# Patient Record
Sex: FEMALE | Race: BLACK OR AFRICAN AMERICAN | NOT HISPANIC OR LATINO | Employment: OTHER | URBAN - METROPOLITAN AREA
[De-identification: names, ages, dates, MRNs, and addresses within clinical notes are randomized per-mention and may not be internally consistent; named-entity substitution may affect disease eponyms.]

---

## 2017-01-08 RX ORDER — LOVASTATIN 20 MG/1
TABLET ORAL
Qty: 180 TABLET | Refills: 0 | Status: SHIPPED | OUTPATIENT
Start: 2017-01-08 | End: 2017-06-08 | Stop reason: SDUPTHER

## 2017-02-20 RX ORDER — GLIPIZIDE 5 MG/1
TABLET ORAL
Qty: 60 TABLET | Refills: 0 | Status: SHIPPED | OUTPATIENT
Start: 2017-02-20 | End: 2017-05-08

## 2017-02-20 RX ORDER — METFORMIN HYDROCHLORIDE 1000 MG/1
TABLET ORAL
Qty: 30 TABLET | Refills: 0 | Status: SHIPPED | OUTPATIENT
Start: 2017-02-20 | End: 2017-06-12 | Stop reason: SDUPTHER

## 2017-02-20 RX ORDER — LOSARTAN POTASSIUM 50 MG/1
TABLET ORAL
Qty: 30 TABLET | Refills: 1 | Status: SHIPPED | OUTPATIENT
Start: 2017-02-20 | End: 2017-05-10 | Stop reason: SDUPTHER

## 2017-02-23 ENCOUNTER — OFFICE VISIT (OUTPATIENT)
Dept: ORTHOPEDICS | Facility: CLINIC | Age: 82
End: 2017-02-23
Payer: MEDICARE

## 2017-02-23 VITALS
WEIGHT: 207.44 LBS | HEART RATE: 79 BPM | SYSTOLIC BLOOD PRESSURE: 131 MMHG | HEIGHT: 66 IN | BODY MASS INDEX: 33.34 KG/M2 | DIASTOLIC BLOOD PRESSURE: 72 MMHG

## 2017-02-23 DIAGNOSIS — M17.12 PRIMARY OSTEOARTHRITIS OF LEFT KNEE: Primary | ICD-10-CM

## 2017-02-23 DIAGNOSIS — S83.92XA SPRAIN OF KNEE/LEG, LEFT, INITIAL ENCOUNTER: ICD-10-CM

## 2017-02-23 PROCEDURE — 99212 OFFICE O/P EST SF 10 MIN: CPT | Mod: PBBFAC | Performed by: ORTHOPAEDIC SURGERY

## 2017-02-23 PROCEDURE — 99999 PR PBB SHADOW E&M-EST. PATIENT-LVL II: CPT | Mod: PBBFAC,,, | Performed by: ORTHOPAEDIC SURGERY

## 2017-02-23 PROCEDURE — 99213 OFFICE O/P EST LOW 20 MIN: CPT | Mod: S$PBB,,, | Performed by: ORTHOPAEDIC SURGERY

## 2017-02-23 NOTE — PROGRESS NOTES
CHIEF COMPLAINT: Left  Knee pain.                                                          HISTORY OF PRESENT ILLNESS:  The patient is a 84 y.o. female  who presents  for evaluation of her left knee pain. She twisted her knee 2 weeks ago with resultant posterior knee pain.  Pain has steadily improved.    Pain Duration: 2 weeks  Pain Quality: sharp  Pain Context:improving  Pain Timing: intermittent  Pain Location:posterior  Pain Severity: mild  Modifying Factors: tylenol helps  Associated Signs and Symptoms:none    She  presents for further treatment recommendations.    She denies radicular pain or low back pain.  She denies distal paresthesias, lower extremity edema, or calf pain concerning for vascular claudication.                                                                                                                 PAST MEDICAL HISTORY:    Past Medical History   Diagnosis Date    AR (allergic rhinitis)     Atherosclerosis of abdominal aorta      noted on CT scan 1/17/2011    Carpal tunnel syndrome of left wrist     Chronic kidney disease, stage 3     Diabetic peripheral neuropathy     Diverticulosis     History of colon polyps     History of diverticulitis of colon 1/2014    Hyperlipemia     Hypertension     Hypothyroidism      followed by endocrinology, Dr. Green    Mild aortic stenosis     OA (osteoarthritis) of knee     Obesity     Type II or unspecified type diabetes mellitus with neurological manifestations, uncontrolled                                                                                                             PAST SURGICAL HISTORY:    Past Surgical History   Procedure Laterality Date    Hysterectomy       partial    Lung biopsy  in her 40's    Cosmetic surgery      Total knee arthroplasty Right 6/13/2014                                                                                                               SOCIAL HISTORY:  Reviewed per EPIC history for  tobacco or alcohol use and she   is an active  84 y.o.  female                                                                             FAMILY MEDICAL HISTORY:  family history includes Arthritis in her brother and sister; Cancer in her mother; Diabetes in her brother and sister; Heart attack in her father; Heart disease in her father; Hypertension in her father and mother; Skin cancer in her brother. There is no history of Melanoma, Eczema, Lupus, Psoriasis, Breast cancer, or Colon cancer.                                                                                                                                                           PHYSICAL EXAMINATION:                                                        GENERAL:  A well-developed, well-nourished 84 y.o. female who is alert and       oriented in no acute distress.      Gait: She  walks with a normal gait.                   EXTREMITIES:  Examination of lower extremities reveals there is no visible mass or deformity.    Left knee:  ROM 5-120    Ligamentously stable to varus/valgus stress without pain.    Negative Sagar's    Anterior and posterior drawers negative.    No pain over pes bursa.    No warmth    No erythema    Effusion No      The skin over both lower extremities is normal and unremarkable.  She has a  painless range of motion of the hips and ankles bilaterally.   She has no calf tenderness to palpation nor edema.    I have reviewed her prior x-rays with her which show moderate DJD.  Do not feel new x-rays needed.                                                                               IMPRESSION: Osteoarthritis left knee with superimposed knee sprain.                             PLAN:  RICE and tylenol

## 2017-02-24 DIAGNOSIS — E11.9 TYPE 2 DIABETES MELLITUS WITHOUT COMPLICATION: ICD-10-CM

## 2017-03-16 DIAGNOSIS — E03.9 ACQUIRED HYPOTHYROIDISM: ICD-10-CM

## 2017-03-16 RX ORDER — LEVOTHYROXINE SODIUM 88 UG/1
88 TABLET ORAL DAILY
Qty: 90 TABLET | Refills: 0 | Status: SHIPPED | OUTPATIENT
Start: 2017-03-16 | End: 2017-06-16 | Stop reason: SDUPTHER

## 2017-03-17 DIAGNOSIS — E11.9 TYPE 2 DIABETES MELLITUS WITHOUT COMPLICATION: ICD-10-CM

## 2017-04-03 ENCOUNTER — TELEPHONE (OUTPATIENT)
Dept: ADMINISTRATIVE | Facility: HOSPITAL | Age: 82
End: 2017-04-03

## 2017-04-03 ENCOUNTER — OFFICE VISIT (OUTPATIENT)
Dept: FAMILY MEDICINE | Facility: CLINIC | Age: 82
End: 2017-04-03
Payer: MEDICARE

## 2017-04-03 ENCOUNTER — LAB VISIT (OUTPATIENT)
Dept: LAB | Facility: HOSPITAL | Age: 82
End: 2017-04-03
Attending: INTERNAL MEDICINE
Payer: MEDICARE

## 2017-04-03 VITALS
BODY MASS INDEX: 33.15 KG/M2 | DIASTOLIC BLOOD PRESSURE: 64 MMHG | HEART RATE: 73 BPM | WEIGHT: 206.25 LBS | HEIGHT: 66 IN | TEMPERATURE: 98 F | RESPIRATION RATE: 18 BRPM | SYSTOLIC BLOOD PRESSURE: 148 MMHG | OXYGEN SATURATION: 98 %

## 2017-04-03 DIAGNOSIS — M19.049 ARTHRITIS OF HAND: ICD-10-CM

## 2017-04-03 DIAGNOSIS — E03.9 HYPOTHYROIDISM (ACQUIRED): ICD-10-CM

## 2017-04-03 DIAGNOSIS — J06.9 UPPER RESPIRATORY TRACT INFECTION, UNSPECIFIED TYPE: Primary | ICD-10-CM

## 2017-04-03 DIAGNOSIS — Z12.11 SCREENING FOR COLON CANCER: ICD-10-CM

## 2017-04-03 DIAGNOSIS — N18.30 BENIGN HYPERTENSION WITH CHRONIC KIDNEY DISEASE, STAGE III: ICD-10-CM

## 2017-04-03 DIAGNOSIS — I12.9 BENIGN HYPERTENSION WITH CHRONIC KIDNEY DISEASE, STAGE III: ICD-10-CM

## 2017-04-03 DIAGNOSIS — E11.40 TYPE 2 DIABETES, CONTROLLED, WITH NEUROPATHY: ICD-10-CM

## 2017-04-03 DIAGNOSIS — E11.9 TYPE 2 DIABETES MELLITUS WITHOUT COMPLICATION: ICD-10-CM

## 2017-04-03 LAB
ALBUMIN SERPL BCP-MCNC: 4 G/DL
ALP SERPL-CCNC: 65 U/L
ALT SERPL W/O P-5'-P-CCNC: 10 U/L
ANION GAP SERPL CALC-SCNC: 7 MMOL/L
AST SERPL-CCNC: 16 U/L
BILIRUB SERPL-MCNC: 0.4 MG/DL
BUN SERPL-MCNC: 18 MG/DL
CALCIUM SERPL-MCNC: 9.8 MG/DL
CHLORIDE SERPL-SCNC: 105 MMOL/L
CHOLEST/HDLC SERPL: 2.9 {RATIO}
CO2 SERPL-SCNC: 31 MMOL/L
CREAT SERPL-MCNC: 0.9 MG/DL
EST. GFR  (AFRICAN AMERICAN): >60 ML/MIN/1.73 M^2
EST. GFR  (NON AFRICAN AMERICAN): 58.9 ML/MIN/1.73 M^2
GLUCOSE SERPL-MCNC: 83 MG/DL
HDL/CHOLESTEROL RATIO: 34.5 %
HDLC SERPL-MCNC: 194 MG/DL
HDLC SERPL-MCNC: 67 MG/DL
LDLC SERPL CALC-MCNC: 110 MG/DL
NONHDLC SERPL-MCNC: 127 MG/DL
POTASSIUM SERPL-SCNC: 4.1 MMOL/L
PROT SERPL-MCNC: 7.2 G/DL
SODIUM SERPL-SCNC: 143 MMOL/L
T3 SERPL-MCNC: 80 NG/DL
T4 FREE SERPL-MCNC: 1.05 NG/DL
TRIGL SERPL-MCNC: 85 MG/DL
TSH SERPL DL<=0.005 MIU/L-ACNC: 0.36 UIU/ML

## 2017-04-03 PROCEDURE — 80053 COMPREHEN METABOLIC PANEL: CPT

## 2017-04-03 PROCEDURE — 84439 ASSAY OF FREE THYROXINE: CPT

## 2017-04-03 PROCEDURE — 84480 ASSAY TRIIODOTHYRONINE (T3): CPT

## 2017-04-03 PROCEDURE — 84443 ASSAY THYROID STIM HORMONE: CPT

## 2017-04-03 PROCEDURE — 83036 HEMOGLOBIN GLYCOSYLATED A1C: CPT

## 2017-04-03 PROCEDURE — 36415 COLL VENOUS BLD VENIPUNCTURE: CPT | Mod: PO

## 2017-04-03 PROCEDURE — 99999 PR PBB SHADOW E&M-EST. PATIENT-LVL IV: CPT | Mod: PBBFAC,,, | Performed by: FAMILY MEDICINE

## 2017-04-03 PROCEDURE — 99214 OFFICE O/P EST MOD 30 MIN: CPT | Mod: S$PBB,,, | Performed by: FAMILY MEDICINE

## 2017-04-03 PROCEDURE — 80061 LIPID PANEL: CPT

## 2017-04-03 RX ORDER — LEVOCETIRIZINE DIHYDROCHLORIDE 5 MG/1
5 TABLET, FILM COATED ORAL NIGHTLY
Qty: 30 TABLET | Refills: 5 | Status: SHIPPED | OUTPATIENT
Start: 2017-04-03 | End: 2018-12-06

## 2017-04-03 RX ORDER — BENZONATATE 200 MG/1
200 CAPSULE ORAL 3 TIMES DAILY PRN
Qty: 45 CAPSULE | Refills: 1 | Status: SHIPPED | OUTPATIENT
Start: 2017-04-03 | End: 2017-10-26 | Stop reason: SDUPTHER

## 2017-04-03 RX ORDER — AZELASTINE 1 MG/ML
1 SPRAY, METERED NASAL 2 TIMES DAILY
Qty: 30 ML | Refills: 3 | Status: SHIPPED | OUTPATIENT
Start: 2017-04-03 | End: 2017-08-28

## 2017-04-03 NOTE — PATIENT INSTRUCTIONS
Please use flonase by these steps:    1. Point your head down to avoid swallowing the medication  2. Point the nozzle upward and slightly away from the center of your nose

## 2017-04-03 NOTE — PROGRESS NOTES
Chief Complaint   Patient presents with    URI     x 1 week    Medication Management       HPI  Tonya Cohn is a 84 y.o. female with multiple medical diagnoses as listed in the medical history and problem list that presents for evaluation for one week of dry cough that is worse at night and a 6 month follow up for hypertension, diabetes and hypothyroid.    She has not had fever or chills, no wheezing but just a persistent cough. She has been taking robitussin over the counter for this with little relief. No sinus pressure or pain.     She is due for lab work and would like to get this done today as she travels far to get here. Her sugars are normally controlled and she does not check them. She has also been having swelling of her right middle finger and some times she has to straighten it with the left hand. She has numbness in her left wrist but sleeps with a splint.    PAST MEDICAL HISTORY:  Past Medical History:   Diagnosis Date    AR (allergic rhinitis)     Atherosclerosis of abdominal aorta     noted on CT scan 1/17/2011    Carpal tunnel syndrome of left wrist     Chronic kidney disease, stage 3     Diabetic peripheral neuropathy     Diverticulosis     History of colon polyps     History of diverticulitis of colon 1/2014    Hyperlipemia     Hypertension     Hypothyroidism     followed by endocrinology, Dr. Green    Mild aortic stenosis     OA (osteoarthritis) of knee     Obesity     Type II or unspecified type diabetes mellitus with neurological manifestations, uncontrolled        PAST SURGICAL HISTORY:  Past Surgical History:   Procedure Laterality Date    COSMETIC SURGERY      HYSTERECTOMY      partial    LUNG BIOPSY  in her 40's    TOTAL KNEE ARTHROPLASTY Right 6/13/2014       SOCIAL HISTORY:  Social History     Social History    Marital status:      Spouse name: N/A    Number of children: 7    Years of education: N/A     Occupational History    retired - house  cleaning      Social History Main Topics    Smoking status: Former Smoker     Packs/day: 0.25     Years: 2.00     Quit date: 7/19/1962    Smokeless tobacco: Former User     Quit date: 7/14/1970    Alcohol use 1.2 oz/week     2 Cans of beer per week      Comment: occasionally    Drug use: No    Sexual activity: No     Other Topics Concern    Are You Pregnant Or Think You May Be? No    Breast-Feeding No     Social History Narrative       FAMILY HISTORY:  Family History   Problem Relation Age of Onset    Cancer Mother      shoulder tumor - cancer    Hypertension Mother     Heart disease Father      valve replacement    Hypertension Father     Heart attack Father     Diabetes Sister     Arthritis Sister      s/p knee surgery    Diabetes Brother     Arthritis Brother      back problems    Skin cancer Brother     Melanoma Neg Hx     Eczema Neg Hx     Lupus Neg Hx     Psoriasis Neg Hx     Breast cancer Neg Hx     Colon cancer Neg Hx        ALLERGIES AND MEDICATIONS: updated and reviewed.  Review of patient's allergies indicates:   Allergen Reactions    Lipitor [atorvastatin]      Current Outpatient Prescriptions   Medication Sig Dispense Refill    clobetasol 0.05% (TEMOVATE) 0.05 % Oint AAA on arms and legs BID x 1-2 wks then prn flares 60 g 2    desonide (DESOWEN) 0.05 % cream Apply topically 2 (two) times daily. 15 g 0    fish oil-fat acid comb.8-hb137 (OMEGA 3-6-9) 1,200 mg Cap       glipiZIDE (GLUCOTROL) 5 MG tablet TAKE 1 TABLET BY MOUTH TWICE DAILY WITH MEALS 60 tablet 0    levothyroxine (SYNTHROID) 88 MCG tablet Take 1 tablet (88 mcg total) by mouth once daily. 90 tablet 0    losartan (COZAAR) 50 MG tablet TAKE 1 TABLET BY MOUTH EVERY DAY 30 tablet 1    metformin (GLUCOPHAGE) 1000 MG tablet TAKE ONE-HALF TABLET BY MOUTH TWICE DAILY WITH MEALS 30 tablet 0    omeprazole (PRILOSEC) 20 MG capsule Take 2 capsules (40 mg total) by mouth once daily. 180 capsule 1    azelastine (ASTELIN) 137  "mcg (0.1 %) nasal spray 1 spray (137 mcg total) by Nasal route 2 (two) times daily. 30 mL 3    benzonatate (TESSALON) 200 MG capsule Take 1 capsule (200 mg total) by mouth 3 (three) times daily as needed for Cough. 45 capsule 1    levocetirizine (XYZAL) 5 MG tablet Take 1 tablet (5 mg total) by mouth every evening. 30 tablet 5    lovastatin (MEVACOR) 20 MG tablet TAKE 2 TABLETS BY MOUTH EVERY NIGHT AT BEDTIME 180 tablet 0     No current facility-administered medications for this visit.        ROS  Review of Systems   Constitutional: Negative for chills, diaphoresis, fatigue, fever and unexpected weight change.   HENT: Positive for congestion. Negative for rhinorrhea, sinus pressure, sore throat and tinnitus.    Eyes: Negative for photophobia and visual disturbance.   Respiratory: Positive for cough. Negative for shortness of breath and wheezing.    Cardiovascular: Negative for chest pain and palpitations.   Gastrointestinal: Negative for abdominal pain, blood in stool, constipation, diarrhea, nausea and vomiting.   Genitourinary: Negative for dysuria, flank pain, frequency and vaginal discharge.   Musculoskeletal: Negative for arthralgias and joint swelling.   Skin: Negative for rash.   Neurological: Negative for speech difficulty, weakness, light-headedness and headaches.   Psychiatric/Behavioral: Negative for behavioral problems and dysphoric mood.       Physical Exam  Vitals:    04/03/17 0913   BP: (!) 148/64   Pulse: 73   Resp: 18   Temp: 97.7 °F (36.5 °C)   TempSrc: Oral   SpO2: 98%   Weight: 93.6 kg (206 lb 3.9 oz)   Height: 5' 6" (1.676 m)    Body mass index is 33.29 kg/(m^2).  Weight: 93.6 kg (206 lb 3.9 oz)   Height: 5' 6" (167.6 cm)     Physical Exam   Constitutional: She is oriented to person, place, and time. She appears well-developed and well-nourished. No distress.   HENT:   Head: Normocephalic and atraumatic.   Nose: Mucosal edema present. Right sinus exhibits no maxillary sinus tenderness and no " frontal sinus tenderness. Left sinus exhibits no maxillary sinus tenderness and no frontal sinus tenderness.   Large amt of nasal turbinate swelling   Eyes: EOM are normal.   Neck: Neck supple.   Cardiovascular: Normal rate and regular rhythm.  Exam reveals no gallop and no friction rub.    No murmur heard.  Pulmonary/Chest: Effort normal and breath sounds normal. No respiratory distress. She has no wheezes. She has no rales.   Musculoskeletal:   Right middle finger with swelling and pain at DIP joint, sensation intact, no redness or warmth   Lymphadenopathy:     She has no cervical adenopathy.   Neurological: She is alert and oriented to person, place, and time.   Skin: Skin is warm and dry. No rash noted.   Psychiatric: She has a normal mood and affect. Her behavior is normal.   Nursing note and vitals reviewed.      Health Maintenance       Date Due Completion Date    Eye Exam 9/9/2016 9/9/2015 (Done)    Override on 9/9/2015: Done (No diabetic retinopathy)    Override on 8/14/2014: Done (no diabetic retinopathy; Dr. Lucero)    Override on 4/21/2014: Done (Pt states her eye exam is scheduled for next month)    Override on 4/1/2013: Done    Foot Exam 2/19/2017 2/19/2016    Override on 1/30/2015: Done (Mr. Davenport)    Hemoglobin A1c 2/19/2017 8/19/2016    Urine Microalbumin 2/19/2017 2/19/2016    Lipid Panel 2/26/2017 2/26/2016    Override on 2/26/2016: Done ([facility] - total 189, TG 62, , HDL 76)    Colonoscopy 3/21/2017 3/21/2012    Mammogram 5/30/2017 5/30/2016    DEXA SCAN 12/24/2017 12/24/2014    TETANUS VACCINE 4/3/2027 4/3/2017 (Declined)    Override on 4/3/2017: Declined            ASSESSMENT     1. Upper respiratory tract infection, unspecified type    2. Screening for colon cancer    3. Type 2 diabetes, controlled, with neuropathy    4. Benign hypertension with chronic kidney disease, stage III    5. Hypothyroidism (acquired)    6. Arthritis of hand        PLAN:     Upper  respiratory tract infection, unspecified type  -discussed how to use nasal spray  -     levocetirizine (XYZAL) 5 MG tablet; Take 1 tablet (5 mg total) by mouth every evening.  Dispense: 30 tablet; Refill: 5  -     azelastine (ASTELIN) 137 mcg (0.1 %) nasal spray; 1 spray (137 mcg total) by Nasal route 2 (two) times daily.  Dispense: 30 mL; Refill: 3  -     benzonatate (TESSALON) 200 MG capsule; Take 1 capsule (200 mg total) by mouth 3 (three) times daily as needed for Cough.  Dispense: 45 capsule; Refill: 1    Screening for colon cancer  -     Case request GI: COLONOSCOPY    Type 2 diabetes, controlled, with neuropathy  -     Ambulatory referral to Podiatry  -     Microalbumin/creatinine urine ratio; Future; Expected date: 4/3/17    Benign hypertension with chronic kidney disease, stage III  -Bp recheck, close follow up if elevated  -     Comprehensive metabolic panel; Future; Expected date: 4/3/17    Hypothyroidism (acquired)  -     TSH; Future; Expected date: 4/3/17  -     T3; Future; Expected date: 4/3/17  -     T4, free; Future; Expected date: 4/3/17    Arthritis of hand  -recommend she continue the topical cream she is using, may take tylenol extra strength    RTC prn, follow up with PCP in 6 mos    Alivia Shah MD  04/03/2017 9:57 AM        Return if symptoms worsen or fail to improve.

## 2017-04-03 NOTE — TELEPHONE ENCOUNTER
The patient's record was received in our office on today. Health Maintenance was updated.          A request was sent on today to Dr. Lucero's office for the patient's most recent diabetic eye exam. I spoke w/ Yenny, she will fax over the eye report on today.

## 2017-04-03 NOTE — MR AVS SNAPSHOT
[facility]  [address]  [place] [address]  Phone: [phone]  Fax: [phone]                  Tonya Cohn   4/3/2017 11:20 AM   Office Visit    Description:  Female : 1932   Provider:  Alivia Shah MD   Department:  [facility]           Reason for Visit     URI     Medication Management           Diagnoses this Visit        Comments    Upper respiratory tract infection, unspecified type    -  Primary     Screening for colon cancer         Type 2 diabetes, controlled, with neuropathy         Benign hypertension with chronic kidney disease, stage III                To Do List           Future Appointments        Provider Department Dept Phone    4/3/2017 11:20 AM Alivia Shah MD [facility] [phone]      Goals (5 Years of Data)              8/19/16    2/26/16    9/29/15    HEMOGLOBIN A1C < 7.0   6.2  6.1  6.0    Related Problems    Type 2 diabetes, controlled, with neuropathy      Follow-Up and Disposition     Return if symptoms worsen or fail to improve.       These Medications        Disp Refills Start End    levocetirizine (XYZAL) 5 MG tablet 30 tablet 5 4/3/2017 4/3/2018    Take 1 tablet (5 mg total) by mouth every evening. - Oral    Pharmacy: [facility] [address] [place] [address] AT [facility] [address] Ph #: [phone]       azelastine (ASTELIN) 137 mcg (0.1 %) nasal spray 30 mL 3 4/3/2017 4/3/2018    1 spray (137 mcg total) by Nasal route 2 (two) times daily. - Nasal    [facility] [address] [place] [address] [facility] [address] Ph #: [phone]       benzonatate (TESSALON) 200 MG capsule 45 capsule 1 4/3/2017 2017    Take 1 capsule (200 mg total) by mouth 3 (three) times daily as needed for Cough. - Oral    [facility] [address] [place], [place] [id number] AT [facility] [address] Ph #: [phone]          [facility]     [facility] -  Assistance  Unless otherwise directed by your provider, please contact [facility], our nurse care line that is available for  assistance.     Registered nurses in the [facility] provide: appointment scheduling, clinical advisement, health education, and other advisory services.  Call: [phone] (toll free)               Medications           Message regarding Medications     Verify the changes and/or additions to your medication regime listed below are the same as discussed with your clinician today.  If any of these changes or additions are incorrect, please notify your healthcare provider.        START taking these NEW medications        Refills    levocetirizine (XYZAL) 5 MG tablet 5    Sig: Take 1 tablet (5 mg total) by mouth every evening.    Class: Normal    Route: Oral    azelastine (ASTELIN) 137 mcg (0.1 %) nasal spray 3    Si spray (137 mcg total) by Nasal route 2 (two) times daily.    Class: Normal    Route: Nasal    benzonatate (TESSALON) 200 MG capsule 1    Sig: Take 1 capsule (200 mg total) by mouth 3 (three) times daily as needed for Cough.    Class: Normal    Route: Oral      STOP taking these medications     meloxicam (MOBIC) 7.5 MG tablet TAKE 1 TABLET BY MOUTH EVERY DAY           Verify that the below list of medications is an accurate representation of the medications you are currently taking.  If none reported, the list may be blank. If incorrect, please contact your healthcare provider. Carry this list with you in case of emergency.           Current Medications     azelastine (ASTELIN) 137 mcg (0.1 %) nasal spray 1 spray (137 mcg total) by Nasal route 2 (two) times daily.    benzonatate (TESSALON) 200 MG capsule Take 1 capsule (200 mg total) by mouth 3 (three) times daily as needed for Cough.    clobetasol 0.05% (TEMOVATE) 0.05 % Oint AAA on arms and legs BID x 1-2 wks then prn flares    desonide  "(DESOWEN) 0.05 % cream Apply topically 2 (two) times daily.    fish oil-fat acid comb.8-hb137 (OMEGA 3-6-9) 1,200 mg Cap     glipiZIDE (GLUCOTROL) 5 MG tablet TAKE 1 TABLET BY MOUTH TWICE DAILY WITH MEALS    levocetirizine (XYZAL) 5 MG tablet Take 1 tablet (5 mg total) by mouth every evening.    levothyroxine (SYNTHROID) 88 MCG tablet Take 1 tablet (88 mcg total) by mouth once daily.    losartan (COZAAR) 50 MG tablet TAKE 1 TABLET BY MOUTH EVERY DAY    lovastatin (MEVACOR) 20 MG tablet TAKE 2 TABLETS BY MOUTH EVERY NIGHT AT BEDTIME    metformin (GLUCOPHAGE) 1000 MG tablet TAKE ONE-HALF TABLET BY MOUTH TWICE DAILY WITH MEALS    omeprazole (PRILOSEC) 20 MG capsule Take 2 capsules (40 mg total) by mouth once daily.           Clinical Reference Information           Your Vitals Were     BP Pulse Temp Resp Height Weight    148/64 73 97.7 °F (36.5 °C) (Oral) 18 5' 6" (1.676 m) 93.6 kg (206 lb 3.9 oz)    SpO2 BMI             98% 33.29 kg/m2         Blood Pressure          Most Recent Value    BP  (!)  148/64      Allergies as of 4/3/2017     Lipitor [Atorvastatin]      Immunizations Administered on Date of Encounter - 4/3/2017     None      Orders Placed During Today's Visit      Normal Orders This Visit    Ambulatory referral to Podiatry     Case request GI: COLONOSCOPY     Future Labs/Procedures Expected by Expires    Microalbumin/creatinine urine ratio  4/3/2017 6/2/2018      [facility] Sign-Up     Activating your [facility] account is as easy as 1-2-3!     1) Visit [email or web address], select Sign Up Now, enter this activation code and your date of birth, then select Next.  Activation code not generated  Current Patient Portal Status: Account disabled      2) Create a username and password to use when you visit [facility] in the future and select a security question in case you lose your password and select Next.    3) Enter your e-mail address and click Sign Up!    Additional Information  If you have questions, please e-mail " [email or web address] or call [phone] to talk to our [facility] staff. Remember, [facility] is NOT to be used for urgent needs. For medical emergencies, dial 911.         Instructions    Please use flonase by these steps:    1. Point your head down to avoid swallowing the medication  2. Point the nozzle upward and slightly away from the center of your nose           Language Assistance Services     ATTENTION: Language assistance services are available, free of charge. Please call [phone].      ATENCIÓN: Si habla español, tiene a schofield disposición servicios gratuitos de asistencia lingüística. Llame al [phone].     CHÚ Ý: N?u b?n nói Ti?ng Vi?t, có các d?ch v? h? tr? ngôn ng? mi?n phí dành cho b?n. G?i s? [phone].         [facility] complies with applicable Federal civil rights laws and does not discriminate on the basis of race, color, national origin, age, disability, or sex.

## 2017-04-03 NOTE — TELEPHONE ENCOUNTER
----- Message from Alivia Shah MD sent at 4/3/2017 10:00 AM CDT -----  Regarding: med release for eye exam  Record request for Dr. Lucero for eye exam for Dr. Hughes patient    Alivia Shah MD

## 2017-04-04 LAB
ESTIMATED AVG GLUCOSE: 131 MG/DL
HBA1C MFR BLD HPLC: 6.2 %

## 2017-04-05 ENCOUNTER — TELEPHONE (OUTPATIENT)
Dept: FAMILY MEDICINE | Facility: CLINIC | Age: 82
End: 2017-04-05

## 2017-04-06 ENCOUNTER — TELEPHONE (OUTPATIENT)
Dept: FAMILY MEDICINE | Facility: CLINIC | Age: 82
End: 2017-04-06

## 2017-04-06 NOTE — TELEPHONE ENCOUNTER
Please let her know that her thyroid function is a little low, if she feels fine we do not need to adjust her medication, but if she is having sweats or palpitations, we can decrease her thyroid medication    Alivia Shah MD

## 2017-04-18 ENCOUNTER — TELEPHONE (OUTPATIENT)
Dept: PODIATRY | Facility: CLINIC | Age: 82
End: 2017-04-18

## 2017-04-18 NOTE — TELEPHONE ENCOUNTER
----- Message from Nicole Reid sent at 4/17/2017  4:39 PM CDT -----  Contact: self  Patient stated received a message to call the office.Patient states received call twice.Patient ask for a call.    Note: called ext [phone] [phone] no answer

## 2017-04-21 ENCOUNTER — OFFICE VISIT (OUTPATIENT)
Dept: FAMILY MEDICINE | Facility: CLINIC | Age: 82
End: 2017-04-21
Payer: MEDICARE

## 2017-04-21 VITALS
DIASTOLIC BLOOD PRESSURE: 74 MMHG | HEIGHT: 66 IN | OXYGEN SATURATION: 98 % | WEIGHT: 204.06 LBS | BODY MASS INDEX: 32.79 KG/M2 | TEMPERATURE: 98 F | SYSTOLIC BLOOD PRESSURE: 134 MMHG | HEART RATE: 82 BPM

## 2017-04-21 DIAGNOSIS — M75.82 ROTATOR CUFF TENDONITIS, LEFT: Primary | ICD-10-CM

## 2017-04-21 PROCEDURE — 99213 OFFICE O/P EST LOW 20 MIN: CPT | Mod: S$PBB,,, | Performed by: INTERNAL MEDICINE

## 2017-04-21 PROCEDURE — 99213 OFFICE O/P EST LOW 20 MIN: CPT | Mod: PBBFAC,PO | Performed by: INTERNAL MEDICINE

## 2017-04-21 PROCEDURE — 99999 PR PBB SHADOW E&M-EST. PATIENT-LVL III: CPT | Mod: PBBFAC,,, | Performed by: INTERNAL MEDICINE

## 2017-04-21 RX ORDER — CYCLOBENZAPRINE HCL 5 MG
5 TABLET ORAL NIGHTLY
Qty: 7 TABLET | Refills: 0 | Status: SHIPPED | OUTPATIENT
Start: 2017-04-21 | End: 2017-04-28

## 2017-04-21 NOTE — PROGRESS NOTES
Assessment & Plan  Rotator cuff tendonitis, left side.  Home PT handout provided.  Cool packs.  She's requesting muscle relaxer.  Hx of flexeril, refilled but at 5 mg, cautioned about sedation and drowsiness with this medication.    Other orders  -     cyclobenzaprine (FLEXERIL) 5 MG tablet; Take 1 tablet (5 mg total) by mouth nightly.  Dispense: 7 tablet; Refill: 0      There are no discontinued medications.    Follow-up: No Follow-up on file.      =================================================================      Chief Complaint   Patient presents with    Shoulder Pain     left       HPI  Tonya is a 84 y.o. female, last appointment with this clinic was 4/3/2017.    No LMP recorded. Patient has had a hysterectomy.    Patient of Dr. Hughes.  Followed for diabetes with neuropathy, chronic kidney disease, hypertension, hyperlipidemia, hypothyroid    For the past week and a half - L shoulder pain.  Some sensation in the neck of pulling - pointing to the trapezius.  Does not do heavy lifting but does stay physically active.  Not a lot of pushing and pulling. Not constant.  No radiation distally.  No issues with shoulder before.  Stomach is fine.  No abd pain, bloating.  Tylenol with some relief.  Notes no chronic neck pain/stiffness.  XR done 2013 with facet hypertrophy.    Patient Care Team:  Tere Hughes MD as PCP - General (Internal Medicine)    Patient Active Problem List    Diagnosis Date Noted    Atherosclerosis of abdominal aorta      noted on CT scan 1/17/2011      Mild aortic stenosis      - followed by cardiology,  Dr. Whitfield      Tachycardia 08/05/2014    Heart murmur 07/02/2014    Diverticulosis     History of colon polyps     AR (allergic rhinitis)     DJD (degenerative joint disease), cervical 01/10/2014    Spondylolisthesis, grade 1 12/04/2013    DDD (degenerative disc disease), lumbar 11/12/2013    GERD (gastroesophageal reflux disease) 09/30/2013    Obesity, Class II, BMI 35-39.9,  with comorbidity     Benign hypertension with chronic kidney disease, stage III 09/16/2012    Hyperlipidemia LDL goal <100 09/16/2012    Type 2 diabetes, controlled, with neuropathy 09/16/2012    Hypothyroidism (acquired) 09/16/2012    Primary osteoarthritis of both knees 07/19/2012       PAST MEDICAL HISTORY:  Past Medical History:   Diagnosis Date    AR (allergic rhinitis)     Atherosclerosis of abdominal aorta     noted on CT scan 1/17/2011    Carpal tunnel syndrome of left wrist     Chronic kidney disease, stage 3     Diabetic peripheral neuropathy     Diverticulosis     History of colon polyps     History of diverticulitis of colon 1/2014    Hyperlipemia     Hypertension     Hypothyroidism     followed by endocrinology, Dr. Green    Mild aortic stenosis     OA (osteoarthritis) of knee     Obesity     Type II or unspecified type diabetes mellitus with neurological manifestations, uncontrolled        PAST SURGICAL HISTORY:  Past Surgical History:   Procedure Laterality Date    COSMETIC SURGERY      HYSTERECTOMY      partial    LUNG BIOPSY  in her 40's    TOTAL KNEE ARTHROPLASTY Right 6/13/2014       SOCIAL HISTORY:  Social History     Social History    Marital status:      Spouse name: N/A    Number of children: 7    Years of education: N/A     Occupational History    retired - house cleaning      Social History Main Topics    Smoking status: Former Smoker     Packs/day: 0.25     Years: 2.00     Quit date: 7/19/1962    Smokeless tobacco: Former User     Quit date: 7/14/1970    Alcohol use 1.2 oz/week     2 Cans of beer per week      Comment: occasionally    Drug use: No    Sexual activity: No     Other Topics Concern    Are You Pregnant Or Think You May Be? No    Breast-Feeding No     Social History Narrative       ALLERGIES AND MEDICATIONS: updated and reviewed.  Review of patient's allergies indicates:   Allergen Reactions    Lipitor [atorvastatin]      Current  "Outpatient Prescriptions   Medication Sig Dispense Refill    azelastine (ASTELIN) 137 mcg (0.1 %) nasal spray 1 spray (137 mcg total) by Nasal route 2 (two) times daily. 30 mL 3    clobetasol 0.05% (TEMOVATE) 0.05 % Oint AAA on arms and legs BID x 1-2 wks then prn flares 60 g 2    fish oil-fat acid comb.8-hb137 (OMEGA 3-6-9) 1,200 mg Cap       glipiZIDE (GLUCOTROL) 5 MG tablet TAKE 1 TABLET BY MOUTH TWICE DAILY WITH MEALS 60 tablet 0    levocetirizine (XYZAL) 5 MG tablet Take 1 tablet (5 mg total) by mouth every evening. 30 tablet 5    levothyroxine (SYNTHROID) 88 MCG tablet Take 1 tablet (88 mcg total) by mouth once daily. 90 tablet 0    losartan (COZAAR) 50 MG tablet TAKE 1 TABLET BY MOUTH EVERY DAY 30 tablet 1    lovastatin (MEVACOR) 20 MG tablet TAKE 2 TABLETS BY MOUTH EVERY NIGHT AT BEDTIME 180 tablet 0    metformin (GLUCOPHAGE) 1000 MG tablet TAKE ONE-HALF TABLET BY MOUTH TWICE DAILY WITH MEALS 30 tablet 0    omeprazole (PRILOSEC) 20 MG capsule Take 2 capsules (40 mg total) by mouth once daily. 180 capsule 1    desonide (DESOWEN) 0.05 % cream Apply topically 2 (two) times daily. 15 g 0     No current facility-administered medications for this visit.        Review of Systems   Constitutional: Negative for chills and fever.   Gastrointestinal: Negative for abdominal pain.   Musculoskeletal: Positive for joint pain and neck pain.       Physical Exam   Vitals:    04/21/17 1041   BP: 134/74   Pulse: 82   Temp: 97.8 °F (36.6 °C)   SpO2: 98%   Weight: 92.5 kg (204 lb 0.6 oz)   Height: 5' 6" (1.676 m)    Body mass index is 32.93 kg/(m^2).  Weight: 92.5 kg (204 lb 0.6 oz)   Height: 5' 6" (167.6 cm)     Physical Exam   Constitutional: She is oriented to person, place, and time. She appears well-developed and well-nourished. No distress.   Eyes: EOM are normal.   Cardiovascular: Normal rate and regular rhythm.    No murmur heard.  Crescendo decrescendo murmur in the left upper sternal border   Abdominal: Soft. " There is no tenderness.   Musculoskeletal: She exhibits no edema.   Left shoulder mild TTP.  Pain with Neer and shelton maneuvers; empty can maneuver with pain; internal rotation with pain.  No deformity no swelling.   Neurological: She is alert and oriented to person, place, and time. Coordination normal.   Skin: Skin is warm and dry.   Psychiatric: She has a normal mood and affect. Her behavior is normal. Thought content normal.

## 2017-04-21 NOTE — PATIENT INSTRUCTIONS

## 2017-04-21 NOTE — MR AVS SNAPSHOT
[facility]  [address]  [place] [address]  Phone: [phone]  Fax: [phone]                  Tonya Cohn   2017 10:40 AM   Office Visit    Description:  Female : 1932   Provider:  Miguel Burks MD   Department:  [facility]           Reason for Visit     Shoulder Pain           Diagnoses this Visit        Comments    Rotator cuff tendonitis, left    -  Primary            To Do List           Future Appointments        Provider Department Dept Phone    2017 9:30 AM Yohana Goldman DPM [facility] Podiatry [phone]    2017 9:45 AM Svetlana Guzman MD [facility] Dermatology [phone]      Goals (5 Years of Data)              4/3/17    8/19/16    2/26/16    HEMOGLOBIN A1C < 7.0   6.2  6.2  6.1    Related Problems    Type 2 diabetes, controlled, with neuropathy       These Medications        Disp Refills Start End    cyclobenzaprine (FLEXERIL) 5 MG tablet 7 tablet 0 2017    Take 1 tablet (5 mg total) by mouth nightly. - Oral    Pharmacy: [facility] [address] [place] [address] AT [facility] [address] & [facility] Ph #: [phone]         [facility] On [facility]     [facility] On [facility] Nurse Care Line - 24/ Assistance  Unless otherwise directed by your provider, please contact [facility], our nurse care line that is available for  assistance.     Registered nurses in the [facility] provide: appointment scheduling, clinical advisement, health education, and other advisory services.  Call: [phone] (toll free)               Medications           Message regarding Medications     Verify the changes and/or additions to your medication regime listed below are the same as discussed with your clinician today.  If any of these changes or additions are incorrect, please notify your healthcare provider.        START taking these NEW medications        Refills    cyclobenzaprine (FLEXERIL) 5 MG  "tablet 0    Sig: Take 1 tablet (5 mg total) by mouth nightly.    Class: Normal    Route: Oral           Verify that the below list of medications is an accurate representation of the medications you are currently taking.  If none reported, the list may be blank. If incorrect, please contact your healthcare provider. Carry this list with you in case of emergency.           Current Medications     azelastine (ASTELIN) 137 mcg (0.1 %) nasal spray 1 spray (137 mcg total) by Nasal route 2 (two) times daily.    clobetasol 0.05% (TEMOVATE) 0.05 % Oint AAA on arms and legs BID x 1-2 wks then prn flares    fish oil-fat acid comb.8-hb137 (OMEGA 3-6-9) 1,200 mg Cap     glipiZIDE (GLUCOTROL) 5 MG tablet TAKE 1 TABLET BY MOUTH TWICE DAILY WITH MEALS    levocetirizine (XYZAL) 5 MG tablet Take 1 tablet (5 mg total) by mouth every evening.    levothyroxine (SYNTHROID) 88 MCG tablet Take 1 tablet (88 mcg total) by mouth once daily.    losartan (COZAAR) 50 MG tablet TAKE 1 TABLET BY MOUTH EVERY DAY    lovastatin (MEVACOR) 20 MG tablet TAKE 2 TABLETS BY MOUTH EVERY NIGHT AT BEDTIME    metformin (GLUCOPHAGE) 1000 MG tablet TAKE ONE-HALF TABLET BY MOUTH TWICE DAILY WITH MEALS    omeprazole (PRILOSEC) 20 MG capsule Take 2 capsules (40 mg total) by mouth once daily.    cyclobenzaprine (FLEXERIL) 5 MG tablet Take 1 tablet (5 mg total) by mouth nightly.    desonide (DESOWEN) 0.05 % cream Apply topically 2 (two) times daily.           Clinical Reference Information           Your Vitals Were     BP Pulse Temp Height Weight SpO2    134/74 82 97.8 °F (36.6 °C) 5' 6" (1.676 m) 92.5 kg (204 lb 0.6 oz) 98%    BMI                32.93 kg/m2          Blood Pressure          Most Recent Value    BP  134/74      Allergies as of 4/21/2017     Lipitor [Atorvastatin]      Immunizations Administered on Date of Encounter - 4/21/2017     None      Instructions      R.I.C.E.    R.I.C.E. stands for Rest, Ice, Compression, and Elevation. Doing these things " helps limit pain and swelling after an injury. R.I.C.E. also helps injuries heal faster. Use R.I.C.E. for sprains, strains, and severe bruises or bumps. Follow the tips on this handout and begin R.I.C.E. as soon as possible after an injury.  ? Rest  Pain is your bodys way of telling you to rest an injured area. Whether you have hurt an elbow, hand, foot, or knee, limiting its use will prevent further injury and help you heal.  ? Ice  Applying ice right after an injury helps prevent swelling and reduce pain. Dont place ice directly on your skin.  · Wrap a cold pack or bag of ice in a thin cloth. Place it over the injured area.  · Ice for 10 minutes every 3 hours. Dont ice for more than 20 minutes at a time.  ? Compression  Putting pressure (compression) on an injury helps prevent swelling and provides support.  · Wrap the injured area firmly with an elastic bandage. If your hand or foot tingles, becomes discolored, or feels cold to the touch, the bandage may be too tight. Rewrap it more loosely.  · If your bandage becomes too loose, rewrap it.  · Do not wear an elastic bandage overnight.  ? Elevation  Keeping an injury elevated helps reduce swelling, pain, and throbbing. Elevation is most effective when the injury is kept elevated higher than the heart.     Call your healthcare provider if you notice any of the following:  · Fingers or toes feel numb, are cold to the touch, or change color  · Skin looks shiny or tight  · Pain, swelling, or bruising worsens and is not improved with elevation   Date Last Reviewed: 9/3/2015  © 6503-6668 [employer]. [address] [place] [address], [place] [address]. All rights reserved. This information is not intended as a substitute for professional medical care. Always follow your healthcare professional's instructions.             Language Assistance Services     ATTENTION: Language assistance services are available, free of charge. Please call [phone].       ATENCIÓN: Si habla español, tiene a schofield disposición servicios gratuitos de asistencia lingüística. Ricky al [phone].     CHÚ Ý: N?u b?n nói Ti?ng Vi?t, có các d?ch v? h? tr? ngôn ng? mi?n phí dành cho b?n. G?i s? [phone].         [facility] complies with applicable Federal civil rights laws and does not discriminate on the basis of race, color, national origin, age, disability, or sex.

## 2017-05-01 ENCOUNTER — OFFICE VISIT (OUTPATIENT)
Dept: PODIATRY | Facility: CLINIC | Age: 82
End: 2017-05-01
Payer: MEDICARE

## 2017-05-01 VITALS
BODY MASS INDEX: 33.21 KG/M2 | SYSTOLIC BLOOD PRESSURE: 122 MMHG | DIASTOLIC BLOOD PRESSURE: 70 MMHG | HEART RATE: 74 BPM | HEIGHT: 66 IN | WEIGHT: 206.63 LBS

## 2017-05-01 DIAGNOSIS — B35.1 DERMATOPHYTOSIS OF NAIL: ICD-10-CM

## 2017-05-01 DIAGNOSIS — L84 PRE-ULCERATIVE CORN OR CALLOUS: ICD-10-CM

## 2017-05-01 DIAGNOSIS — E11.40 TYPE 2 DIABETES, CONTROLLED, WITH NEUROPATHY: Primary | ICD-10-CM

## 2017-05-01 PROCEDURE — 11055 PARING/CUTG B9 HYPRKER LES 1: CPT | Mod: Q9,59,PBBFAC | Performed by: PODIATRIST

## 2017-05-01 PROCEDURE — 99213 OFFICE O/P EST LOW 20 MIN: CPT | Mod: PBBFAC | Performed by: PODIATRIST

## 2017-05-01 PROCEDURE — 99999 PR PBB SHADOW E&M-EST. PATIENT-LVL III: CPT | Mod: PBBFAC,,, | Performed by: PODIATRIST

## 2017-05-01 PROCEDURE — 99499 UNLISTED E&M SERVICE: CPT | Mod: S$PBB,,, | Performed by: PODIATRIST

## 2017-05-01 PROCEDURE — 11721 DEBRIDE NAIL 6 OR MORE: CPT | Mod: 59,Q9,S$PBB, | Performed by: PODIATRIST

## 2017-05-01 NOTE — PROGRESS NOTES
CC:     Foot exam       HPI:   The patient is a 84 y.o.  female  who presents for a diabetic foot exam.     Patient reports no presence of abnormal sensation to the feet .    History of diabetic foot ulcers: none   History of foot surgery: none.     Shoes worn today:  Slip on casual lace up shoes.  She states they're comfortable on her left 5th toe, painful when in tight shoes though.   She is requesting toenail debridement today.       Primary care doctor is: Tere Hughes MD  Patient last saw primary care doctor on:   8/19/16  HEMOGLOBIN A1C   Date Value Ref Range Status   08/19/2016 6.2 4.5 - 6.2 % Final   02/26/2016 6.1 (H) <5.7 Final   09/29/2015 6.0 4.5 - 6.2 % Final           Past Medical History:   Diagnosis Date    AR (allergic rhinitis)     Atherosclerosis of abdominal aorta     noted on CT scan 1/17/2011    Carpal tunnel syndrome of left wrist     Chronic kidney disease, stage 3     Diabetic peripheral neuropathy     Diverticulosis     History of colon polyps     History of diverticulitis of colon 1/2014    Hyperlipemia     Hypertension     Hypothyroidism     followed by endocrinology, Dr. Green    Mild aortic stenosis     OA (osteoarthritis) of knee     Obesity     Type II or unspecified type diabetes mellitus with neurological manifestations, uncontrolled          Current Outpatient Prescriptions on File Prior to Visit   Medication Sig Dispense Refill    azelastine (ASTELIN) 137 mcg (0.1 %) nasal spray 1 spray (137 mcg total) by Nasal route 2 (two) times daily. 30 mL 3    clobetasol 0.05% (TEMOVATE) 0.05 % Oint AAA on arms and legs BID x 1-2 wks then prn flares 60 g 2    fish oil-fat acid comb.8-hb137 (OMEGA 3-6-9) 1,200 mg Cap       glipiZIDE (GLUCOTROL) 5 MG tablet TAKE 1 TABLET BY MOUTH TWICE DAILY WITH MEALS 60 tablet 0    levocetirizine (XYZAL) 5 MG tablet Take 1 tablet (5 mg total) by mouth every evening. 30 tablet 5    levothyroxine (SYNTHROID) 88 MCG tablet Take 1  "tablet (88 mcg total) by mouth once daily. 90 tablet 0    losartan (COZAAR) 50 MG tablet TAKE 1 TABLET BY MOUTH EVERY DAY 30 tablet 1    lovastatin (MEVACOR) 20 MG tablet TAKE 2 TABLETS BY MOUTH EVERY NIGHT AT BEDTIME 180 tablet 0    metformin (GLUCOPHAGE) 1000 MG tablet TAKE ONE-HALF TABLET BY MOUTH TWICE DAILY WITH MEALS 30 tablet 0    omeprazole (PRILOSEC) 20 MG capsule Take 2 capsules (40 mg total) by mouth once daily. 180 capsule 1    desonide (DESOWEN) 0.05 % cream Apply topically 2 (two) times daily. 15 g 0     No current facility-administered medications on file prior to visit.          Review of patient's allergies indicates:   Allergen Reactions    Lipitor [atorvastatin]              ROS:  General ROS: negative  Respiratory ROS: no cough, shortness of breath, or wheezing  Cardiovascular ROS: no chest pain or dyspnea on exertion  Musculoskeletal ROS: negative  Neurological ROS:   negative for - numbness/tingling  Dermatological ROS: negative          EXAM:   Vitals:    05/01/17 0930   BP: 122/70   Pulse: 74   Weight: 93.7 kg (206 lb 9.6 oz)   Height: 5' 6" (1.676 m)       General: alert, no distress, cooperative    Vascular:   Dorsalis pedis:   1+ bilateral.   Posterior Tibial:   1+ bilateral.   3 seconds capillary refill time   Temperature of toes are cool to touch.   normal hair growth on the feet.    Edema on feet:   Trace and non-pitting   Varicosities:  none    Dermatological:    Skin: thin and atrophic,  dry  Nails: toenails 1-5 L and 1-5 R  are onychomycosis of the toenails, onycholysis (right hallux) and dystrophic nails, elongated and thick by 1-2mm with subungual debris.    Callus:  None  Open Wounds: None  Ecchymoses is not observed.      Erythema:  none .     Interdigital spaces: clean, dry and without evidence of break in skin integrity      Neurological:    Sharp dull - B/L normal and light touch - B/L normal  Vibratory - normal  [place] diminished      Musculoskeletal:     Muscle " strength: 5/5, adequate ROM, adequate strength     Right foot:  no gross deformity   Left foot: right 5th hammertoe, rigid contracture at the distal interphalangeal joint and proximal interphalangeal joint              ASSESSMENT/PLAN     I counseled the patient on her conditions, their implications and medical management.     There are no diagnoses linked to this encounter.     See Foot Care report under the Procedures tab.     No Follow-up on file.

## 2017-05-01 NOTE — PROGRESS NOTES
CC:     Foot exam       HPI:   The patient is a 84 y.o.  female  who presents for a diabetic foot exam.     Patient reports no presence of abnormal sensation to the feet .    History of diabetic foot ulcers: none   History of foot surgery: none.     Shoes worn today:  Dress flats.   She does have diabetes shoes at home that she wears for walking long distance.    She complains of pain on the left 5th toe.  Has history of pain in this area.  She is also requesting toenail debridement today.         Primary care doctor is: Tere Hughes MD  Patient last saw primary care doctor on:   4/3/17  Internal medicine  HEMOGLOBIN A1C   Date Value Ref Range Status   08/19/2016 6.2 4.5 - 6.2 % Final   02/26/2016 6.1 (H) <5.7 Final   09/29/2015 6.0 4.5 - 6.2 % Final           Past Medical History:   Diagnosis Date    AR (allergic rhinitis)     Atherosclerosis of abdominal aorta     noted on CT scan 1/17/2011    Carpal tunnel syndrome of left wrist     Chronic kidney disease, stage 3     Diabetic peripheral neuropathy     Diverticulosis     History of colon polyps     History of diverticulitis of colon 1/2014    Hyperlipemia     Hypertension     Hypothyroidism     followed by endocrinology, Dr. Green    Mild aortic stenosis     OA (osteoarthritis) of knee     Obesity     Type II or unspecified type diabetes mellitus with neurological manifestations, uncontrolled          Current Outpatient Prescriptions on File Prior to Visit   Medication Sig Dispense Refill    azelastine (ASTELIN) 137 mcg (0.1 %) nasal spray 1 spray (137 mcg total) by Nasal route 2 (two) times daily. 30 mL 3    clobetasol 0.05% (TEMOVATE) 0.05 % Oint AAA on arms and legs BID x 1-2 wks then prn flares 60 g 2    fish oil-fat acid comb.8-hb137 (OMEGA 3-6-9) 1,200 mg Cap       glipiZIDE (GLUCOTROL) 5 MG tablet TAKE 1 TABLET BY MOUTH TWICE DAILY WITH MEALS 60 tablet 0    levocetirizine (XYZAL) 5 MG tablet Take 1 tablet (5 mg total) by mouth  "every evening. 30 tablet 5    levothyroxine (SYNTHROID) 88 MCG tablet Take 1 tablet (88 mcg total) by mouth once daily. 90 tablet 0    losartan (COZAAR) 50 MG tablet TAKE 1 TABLET BY MOUTH EVERY DAY 30 tablet 1    lovastatin (MEVACOR) 20 MG tablet TAKE 2 TABLETS BY MOUTH EVERY NIGHT AT BEDTIME 180 tablet 0    metformin (GLUCOPHAGE) 1000 MG tablet TAKE ONE-HALF TABLET BY MOUTH TWICE DAILY WITH MEALS 30 tablet 0    omeprazole (PRILOSEC) 20 MG capsule Take 2 capsules (40 mg total) by mouth once daily. 180 capsule 1    desonide (DESOWEN) 0.05 % cream Apply topically 2 (two) times daily. 15 g 0     No current facility-administered medications on file prior to visit.          Review of patient's allergies indicates:   Allergen Reactions    Lipitor [atorvastatin]              ROS:  General ROS: negative  Respiratory ROS: no cough, shortness of breath, or wheezing  Cardiovascular ROS: no chest pain or dyspnea on exertion  Musculoskeletal ROS: negative  Neurological ROS:   negative for - numbness/tingling  Dermatological ROS: negative          EXAM:   Vitals:    05/01/17 0930   BP: 122/70   Pulse: 74   Weight: 93.7 kg (206 lb 9.6 oz)   Height: 5' 6" (1.676 m)       General: alert, no distress, cooperative    Vascular:   Dorsalis pedis:   1+ bilateral.   Posterior Tibial:   1+ bilateral.   3 seconds capillary refill time   Temperature of toes are cool to touch.   normal hair growth on the feet.    Edema on feet:   Trace and non-pitting   Varicosities:  none    Dermatological:    Skin: thin and atrophic,  dry  Nails: toenails 1-5 L and 1-5 R  are onychomycosis of the toenails, onycholysis (right hallux) and dystrophic nails, elongated and thick by 1-2mm with subungual debris.    Callus:  left 5th toe at the proximal interphalangeal joint   Open Wounds: None  Ecchymoses is not observed.      Erythema:  none .     Interdigital spaces: clean, dry and without evidence of break in skin integrity      Neurological:    Sharp " dull - B/L normal and light touch - B/L normal  Vibratory - normal  [place] diminished      Musculoskeletal:     Muscle strength: 5/5, adequate ROM, adequate strength     Right foot:  no gross deformity   Left foot: left 5th hammertoe, rigid contracture at the distal interphalangeal joint and proximal interphalangeal joint              ASSESSMENT/PLAN     I counseled the patient on her conditions, their implications and medical management.     Type 2 diabetes, controlled, with neuropathy    Dermatophytosis of nail    Pre-ulcerative corn or callous      Routine Foot Care  Date/Time: 5/1/2017 10:12 AM  Performed by: DAISY ASHLEY  Authorized by: DAISY ASHLEY     Consent Done?:  Yes (Verbal)  Hyperkeratotic Skin Lesions?: Yes    Number of trimmed lesions:  1  Location(s):  Left 5th Toe    Nail Care Type:  Debride  Location(s): All  (Left 1st Toe, Left 3rd Toe, Left 2nd Toe, Left 4th Toe, Left 5th Toe, Right 1st Toe, Right 2nd Toe, Right 3rd Toe, Right 4th Toe and Right 5th Toe)  Patient tolerance:  Patient tolerated the procedure well with no immediate complications      Wider shoes  Moisturize feet daily  Patient defers surgical intervention for correction of left 5th toe.   Return in about 6 months (around 11/1/2017) for diabetic foot exam, or sooner if concerned.

## 2017-05-01 NOTE — PATIENT INSTRUCTIONS
Your a1c    Hemoglobin A1C   Date Value Ref Range Status   04/03/2017 6.2 4.5 - 6.2 % Final     Comment:     According to ADA guidelines, hemoglobin A1C <7.0% represents  optimal control in non-pregnant diabetic patients.  Different  metrics may apply to specific populations.   Standards of Medical Care in Diabetes - 2016.  For the purpose of screening for the presence of diabetes:  <5.7%     Consistent with the absence of diabetes  5.7-6.4%  Consistent with increasing risk for diabetes   (prediabetes)  >or=6.5%  Consistent with diabetes  Currently no consensus exists for use of hemoglobin A1C  for diagnosis of diabetes for children.     08/19/2016 6.2 4.5 - 6.2 % Final     Comment:     According to ADA guidelines, hemoglobin A1C <7.0% represents  optimal control in non-pregnant diabetic patients.  Different  metrics may apply to specific populations.   Standards of Medical Care in Diabetes - 2016.  For the purpose of screening for the presence of diabetes:  <5.7%     Consistent with the absence of diabetes  5.7-6.4%  Consistent with increasing risk for diabetes   (prediabetes)  >or=6.5%  Consistent with diabetes  Currently no consensus exists for use of hemoglobin A1C  for diagnosis of diabetes for children.     02/26/2016 6.1 (H) <5.7 Final     Comment:     Units:  % of total Hgb  According to ADA guidelines, hemoglobin A1c <7.0%  represents optimal control in non-pregnant diabetic  patients. Different metrics may apply to specific  patient populations. Standards of Medical Care in  Diabetes-2013. Diabetes Care. 2013;36:s11-s66  For the purpose of screening for the presence of  diabetes  <5.7%       Consistent with the absence of diabetes  5.7-6.4%    Consistent with increased risk for diabetes  (prediabetes)  >or=6.5%    Consistent with diabetes  This assay result is consistent with a higher risk  of diabetes.  Currently, no consensus exists for use of hemoglobin  A1c for diagnosis of diabetes for children.  Test  Performed at:  [facility]  [address] [place]  [address]     RUKHSANA LI MD         How to Check Your Feet    Below are tips to help you look for foot problems. Try to check your feet at the same time each day, such as when you get out of bed in the morning.    · Check the top of each foot. The tops of toes, back of the heel, and outer edge of the foot can get a lot of rubbing from poor-fitting shoes.    · Check the bottom of each foot. Daily wear and tear often leads to problems at pressure spots.    · Check the toes and nails. Fungal infections often occur between toes. Toenail problems can also be a sign of fungal infections or lead to breaks in the skin.    · Check your shoes, too. Loose objects inside a shoe can injure the foot. Use your hand to feel inside your shoes for things like reji, loose stitching, or rough areas that could irritate your skin.        Diabetic Foot Care    Diabetes can lead to a number of different foot complications. Fortunately, most of these complications can be prevented with a little extra foot care. If diabetes is not well controlled, the high blood sugar can cause damage to blood vessels and result in poor circulation to the foot. When the skin does not get enough blood flow, it becomes prone to pressure sores and ulcers, which heal slowly.  High blood sugar can also damage nerves, interfering with the ability to feel pain and pressure. When you cant feel your foot normally, it is easy to injure your skin, bones and joints without knowing it. For these reasons diabetes increases the risk of fungal infections, bunions and ulcers. Deep ulcers can lead to bone infection. Gangrene is the most serious foot complication of diabetes. It usually occurs on the tips of the toes as blacked areas of skin. The black area is dead tissue. In severe cases, gangrene spreads to involve the entire toe, other toes and the entire foot. Foot or toe amputation may be  required. Good foot care and blood sugar control can prevent this.    Home Care  1. Wear comfortable, proper fitting shoes.  2. Wash your feet daily with warm water and mild soap.  3. After drying, apply a moisturizing cream or lotion.  4. Check your feet daily for skin breaks, blisters, swelling, or redness. Look between your toes also.  5. Wear cotton socks and change them every day.  6. Trim toe nails carefully and do not cut your cuticles.  7. Strive to keep your blood sugar under control with a combination of medicines, diet and activity.  8. If you smoke and have diabetes, it is very important that you stop. Smoking reduces blood flow to your foot.  9. Avoid activities that increase your risk of foot injury:  · Do not walk barefoot.  · Do not use heating pads or hot water bottles on your feet.  · Do not put your foot in a hot tub without first checking the temperature with your hand.  10) Schedule yearly foot exams.    Follow Up  with your doctor or as advised by our staff. Report any cut, puncture, scrape, other injury, blister, ingrown toenail or ulcer on your foot.    Get Prompt Medical Attention  if any of the following occur:  -- Open ulcer with pus draining from the wound  -- Increasing foot or leg pain  -- New areas of redness or swelling or tender areas of the foot    © 4670-1483 [employer]. [address] [place] [address]. All rights reserved. This information is not intended as a substitute for professional medical care. Always follow your healthcare professional's instructions.

## 2017-05-02 ENCOUNTER — OFFICE VISIT (OUTPATIENT)
Dept: DERMATOLOGY | Facility: CLINIC | Age: 82
End: 2017-05-02
Payer: MEDICARE

## 2017-05-02 DIAGNOSIS — L30.9 ECZEMA, UNSPECIFIED TYPE: ICD-10-CM

## 2017-05-02 DIAGNOSIS — L30.9 ECZEMA OF BOTH HANDS: Primary | ICD-10-CM

## 2017-05-02 PROCEDURE — 99214 OFFICE O/P EST MOD 30 MIN: CPT | Mod: S$PBB,,, | Performed by: DERMATOLOGY

## 2017-05-02 PROCEDURE — 99999 PR PBB SHADOW E&M-EST. PATIENT-LVL II: CPT | Mod: PBBFAC,,, | Performed by: DERMATOLOGY

## 2017-05-02 PROCEDURE — 99212 OFFICE O/P EST SF 10 MIN: CPT | Mod: PBBFAC | Performed by: DERMATOLOGY

## 2017-05-02 RX ORDER — CLOBETASOL PROPIONATE 0.5 MG/G
OINTMENT TOPICAL 2 TIMES DAILY
Qty: 60 G | Refills: 1 | Status: SHIPPED | OUTPATIENT
Start: 2017-05-02 | End: 2018-03-21

## 2017-05-02 NOTE — PROGRESS NOTES
Subjective:       Patient ID:  Tonya Cohn is a 84 y.o. female who presents for   Chief Complaint   Patient presents with    Rash     R arm & L lower leg, f/u comes & goes, itchy, tx clobetasol oint     Rash  - Follow-up  Symptom course: unchanged  Currently using: clobetasol.  Affected locations: left knee, left lower leg, right wrist and right arm  Signs / symptoms: inflamed    Does not wear gloves to wash dishes  Dove soap  Detergents for sensitive skin  CeraVe moisturizer daily (at least)  Past Medical History:   Diagnosis Date    AR (allergic rhinitis)     Atherosclerosis of abdominal aorta     noted on CT scan 1/17/2011    Carpal tunnel syndrome of left wrist     Chronic kidney disease, stage 3     Diabetic peripheral neuropathy     Diverticulosis     History of colon polyps     History of diverticulitis of colon 1/2014    Hyperlipemia     Hypertension     Hypothyroidism     followed by endocrinology, Dr. Green    Mild aortic stenosis     OA (osteoarthritis) of knee     Obesity     Type II or unspecified type diabetes mellitus with neurological manifestations, uncontrolled      Review of Systems   Constitutional: Negative for fever and chills.   Skin: Positive for itching, rash, dry skin and activity-related sunscreen use. Negative for daily sunscreen use.   Hematologic/Lymphatic: Negative for adenopathy. Bruises/bleeds easily.        Objective:    Physical Exam   Constitutional: She appears well-developed and well-nourished. No distress.   Neurological: She is alert and oriented to person, place, and time. She is not disoriented.   Psychiatric: She has a normal mood and affect.   Skin:   Areas Examined (abnormalities noted in diagram):   Head / Face Inspection Performed  Neck Inspection Performed  Chest / Axilla Inspection Performed  RUE Inspected  LUE Inspection Performed  RLE Inspected  LLE Inspection Performed  Nails and Digits Inspection Performed              Diagram Legend      Erythematous scaling macule/papule c/w actinic keratosis       Vascular papule c/w angioma      Pigmented verrucoid papule/plaque c/w seborrheic keratosis      Yellow umbilicated papule c/w sebaceous hyperplasia      Irregularly shaped tan macule c/w lentigo     1-2 mm smooth white papules consistent with Milia      Movable subcutaneous cyst with punctum c/w epidermal inclusion cyst      Subcutaneous movable cyst c/w pilar cyst      Firm pink to brown papule c/w dermatofibroma      Pedunculated fleshy papule(s) c/w skin tag(s)      Evenly pigmented macule c/w junctional nevus     Mildly variegated pigmented, slightly irregular-bordered macule c/w mildly atypical nevus      Flesh colored to evenly pigmented papule c/w intradermal nevus       Pink pearly papule/plaque c/w basal cell carcinoma      Erythematous hyperkeratotic cursted plaque c/w SCC      Surgical scar with no sign of skin cancer recurrence      Open and closed comedones      Inflammatory papules and pustules      Verrucoid papule consistent consistent with wart     Erythematous eczematous patches and plaques     Dystrophic onycholytic nail with subungual debris c/w onychomycosis     Umbilicated papule    Erythematous-base heme-crusted tan verrucoid plaque consistent with inflamed seborrheic keratosis     Erythematous Silvery Scaling Plaque c/w Psoriasis     See annotation      Assessment / Plan:        Eczema-both hands, shins  -     clobetasol 0.05% (TEMOVATE) 0.05 % Oint; Apply topically 2 (two) times daily.  Dispense: 60 g; Refill: 1  Encouraged liberal use of moisturizer as this can contribute to pruritis  I discussed the side effects of topical steroids including atrophy, telangiectasias, striae.  Avoid use on the face and contact with the eyes  Continue Dove, CeraVe moisturizer, fragrance free detergent         Return in about 6 months (around 11/2/2017).

## 2017-05-08 RX ORDER — GLIPIZIDE 5 MG/1
TABLET ORAL
Qty: 60 TABLET | Refills: 0 | Status: SHIPPED | OUTPATIENT
Start: 2017-05-08 | End: 2017-07-16 | Stop reason: SDUPTHER

## 2017-05-10 RX ORDER — LOSARTAN POTASSIUM 50 MG/1
50 TABLET ORAL DAILY
Qty: 30 TABLET | Refills: 2 | Status: SHIPPED | OUTPATIENT
Start: 2017-05-10 | End: 2017-08-23 | Stop reason: SDUPTHER

## 2017-05-23 ENCOUNTER — OFFICE VISIT (OUTPATIENT)
Dept: CARDIOLOGY | Facility: CLINIC | Age: 82
End: 2017-05-23
Payer: MEDICARE

## 2017-05-23 VITALS
BODY MASS INDEX: 34.01 KG/M2 | HEART RATE: 84 BPM | HEIGHT: 65 IN | DIASTOLIC BLOOD PRESSURE: 79 MMHG | SYSTOLIC BLOOD PRESSURE: 131 MMHG | OXYGEN SATURATION: 96 % | WEIGHT: 204.13 LBS

## 2017-05-23 DIAGNOSIS — N18.30 BENIGN HYPERTENSION WITH CHRONIC KIDNEY DISEASE, STAGE III: ICD-10-CM

## 2017-05-23 DIAGNOSIS — E78.5 HYPERLIPIDEMIA LDL GOAL <100: ICD-10-CM

## 2017-05-23 DIAGNOSIS — E11.40 TYPE 2 DIABETES, CONTROLLED, WITH NEUROPATHY: Primary | ICD-10-CM

## 2017-05-23 DIAGNOSIS — I10 HYPERTENSION: ICD-10-CM

## 2017-05-23 DIAGNOSIS — I12.9 BENIGN HYPERTENSION WITH CHRONIC KIDNEY DISEASE, STAGE III: ICD-10-CM

## 2017-05-23 DIAGNOSIS — I35.0 MILD AORTIC STENOSIS: ICD-10-CM

## 2017-05-23 PROCEDURE — 93005 ELECTROCARDIOGRAM TRACING: CPT | Mod: ,,, | Performed by: INTERNAL MEDICINE

## 2017-05-23 PROCEDURE — 99213 OFFICE O/P EST LOW 20 MIN: CPT | Mod: S$PBB,,, | Performed by: INTERNAL MEDICINE

## 2017-05-23 PROCEDURE — 99999 PR PBB SHADOW E&M-EST. PATIENT-LVL III: CPT | Mod: PBBFAC,,, | Performed by: INTERNAL MEDICINE

## 2017-05-23 PROCEDURE — 93010 ELECTROCARDIOGRAM REPORT: CPT | Mod: ,,, | Performed by: INTERNAL MEDICINE

## 2017-05-23 PROCEDURE — 99213 OFFICE O/P EST LOW 20 MIN: CPT | Mod: PBBFAC,PO | Performed by: INTERNAL MEDICINE

## 2017-05-23 NOTE — PROGRESS NOTES
Subjective:    Patient ID:  Tonya Cohn is a 85 y.o. female who presents for follow-up of Valvular Heart Disease      HPI     Has been followed for HTN, AS-mild,  and tachycardia     Stress test 10/17/16  LVEF: >= 70 %  Impression: NORMAL MYOCARDIAL PERFUSION  1. The perfusion scan is free of evidence for myocardial ischemia or injury.   2. Resting wall motion is physiologic.   3. Resting LV function is normal.     Echo 10/17/16    1 - Normal left ventricular systolic function (EF 55-60%).     2 - Left ventricular diastolic dysfunction.     3 - Mild mitral regurgitation.     4 - Mild tricuspid regurgitation.     5 - Trivial pulmonic regurgitation.     Denies recent CP or SOB  EKG NSR - ok    Review of Systems   Constitution: Negative for decreased appetite.   HENT: Negative for ear discharge.    Eyes: Negative for blurred vision.   Respiratory: Negative for hemoptysis.    Endocrine: Negative for polyphagia.   Hematologic/Lymphatic: Negative for adenopathy.   Skin: Negative for color change.   Musculoskeletal: Negative for joint swelling.   Neurological: Negative for brief paralysis.   Psychiatric/Behavioral: Negative for hallucinations.        Objective:    Physical Exam   Constitutional: She is oriented to person, place, and time. She appears well-developed and well-nourished.   HENT:   Head: Normocephalic and atraumatic.   Eyes: Conjunctivae are normal. Pupils are equal, round, and reactive to light.   Neck: Normal range of motion. Neck supple.   Cardiovascular: Normal rate and intact distal pulses.    Murmur heard.   Early systolic murmur is present with a grade of 2/6   Pulmonary/Chest: Effort normal and breath sounds normal.   Abdominal: Soft. Bowel sounds are normal.   Musculoskeletal: Normal range of motion.   Neurological: She is alert and oriented to person, place, and time.   Skin: Skin is warm and dry.         Assessment:       1. Type 2 diabetes, controlled, with neuropathy    2. Benign hypertension  with chronic kidney disease, stage III    3. Mild aortic stenosis    4. Hyperlipidemia LDL goal <100         Plan:       Cardiac stable  OV 6 months with echo to look at AS

## 2017-05-29 ENCOUNTER — HOSPITAL ENCOUNTER (OUTPATIENT)
Dept: RADIOLOGY | Facility: HOSPITAL | Age: 82
Discharge: HOME | End: 2017-05-29
Attending: ORTHOPAEDIC SURGERY
Payer: MEDICARE

## 2017-05-29 ENCOUNTER — OFFICE VISIT (OUTPATIENT)
Dept: ORTHOPEDICS | Facility: CLINIC | Age: 82
End: 2017-05-29
Payer: MEDICARE

## 2017-05-29 VITALS — WEIGHT: 204.13 LBS | HEIGHT: 65 IN | BODY MASS INDEX: 34.01 KG/M2

## 2017-05-29 DIAGNOSIS — Z98.890 POST-OPERATIVE STATE: Primary | ICD-10-CM

## 2017-05-29 DIAGNOSIS — Z98.890 POST-OPERATIVE STATE: ICD-10-CM

## 2017-05-29 DIAGNOSIS — Z96.651 STATUS POST TOTAL RIGHT KNEE REPLACEMENT: ICD-10-CM

## 2017-05-29 PROCEDURE — 99213 OFFICE O/P EST LOW 20 MIN: CPT | Mod: S$PBB,,, | Performed by: ORTHOPAEDIC SURGERY

## 2017-05-29 PROCEDURE — 73562 X-RAY EXAM OF KNEE 3: CPT | Mod: 26,50,, | Performed by: RADIOLOGY

## 2017-05-29 PROCEDURE — 99999 PR PBB SHADOW E&M-EST. PATIENT-LVL II: CPT | Mod: PBBFAC,,, | Performed by: ORTHOPAEDIC SURGERY

## 2017-05-29 PROCEDURE — 73562 X-RAY EXAM OF KNEE 3: CPT | Mod: TC,50

## 2017-05-29 NOTE — PROGRESS NOTES
CC:  Right knee replacement    Hx:  Tonya Cohn presents for routine follow up of right knee replacement.  Her procedure was performed 3 years ago.  She has done well postoperatively and she denies effusions, warmth, or fever.  No complaints of pain.  No complaints of swelling.  Her ADL's are not limited in any way.    ROS:  Denies fevers/chills.  Denies distal edema or distal paresthesias.  Denies warmth or erythema in the knee.      PE:  Skin is unremarkable over both knees other than well healed anterior incision.  No warmth, erythema, or effusion bilaterally.  Stable to varus/valgus stress bilaterally.  No distal edema bilaterally.  No pain ROM either hip.  ROM 0-120    AP standing knees, Merchant's and lateral right knee radiographs were ordered and reviewed with the patient and show no evidence of loosening or wear.    IMP: S/P Knee replacement    Plan: Dental prophylaxis was discussed.    She may follow up prn.

## 2017-06-08 DIAGNOSIS — E11.40 TYPE 2 DIABETES, CONTROLLED, WITH NEUROPATHY: Primary | ICD-10-CM

## 2017-06-08 DIAGNOSIS — I12.9 BENIGN HYPERTENSION WITH CHRONIC KIDNEY DISEASE, STAGE III: ICD-10-CM

## 2017-06-08 DIAGNOSIS — N18.30 BENIGN HYPERTENSION WITH CHRONIC KIDNEY DISEASE, STAGE III: ICD-10-CM

## 2017-06-08 DIAGNOSIS — E78.5 HYPERLIPIDEMIA LDL GOAL <100: ICD-10-CM

## 2017-06-08 DIAGNOSIS — Z12.31 ENCOUNTER FOR SCREENING MAMMOGRAM FOR MALIGNANT NEOPLASM OF BREAST: ICD-10-CM

## 2017-06-08 DIAGNOSIS — E03.9 HYPOTHYROIDISM (ACQUIRED): ICD-10-CM

## 2017-06-08 RX ORDER — LOVASTATIN 20 MG/1
40 TABLET ORAL NIGHTLY
Qty: 180 TABLET | Refills: 0 | Status: SHIPPED | OUTPATIENT
Start: 2017-06-08 | End: 2017-08-28 | Stop reason: SDUPTHER

## 2017-06-12 RX ORDER — METFORMIN HYDROCHLORIDE 1000 MG/1
TABLET ORAL
Qty: 30 TABLET | Refills: 2 | Status: SHIPPED | OUTPATIENT
Start: 2017-06-12 | End: 2017-09-19 | Stop reason: SDUPTHER

## 2017-06-16 DIAGNOSIS — E03.9 ACQUIRED HYPOTHYROIDISM: ICD-10-CM

## 2017-06-16 RX ORDER — LEVOTHYROXINE SODIUM 88 UG/1
TABLET ORAL
Qty: 90 TABLET | Refills: 0 | Status: SHIPPED | OUTPATIENT
Start: 2017-06-16 | End: 2018-04-09 | Stop reason: SDUPTHER

## 2017-07-16 RX ORDER — GLIPIZIDE 5 MG/1
TABLET ORAL
Qty: 60 TABLET | Refills: 0 | Status: SHIPPED | OUTPATIENT
Start: 2017-07-16 | End: 2017-08-16 | Stop reason: SDUPTHER

## 2017-08-16 RX ORDER — GLIPIZIDE 5 MG/1
TABLET ORAL
Qty: 60 TABLET | Refills: 0 | Status: SHIPPED | OUTPATIENT
Start: 2017-08-16 | End: 2017-09-18 | Stop reason: SDUPTHER

## 2017-08-22 ENCOUNTER — HOSPITAL ENCOUNTER (OUTPATIENT)
Dept: RADIOLOGY | Facility: HOSPITAL | Age: 82
Discharge: HOME | End: 2017-08-22
Payer: MEDICARE

## 2017-08-22 DIAGNOSIS — Z12.31 ENCOUNTER FOR SCREENING MAMMOGRAM FOR MALIGNANT NEOPLASM OF BREAST: ICD-10-CM

## 2017-08-22 PROCEDURE — 77067 SCR MAMMO BI INCL CAD: CPT | Mod: 26,,, | Performed by: RADIOLOGY

## 2017-08-22 PROCEDURE — 77067 SCR MAMMO BI INCL CAD: CPT | Mod: TC

## 2017-08-22 PROCEDURE — 77063 BREAST TOMOSYNTHESIS BI: CPT | Mod: 26,,, | Performed by: RADIOLOGY

## 2017-08-23 DIAGNOSIS — N18.30 BENIGN HYPERTENSION WITH CHRONIC KIDNEY DISEASE, STAGE III: Primary | ICD-10-CM

## 2017-08-23 DIAGNOSIS — I12.9 BENIGN HYPERTENSION WITH CHRONIC KIDNEY DISEASE, STAGE III: Primary | ICD-10-CM

## 2017-08-23 RX ORDER — LOSARTAN POTASSIUM 50 MG/1
50 TABLET ORAL DAILY
Qty: 30 TABLET | Refills: 0 | Status: SHIPPED | OUTPATIENT
Start: 2017-08-23 | End: 2017-09-20 | Stop reason: SDUPTHER

## 2017-08-23 NOTE — TELEPHONE ENCOUNTER
----- Message from Michelle Hayward sent at 8/23/2017  3:12 PM CDT -----  Contact: Self   Patient need refill. Please call patient at [phone]    losartan (COZAAR) 50 MG tablet    [facility] in [place]

## 2017-08-28 ENCOUNTER — OFFICE VISIT (OUTPATIENT)
Dept: FAMILY MEDICINE | Facility: CLINIC | Age: 82
End: 2017-08-28
Payer: MEDICARE

## 2017-08-28 VITALS
TEMPERATURE: 99 F | DIASTOLIC BLOOD PRESSURE: 68 MMHG | OXYGEN SATURATION: 96 % | BODY MASS INDEX: 33.83 KG/M2 | SYSTOLIC BLOOD PRESSURE: 122 MMHG | HEART RATE: 74 BPM | WEIGHT: 203.06 LBS | HEIGHT: 65 IN

## 2017-08-28 DIAGNOSIS — I12.9 BENIGN HYPERTENSION WITH CHRONIC KIDNEY DISEASE, STAGE III: ICD-10-CM

## 2017-08-28 DIAGNOSIS — E78.5 HYPERLIPIDEMIA LDL GOAL <100: ICD-10-CM

## 2017-08-28 DIAGNOSIS — E11.40 TYPE 2 DIABETES, CONTROLLED, WITH NEUROPATHY: ICD-10-CM

## 2017-08-28 DIAGNOSIS — Z23 FLU VACCINE NEED: ICD-10-CM

## 2017-08-28 DIAGNOSIS — N18.30 BENIGN HYPERTENSION WITH CHRONIC KIDNEY DISEASE, STAGE III: ICD-10-CM

## 2017-08-28 DIAGNOSIS — M43.6 STIFFNESS OF NECK: ICD-10-CM

## 2017-08-28 DIAGNOSIS — E03.9 HYPOTHYROIDISM (ACQUIRED): ICD-10-CM

## 2017-08-28 DIAGNOSIS — Z00.00 ROUTINE MEDICAL EXAM: Primary | ICD-10-CM

## 2017-08-28 DIAGNOSIS — I35.0 MILD AORTIC STENOSIS: ICD-10-CM

## 2017-08-28 DIAGNOSIS — I70.0 ATHEROSCLEROSIS OF ABDOMINAL AORTA: ICD-10-CM

## 2017-08-28 DIAGNOSIS — E66.9 OBESITY (BMI 30-39.9): ICD-10-CM

## 2017-08-28 DIAGNOSIS — Z12.10 ENCOUNTER FOR SCREENING FOR MALIGNANT NEOPLASM OF INTESTINAL TRACT: ICD-10-CM

## 2017-08-28 DIAGNOSIS — K21.9 GASTROESOPHAGEAL REFLUX DISEASE WITHOUT ESOPHAGITIS: ICD-10-CM

## 2017-08-28 PROCEDURE — 99397 PER PM REEVAL EST PAT 65+ YR: CPT | Mod: S$PBB,,, | Performed by: INTERNAL MEDICINE

## 2017-08-28 PROCEDURE — 99999 PR PBB SHADOW E&M-EST. PATIENT-LVL V: CPT | Mod: PBBFAC,,, | Performed by: INTERNAL MEDICINE

## 2017-08-28 PROCEDURE — 99215 OFFICE O/P EST HI 40 MIN: CPT | Mod: PBBFAC,PO | Performed by: INTERNAL MEDICINE

## 2017-08-28 RX ORDER — FLUOCINONIDE 1 MG/G
CREAM TOPICAL
COMMUNITY
Start: 2017-08-04

## 2017-08-28 RX ORDER — LOVASTATIN 20 MG/1
60 TABLET ORAL NIGHTLY
Qty: 270 TABLET | Refills: 1 | Status: SHIPPED | OUTPATIENT
Start: 2017-08-28 | End: 2018-03-09 | Stop reason: SDUPTHER

## 2017-08-28 RX ORDER — LIDOCAINE 50 MG/G
OINTMENT TOPICAL
COMMUNITY
Start: 2017-08-23 | End: 2019-05-02

## 2017-08-28 NOTE — PROGRESS NOTES
HISTORY OF PRESENT ILLNESS:  Tonya Cohn is a 85 y.o. female who presents to the clinic today for a routine medical physical exam. Her last physical exam was approximately 1 years(s) ago.        PAST MEDICAL HISTORY:  Past Medical History:   Diagnosis Date    AR (allergic rhinitis)     Atherosclerosis of abdominal aorta     noted on CT scan 1/17/2011    Carpal tunnel syndrome of left wrist     Chronic kidney disease, stage 3     Diabetic peripheral neuropathy     Diverticulosis     History of colon polyps     History of diverticulitis of colon 1/2014    Hyperlipemia     Hypertension     Hypothyroidism     followed by endocrinology, Dr. Green    Mild aortic stenosis     OA (osteoarthritis) of knee     Obesity     Type II or unspecified type diabetes mellitus with neurological manifestations, uncontrolled        PAST SURGICAL HISTORY:  Past Surgical History:   Procedure Laterality Date    COSMETIC SURGERY      HYSTERECTOMY      partial    LUNG BIOPSY  in her 40's    TOTAL KNEE ARTHROPLASTY Right 6/13/2014    TKR       SOCIAL HISTORY:  Social History     Social History    Marital status:      Spouse name: N/A    Number of children: 7    Years of education: N/A     Occupational History    retired - house cleaning      Social History Main Topics    Smoking status: Former Smoker     Packs/day: 0.25     Years: 2.00     Quit date: 7/19/1962    Smokeless tobacco: Former User     Quit date: 7/14/1970    Alcohol use 1.2 oz/week     2 Cans of beer per week      Comment: occasionally    Drug use: No    Sexual activity: No     Other Topics Concern    Are You Pregnant Or Think You May Be? No    Breast-Feeding No     Social History Narrative    No narrative on file       FAMILY HISTORY:  Family History   Problem Relation Age of Onset    Cancer Mother      shoulder tumor - cancer    Hypertension Mother     Heart disease Father      valve replacement    Hypertension Father     Heart  attack Father     Diabetes Sister     Arthritis Sister      s/p knee surgery    Diabetes Brother     Arthritis Brother      back problems    Skin cancer Brother     Cancer Son      jaw    Melanoma Neg Hx     Eczema Neg Hx     Lupus Neg Hx     Psoriasis Neg Hx     Breast cancer Neg Hx     Colon cancer Neg Hx        ALLERGIES AND MEDICATIONS: updated and reviewed.  Review of patient's allergies indicates:   Allergen Reactions    Lipitor [atorvastatin]      Medication List with Changes/Refills   Current Medications    CLOBETASOL 0.05% (TEMOVATE) 0.05 % OINT    Apply topically 2 (two) times daily.    DESONIDE (DESOWEN) 0.05 % CREAM    Apply topically 2 (two) times daily.    FISH OIL-FAT ACID COMB.8- (OMEGA 3-6-9) 1,200 MG CAP        FLUOCINONIDE 0.1 % CREA        GLIPIZIDE (GLUCOTROL) 5 MG TABLET    TAKE 1 TABLET BY MOUTH TWICE DAILY WITH MEALS    LEVOCETIRIZINE (XYZAL) 5 MG TABLET    Take 1 tablet (5 mg total) by mouth every evening.    LEVOTHYROXINE (SYNTHROID) 88 MCG TABLET    TAKE 1 TABLET(88 MCG) BY MOUTH EVERY DAY    LIDOCAINE (XYLOCAINE) 5 % OINT OINTMENT        LOSARTAN (COZAAR) 50 MG TABLET    Take 1 tablet (50 mg total) by mouth once daily.    METFORMIN (GLUCOPHAGE) 1000 MG TABLET    TAKE ONE-HALF TABLET BY MOUTH TWICE DAILY WITH MEALS    OMEPRAZOLE (PRILOSEC) 20 MG CAPSULE    Take 2 capsules (40 mg total) by mouth once daily.   Changed and/or Refilled Medications    Modified Medication Previous Medication    LOVASTATIN (MEVACOR) 20 MG TABLET lovastatin (MEVACOR) 20 MG tablet       Take 3 tablets (60 mg total) by mouth nightly.    Take 2 tablets (40 mg total) by mouth nightly.   Discontinued Medications    AZELASTINE (ASTELIN) 137 MCG (0.1 %) NASAL SPRAY    1 spray (137 mcg total) by Nasal route 2 (two) times daily.             SCREENING HISTORY:  Health Maintenance       Date Due Completion Date    Colonoscopy 03/21/2017 3/21/2012    Influenza Vaccine 08/01/2017 12/2/2016    Eye Exam  09/20/2017 9/20/2016 (Done)    Override on 9/20/2016: Done (Dr. Kole Lucero- negative for diabetic retinopathy bilaterally)    Override on 9/9/2015: Done (No diabetic retinopathy)    Override on 8/14/2014: Done (no diabetic retinopathy; Dr. Lucero)    Override on 4/21/2014: Done (Pt states her eye exam is scheduled for next month)    Override on 4/1/2013: Done    DEXA SCAN 12/24/2017 12/24/2014    Hemoglobin A1c 02/22/2018 8/22/2017    Urine Microalbumin 04/03/2018 4/3/2017    Foot Exam 05/01/2018 5/1/2017 (Done)    Override on 5/1/2017: Done (- mild neuropathy)    Override on 10/28/2016: Done    Override on 1/30/2015: Done (Mr. Davenport)    Mammogram 08/22/2018 8/22/2017    Lipid Panel 08/22/2018 8/22/2017    Override on 2/26/2016: Done ([facility] - total 189, TG 62, , HDL 76)    TETANUS VACCINE 04/03/2027 4/3/2017 (Declined)    Override on 4/3/2017: Declined            REVIEW OF SYSTEMS:   The patient reports good dietary habits.  The patient does not exercise regularly.  General ROS: negative for - chills, fever or malaise  Psychological ROS: negative for - anxiety, depression or suicidal ideation  Ophthalmic ROS: negative for - blurry vision or eye pain  ENT ROS: negative for - epistaxis, headaches, nasal congestion, oral lesions or sore throat  Allergy and Immunology ROS: negative for - hives  Hematological and Lymphatic ROS: negative for - swollen lymph nodes  Endocrine ROS: negative for - polydipsia/polyuria or temperature intolerance  Respiratory ROS: positive for - cough x about 1 month - mostly from PND  negative for - hemoptysis, sputum changes or wheezing  Cardiovascular ROS: no chest pain or dyspnea on exertion  Gastrointestinal ROS: no abdominal pain, change in bowel habits, or black or bloody stools  Genito-Urinary ROS: no dysuria, trouble voiding, or hematuria  Breast ROS: negative for breast lumps  Musculoskeletal ROS: negative for - joint swelling; she reports some stiffness  "in her neck that started about 2 days ago.  She thinks she may have slept wrong.  It is improving.  She has been taking Tylenol as needed for pain.  Neurological ROS: no TIA or stroke symptoms  Dermatological ROS: negative for mole changes and rash    Physical Examination:   Vitals:    08/28/17 0925   BP: 122/68   Pulse: 74   Temp: 98.6 °F (37 °C)     Weight: 92.1 kg (203 lb 0.7 oz)   Height: 5' 5" (165.1 cm)   Body mass index is 33.79 kg/m².    General appearance - alert, well appearing, and in no distress and obese  Mental status - alert, oriented to person, place, and time, normal mood, behavior, speech, dress, motor activity, and thought processes  Eyes - pupils equal and reactive, extraocular eye movements intact, sclera anicteric  Ears - bilateral TM's and external ear canals normal  Nose - normal nontender sinuses, mucosal congestion and mucosal pallor  Mouth - mucous membranes moist, pharynx normal without lesions  Neck - supple, no significant adenopathy, carotids upstroke normal bilaterally, no bruits, thyroid exam: thyroid is normal in size without nodules or tenderness  Lymphatics - no palpable lymphadenopathy  Chest - clear to auscultation, no wheezes, rales or rhonchi, symmetric air entry  Heart - normal rate and regular rhythm, no gallops noted, systolic murmur 2/6   Abdomen - soft, nontender, nondistended, no masses or organomegaly  Breasts - not examined  Back exam - mild limited range of motion  Neurological - alert, oriented, normal speech, no focal findings or movement disorder noted, cranial nerves II through XII intact  Musculoskeletal - no muscular tenderness noted, Mild-Moderate osteoarthritic changes noted to both knee joints. No joint effusions noted.  Mild stiffness noted in the neck region with slight discomfort to palpation of the left upper trapezius muscle.  Extremities - no pedal edema noted  Skin - normal coloration and turgor, no rashes, no suspicious skin lesions noted      Results " for orders placed or performed in visit on 08/22/17   CBC auto differential   Result Value Ref Range    WBC 5.35 3.90 - 12.70 K/uL    RBC 4.35 4.00 - 5.40 M/uL    Hemoglobin 12.7 12.0 - 16.0 g/dL    Hematocrit 35.8 (L) 37.0 - 48.5 %    MCV 82 82 - 98 fL    MCH 29.2 27.0 - 31.0 pg    MCHC 35.5 32.0 - 36.0 g/dL    RDW 13.7 11.5 - 14.5 %    Platelets 205 150 - 350 K/uL    MPV 11.2 9.2 - 12.9 fL    Gran # 2.4 1.8 - 7.7 K/uL    Lymph # 2.2 1.0 - 4.8 K/uL    Mono # 0.5 0.3 - 1.0 K/uL    Eos # 0.2 0.0 - 0.5 K/uL    Baso # 0.02 0.00 - 0.20 K/uL    Gran% 45.5 38.0 - 73.0 %    Lymph% 40.6 18.0 - 48.0 %    Mono% 10.1 4.0 - 15.0 %    Eosinophil% 3.2 0.0 - 8.0 %    Basophil% 0.4 0.0 - 1.9 %    Differential Method Automated    Lipid panel   Result Value Ref Range    Cholesterol 201 (H) 120 - 199 mg/dL    Triglycerides 97 30 - 150 mg/dL    HDL 66 40 - 75 mg/dL    LDL Cholesterol 115.6 63.0 - 159.0 mg/dL    HDL/Chol Ratio 32.8 20.0 - 50.0 %    Total Cholesterol/HDL Ratio 3.0 2.0 - 5.0    Non-HDL Cholesterol 135 mg/dL   Hemoglobin A1c   Result Value Ref Range    Hemoglobin A1C 6.1 (H) 4.0 - 5.6 %    Estimated Avg Glucose 128 68 - 131 mg/dL   TSH   Result Value Ref Range    TSH 0.379 (L) 0.400 - 4.000 uIU/mL   T4, free   Result Value Ref Range    Free T4 1.09 0.71 - 1.51 ng/dL          ASSESSMENT AND PLAN:  1. Routine medical exam  Counseled on age appropriate medical preventative services including age appropriate cancer screenings, age appropriate eye and dental exams, over all nutritional health, need for a consistent exercise regimen, and an over all push towards maintaining a vigorous and active lifestyle.  Counseled on age appropriate vaccines and discussed upcoming health care needs based on age/gender. Discussed good sleep hygiene and stress management.     2. Type 2 diabetes, controlled, with neuropathy  Diabetes currently is controlled. We discussed diabetic diet and regular exercise. We discussed home blood sugar  monitoring, if appropriate. The current medical regimen is effective;  continue present plan and medications.      3. Benign hypertension with chronic kidney disease, stage III  Discussed sodium restriction, maintaining ideal body weight and regular exercise program as physiologic means to achieve blood pressure control. The patient will strive towards this. The current medical regimen is effective;  continue present plan and medications. Recommended patient to check home readings to monitor and see me for followup as scheduled or sooner as needed. Patient was educated that both decongestant and anti-inflammatory medication may raise blood pressure. Stable kidney function. Observe. Patient counseled to avoid/minimize the use of anti-inflammatory  medication.     4. Hyperlipidemia LDL goal <100  LDL not quite at goal.  We discussed low fat diet and regular exercise.  I will increase her lovastatin to 60 mg daily.  Recheck in 6 months.  - lovastatin (MEVACOR) 20 MG tablet; Take 3 tablets (60 mg total) by mouth nightly.  Dispense: 270 tablet; Refill: 1    5. Hypothyroidism (acquired)  Patient is clinically euthyroid. Continue current regimen.     6. Gastroesophageal reflux disease without esophagitis  Symptoms controlled. Reflux precautions discussed (eliminate tobacco if a smoker; minimize caffeine, chocolate and red/white peppermint intake; avoid heavy and spicy meals; don't lay down within 2-3 hours after eating). Medication as needed. Patient asked to take medication breaks, if possible - discussed chronic use can limit calcium absorption, increases the risk for intestinal infections, and can cause kidney damage.      7. Mild aortic stenosis  Stable. Asymptomatic. Observe. Followed by cardiology.    8. Atherosclerosis of abdominal aorta  Patient with Atherosclerosis of the Aorta.  Stable/asymptomatic. Currently stable on lipid lowering medication and b/p monitoring.     9. Obesity (BMI 30-39.9)  The patient is  asked to make an attempt to improve diet and exercise patterns to aid in medical management of this problem.     10. Encounter for screening for malignant neoplasm of intestinal tract    - Case request GI: COLONOSCOPY    11. Flu vaccine need  Patient was advised to come back in about a month or go to the pharmacy since we do not have this available in the clinic today.     12. Stiffness of neck  Improving.  Continue Tylenol as needed for pain.  Add heat.  She will let me know symptoms worsen or persist.          Return in about 6 months (around 2/28/2018), or if symptoms worsen or fail to improve, for follow up chronic medical conditions.. or sooner as needed.

## 2017-09-14 ENCOUNTER — TELEPHONE (OUTPATIENT)
Dept: ENDOSCOPY | Facility: HOSPITAL | Age: 82
End: 2017-09-14

## 2017-09-14 DIAGNOSIS — Z12.11 SPECIAL SCREENING FOR MALIGNANT NEOPLASMS, COLON: Primary | ICD-10-CM

## 2017-09-14 RX ORDER — SODIUM, POTASSIUM,MAG SULFATES 17.5-3.13G
1 SOLUTION, RECONSTITUTED, ORAL ORAL ONCE
Qty: 1 BOTTLE | Refills: 0 | Status: SHIPPED | OUTPATIENT
Start: 2017-09-14 | End: 2017-09-14

## 2017-09-18 RX ORDER — GLIPIZIDE 5 MG/1
TABLET ORAL
Qty: 60 TABLET | Refills: 5 | Status: SHIPPED | OUTPATIENT
Start: 2017-09-18 | End: 2018-03-21 | Stop reason: DRUGHIGH

## 2017-09-19 RX ORDER — METFORMIN HYDROCHLORIDE 1000 MG/1
TABLET ORAL
Qty: 30 TABLET | Refills: 2 | Status: SHIPPED | OUTPATIENT
Start: 2017-09-19 | End: 2018-01-01 | Stop reason: SDUPTHER

## 2017-09-20 DIAGNOSIS — I12.9 BENIGN HYPERTENSION WITH CHRONIC KIDNEY DISEASE, STAGE III: ICD-10-CM

## 2017-09-20 DIAGNOSIS — N18.30 BENIGN HYPERTENSION WITH CHRONIC KIDNEY DISEASE, STAGE III: ICD-10-CM

## 2017-09-20 RX ORDER — LOSARTAN POTASSIUM 50 MG/1
TABLET ORAL
Qty: 30 TABLET | Refills: 5 | Status: SHIPPED | OUTPATIENT
Start: 2017-09-20 | End: 2018-03-19 | Stop reason: SDUPTHER

## 2017-10-03 ENCOUNTER — HOSPITAL ENCOUNTER (OUTPATIENT)
Facility: HOSPITAL | Age: 82
Discharge: HOME | End: 2017-10-03
Attending: COLON & RECTAL SURGERY | Admitting: COLON & RECTAL SURGERY
Payer: MEDICARE

## 2017-10-03 ENCOUNTER — ANESTHESIA EVENT (OUTPATIENT)
Dept: ENDOSCOPY | Facility: HOSPITAL | Age: 82
End: 2017-10-03
Payer: MEDICARE

## 2017-10-03 ENCOUNTER — ANESTHESIA (OUTPATIENT)
Dept: ENDOSCOPY | Facility: HOSPITAL | Age: 82
End: 2017-10-03
Payer: MEDICARE

## 2017-10-03 ENCOUNTER — SURGERY (OUTPATIENT)
Age: 82
End: 2017-10-03

## 2017-10-03 VITALS
HEIGHT: 66 IN | BODY MASS INDEX: 30.53 KG/M2 | OXYGEN SATURATION: 100 % | TEMPERATURE: 98 F | RESPIRATION RATE: 18 BRPM | DIASTOLIC BLOOD PRESSURE: 67 MMHG | HEART RATE: 68 BPM | WEIGHT: 190 LBS | SYSTOLIC BLOOD PRESSURE: 131 MMHG

## 2017-10-03 DIAGNOSIS — Z12.11 SCREENING FOR COLON CANCER: ICD-10-CM

## 2017-10-03 DIAGNOSIS — Z86.010 HISTORY OF COLON POLYPS: Primary | ICD-10-CM

## 2017-10-03 LAB
GLUCOSE SERPL-MCNC: 103 MG/DL (ref 70–110)
POCT GLUCOSE: 103 MG/DL (ref 70–110)

## 2017-10-03 PROCEDURE — 37000009 HC ANESTHESIA EA ADD 15 MINS: Performed by: COLON & RECTAL SURGERY

## 2017-10-03 PROCEDURE — 37000008 HC ANESTHESIA 1ST 15 MINUTES: Performed by: COLON & RECTAL SURGERY

## 2017-10-03 PROCEDURE — G0105 COLORECTAL SCRN; HI RISK IND: HCPCS | Performed by: COLON & RECTAL SURGERY

## 2017-10-03 PROCEDURE — 82962 GLUCOSE BLOOD TEST: CPT | Performed by: COLON & RECTAL SURGERY

## 2017-10-03 PROCEDURE — 63600175 PHARM REV CODE 636 W HCPCS: Performed by: NURSE ANESTHETIST, CERTIFIED REGISTERED

## 2017-10-03 PROCEDURE — G0105 COLORECTAL SCRN; HI RISK IND: HCPCS | Mod: GC,,, | Performed by: COLON & RECTAL SURGERY

## 2017-10-03 PROCEDURE — 25000003 PHARM REV CODE 250: Performed by: NURSE PRACTITIONER

## 2017-10-03 PROCEDURE — D9220A PRA ANESTHESIA: Mod: 33,ANES,, | Performed by: ANESTHESIOLOGY

## 2017-10-03 PROCEDURE — D9220A PRA ANESTHESIA: Mod: 33,CRNA,, | Performed by: NURSE ANESTHETIST, CERTIFIED REGISTERED

## 2017-10-03 RX ORDER — PROPOFOL 10 MG/ML
VIAL (ML) INTRAVENOUS
Status: DISCONTINUED | OUTPATIENT
Start: 2017-10-03 | End: 2017-10-03

## 2017-10-03 RX ORDER — SODIUM CHLORIDE 9 MG/ML
INJECTION, SOLUTION INTRAVENOUS CONTINUOUS
Status: DISCONTINUED | OUTPATIENT
Start: 2017-10-03 | End: 2017-10-03 | Stop reason: HOSPADM

## 2017-10-03 RX ORDER — PROPOFOL 10 MG/ML
VIAL (ML) INTRAVENOUS CONTINUOUS PRN
Status: DISCONTINUED | OUTPATIENT
Start: 2017-10-03 | End: 2017-10-03

## 2017-10-03 RX ADMIN — SODIUM CHLORIDE: 0.9 INJECTION, SOLUTION INTRAVENOUS at 09:10

## 2017-10-03 RX ADMIN — PROPOFOL 50 MG: 10 INJECTION, EMULSION INTRAVENOUS at 09:10

## 2017-10-03 RX ADMIN — PROPOFOL 100 MCG/KG/MIN: 10 INJECTION, EMULSION INTRAVENOUS at 09:10

## 2017-10-03 NOTE — ANESTHESIA POSTPROCEDURE EVALUATION
"Anesthesia Post Evaluation    Patient: Tonya Cohn    Procedure(s) Performed: Procedure(s) (LRB):  COLONOSCOPY (N/A)    Final Anesthesia Type: general  Patient location during evaluation: GI PACU  Patient participation: Yes- Able to Participate  Level of consciousness: awake and alert  Post-procedure vital signs: reviewed and stable  Pain management: adequate  Airway patency: patent  PONV status at discharge: No PONV  Anesthetic complications: no      Cardiovascular status: blood pressure returned to baseline  Respiratory status: unassisted  Hydration status: euvolemic  Follow-up not needed.        Visit Vitals  /67 (BP Location: Left arm, Patient Position: Lying)   Pulse 68   Temp 36.6 °C (97.8 °F) (Temporal)   Resp 18   Ht 5' 6" (1.676 m)   Wt 86.2 kg (190 lb)   SpO2 100%   Breastfeeding? No   BMI 30.67 kg/m²       Pain/Ophelia Score: Pain Assessment Performed: Yes (10/3/2017 10:22 AM)  Presence of Pain: denies (10/3/2017 10:22 AM)  Ophelia Score: 10 (10/3/2017 10:03 AM)      "

## 2017-10-03 NOTE — PATIENT INSTRUCTIONS
Discharge Summary/Instructions after an Endoscopic Procedure  Patient Name: Tonya Cohn  Patient MRN: [id number]  Patient YOB: 1932 Tuesday, October 03, 2017  Alin Gonzalez MD  RESTRICTIONS:  During your procedure today, you received medications for sedation.  These   medications may affect your judgment, balance and coordination.  Therefore,   for 24 hours, you have the following restrictions:   - DO NOT drive a car, operate machinery, make legal/financial decisions,   sign important papers or drink alcohol.    ACTIVITY:  The following day: return to full activity including work, except no heavy   lifting, straining or running for 3 days if polyps were removed.  DIET:  Eat and drink normally unless instructed otherwise.  TREATMENT FOR COMMON SIDE EFFECTS:  - Mild abdominal pain, belching, bloating or excessive gas: rest, eat   lightly and use a heating pad.  - Sore Throat: treat with throat lozenges and/or gargle with warm salt   water.  SYMPTOMS TO WATCH FOR AND REPORT TO YOUR PHYSICIAN:  1. Abdominal pain or bloating, other than gas cramps.  2. Chest pain.  3. Back pain.  4. Chills or fever occurring within 24 hours after the procedure.  5. Rectal bleeding, which would show as bright red, maroon, or black stools.   (A tablespoon of blood from the rectum is not serious, especially if   hemorrhoids are present.)  6. Vomiting.  7. Weakness or dizziness.  8. Because air was used during the procedure, expelling large amounts of air   from your rectum or belching is normal.  9. If a bowel prep was taken, you may not have a bowel movement for 1-3   days.  This is normal.  GO DIRECTLY TO THE EMERGENCY ROOM IF YOU HAVE ANY OF THE FOLLOWING:   Difficulty breathing   Chills and/or fever over 101 F   Persistent vomiting and/or vomiting blood   Severe abdominal pain   Severe chest pain   Black, tarry stools   Bleeding- more than one tablespoon  Your doctor recommends these additional instructions:  If any biopsies  were taken, your doctors clinic will call you in 1 to 2   weeks with any results.  You are being discharged to home.   Your physician has recommended a repeat colonoscopy in five years for   surveillance.  For questions, problems or results please call your physician - Alin Gonzalez MD at Work:  [phone].  [facility], EMERGENCY ROOM PHONE NUMBER: [phone]  IF A COMPLICATION OR EMERGENCY SITUATION ARISES AND YOU ARE UNABLE TO REACH   YOUR PHYSICIAN - GO DIRECTLY TO THE EMERGENCY ROOM.  Alin Gonzalez MD  10/3/2017 9:56:45 AM  This report has been verified and signed electronically.

## 2017-10-03 NOTE — ANESTHESIA RELEASE NOTE
Anesthesia Release from PACU note    Patient: Tonya Cohn    Procedure(s) Performed: Procedure(s) (LRB):  COLONOSCOPY (N/A)    Anesthesia type: general    Post pain: Adequate analgesia    Post assessment: no apparent anesthetic complications, tolerated procedure well and no evidence of recall    Last Vitals:   Vitals:    10/03/17 1021   BP: 131/67   Pulse: 68   Resp: 18   Temp:    SpO2: 100%       Post vital signs: stable    Level of consciousness: awake, alert  and oriented    Nausea/Vomiting: no nausea/no vomiting    Complications: none    Airway Patency: patent    Respiratory: unassisted    Cardiovascular: stable and blood pressure at baseline    Hydration: euvolemic

## 2017-10-03 NOTE — ANESTHESIA PREPROCEDURE EVALUATION
10/03/2017  Tonya Cohn is a 85 y.o., female.    Pre-operative evaluation for Procedure(s) (LRB):  COLONOSCOPY (N/A)    Tonya Cohn is a 85 y.o. female     Patient Active Problem List   Diagnosis    Primary osteoarthritis of both knees    Benign hypertension with chronic kidney disease, stage III    Hyperlipidemia LDL goal <100    Type 2 diabetes, controlled, with neuropathy    Hypothyroidism (acquired)    Obesity (BMI 30-39.9)    GERD (gastroesophageal reflux disease)    DDD (degenerative disc disease), lumbar    Spondylolisthesis, grade 1    DJD (degenerative joint disease), cervical    Diverticulosis    History of colon polyps    AR (allergic rhinitis)    Heart murmur    Tachycardia    Mild aortic stenosis    Atherosclerosis of abdominal aorta    Screening for colon cancer       Review of patient's allergies indicates:   Allergen Reactions    Lipitor [atorvastatin]        No current facility-administered medications on file prior to encounter.      Current Outpatient Prescriptions on File Prior to Encounter   Medication Sig Dispense Refill    fish oil-fat acid comb.8-hb137 (OMEGA 3-6-9) 1,200 mg Cap       levocetirizine (XYZAL) 5 MG tablet Take 1 tablet (5 mg total) by mouth every evening. 30 tablet 5    levothyroxine (SYNTHROID) 88 MCG tablet TAKE 1 TABLET(88 MCG) BY MOUTH EVERY DAY 90 tablet 0    lovastatin (MEVACOR) 20 MG tablet Take 3 tablets (60 mg total) by mouth nightly. 270 tablet 1    omeprazole (PRILOSEC) 20 MG capsule Take 2 capsules (40 mg total) by mouth once daily. 180 capsule 1    clobetasol 0.05% (TEMOVATE) 0.05 % Oint Apply topically 2 (two) times daily. 60 g 1    desonide (DESOWEN) 0.05 % cream Apply topically 2 (two) times daily. 15 g 0    fluocinonide 0.1 % Crea       lidocaine (XYLOCAINE) 5 % Oint ointment          Past Surgical History:    Procedure Laterality Date    COSMETIC SURGERY      HYSTERECTOMY      partial    LUNG BIOPSY  in her 40's    TOTAL KNEE ARTHROPLASTY Right 2014    TKR       Social History     Social History    Marital status:      Spouse name: N/A    Number of children: 7    Years of education: N/A     Occupational History    retired - house cleaning      Social History Main Topics    Smoking status: Former Smoker     Packs/day: 0.25     Years: 2.00     Quit date: 1962    Smokeless tobacco: Former User     Quit date: 1970    Alcohol use 1.2 oz/week     2 Cans of beer per week      Comment: occasionally    Drug use: No    Sexual activity: No     Other Topics Concern    Are You Pregnant Or Think You May Be? No    Breast-Feeding No     Social History Narrative    No narrative on file         Vital Signs Range (Last 24H):  Temp:  [36.6 °C (97.9 °F)]   Pulse:  [92]   Resp:  [16]   BP: (130)/(73)   SpO2:  [94 %]       CBC: No results for input(s): WBC, RBC, HGB, HCT, PLT, MCV, MCH, MCHC in the last 72 hours.    CMP: No results for input(s): NA, K, CL, CO2, BUN, CREATININE, GLU, MG, PHOS, CALCIUM, ALBUMIN, PROT, ALKPHOS, ALT, AST, BILITOT in the last 72 hours.    INR  No results for input(s): INR, PROTIME, APTT in the last 72 hours.    Invalid input(s): PT        Diagnostic Studies:      EKD Echo:      Anesthesia Evaluation    I have reviewed the Patient Summary Reports.    I have reviewed the Nursing Notes.   I have reviewed the Medications.     Review of Systems  Anesthesia Hx:  No problems with previous Anesthesia    Social:  Former Smoker, Social Alcohol Use    Cardiovascular:   Exercise tolerance: poor Hypertension ECG has been reviewed.    Pulmonary:   Denies Shortness of breath.    Renal/:   Chronic Renal Disease, CRI    Hepatic/GI:   GERD, well controlled    Musculoskeletal:   Arthritis     Neurological:   Peripheral Neuropathy    Endocrine:   Diabetes Hypothyroidism         Physical Exam  General:  Well nourished    Airway/Jaw/Neck:  Airway Findings: Mouth Opening: Normal Tongue: Normal  General Airway Assessment: Adult  Mallampati: II  TM Distance: Normal, at least 6 cm  Jaw/Neck Findings:  Neck ROM: Extension Decreased, Mild      Dental:  Dental Findings: Periodontal disease, Severe, Upper Dentures   Chest/Lungs:  Chest/Lungs Findings: Clear to auscultation, Normal Respiratory Rate     Heart/Vascular:  Heart Findings: Rate: Normal  Rhythm: Regular Rhythm        Mental Status:  Mental Status Findings:  Cooperative, Alert and Oriented         Anesthesia Plan  Type of Anesthesia, risks & benefits discussed:  Anesthesia Type:  general  Patient's Preference:   Intra-op Monitoring Plan: standard ASA monitors  Intra-op Monitoring Plan Comments:   Post Op Pain Control Plan: IV/PO Opioids PRN  Post Op Pain Control Plan Comments:   Induction:   IV  Beta Blocker:  Patient is not currently on a Beta-Blocker (No further documentation required).       Informed Consent: Patient understands risks and agrees with Anesthesia plan.  Questions answered. Anesthesia consent signed with patient.  ASA Score: 3     Day of Surgery Review of History & Physical:    H&P update referred to the provider.         Ready For Surgery From Anesthesia Perspective.

## 2017-10-03 NOTE — H&P
Endoscopy H&P    Procedure : Colonoscopy      personal history of colon polyps and most recent endoscopic exam 5 years ago      Past Medical History:   Diagnosis Date    AR (allergic rhinitis)     Atherosclerosis of abdominal aorta     noted on CT scan 1/17/2011    Carpal tunnel syndrome of left wrist     Chronic kidney disease, stage 3     Diabetic peripheral neuropathy     Diverticulosis     History of colon polyps     History of diverticulitis of colon 1/2014    Hyperlipemia     Hypertension     Hypothyroidism     followed by endocrinology, Dr. Green    Mild aortic stenosis     OA (osteoarthritis) of knee     Obesity     Type II or unspecified type diabetes mellitus with neurological manifestations, uncontrolled(250.62)              Review of Systems -ROS:  GENERAL: No fever, chills, fatigability or weight loss.  CHEST: Denies BLACK, cyanosis, wheezing, cough and sputum production.  CARDIOVASCULAR: Denies chest pain, PND, orthopnea or reduced exercise tolerance.   Musculoskeletal ROS: negative for - gait disturbance or joint pain  Neurological ROS: negative for - confusion or memory loss        Physical Exam:  General: well developed, well nourished, no distress  Head: normocephalic  Neck: supple, symmetrical, trachea midline  Lungs:  normal respiratory effort  Heart: regular rate and rhythm  Abdomen: soft, non-tender non-distented; bowel sounds normal; no masses,  no organomegaly  Extremities: no cyanosis or edema, or clubbing       Moderate Sedation (choice): Mallampati Score 2    ASA : III    IMP: personal history of colon polyps and most recent endoscopic exam 5 years ago    Plan: Colonoscopy with Moderate sedation.  I have explained the procedure including indications, alternatives, expected outcomes and potential complications. The patient appears to understand and gives informed consent. The patient is medically ready for surgery.

## 2017-10-03 NOTE — TRANSFER OF CARE
"Anesthesia Transfer of Care Note    Patient: Tonya Cohn    Procedure(s) Performed: Procedure(s) (LRB):  COLONOSCOPY (N/A)    Patient location: PACU    Anesthesia Type: general    Transport from OR: Transported from OR on room air with adequate spontaneous ventilation    Post pain: adequate analgesia    Post assessment: no apparent anesthetic complications    Post vital signs: stable    Level of consciousness: sedated    Nausea/Vomiting: no nausea/vomiting    Complications: none    Transfer of care protocol was followed      Last vitals:   Visit Vitals  /73 (Patient Position: Lying)   Pulse 92   Temp 36.6 °C (97.9 °F) (Temporal)   Resp 16   Ht 5' 6" (1.676 m)   Wt 86.2 kg (190 lb)   SpO2 (!) 94%   Breastfeeding? No   BMI 30.67 kg/m²     "

## 2017-10-04 ENCOUNTER — TELEPHONE (OUTPATIENT)
Dept: FAMILY MEDICINE | Facility: CLINIC | Age: 82
End: 2017-10-04

## 2017-10-04 DIAGNOSIS — E11.40 TYPE 2 DIABETES, CONTROLLED, WITH NEUROPATHY: Primary | ICD-10-CM

## 2017-10-04 NOTE — TELEPHONE ENCOUNTER
----- Message from Tere Hughes MD sent at 10/3/2017 10:49 AM CDT -----  Please call patient: did she do her eye exam yet this year?

## 2017-10-04 NOTE — TELEPHONE ENCOUNTER
Spoke w/patient, states she has not had eye exam this year.  Requesting referral to see someone with [facility]. Please advise.

## 2017-10-05 NOTE — TELEPHONE ENCOUNTER
Spoke w/patient, informed of referral and she will be contacted to schedule. Verbalized understanding.

## 2017-10-10 ENCOUNTER — TELEPHONE (OUTPATIENT)
Dept: ENDOSCOPY | Facility: HOSPITAL | Age: 82
End: 2017-10-10

## 2017-10-13 ENCOUNTER — OFFICE VISIT (OUTPATIENT)
Dept: OPHTHALMOLOGY | Facility: CLINIC | Age: 82
End: 2017-10-13
Payer: MEDICARE

## 2017-10-13 DIAGNOSIS — E11.9 DM TYPE 2 WITHOUT RETINOPATHY: Primary | ICD-10-CM

## 2017-10-13 DIAGNOSIS — Z96.1 PSEUDOPHAKIA: ICD-10-CM

## 2017-10-13 DIAGNOSIS — I10 ESSENTIAL HYPERTENSION: ICD-10-CM

## 2017-10-13 DIAGNOSIS — H52.7 REFRACTIVE ERROR: ICD-10-CM

## 2017-10-13 PROCEDURE — 99212 OFFICE O/P EST SF 10 MIN: CPT | Mod: PBBFAC,PO | Performed by: OPHTHALMOLOGY

## 2017-10-13 PROCEDURE — 99999 PR PBB SHADOW E&M-EST. PATIENT-LVL II: CPT | Mod: PBBFAC,,, | Performed by: OPHTHALMOLOGY

## 2017-10-13 PROCEDURE — 92004 COMPRE OPH EXAM NEW PT 1/>: CPT | Mod: S$PBB,,, | Performed by: OPHTHALMOLOGY

## 2017-10-13 NOTE — LETTER
October 13, 2017      Tere Hughes MD  [address]  [place] [address]           [facility]  [address] [place] [address]  Phone: [phone]  Fax: [phone]          Patient: Tonya Cohn   MR Number: [id number]   YOB: 1932   Date of Visit: 10/13/2017       Dear Dr. Tere Hughes:    Thank you for referring Tonya Cohn to me for evaluation. Attached you will find relevant portions of my assessment and plan of care.    If you have questions, please do not hesitate to call me. I look forward to following Tonya Cohn along with you.    Sincerely,    Branden Alba MD    Enclosure  CC:  No Recipients    If you would like to receive this communication electronically, please contact [email or web address] or [phone] to request more information on [facility] access.    For providers and/or their staff who would like to refer a patient to [facility], please contact us through our one-stop-shop provider referral line, [facility], at [phone].    If you feel you have received this communication in error or would no longer like to receive these types of communications, please e-mail [email or web address]

## 2017-10-13 NOTE — PROGRESS NOTES
Subjective:       Patient ID: Tonya Cohn is a 85 y.o. female.    Chief Complaint: Diabetic Eye Exam (ref. by pcp Dr Tere Hughes)    HPI  Review of Systems    Objective:      Physical Exam    Assessment:       1. 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 Essential hypertension    3. Refractive error    4. Pseudophakia        Plan:       DM-No NPDR OU.  HTN-No retinopathy OU.  RE-No need to change Rx.        Control DM & HTN.  RTC 1 yr.

## 2017-10-15 DIAGNOSIS — E03.9 ACQUIRED HYPOTHYROIDISM: ICD-10-CM

## 2017-10-15 RX ORDER — LEVOTHYROXINE SODIUM 88 UG/1
TABLET ORAL
Qty: 90 TABLET | Refills: 1 | Status: SHIPPED | OUTPATIENT
Start: 2017-10-15 | End: 2017-11-01 | Stop reason: SDUPTHER

## 2017-10-16 ENCOUNTER — TELEPHONE (OUTPATIENT)
Dept: FAMILY MEDICINE | Facility: CLINIC | Age: 82
End: 2017-10-16

## 2017-10-16 NOTE — TELEPHONE ENCOUNTER
----- Message from Alanna Stephens sent at 10/16/2017 11:11 AM CDT -----  Contact: self  Refill : levothyroxine (SYNTHROID) 88 MCG tablet    Pharmacy     [facility] [place] [id number] AT [facility] [address]     #90 DAY SUPPLY#

## 2017-10-16 NOTE — TELEPHONE ENCOUNTER
No answer.called to advise patient medication was approved yesterday and sent to her pharmacy.(Levothyroxine)

## 2017-10-25 ENCOUNTER — HOSPITAL ENCOUNTER (OUTPATIENT)
Dept: CARDIOLOGY | Facility: HOSPITAL | Age: 82
Discharge: HOME | End: 2017-10-25
Attending: INTERNAL MEDICINE
Payer: MEDICARE

## 2017-10-25 DIAGNOSIS — I35.0 MILD AORTIC STENOSIS: ICD-10-CM

## 2017-10-25 LAB
AORTIC VALVE STENOSIS: ABNORMAL
DIASTOLIC DYSFUNCTION: YES
ESTIMATED PA SYSTOLIC PRESSURE: 22.89
MITRAL VALVE REGURGITATION: ABNORMAL
RETIRED EF AND QEF - SEE NOTES: 55 (ref 55–65)
TRICUSPID VALVE REGURGITATION: ABNORMAL

## 2017-10-25 PROCEDURE — 93306 TTE W/DOPPLER COMPLETE: CPT

## 2017-10-25 PROCEDURE — 93306 TTE W/DOPPLER COMPLETE: CPT | Mod: 26,,, | Performed by: INTERNAL MEDICINE

## 2017-10-26 DIAGNOSIS — J06.9 UPPER RESPIRATORY TRACT INFECTION, UNSPECIFIED TYPE: ICD-10-CM

## 2017-10-26 RX ORDER — BENZONATATE 200 MG/1
CAPSULE ORAL
Qty: 45 CAPSULE | Refills: 0 | Status: SHIPPED | OUTPATIENT
Start: 2017-10-26 | End: 2018-02-20 | Stop reason: ALTCHOICE

## 2017-10-30 ENCOUNTER — OFFICE VISIT (OUTPATIENT)
Dept: PODIATRY | Facility: CLINIC | Age: 82
End: 2017-10-30
Payer: MEDICARE

## 2017-10-30 VITALS
HEIGHT: 66 IN | DIASTOLIC BLOOD PRESSURE: 69 MMHG | SYSTOLIC BLOOD PRESSURE: 113 MMHG | HEART RATE: 73 BPM | BODY MASS INDEX: 30.53 KG/M2 | WEIGHT: 190 LBS

## 2017-10-30 DIAGNOSIS — M20.42 HAMMER TOE OF LEFT FOOT: ICD-10-CM

## 2017-10-30 DIAGNOSIS — E11.40 TYPE 2 DIABETES, CONTROLLED, WITH NEUROPATHY: Primary | ICD-10-CM

## 2017-10-30 PROCEDURE — 99999 PR PBB SHADOW E&M-EST. PATIENT-LVL III: CPT | Mod: PBBFAC,,, | Performed by: PODIATRIST

## 2017-10-30 PROCEDURE — 99213 OFFICE O/P EST LOW 20 MIN: CPT | Mod: PBBFAC | Performed by: PODIATRIST

## 2017-10-30 PROCEDURE — 99214 OFFICE O/P EST MOD 30 MIN: CPT | Mod: S$PBB,,, | Performed by: PODIATRIST

## 2017-10-30 NOTE — PROGRESS NOTES
CC:     Foot exam       HPI:   The patient is a 85 y.o.  female  who presents for a diabetic foot exam.     Patient reports no presence of abnormal sensation to the feet .    History of diabetic foot ulcers: none   History of foot surgery: none.     Shoes worn today:  Athletic mesh type shoes.    She complains of pain on the left 5th toe.  Has history of pain in this area.    Her grandson trimmed her nails recently.         Primary care doctor is: Tere Hughes MD  Patient last saw primary care doctor on:   Chief Complaint   Patient presents with    PCP     Ellen 08/28/2017    Diabetic Foot Exam    Nail Care           Past Medical History:   Diagnosis Date    AR (allergic rhinitis)     Atherosclerosis of abdominal aorta     noted on CT scan 1/17/2011    Carpal tunnel syndrome of left wrist     Chronic kidney disease, stage 3     Diabetic peripheral neuropathy     Diverticulosis     History of colon polyps     History of diverticulitis of colon 1/2014    Hyperlipemia     Hypertension     Hypothyroidism     followed by endocrinology, Dr. Green    Mild aortic stenosis     OA (osteoarthritis) of knee     Obesity     Type II or unspecified type diabetes mellitus with neurological manifestations, uncontrolled(250.62)          Current Outpatient Prescriptions on File Prior to Visit   Medication Sig Dispense Refill    benzonatate (TESSALON) 200 MG capsule TAKE 1 CAPSULE(200 MG) BY MOUTH THREE TIMES DAILY AS NEEDED FOR COUGH 45 capsule 0    clobetasol 0.05% (TEMOVATE) 0.05 % Oint Apply topically 2 (two) times daily. 60 g 1    fish oil-fat acid comb.[id number] (OMEGA 3-6-9) 1,200 mg Cap       fluocinonide 0.1 % Crea       glipiZIDE (GLUCOTROL) 5 MG tablet TAKE 1 TABLET BY MOUTH TWICE DAILY WITH MEALS 60 tablet 5    levocetirizine (XYZAL) 5 MG tablet Take 1 tablet (5 mg total) by mouth every evening. 30 tablet 5    levothyroxine (SYNTHROID) 88 MCG tablet TAKE 1 TABLET(88 MCG) BY MOUTH EVERY DAY 90  "tablet 0    levothyroxine (SYNTHROID) 88 MCG tablet TAKE 1 TABLET(88 MCG) BY MOUTH EVERY DAY 90 tablet 1    lidocaine (XYLOCAINE) 5 % Oint ointment       losartan (COZAAR) 50 MG tablet TAKE 1 TABLET(50 MG) BY MOUTH EVERY DAY 30 tablet 5    lovastatin (MEVACOR) 20 MG tablet Take 3 tablets (60 mg total) by mouth nightly. 270 tablet 1    metformin (GLUCOPHAGE) 1000 MG tablet TAKE ONE-HALF TABLET BY MOUTH TWICE DAILY WITH MEALS 30 tablet 2    omeprazole (PRILOSEC) 20 MG capsule Take 2 capsules (40 mg total) by mouth once daily. 180 capsule 1    desonide (DESOWEN) 0.05 % cream Apply topically 2 (two) times daily. 15 g 0     No current facility-administered medications on file prior to visit.          Review of patient's allergies indicates:   Allergen Reactions    Lipitor [atorvastatin]              ROS:  General ROS: negative  Respiratory ROS: no cough, shortness of breath, or wheezing  Cardiovascular ROS: no chest pain or dyspnea on exertion  Musculoskeletal ROS: negative  Neurological ROS:   negative for - numbness/tingling  Dermatological ROS: negative        Social History     Social History    Marital status:      Spouse name: N/A    Number of children: 7    Years of education: N/A     Occupational History    retired - house cleaning      Social History Main Topics    Smoking status: Former Smoker     Packs/day: 0.25     Years: 2.00     Quit date: 7/19/1962    Smokeless tobacco: Former User     Quit date: 7/14/1970    Alcohol use 1.2 oz/week     2 Cans of beer per week      Comment: occasionally    Drug use: No    Sexual activity: No     Other Topics Concern    Are You Pregnant Or Think You May Be? No    Breast-Feeding No     Social History Narrative    No narrative on file           EXAM:   Vitals:    10/30/17 1047   BP: 113/69   Pulse: 73   Weight: 86.2 kg (190 lb)   Height: 5' 6" (1.676 m)       General: alert, no distress, cooperative    Vascular:   Dorsalis pedis:   1+ bilateral. "   Posterior Tibial:   1+ bilateral.   3 seconds capillary refill time   Temperature of toes are cool to touch.   normal hair growth on the feet.    Edema on feet:   Trace and non-pitting   Varicosities:  none      Dermatological:    Skin: thin and atrophic,  dry  Nails: toenails 1-5 L and 1-5 R  are onychomycosis of the toenails, short.  No paronychia  Callus:  left 5th toe at the proximal interphalangeal joint   Open Wounds: None  Ecchymoses is not observed.      Erythema:  none .     Interdigital spaces: clean, dry and without evidence of break in skin integrity      Neurological:    Sharp dull - B/L normal and light touch - B/L normal  Vibratory - normal  Oakley diminished      Musculoskeletal:     Muscle strength: 5/5, adequate ROM, adequate strength     Right foot:  no gross deformity   Left foot: left 5th hammertoe, rigid contracture at the distal interphalangeal joint and proximal interphalangeal joint, with thin hyperkeratosis             ASSESSMENT/PLAN     I counseled the patient on her conditions, their implications and medical management.     Type 2 diabetes, controlled, with neuropathy  -     DIABETIC SHOES FOR HOME USE    Hammer toe of left foot  -     DIABETIC SHOES FOR HOME USE      Procedures:  None today.   Wider shoes.  Toe sleeve provided  Moisturize feet daily  Patient defers surgical intervention for correction of left 5th toe.   But will discuss if interested.     Return in about 6 months (around 4/30/2018) for foot exam, or sooner if concerned.

## 2017-10-30 NOTE — PATIENT INSTRUCTIONS
How to Check Your Feet    Below are tips to help you look for foot problems. Try to check your feet at the same time each day, such as when you get out of bed in the morning.    · Check the top of each foot. The tops of toes, back of the heel, and outer edge of the foot can get a lot of rubbing from poor-fitting shoes.    · Check the bottom of each foot. Daily wear and tear often leads to problems at pressure spots.    · Check the toes and nails. Fungal infections often occur between toes. Toenail problems can also be a sign of fungal infections or lead to breaks in the skin.    · Check your shoes, too. Loose objects inside a shoe can injure the foot. Use your hand to feel inside your shoes for things like reji, loose stitching, or rough areas that could irritate your skin.        Diabetic Foot Care    Diabetes can lead to a number of different foot complications. Fortunately, most of these complications can be prevented with a little extra foot care. If diabetes is not well controlled, the high blood sugar can cause damage to blood vessels and result in poor circulation to the foot. When the skin does not get enough blood flow, it becomes prone to pressure sores and ulcers, which heal slowly.  High blood sugar can also damage nerves, interfering with the ability to feel pain and pressure. When you cant feel your foot normally, it is easy to injure your skin, bones and joints without knowing it. For these reasons diabetes increases the risk of fungal infections, bunions and ulcers. Deep ulcers can lead to bone infection. Gangrene is the most serious foot complication of diabetes. It usually occurs on the tips of the toes as blacked areas of skin. The black area is dead tissue. In severe cases, gangrene spreads to involve the entire toe, other toes and the entire foot. Foot or toe amputation may be required. Good foot care and blood sugar control can prevent this.    Home Care  1. Wear comfortable, proper fitting  shoes.  2. Wash your feet daily with warm water and mild soap.  3. After drying, apply a moisturizing cream or lotion.  4. Check your feet daily for skin breaks, blisters, swelling, or redness. Look between your toes also.  5. Wear cotton socks and change them every day.  6. Trim toe nails carefully and do not cut your cuticles.  7. Strive to keep your blood sugar under control with a combination of medicines, diet and activity.  8. If you smoke and have diabetes, it is very important that you stop. Smoking reduces blood flow to your foot.  9. Avoid activities that increase your risk of foot injury:  · Do not walk barefoot.  · Do not use heating pads or hot water bottles on your feet.  · Do not put your foot in a hot tub without first checking the temperature with your hand.  10) Schedule yearly foot exams.    Follow Up  with your doctor or as advised by our staff. Report any cut, puncture, scrape, other injury, blister, ingrown toenail or ulcer on your foot.    Get Prompt Medical Attention  if any of the following occur:  -- Open ulcer with pus draining from the wound  -- Increasing foot or leg pain  -- New areas of redness or swelling or tender areas of the foot    © 1663-4528 [employer]. [address] [place] [address], [place] [address]. All rights reserved. This information is not intended as a substitute for professional medical care. Always follow your healthcare professional's instructions.      Foot Surgery: Curled Fifth Toe  Hammertoes can make walking painful. With hammertoes, one or more toes curl or bend abnormally. This can be caused by an inherited muscle problem, an abnormal bone length, or poor foot mechanics. The affected joints can rub inside shoes, causing corns (buildups of dead skin).    Curled fifth toe  A curled fifth toe is most often inherited. When the fifth toe curls inward, it moves under the next toe. Then the nail of the curled toe starts to face outward. As a result, you may  bear weight on the side of your toe instead of the bottom. This can cause corns and painful nails.  There are many nonsurgical treatments available. But if these are not effective, surgery is a choice.    Derotation arthroplasty  A wedge of skin and a section of bone are removed to help straighten (derotate) the toe. You can bear weight on your foot right after surgery. In some cases, you may need to wear a bandage, splint, and surgical shoe for a few weeks. When healed, the bones become connected with scar tissue.  Date Last Reviewed: 10/15/2015  © 1706-1108 [employer]. [address] [place] [address], [place] [id number]. All rights reserved. This information is not intended as a substitute for professional medical care. Always follow your healthcare professional's instructions.        What Are Mallet, Hammer, and Claw Toes?  Mallet, hammer, and claw toes are most often caused by wearing shoes that are too short or heels that are too high. This jams the toes against the front of the shoe and causes one or more joints to bend. Rarely, disease can cause the joints in the toes to bend. Mallet, hammer, and claw toes are among the most common toe problems. They occur most often in the longest of the four smaller toes.    Inside your toes  There are 3 bones in each of your 4 smaller toes. Where 2 bones connect is called a joint. Normally the toes lie flat. But pressure on the toes or the front of the foot can cause one or more joints to bend. This curls the toe. Toes that stay curled are called mallet toes, hammer toes, or claw toes, depending on which joints are bent.    Symptoms  You may feel pain in the toe or in the ball of your foot. A corn (a hard growth of skin on the top of the toe) may form where the toe rubs against the top of the shoe. Or a callus (a hard growth of skin on the bottom of the foot) may form under the tip of the toe or on the ball of the foot. Corns and calluses can also be painful.   Date  Last Reviewed: 10/18/2015  © 0335-0673 [employer]. [address] [place] [address], [place] [address]. All rights reserved. This information is not intended as a substitute for professional medical care. Always follow your healthcare professional's instructions.

## 2017-10-31 PROBLEM — Z12.11 SCREENING FOR COLON CANCER: Status: RESOLVED | Noted: 2017-10-03 | Resolved: 2017-10-31

## 2017-10-31 PROBLEM — H52.7 REFRACTIVE ERROR: Status: RESOLVED | Noted: 2017-10-13 | Resolved: 2017-10-31

## 2017-10-31 PROBLEM — I10 ESSENTIAL HYPERTENSION: Status: RESOLVED | Noted: 2017-10-13 | Resolved: 2017-10-31

## 2017-10-31 PROBLEM — E11.9 DM TYPE 2 WITHOUT RETINOPATHY: Status: RESOLVED | Noted: 2017-10-13 | Resolved: 2017-10-31

## 2017-11-01 ENCOUNTER — OFFICE VISIT (OUTPATIENT)
Dept: FAMILY MEDICINE | Facility: CLINIC | Age: 82
End: 2017-11-01
Payer: MEDICARE

## 2017-11-01 VITALS
TEMPERATURE: 99 F | BODY MASS INDEX: 33.48 KG/M2 | HEART RATE: 95 BPM | HEIGHT: 65 IN | DIASTOLIC BLOOD PRESSURE: 80 MMHG | SYSTOLIC BLOOD PRESSURE: 102 MMHG | WEIGHT: 200.94 LBS | OXYGEN SATURATION: 96 %

## 2017-11-01 DIAGNOSIS — N18.30 BENIGN HYPERTENSION WITH CHRONIC KIDNEY DISEASE, STAGE III: ICD-10-CM

## 2017-11-01 DIAGNOSIS — E11.40 TYPE 2 DIABETES, CONTROLLED, WITH NEUROPATHY: ICD-10-CM

## 2017-11-01 DIAGNOSIS — R05.9 COUGH: ICD-10-CM

## 2017-11-01 DIAGNOSIS — K21.9 GASTROESOPHAGEAL REFLUX DISEASE WITHOUT ESOPHAGITIS: Primary | ICD-10-CM

## 2017-11-01 DIAGNOSIS — J30.9 ALLERGIC RHINITIS, UNSPECIFIED CHRONICITY, UNSPECIFIED SEASONALITY, UNSPECIFIED TRIGGER: ICD-10-CM

## 2017-11-01 DIAGNOSIS — I12.9 BENIGN HYPERTENSION WITH CHRONIC KIDNEY DISEASE, STAGE III: ICD-10-CM

## 2017-11-01 DIAGNOSIS — E66.9 OBESITY (BMI 30-39.9): ICD-10-CM

## 2017-11-01 PROCEDURE — 99999 PR PBB SHADOW E&M-EST. PATIENT-LVL III: CPT | Mod: PBBFAC,,, | Performed by: INTERNAL MEDICINE

## 2017-11-01 PROCEDURE — 99213 OFFICE O/P EST LOW 20 MIN: CPT | Mod: PBBFAC,PO | Performed by: INTERNAL MEDICINE

## 2017-11-01 PROCEDURE — 99214 OFFICE O/P EST MOD 30 MIN: CPT | Mod: S$PBB,,, | Performed by: INTERNAL MEDICINE

## 2017-11-01 RX ORDER — MOMETASONE FUROATE 50 UG/1
2 SPRAY, METERED NASAL DAILY
Qty: 17 G | Refills: 3 | Status: SHIPPED | OUTPATIENT
Start: 2017-11-01 | End: 2018-12-06

## 2017-11-01 NOTE — PROGRESS NOTES
"HISTORY OF PRESENT ILLNESS:  Tonya Cohn is a 85 y.o. female who presents to the clinic today for Sore Throat and URI  .  The patient presents to clinic today with complaint of sore throat and postnasal drip.  She has known ALLERGIES.  She is currently not doing much at home for her ALLERGIES.  She has no ALLERGY nose spray.  She is not doing any saline rinse.  She is not taking any antihistamines at bedtime.  She had a colonoscopy done recently and apparently she had some cough then with clear mucous.  She has been prescribed some Tessalon Perles which she feels helped a little bit.  The gastroenterologist who did a colonoscopy thought perhaps she was suffering from heartburn and reflux.  She does take omeprazole on a regular basis.  She drinks about 2 cups of coffee in the morning.  The rest of the day she drinks water and tea.  She does like to eat on bread and white peppermints.  She denies any dysphagia.  He is 6 sometimes things get "stuck in the top part of her throat".  She has diabetes with neuropathy which is well controlled.  She also has hypertension complicated by chronic kidney disease stage III.  She does not check her blood pressures at home. The patient denies any problems with cardiac chest pain, dizziness, palpitations, orthopnea, or lower extremity edema.  The patient reports compliance with current medication- patient denies missing any  medication doses.      PAST MEDICAL HISTORY:  Past Medical History:   Diagnosis Date    AR (allergic rhinitis)     Atherosclerosis of abdominal aorta     noted on CT scan 1/17/2011    Carpal tunnel syndrome of left wrist     Chronic kidney disease, stage 3     Diabetic peripheral neuropathy     Diverticulosis     History of colon polyps     History of diverticulitis of colon 1/2014    Hyperlipemia     Hypertension     Hypothyroidism     followed by endocrinology, Dr. Green    Mild aortic stenosis     OA (osteoarthritis) of knee     Obesity  "    Type II or unspecified type diabetes mellitus with neurological manifestations, uncontrolled(250.62)        PAST SURGICAL HISTORY:  Past Surgical History:   Procedure Laterality Date    CATARACT EXTRACTION      COLONOSCOPY N/A 10/3/2017    Procedure: COLONOSCOPY;  Surgeon: Alin Gonzalez MD;  Location: [facility] ([address] [place] [address]);  Service: Endoscopy;  Laterality: N/A;    COSMETIC SURGERY      HYSTERECTOMY      partial    LUNG BIOPSY  in her 40's    TOTAL KNEE ARTHROPLASTY Right 6/13/2014    TKR       SOCIAL HISTORY:  Social History     Social History    Marital status:      Spouse name: N/A    Number of children: 7    Years of education: N/A     Occupational History    retired - house cleaning      Social History Main Topics    Smoking status: Former Smoker     Packs/day: 0.25     Years: 2.00     Quit date: 7/19/1962    Smokeless tobacco: Former User     Quit date: 7/14/1970    Alcohol use 1.2 oz/week     2 Cans of beer per week      Comment: occasionally    Drug use: No    Sexual activity: No     Other Topics Concern    Are You Pregnant Or Think You May Be? No    Breast-Feeding No     Social History Narrative    No narrative on file       FAMILY HISTORY:  Family History   Problem Relation Age of Onset    Cancer Mother      shoulder tumor - cancer    Hypertension Mother     Heart disease Father      valve replacement    Hypertension Father     Heart attack Father     Diabetes Sister     Arthritis Sister      s/p knee surgery    Diabetes Brother     Arthritis Brother      back problems    Skin cancer Brother     Cancer Son      jaw    Melanoma Neg Hx     Eczema Neg Hx     Lupus Neg Hx     Psoriasis Neg Hx     Breast cancer Neg Hx     Colon cancer Neg Hx        ALLERGIES AND MEDICATIONS: updated and reviewed.  Review of patient's allergies indicates:   Allergen Reactions    Lipitor [atorvastatin]      Medication List with Changes/Refills   New Medications    MOMETASONE (NASONEX)  50 MCG/ACTUATION NASAL SPRAY    2 sprays by Nasal route once daily.   Current Medications    BENZONATATE (TESSALON) 200 MG CAPSULE    TAKE 1 CAPSULE(200 MG) BY MOUTH THREE TIMES DAILY AS NEEDED FOR COUGH    CLOBETASOL 0.05% (TEMOVATE) 0.05 % OINT    Apply topically 2 (two) times daily.    DESONIDE (DESOWEN) 0.05 % CREAM    Apply topically 2 (two) times daily.    FISH OIL-FAT ACID COMB.8- (OMEGA 3-6-9) 1,200 MG CAP        FLUOCINONIDE 0.1 % CREA        GLIPIZIDE (GLUCOTROL) 5 MG TABLET    TAKE 1 TABLET BY MOUTH TWICE DAILY WITH MEALS    LEVOCETIRIZINE (XYZAL) 5 MG TABLET    Take 1 tablet (5 mg total) by mouth every evening.    LEVOTHYROXINE (SYNTHROID) 88 MCG TABLET    TAKE 1 TABLET(88 MCG) BY MOUTH EVERY DAY    LIDOCAINE (XYLOCAINE) 5 % OINT OINTMENT        LOSARTAN (COZAAR) 50 MG TABLET    TAKE 1 TABLET(50 MG) BY MOUTH EVERY DAY    LOVASTATIN (MEVACOR) 20 MG TABLET    Take 3 tablets (60 mg total) by mouth nightly.    METFORMIN (GLUCOPHAGE) 1000 MG TABLET    TAKE ONE-HALF TABLET BY MOUTH TWICE DAILY WITH MEALS    OMEPRAZOLE (PRILOSEC) 20 MG CAPSULE    Take 2 capsules (40 mg total) by mouth once daily.   Discontinued Medications    LEVOTHYROXINE (SYNTHROID) 88 MCG TABLET    TAKE 1 TABLET(88 MCG) BY MOUTH EVERY DAY        PATIENT CARE TEAM:  Data Unavailable      CARE TEAM:  Patient Care Team:  Tere Hughes MD as PCP - General (Internal Medicine)         REVIEW OF SYSTEMS:  General ROS: negative for - chills, fever or malaise  Psychological ROS: negative for - memory difficulties, obsessive thoughts or suicidal ideation  Ophthalmic ROS: negative for - blurry vision or eye pain  ENT ROS: negative for - epistaxis, oral lesions or sore throat  Allergy and Immunology ROS: positive for - seasonal allergies  negative for - hives  Hematological and Lymphatic ROS: negative for - swollen lymph nodes  Endocrine ROS: negative for - polydipsia/polyuria or temperature intolerance  Respiratory ROS: negative for -  "hemoptysis, sputum changes or wheezing  Cardiovascular ROS: no chest pain or dyspnea on exertion  Gastrointestinal ROS: no abdominal pain, change in bowel habits, or black or bloody stools  Dermatological ROS: negative for mole changes and rash  Musculoskeletal ROS: negative for - gait disturbance, joint swelling, muscle pain or muscular weakness  Neurological ROS: no TIA or stroke symptoms  Genito-Urinary ROS: no dysuria, trouble voiding, or hematuria      PHYSICAL EXAM:   Vitals:    11/01/17 1520   BP: 102/80   Pulse: 95   Temp: 98.7 °F (37.1 °C)     Weight: 91.2 kg (200 lb 15.2 oz)   Height: 1' 8.11" (51.1 cm)   Body mass index is 349.28 kg/m².     General appearance - alert, well appearing, and in no distress  Mental status - alert, oriented to person, place, and time, normal mood, behavior, speech, dress, motor activity, and thought processes  Eyes - pupils equal and reactive, extraocular eye movements intact, sclera anicteric  Ears - bilateral TM's and external ear canals normal  Nose - normal nontender sinuses, mucosal congestion and mucosal pallor  Mouth - mucous membranes moist, pharynx normal without lesions  Neck - supple, no significant adenopathy, carotids upstroke normal bilaterally, no bruits, thyroid exam: thyroid is normal in size without nodules or tenderness  Lymphatics - no palpable lymphadenopathy  Chest - clear to auscultation, no wheezes, rales or rhonchi, symmetric air entry  Heart - normal rate and regular rhythm, no gallops noted  Neurological - alert, oriented, normal speech, no focal findings or movement disorder noted, cranial nerves II through XII intact  Musculoskeletal - no muscular tenderness noted, Mild-Moderate osteoarthritic changes noted to both knee joints. No joint effusions noted.   Extremities - no pedal edema noted  Skin - normal coloration and turgor, no rashes, no suspicious skin lesions noted      ASSESSMENT AND PLAN:  1. Gastroesophageal reflux disease without " esophagitis  Symptoms not controlled. Reflux precautions discussed (eliminate tobacco if a smoker; minimize caffeine, chocolate and red/white peppermint intake; avoid heavy and spicy meals; don't lay down within 2-3 hours after eating). Medication as needed. Patient asked to take medication breaks, if possible - discussed chronic use can limit calcium absorption, increases the risk for intestinal infections, and can cause kidney damage.  We will check EGD to rule out any stenosis.  - Case request GI: ESOPHAGOGASTRODUODENOSCOPY (EGD)    2. Cough/3. Allergic rhinitis, unspecified chronicity, unspecified seasonality, unspecified trigger  We discussed several treatment strategies: antihistamine at bedtime, flonase in the morning. We also discussed saline nasal rinse in the evening as needed. I recommended allergy covers for pillow and mattress. Patient will let me know if symptoms worsen or persist.   - mometasone (NASONEX) 50 mcg/actuation nasal spray; 2 sprays by Nasal route once daily.  Dispense: 17 g; Refill: 3    4. Type 2 diabetes, controlled, with neuropathy  Diabetes currently is controlled. We discussed diabetic diet and regular exercise. We discussed home blood sugar monitoring, if appropriate. The current medical regimen is effective;  continue present plan and medications.      5. Benign hypertension with chronic kidney disease, stage III  Discussed sodium restriction, maintaining ideal body weight and regular exercise program as physiologic means to achieve blood pressure control. The patient will strive towards this. The current medical regimen is effective;  continue present plan and medications. Recommended patient to check home readings to monitor and see me for followup as scheduled or sooner as needed. Patient was educated that both decongestant and anti-inflammatory medication may raise blood pressure. Stable kidney function. Observe. Patient counseled to avoid/minimize the use of anti-inflammatory   Medication. Discussed to stay well hydrated. Also discussed with patient that good control of blood pressure or diabetes, if present, will help to prevent progression.     6. Obesity (BMI 30-39.9)  The patient is asked to make an attempt to improve diet and exercise patterns to aid in medical management of this problem.           Return in about 3 months (around 2/1/2018), or if symptoms worsen or fail to improve, for follow up chronic medical conditions.. or sooner as needed.

## 2017-11-01 NOTE — PATIENT INSTRUCTIONS
Recommend: antihistamine at bedtime (allegra, claritin or zyrtec), saline nasal rinse twice a day (hold head forward and spray towards the ear). Nasonex in the evening after a saline rinse.  I recommended allergy covers for pillow and mattress.

## 2017-11-19 RX ORDER — LOVASTATIN 20 MG/1
TABLET ORAL
Qty: 180 TABLET | Refills: 0 | Status: SHIPPED | OUTPATIENT
Start: 2017-11-19 | End: 2018-03-09 | Stop reason: SDUPTHER

## 2017-11-20 ENCOUNTER — OFFICE VISIT (OUTPATIENT)
Dept: CARDIOLOGY | Facility: CLINIC | Age: 82
End: 2017-11-20
Payer: MEDICARE

## 2017-11-20 ENCOUNTER — TELEPHONE (OUTPATIENT)
Dept: FAMILY MEDICINE | Facility: CLINIC | Age: 82
End: 2017-11-20

## 2017-11-20 VITALS
BODY MASS INDEX: 33.46 KG/M2 | OXYGEN SATURATION: 97 % | HEIGHT: 65 IN | DIASTOLIC BLOOD PRESSURE: 64 MMHG | WEIGHT: 200.81 LBS | HEART RATE: 72 BPM | RESPIRATION RATE: 15 BRPM | SYSTOLIC BLOOD PRESSURE: 144 MMHG

## 2017-11-20 DIAGNOSIS — I10 HTN (HYPERTENSION): ICD-10-CM

## 2017-11-20 DIAGNOSIS — E78.5 HYPERLIPIDEMIA LDL GOAL <100: Primary | ICD-10-CM

## 2017-11-20 DIAGNOSIS — I35.0 MILD AORTIC STENOSIS: ICD-10-CM

## 2017-11-20 DIAGNOSIS — I70.0 ATHEROSCLEROSIS OF ABDOMINAL AORTA: ICD-10-CM

## 2017-11-20 DIAGNOSIS — I12.9 BENIGN HYPERTENSION WITH CHRONIC KIDNEY DISEASE, STAGE III: ICD-10-CM

## 2017-11-20 DIAGNOSIS — E11.40 TYPE 2 DIABETES, CONTROLLED, WITH NEUROPATHY: ICD-10-CM

## 2017-11-20 DIAGNOSIS — N18.30 BENIGN HYPERTENSION WITH CHRONIC KIDNEY DISEASE, STAGE III: ICD-10-CM

## 2017-11-20 DIAGNOSIS — R00.0 TACHYCARDIA: ICD-10-CM

## 2017-11-20 PROCEDURE — 99213 OFFICE O/P EST LOW 20 MIN: CPT | Mod: S$PBB,,, | Performed by: INTERNAL MEDICINE

## 2017-11-20 PROCEDURE — 93010 ELECTROCARDIOGRAM REPORT: CPT | Mod: ,,, | Performed by: INTERNAL MEDICINE

## 2017-11-20 PROCEDURE — 99213 OFFICE O/P EST LOW 20 MIN: CPT | Mod: PBBFAC | Performed by: INTERNAL MEDICINE

## 2017-11-20 PROCEDURE — 99999 PR PBB SHADOW E&M-EST. PATIENT-LVL III: CPT | Mod: PBBFAC,,, | Performed by: INTERNAL MEDICINE

## 2017-11-20 NOTE — TELEPHONE ENCOUNTER
----- Message from Willow Chavez sent at 11/20/2017 12:29 PM CST -----  Contact: Self  Pt called to f/u on being scheduled for appointment to have throat looked at with scope. Pt can be reached @ [phone].

## 2017-11-20 NOTE — PROGRESS NOTES
Subjective:    Patient ID:  Tonya Cohn is a 85 y.o. female who presents for follow-up of Hypertension      HPI     HTN, AS-mild,  and tachycardia     Stress test 10/17/16  LVEF: >= 70 %  Impression: NORMAL MYOCARDIAL PERFUSION  1. The perfusion scan is free of evidence for myocardial ischemia or injury.   2. Resting wall motion is physiologic.   3. Resting LV function is normal.     Echo 10/25/17    1 - Normal left ventricular systolic function (EF 55-60%).     2 - Concentric remodeling.     3 - Impaired LV relaxation, elevated LAP (grade 2 diastolic dysfunction).     4 - Trivial aortic stenosis, AMAIRANI = 1.54 cm2, peak velocity = 2.02 m/s, mean gradient = 9 mmHg.      Denies recent CP or SOB  EKG NSR - ok    Review of Systems   Constitution: Negative for decreased appetite.   HENT: Negative for ear discharge.    Eyes: Negative for blurred vision.   Respiratory: Negative for hemoptysis.    Endocrine: Negative for polyphagia.   Hematologic/Lymphatic: Negative for adenopathy.   Skin: Negative for color change.   Musculoskeletal: Negative for joint swelling.   Neurological: Negative for brief paralysis.   Psychiatric/Behavioral: Negative for hallucinations.        Objective:    Physical Exam   Constitutional: She is oriented to person, place, and time. She appears well-developed and well-nourished.   HENT:   Head: Normocephalic and atraumatic.   Eyes: Conjunctivae are normal. Pupils are equal, round, and reactive to light.   Neck: Normal range of motion. Neck supple.   Cardiovascular: Normal rate and intact distal pulses.    Murmur heard.   Early systolic murmur is present with a grade of 2/6   Pulmonary/Chest: Effort normal and breath sounds normal.   Abdominal: Soft. Bowel sounds are normal.   Musculoskeletal: Normal range of motion.   Neurological: She is alert and oriented to person, place, and time.   Skin: Skin is warm and dry.         Assessment:       1. Hyperlipidemia LDL goal <100    2. Tachycardia    3.  Mild aortic stenosis    4. Atherosclerosis of abdominal aorta    5. Benign hypertension with chronic kidney disease, stage III    6. Type 2 diabetes, controlled, with neuropathy         Plan:       Cardiac stable  OV 6 months

## 2017-11-28 ENCOUNTER — TELEPHONE (OUTPATIENT)
Dept: FAMILY MEDICINE | Facility: CLINIC | Age: 82
End: 2017-11-28

## 2017-11-28 NOTE — TELEPHONE ENCOUNTER
----- Message from Sylvia Freitas sent at 11/28/2017  2:04 PM CST -----  Contact: Pt can be reached [phone] / [phone]  Pt is calling to speak with the nurse appt about procedure appt.      Thank you!

## 2017-12-12 ENCOUNTER — HOSPITAL ENCOUNTER (OUTPATIENT)
Facility: HOSPITAL | Age: 82
Discharge: HOME | End: 2017-12-12
Attending: INTERNAL MEDICINE | Admitting: INTERNAL MEDICINE
Payer: MEDICARE

## 2017-12-12 ENCOUNTER — SURGERY (OUTPATIENT)
Age: 82
End: 2017-12-12

## 2017-12-12 ENCOUNTER — ANESTHESIA (OUTPATIENT)
Dept: ENDOSCOPY | Facility: HOSPITAL | Age: 82
End: 2017-12-12
Payer: MEDICARE

## 2017-12-12 ENCOUNTER — ANESTHESIA EVENT (OUTPATIENT)
Dept: ENDOSCOPY | Facility: HOSPITAL | Age: 82
End: 2017-12-12
Payer: MEDICARE

## 2017-12-12 VITALS
WEIGHT: 206.13 LBS | TEMPERATURE: 98 F | DIASTOLIC BLOOD PRESSURE: 75 MMHG | HEART RATE: 87 BPM | OXYGEN SATURATION: 98 % | HEIGHT: 66 IN | RESPIRATION RATE: 16 BRPM | BODY MASS INDEX: 33.13 KG/M2 | SYSTOLIC BLOOD PRESSURE: 138 MMHG

## 2017-12-12 DIAGNOSIS — K21.9 GERD (GASTROESOPHAGEAL REFLUX DISEASE): ICD-10-CM

## 2017-12-12 DIAGNOSIS — K21.9 GASTROESOPHAGEAL REFLUX DISEASE, ESOPHAGITIS PRESENCE NOT SPECIFIED: Primary | ICD-10-CM

## 2017-12-12 LAB — POCT GLUCOSE: 96 MG/DL (ref 70–110)

## 2017-12-12 PROCEDURE — 37000008 HC ANESTHESIA 1ST 15 MINUTES: Performed by: INTERNAL MEDICINE

## 2017-12-12 PROCEDURE — 88305 TISSUE EXAM BY PATHOLOGIST: CPT | Mod: 26,,, | Performed by: PATHOLOGY

## 2017-12-12 PROCEDURE — 43239 EGD BIOPSY SINGLE/MULTIPLE: CPT | Performed by: INTERNAL MEDICINE

## 2017-12-12 PROCEDURE — D9220A PRA ANESTHESIA: Mod: ANES,,, | Performed by: ANESTHESIOLOGY

## 2017-12-12 PROCEDURE — 82962 GLUCOSE BLOOD TEST: CPT | Performed by: INTERNAL MEDICINE

## 2017-12-12 PROCEDURE — D9220A PRA ANESTHESIA: Mod: CRNA,,, | Performed by: NURSE ANESTHETIST, CERTIFIED REGISTERED

## 2017-12-12 PROCEDURE — 25000003 PHARM REV CODE 250: Performed by: INTERNAL MEDICINE

## 2017-12-12 PROCEDURE — 63600175 PHARM REV CODE 636 W HCPCS: Performed by: NURSE ANESTHETIST, CERTIFIED REGISTERED

## 2017-12-12 PROCEDURE — 27201012 HC FORCEPS, HOT/COLD, DISP: Performed by: INTERNAL MEDICINE

## 2017-12-12 PROCEDURE — 88305 TISSUE EXAM BY PATHOLOGIST: CPT | Performed by: PATHOLOGY

## 2017-12-12 PROCEDURE — 43239 EGD BIOPSY SINGLE/MULTIPLE: CPT | Mod: ,,, | Performed by: INTERNAL MEDICINE

## 2017-12-12 PROCEDURE — 37000009 HC ANESTHESIA EA ADD 15 MINS: Performed by: INTERNAL MEDICINE

## 2017-12-12 RX ORDER — PROPOFOL 10 MG/ML
VIAL (ML) INTRAVENOUS CONTINUOUS PRN
Status: DISCONTINUED | OUTPATIENT
Start: 2017-12-12 | End: 2017-12-12

## 2017-12-12 RX ORDER — PROPOFOL 10 MG/ML
VIAL (ML) INTRAVENOUS
Status: DISCONTINUED | OUTPATIENT
Start: 2017-12-12 | End: 2017-12-12

## 2017-12-12 RX ORDER — SODIUM CHLORIDE 9 MG/ML
INJECTION, SOLUTION INTRAVENOUS CONTINUOUS
Status: DISCONTINUED | OUTPATIENT
Start: 2017-12-12 | End: 2017-12-12 | Stop reason: HOSPADM

## 2017-12-12 RX ORDER — LIDOCAINE HCL/PF 100 MG/5ML
SYRINGE (ML) INTRAVENOUS
Status: DISCONTINUED | OUTPATIENT
Start: 2017-12-12 | End: 2017-12-12

## 2017-12-12 RX ADMIN — PROPOFOL 200 MCG/KG/MIN: 10 INJECTION, EMULSION INTRAVENOUS at 09:12

## 2017-12-12 RX ADMIN — LIDOCAINE HYDROCHLORIDE 100 MG: 20 INJECTION, SOLUTION INTRAVENOUS at 09:12

## 2017-12-12 RX ADMIN — PROPOFOL 80 MG: 10 INJECTION, EMULSION INTRAVENOUS at 09:12

## 2017-12-12 RX ADMIN — SODIUM CHLORIDE: 0.9 INJECTION, SOLUTION INTRAVENOUS at 09:12

## 2017-12-12 NOTE — H&P
Short Stay Endoscopy History and Physical    PCP - Tere Hughes MD  Referring Physician - Tere Hughes MD  [address] [facility]  [place] [id number]    Procedure - EGD  ASA - per anesthesia  Mallampati - per anesthesia  History of Anesthesia problems - no  Family history Anesthesia problems -  no   Plan of anesthesia - General    HPI  85 y.o. female  Reason for procedure: GERD with regurgitation      ROS:  Constitutional: No fevers, chills, No weight loss  CV: No chest pain  Pulm: No cough, No shortness of breath  GI: see HPI    Medical History:  has a past medical history of AR (allergic rhinitis); Atherosclerosis of abdominal aorta; Carpal tunnel syndrome of left wrist; Chronic kidney disease, stage 3; Diabetic peripheral neuropathy; Diverticulosis; History of colon polyps; History of diverticulitis of colon (1/2014); Hyperlipemia; Hypertension; Hypothyroidism; Mild aortic stenosis; OA (osteoarthritis) of knee; Obesity; and Type II or unspecified type diabetes mellitus with neurological manifestations, uncontrolled(250.62).    Surgical History:  has a past surgical history that includes Hysterectomy; Lung biopsy (in her 40's); Cosmetic surgery; Total knee arthroplasty (Right, 6/13/2014); Colonoscopy (N/A, 10/3/2017); and Cataract extraction.    Family History: family history includes Arthritis in her brother and sister; Cancer in her mother and son; Diabetes in her brother and sister; Heart attack in her father; Heart disease in her father; Hypertension in her father and mother; Skin cancer in her brother.. Otherwise no colon cancer, inflammatory bowel disease, or GI malignancies.    Social History:  reports that she quit smoking about 55 years ago. She has a 0.50 pack-year smoking history. She quit smokeless tobacco use about 47 years ago. She reports that she drinks about 1.2 oz of alcohol per week . She reports that she does not use drugs.    Review of patient's allergies indicates:   Allergen Reactions     Lipitor [atorvastatin]        Medications:   Prescriptions Prior to Admission   Medication Sig Dispense Refill Last Dose    benzonatate (TESSALON) 200 MG capsule TAKE 1 CAPSULE(200 MG) BY MOUTH THREE TIMES DAILY AS NEEDED FOR COUGH 45 capsule 0 Taking    clobetasol 0.05% (TEMOVATE) 0.05 % Oint Apply topically 2 (two) times daily. 60 g 1 Taking    desonide (DESOWEN) 0.05 % cream Apply topically 2 (two) times daily. 15 g 0 Taking    fish oil-fat acid comb.8-hb137 (OMEGA 3-6-9) 1,200 mg Cap    Taking    fluocinonide 0.1 % Crea    Taking    glipiZIDE (GLUCOTROL) 5 MG tablet TAKE 1 TABLET BY MOUTH TWICE DAILY WITH MEALS 60 tablet 5 Taking    levocetirizine (XYZAL) 5 MG tablet Take 1 tablet (5 mg total) by mouth every evening. 30 tablet 5 Taking    levothyroxine (SYNTHROID) 88 MCG tablet TAKE 1 TABLET(88 MCG) BY MOUTH EVERY DAY 90 tablet 0 Taking    lidocaine (XYLOCAINE) 5 % Oint ointment    Taking    losartan (COZAAR) 50 MG tablet TAKE 1 TABLET(50 MG) BY MOUTH EVERY DAY 30 tablet 5 Taking    lovastatin (MEVACOR) 20 MG tablet Take 3 tablets (60 mg total) by mouth nightly. 270 tablet 1 Taking    lovastatin (MEVACOR) 20 MG tablet TAKE 2 TABLETS(40 MG) BY MOUTH EVERY NIGHT 180 tablet 0     metformin (GLUCOPHAGE) 1000 MG tablet TAKE ONE-HALF TABLET BY MOUTH TWICE DAILY WITH MEALS 30 tablet 2 Taking    mometasone (NASONEX) 50 mcg/actuation nasal spray 2 sprays by Nasal route once daily. 17 g 3     omeprazole (PRILOSEC) 20 MG capsule Take 2 capsules (40 mg total) by mouth once daily. 180 capsule 1 Taking       Physical Exam:    Vital Signs: There were no vitals filed for this visit.    General Appearance: Well appearing in no acute distress  Lungs: CTA anteriorly  Heart:  Regular rate, S1, S2 normal, no murmurs heard.  Abdomen: Soft, non tender, non distended with normal bowel sounds, no masses  Extremities: No edema    Labs:  Lab Results   Component Value Date    WBC 5.35 08/22/2017    HGB 12.7 08/22/2017    HCT  35.8 (L) 08/22/2017     08/22/2017    CHOL 201 (H) 08/22/2017    TRIG 97 08/22/2017    HDL 66 08/22/2017    ALT 10 04/03/2017    AST 16 04/03/2017     04/03/2017    K 4.1 04/03/2017     04/03/2017    CREATININE 0.9 04/03/2017    BUN 18 04/03/2017    CO2 31 (H) 04/03/2017    TSH 0.379 (L) 08/22/2017    INR 1.3 07/10/2014    HGBA1C 6.1 (H) 08/22/2017       I have explained the risks and benefits of this endoscopic procedure to the patient including but not limited to bleeding, inflammation, infection, perforation, and death.      Katrin De La Torre MD

## 2017-12-12 NOTE — PLAN OF CARE
Pt verbalized an understanding of discharge instructions including diet, s/s to notify md, and follow up.  Pt refused wheel chair and will ambulate off unit with sis in law.

## 2017-12-12 NOTE — DISCHARGE INSTRUCTIONS

## 2017-12-12 NOTE — TRANSFER OF CARE
"Anesthesia Transfer of Care Note    Patient: Tonya Cohn    Procedure(s) Performed: Procedure(s) (LRB):  ESOPHAGOGASTRODUODENOSCOPY (EGD) (N/A)    Patient location: PACU    Anesthesia Type: general    Transport from OR: Transported from OR on room air with adequate spontaneous ventilation    Post pain: adequate analgesia    Post assessment: no apparent anesthetic complications    Post vital signs: stable    Level of consciousness: sedated    Nausea/Vomiting: no nausea/vomiting    Complications: none    Transfer of care protocol was followed      Last vitals:   Visit Vitals  BP (!) 141/67 (BP Location: Left arm, Patient Position: Lying)   Pulse 93   Temp 36.6 °C (97.9 °F) (Temporal)   Resp 18   Ht 5' 6" (1.676 m)   Wt 93.5 kg (206 lb 2 oz)   SpO2 97%   Breastfeeding? No   BMI 33.27 kg/m²     "

## 2017-12-12 NOTE — PATIENT INSTRUCTIONS
Discharge Summary/Instructions after an Endoscopic Procedure  Patient Name: Tonya Cohn  Patient MRN: [id number]  Patient YOB: 1932 Tuesday, December 12, 2017  Katrin De La Torre MD  RESTRICTIONS:  During your procedure today, you received medications for sedation.  These   medications may affect your judgment, balance and coordination.  Therefore,   for 24 hours, you have the following restrictions:   - DO NOT drive a car, operate machinery, make legal/financial decisions,   sign important papers or drink alcohol.    ACTIVITY:  The following day: return to full activity including work, except no heavy   lifting, straining or running for 3 days if polyps were removed.  DIET:  Eat and drink normally unless instructed otherwise.     TREATMENT FOR COMMON SIDE EFFECTS:  - Mild abdominal pain, belching, bloating or excessive gas: rest, eat   lightly and use a heating pad.  - Sore Throat: treat with throat lozenges and/or gargle with warm salt   water.  SYMPTOMS TO WATCH FOR AND REPORT TO YOUR PHYSICIAN:  1. Abdominal pain or bloating, other than gas cramps.  2. Chest pain.  3. Back pain.  4. Chills or fever occurring within 24 hours after the procedure.  5. Rectal bleeding, which would show as bright red, maroon, or black stools.   (A tablespoon of blood from the rectum is not serious, especially if   hemorrhoids are present.)  6. Vomiting.  7. Weakness or dizziness.  8. Because air was used during the procedure, expelling large amounts of air   from your rectum or belching is normal.  9. If a bowel prep was taken, you may not have a bowel movement for 1-3   days.  This is normal.  GO DIRECTLY TO THE NEAREST EMERGENCY ROOM IF YOU HAVE ANY OF THE FOLLOWING:      Difficulty breathing  Chills and/or fever over 101 F   Persistent vomiting and/or vomiting blood   Severe abdominal pain   Severe chest pain   Black, tarry stools   Bleeding- more than one tablespoon   Any other symptom or condition that you may feel  needs urgent attention  Your doctor recommends these additional instructions:  If any biopsies were taken, your doctor s clinic will contact you in 1 to 2   weeks with any results.  You are being discharged to home.   You have a contact number available for emergencies.  The signs and symptoms   of potential delayed complications were discussed with you.  You may return   to normal activities tomorrow.  Written discharge instructions were   provided to you.   Resume your previous diet.   Continue your present medications.   We are waiting for your pathology results.   Return to your referring physician as previously scheduled.  For questions, problems or results please call your physician - Katrin De La Torre MD at Work:  [phone].  [facility], EMERGENCY ROOM PHONE NUMBER: [phone]  IF A COMPLICATION OR EMERGENCY SITUATION ARISES AND YOU ARE UNABLE TO REACH   YOUR PHYSICIAN - GO DIRECTLY TO THE EMERGENCY ROOM.  Katrin De La Torre MD  12/12/2017 9:46:18 AM  This report has been verified and signed electronically.

## 2017-12-12 NOTE — ANESTHESIA PREPROCEDURE EVALUATION
12/12/2017  Tonya Cohn is a 85 y.o., female.  Patient Active Problem List   Diagnosis    Primary osteoarthritis of both knees    Benign hypertension with chronic kidney disease, stage III    Hyperlipidemia LDL goal <100    Type 2 diabetes, controlled, with neuropathy    Hypothyroidism (acquired)    Obesity (BMI 30-39.9)    GERD (gastroesophageal reflux disease)    DDD (degenerative disc disease), lumbar    Spondylolisthesis, grade 1    DJD (degenerative joint disease), cervical    Diverticulosis    History of colon polyps    AR (allergic rhinitis)    Heart murmur    Tachycardia    Mild aortic stenosis    Atherosclerosis of abdominal aorta    Pseudophakia         Anesthesia Evaluation         Review of Systems      Physical Exam  General:  Well nourished    Airway/Jaw/Neck:  Airway Findings: Mouth Opening: Normal Tongue: Normal  General Airway Assessment: Adult  Mallampati: II  Improves to II with phonation.  TM Distance: Normal, at least 6 cm      Dental:  Dental Findings: Edentulous   Chest/Lungs:  Chest/Lungs Findings: Clear to auscultation     Heart/Vascular:  Heart Findings: Rate: Normal  Rhythm: Regular Rhythm  Sounds: Normal        Mental Status:  Mental Status Findings:  Cooperative, Alert and Oriented         Anesthesia Plan  Type of Anesthesia, risks & benefits discussed:  Anesthesia Type:  general  Patient's Preference: General  Intra-op Monitoring Plan: standard ASA monitors  Intra-op Monitoring Plan Comments: Standard ASA monitors.   Post Op Pain Control Plan: per primary service following discharge from PACU  Post Op Pain Control Plan Comments: Per primary service.     Induction:   IV  Beta Blocker:  Patient is not currently on a Beta-Blocker (No further documentation required).       Informed Consent: Patient understands risks and agrees with Anesthesia plan.  Questions  answered. Anesthesia consent signed with patient.  ASA Score: 2     Day of Surgery Review of History & Physical:    H&P update referred to the surgeon.     Anesthesia Plan Notes: Chart reviewed, patient interviewed and examined.  The plan for general anesthesia was explained.  Questions were answered and the consent was signed.  Betina MANCERA         Ready For Surgery From Anesthesia Perspective.

## 2017-12-15 ENCOUNTER — TELEPHONE (OUTPATIENT)
Dept: FAMILY MEDICINE | Facility: CLINIC | Age: 82
End: 2017-12-15

## 2017-12-15 DIAGNOSIS — R09.89 CHOKING EPISODE OCCURRING AT NIGHT: Primary | ICD-10-CM

## 2017-12-15 NOTE — TELEPHONE ENCOUNTER
Spoke w/patient, informed of result. Patient states she is feeling better, but still has some choking at night.

## 2017-12-15 NOTE — TELEPHONE ENCOUNTER
Please call patient: her EGD showed mild inflammation but no stenosis or other abnormalities. How is she feeling?

## 2017-12-19 ENCOUNTER — TELEPHONE (OUTPATIENT)
Dept: ENDOSCOPY | Facility: HOSPITAL | Age: 82
End: 2017-12-19

## 2018-01-01 RX ORDER — METFORMIN HYDROCHLORIDE 1000 MG/1
TABLET ORAL
Qty: 30 TABLET | Refills: 2 | Status: SHIPPED | OUTPATIENT
Start: 2018-01-01 | End: 2018-04-02 | Stop reason: SDUPTHER

## 2018-01-25 ENCOUNTER — OFFICE VISIT (OUTPATIENT)
Dept: PODIATRY | Facility: CLINIC | Age: 83
End: 2018-01-25
Payer: MEDICARE

## 2018-01-25 VITALS
DIASTOLIC BLOOD PRESSURE: 79 MMHG | BODY MASS INDEX: 33.11 KG/M2 | HEART RATE: 81 BPM | HEIGHT: 66 IN | SYSTOLIC BLOOD PRESSURE: 141 MMHG | WEIGHT: 206 LBS

## 2018-01-25 DIAGNOSIS — E11.40 TYPE 2 DIABETES, CONTROLLED, WITH NEUROPATHY: Primary | ICD-10-CM

## 2018-01-25 DIAGNOSIS — B35.1 DERMATOPHYTOSIS OF NAIL: ICD-10-CM

## 2018-01-25 PROCEDURE — 11721 DEBRIDE NAIL 6 OR MORE: CPT | Mod: Q9,PBBFAC,PO | Performed by: PODIATRIST

## 2018-01-25 PROCEDURE — 99213 OFFICE O/P EST LOW 20 MIN: CPT | Mod: PBBFAC,PO | Performed by: PODIATRIST

## 2018-01-25 PROCEDURE — 99499 UNLISTED E&M SERVICE: CPT | Mod: S$PBB,,, | Performed by: PODIATRIST

## 2018-01-25 PROCEDURE — 99999 PR PBB SHADOW E&M-EST. PATIENT-LVL III: CPT | Mod: PBBFAC,,, | Performed by: PODIATRIST

## 2018-01-25 NOTE — PROGRESS NOTES
Chief Complaint   Patient presents with    Diabetes Mellitus     dr hardy  11/17/17    Toe Pain     left great toe            HPI:   The patient is a 85 y.o.  female  who presents for a diabetic foot exam.     Patient reports no presence of abnormal sensation to the feet .    History of diabetic foot ulcers: none   History of foot surgery: none.     Shoes worn today:  Athletic mesh type shoes.    She complains of pain on the left great pain.  Concerned about infection.  She has been applying neosporin to the area.           Primary care doctor is: Tere Hardy MD  Patient last saw primary care doctor on:   Chief Complaint   Patient presents with    Diabetes Mellitus     dr hardy  11/17/17    Toe Pain     left great toe           Past Medical History:   Diagnosis Date    AR (allergic rhinitis)     Atherosclerosis of abdominal aorta     noted on CT scan 1/17/2011    Carpal tunnel syndrome of left wrist     Chronic kidney disease, stage 3     Diabetic peripheral neuropathy     Diverticulosis     History of colon polyps     History of diverticulitis of colon 1/2014    Hyperlipemia     Hypertension     Hypothyroidism     followed by endocrinology, Dr. Green    Mild aortic stenosis     OA (osteoarthritis) of knee     Obesity     Type II or unspecified type diabetes mellitus with neurological manifestations, uncontrolled(250.62)          Current Outpatient Prescriptions on File Prior to Visit   Medication Sig Dispense Refill    benzonatate (TESSALON) 200 MG capsule TAKE 1 CAPSULE(200 MG) BY MOUTH THREE TIMES DAILY AS NEEDED FOR COUGH 45 capsule 0    clobetasol 0.05% (TEMOVATE) 0.05 % Oint Apply topically 2 (two) times daily. 60 g 1    fish oil-fat acid [id number] (OMEGA 3-6-9) 1,200 mg Cap       fluocinonide 0.1 % Crea       glipiZIDE (GLUCOTROL) 5 MG tablet TAKE 1 TABLET BY MOUTH TWICE DAILY WITH MEALS 60 tablet 5    levocetirizine (XYZAL) 5 MG tablet Take 1 tablet (5 mg total) by  mouth every evening. 30 tablet 5    levothyroxine (SYNTHROID) 88 MCG tablet TAKE 1 TABLET(88 MCG) BY MOUTH EVERY DAY 90 tablet 0    lidocaine (XYLOCAINE) 5 % Oint ointment       losartan (COZAAR) 50 MG tablet TAKE 1 TABLET(50 MG) BY MOUTH EVERY DAY 30 tablet 5    lovastatin (MEVACOR) 20 MG tablet Take 3 tablets (60 mg total) by mouth nightly. 270 tablet 1    lovastatin (MEVACOR) 20 MG tablet TAKE 2 TABLETS(40 MG) BY MOUTH EVERY NIGHT 180 tablet 0    metFORMIN (GLUCOPHAGE) 1000 MG tablet TAKE 1/2 TABLET BY MOUTH TWICE DAILY WITH MEALS 30 tablet 2    mometasone (NASONEX) 50 mcg/actuation nasal spray 2 sprays by Nasal route once daily. 17 g 3    omeprazole (PRILOSEC) 20 MG capsule Take 2 capsules (40 mg total) by mouth once daily. 180 capsule 1    desonide (DESOWEN) 0.05 % cream Apply topically 2 (two) times daily. 15 g 0     No current facility-administered medications on file prior to visit.          Review of patient's allergies indicates:   Allergen Reactions    Lipitor [atorvastatin]              ROS:  General ROS: negative  Respiratory ROS: no cough, shortness of breath, or wheezing  Cardiovascular ROS: no chest pain or dyspnea on exertion  Musculoskeletal ROS: negative  Neurological ROS:   negative for - numbness/tingling  Dermatological ROS: negative        Social History     Social History    Marital status:      Spouse name: N/A    Number of children: 7    Years of education: N/A     Occupational History    retired - house cleaning      Social History Main Topics    Smoking status: Former Smoker     Packs/day: 0.25     Years: 2.00     Quit date: 7/19/1962    Smokeless tobacco: Former User     Quit date: 7/14/1970    Alcohol use 1.2 oz/week     2 Cans of beer per week      Comment: occasionally    Drug use: No    Sexual activity: No     Other Topics Concern    Are You Pregnant Or Think You May Be? No    Breast-Feeding No     Social History Narrative    No narrative on file  "          EXAM:   Vitals:    01/25/18 0911   BP: (!) 141/79   Pulse: 81   Weight: 93.4 kg (206 lb)   Height: 5' 6" (1.676 m)       General: alert, no distress, cooperative    Vascular:   Dorsalis pedis:   1+ bilateral.   Posterior Tibial:   1+ bilateral.   3 seconds capillary refill time   Temperature of toes are cool to touch.   normal hair growth on the feet.    Edema on feet:   Trace and non-pitting   Varicosities:  none      Dermatological:    Skin: thin and atrophic,  dry  Nails: toenails 1-5 L and 1-5 R  are onychomycosis of the toenails, thickened with subungual debris   No paronychia.  No drainage noted to the left hallux.    Callus:  none  Open Wounds: None  Ecchymoses is not observed.      Erythema:  none .     Interdigital spaces: clean, dry and without evidence of break in skin integrity      Neurological:    Sharp dull - B/L normal and light touch - B/L normal  Vibratory - normal  Somers diminished      Musculoskeletal:     Muscle strength: 5/5, adequate ROM, adequate strength     Right foot:  no gross deformity   Left foot: left 5th hammertoe, rigid contracture at the distal interphalangeal joint and proximal interphalangeal joint,             ASSESSMENT/PLAN     I counseled the patient on her conditions, their implications and medical management.     Type 2 diabetes, controlled, with neuropathy    Dermatophytosis of nail      Routine Foot Care  Date/Time: 1/25/2018 10:00 AM  Performed by: DAISY ASHLEY  Authorized by: DAISY ASHLEY     Consent Done?:  Yes (Verbal)    Nail Care Type:  Debride  Location(s): All  (Left 1st Toe, Left 3rd Toe, Left 2nd Toe, Left 4th Toe, Left 5th Toe, Right 1st Toe, Right 2nd Toe, Right 3rd Toe, Right 4th Toe and Right 5th Toe)  Patient tolerance:  Patient tolerated the procedure well with no immediate complications     With patient's permission, the toenails mentioned above were aggressively reduced and debrided using a nail nipper, removing all offending nail and debris. " The patient will continue to monitor the areas daily, inspect the feet, wear protective shoe gear when ambulatory, and moisturizer to maintain skin integrity.       Reassurance.  left hallux nail pain is from thickness of the toenail.  No acute infection.   I filed down the toenail and patient reported some relief.

## 2018-02-15 ENCOUNTER — TELEPHONE (OUTPATIENT)
Dept: FAMILY MEDICINE | Facility: CLINIC | Age: 83
End: 2018-02-15

## 2018-02-15 NOTE — TELEPHONE ENCOUNTER
----- Message from Angela Gracía sent at 2/15/2018 12:26 PM CST -----  Contact: Self   Patient would like to be seen, she is having thumb pain. However, there is not any appointments available. Please call at [phone]. Patient wanted to be seen by you only.

## 2018-02-16 ENCOUNTER — TELEPHONE (OUTPATIENT)
Dept: FAMILY MEDICINE | Facility: CLINIC | Age: 83
End: 2018-02-16

## 2018-02-16 NOTE — TELEPHONE ENCOUNTER
----- Message from Eli Block sent at 2/16/2018  9:28 AM CST -----  Brett from [employer] requesting to get the latest clinical notes. She can be reached at [phone]. Fax: [phone]. Thank you!

## 2018-02-20 ENCOUNTER — OFFICE VISIT (OUTPATIENT)
Dept: FAMILY MEDICINE | Facility: CLINIC | Age: 83
End: 2018-02-20
Payer: MEDICARE

## 2018-02-20 ENCOUNTER — LAB VISIT (OUTPATIENT)
Dept: LAB | Facility: HOSPITAL | Age: 83
End: 2018-02-20
Attending: FAMILY MEDICINE
Payer: MEDICARE

## 2018-02-20 VITALS
OXYGEN SATURATION: 97 % | TEMPERATURE: 98 F | WEIGHT: 207.44 LBS | SYSTOLIC BLOOD PRESSURE: 136 MMHG | HEART RATE: 77 BPM | BODY MASS INDEX: 33.34 KG/M2 | HEIGHT: 66 IN | DIASTOLIC BLOOD PRESSURE: 70 MMHG

## 2018-02-20 DIAGNOSIS — M79.641 RIGHT HAND PAIN: ICD-10-CM

## 2018-02-20 DIAGNOSIS — I70.0 ATHEROSCLEROSIS OF ABDOMINAL AORTA: ICD-10-CM

## 2018-02-20 DIAGNOSIS — M79.641 RIGHT HAND PAIN: Primary | ICD-10-CM

## 2018-02-20 LAB
CRP SERPL-MCNC: 1.1 MG/L
ERYTHROCYTE [SEDIMENTATION RATE] IN BLOOD BY WESTERGREN METHOD: 20 MM/HR
URATE SERPL-MCNC: 5.2 MG/DL

## 2018-02-20 PROCEDURE — 1125F AMNT PAIN NOTED PAIN PRSNT: CPT | Mod: ,,, | Performed by: FAMILY MEDICINE

## 2018-02-20 PROCEDURE — 99214 OFFICE O/P EST MOD 30 MIN: CPT | Mod: S$PBB,,, | Performed by: FAMILY MEDICINE

## 2018-02-20 PROCEDURE — 85651 RBC SED RATE NONAUTOMATED: CPT

## 2018-02-20 PROCEDURE — 86038 ANTINUCLEAR ANTIBODIES: CPT

## 2018-02-20 PROCEDURE — 99999 PR PBB SHADOW E&M-EST. PATIENT-LVL III: CPT | Mod: PBBFAC,,, | Performed by: FAMILY MEDICINE

## 2018-02-20 PROCEDURE — 36415 COLL VENOUS BLD VENIPUNCTURE: CPT | Mod: PO

## 2018-02-20 PROCEDURE — 99213 OFFICE O/P EST LOW 20 MIN: CPT | Mod: PBBFAC,PO | Performed by: FAMILY MEDICINE

## 2018-02-20 PROCEDURE — 84550 ASSAY OF BLOOD/URIC ACID: CPT

## 2018-02-20 PROCEDURE — 86140 C-REACTIVE PROTEIN: CPT

## 2018-02-20 PROCEDURE — 1159F MED LIST DOCD IN RCRD: CPT | Mod: ,,, | Performed by: FAMILY MEDICINE

## 2018-02-20 RX ORDER — DICLOFENAC SODIUM 10 MG/G
2 GEL TOPICAL 3 TIMES DAILY
Qty: 100 G | Refills: 0 | Status: SHIPPED | OUTPATIENT
Start: 2018-02-20 | End: 2018-08-11 | Stop reason: ALTCHOICE

## 2018-02-20 NOTE — PROGRESS NOTES
[facility]  Progress Note    SUBJECTIVE:     Chief Complaint   Patient presents with    Joint Swelling     hands    Hand Pain       HPI   Tonya Cohn  is a 85 y.o. female here for c/o hand pain for the past couple weeks. Onset was gradual and was worst. Rates pain as mild-moderate. There is associated swelling. otc meds not working well. It only started getting better over the past few days. No falls/trauma.     Review of patient's allergies indicates:   Allergen Reactions    Lipitor [atorvastatin]        Past Medical History:   Diagnosis Date    AR (allergic rhinitis)     Atherosclerosis of abdominal aorta     noted on CT scan 1/17/2011    Carpal tunnel syndrome of left wrist     Chronic kidney disease, stage 3     Diabetic peripheral neuropathy     Diverticulosis     History of colon polyps     History of diverticulitis of colon 1/2014    Hyperlipemia     Hypertension     Hypothyroidism     followed by endocrinology, Dr. Green    Mild aortic stenosis     OA (osteoarthritis) of knee     Obesity     Type II or unspecified type diabetes mellitus with neurological manifestations, uncontrolled(250.62)      Past Surgical History:   Procedure Laterality Date    CATARACT EXTRACTION      COLONOSCOPY N/A 10/3/2017    Procedure: COLONOSCOPY;  Surgeon: Alin Gonzalez MD;  Location: [facility] ([address] [place] [address]);  Service: Endoscopy;  Laterality: N/A;    COSMETIC SURGERY      HYSTERECTOMY      partial    LUNG BIOPSY  in her 40's    TOTAL KNEE ARTHROPLASTY Right 6/13/2014    TKR     Family History   Problem Relation Age of Onset    Cancer Mother      shoulder tumor - cancer    Hypertension Mother     Heart disease Father      valve replacement    Hypertension Father     Heart attack Father     Diabetes Sister     Arthritis Sister      s/p knee surgery    Diabetes Brother     Arthritis Brother      back problems    Skin cancer Brother     Cancer Son      jaw    Melanoma Neg Hx      Eczema Neg Hx     Lupus Neg Hx     Psoriasis Neg Hx     Breast cancer Neg Hx     Colon cancer Neg Hx      Social History   Substance Use Topics    Smoking status: Former Smoker     Packs/day: 0.25     Years: 2.00     Quit date: 7/19/1962    Smokeless tobacco: Former User     Quit date: 7/14/1970    Alcohol use 1.2 oz/week     2 Cans of beer per week      Comment: occasionally        Review of Systems   Constitutional: Negative for chills and fever.   Musculoskeletal: Positive for joint pain (right hand (thumb)).   Skin: Negative for itching and rash.     OBJECTIVE:     Vitals:    02/20/18 1033   BP: 136/70   Pulse: 77   Temp: 98.2 °F (36.8 °C)     Body mass index is 33.48 kg/m².    Physical Exam   Constitutional: She is oriented to person, place, and time. She appears distressed (mild).   HENT:   Head: Normocephalic and atraumatic.   Eyes: Conjunctivae and EOM are normal.   Pulmonary/Chest: Effort normal.   Musculoskeletal: She exhibits tenderness (right base of thumb, with some swelling. no fractures/dislocations. also some tenderness to right 3rd digit at PIP. again mild, swelling but no erythema.).   Neurological: She is alert and oriented to person, place, and time.   Skin: She is not diaphoretic.       Old records were reviewed. Labs and/or images were independently reviewed.    ASSESSMENT     1. Right hand pain    2. Atherosclerosis of abdominal aorta        PLAN:     Right hand pain  -     Uric acid; Future; Expected date: 02/20/2018  -     JES; Future; Expected date: 02/20/2018  -     Sedimentation rate, manual; Future; Expected date: 02/20/2018  -     C-reactive protein; Future; Expected date: 02/20/2018  -     diclofenac sodium (VOLTAREN) 1 % Gel; Apply 2 g topically 3 (three) times daily.  Dispense: 100 g; Refill: 0  -     Will run tests to r/o gout. Recommend ice, tylenol, and gel.     Atherosclerosis of abdominal aorta   -     Stable. Continue current regimen.      RTC PRN    Sukumar Goldman  MD  02/20/2018 10:55 AM

## 2018-02-21 LAB — ANA SER QL IF: NORMAL

## 2018-03-08 ENCOUNTER — TELEPHONE (OUTPATIENT)
Dept: FAMILY MEDICINE | Facility: CLINIC | Age: 83
End: 2018-03-08

## 2018-03-08 NOTE — TELEPHONE ENCOUNTER
----- Message from Eli Block sent at 3/8/2018  9:15 AM CST -----  Contact: [employer]  Ivanna from [employer] calling to get approval for test strips, lancets, and alcohol pads. She can be reached at [phone] ext [phone]. Thank you!

## 2018-03-09 DIAGNOSIS — I12.9 BENIGN HYPERTENSION WITH CHRONIC KIDNEY DISEASE, STAGE III: ICD-10-CM

## 2018-03-09 DIAGNOSIS — M94.9 DISORDER OF BONE AND CARTILAGE: ICD-10-CM

## 2018-03-09 DIAGNOSIS — M51.36 DDD (DEGENERATIVE DISC DISEASE), LUMBAR: ICD-10-CM

## 2018-03-09 DIAGNOSIS — M89.9 DISORDER OF BONE AND CARTILAGE: ICD-10-CM

## 2018-03-09 DIAGNOSIS — N18.30 BENIGN HYPERTENSION WITH CHRONIC KIDNEY DISEASE, STAGE III: ICD-10-CM

## 2018-03-09 DIAGNOSIS — E11.40 TYPE 2 DIABETES, CONTROLLED, WITH NEUROPATHY: Primary | ICD-10-CM

## 2018-03-09 DIAGNOSIS — E78.5 HYPERLIPIDEMIA LDL GOAL <100: ICD-10-CM

## 2018-03-09 DIAGNOSIS — E03.9 HYPOTHYROIDISM (ACQUIRED): ICD-10-CM

## 2018-03-09 RX ORDER — LOVASTATIN 20 MG/1
TABLET ORAL
Qty: 180 TABLET | Refills: 0 | Status: SHIPPED | OUTPATIENT
Start: 2018-03-09 | End: 2018-06-09 | Stop reason: SDUPTHER

## 2018-03-19 ENCOUNTER — HOSPITAL ENCOUNTER (OUTPATIENT)
Dept: RADIOLOGY | Facility: CLINIC | Age: 83
Discharge: HOME | End: 2018-03-19
Payer: MEDICARE

## 2018-03-19 DIAGNOSIS — N18.30 BENIGN HYPERTENSION WITH CHRONIC KIDNEY DISEASE, STAGE III: ICD-10-CM

## 2018-03-19 DIAGNOSIS — I12.9 BENIGN HYPERTENSION WITH CHRONIC KIDNEY DISEASE, STAGE III: ICD-10-CM

## 2018-03-19 DIAGNOSIS — M94.9 DISORDER OF BONE AND CARTILAGE: ICD-10-CM

## 2018-03-19 DIAGNOSIS — M89.9 DISORDER OF BONE AND CARTILAGE: ICD-10-CM

## 2018-03-19 PROCEDURE — 77080 DXA BONE DENSITY AXIAL: CPT | Mod: TC,PO

## 2018-03-19 PROCEDURE — 77080 DXA BONE DENSITY AXIAL: CPT | Mod: 26,,, | Performed by: INTERNAL MEDICINE

## 2018-03-19 RX ORDER — LOSARTAN POTASSIUM 50 MG/1
TABLET ORAL
Qty: 30 TABLET | Refills: 0 | Status: SHIPPED | OUTPATIENT
Start: 2018-03-19 | End: 2018-04-06 | Stop reason: SDUPTHER

## 2018-03-20 PROBLEM — M85.89 OSTEOPENIA OF MULTIPLE SITES: Status: ACTIVE | Noted: 2018-03-20

## 2018-03-21 ENCOUNTER — LAB VISIT (OUTPATIENT)
Dept: LAB | Facility: HOSPITAL | Age: 83
End: 2018-03-21
Attending: NURSE PRACTITIONER
Payer: MEDICARE

## 2018-03-21 ENCOUNTER — OFFICE VISIT (OUTPATIENT)
Dept: FAMILY MEDICINE | Facility: CLINIC | Age: 83
End: 2018-03-21
Payer: MEDICARE

## 2018-03-21 VITALS
WEIGHT: 196.13 LBS | OXYGEN SATURATION: 93 % | DIASTOLIC BLOOD PRESSURE: 66 MMHG | TEMPERATURE: 98 F | HEIGHT: 66 IN | BODY MASS INDEX: 31.52 KG/M2 | SYSTOLIC BLOOD PRESSURE: 134 MMHG | HEART RATE: 72 BPM

## 2018-03-21 DIAGNOSIS — E66.9 OBESITY (BMI 30-39.9): ICD-10-CM

## 2018-03-21 DIAGNOSIS — M85.89 OSTEOPENIA OF MULTIPLE SITES: ICD-10-CM

## 2018-03-21 DIAGNOSIS — I70.0 ATHEROSCLEROSIS OF ABDOMINAL AORTA: ICD-10-CM

## 2018-03-21 DIAGNOSIS — J30.9 ALLERGIC RHINITIS, UNSPECIFIED CHRONICITY, UNSPECIFIED SEASONALITY, UNSPECIFIED TRIGGER: Primary | ICD-10-CM

## 2018-03-21 DIAGNOSIS — I10 ESSENTIAL HYPERTENSION: ICD-10-CM

## 2018-03-21 DIAGNOSIS — E11.40 TYPE 2 DIABETES, CONTROLLED, WITH NEUROPATHY: ICD-10-CM

## 2018-03-21 DIAGNOSIS — K21.9 GASTROESOPHAGEAL REFLUX DISEASE WITHOUT ESOPHAGITIS: ICD-10-CM

## 2018-03-21 DIAGNOSIS — E78.5 HYPERLIPIDEMIA LDL GOAL <100: ICD-10-CM

## 2018-03-21 DIAGNOSIS — I35.0 MILD AORTIC STENOSIS: ICD-10-CM

## 2018-03-21 DIAGNOSIS — Z86.010 HISTORY OF COLON POLYPS: ICD-10-CM

## 2018-03-21 DIAGNOSIS — E03.9 HYPOTHYROIDISM (ACQUIRED): ICD-10-CM

## 2018-03-21 LAB
CREAT UR-MCNC: 84 MG/DL
MICROALBUMIN UR DL<=1MG/L-MCNC: 10 UG/ML
MICROALBUMIN/CREATININE RATIO: 11.9 UG/MG

## 2018-03-21 PROCEDURE — 99999 PR PBB SHADOW E&M-EST. PATIENT-LVL IV: CPT | Mod: PBBFAC,,, | Performed by: NURSE PRACTITIONER

## 2018-03-21 PROCEDURE — 82043 UR ALBUMIN QUANTITATIVE: CPT

## 2018-03-21 PROCEDURE — 99214 OFFICE O/P EST MOD 30 MIN: CPT | Mod: S$PBB,,, | Performed by: NURSE PRACTITIONER

## 2018-03-21 PROCEDURE — 99214 OFFICE O/P EST MOD 30 MIN: CPT | Mod: PBBFAC,PO | Performed by: NURSE PRACTITIONER

## 2018-03-21 RX ORDER — GLIPIZIDE 5 MG/1
5 TABLET, FILM COATED, EXTENDED RELEASE ORAL
COMMUNITY
End: 2018-04-02 | Stop reason: SDUPTHER

## 2018-03-21 NOTE — PATIENT INSTRUCTIONS
Decrease Glipizide 5 mg once a day with breakfast only  Continue Metformin 1000 mg 1/2 tablet twice a day  Continue watching diet closely  Try to begin a exercise program  Check microalbuminuria today

## 2018-03-21 NOTE — PROGRESS NOTES
Subjective:       Patient ID: Tonya Cohn is a 85 y.o. female.    Chief Complaint: Hypertension (F/U) and Diabetes (F/U)    85-year-old female presents to the clinic today for follow-up on hypertension and diabetes.  She is also here to discuss lab results.  She has good dietary habits.  She does not exercise.  However, she is very active.  She is compliant with all of her medications.  She takes Prilosec only as needed like when she eats certain fatty foods.  She denies any headaches, dizziness, or blurred vision.  She denies any cardiac chest pain, heart palpitations, shortness breath, or swelling to lower extremities.  She states the lidocaine gel is helping with the pain in her right thumb and right middle finger.  She does not check her blood pressure blood sugars.  I did discuss with her that her hemoglobin A1c was 5.8 her blood sugar was 83.  I recommended that she decrease her glipizide from 5 mg twice a day to 5 mg with breakfast only.  I also told to continue her metformin thousand milligrams one half tablet twice a day with meals.      Past Medical History:   Diagnosis Date    AR (allergic rhinitis)     Atherosclerosis of abdominal aorta     noted on CT scan 1/17/2011    Carpal tunnel syndrome of left wrist     Chronic kidney disease, stage 3     Diabetic peripheral neuropathy     Diverticulosis     History of colon polyps     History of diverticulitis of colon 1/2014    Hyperlipemia     Hypertension     Hypothyroidism     followed by endocrinology, Dr. Green    Mild aortic stenosis     OA (osteoarthritis) of knee     Obesity     Type II or unspecified type diabetes mellitus with neurological manifestations, uncontrolled(250.62)      Past Surgical History:   Procedure Laterality Date    CATARACT EXTRACTION      COLONOSCOPY N/A 10/3/2017    Procedure: COLONOSCOPY;  Surgeon: Alin Gonzalez MD;  Location: [facility] ([address] [place] [address]);  Service: Endoscopy;  Laterality: N/A;    COSMETIC  SURGERY      HYSTERECTOMY      partial    LUNG BIOPSY  in her 40's    TOTAL KNEE ARTHROPLASTY Right 6/13/2014    TKR      reports that she quit smoking about 55 years ago. She has a 0.50 pack-year smoking history. She quit smokeless tobacco use about 47 years ago. She reports that she drinks about 1.2 oz of alcohol per week . She reports that she does not use drugs.  Review of Systems   Respiratory: Negative for cough, shortness of breath and wheezing.    Cardiovascular: Negative for chest pain, palpitations and leg swelling.   Gastrointestinal: Negative for abdominal pain, blood in stool, constipation, diarrhea, nausea and vomiting.   Musculoskeletal: Negative for gait problem.   Skin: Negative for rash.   Neurological: Negative for dizziness, light-headedness and headaches.       Objective:      Physical Exam   Constitutional: She is oriented to person, place, and time. She appears well-developed and well-nourished. No distress.   Eyes: Conjunctivae and EOM are normal. Pupils are equal, round, and reactive to light. Right eye exhibits no discharge. Left eye exhibits no discharge. No scleral icterus.   Neck: Normal range of motion. Neck supple. No JVD present.   Cardiovascular: Normal rate, regular rhythm and normal heart sounds.    No murmur heard.  Pulmonary/Chest: Effort normal and breath sounds normal. No respiratory distress. She has no wheezes. She has no rales.   Abdominal: Soft. Bowel sounds are normal. There is no tenderness.   Musculoskeletal: Normal range of motion. She exhibits no edema.   Protective Sensation (w/ 10 gram monofilament):  Right: Intact  Left: Intact    Visual Inspection:  Onychomycosis -  Bilateral    Pedal Pulses:   Right: Present  Left: Present    Posterior tibialis:   Right:Present  Left: Present     Neurological: She is alert and oriented to person, place, and time.   Skin: Skin is warm and dry. She is not diaphoretic.   Psychiatric: She has a normal mood and affect.        Assessment:       1. Allergic rhinitis, unspecified chronicity, unspecified seasonality, unspecified trigger    2. Atherosclerosis of abdominal aorta    3. Benign hypertension with chronic kidney disease, stage III    4. Gastroesophageal reflux disease without esophagitis    5. History of colon polyps    6. Hyperlipidemia LDL goal <100    7. Hypothyroidism (acquired)    8. Mild aortic stenosis    9. Obesity (BMI 30-39.9)    10. Osteopenia of multiple sites    11. Type 2 diabetes, controlled, with neuropathy    12. Essential hypertension        Plan:         Allergic rhinitis, unspecified chronicity, unspecified seasonality, unspecified trigger  - The current medical regimen is effective;  continue present plan and medications.    Atherosclerosis of abdominal aorta  -Stable / Asymptomatic is on blood pressure and cholesterol lowering medications    Gastroesophageal reflux disease without esophagitis  - tales Prilosec as needed only     History of colon polyps  - UTD on colonoscopy 10/3/2017    Hyperlipidemia LDL goal <100  - The current medical regimen is effective;  continue present plan and medications.    Hypothyroidism (acquired)  - The current medical regimen is effective;  continue present plan and medications.    Mild aortic stenosis  - followed by Dr. Whitfield    Obesity (BMI 30-39.9)  - The patient is asked to make an attempt to improve diet and exercise patterns to aid in medical management of this problem.    Osteopenia of multiple sites  - The current medical regimen is effective;  continue present plan and medications.    Type 2 diabetes, controlled, with neuropathy  -     Microalbumin/creatinine urine ratio; Future; Expected date: 03/21/2018  - Decrease Glipizide 5 mg once with breakfast only  - Continue Metformin 1000 mg 1/2 tablet bid with meals     Essential hypertension  - The current medical regimen is effective;  continue present plan and medications.

## 2018-03-22 ENCOUNTER — TELEPHONE (OUTPATIENT)
Dept: FAMILY MEDICINE | Facility: CLINIC | Age: 83
End: 2018-03-22

## 2018-03-22 NOTE — TELEPHONE ENCOUNTER
I spoke with the patient and explained that her urine microalbuminuria was negative. Patient verbalized understanding of above. Patient verbalized understanding of above.

## 2018-04-02 ENCOUNTER — TELEPHONE (OUTPATIENT)
Dept: FAMILY MEDICINE | Facility: CLINIC | Age: 83
End: 2018-04-02

## 2018-04-02 RX ORDER — GLIPIZIDE 5 MG/1
TABLET ORAL
Qty: 60 TABLET | Refills: 0 | Status: SHIPPED | OUTPATIENT
Start: 2018-04-02 | End: 2018-05-01 | Stop reason: SDUPTHER

## 2018-04-02 RX ORDER — METFORMIN HYDROCHLORIDE 1000 MG/1
TABLET ORAL
Qty: 30 TABLET | Refills: 0 | Status: SHIPPED | OUTPATIENT
Start: 2018-04-02 | End: 2018-05-01 | Stop reason: SDUPTHER

## 2018-04-02 NOTE — TELEPHONE ENCOUNTER
----- Message from Angela García sent at 4/2/2018  2:18 PM CDT -----  Contact: Self   Patient is requestign an appt in May Please call at [phone] [facility] [phone]

## 2018-04-02 NOTE — TELEPHONE ENCOUNTER
----- Message from Angela García sent at 4/2/2018  2:18 PM CDT -----  Contact: Self   Patient is requestign an appt in May Please call at [phone] [facility] [phone]

## 2018-04-06 ENCOUNTER — OFFICE VISIT (OUTPATIENT)
Dept: FAMILY MEDICINE | Facility: CLINIC | Age: 83
End: 2018-04-06
Payer: MEDICARE

## 2018-04-06 VITALS
SYSTOLIC BLOOD PRESSURE: 144 MMHG | OXYGEN SATURATION: 95 % | TEMPERATURE: 98 F | WEIGHT: 195 LBS | DIASTOLIC BLOOD PRESSURE: 76 MMHG | HEIGHT: 66 IN | HEART RATE: 90 BPM | BODY MASS INDEX: 31.34 KG/M2

## 2018-04-06 DIAGNOSIS — Z23 NEED FOR TDAP VACCINATION: ICD-10-CM

## 2018-04-06 DIAGNOSIS — Z71.89 ADVANCED CARE PLANNING/COUNSELING DISCUSSION: ICD-10-CM

## 2018-04-06 DIAGNOSIS — K21.9 GASTROESOPHAGEAL REFLUX DISEASE WITHOUT ESOPHAGITIS: ICD-10-CM

## 2018-04-06 DIAGNOSIS — I70.0 ATHEROSCLEROSIS OF ABDOMINAL AORTA: ICD-10-CM

## 2018-04-06 DIAGNOSIS — E11.40 TYPE 2 DIABETES, CONTROLLED, WITH NEUROPATHY: Primary | ICD-10-CM

## 2018-04-06 DIAGNOSIS — M85.89 OSTEOPENIA OF MULTIPLE SITES: ICD-10-CM

## 2018-04-06 DIAGNOSIS — N18.30 BENIGN HYPERTENSION WITH CHRONIC KIDNEY DISEASE, STAGE III: ICD-10-CM

## 2018-04-06 DIAGNOSIS — H92.02 LEFT EAR PAIN: ICD-10-CM

## 2018-04-06 DIAGNOSIS — E78.5 HYPERLIPIDEMIA LDL GOAL <100: ICD-10-CM

## 2018-04-06 DIAGNOSIS — E03.9 HYPOTHYROIDISM (ACQUIRED): ICD-10-CM

## 2018-04-06 DIAGNOSIS — I12.9 BENIGN HYPERTENSION WITH CHRONIC KIDNEY DISEASE, STAGE III: ICD-10-CM

## 2018-04-06 DIAGNOSIS — E66.9 OBESITY (BMI 30-39.9): ICD-10-CM

## 2018-04-06 PROBLEM — I10 ESSENTIAL HYPERTENSION: Status: RESOLVED | Noted: 2018-03-21 | Resolved: 2018-04-06

## 2018-04-06 PROCEDURE — 99999 PR PBB SHADOW E&M-EST. PATIENT-LVL III: CPT | Mod: PBBFAC,,, | Performed by: INTERNAL MEDICINE

## 2018-04-06 PROCEDURE — 99497 ADVNCD CARE PLAN 30 MIN: CPT | Mod: S$PBB,,, | Performed by: INTERNAL MEDICINE

## 2018-04-06 PROCEDURE — 99215 OFFICE O/P EST HI 40 MIN: CPT | Mod: S$PBB,25,, | Performed by: INTERNAL MEDICINE

## 2018-04-06 PROCEDURE — 99213 OFFICE O/P EST LOW 20 MIN: CPT | Mod: PBBFAC,PO,25 | Performed by: INTERNAL MEDICINE

## 2018-04-06 PROCEDURE — 99497 ADVNCD CARE PLAN 30 MIN: CPT | Mod: PBBFAC,PO | Performed by: INTERNAL MEDICINE

## 2018-04-06 RX ORDER — LOSARTAN POTASSIUM 50 MG/1
TABLET ORAL
Qty: 30 TABLET | Refills: 5 | Status: SHIPPED | OUTPATIENT
Start: 2018-04-06 | End: 2018-08-23 | Stop reason: SDUPTHER

## 2018-04-06 NOTE — PROGRESS NOTES
HISTORY OF PRESENT ILLNESS:  Tonya Cohn is a 85 y.o. female who presents to the clinic today for Annual Exam; Diabetes; Hyperlipidemia; Hypertension; Thyroid Problem; and Chronic Kidney Disease  .  The patient presents to clinic today for follow-up of her type 2 diabetes mellitus, located by neuropathy, hypertension, located by chronic kidney disease stage III, and hyperlipidemia.  The patient states she has been out of her blood pressure medication for the last 3 days.  She does not check her blood pressure or blood sugars at home. The patient denies any problems with cardiac chest pain, dizziness, palpitations, orthopnea, or lower extremity edema.  She denies temperature intolerance or unexplained changes in her weight.  No significant positives with heartburn or reflux.  Her only complaint today is some pain around her left ear.  She denies any changes in hearing.  No drainage.  She denies using Q-tips.  She does chew gum on a regular basis.      PAST MEDICAL HISTORY:  Past Medical History:   Diagnosis Date    AR (allergic rhinitis)     Atherosclerosis of abdominal aorta     noted on CT scan 1/17/2011    Carpal tunnel syndrome of left wrist     Chronic kidney disease, stage 3     Diabetic peripheral neuropathy     Diverticulosis     History of colon polyps     History of diverticulitis of colon 1/2014    Hyperlipemia     Hypertension     Hypothyroidism     followed by endocrinology, Dr. Green    Mild aortic stenosis     OA (osteoarthritis) of knee     Obesity     Type II or unspecified type diabetes mellitus with neurological manifestations, uncontrolled(250.62)        PAST SURGICAL HISTORY:  Past Surgical History:   Procedure Laterality Date    CATARACT EXTRACTION      COLONOSCOPY N/A 10/3/2017    Procedure: COLONOSCOPY;  Surgeon: Alin Gonzalez MD;  Location: [address]);  Service: Endoscopy;  Laterality: N/A;    COSMETIC SURGERY      HYSTERECTOMY      partial    LUNG BIOPSY   in her 40's    TOTAL KNEE ARTHROPLASTY Right 6/13/2014    TKR       SOCIAL HISTORY:  Social History     Social History    Marital status:      Spouse name: N/A    Number of children: 7    Years of education: N/A     Occupational History    retired - house cleaning      Social History Main Topics    Smoking status: Former Smoker     Packs/day: 0.25     Years: 2.00     Quit date: 7/19/1962    Smokeless tobacco: Former User     Quit date: 7/14/1970    Alcohol use 1.2 oz/week     2 Cans of beer per week      Comment: occasionally    Drug use: No    Sexual activity: No     Other Topics Concern    Are You Pregnant Or Think You May Be? No    Breast-Feeding No     Social History Narrative    No narrative on file       FAMILY HISTORY:  Family History   Problem Relation Age of Onset    Cancer Mother      shoulder tumor - cancer    Hypertension Mother     Heart disease Father      valve replacement    Hypertension Father     Heart attack Father     Diabetes Sister     Arthritis Sister      s/p knee surgery    Diabetes Brother     Arthritis Brother      back problems    Skin cancer Brother     Cancer Son      jaw    Melanoma Neg Hx     Eczema Neg Hx     Lupus Neg Hx     Psoriasis Neg Hx     Breast cancer Neg Hx     Colon cancer Neg Hx        ALLERGIES AND MEDICATIONS: updated and reviewed.  Review of patient's allergies indicates:   Allergen Reactions    Lipitor [atorvastatin]      Medication List with Changes/Refills   Current Medications    DICLOFENAC SODIUM (VOLTAREN) 1 % GEL    Apply 2 g topically 3 (three) times daily.    FISH OIL-FAT ACID COMB.8- (OMEGA 3-6-9) 1,200 MG CAP        FLUOCINONIDE 0.1 % CREA    as needed.     GLIPIZIDE (GLUCOTROL) 5 MG TABLET    TAKE 1 TABLET BY MOUTH TWICE DAILY WITH MEALS    LEVOCETIRIZINE (XYZAL) 5 MG TABLET    Take 1 tablet (5 mg total) by mouth every evening.    LEVOTHYROXINE (SYNTHROID) 88 MCG TABLET    TAKE 1 TABLET(88 MCG) BY MOUTH EVERY DAY  "   LIDOCAINE (XYLOCAINE) 5 % OINT OINTMENT        LOVASTATIN (MEVACOR) 20 MG TABLET    TAKE 2 TABLETS(40 MG) BY MOUTH EVERY NIGHT    METFORMIN (GLUCOPHAGE) 1000 MG TABLET    TAKE 1/2 TABLET BY MOUTH TWICE DAILY WITH MEALS    MOMETASONE (NASONEX) 50 MCG/ACTUATION NASAL SPRAY    2 sprays by Nasal route once daily.    OMEPRAZOLE (PRILOSEC) 20 MG CAPSULE    Take 2 capsules (40 mg total) by mouth once daily.   Changed and/or Refilled Medications    Modified Medication Previous Medication    LOSARTAN (COZAAR) 50 MG TABLET losartan (COZAAR) 50 MG tablet       TAKE 1 TABLET(50 MG) BY MOUTH EVERY DAY    TAKE 1 TABLET(50 MG) BY MOUTH EVERY DAY          CARE TEAM:  Patient Care Team:  Tere Hughes MD as PCP - General (Internal Medicine)  Alivia Shah MD as PCP - [facility] Attributed         REVIEW OF SYSTEMS:  Review of Systems   Constitutional: Negative for chills, fatigue, fever and unexpected weight change.   HENT: Positive for ear pain. Negative for congestion, ear discharge and postnasal drip.    Eyes: Negative for photophobia, pain and visual disturbance.   Respiratory: Negative for cough, shortness of breath and wheezing.    Cardiovascular: Negative for chest pain, palpitations and leg swelling.   Gastrointestinal: Negative for abdominal pain, constipation, diarrhea, nausea and vomiting.   Genitourinary: Negative for dysuria, frequency, urgency and vaginal discharge.   Musculoskeletal: Negative for back pain, joint swelling and neck stiffness.   Skin: Negative for rash.   Neurological: Negative for weakness and headaches.   Psychiatric/Behavioral: Negative for dysphoric mood and sleep disturbance. The patient is not nervous/anxious.          PHYSICAL EXAM:   Vitals:    04/06/18 1500   BP: (!) 144/76   Pulse: 90   Temp: 98 °F (36.7 °C)     Weight: 88.5 kg (195 lb)   Height: 5' 6" (167.6 cm)   Body mass index is 31.47 kg/m².     General appearance - alert, well appearing, and in no distress and obese  Mental status - " alert, oriented to person, place, and time, normal mood, behavior, speech, dress, motor activity, and thought processes  Eyes - pupils equal and reactive, extraocular eye movements intact, sclera anicteric  Ears - normal TMs and ear canals bilaterally; no significant pain to palpation of her TMJ joints  Mouth - mucous membranes moist, pharynx normal without lesions  Neck - supple, no significant adenopathy, carotids upstroke normal bilaterally, no bruits, thyroid exam: thyroid is normal in size without nodules or tenderness  Lymphatics - no palpable lymphadenopathy  Chest - clear to auscultation, no wheezes, rales or rhonchi, symmetric air entry  Heart - normal rate and regular rhythm, no gallops noted  Neurological - alert, oriented, normal speech, no focal findings or movement disorder noted, cranial nerves II through XII intact  Musculoskeletal - no muscular tenderness noted, Mild-Moderate osteoarthritic changes noted to both knee joints. No joint effusions noted.   Extremities - no pedal edema noted  Skin - normal coloration and turgor, no rashes, no suspicious skin lesions noted    Results for orders placed or performed in visit on 03/21/18   Microalbumin/creatinine urine ratio   Result Value Ref Range    Microalbum.,U,Random 10.0 ug/mL    Creatinine, Random Ur 84.0 15.0 - 325.0 mg/dL    Microalb Creat Ratio 11.9 0.0 - 30.0 ug/mg        Hemoglobin A1C   Date Value Ref Range Status   03/19/2018 5.8 (H) 4.0 - 5.6 % Final     Comment:     According to ADA guidelines, hemoglobin A1c <7.0% represents  optimal control in non-pregnant diabetic patients. Different  metrics may apply to specific patient populations.   Standards of Medical Care in Diabetes-2016.  For the purpose of screening for the presence of diabetes:  <5.7%     Consistent with the absence of diabetes  5.7-6.4%  Consistent with increasing risk for diabetes   (prediabetes)  >or=6.5%  Consistent with diabetes  Currently, no consensus exists for use of  hemoglobin A1c  for diagnosis of diabetes for children.  This Hemoglobin A1c assay has significant interference with fetal   hemoglobin   (HbF). The results are invalid for patients with abnormal amounts of   HbF,   including those with known Hereditary Persistence   of Fetal Hemoglobin. Heterozygous hemoglobin variants (HbAS, HbAC,   HbAD, HbAE, HbA2) do not significantly interfere with this assay;   however, presence of multiple variants in a sample may impact the %   interference.     08/22/2017 6.1 (H) 4.0 - 5.6 % Final     Comment:     According to ADA guidelines, hemoglobin A1c <7.0% represents  optimal control in non-pregnant diabetic patients. Different  metrics may apply to specific patient populations.   Standards of Medical Care in Diabetes-2016.  For the purpose of screening for the presence of diabetes:  <5.7%     Consistent with the absence of diabetes  5.7-6.4%  Consistent with increasing risk for diabetes   (prediabetes)  >or=6.5%  Consistent with diabetes  Currently, no consensus exists for use of hemoglobin A1c  for diagnosis of diabetes for children.  This Hemoglobin A1c assay has significant interference with fetal   hemoglobin   (HbF). The results are invalid for patients with abnormal amounts of   HbF,   including those with known Hereditary Persistence   of Fetal Hemoglobin. Heterozygous hemoglobin variants (HbAS, HbAC,   HbAD, HbAE, HbA2) do not significantly interfere with this assay;   however, presence of multiple variants in a sample may impact the %   interference.     04/03/2017 6.2 4.5 - 6.2 % Final     Comment:     According to ADA guidelines, hemoglobin A1C <7.0% represents  optimal control in non-pregnant diabetic patients.  Different  metrics may apply to specific populations.   Standards of Medical Care in Diabetes - 2016.  For the purpose of screening for the presence of diabetes:  <5.7%     Consistent with the absence of diabetes  5.7-6.4%  Consistent with increasing risk for  diabetes   (prediabetes)  >or=6.5%  Consistent with diabetes  Currently no consensus exists for use of hemoglobin A1C  for diagnosis of diabetes for children.             ASSESSMENT AND PLAN:  1. Type 2 diabetes, controlled, with neuropathy  Diabetes currently is controlled for age and comorbid conditions. We discussed diabetic diet and regular exercise. We discussed home blood sugar monitoring, if appropriate. The current medical regimen is effective;  continue present plan and medications. Neuropathy pain controlled.     2. Benign hypertension with chronic kidney disease, stage III  Blood pressure is not controlled today. We discussed low salt diet and regular exercise. Patient reports that they have not taken any decongestant or anti-inflammatory medication recently (patient educated that these medications can increase blood pressure).  Medication changes were not made today - BP likely up as she has been out of her medication for the past 3 days. Patient will come back in 2-4 weeks for recheck of blood pressure by nursing staff. Patient also asked to check blood pressure at home and bring recordings to next office visit. Patient did not want to enroll in the  digital hypertension program.    - losartan (COZAAR) 50 MG tablet; TAKE 1 TABLET(50 MG) BY MOUTH EVERY DAY  Dispense: 30 tablet; Refill: 5    3. Hyperlipidemia LDL goal <100  LDL not quite at goal, but will continue current dose for now. We discussed low fat diet and regular physical activity.    4. Hypothyroidism (acquired)  Patient is clinically euthyroid. Continue current regimen.     5. Gastroesophageal reflux disease without esophagitis  Symptoms controlled. Reflux precautions discussed (eliminate tobacco if a smoker; minimize caffeine, chocolate and red/white peppermint intake; avoid heavy and spicy meals; don't lay down within 2-3 hours after eating). Medication as needed. Patient asked to take medication breaks, if possible - discussed chronic use  can limit calcium absorption, increases the risk for intestinal infections, and can cause kidney damage. There are also some newer studies that show possible increased risk of mortality.     6. Atherosclerosis of abdominal aorta  Patient with Atherosclerosis of the Aorta.  Stable/asymptomatic. Currently stable on lipid lowering medication and b/p monitoring.     7. Osteopenia of multiple sites  We discussed adequate calcium and vitamin D supplementation. We discussed fall precautions. She is up to date on her BMD. No need for Rx tx at this time.      8. Obesity (BMI 30-39.9)  The patient is asked to make an attempt to improve diet and exercise patterns to aid in medical management of this problem.     9. Left ear pain  I'm unsure of the etiology of her ear pain.  Exam normal.  I don't see any signs of TMJ.  I encouraged her to perhaps apply some warmth and to not chew gum for a few days.  She will let me know symptoms worsen or persist.    10. Advanced care planning/counseling discussion  I had a discussion today with the patient, family, and/or surrogate regarding advanced directives. Paperwork was given to complete living will and medical POA. [place] was discussed and paperwork was given to complete at the next office visit. Approximately 10 minute(s) spent on counseling/discussion regarding end of life decision making.     11. Need for Tdap vaccination  Patient was advised to get immunization at the pharmacy.            Follow-up in about 6 months (around 10/6/2018), or if symptoms worsen or fail to improve, for annual exam. or sooner as needed.

## 2018-04-08 DIAGNOSIS — E03.9 ACQUIRED HYPOTHYROIDISM: ICD-10-CM

## 2018-04-09 RX ORDER — LEVOTHYROXINE SODIUM 88 UG/1
TABLET ORAL
Qty: 90 TABLET | Refills: 1 | Status: SHIPPED | OUTPATIENT
Start: 2018-04-09 | End: 2018-10-01 | Stop reason: SDUPTHER

## 2018-04-20 ENCOUNTER — CLINICAL SUPPORT (OUTPATIENT)
Dept: FAMILY MEDICINE | Facility: CLINIC | Age: 83
End: 2018-04-20
Payer: MEDICARE

## 2018-04-20 VITALS — SYSTOLIC BLOOD PRESSURE: 130 MMHG | DIASTOLIC BLOOD PRESSURE: 70 MMHG | HEART RATE: 71 BPM

## 2018-04-20 DIAGNOSIS — I12.9 BENIGN HYPERTENSION WITH CHRONIC KIDNEY DISEASE, STAGE III: Primary | ICD-10-CM

## 2018-04-20 DIAGNOSIS — N18.30 BENIGN HYPERTENSION WITH CHRONIC KIDNEY DISEASE, STAGE III: Primary | ICD-10-CM

## 2018-04-20 PROCEDURE — 99999 PR PBB SHADOW E&M-EST. PATIENT-LVL III: CPT | Mod: PBBFAC,,,

## 2018-04-20 PROCEDURE — 99499 UNLISTED E&M SERVICE: CPT | Mod: S$PBB,,, | Performed by: INTERNAL MEDICINE

## 2018-04-20 PROCEDURE — 99213 OFFICE O/P EST LOW 20 MIN: CPT | Mod: PBBFAC,PO

## 2018-04-20 NOTE — PROGRESS NOTES
Tonya Cohn 85 y.o. female is here today for Blood Pressure check.   History of HTN yes.    Review of patient's allergies indicates:   Allergen Reactions    Lipitor [atorvastatin]      Creatinine   Date Value Ref Range Status   03/19/2018 0.8 0.5 - 1.4 mg/dL Final     Sodium   Date Value Ref Range Status   03/19/2018 143 136 - 145 mmol/L Final     Potassium   Date Value Ref Range Status   03/19/2018 3.9 3.5 - 5.1 mmol/L Final   ]  Patient verifies taking blood pressure medications on a regular basis at the same time of the day.     Current Outpatient Prescriptions:     fish oil-fat acid comb.8-hb137 (OMEGA 3-6-9) 1,200 mg Cap, , Disp: , Rfl:     fluocinonide 0.1 % Crea, as needed. , Disp: , Rfl:     glipiZIDE (GLUCOTROL) 5 MG tablet, TAKE 1 TABLET BY MOUTH TWICE DAILY WITH MEALS, Disp: 60 tablet, Rfl: 0    levothyroxine (SYNTHROID) 88 MCG tablet, TAKE 1 TABLET(88 MCG) BY MOUTH EVERY DAY, Disp: 90 tablet, Rfl: 1    losartan (COZAAR) 50 MG tablet, TAKE 1 TABLET(50 MG) BY MOUTH EVERY DAY, Disp: 30 tablet, Rfl: 5    lovastatin (MEVACOR) 20 MG tablet, TAKE 2 TABLETS(40 MG) BY MOUTH EVERY NIGHT, Disp: 180 tablet, Rfl: 0    metFORMIN (GLUCOPHAGE) 1000 MG tablet, TAKE 1/2 TABLET BY MOUTH TWICE DAILY WITH MEALS, Disp: 30 tablet, Rfl: 0    omeprazole (PRILOSEC) 20 MG capsule, Take 2 capsules (40 mg total) by mouth once daily., Disp: 180 capsule, Rfl: 1    diclofenac sodium (VOLTAREN) 1 % Gel, Apply 2 g topically 3 (three) times daily., Disp: 100 g, Rfl: 0    levocetirizine (XYZAL) 5 MG tablet, Take 1 tablet (5 mg total) by mouth every evening., Disp: 30 tablet, Rfl: 5    lidocaine (XYLOCAINE) 5 % Oint ointment, , Disp: , Rfl:     mometasone (NASONEX) 50 mcg/actuation nasal spray, 2 sprays by Nasal route once daily., Disp: 17 g, Rfl: 3  Does patient have record of home blood pressure readings no.   Last dose of blood pressure medication was taken at approx 8:30 am on Thursday.  Patient is asymptomatic.    Complains of none.    Vitals:    04/20/18 1005   BP: 130/70   BP Location: Right arm   Patient Position: Sitting   BP Method: Medium (Manual)   Pulse: 71         Dr. Hughes notified.

## 2018-05-01 RX ORDER — METFORMIN HYDROCHLORIDE 1000 MG/1
TABLET ORAL
Qty: 90 TABLET | Refills: 1 | Status: SHIPPED | OUTPATIENT
Start: 2018-05-01 | End: 2019-01-04 | Stop reason: SDUPTHER

## 2018-05-01 RX ORDER — GLIPIZIDE 5 MG/1
TABLET ORAL
Qty: 180 TABLET | Refills: 1 | Status: SHIPPED | OUTPATIENT
Start: 2018-05-01 | End: 2019-02-27 | Stop reason: SDUPTHER

## 2018-05-22 ENCOUNTER — OFFICE VISIT (OUTPATIENT)
Dept: CARDIOLOGY | Facility: CLINIC | Age: 83
End: 2018-05-22
Payer: MEDICARE

## 2018-05-22 VITALS
HEART RATE: 89 BPM | RESPIRATION RATE: 15 BRPM | BODY MASS INDEX: 31.36 KG/M2 | WEIGHT: 195.13 LBS | SYSTOLIC BLOOD PRESSURE: 125 MMHG | OXYGEN SATURATION: 98 % | DIASTOLIC BLOOD PRESSURE: 69 MMHG | HEIGHT: 66 IN

## 2018-05-22 DIAGNOSIS — E11.40 TYPE 2 DIABETES, CONTROLLED, WITH NEUROPATHY: ICD-10-CM

## 2018-05-22 DIAGNOSIS — I35.0 MILD AORTIC STENOSIS: ICD-10-CM

## 2018-05-22 DIAGNOSIS — E78.5 HYPERLIPIDEMIA LDL GOAL <100: Primary | ICD-10-CM

## 2018-05-22 DIAGNOSIS — I10 HTN (HYPERTENSION): ICD-10-CM

## 2018-05-22 PROCEDURE — 99214 OFFICE O/P EST MOD 30 MIN: CPT | Mod: PBBFAC,25 | Performed by: INTERNAL MEDICINE

## 2018-05-22 PROCEDURE — 93005 ELECTROCARDIOGRAM TRACING: CPT | Mod: PBBFAC | Performed by: INTERNAL MEDICINE

## 2018-05-22 PROCEDURE — 93010 ELECTROCARDIOGRAM REPORT: CPT | Mod: S$PBB,,, | Performed by: INTERNAL MEDICINE

## 2018-05-22 PROCEDURE — 99213 OFFICE O/P EST LOW 20 MIN: CPT | Mod: S$PBB,,, | Performed by: INTERNAL MEDICINE

## 2018-05-22 PROCEDURE — 99999 PR PBB SHADOW E&M-EST. PATIENT-LVL IV: CPT | Mod: PBBFAC,,, | Performed by: INTERNAL MEDICINE

## 2018-05-22 NOTE — PROGRESS NOTES
Subjective:    Patient ID:  Tonya Cohn is a 86 y.o. female who presents for follow-up of Hypertension      HPI     HTN, AS-mild, HLD, DM     Stress test 10/17/16  LVEF: >= 70 %  Impression: NORMAL MYOCARDIAL PERFUSION  1. The perfusion scan is free of evidence for myocardial ischemia or injury.   2. Resting wall motion is physiologic.   3. Resting LV function is normal.     Echo 10/25/17    1 - Normal left ventricular systolic function (EF 55-60%).     2 - Concentric remodeling.     3 - Impaired LV relaxation, elevated LAP (grade 2 diastolic dysfunction).     4 - Trivial aortic stenosis, AMAIRANI = 1.54 cm2, peak velocity = 2.02 m/s, mean gradient = 9 mmHg.     Stays active for 86, denies CP or SOB  EKG NSR - ok    Review of Systems   Constitution: Negative for decreased appetite.   HENT: Negative for ear discharge.    Eyes: Negative for blurred vision.   Respiratory: Negative for hemoptysis.    Endocrine: Negative for polyphagia.   Hematologic/Lymphatic: Negative for adenopathy.   Skin: Negative for color change.   Musculoskeletal: Negative for joint swelling.   Neurological: Negative for brief paralysis.   Psychiatric/Behavioral: Negative for hallucinations.        Objective:    Physical Exam   Constitutional: She is oriented to person, place, and time. She appears well-developed and well-nourished.   HENT:   Head: Normocephalic and atraumatic.   Eyes: Conjunctivae are normal. Pupils are equal, round, and reactive to light.   Neck: Normal range of motion. Neck supple.   Cardiovascular: Normal rate and intact distal pulses.    Murmur heard.   Early systolic murmur is present with a grade of 2/6   Pulmonary/Chest: Effort normal and breath sounds normal.   Abdominal: Soft. Bowel sounds are normal.   Musculoskeletal: Normal range of motion.   Neurological: She is alert and oriented to person, place, and time.   Skin: Skin is warm and dry.         Assessment:       1. Hyperlipidemia LDL goal <100    2. Mild aortic  stenosis    3. Type 2 diabetes, controlled, with neuropathy         Plan:       Cardiac stable  OV 6 months with echo

## 2018-05-30 ENCOUNTER — TELEPHONE (OUTPATIENT)
Dept: ORTHOPEDICS | Facility: CLINIC | Age: 83
End: 2018-05-30

## 2018-05-30 NOTE — TELEPHONE ENCOUNTER
----- Message from Megan Shelley sent at 5/30/2018  9:30 AM CDT -----  Contact: Pt  Pt is requesting a callback from Joanne regarding an appt says her legs are hurting and she could barely walk    Pt can be reached at [phone]    Thanks

## 2018-05-31 ENCOUNTER — HOSPITAL ENCOUNTER (OUTPATIENT)
Dept: RADIOLOGY | Facility: HOSPITAL | Age: 83
Discharge: HOME | End: 2018-05-31
Attending: NURSE PRACTITIONER
Payer: MEDICARE

## 2018-05-31 ENCOUNTER — OFFICE VISIT (OUTPATIENT)
Dept: ORTHOPEDICS | Facility: CLINIC | Age: 83
End: 2018-05-31
Payer: MEDICARE

## 2018-05-31 VITALS — BODY MASS INDEX: 31.29 KG/M2 | WEIGHT: 194.69 LBS | HEIGHT: 66 IN

## 2018-05-31 DIAGNOSIS — M25.561 RIGHT KNEE PAIN, UNSPECIFIED CHRONICITY: ICD-10-CM

## 2018-05-31 DIAGNOSIS — M54.31 SCIATICA OF RIGHT SIDE: ICD-10-CM

## 2018-05-31 DIAGNOSIS — M25.561 RIGHT KNEE PAIN, UNSPECIFIED CHRONICITY: Primary | ICD-10-CM

## 2018-05-31 PROCEDURE — 99213 OFFICE O/P EST LOW 20 MIN: CPT | Mod: PBBFAC,25 | Performed by: NURSE PRACTITIONER

## 2018-05-31 PROCEDURE — 99213 OFFICE O/P EST LOW 20 MIN: CPT | Mod: S$PBB,,, | Performed by: NURSE PRACTITIONER

## 2018-05-31 PROCEDURE — 73564 X-RAY EXAM KNEE 4 OR MORE: CPT | Mod: 26,RT,, | Performed by: RADIOLOGY

## 2018-05-31 PROCEDURE — 99999 PR PBB SHADOW E&M-EST. PATIENT-LVL III: CPT | Mod: PBBFAC,,, | Performed by: NURSE PRACTITIONER

## 2018-05-31 PROCEDURE — 73562 X-RAY EXAM OF KNEE 3: CPT | Mod: 26,XS,LT, | Performed by: RADIOLOGY

## 2018-05-31 PROCEDURE — 73562 X-RAY EXAM OF KNEE 3: CPT | Mod: TC,LT

## 2018-05-31 RX ORDER — METHYLPREDNISOLONE 4 MG/1
TABLET ORAL
Qty: 1 PACKAGE | Refills: 0 | Status: SHIPPED | OUTPATIENT
Start: 2018-05-31 | End: 2018-06-06 | Stop reason: SDUPTHER

## 2018-06-06 DIAGNOSIS — M54.31 SCIATICA OF RIGHT SIDE: ICD-10-CM

## 2018-06-11 RX ORDER — LOVASTATIN 20 MG/1
TABLET ORAL
Qty: 180 TABLET | Refills: 0 | Status: SHIPPED | OUTPATIENT
Start: 2018-06-11 | End: 2018-06-15 | Stop reason: SDUPTHER

## 2018-06-11 RX ORDER — METHYLPREDNISOLONE 4 MG/1
TABLET ORAL
Qty: 21 TABLET | Refills: 0 | Status: SHIPPED | OUTPATIENT
Start: 2018-06-11 | End: 2018-06-21

## 2018-06-15 RX ORDER — LOVASTATIN 20 MG/1
TABLET ORAL
Qty: 180 TABLET | Refills: 0 | Status: SHIPPED | OUTPATIENT
Start: 2018-06-15 | End: 2018-09-16 | Stop reason: SDUPTHER

## 2018-06-21 ENCOUNTER — OFFICE VISIT (OUTPATIENT)
Dept: FAMILY MEDICINE | Facility: CLINIC | Age: 83
End: 2018-06-21
Payer: MEDICARE

## 2018-06-21 VITALS
OXYGEN SATURATION: 97 % | HEART RATE: 96 BPM | DIASTOLIC BLOOD PRESSURE: 56 MMHG | SYSTOLIC BLOOD PRESSURE: 136 MMHG | HEIGHT: 66 IN | BODY MASS INDEX: 31.25 KG/M2 | WEIGHT: 194.44 LBS | TEMPERATURE: 99 F

## 2018-06-21 DIAGNOSIS — R06.2 WHEEZING: ICD-10-CM

## 2018-06-21 DIAGNOSIS — I70.0 ATHEROSCLEROSIS OF ABDOMINAL AORTA: ICD-10-CM

## 2018-06-21 DIAGNOSIS — E11.40 TYPE 2 DIABETES, CONTROLLED, WITH NEUROPATHY: ICD-10-CM

## 2018-06-21 DIAGNOSIS — J06.9 VIRAL URI WITH COUGH: Primary | ICD-10-CM

## 2018-06-21 PROCEDURE — 99214 OFFICE O/P EST MOD 30 MIN: CPT | Mod: S$PBB,,, | Performed by: NURSE PRACTITIONER

## 2018-06-21 PROCEDURE — 99999 PR PBB SHADOW E&M-EST. PATIENT-LVL IV: CPT | Mod: PBBFAC,,, | Performed by: NURSE PRACTITIONER

## 2018-06-21 PROCEDURE — 99214 OFFICE O/P EST MOD 30 MIN: CPT | Mod: PBBFAC,PO | Performed by: NURSE PRACTITIONER

## 2018-06-21 RX ORDER — BENZONATATE 200 MG/1
200 CAPSULE ORAL 3 TIMES DAILY PRN
Qty: 30 CAPSULE | Refills: 0 | Status: SHIPPED | OUTPATIENT
Start: 2018-06-21 | End: 2018-07-01

## 2018-06-21 RX ORDER — ALBUTEROL SULFATE 90 UG/1
2 AEROSOL, METERED RESPIRATORY (INHALATION) EVERY 6 HOURS PRN
Qty: 1 INHALER | Refills: 0 | Status: SHIPPED | OUTPATIENT
Start: 2018-06-21 | End: 2018-08-11

## 2018-06-21 NOTE — PROGRESS NOTES
The the Subjective:       Patient ID: Tonya Cohn is a 86 y.o. female.    Chief Complaint: Cough (5 days) and Chest Congestion (5 days)    86-year-old female presents to the clinic today chief complaint of sneezing, postnasal drip, right rib cage pain from coughing, coughing, and wheezing at night for the past 5 days.  She has been taking Robitussin and aspirin without much relief.  She denies any fever, chills, sore throat, sinus congestion, shortness of breath, abdominal pain, nausea, vomiting, or diarrhea.  She denies any cardiac chest pain, heart palpitations, shortness of breath, or swelling to lower extremities. She denies any headaches, dizziness, or blurred vision.        Past Medical History:   Diagnosis Date    AR (allergic rhinitis)     Atherosclerosis of abdominal aorta     noted on CT scan 1/17/2011    Carpal tunnel syndrome of left wrist     Chronic kidney disease, stage 3     Diabetic peripheral neuropathy     Diverticulosis     History of colon polyps     History of diverticulitis of colon 1/2014    Hyperlipemia     Hypertension     Hypothyroidism     followed by endocrinology, Dr. Green    Mild aortic stenosis     OA (osteoarthritis) of knee     Obesity     Type II or unspecified type diabetes mellitus with neurological manifestations, uncontrolled(250.62)      Past Surgical History:   Procedure Laterality Date    CATARACT EXTRACTION      COLONOSCOPY N/A 10/3/2017    Procedure: COLONOSCOPY;  Surgeon: Alin Gonzalez MD;  Location: [address]);  Service: Endoscopy;  Laterality: N/A;    COSMETIC SURGERY      HYSTERECTOMY      partial    JOINT REPLACEMENT      LUNG BIOPSY  in her 40's    TOTAL KNEE ARTHROPLASTY Right 6/13/2014    TKR      reports that she quit smoking about 55 years ago. She has a 0.50 pack-year smoking history. She quit smokeless tobacco use about 47 years ago. She reports that she drinks about 1.2 oz of alcohol per week . She reports that she does  not use drugs.  Review of Systems   Constitutional: Negative for chills and fever.   HENT: Positive for postnasal drip and sneezing. Negative for congestion, ear discharge, ear pain, rhinorrhea, sinus pressure and sore throat.    Eyes: Negative for pain, discharge and itching.   Respiratory: Positive for cough and wheezing. Negative for shortness of breath.    Cardiovascular: Negative for chest pain, palpitations and leg swelling.   Gastrointestinal: Negative for abdominal pain, diarrhea, nausea and vomiting.   Musculoskeletal: Negative for back pain, neck pain and neck stiffness.   Skin: Negative for rash.   Neurological: Negative for dizziness, light-headedness and headaches.   Hematological: Negative for adenopathy.       Objective:      Physical Exam   Constitutional: She is oriented to person, place, and time. She appears well-developed and well-nourished. No distress.   HENT:   Head: Normocephalic and atraumatic.   Right Ear: External ear normal.   Left Ear: External ear normal.   Nose: Nose normal.   Mouth/Throat: No oropharyngeal exudate.   Clear post nasal drip noted    Eyes: Conjunctivae and EOM are normal. Pupils are equal, round, and reactive to light. Right eye exhibits no discharge. Left eye exhibits no discharge. No scleral icterus.   Neck: Normal range of motion. Neck supple.   Cardiovascular: Normal rate and regular rhythm.  Exam reveals no gallop and no friction rub.    No murmur heard.  Pulmonary/Chest: Effort normal and breath sounds normal. No respiratory distress. She has no wheezes. She has no rales.   A lot of coughing but no wheezing    Abdominal: Soft. Bowel sounds are normal. There is no tenderness.   Musculoskeletal: Normal range of motion. She exhibits no edema.   Lymphadenopathy:     She has no cervical adenopathy.   Neurological: She is alert and oriented to person, place, and time.   Skin: Skin is warm and dry. No rash noted. She is not diaphoretic.   Psychiatric: She has a normal  mood and affect.       Assessment:       1. Viral URI with cough    2. Wheezing    3. Type 2 diabetes, controlled, with neuropathy    4. Atherosclerosis of abdominal aorta        Plan:         Viral URI with cough  -     benzonatate (TESSALON) 200 MG capsule; Take 1 capsule (200 mg total) by mouth 3 (three) times daily as needed.  Dispense: 30 capsule; Refill: 0    Wheezing  -     albuterol 90 mcg/actuation inhaler; Inhale 2 puffs into the lungs every 6 (six) hours as needed.  Dispense: 1 Inhaler; Refill: 0    Type 2 diabetes, controlled, with neuropathy  - The current medical regimen is effective;  continue present plan and medications.    Atherosclerosis of abdominal aorta  - Stable / Asymptomatic is on blood pressure and cholesterol lowering medications        Follow-up if symptoms worsen or fail to improve.

## 2018-06-21 NOTE — PATIENT INSTRUCTIONS
Albuterol inhaler for wheezing and excessive coughing   Tessalon as needed for coughing   Tylenol as needed for rib or chest pain   Drink lots of water   Left a message to get some generic Claritin

## 2018-06-26 ENCOUNTER — TELEPHONE (OUTPATIENT)
Dept: FAMILY MEDICINE | Facility: CLINIC | Age: 83
End: 2018-06-26

## 2018-06-26 DIAGNOSIS — R06.2 WHEEZING: Primary | ICD-10-CM

## 2018-06-26 RX ORDER — ALBUTEROL SULFATE 90 UG/1
2 AEROSOL, METERED RESPIRATORY (INHALATION) EVERY 6 HOURS PRN
Qty: 1 INHALER | Refills: 0 | Status: SHIPPED | OUTPATIENT
Start: 2018-06-26 | End: 2018-11-09 | Stop reason: ALTCHOICE

## 2018-06-26 RX ORDER — ALBUTEROL SULFATE 90 UG/1
2 AEROSOL, METERED RESPIRATORY (INHALATION) EVERY 6 HOURS PRN
Qty: 1 INHALER | Refills: 0 | Status: SHIPPED | OUTPATIENT
Start: 2018-06-26 | End: 2018-07-25 | Stop reason: SDUPTHER

## 2018-06-26 NOTE — TELEPHONE ENCOUNTER
Patient insurance doesn't cover Proventil the alternative is Pro Air Inhaler or Ventolin inhaler please advise.

## 2018-06-28 ENCOUNTER — OFFICE VISIT (OUTPATIENT)
Dept: FAMILY MEDICINE | Facility: CLINIC | Age: 83
End: 2018-06-28
Payer: MEDICARE

## 2018-06-28 VITALS
WEIGHT: 190.94 LBS | TEMPERATURE: 99 F | OXYGEN SATURATION: 96 % | HEIGHT: 66 IN | SYSTOLIC BLOOD PRESSURE: 124 MMHG | HEART RATE: 95 BPM | BODY MASS INDEX: 30.69 KG/M2 | DIASTOLIC BLOOD PRESSURE: 70 MMHG

## 2018-06-28 DIAGNOSIS — J32.0 MAXILLARY SINUSITIS, UNSPECIFIED CHRONICITY: Primary | ICD-10-CM

## 2018-06-28 DIAGNOSIS — R09.81 NASAL CONGESTION: ICD-10-CM

## 2018-06-28 DIAGNOSIS — R05.9 COUGH: ICD-10-CM

## 2018-06-28 DIAGNOSIS — R09.82 PND (POST-NASAL DRIP): ICD-10-CM

## 2018-06-28 DIAGNOSIS — R51.9 SINUS HEADACHE: ICD-10-CM

## 2018-06-28 PROCEDURE — 99214 OFFICE O/P EST MOD 30 MIN: CPT | Mod: S$PBB,,, | Performed by: FAMILY MEDICINE

## 2018-06-28 PROCEDURE — 99999 PR PBB SHADOW E&M-EST. PATIENT-LVL III: CPT | Mod: PBBFAC,,, | Performed by: FAMILY MEDICINE

## 2018-06-28 PROCEDURE — 99213 OFFICE O/P EST LOW 20 MIN: CPT | Mod: PBBFAC,PO | Performed by: FAMILY MEDICINE

## 2018-06-28 RX ORDER — AMOXICILLIN AND CLAVULANATE POTASSIUM 875; 125 MG/1; MG/1
1 TABLET, FILM COATED ORAL EVERY 12 HOURS
Qty: 20 TABLET | Refills: 0 | Status: SHIPPED | OUTPATIENT
Start: 2018-06-28 | End: 2018-08-11

## 2018-06-28 RX ORDER — GUAIFENESIN 1200 MG/1
1 TABLET, EXTENDED RELEASE ORAL EVERY 12 HOURS PRN
Qty: 20 TABLET | Refills: 0 | Status: SHIPPED | OUTPATIENT
Start: 2018-06-28 | End: 2018-07-08

## 2018-06-28 RX ORDER — PROMETHAZINE HYDROCHLORIDE AND DEXTROMETHORPHAN HYDROBROMIDE 6.25; 15 MG/5ML; MG/5ML
5 SYRUP ORAL 3 TIMES DAILY PRN
Qty: 118 ML | Refills: 1 | Status: SHIPPED | OUTPATIENT
Start: 2018-06-28 | End: 2018-08-11 | Stop reason: ALTCHOICE

## 2018-06-28 NOTE — PROGRESS NOTES
Office Visit    Patient Name: Tonya Cohn    : 1932  MRN: [id number]      Assessment/Plan:  Tonya Cohn is a 86 y.o. female who presents today for :    Maxillary sinusitis, unspecified chronicity  -     amoxicillin-clavulanate 875-125mg (AUGMENTIN) 875-125 mg per tablet; Take 1 tablet by mouth every 12 (twelve) hours.  Dispense: 20 tablet; Refill: 0  -     guaiFENesin (MUCINEX) 1,200 mg Ta12; Take 1 tablet by mouth every 12 (twelve) hours as needed.  Dispense: 20 tablet; Refill: 0    Nasal congestion    PND (post-nasal drip)    Cough  -     promethazine-dextromethorphan (PROMETHAZINE-DM) 6.25-15 mg/5 mL Syrp; Take 5 mLs by mouth 3 (three) times daily as needed (cough).  Dispense: 118 mL; Refill: 1    Sinus headache    -start Abx. Counseled pt that cough may persist for up to 2-3 weeks  -advised cont supportive therapy and symptomatic management. May try Nasal Saline Rinses and continue with Astelin. Cepacol prn for sore throat  -advised to use air humidifier/filter at home, changing AC filters regularly, avoid second-hand smoking  -stressed hydration (water) and rest, as well as frequent hand washing and covering coughs to prevent spread of respiratory illnesses  -     Tylenol every 4-6 hours as needed for fever, headaches, sore throat, ear pain, bodyaches, and/or nasal/sinus inflammation  -RTC if Sx worsens or call clinic back if pt has any concerns.      Follow-up for worsening Sx. Urgent care/ED precautions provided.     This note was created by combination of typed  and Dragon dictation.  Transcription errors may be present.  If there are any questions, please contact me.      ----------------------------------------------------------------------------------------------------------------------      HPI:  Tonya is a 86 y.o. female who presents today for:    Sinus Problem and Cough      This patient has multiple medical diagnoses as noted below.    This patient is new to me     Patient is  here today for sinus pressure and forehead for the past week. She was seen 1 week ago with cough and was diagnosed with URI and was sent home on Tessalon Perles, for which she states cough slightly improved, but then she developed sinus pressure and frontal headache, with nasal congestion. She has had a normal appetite. No sick contact with similar Sx. Also uses Astelin with some relief of nasal congestion.  No sore throat.  No body aches  No ear pain. No F/C          Additional ROS    No dysphagia  No CP/SOB/palpitations/swelling  No nausea/vomiting/abd pain/no diarrhea  No rashes    Patient Active Problem List   Diagnosis    Primary osteoarthritis of both knees    Hyperlipidemia LDL goal <100    Type 2 diabetes, controlled, with neuropathy    Hypothyroidism (acquired)    Obesity (BMI 30-39.9)    GERD (gastroesophageal reflux disease)    DDD (degenerative disc disease), lumbar    Spondylolisthesis, grade 1    DJD (degenerative joint disease), cervical    Diverticulosis    History of colon polyps    AR (allergic rhinitis)    Heart murmur    Tachycardia    Mild aortic stenosis    Atherosclerosis of abdominal aorta    Pseudophakia    Osteopenia of multiple sites       Current Medications  Medications reviewed and updated.       Current Outpatient Prescriptions:     albuterol (VENTOLIN HFA) 90 mcg/actuation inhaler, Inhale 2 puffs into the lungs every 6 (six) hours as needed for Wheezing. Rescue, Disp: 1 Inhaler, Rfl: 0    albuterol 90 mcg/actuation inhaler, Inhale 2 puffs into the lungs every 6 (six) hours as needed., Disp: 1 Inhaler, Rfl: 0    albuterol 90 mcg/actuation inhaler, Inhale 2 puffs into the lungs every 6 (six) hours as needed., Disp: 1 Inhaler, Rfl: 0    benzonatate (TESSALON) 200 MG capsule, Take 1 capsule (200 mg total) by mouth 3 (three) times daily as needed., Disp: 30 capsule, Rfl: 0    fish oil-fat acid comb.[id number] (OMEGA 3-6-9) 1,200 mg Cap, , Disp: , Rfl:     fluocinonide  0.1 % Crea, as needed. , Disp: , Rfl:     glipiZIDE (GLUCOTROL) 5 MG tablet, TAKE 1 TABLET BY MOUTH TWICE DAILY WITH MEALS, Disp: 180 tablet, Rfl: 1    levothyroxine (SYNTHROID) 88 MCG tablet, TAKE 1 TABLET(88 MCG) BY MOUTH EVERY DAY, Disp: 90 tablet, Rfl: 1    lidocaine (XYLOCAINE) 5 % Oint ointment, , Disp: , Rfl:     losartan (COZAAR) 50 MG tablet, TAKE 1 TABLET(50 MG) BY MOUTH EVERY DAY, Disp: 30 tablet, Rfl: 5    lovastatin (MEVACOR) 20 MG tablet, TAKE 2 TABLETS(40 MG) BY MOUTH EVERY NIGHT, Disp: 180 tablet, Rfl: 0    metFORMIN (GLUCOPHAGE) 1000 MG tablet, TAKE 1/2 TABLET BY MOUTH TWICE DAILY WITH MEALS, Disp: 90 tablet, Rfl: 1    mometasone (NASONEX) 50 mcg/actuation nasal spray, 2 sprays by Nasal route once daily., Disp: 17 g, Rfl: 3    omeprazole (PRILOSEC) 20 MG capsule, Take 2 capsules (40 mg total) by mouth once daily., Disp: 180 capsule, Rfl: 1    amoxicillin-clavulanate 875-125mg (AUGMENTIN) 875-125 mg per tablet, Take 1 tablet by mouth every 12 (twelve) hours., Disp: 20 tablet, Rfl: 0    diclofenac sodium (VOLTAREN) 1 % Gel, Apply 2 g topically 3 (three) times daily., Disp: 100 g, Rfl: 0    guaiFENesin (MUCINEX) 1,200 mg Ta12, Take 1 tablet by mouth every 12 (twelve) hours as needed., Disp: 20 tablet, Rfl: 0    levocetirizine (XYZAL) 5 MG tablet, Take 1 tablet (5 mg total) by mouth every evening., Disp: 30 tablet, Rfl: 5    promethazine-dextromethorphan (PROMETHAZINE-DM) 6.25-15 mg/5 mL Syrp, Take 5 mLs by mouth 3 (three) times daily as needed (cough)., Disp: 118 mL, Rfl: 1    Past Surgical History:   Procedure Laterality Date    CATARACT EXTRACTION      COLONOSCOPY N/A 10/3/2017    Procedure: COLONOSCOPY;  Surgeon: Alin Gonzalez MD;  Location: [facility] (4TH FLR);  Service: Endoscopy;  Laterality: N/A;    COSMETIC SURGERY      HYSTERECTOMY      partial    JOINT REPLACEMENT      LUNG BIOPSY  in her 40's    TOTAL KNEE ARTHROPLASTY Right 6/13/2014    TKR       Family History  "  Problem Relation Age of Onset    Cancer Mother         shoulder tumor - cancer    Hypertension Mother     Heart disease Father         valve replacement    Hypertension Father     Heart attack Father     Diabetes Sister     Arthritis Sister         s/p knee surgery    Diabetes Brother     Arthritis Brother         back problems    Skin cancer Brother     Cancer Son         jaw    Melanoma Neg Hx     Eczema Neg Hx     Lupus Neg Hx     Psoriasis Neg Hx     Breast cancer Neg Hx     Colon cancer Neg Hx        Social History     Social History    Marital status:      Spouse name: N/A    Number of children: 7    Years of education: N/A     Occupational History    retired - house cleaning      Social History Main Topics    Smoking status: Former Smoker     Packs/day: 0.25     Years: 2.00     Quit date: 7/19/1962    Smokeless tobacco: Former User     Quit date: 7/14/1970    Alcohol use 1.2 oz/week     2 Cans of beer per week      Comment: occasionally    Drug use: No    Sexual activity: No     Other Topics Concern    Are You Pregnant Or Think You May Be? No    Breast-Feeding No     Social History Narrative    No narrative on file             Allergies   Review of patient's allergies indicates:   Allergen Reactions    Lipitor [atorvastatin]              Review of Systems  See HPI      Physical Exam  /70   Pulse 95   Temp 98.9 °F (37.2 °C) (Oral)   Ht 5' 6" (1.676 m)   Wt 86.6 kg (190 lb 14.7 oz)   SpO2 96%   BMI 30.81 kg/m²     GEN: NAD, well developed, appears tired but nontoxic  HEENT: NCAT, PERRLA, EOMI, sclera clear, anicteric, TM clear bilaterally with normal light reflex, mild nasal turbinate swelling, MMM with no lesions, O/P clear - no tonsillar swelling/discharge, +mild drainage, +minimal clear nasal discharge,+moderate frontal/maxillary TTP  NECK: normal, supple with midline trachea, no LAD, no thyromegaly  LUNGS: CTAB, no w/r/r, no increased work of " breathing  HEART: RRR, normal S1 and S2, no m/r/g  ABD: s/nt/nd, NABS  SKIN: normal turgor, no rashes, no other lesions.   PSYCH: AOx3, appropriate mood and affect.  NEURO: normal without focal findings, CN II-XII are intact.  Sensation/strength grossly normal, gait and station normal. Normal hearing test. No light sensitivity.

## 2018-06-29 ENCOUNTER — TELEPHONE (OUTPATIENT)
Dept: ADMINISTRATIVE | Facility: HOSPITAL | Age: 83
End: 2018-06-29

## 2018-07-25 DIAGNOSIS — R06.2 WHEEZING: ICD-10-CM

## 2018-07-25 RX ORDER — ALBUTEROL SULFATE 90 UG/1
2 AEROSOL, METERED RESPIRATORY (INHALATION) EVERY 6 HOURS PRN
Qty: 1 INHALER | Refills: 1 | Status: SHIPPED | OUTPATIENT
Start: 2018-07-25 | End: 2018-08-11

## 2018-08-11 ENCOUNTER — OFFICE VISIT (OUTPATIENT)
Dept: URGENT CARE | Facility: CLINIC | Age: 83
End: 2018-08-11
Payer: MEDICARE

## 2018-08-11 VITALS
RESPIRATION RATE: 18 BRPM | TEMPERATURE: 97 F | OXYGEN SATURATION: 98 % | HEIGHT: 66 IN | BODY MASS INDEX: 30.53 KG/M2 | WEIGHT: 190 LBS | SYSTOLIC BLOOD PRESSURE: 153 MMHG | DIASTOLIC BLOOD PRESSURE: 81 MMHG | HEART RATE: 85 BPM

## 2018-08-11 DIAGNOSIS — R03.0 ELEVATED BLOOD PRESSURE READING: ICD-10-CM

## 2018-08-11 DIAGNOSIS — G89.29 CHRONIC RIGHT-SIDED LOW BACK PAIN WITH RIGHT-SIDED SCIATICA: Primary | ICD-10-CM

## 2018-08-11 DIAGNOSIS — M54.41 CHRONIC RIGHT-SIDED LOW BACK PAIN WITH RIGHT-SIDED SCIATICA: Primary | ICD-10-CM

## 2018-08-11 PROCEDURE — 99214 OFFICE O/P EST MOD 30 MIN: CPT | Mod: S$GLB,,, | Performed by: NURSE PRACTITIONER

## 2018-08-11 RX ORDER — METHYLPREDNISOLONE 4 MG/1
4 TABLET ORAL
Qty: 1 PACKAGE | Refills: 0 | Status: SHIPPED | OUTPATIENT
Start: 2018-08-11 | End: 2018-08-17

## 2018-08-11 NOTE — PATIENT INSTRUCTIONS
Please follow up with your Primary care provider within 2-5 days if your signs and symptoms have not resolved or worsen.     If your condition worsens or fails to improve we recommend that you receive another evaluation at the emergency room immediately or contact your primary medical clinic to discuss your concerns.    You must understand that you have received an Urgent Care treatment only and that you may be released before all of your medical problems are known or treated.   You, the patient, will arrange for follow up care as instructed.     ORTHO    Ice 20 minutes on and 20 minutes off PRN     Please drink plenty of fluids.    Please get plenty of rest.    Please return here or go to the Emergency Department for any concerns or worsening of condition.    If you were prescribed a narcotic medication, do not drive or operate heavy equipment or machinery while taking these medications.    If you were not prescribed an anti-inflammatory medication, and if you do not have any history of stomach/intestinal ulcers, or kidney disease, or are not taking a blood thinner such as Coumadin, Plavix, Pradaxa, Eloquis, or Xaralta for example, it is OK to take over the counter Ibuprofen or Advil or Motrin or Aleve as directed.  Do not take these medications on an empty stomach.    If you were given a splint wear it at all times unless otherwise instructed.     If you were given crutches use them as we instructed. Do not rest your armpits on the foam pad or you risk compressing the nerves and the vessels there.      Rest, ice, compression and elevation to the affected joint or limb as needed.    Please follow up with your primary care doctor or specialist as needed.    If you lose control of your bowel and/or bladder, please go to the nearest Emergency Department immediately.    If you lose sensation in between your legs by your genitalia and/or rectum, please go to the nearest Emergency Department immediately.    If you lose  control or sensation of any extremity, please go to the nearest Emergency Department immediately.    Elevated Blood Pressure    Your blood pressure was elevated during your visit to the urgent care.     It was not so high that immediate care was needed but it is recommended that you monitor your blood pressure over the next week or two to make sure that it is not staying elevated.      Please have your blood pressure taken 2-3 times daily at different times of the day.  Write all of those blood pressures down and record the time that they were taken.     Keep all that information and take it with you to see your Primary Care Physician.     If you are taking antihypertensives, please continue your medication regularly as prescribed.      If your blood pressure is consistently above 140/90 you will need to follow up with your PCP more quickly.     - According to ACEP guidelines, workup and treatment of hypertension in asymptomatic patient is not warranted in the ED:    (1) Initiating treatment for asymptomatic hypertension in the ED is not necessary when patients have follow-up.    (2) Rapidly lowering blood pressure in asymptomatic patients in the ED is unnecessary and may be harmful in some patients.    (3) When ED treatment for asymptomatic hypertension is initiated, blood pressure management should attempt to gradually lower blood pressure and should not be expected to be normalized during the initial ED visit.        Call your doctor immediately or seek emergency care if you have any of the following:  · Chest pain or shortness of breath (call 911)  · Moderate to severe headache  · Weakness in the muscles of your face, arms, or legs  · Trouble speaking  · Extreme drowsiness  · Confusion  · Fainting or dizziness  · Pulsating or rushing sound in your ears  · Unexplained nosebleed  · Weakness, tingling, or numbness of your face, arms, or legs  · Change in vision  · Blood pressure measured at home that is greater  than 180/110     You were also provided a DASH diet plan flyer, published by the [employer].   It is the only recommended diet used for lowering your blood pressure.  Please read through and try to f      Back Sprain or Strain    Injury to the muscles (strain) or ligaments (sprain) around the spine can be troubling. Injury may occur after a sudden forceful twisting or bending force such as in a car accident, after a simple awkward movement, or after lifting something heavy with poor body positioning. In any case, muscle spasm is often present and adds to the pain.  Thankfully, most people feel better in 1 to 2 weeks, and most of the rest in 1 to 2 months. Most people can remain active. Unless you had a forceful or traumatic physical injury such as a car accident or fall, X-rays may not be ordered for the first evaluation of a back sprain or strain. If pain continues and does not respond to medical treatment, your healthcare provider may then order X-rays and other tests.  Home care  The following guidelines will help you care for your injury at home:  · When in bed, try to find a comfortable position. A firm mattress is best. Try lying flat on your back with pillows under your knees. You can also try lying on your side with your knees bent up toward your chest and a pillow between your knees.  · Don't sit for long periods. Try not to take long car rides or take other trips that have you sitting for a long time. This puts more stress on the lower back than standing or walking.  · During the first 24 to 72 hours after an injury or flare-up, apply an ice pack to the painful area for 20 minutes. Then remove it for 20 minutes. Do this for 60 to 90 minutes, or several times a day. This will reduce swelling and pain. Be sure to wrap the ice pack in a thin towel or plastic to protect your skin.  · You can start with ice, then switch to heat. Heat from a hot shower, hot bath, or heating pad reduces pain and  works well for muscle spasms. Put heat on the painful area for 20 minutes, then remove for 20 minutes. Do this for 60 to 90 minutes, or several times a day. Do not use a heating pad while sleeping. It can burn the skin.  · You can alternate the ice and heat. Talk with your healthcare provider to find out the best treatment or therapy for your back pain.  · Therapeutic massage will help relax the back muscles without stretching them.  · Be aware of safe lifting methods. Do not lift anything over 15 pounds until all of the pain is gone.  Medicines  Talk to your healthcare provider before using medicines, especially if you have other health problems or are taking other medicines.  · You may use acetaminophen or ibuprofen to control pain, unless another pain medicine was prescribed. If you have chronic conditions like diabetes, liver or kidney disease, stomach ulcers, or gastrointestinal bleeding, or are taking blood-thinner medicines, talk with your doctor before taking any medicines.  · Be careful if you are given prescription medicines, narcotics, or medicine for muscle spasm. They can cause drowsiness, and affect your coordination, reflexes, and judgment. Do not drive or operate heavy machinery when taking these types of medicines. Only take pain medicine as prescribed by your healthcare provider.  Follow-up care  Follow up with your healthcare provider, or as advised. You may need physical therapy or more tests if your symptoms get worse.  If you had X-rays your healthcare provider may be checking for any broken bones, breaks, or fractures. Bruises and sprains can sometimes hurt as much as a fracture. These injuries can take time to heal completely. If your symptoms dont improve or they get worse, talk with your healthcare provider. You may need a repeat X-ray or other tests.  Call 911  Call for emergency care if any of the following occur:  · Trouble breathing  · Confused  · Very drowsy or trouble  awakening  · Fainting or loss of consciousness  · Rapid or very slow heart rate  · Loss of bowel or bladder control  When to seek medical advice  Call your healthcare provider right away if any of the following occur:  · Pain gets worse or spreads to your arms or legs  · Weakness or numbness in one or both arms or legs  · Numbness in the groin or genital area  Date Last Reviewed: 6/1/2016  © 3190-6327 [employer]. [address] [place] [address], [place] [address]. All rights reserved. This information is not intended as a substitute for professional medical care. Always follow your healthcare professional's instructions.

## 2018-08-11 NOTE — PROGRESS NOTES
"Subjective:       Patient ID: Tonya Cohn is a 86 y.o. female.    Vitals:  height is 5' 6" (1.676 m) and weight is 86.2 kg (190 lb). Her temperature is 96.8 °F (36 °C). Her blood pressure is 153/81 (abnormal) and her pulse is 85. Her respiration is 18 and oxygen saturation is 98%.     Chief Complaint: Hip Pain    This is a 86 y.o. female who presents today with a chief complaint of right side hip pain down her leg. Able to urinate and have bowel movements.         Pain   This is a recurrent problem. The current episode started in the past 7 days. The problem occurs constantly. The problem has been unchanged. Pertinent negatives include no abdominal pain, change in bowel habit, numbness, rash or urinary symptoms. Associated symptoms comments: Leg Pain . Nothing aggravates the symptoms.     Review of Systems   Constitution: Negative for malaise/fatigue.   Skin: Negative for rash.   Musculoskeletal: Positive for joint pain. Negative for back pain, muscle cramps, muscle weakness and stiffness.   Gastrointestinal: Negative for abdominal pain, change in bowel habit and bowel incontinence.   Genitourinary: Negative for bladder incontinence, dysuria, hematuria and urgency.   Neurological: Negative for disturbances in coordination and numbness.       Objective:      Physical Exam   Constitutional: She is oriented to person, place, and time. Vital signs are normal. She appears well-developed and well-nourished. She is active and cooperative. No distress.   HENT:   Head: Normocephalic and atraumatic.   Nose: Nose normal.   Mouth/Throat: Oropharynx is clear and moist and mucous membranes are normal.   Eyes: Conjunctivae and lids are normal.   Neck: Trachea normal, normal range of motion, full passive range of motion without pain and phonation normal. Neck supple.   Cardiovascular: Normal rate, regular rhythm, normal heart sounds, intact distal pulses and normal pulses.    Pulmonary/Chest: Effort normal and breath sounds " normal.   Abdominal: Soft. Normal appearance and bowel sounds are normal. She exhibits no abdominal bruit, no pulsatile midline mass and no mass.   Musculoskeletal: She exhibits no edema or deformity.        Thoracic back: Normal.        Lumbar back: She exhibits decreased range of motion, tenderness, pain and spasm. She exhibits no bony tenderness, no swelling, no edema, no deformity, no laceration and normal pulse.   Neurological: She is alert and oriented to person, place, and time. She has normal strength and normal reflexes. No sensory deficit.   Skin: Skin is warm, dry and intact. She is not diaphoretic.   Psychiatric: She has a normal mood and affect. Her speech is normal and behavior is normal. Judgment and thought content normal. Cognition and memory are normal.   Nursing note and vitals reviewed.      Assessment:       1. Chronic right-sided low back pain with right-sided sciatica    2. Elevated blood pressure reading        Plan:       Patient's PCP gave her medrol dose pack for her Advised to monitor blood pressure and blood sugar.  If elevated, she may half the dose of the steroid pack and call her PCP.  Verbalized understanding.     Chronic right-sided low back pain with right-sided sciatica  -     methylPREDNISolone (MEDROL DOSEPACK) 4 mg tablet; Take 1 tablet (4 mg total) by mouth as needed (Take as directed). Take as directed  Dispense: 1 Package; Refill: 0    Elevated blood pressure reading      Patient Instructions     Please follow up with your Primary care provider within 2-5 days if your signs and symptoms have not resolved or worsen.     If your condition worsens or fails to improve we recommend that you receive another evaluation at the emergency room immediately or contact your primary medical clinic to discuss your concerns.    You must understand that you have received an Urgent Care treatment only and that you may be released before all of your medical problems are known or treated.    You, the patient, will arrange for follow up care as instructed.     ORTHO    Ice 20 minutes on and 20 minutes off PRN     Please drink plenty of fluids.    Please get plenty of rest.    Please return here or go to the Emergency Department for any concerns or worsening of condition.    If you were prescribed a narcotic medication, do not drive or operate heavy equipment or machinery while taking these medications.    If you were not prescribed an anti-inflammatory medication, and if you do not have any history of stomach/intestinal ulcers, or kidney disease, or are not taking a blood thinner such as Coumadin, Plavix, Pradaxa, Eloquis, or Xaralta for example, it is OK to take over the counter Ibuprofen or Advil or Motrin or Aleve as directed.  Do not take these medications on an empty stomach.    If you were given a splint wear it at all times unless otherwise instructed.     If you were given crutches use them as we instructed. Do not rest your armpits on the foam pad or you risk compressing the nerves and the vessels there.      Rest, ice, compression and elevation to the affected joint or limb as needed.    Please follow up with your primary care doctor or specialist as needed.    If you lose control of your bowel and/or bladder, please go to the nearest Emergency Department immediately.    If you lose sensation in between your legs by your genitalia and/or rectum, please go to the nearest Emergency Department immediately.    If you lose control or sensation of any extremity, please go to the nearest Emergency Department immediately.    Elevated Blood Pressure    Your blood pressure was elevated during your visit to the urgent care.     It was not so high that immediate care was needed but it is recommended that you monitor your blood pressure over the next week or two to make sure that it is not staying elevated.      Please have your blood pressure taken 2-3 times daily at different times of the day.  Write  all of those blood pressures down and record the time that they were taken.     Keep all that information and take it with you to see your Primary Care Physician.     If you are taking antihypertensives, please continue your medication regularly as prescribed.      If your blood pressure is consistently above 140/90 you will need to follow up with your PCP more quickly.     - According to ACEP guidelines, workup and treatment of hypertension in asymptomatic patient is not warranted in the ED:    (1) Initiating treatment for asymptomatic hypertension in the ED is not necessary when patients have follow-up.    (2) Rapidly lowering blood pressure in asymptomatic patients in the ED is unnecessary and may be harmful in some patients.    (3) When ED treatment for asymptomatic hypertension is initiated, blood pressure management should attempt to gradually lower blood pressure and should not be expected to be normalized during the initial ED visit.        Call your doctor immediately or seek emergency care if you have any of the following:  · Chest pain or shortness of breath (call 911)  · Moderate to severe headache  · Weakness in the muscles of your face, arms, or legs  · Trouble speaking  · Extreme drowsiness  · Confusion  · Fainting or dizziness  · Pulsating or rushing sound in your ears  · Unexplained nosebleed  · Weakness, tingling, or numbness of your face, arms, or legs  · Change in vision  · Blood pressure measured at home that is greater than 180/110     You were also provided a DASH diet plan flyer, published by the [employer].   It is the only recommended diet used for lowering your blood pressure.  Please read through and try to f      Back Sprain or Strain    Injury to the muscles (strain) or ligaments (sprain) around the spine can be troubling. Injury may occur after a sudden forceful twisting or bending force such as in a car accident, after a simple awkward movement, or after lifting  something heavy with poor body positioning. In any case, muscle spasm is often present and adds to the pain.  Thankfully, most people feel better in 1 to 2 weeks, and most of the rest in 1 to 2 months. Most people can remain active. Unless you had a forceful or traumatic physical injury such as a car accident or fall, X-rays may not be ordered for the first evaluation of a back sprain or strain. If pain continues and does not respond to medical treatment, your healthcare provider may then order X-rays and other tests.  Home care  The following guidelines will help you care for your injury at home:  · When in bed, try to find a comfortable position. A firm mattress is best. Try lying flat on your back with pillows under your knees. You can also try lying on your side with your knees bent up toward your chest and a pillow between your knees.  · Don't sit for long periods. Try not to take long car rides or take other trips that have you sitting for a long time. This puts more stress on the lower back than standing or walking.  · During the first 24 to 72 hours after an injury or flare-up, apply an ice pack to the painful area for 20 minutes. Then remove it for 20 minutes. Do this for 60 to 90 minutes, or several times a day. This will reduce swelling and pain. Be sure to wrap the ice pack in a thin towel or plastic to protect your skin.  · You can start with ice, then switch to heat. Heat from a hot shower, hot bath, or heating pad reduces pain and works well for muscle spasms. Put heat on the painful area for 20 minutes, then remove for 20 minutes. Do this for 60 to 90 minutes, or several times a day. Do not use a heating pad while sleeping. It can burn the skin.  · You can alternate the ice and heat. Talk with your healthcare provider to find out the best treatment or therapy for your back pain.  · Therapeutic massage will help relax the back muscles without stretching them.  · Be aware of safe lifting methods. Do  not lift anything over 15 pounds until all of the pain is gone.  Medicines  Talk to your healthcare provider before using medicines, especially if you have other health problems or are taking other medicines.  · You may use acetaminophen or ibuprofen to control pain, unless another pain medicine was prescribed. If you have chronic conditions like diabetes, liver or kidney disease, stomach ulcers, or gastrointestinal bleeding, or are taking blood-thinner medicines, talk with your doctor before taking any medicines.  · Be careful if you are given prescription medicines, narcotics, or medicine for muscle spasm. They can cause drowsiness, and affect your coordination, reflexes, and judgment. Do not drive or operate heavy machinery when taking these types of medicines. Only take pain medicine as prescribed by your healthcare provider.  Follow-up care  Follow up with your healthcare provider, or as advised. You may need physical therapy or more tests if your symptoms get worse.  If you had X-rays your healthcare provider may be checking for any broken bones, breaks, or fractures. Bruises and sprains can sometimes hurt as much as a fracture. These injuries can take time to heal completely. If your symptoms dont improve or they get worse, talk with your healthcare provider. You may need a repeat X-ray or other tests.  Call 911  Call for emergency care if any of the following occur:  · Trouble breathing  · Confused  · Very drowsy or trouble awakening  · Fainting or loss of consciousness  · Rapid or very slow heart rate  · Loss of bowel or bladder control  When to seek medical advice  Call your healthcare provider right away if any of the following occur:  · Pain gets worse or spreads to your arms or legs  · Weakness or numbness in one or both arms or legs  · Numbness in the groin or genital area  Date Last Reviewed: 6/1/2016  © 7509-2708 [employer]. [address] [place] [address], [place] [address]. All rights  reserved. This information is not intended as a substitute for professional medical care. Always follow your healthcare professional's instructions.

## 2018-08-23 DIAGNOSIS — N18.30 BENIGN HYPERTENSION WITH CHRONIC KIDNEY DISEASE, STAGE III: ICD-10-CM

## 2018-08-23 DIAGNOSIS — I12.9 BENIGN HYPERTENSION WITH CHRONIC KIDNEY DISEASE, STAGE III: ICD-10-CM

## 2018-08-23 RX ORDER — LOSARTAN POTASSIUM 50 MG/1
TABLET ORAL
Qty: 30 TABLET | Refills: 2 | Status: SHIPPED | OUTPATIENT
Start: 2018-08-23 | End: 2018-11-26 | Stop reason: SDUPTHER

## 2018-09-04 ENCOUNTER — HOSPITAL ENCOUNTER (OUTPATIENT)
Dept: RADIOLOGY | Facility: HOSPITAL | Age: 83
Discharge: HOME | End: 2018-09-04
Attending: INTERNAL MEDICINE
Payer: MEDICARE

## 2018-09-04 ENCOUNTER — INITIAL CONSULT (OUTPATIENT)
Dept: RHEUMATOLOGY | Facility: CLINIC | Age: 83
End: 2018-09-04
Payer: MEDICARE

## 2018-09-04 VITALS
HEART RATE: 70 BPM | BODY MASS INDEX: 31.34 KG/M2 | WEIGHT: 195 LBS | HEIGHT: 66 IN | DIASTOLIC BLOOD PRESSURE: 64 MMHG | SYSTOLIC BLOOD PRESSURE: 132 MMHG

## 2018-09-04 DIAGNOSIS — M54.31 RIGHT SIDED SCIATICA: Primary | ICD-10-CM

## 2018-09-04 DIAGNOSIS — M54.31 RIGHT SIDED SCIATICA: ICD-10-CM

## 2018-09-04 PROCEDURE — 72100 X-RAY EXAM L-S SPINE 2/3 VWS: CPT | Mod: TC

## 2018-09-04 PROCEDURE — 99213 OFFICE O/P EST LOW 20 MIN: CPT | Mod: PBBFAC,25 | Performed by: INTERNAL MEDICINE

## 2018-09-04 PROCEDURE — 72100 X-RAY EXAM L-S SPINE 2/3 VWS: CPT | Mod: 26,,, | Performed by: RADIOLOGY

## 2018-09-04 PROCEDURE — 99204 OFFICE O/P NEW MOD 45 MIN: CPT | Mod: S$PBB,,, | Performed by: INTERNAL MEDICINE

## 2018-09-04 PROCEDURE — 99999 PR PBB SHADOW E&M-EST. PATIENT-LVL III: CPT | Mod: PBBFAC,,, | Performed by: INTERNAL MEDICINE

## 2018-09-04 RX ORDER — PROMETHAZINE HYDROCHLORIDE AND DEXTROMETHORPHAN HYDROBROMIDE 6.25; 15 MG/5ML; MG/5ML
SYRUP ORAL
Refills: 1 | COMMUNITY
Start: 2018-08-27 | End: 2018-12-06

## 2018-09-04 NOTE — PROGRESS NOTES
Chief Complaint   Patient presents with    Disease Management     RIGHT SIDED SCIATICA       Patient was referred by : SELF     History of presenting illness    86 year old black female comes with low back pain    She has :     Low back pain   Shoots to the right leg  She got medrol dose pack  She felt better  She takes tylenol    This is a new problem  Started jan 2018  No recent falls    Comes and goes    Day time : its ok  Nights : cant sleep  Has to get up and sit down    Walking and bending is ok  Lying down is the worse    No paresthesias  No muscle weakness     Its not the pain in the back that bothers her but the shooting pain down the right leg that bothers her    Right knee replaced 2014  Left knee ok  Hips ok  Ankles and feet ok  Hands,wrists,elbows,shoulders,neck,mid back is ok    In the past MRI LS spine    Lumbar spondylosis, most prominent in the facet joints at L4-5, contributing to moderate central canal and mild bilateral neural foraminal stenosis.  There is mild/grade 1 L4 over L5 anterolisthesis.  This was not treated or followed     Past history : HLD,DM,hypothyroidism,GERD,osteopenia     Family history : heart disease dad,cancer in the lymph node    Social history : former smoker quit 1970      Review of Systems   Constitutional: Negative for activity change, appetite change, chills, diaphoresis, fatigue, fever and unexpected weight change.   HENT: Negative for congestion, dental problem, drooling, ear discharge, ear pain, facial swelling, hearing loss, mouth sores, nosebleeds, postnasal drip, rhinorrhea, sinus pressure, sinus pain, sneezing, sore throat, tinnitus, trouble swallowing and voice change.    Eyes: Negative for photophobia, pain, discharge, redness, itching and visual disturbance.   Respiratory: Negative for apnea, cough, choking, chest tightness, shortness of breath, wheezing and stridor.    Cardiovascular: Negative for chest pain, palpitations and leg swelling.    Gastrointestinal: Negative for abdominal distention, abdominal pain, anal bleeding, blood in stool, constipation, diarrhea, nausea, rectal pain and vomiting.   Endocrine: Negative for cold intolerance, heat intolerance, polydipsia, polyphagia and polyuria.   Genitourinary: Negative for decreased urine volume, difficulty urinating, dysuria, enuresis, flank pain, frequency, genital sores, hematuria and urgency.   Musculoskeletal: Positive for arthralgias. Negative for back pain, gait problem, joint swelling, myalgias, neck pain and neck stiffness.   Skin: Negative for color change, pallor, rash and wound.   Allergic/Immunologic: Negative for environmental allergies, food allergies and immunocompromised state.   Neurological: Negative for dizziness, tremors, seizures, syncope, facial asymmetry, speech difficulty, weakness, light-headedness, numbness and headaches.   Hematological: Negative for adenopathy. Does not bruise/bleed easily.   Psychiatric/Behavioral: Negative for agitation, behavioral problems, confusion, decreased concentration, dysphoric mood, hallucinations, self-injury, sleep disturbance and suicidal ideas. The patient is not nervous/anxious and is not hyperactive.      Physical Exam     PARRY-28 tender joint count: 0  PARRY-28 swollen joint count: 0    Physical Exam   Constitutional: She is oriented to person, place, and time and well-developed, well-nourished, and in no distress. No distress.   HENT:   Head: Normocephalic.   Mouth/Throat: Oropharynx is clear and moist.   Eyes: Conjunctivae are normal. Pupils are equal, round, and reactive to light. Right eye exhibits no discharge. Left eye exhibits no discharge. No scleral icterus.   Neck: Normal range of motion. No thyromegaly present.   Cardiovascular: Normal rate, regular rhythm, normal heart sounds and intact distal pulses.    Pulmonary/Chest: Effort normal and breath sounds normal. No stridor.   Abdominal: Soft. Bowel sounds are normal.    Lymphadenopathy:     She has no cervical adenopathy.   Neurological: She is alert and oriented to person, place, and time.   Skin: Skin is warm. No rash noted. She is not diaphoretic.     Psychiatric: Affect and judgment normal.   Musculoskeletal: Normal range of motion.         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 Right sided sciatica        Reviewed imaging    New problem     Plan    Stat LS spine xrays    PT LS spine    Prn tylenol and NSAIDs    If symptoms persist,will MRI    Tonya was seen today for disease management.    Diagnoses and all orders for this visit:    Right sided sciatica  -     X-Ray Lumbar Spine AP And Lateral; Future  -     Ambulatory Referral to Physical/Occupational Therapy        RTC IN 3 MONTHS

## 2018-09-11 PROBLEM — Z74.09 IMPAIRED FUNCTIONAL MOBILITY AND ACTIVITY TOLERANCE: Status: ACTIVE | Noted: 2018-09-11

## 2018-09-16 RX ORDER — LOVASTATIN 20 MG/1
TABLET ORAL
Qty: 180 TABLET | Refills: 0 | Status: SHIPPED | OUTPATIENT
Start: 2018-09-16 | End: 2018-10-03

## 2018-10-01 DIAGNOSIS — E03.9 ACQUIRED HYPOTHYROIDISM: ICD-10-CM

## 2018-10-01 RX ORDER — LEVOTHYROXINE SODIUM 88 UG/1
TABLET ORAL
Qty: 90 TABLET | Refills: 0 | Status: SHIPPED | OUTPATIENT
Start: 2018-10-01 | End: 2019-01-07 | Stop reason: SDUPTHER

## 2018-10-03 ENCOUNTER — OFFICE VISIT (OUTPATIENT)
Dept: FAMILY MEDICINE | Facility: CLINIC | Age: 83
End: 2018-10-03
Payer: MEDICARE

## 2018-10-03 ENCOUNTER — LAB VISIT (OUTPATIENT)
Dept: LAB | Facility: HOSPITAL | Age: 83
End: 2018-10-03
Payer: MEDICARE

## 2018-10-03 VITALS
TEMPERATURE: 98 F | SYSTOLIC BLOOD PRESSURE: 134 MMHG | BODY MASS INDEX: 30.9 KG/M2 | WEIGHT: 192.25 LBS | OXYGEN SATURATION: 97 % | DIASTOLIC BLOOD PRESSURE: 78 MMHG | HEIGHT: 66 IN | HEART RATE: 67 BPM

## 2018-10-03 DIAGNOSIS — E78.5 HYPERLIPIDEMIA LDL GOAL <100: ICD-10-CM

## 2018-10-03 DIAGNOSIS — E03.9 HYPOTHYROIDISM (ACQUIRED): ICD-10-CM

## 2018-10-03 DIAGNOSIS — I70.0 ATHEROSCLEROSIS OF ABDOMINAL AORTA: ICD-10-CM

## 2018-10-03 DIAGNOSIS — E11.40 TYPE 2 DIABETES, CONTROLLED, WITH NEUROPATHY: ICD-10-CM

## 2018-10-03 DIAGNOSIS — J30.9 ALLERGIC RHINITIS, UNSPECIFIED SEASONALITY, UNSPECIFIED TRIGGER: ICD-10-CM

## 2018-10-03 DIAGNOSIS — Z00.00 ROUTINE MEDICAL EXAM: Primary | ICD-10-CM

## 2018-10-03 DIAGNOSIS — E66.9 OBESITY (BMI 30-39.9): ICD-10-CM

## 2018-10-03 DIAGNOSIS — I35.0 MILD AORTIC STENOSIS: ICD-10-CM

## 2018-10-03 DIAGNOSIS — K21.9 GASTROESOPHAGEAL REFLUX DISEASE WITHOUT ESOPHAGITIS: ICD-10-CM

## 2018-10-03 DIAGNOSIS — Z23 FLU VACCINE NEED: ICD-10-CM

## 2018-10-03 PROCEDURE — 36415 COLL VENOUS BLD VENIPUNCTURE: CPT | Mod: PO

## 2018-10-03 PROCEDURE — 99999 PR PBB SHADOW E&M-EST. PATIENT-LVL III: CPT | Mod: PBBFAC,,, | Performed by: INTERNAL MEDICINE

## 2018-10-03 PROCEDURE — 90662 IIV NO PRSV INCREASED AG IM: CPT | Mod: PBBFAC,PO

## 2018-10-03 PROCEDURE — 83036 HEMOGLOBIN GLYCOSYLATED A1C: CPT

## 2018-10-03 PROCEDURE — 99397 PER PM REEVAL EST PAT 65+ YR: CPT | Mod: S$PBB,,, | Performed by: INTERNAL MEDICINE

## 2018-10-03 PROCEDURE — 99213 OFFICE O/P EST LOW 20 MIN: CPT | Mod: PBBFAC,PO,25 | Performed by: INTERNAL MEDICINE

## 2018-10-03 RX ORDER — NAPROXEN SODIUM 220 MG/1
81 TABLET, FILM COATED ORAL DAILY
Refills: 0 | COMMUNITY
Start: 2018-10-03 | End: 2022-09-14

## 2018-10-03 RX ORDER — LOVASTATIN 20 MG/1
60 TABLET ORAL NIGHTLY
Qty: 270 TABLET | Refills: 1 | Status: SHIPPED | OUTPATIENT
Start: 2018-10-03 | End: 2018-12-20 | Stop reason: ALTCHOICE

## 2018-10-03 NOTE — PROGRESS NOTES
Influenza vaccination administered. Tolerated well, instructed to wait 15 min for observation. No reaction noted at discharge.

## 2018-10-03 NOTE — PROGRESS NOTES
HISTORY OF PRESENT ILLNESS:  Tonya Cohn is a 86 y.o. female who presents to the clinic today for a routine medical physical exam. Her last physical exam was approximately 1 years(s) ago.        PAST MEDICAL HISTORY:  Past Medical History:   Diagnosis Date    AR (allergic rhinitis)     Atherosclerosis of abdominal aorta     noted on CT scan 1/17/2011    Carpal tunnel syndrome of left wrist     Chronic kidney disease, stage 3     Diabetic peripheral neuropathy     Diverticulosis     History of colon polyps     History of diverticulitis of colon 1/2014    Hyperlipemia     Hypertension     Hypothyroidism     followed by endocrinology, Dr. Green    Mild aortic stenosis     OA (osteoarthritis) of knee     Obesity     Sciatica     Type II or unspecified type diabetes mellitus with neurological manifestations, uncontrolled(250.62)        PAST SURGICAL HISTORY:  Past Surgical History:   Procedure Laterality Date    CATARACT EXTRACTION      COLONOSCOPY N/A 10/3/2017    Procedure: COLONOSCOPY;  Surgeon: Alin Gonzalez MD;  Location: [facility] ([address] [place] [address]);  Service: Endoscopy;  Laterality: N/A;    COLONOSCOPY N/A 10/3/2017    Performed by Alin Gonzalez MD at [facility] (4TH FLR)    COSMETIC SURGERY      ESOPHAGOGASTRODUODENOSCOPY (EGD) N/A 12/12/2017    Performed by Katrin De La Torre MD at [facility] (4TH FLR)    HYSTERECTOMY      partial    JOINT REPLACEMENT      LUNG BIOPSY  in her 40's    REPLACEMENT-KNEE-TOTAL Right 6/13/2014    Performed by Rojas Kaur [facility] at [facility] OR 2ND FLR    TOTAL KNEE ARTHROPLASTY Right 6/13/2014    TKR       SOCIAL HISTORY:  Social History     Socioeconomic History    Marital status:      Spouse name: Not on file    Number of children: 7    Years of education: Not on file    Highest education level: Not on file   Social Needs    Financial resource strain: Not on file    Food insecurity - worry: Not on file    Food insecurity - inability: Not on file     Transportation needs - medical: Not on file    Transportation needs - non-medical: Not on file   Occupational History    Occupation: retired - house cleaning   Tobacco Use    Smoking status: Former Smoker     Packs/day: 0.25     Years: 2.00     Pack years: 0.50     Last attempt to quit: 1962     Years since quittin.2    Smokeless tobacco: Former User     Quit date: 1970   Substance and Sexual Activity    Alcohol use: Yes     Alcohol/week: 1.2 oz     Types: 2 Cans of beer per week     Comment: occasionally    Drug use: No    Sexual activity: No   Other Topics Concern    Are you pregnant or think you may be? No    Breast-feeding No   Social History Narrative    Not on file       FAMILY HISTORY:  Family History   Problem Relation Age of Onset    Cancer Mother         shoulder tumor - cancer    Hypertension Mother     Heart disease Father         valve replacement    Hypertension Father     Heart attack Father     Diabetes Sister     Arthritis Sister         s/p knee surgery    Diabetes Brother     Arthritis Brother         back problems    Skin cancer Brother     Cancer Son         jaw    Melanoma Neg Hx     Eczema Neg Hx     Lupus Neg Hx     Psoriasis Neg Hx     Breast cancer Neg Hx     Colon cancer Neg Hx        ALLERGIES AND MEDICATIONS: updated and reviewed.  Review of patient's allergies indicates:   Allergen Reactions    Lipitor [atorvastatin]         Medication List           Accurate as of 10/3/18 11:39 AM. If you have any questions, ask your nurse or doctor.               START taking these medications    aspirin 81 MG Chew  Take 1 tablet (81 mg total) by mouth once daily.  Started by:  Tere Hughes MD        CHANGE how you take these medications    lovastatin 20 MG tablet  Commonly known as:  MEVACOR  Take 3 tablets (60 mg total) by mouth every evening.  What changed:  See the new instructions.  Changed by:  Tere Hughes MD        CONTINUE taking these  medications    albuterol 90 mcg/actuation inhaler  Commonly known as:  PROVENTIL/VENTOLIN HFA  Inhale 2 puffs into the lungs every 6 (six) hours as needed.     fluocinonide 0.1 % Crea     glipiZIDE 5 MG tablet  Commonly known as:  GLUCOTROL  TAKE 1 TABLET BY MOUTH TWICE DAILY WITH MEALS     levocetirizine 5 MG tablet  Commonly known as:  XYZAL  Take 1 tablet (5 mg total) by mouth every evening.     levothyroxine 88 MCG tablet  Commonly known as:  SYNTHROID  TAKE 1 TABLET(88 MCG) BY MOUTH EVERY DAY     lidocaine 5 % Oint ointment  Commonly known as:  XYLOCAINE     losartan 50 MG tablet  Commonly known as:  COZAAR  TAKE 1 TABLET(50 MG) BY MOUTH EVERY DAY     metFORMIN 1000 MG tablet  Commonly known as:  GLUCOPHAGE  TAKE 1/2 TABLET BY MOUTH TWICE DAILY WITH MEALS     mometasone 50 mcg/actuation nasal spray  Commonly known as:  NASONEX  2 sprays by Nasal route once daily.     OMEGA 3-6-9 1,200 mg (400 [id number]) Cap  Generic drug:  fish oil-fat acid [id number]     omeprazole 20 MG capsule  Commonly known as:  PRILOSEC  Take 2 capsules (40 mg total) by mouth once daily.     promethazine-dextromethorphan 6.25-15 mg/5 mL Syrp  Commonly known as:  PROMETHAZINE-DM           Where to Get Your Medications      [facility] [address] [place] [address] AT [facility] [address] [address]    Phone:  [phone]   · lovastatin 20 MG tablet     You can get these medications from any pharmacy    You don't need a prescription for these medications  · aspirin 81 MG Chew           CARE TEAM:  Patient Care Team:  Tere Hughes MD as PCP - General (Internal Medicine)  Alivia Shah MD as PCP - MSSP Attributed  Preston Whitfield MD as Consulting Physician (Cardiology)           SCREENING HISTORY:  Health Maintenance       Date Due Completion Date    TETANUS VACCINE 05/16/1950 ---    Influenza Vaccine 08/01/2018 10/3/2017    Hemoglobin A1c  09/19/2018 3/19/2018    Lipid Panel 03/19/2019 3/19/2018    Override on 2/26/2016: Done ([place] - total 189, TG 62, , HDL 76)    Foot Exam 03/21/2019 3/21/2018    Override on 3/21/2018: Done    Override on 5/1/2017: Done (- mild neuropathy)    Override on 10/28/2016: Done    Override on 1/30/2015: Done (Mr. Davenport)    Urine Microalbumin 03/21/2019 3/21/2018    Eye Exam 06/25/2019 6/25/2018    Override on 9/20/2016: Done (Dr. Kole Lucero- negative for diabetic retinopathy bilaterally)    Override on 9/9/2015: Done (No diabetic retinopathy)    Override on 8/14/2014: Done (no diabetic retinopathy; Dr. Lucero)    Override on 4/21/2014: Done (Pt states her eye exam is scheduled for next month)    Override on 4/1/2013: Done    Colonoscopy 10/03/2022 10/3/2017            REVIEW OF SYSTEMS:   The patient reports good dietary habits.  The patient does not exercise regularly, but tries to stay active.  Review of Systems   Constitutional: Negative for chills, fatigue, fever and unexpected weight change.   HENT: Negative for congestion, ear discharge, ear pain and postnasal drip.    Eyes: Negative for photophobia, pain and visual disturbance.   Respiratory: Negative for cough, shortness of breath and wheezing.    Cardiovascular: Negative for chest pain, palpitations and leg swelling.   Gastrointestinal: Negative for abdominal pain, constipation, diarrhea, nausea and vomiting.   Genitourinary: Negative for dysuria, frequency, urgency and vaginal discharge.   Musculoskeletal: Positive for arthralgias (- mild; chronic). Negative for back pain, joint swelling and neck stiffness.   Skin: Negative for rash.   Neurological: Negative for weakness and headaches.   Psychiatric/Behavioral: Negative for dysphoric mood and sleep disturbance. The patient is not nervous/anxious.      Breast ROS: negative for breast lumps             Physical Examination:   Vitals:    10/03/18 1033   BP: 134/78   Pulse: 67   Temp:  "97.7 °F (36.5 °C)     Weight: 87.2 kg (192 lb 3.9 oz)   Height: 5' 6" (167.6 cm)   Body mass index is 31.03 kg/m².      Patient did not require to have a chaperone present during the exam today.  General appearance - alert, well appearing, and in no distress and obese  Mental status - alert, oriented to person, place, and time, normal mood, behavior, speech, dress, motor activity, and thought processes  Eyes - pupils equal and reactive, extraocular eye movements intact, sclera anicteric  Mouth - mucous membranes moist, pharynx normal without lesions  Neck - supple, no significant adenopathy, carotids upstroke normal bilaterally, no bruits, thyroid exam: thyroid is normal in size without nodules or tenderness  Lymphatics - no palpable lymphadenopathy  Chest - clear to auscultation, no wheezes, rales or rhonchi, symmetric air entry  Heart - normal rate and regular rhythm, no gallops noted; soft 2/6 systolic murmur that radiated to the carotid arteries  Abdomen - soft, nontender, nondistended, no masses or organomegaly  Breasts - not examined  Back exam - mild limited range of motion, no pain with motion noted during exam  Neurological - alert, oriented, normal speech, no focal findings or movement disorder noted, cranial nerves II through XII intact  Musculoskeletal - no muscular tenderness noted, Mild-Moderate osteoarthritic changes noted to both knee joints. No joint effusions noted.   Extremities - no pedal edema noted  Skin - normal coloration and turgor, no rashes, no suspicious skin lesions noted      ASSESSMENT AND PLAN:  1. Routine medical exam  Counseled on age appropriate medical preventative services including age appropriate cancer screenings, age appropriate eye and dental exams, over all nutritional health, need for a consistent exercise regimen, and an over all push towards maintaining a vigorous and active lifestyle.  Counseled on age appropriate vaccines and discussed upcoming health care needs based " on age/gender. Discussed good sleep hygiene and stress management.     2. Type 2 diabetes, controlled, with neuropathy  Diabetes currently is controlled for age and comorbid conditions. We discussed diabetic diet and regular exercise. We discussed home blood sugar monitoring, if appropriate. Continue current medication regimen.  Diabetic complications addressed: Neuropathy pain controlled.  Patient was counseled on the need for yearly diabetic retinopathy exam and yearly diabetic foot exam.   - Hemoglobin A1c; Future    3. Mild aortic stenosis  Stable. Asymptomatic. Observe. Due for follow up with cardiology this month.  - Ambulatory referral to Cardiology    4. Hyperlipidemia LDL goal <100  LDL not quite at goal.  She is supposed to be on 60 mg of lovastatin daily.  I will increase her dosage.  Recheck blood work in 6 months.  - lovastatin (MEVACOR) 20 MG tablet; Take 3 tablets (60 mg total) by mouth every evening.  Dispense: 270 tablet; Refill: 1    5. Hypothyroidism (acquired)  Patient is clinically euthyroid. Continue current regimen.     6. Gastroesophageal reflux disease without esophagitis  Symptoms controlled: yes. Reflux precautions discussed (eliminate tobacco if a smoker; minimize caffeine, chocolate and red/white peppermint intake; avoid heavy and spicy meals; don't lay down within 2-3 hours after eating; minimize the intake of NSAIDs). Medication as needed. Patient asked to take medication breaks, if possible - discussed chronic use can limit calcium absorption (which can lead to osteopenia/osteoporosis), increases the risk for intestinal infections, and can cause kidney damage. There are also some newer studies that show possible increased risk of mortality.     7. Allergic rhinitis, unspecified seasonality, unspecified trigger  We discussed several treatment strategies: antihistamine at bedtime, flonase in the morning. We also discussed saline nasal rinse in the evening as needed. I recommended  allergy covers for pillow and mattress. Patient will let me know if symptoms worsen or persist.     8. Atherosclerosis of abdominal aorta  Patient with Atherosclerosis of the Aorta.  Stable/asymptomatic. Currently stable on lipid lowering medication and b/p monitoring.   - aspirin 81 MG Chew; Take 1 tablet (81 mg total) by mouth once daily.; Refill: 0    9. Obesity (BMI 30-39.9)  The patient is asked to make an attempt to improve diet and exercise patterns to aid in medical management of this problem.     10. Flu vaccine need    - Influenza - High Dose (65+) (PF) (IM)    I encouraged her to go to the pharmacy to get the Tdap and new shingles vaccine.      Follow-up in about 6 months (around 4/3/2019), or if symptoms worsen or fail to improve, for follow up chronic medical conditions.. or sooner as needed.

## 2018-10-04 LAB
ESTIMATED AVG GLUCOSE: 120 MG/DL
HBA1C MFR BLD HPLC: 5.8 %

## 2018-10-12 ENCOUNTER — TELEPHONE (OUTPATIENT)
Dept: PODIATRY | Facility: CLINIC | Age: 83
End: 2018-10-12

## 2018-10-23 ENCOUNTER — TELEPHONE (OUTPATIENT)
Dept: CARDIOLOGY | Facility: CLINIC | Age: 83
End: 2018-10-23

## 2018-10-23 DIAGNOSIS — E78.5 HYPERLIPIDEMIA LDL GOAL <100: ICD-10-CM

## 2018-10-23 DIAGNOSIS — R01.1 HEART MURMUR: Primary | ICD-10-CM

## 2018-10-23 DIAGNOSIS — I35.0 MILD AORTIC STENOSIS: ICD-10-CM

## 2018-10-30 ENCOUNTER — HOSPITAL ENCOUNTER (OUTPATIENT)
Dept: CARDIOLOGY | Facility: HOSPITAL | Age: 83
Discharge: HOME | End: 2018-10-30
Attending: INTERNAL MEDICINE
Payer: MEDICARE

## 2018-10-30 VITALS — BODY MASS INDEX: 30.86 KG/M2 | HEIGHT: 66 IN | WEIGHT: 192 LBS

## 2018-10-30 DIAGNOSIS — E78.5 HYPERLIPIDEMIA LDL GOAL <100: ICD-10-CM

## 2018-10-30 DIAGNOSIS — R01.1 HEART MURMUR: ICD-10-CM

## 2018-10-30 DIAGNOSIS — I35.0 MILD AORTIC STENOSIS: ICD-10-CM

## 2018-10-30 LAB
AORTIC ROOT ANNULUS: 3.13 CM
AORTIC VALVE CUSP SEPERATION: 1.58 CM
ASCENDING AORTA: 2.87 CM
AV MEAN GRADIENT: 10.77 MMHG
AV PEAK GRADIENT: 19.09 MMHG
AV VALVE AREA: 1.49 CM2
BSA FOR ECHO PROCEDURE: 2.01 M2
CV ECHO LV RWT: 0.55 CM
DOP CALC AO PEAK VEL: 2.18 M/S
DOP CALC AO VTI: 52.17 CM
DOP CALC LVOT AREA: 2.63 CM2
DOP CALC LVOT DIAMETER: 1.83 CM
DOP CALC LVOT STROKE VOLUME: 77.66 CM3
DOP CALCLVOT PEAK VEL VTI: 29.54 CM
E WAVE DECELERATION TIME: 304.66 MSEC
E/A RATIO: 0.92
E/E' RATIO: 18.46
ECHO LV POSTERIOR WALL: 1.12 CM (ref 0.6–1.1)
FRACTIONAL SHORTENING: 43 % (ref 28–44)
INTERVENTRICULAR SEPTUM: 1.12 CM (ref 0.6–1.1)
IVRT: 0.16 MSEC
LA MAJOR: 4.73 CM
LA MINOR: 4.97 CM
LA WIDTH: 4.13 CM
LEFT ATRIUM SIZE: 3.52 CM
LEFT ATRIUM VOLUME INDEX: 29.8 ML/M2
LEFT ATRIUM VOLUME: 59.89 CM3
LEFT INTERNAL DIMENSION IN SYSTOLE: 2.33 CM (ref 2.1–4)
LEFT VENTRICLE DIASTOLIC VOLUME INDEX: 36.65 ML/M2
LEFT VENTRICLE DIASTOLIC VOLUME: 73.67 ML
LEFT VENTRICLE MASS INDEX: 77 G/M2
LEFT VENTRICLE SYSTOLIC VOLUME INDEX: 9.3 ML/M2
LEFT VENTRICLE SYSTOLIC VOLUME: 18.71 ML
LEFT VENTRICULAR INTERNAL DIMENSION IN DIASTOLE: 4.09 CM (ref 3.5–6)
LEFT VENTRICULAR MASS: 154.7 G
LV LATERAL E/E' RATIO: 17.14
LV SEPTAL E/E' RATIO: 20
MV PEAK A VEL: 1.31 M/S
MV PEAK E VEL: 1.2 M/S
MV STENOSIS PRESSURE HALF TIME: 88.35 MS
MV VALVE AREA P 1/2 METHOD: 2.49 CM2
PISA TR MAX VEL: 2.55 M/S
PULM VEIN S/D RATIO: 1.25
PV PEAK D VEL: 0.44 M/S
PV PEAK GRADIENT: 5.65 MMHG
PV PEAK S VEL: 0.55 M/S
PV PEAK VELOCITY: 1.19 CM/S
RA MAJOR: 3.9 CM
RA PRESSURE: 8 MMHG
RA WIDTH: 3.76 CM
RETIRED EF AND QEF - SEE NOTES: 74.6 %
RIGHT VENTRICULAR END-DIASTOLIC DIMENSION: 3.9 CM
SINUS: 3.18 CM
STJ: 2.25 CM
TDI LATERAL: 0.07
TDI SEPTAL: 0.06
TDI: 0.07
TR MAX PG: 26.01 MMHG
TRICUSPID ANNULAR PLANE SYSTOLIC EXCURSION: 2.62 CM
TV REST PULMONARY ARTERY PRESSURE: 34.01 MMHG

## 2018-10-30 PROCEDURE — 93306 TTE W/DOPPLER COMPLETE: CPT

## 2018-10-30 PROCEDURE — 93306 TTE W/DOPPLER COMPLETE: CPT | Mod: 26,,, | Performed by: INTERNAL MEDICINE

## 2018-11-09 ENCOUNTER — OFFICE VISIT (OUTPATIENT)
Dept: CARDIOLOGY | Facility: CLINIC | Age: 83
End: 2018-11-09
Payer: MEDICARE

## 2018-11-09 VITALS
SYSTOLIC BLOOD PRESSURE: 136 MMHG | BODY MASS INDEX: 30.7 KG/M2 | OXYGEN SATURATION: 96 % | HEART RATE: 75 BPM | HEIGHT: 66 IN | DIASTOLIC BLOOD PRESSURE: 64 MMHG | WEIGHT: 191 LBS

## 2018-11-09 DIAGNOSIS — I35.0 MILD AORTIC STENOSIS: ICD-10-CM

## 2018-11-09 DIAGNOSIS — R06.02 SOB (SHORTNESS OF BREATH): ICD-10-CM

## 2018-11-09 DIAGNOSIS — E78.5 HYPERLIPIDEMIA LDL GOAL <100: Primary | ICD-10-CM

## 2018-11-09 PROCEDURE — 99213 OFFICE O/P EST LOW 20 MIN: CPT | Mod: PBBFAC,PO,25 | Performed by: INTERNAL MEDICINE

## 2018-11-09 PROCEDURE — 99213 OFFICE O/P EST LOW 20 MIN: CPT | Mod: S$PBB,,, | Performed by: INTERNAL MEDICINE

## 2018-11-09 PROCEDURE — 99999 PR PBB SHADOW E&M-EST. PATIENT-LVL III: CPT | Mod: PBBFAC,,, | Performed by: INTERNAL MEDICINE

## 2018-11-09 PROCEDURE — 93010 ELECTROCARDIOGRAM REPORT: CPT | Mod: S$PBB,,, | Performed by: INTERNAL MEDICINE

## 2018-11-09 PROCEDURE — 93005 ELECTROCARDIOGRAM TRACING: CPT | Mod: PBBFAC,PO | Performed by: INTERNAL MEDICINE

## 2018-11-09 NOTE — PROGRESS NOTES
Subjective:    Patient ID:  Tonya Cohn is a 86 y.o. female who presents for follow-up of Follow-up      HPI        HTN, AS-mild, HLD, DM     Stress test 10/17/16  LVEF: >= 70 %  Impression: NORMAL MYOCARDIAL PERFUSION  1. The perfusion scan is free of evidence for myocardial ischemia or injury.   2. Resting wall motion is physiologic.   3. Resting LV function is normal.     Echo 10/30/18  · Left ventricle ejection fraction is normal at 65%  · No wall motion abnormalities.  · Left ventricle shows concentric remodeling.  · Mild-to-moderate aortic valve stenosis.  · AMAIRANI is 1.49 cm2; peak velocity is 2.18 m/s; mean gradient is 10.77 mmHg.  · Trace mitral regurgitation.  · Mild tricuspid regurgitation.  · The estimated PA systolic pressure is 34.01 mm Hg          5/22/18 Stays active for 86, denies CP or SOB  EKG NSR - ok    Denies CP or SOB  Stays active - [place] the [place]  EKG NSR - ok        Review of Systems   Constitution: Negative for decreased appetite.   HENT: Negative for ear discharge.    Eyes: Negative for blurred vision.   Respiratory: Negative for hemoptysis.    Endocrine: Negative for polyphagia.   Hematologic/Lymphatic: Negative for adenopathy.   Skin: Negative for color change.   Musculoskeletal: Negative for joint swelling.   Neurological: Negative for brief paralysis.   Psychiatric/Behavioral: Negative for hallucinations.        Objective:    Physical Exam   Constitutional: She is oriented to person, place, and time. She appears well-developed and well-nourished.   HENT:   Head: Normocephalic and atraumatic.   Eyes: Conjunctivae are normal. Pupils are equal, round, and reactive to light.   Neck: Normal range of motion. Neck supple.   Cardiovascular: Normal rate and intact distal pulses.   Murmur heard.   Early systolic murmur is present with a grade of 2/6.  Pulmonary/Chest: Effort normal and breath sounds normal.   Abdominal: Soft. Bowel sounds are normal.   Musculoskeletal: Normal range  of motion.   Neurological: She is alert and oriented to person, place, and time.   Skin: Skin is warm and dry.         Assessment:       1. Hyperlipidemia LDL goal <100    2. Mild aortic stenosis         Plan:       Cardiac stable  OV 6 months

## 2018-11-15 DIAGNOSIS — R05.9 COUGH: ICD-10-CM

## 2018-11-15 RX ORDER — PROMETHAZINE HYDROCHLORIDE AND DEXTROMETHORPHAN HYDROBROMIDE 6.25; 15 MG/5ML; MG/5ML
SYRUP ORAL
Qty: 118 ML | Refills: 0 | OUTPATIENT
Start: 2018-11-15

## 2018-11-26 DIAGNOSIS — I12.9 BENIGN HYPERTENSION WITH CHRONIC KIDNEY DISEASE, STAGE III: ICD-10-CM

## 2018-11-26 DIAGNOSIS — N18.30 BENIGN HYPERTENSION WITH CHRONIC KIDNEY DISEASE, STAGE III: ICD-10-CM

## 2018-11-26 RX ORDER — LOSARTAN POTASSIUM 50 MG/1
TABLET ORAL
Qty: 30 TABLET | Refills: 0 | Status: SHIPPED | OUTPATIENT
Start: 2018-11-26 | End: 2018-12-06 | Stop reason: SDUPTHER

## 2018-12-06 ENCOUNTER — OFFICE VISIT (OUTPATIENT)
Dept: FAMILY MEDICINE | Facility: CLINIC | Age: 83
End: 2018-12-06
Payer: MEDICARE

## 2018-12-06 VITALS
TEMPERATURE: 98 F | SYSTOLIC BLOOD PRESSURE: 126 MMHG | OXYGEN SATURATION: 99 % | HEIGHT: 66 IN | HEART RATE: 96 BPM | DIASTOLIC BLOOD PRESSURE: 60 MMHG | WEIGHT: 194.75 LBS | BODY MASS INDEX: 31.3 KG/M2

## 2018-12-06 DIAGNOSIS — N18.30 BENIGN HYPERTENSION WITH CHRONIC KIDNEY DISEASE, STAGE III: ICD-10-CM

## 2018-12-06 DIAGNOSIS — I12.9 BENIGN HYPERTENSION WITH CHRONIC KIDNEY DISEASE, STAGE III: ICD-10-CM

## 2018-12-06 DIAGNOSIS — H66.002 ACUTE SUPPURATIVE OTITIS MEDIA OF LEFT EAR WITHOUT SPONTANEOUS RUPTURE OF TYMPANIC MEMBRANE, RECURRENCE NOT SPECIFIED: Primary | ICD-10-CM

## 2018-12-06 DIAGNOSIS — Z23 NEED FOR IMMUNIZATION AGAINST TETANUS ALONE: ICD-10-CM

## 2018-12-06 PROCEDURE — 99214 OFFICE O/P EST MOD 30 MIN: CPT | Mod: PBBFAC,PO,25 | Performed by: NURSE PRACTITIONER

## 2018-12-06 PROCEDURE — 99999 PR PBB SHADOW E&M-EST. PATIENT-LVL IV: CPT | Mod: PBBFAC,,, | Performed by: NURSE PRACTITIONER

## 2018-12-06 PROCEDURE — 90714 TD VACC NO PRESV 7 YRS+ IM: CPT | Mod: PBBFAC,PO

## 2018-12-06 PROCEDURE — 99214 OFFICE O/P EST MOD 30 MIN: CPT | Mod: S$PBB,,, | Performed by: NURSE PRACTITIONER

## 2018-12-06 RX ORDER — AMOXICILLIN 500 MG/1
500 TABLET, FILM COATED ORAL EVERY 12 HOURS
Qty: 20 TABLET | Refills: 0 | Status: SHIPPED | OUTPATIENT
Start: 2018-12-06 | End: 2018-12-16

## 2018-12-06 RX ORDER — LOSARTAN POTASSIUM 50 MG/1
TABLET ORAL
Qty: 90 TABLET | Refills: 3 | Status: SHIPPED | OUTPATIENT
Start: 2018-12-06 | End: 2019-11-05 | Stop reason: SDUPTHER

## 2018-12-06 NOTE — PROGRESS NOTES
History of Present Illness   Tonya Cohn is a 86 y.o. woman with medical history as listed below who presents today for evaluation of left ear pain x4 days. Pain is constant with intermittent worsening. The pain radiates down jaw line. The pain is associated with right sided sore throat. She denies drainage from the ear or external ear pain. The pain is not associated with nasal congestion, post nasal drip, sinus pressure, right ear symptoms, cough, or fever. She has tried a sinus pill, Tylenol, and old abx/steroid ear drops with no relief. She has no additional complaints and is otherwise healthy on today's visit.      Past Medical History:   Diagnosis Date    AR (allergic rhinitis)     Atherosclerosis of abdominal aorta     noted on CT scan 1/17/2011    Carpal tunnel syndrome of left wrist     Chronic kidney disease, stage 3     Diabetic peripheral neuropathy     Diverticulosis     History of colon polyps     History of diverticulitis of colon 1/2014    Hyperlipemia     Hypertension     Hypothyroidism     followed by endocrinology, Dr. Green    Mild aortic stenosis     OA (osteoarthritis) of knee     Obesity     Sciatica     Type II or unspecified type diabetes mellitus with neurological manifestations, uncontrolled(250.62)        Past Surgical History:   Procedure Laterality Date    CATARACT EXTRACTION      COLONOSCOPY N/A 10/3/2017    Procedure: COLONOSCOPY;  Surgeon: Alin Gonzalez MD;  Location: [facility] ([address] [place] [address]);  Service: Endoscopy;  Laterality: N/A;    COLONOSCOPY N/A 10/3/2017    [facility] at [facility] (4TH FLR)    COSMETIC SURGERY      ESOPHAGOGASTRODUODENOSCOPY (EGD) N/A 12/12/2017    Performed by Katrin [facility] La [facility]re [facility] at [facility] (4TH FLR)    HYSTERECTOMY      partial    JOINT REPLACEMENT      LUNG BIOPSY  in her 40's    REPLACEMENT-KNEE-TOTAL Right 6/13/2014    Per[facility] at [facility] 2ND FLR    TOTAL KNEE ARTHROPLASTY Right  2014    TKR       Social History     Socioeconomic History    Marital status:      Spouse name: None    Number of children: 7    Years of education: None    Highest education level: None   Social Needs    Financial resource strain: None    Food insecurity - worry: None    Food insecurity - inability: None    Transportation needs - medical: None    Transportation needs - non-medical: None   Occupational History    Occupation: retired - house cleaning   Tobacco Use    Smoking status: Former Smoker     Packs/day: 0.25     Years: 2.00     Pack years: 0.50     Last attempt to quit: 1962     Years since quittin.4    Smokeless tobacco: Former User     Quit date: 1970   Substance and Sexual Activity    Alcohol use: Yes     Alcohol/week: 1.2 oz     Types: 2 Cans of beer per week     Comment: occasionally    Drug use: No    Sexual activity: No   Other Topics Concern    Are you pregnant or think you may be? No    Breast-feeding No   Social History Narrative    None       Family History   Problem Relation Age of Onset    Cancer Mother         shoulder tumor - cancer    Hypertension Mother     Heart disease Father         valve replacement    Hypertension Father     Heart attack Father     Diabetes Sister     Arthritis Sister         s/p knee surgery    Diabetes Brother     Arthritis Brother         back problems    Skin cancer Brother     Cancer Son         jaw    Melanoma Neg Hx     Eczema Neg Hx     Lupus Neg Hx     Psoriasis Neg Hx     Breast cancer Neg Hx     Colon cancer Neg Hx        Review of Systems  Review of Systems   Constitutional: Negative for chills and fever.   HENT: Positive for ear pain (right) and sore throat. Negative for congestion and ear discharge.    Eyes: Negative for discharge and redness.   Respiratory: Negative for cough, sputum production, shortness of breath and wheezing.    Cardiovascular: Negative for chest pain.   Gastrointestinal:  "Negative for nausea and vomiting.     A complete review of systems was otherwise negative.    Physical Exam  /60 (BP Location: Right arm, Patient Position: Sitting, BP Method: Medium (Manual))   Pulse 96   Temp 98.1 °F (36.7 °C) (Oral)   Ht 5' 6" (1.676 m)   Wt 88.3 kg (194 lb 12.4 oz)   SpO2 99%   BMI 31.44 kg/m²   General appearance: alert, appears stated age, cooperative and no distress  Eyes: negative findings: lids and lashes normal and conjunctivae and sclerae normal  Ears: normal TM and external ear canal right ear, abnormal external canal left ear - erythematous and abnormal TM left ear - dull, bulging and purulent middle ear fluid  Nose: clear discharge, mild congestion, turbinates pale, no sinus tenderness  Throat: lips, mucosa, and tongue normal; teeth and gums normal  Lungs: clear to auscultation bilaterally  Heart: regular rate and rhythm, S1, S2 normal, no murmur, click, rub or gallop  Lymph nodes: Cervical, supraclavicular, and axillary nodes normal.  Neurologic: Grossly normal    Assessment/Plan  Acute suppurative otitis media of left ear without spontaneous rupture of tympanic membrane, recurrence not specified  Treatment as listed below.  Okay to continue OTC allergy pill and Tylenol.  Discussed warm compresses for pain control.  RTC PRN.  -     amoxicillin (AMOXIL) 500 MG Tab; Take 1 tablet (500 mg total) by mouth every 12 (twelve) hours. for 10 days  Dispense: 20 tablet; Refill: 0    Benign hypertension with chronic kidney disease, stage III  The current medical regimen is effective;  continue present plan and medications.  -     losartan (COZAAR) 50 MG tablet; By mouth once daily  Dispense: 90 tablet; Refill: 3    Need for immunization against tetanus alone  Administered today.  -     (In Office Administered) Td Vaccine - Preservative Free    She has verbalized understanding and is in agreement with plan of care.    Follow-up if symptoms worsen or fail to improve.  "

## 2018-12-06 NOTE — PATIENT INSTRUCTIONS
Common Middle Ear Problems    Your middle ear may have been injured or infected recently. Over time, certain growths or bone disease can also harm the middle ear. Left untreated, middle ear problems often lead to lifelong hearing loss. There are two types of hearing loss: conductive and sensorineural. One or both kinds can occur. Injury, infection, certain growths, or bone disease can cause your symptoms. A ruptured eardrum or a long-lasting (chronic) ear infection may be painful and decrease hearing.  Symptoms  · Hearing loss in one or both ears  · Fluid, often smelly, draining from the ear  · Pain, pressure, or discomfort in the ear  · Ringing in the ear  Conductive and sensorineural hearing loss  Sound waves may be disrupted before they reach the inner ear. If this happens, conductive hearing loss may occur. The ear canal can be blocked by wax, infection, a tumor, or a foreign object. The eardrum can be injured or infected. Abnormal bone growth, infection, or tumors in the middle ear can block sound waves.  Sound waves may not be processed correctly in the inner ear. If this happens, sensorineural hearing loss may occur. Permanent hearing loss is most commonly associated with sensorineural problems.  The tests and evaluations used to diagnose what type of hearing problem you have will depend on your symptoms.   Date Last Reviewed: 10/1/2016  © 9237-3375 [employer]. [address] [place] [address], [place] [address]. All rights reserved. This information is not intended as a substitute for professional medical care. Always follow your healthcare professional's instructions.

## 2018-12-18 ENCOUNTER — TELEPHONE (OUTPATIENT)
Dept: FAMILY MEDICINE | Facility: CLINIC | Age: 83
End: 2018-12-18

## 2018-12-18 DIAGNOSIS — E78.5 HYPERLIPIDEMIA LDL GOAL <100: ICD-10-CM

## 2018-12-18 RX ORDER — LOVASTATIN 20 MG/1
60 TABLET ORAL NIGHTLY
Qty: 270 TABLET | Refills: 0 | Status: SHIPPED | OUTPATIENT
Start: 2018-12-18 | End: 2018-12-20 | Stop reason: ALTCHOICE

## 2018-12-20 RX ORDER — LOVASTATIN 40 MG/1
80 TABLET ORAL NIGHTLY
Qty: 180 TABLET | Refills: 1 | Status: SHIPPED | OUTPATIENT
Start: 2018-12-20 | End: 2019-09-20

## 2018-12-20 NOTE — TELEPHONE ENCOUNTER
rec'd note from pharmacy - plan does not cover medication. Please call plan at [phone] to see what is covered.  Is it # of pills? She takes 3 a day.   Hx of lipitor with SE.  Consider crestor if lovastatin not covered.

## 2018-12-24 NOTE — TELEPHONE ENCOUNTER
Spoke with patient with the new medication changes for cholesterol medication as charted. Patient to  new script today and verbalize instruction.

## 2019-01-04 RX ORDER — METFORMIN HYDROCHLORIDE 1000 MG/1
TABLET ORAL
Qty: 90 TABLET | Refills: 0 | Status: SHIPPED | OUTPATIENT
Start: 2019-01-04 | End: 2019-04-05 | Stop reason: SDUPTHER

## 2019-01-07 DIAGNOSIS — E03.9 ACQUIRED HYPOTHYROIDISM: ICD-10-CM

## 2019-01-07 RX ORDER — LEVOTHYROXINE SODIUM 88 UG/1
TABLET ORAL
Qty: 90 TABLET | Refills: 0 | Status: SHIPPED | OUTPATIENT
Start: 2019-01-07 | End: 2019-04-10 | Stop reason: SDUPTHER

## 2019-02-15 ENCOUNTER — PES CALL (OUTPATIENT)
Dept: ADMINISTRATIVE | Facility: CLINIC | Age: 84
End: 2019-02-15

## 2019-02-22 ENCOUNTER — PES CALL (OUTPATIENT)
Dept: ADMINISTRATIVE | Facility: CLINIC | Age: 84
End: 2019-02-22

## 2019-02-27 RX ORDER — GLIPIZIDE 5 MG/1
TABLET ORAL
Qty: 180 TABLET | Refills: 0 | Status: SHIPPED | OUTPATIENT
Start: 2019-02-27 | End: 2019-12-27

## 2019-03-14 ENCOUNTER — OFFICE VISIT (OUTPATIENT)
Dept: FAMILY MEDICINE | Facility: CLINIC | Age: 84
End: 2019-03-14
Payer: MEDICARE

## 2019-03-14 VITALS
RESPIRATION RATE: 16 BRPM | WEIGHT: 196.56 LBS | SYSTOLIC BLOOD PRESSURE: 138 MMHG | HEART RATE: 68 BPM | TEMPERATURE: 98 F | OXYGEN SATURATION: 96 % | HEIGHT: 66 IN | BODY MASS INDEX: 31.59 KG/M2 | DIASTOLIC BLOOD PRESSURE: 70 MMHG

## 2019-03-14 DIAGNOSIS — E11.40 TYPE 2 DIABETES, CONTROLLED, WITH NEUROPATHY: ICD-10-CM

## 2019-03-14 DIAGNOSIS — L25.9 CONTACT DERMATITIS, UNSPECIFIED CONTACT DERMATITIS TYPE, UNSPECIFIED TRIGGER: Primary | ICD-10-CM

## 2019-03-14 LAB — GLUCOSE SERPL-MCNC: 41 MG/DL (ref 70–110)

## 2019-03-14 PROCEDURE — 99214 PR OFFICE/OUTPT VISIT, EST, LEVL IV, 30-39 MIN: ICD-10-PCS | Mod: S$PBB,,, | Performed by: NURSE PRACTITIONER

## 2019-03-14 PROCEDURE — 99215 OFFICE O/P EST HI 40 MIN: CPT | Mod: PBBFAC,PO | Performed by: NURSE PRACTITIONER

## 2019-03-14 PROCEDURE — 96372 THER/PROPH/DIAG INJ SC/IM: CPT | Mod: PBBFAC,PO

## 2019-03-14 PROCEDURE — 99999 PR PBB SHADOW E&M-EST. PATIENT-LVL V: CPT | Mod: PBBFAC,,, | Performed by: NURSE PRACTITIONER

## 2019-03-14 PROCEDURE — 99214 OFFICE O/P EST MOD 30 MIN: CPT | Mod: S$PBB,,, | Performed by: NURSE PRACTITIONER

## 2019-03-14 PROCEDURE — 99999 PR PBB SHADOW E&M-EST. PATIENT-LVL V: ICD-10-PCS | Mod: PBBFAC,,, | Performed by: NURSE PRACTITIONER

## 2019-03-14 PROCEDURE — 82962 GLUCOSE BLOOD TEST: CPT | Mod: PBBFAC,PO | Performed by: NURSE PRACTITIONER

## 2019-03-14 RX ORDER — HYDROXYZINE HYDROCHLORIDE 25 MG/1
25 TABLET, FILM COATED ORAL 3 TIMES DAILY PRN
Qty: 30 TABLET | Refills: 0 | Status: SHIPPED | OUTPATIENT
Start: 2019-03-14 | End: 2019-06-24

## 2019-03-14 RX ORDER — TRIAMCINOLONE ACETONIDE 40 MG/ML
40 INJECTION, SUSPENSION INTRA-ARTICULAR; INTRAMUSCULAR
Status: COMPLETED | OUTPATIENT
Start: 2019-03-14 | End: 2019-03-14

## 2019-03-14 RX ORDER — PREDNISONE 20 MG/1
TABLET ORAL
Qty: 18 TABLET | Refills: 0 | Status: SHIPPED | OUTPATIENT
Start: 2019-03-14 | End: 2019-04-03 | Stop reason: ALTCHOICE

## 2019-03-14 RX ADMIN — TRIAMCINOLONE ACETONIDE 40 MG: 40 INJECTION, SUSPENSION INTRA-ARTICULAR; INTRAMUSCULAR at 11:03

## 2019-03-14 NOTE — PATIENT INSTRUCTIONS
"  Contact Dermatitis  Contact dermatitis is a skin rash caused by something that touches the skin and makes it irritated and inflamed. Your skin may be red, swollen, dry, and may be cracked. Blisters may form and ooze. The rash will itch.  Contact dermatitis can form on the face and neck, backs of hands, forearms, genitals, and lower legs.  People can get contact dermatitis from lots of sources. These include:  · Plants such as poison ivy, oak, or sumac  · Chemicals in hair dyes and rinses, soaps, solvents, waxes, fingernail polish, and deodorants   · Jewelry or watchbands made of nickel  Contact dermatitis is not passed from person to person.  Talk with your healthcare provider about what may have caused the rash. A type of allergy testing called "patch testing" may be used to discover what you are allergic to. You will need to avoid the source of your rash in the future to prevent it from coming back.  Treatment is done to relieve itching and prevent the rash from coming back. The rash should go away in a few days to a few weeks.  Home care  Your healthcare provider may prescribe medicine to relieve swelling and itching. Follow all instructions when using these medicines.  General care:  · Avoid anything that heats up your skin, such as hot showers or baths, or direct sunlight. This can make itching worse.  · Apply cold compresses to soothe your sores to help relieve your symptoms. Do this for 30 minutes 3 to 4 times a day. You can make a cold compress by soaking a cloth in cold water. Squeeze out excess water. You can add colloidal oatmeal to the water to help reduce itching. For severe itching in a small area, apply an ice pack wrapped in a thin towel. Do this for 20 minutes 3 to 4 times a day.  · You can also try wet dressings. One way to do this is to wear a wet piece of clothing under a dry one. Wear a damp shirt under a dry shirt if your upper body is affected. This can relieve itching and prevent you from " scratching the affected area.  · You can also help relieve large areas of itching by taking a lukewarm bath with colloidal oatmeal added to the water.  · Use hydrocortisone cream for redness and irritation, unless another medicine was prescribed. You can also use benzocaine anesthetic cream or spray. Calamine lotion can also relieve mild symptoms.  · Use oral diphenhydramine to help reduce itching. You can buy this antihistamine at drug and grocery stores. It can make you sleepy, so use lower doses during the daytime. Or you can use loratadine. This is an antihistamine that will not make you sleepy. Do not use diphenhydramine if you have glaucoma or have trouble urinating due to an enlarged prostate.  · If a plant causes your rash, make sure to wash your skin and the clothes you were wearing when you came into contact with the plant. This is to wash away the plant oils that gave you the rash and prevent more or worse symptoms.  · Stay away from the substance or object that causes your symptoms. If you cant avoid it, wear gloves or some other type of protection.  Follow-up care  Follow up with your healthcare provider, or as advised.  When to seek medical advice  Call your healthcare provider right away if any of these occur:  · Spreading of the rash to other parts of your body  · Severe swelling of your face, eyelids, mouth, throat or tongue  · Trouble urinating due to swelling in the genital area  · Fever of 100.4°F (38°C) or higher  · Redness or swelling that gets worse  · Pain that gets worse  · Foul-smelling fluid leaking from the skin  · Yellow-brown crusts on the open blisters  Date Last Reviewed: 9/1/2016  © 8914-0428 [employer]. [address] [place] [address], [place] [address]. All rights reserved. This information is not intended as a substitute for professional medical care. Always follow your healthcare professional's instructions.

## 2019-03-14 NOTE — PROGRESS NOTES
Subjective:       Patient ID: Tonya Cohn is a 86 y.o. female.    Chief Complaint: Rash (back, chest, and arms)    Rash   This is a new problem. The current episode started in the past 7 days. The problem has been gradually worsening since onset. The affected locations include the back, neck and chest. The rash is characterized by itchiness and redness. It is unknown if there was an exposure to a precipitant. Pertinent negatives include no anorexia, congestion, cough, diarrhea, eye pain, facial edema, fatigue, fever, joint pain, nail changes, rhinorrhea, shortness of breath, sore throat or vomiting. Past treatments include nothing.     Review of Systems   Constitutional: Negative for fatigue and fever.   HENT: Negative for congestion, rhinorrhea and sore throat.    Eyes: Negative for pain.   Respiratory: Negative for cough, chest tightness and shortness of breath.    Gastrointestinal: Negative for anorexia, diarrhea and vomiting.   Musculoskeletal: Negative for arthralgias, back pain, joint pain, joint swelling, myalgias and neck pain.   Skin: Positive for rash. Negative for color change and nail changes.   Neurological: Negative for dizziness and weakness.   Hematological: Negative for adenopathy. Does not bruise/bleed easily.       Objective:      Physical Exam   Constitutional: She is oriented to person, place, and time. Vital signs are normal. She appears well-developed and well-nourished.   HENT:   Head: Normocephalic and atraumatic.   Right Ear: External ear normal.   Left Ear: External ear normal.   Nose: Nose normal.   Mouth/Throat: Oropharynx is clear and moist. No oropharyngeal exudate.   Cardiovascular: Normal rate, regular rhythm and normal heart sounds.   Pulmonary/Chest: Effort normal and breath sounds normal.   Neurological: She is alert and oriented to person, place, and time.   Skin: Skin is warm, dry and intact. Capillary refill takes less than 2 seconds. Rash noted. Rash is maculopapular and  urticarial.   Psychiatric: She has a normal mood and affect.       Assessment:       1. Contact dermatitis, unspecified contact dermatitis type, unspecified trigger    2. Type 2 diabetes, controlled, with neuropathy        Plan:       Tonay was seen today for rash.    Diagnoses and all orders for this visit:    Contact dermatitis, unspecified contact dermatitis type, unspecified trigger  -     predniSONE (DELTASONE) 20 MG tablet; 60 mg x 3 days then 40mg x 3 days the 20 mg until complete  -     hydrOXYzine HCl (ATARAX) 25 MG tablet; Take 1 tablet (25 mg total) by mouth 3 (three) times daily as needed for Itching.  -     triamcinolone acetonide injection 40 mg  Home care  Your healthcare provider may prescribe medicine to relieve swelling and itching. Follow all instructions when using these medicines.  General care:  · Avoid anything that heats up your skin, such as hot showers or baths, or direct sunlight. This can make itching worse.  · Apply cold compresses to soothe your sores to help relieve your symptoms. Do this for 30 minutes 3 to 4 times a day. You can make a cold compress by soaking a cloth in cold water. Squeeze out excess water. You can add colloidal oatmeal to the water to help reduce itching. For severe itching in a small area, apply an ice pack wrapped in a thin towel. Do this for 20 minutes 3 to 4 times a day.  · You can also try wet dressings. One way to do this is to wear a wet piece of clothing under a dry one. Wear a damp shirt under a dry shirt if your upper body is affected. This can relieve itching and prevent you from scratching the affected area.  · You can also help relieve large areas of itching by taking a lukewarm bath with colloidal oatmeal added to the water.  · Use hydrocortisone cream for redness and irritation, unless another medicine was prescribed. You can also use benzocaine anesthetic cream or spray. Calamine lotion can also relieve mild symptoms.  · Use oral diphenhydramine to  help reduce itching. You can buy this antihistamine at drug and grocery stores. It can make you sleepy, so use lower doses during the daytime. Or you can use loratadine. This is an antihistamine that will not make you sleepy. Do not use diphenhydramine if you have glaucoma or have trouble urinating due to an enlarged prostate.  · If a plant causes your rash, make sure to wash your skin and the clothes you were wearing when you came into contact with the plant. This is to wash away the plant oils that gave you the rash and prevent more or worse symptoms.  · Stay away from the substance or object that causes your symptoms. If you cant avoid it, wear gloves or some other type of protection.  Follow-up care  Follow up with your healthcare provider, or as advised.  When to seek medical advice  Call your healthcare provider right away if any of these occur:  · Spreading of the rash to other parts of your body  · Severe swelling of your face, eyelids, mouth, throat or tongue  · Trouble urinating due to swelling in the genital area  · Fever of 100.4°F (38°C) or higher  · Redness or swelling that gets worse  · Pain that gets worse  · Foul-smelling fluid leaking from the skin  · Yellow-brown crusts on the open blisters  Date Last Reviewed: 9/1/2016  © 9829-6370 [employer]. [address] [place] [address], [place] [address]. All rights reserved. This information is not intended as a substitute for professional medical care. Always follow your healthcare professional's instructions.        Type 2 diabetes, controlled, with neuropathy  -     POCT Glucose, Hand-Held Device

## 2019-03-29 ENCOUNTER — INITIAL CONSULT (OUTPATIENT)
Dept: OPTOMETRY | Facility: CLINIC | Age: 84
End: 2019-03-29
Payer: MEDICARE

## 2019-03-29 DIAGNOSIS — Z83.511 FAMILY HISTORY OF GLAUCOMA: ICD-10-CM

## 2019-03-29 DIAGNOSIS — Z79.84 LONG TERM CURRENT USE OF ORAL HYPOGLYCEMIC DRUG: ICD-10-CM

## 2019-03-29 DIAGNOSIS — H04.123 BILATERAL DRY EYES: ICD-10-CM

## 2019-03-29 DIAGNOSIS — E11.9 TYPE 2 DIABETES MELLITUS WITHOUT RETINOPATHY: Primary | ICD-10-CM

## 2019-03-29 DIAGNOSIS — Z96.1 PSEUDOPHAKIA OF BOTH EYES: ICD-10-CM

## 2019-03-29 DIAGNOSIS — H52.223 REGULAR ASTIGMATISM WITH PRESBYOPIA, BILATERAL: ICD-10-CM

## 2019-03-29 DIAGNOSIS — H52.4 REGULAR ASTIGMATISM WITH PRESBYOPIA, BILATERAL: ICD-10-CM

## 2019-03-29 LAB
LEFT EYE DM RETINOPATHY: NEGATIVE
RIGHT EYE DM RETINOPATHY: NEGATIVE

## 2019-03-29 PROCEDURE — 92015 DETERMINE REFRACTIVE STATE: CPT | Mod: ,,, | Performed by: OPTOMETRIST

## 2019-03-29 PROCEDURE — 92015 PR REFRACTION: ICD-10-PCS | Mod: ,,, | Performed by: OPTOMETRIST

## 2019-03-29 PROCEDURE — 99213 OFFICE O/P EST LOW 20 MIN: CPT | Mod: PBBFAC | Performed by: OPTOMETRIST

## 2019-03-29 PROCEDURE — 92014 PR EYE EXAM, EST PATIENT,COMPREHESV: ICD-10-PCS | Mod: S$PBB,,, | Performed by: OPTOMETRIST

## 2019-03-29 PROCEDURE — 99999 PR PBB SHADOW E&M-EST. PATIENT-LVL III: CPT | Mod: PBBFAC,,, | Performed by: OPTOMETRIST

## 2019-03-29 PROCEDURE — 99999 PR PBB SHADOW E&M-EST. PATIENT-LVL III: ICD-10-PCS | Mod: PBBFAC,,, | Performed by: OPTOMETRIST

## 2019-03-29 PROCEDURE — 92014 COMPRE OPH EXAM EST PT 1/>: CPT | Mod: S$PBB,,, | Performed by: OPTOMETRIST

## 2019-03-29 NOTE — PROGRESS NOTES
HPI     Ms. Tonya Cohn is here for her annual diabetic eye exam.    Blurred vision all ranges with glasses (Rx about 3 years old). She thinks   she sees better without glasses. She requests refraction today.     When she is reading she gets blurry vision and eyes burn sometimes. She   uses Visine whenever this happens, with relief.     (+)drops: Visine prn (used rarely)  (-)flashes  (-)floaters  (-)diplopia    (+)Diabetes  Hemoglobin A1C       Date                     Value               Ref Range             Status           10/03/2018               5.8 (H)             4.0 - 5.6 %         Final  03/19/2018               5.8 (H)             4.0 - 5.6 %         Final  08/22/2017               6.1 (H)             4.0 - 5.6 %         Final      OCULAR HISTORY  Last Eye Exam: 10/13/17 with Dr. Alba  (+)eye surgery: Cataract extraction w/IOL OU  (-)diagnosed or treated for any eye conditions or diseases      FAMILY HISTORY  (+)Glaucoma: Mother        Last edited by Criss Biard, OD on 3/29/2019  9:45 AM. (History)            Assessment /Plan     For exam results, see Encounter Report.    Type 2 diabetes mellitus without retinopathy  Long term current use of oral hypoglycemic drug   No retinopathy noted OU. Monitor with yearly DFE.     Bilateral dry eyes   Recommended artificial tears BID-TID.    Pseudophakia of both eyes   Doing well OU. Monitor.     Family history of glaucoma   No signs of glaucoma today OU. Monitor yearly.     Regular astigmatism with presbyopia, bilateral   Increase OD, relatively stable OS. New glasses prescription released, adaptation expected.  New glasses optional.   Eyeglass Final Rx     Eyeglass Final Rx       Sphere Cylinder Axis Add    Right -1.50 +1.75 015 +2.50    Left -1.25 +1.25 005 +2.50    Expiration Date:  3/29/2020                 RTC 1 year

## 2019-04-03 ENCOUNTER — OFFICE VISIT (OUTPATIENT)
Dept: FAMILY MEDICINE | Facility: CLINIC | Age: 84
End: 2019-04-03
Payer: MEDICARE

## 2019-04-03 VITALS
BODY MASS INDEX: 31.59 KG/M2 | WEIGHT: 196.56 LBS | OXYGEN SATURATION: 97 % | DIASTOLIC BLOOD PRESSURE: 54 MMHG | HEIGHT: 66 IN | SYSTOLIC BLOOD PRESSURE: 106 MMHG | HEART RATE: 74 BPM | TEMPERATURE: 98 F

## 2019-04-03 DIAGNOSIS — E11.40 TYPE 2 DIABETES, CONTROLLED, WITH NEUROPATHY: Primary | ICD-10-CM

## 2019-04-03 DIAGNOSIS — M85.89 OSTEOPENIA OF MULTIPLE SITES: ICD-10-CM

## 2019-04-03 DIAGNOSIS — E03.9 HYPOTHYROIDISM (ACQUIRED): ICD-10-CM

## 2019-04-03 DIAGNOSIS — K21.9 GASTROESOPHAGEAL REFLUX DISEASE WITHOUT ESOPHAGITIS: ICD-10-CM

## 2019-04-03 DIAGNOSIS — I70.0 ATHEROSCLEROSIS OF ABDOMINAL AORTA: ICD-10-CM

## 2019-04-03 DIAGNOSIS — E66.9 OBESITY (BMI 30-39.9): ICD-10-CM

## 2019-04-03 DIAGNOSIS — Z86.010 HISTORY OF COLON POLYPS: ICD-10-CM

## 2019-04-03 DIAGNOSIS — J30.9 ALLERGIC RHINITIS, UNSPECIFIED SEASONALITY, UNSPECIFIED TRIGGER: ICD-10-CM

## 2019-04-03 DIAGNOSIS — E78.5 HYPERLIPIDEMIA LDL GOAL <100: ICD-10-CM

## 2019-04-03 DIAGNOSIS — I35.0 MILD AORTIC STENOSIS: ICD-10-CM

## 2019-04-03 PROCEDURE — 99214 OFFICE O/P EST MOD 30 MIN: CPT | Mod: PBBFAC,PO | Performed by: NURSE PRACTITIONER

## 2019-04-03 PROCEDURE — 99214 OFFICE O/P EST MOD 30 MIN: CPT | Mod: S$PBB,,, | Performed by: NURSE PRACTITIONER

## 2019-04-03 PROCEDURE — 99999 PR PBB SHADOW E&M-EST. PATIENT-LVL IV: ICD-10-PCS | Mod: PBBFAC,,, | Performed by: NURSE PRACTITIONER

## 2019-04-03 PROCEDURE — 99999 PR PBB SHADOW E&M-EST. PATIENT-LVL IV: CPT | Mod: PBBFAC,,, | Performed by: NURSE PRACTITIONER

## 2019-04-03 PROCEDURE — 99214 PR OFFICE/OUTPT VISIT, EST, LEVL IV, 30-39 MIN: ICD-10-PCS | Mod: S$PBB,,, | Performed by: NURSE PRACTITIONER

## 2019-04-03 NOTE — PROGRESS NOTES
Subjective:       Patient ID: Tonya Cohn is a 86 y.o. female.    Chief Complaint: Diabetes (F/U); Hyperlipidemia (F/U); and Hypothyroidism (F/U)    86-year-old female presents to the clinic today for follow-up on diabetes, hyperlipidemia, and hypothyroidism.  She has good dietary habits.  She does not exercise but she is very active.  She is compliant with all of her medications.  She denies any cardiac chest pain, heart palpitations, shortness of breath, or swelling to lower extremities.  She denies any headaches, dizziness, or blurred vision.  She is due to follow up with Dr. Whitfield in May of 2019.    Past Medical History:   Diagnosis Date    AR (allergic rhinitis)     Atherosclerosis of abdominal aorta     noted on CT scan 1/17/2011    Carpal tunnel syndrome of left wrist     Chronic kidney disease, stage 3     Diabetic peripheral neuropathy     Diverticulosis     History of colon polyps     History of diverticulitis of colon 1/2014    Hyperlipemia     Hypertension     Hypothyroidism     followed by endocrinology, Dr. Green    Mild aortic stenosis     OA (osteoarthritis) of knee     Obesity     Sciatica     Type II or unspecified type diabetes mellitus with neurological manifestations, uncontrolled(250.62)      Past Surgical History:   Procedure Laterality Date    CATARACT EXTRACTION      COLONOSCOPY N/A 10/3/2017    Performed by [facility] at [facility] ENDO (4TH FLR)    COSMETIC SURGERY      ESOPHAGOGASTRODUODENOSCOPY (EGD) N/A 12/12/2017    Performed by Katrin De La Torre MD at [facility] ENDO (4TH FLR)    HYSTERECTOMY      partial    JOINT REPLACEMENT      LUNG BIOPSY  in her 40's    REPLACEMENT-KNEE-TOTAL Right 6/13/2014    Performed by Rojas Kaur MD at [facility] OR 2ND FLR    TOTAL KNEE ARTHROPLASTY Right 6/13/2014    TKR      reports that she quit smoking about 56 years ago. She has a 0.50 pack-year smoking history. She quit smokeless tobacco use about 48 years ago. She reports  that she drinks about 1.2 oz of alcohol per week. She reports that she does not use drugs.  Review of Systems   Constitutional: Negative for activity change.   Respiratory: Negative for cough, chest tightness, shortness of breath and wheezing.    Cardiovascular: Negative for chest pain, palpitations and leg swelling.   Gastrointestinal: Negative for abdominal pain, constipation, diarrhea, nausea and vomiting.   Musculoskeletal: Negative for gait problem.   Skin: Negative for color change.   Neurological: Negative for dizziness, syncope and light-headedness.       Objective:      Physical Exam   Constitutional: She is oriented to person, place, and time. She appears well-developed and well-nourished. No distress.   Eyes: Pupils are equal, round, and reactive to light. Conjunctivae and EOM are normal. Right eye exhibits no discharge. Left eye exhibits no discharge. No scleral icterus.   Neck: Normal range of motion. Neck supple. No JVD present.   Cardiovascular: Normal rate and regular rhythm.   Murmur heard.  Pulmonary/Chest: Effort normal and breath sounds normal. No respiratory distress. She has no wheezes. She has no rales.   Abdominal: Soft. Bowel sounds are normal. There is no tenderness.   Musculoskeletal: Normal range of motion. She exhibits no edema.   Protective Sensation (w/ 10 gram monofilament):  Right: Intact  Left: Intact    Visual Inspection:  Onychomycosis -  Bilateral with black colored nails noted     Pedal Pulses:   Right: Present  Left: Present    Posterior tibialis:   Right:Present  Left: Present     Neurological: She is alert and oriented to person, place, and time.   Skin: Skin is warm and dry. She is not diaphoretic.   Psychiatric: She has a normal mood and affect.       Assessment:       1. Type 2 diabetes, controlled, with neuropathy    2. Mild aortic stenosis    3. Hyperlipidemia LDL goal <100    4. Gastroesophageal reflux disease without esophagitis    5. Allergic rhinitis, unspecified  seasonality, unspecified trigger    6. Atherosclerosis of abdominal aorta    7. Osteopenia of multiple sites    8. Obesity (BMI 30-39.9)    9. History of colon polyps    10. Hypothyroidism (acquired)        Plan:         Type 2 diabetes, controlled, with neuropathy  -     Hemoglobin A1c; Future; Expected date: 04/03/2019  -     Microalbumin/creatinine urine ratio; Future; Expected date: 04/03/2019  - continue current medication will adjust accordingly    Mild aortic stenosis  - followed by Dr. Whitfield  - due to see in 5/2019    Hyperlipidemia LDL goal <100  -     Comprehensive metabolic panel; Future; Expected date: 04/03/2019  -     Lipid panel; Future; Expected date: 04/03/2019  - The current medical regimen is effective;  continue present plan and medications.    Gastroesophageal reflux disease without esophagitis  - Takes Prilosec as needed only    Allergic rhinitis, unspecified seasonality, unspecified trigger  - takes OTC medications as needed only    Atherosclerosis of abdominal aorta  -     CBC auto differential; Future; Expected date: 04/03/2019  - Stable / Asymptomatic is on blood pressure and cholesterol lowering medications    Osteopenia of multiple sites  - 3/2018 no treatment needed at this time    Obesity (BMI 30-39.9)  - The patient is asked to make an attempt to improve diet and exercise patterns to aid in medical management of this problem.    History of colon polyps  - up to date on colonoscopy    Hypothyroidism (acquired)  -     TSH; Future; Expected date: 04/03/2019  -     T4, free; Future; Expected date: 04/03/2019  - continue current medication will adjust accordingly

## 2019-04-03 NOTE — PATIENT INSTRUCTIONS
Check fasting labs  Continue all current medications   Watch diet closely and stay active  Follow up with Dr. Whitfield about 5/19/2019 call and schedule appointment   Follow up with Dr. Hughes as scheduled

## 2019-04-05 RX ORDER — METFORMIN HYDROCHLORIDE 1000 MG/1
TABLET ORAL
Qty: 90 TABLET | Refills: 1 | Status: SHIPPED | OUTPATIENT
Start: 2019-04-05 | End: 2019-10-22 | Stop reason: SDUPTHER

## 2019-04-10 ENCOUNTER — TELEPHONE (OUTPATIENT)
Dept: FAMILY MEDICINE | Facility: CLINIC | Age: 84
End: 2019-04-10

## 2019-04-10 DIAGNOSIS — E03.9 HYPOTHYROIDISM (ACQUIRED): Primary | ICD-10-CM

## 2019-04-10 DIAGNOSIS — E03.9 ACQUIRED HYPOTHYROIDISM: ICD-10-CM

## 2019-04-10 RX ORDER — LEVOTHYROXINE SODIUM 88 UG/1
TABLET ORAL
Qty: 90 TABLET | Refills: 1 | Status: SHIPPED | OUTPATIENT
Start: 2019-04-10 | End: 2019-10-14 | Stop reason: SDUPTHER

## 2019-04-10 NOTE — TELEPHONE ENCOUNTER
I spoke with the patient and explained that all of her labs were normal and her diabetes was well controlled. She needed to continue all of her current medications. She verbalized understanding of above. She needs to follow up with Dr. Hughes as scheduled. She needs a refill on her thyroid medication.

## 2019-05-02 ENCOUNTER — OFFICE VISIT (OUTPATIENT)
Dept: FAMILY MEDICINE | Facility: CLINIC | Age: 84
End: 2019-05-02
Payer: MEDICARE

## 2019-05-02 VITALS
TEMPERATURE: 98 F | WEIGHT: 199.06 LBS | HEART RATE: 84 BPM | DIASTOLIC BLOOD PRESSURE: 64 MMHG | HEIGHT: 66 IN | OXYGEN SATURATION: 96 % | BODY MASS INDEX: 31.99 KG/M2 | SYSTOLIC BLOOD PRESSURE: 120 MMHG

## 2019-05-02 DIAGNOSIS — K21.9 GASTROESOPHAGEAL REFLUX DISEASE WITHOUT ESOPHAGITIS: ICD-10-CM

## 2019-05-02 DIAGNOSIS — L25.9 CONTACT DERMATITIS, UNSPECIFIED CONTACT DERMATITIS TYPE, UNSPECIFIED TRIGGER: Primary | ICD-10-CM

## 2019-05-02 DIAGNOSIS — E78.5 HYPERLIPIDEMIA LDL GOAL <100: ICD-10-CM

## 2019-05-02 DIAGNOSIS — E66.9 OBESITY (BMI 30-39.9): ICD-10-CM

## 2019-05-02 DIAGNOSIS — I70.0 ATHEROSCLEROSIS OF ABDOMINAL AORTA: ICD-10-CM

## 2019-05-02 DIAGNOSIS — Z86.010 HISTORY OF COLON POLYPS: ICD-10-CM

## 2019-05-02 DIAGNOSIS — E11.40 TYPE 2 DIABETES, CONTROLLED, WITH NEUROPATHY: ICD-10-CM

## 2019-05-02 DIAGNOSIS — I35.0 MILD AORTIC STENOSIS: ICD-10-CM

## 2019-05-02 PROCEDURE — 99214 PR OFFICE/OUTPT VISIT, EST, LEVL IV, 30-39 MIN: ICD-10-PCS | Mod: S$PBB,,, | Performed by: NURSE PRACTITIONER

## 2019-05-02 PROCEDURE — 99999 PR PBB SHADOW E&M-EST. PATIENT-LVL V: ICD-10-PCS | Mod: PBBFAC,,, | Performed by: NURSE PRACTITIONER

## 2019-05-02 PROCEDURE — 99214 OFFICE O/P EST MOD 30 MIN: CPT | Mod: S$PBB,,, | Performed by: NURSE PRACTITIONER

## 2019-05-02 PROCEDURE — 99215 OFFICE O/P EST HI 40 MIN: CPT | Mod: PBBFAC,PO | Performed by: NURSE PRACTITIONER

## 2019-05-02 PROCEDURE — 99999 PR PBB SHADOW E&M-EST. PATIENT-LVL V: CPT | Mod: PBBFAC,,, | Performed by: NURSE PRACTITIONER

## 2019-05-02 RX ORDER — TRIAMCINOLONE ACETONIDE 5 MG/G
CREAM TOPICAL 2 TIMES DAILY
Qty: 30 G | Refills: 1 | Status: SHIPPED | OUTPATIENT
Start: 2019-05-02 | End: 2019-06-24

## 2019-05-02 NOTE — PROGRESS NOTES
Subjective:       Patient ID: Tonya Cohn is a 86 y.o. female.    Chief Complaint: Rash (Neck and Arms)    86-year-old female presents to the clinic today with complaint of a rash to left AC area of left arm and around neck the in upper chest area times the last couple of days.  She had a similar rash around 03/14/2019 was treated with prednisone and Atarax which she stated resolved within a came back a couple of days ago.  She denies any exposure to new soaps, detergents, hair products, medications, or lotions.  She denies any swelling to her lips, tongue, uvula, shortness of breath, or wheezing.  She has diabetes but does not remember what her most recent blood sugars were.  She denies any cardiac chest pain, heart palpitations, shortness breath, or swelling to lower extremities.  She denies any headaches, dizziness, or blurred vision.  She is scheduled to see  on May 20 a  on July 17th.    Past Medical History:   Diagnosis Date    AR (allergic rhinitis)     Atherosclerosis of abdominal aorta     noted on CT scan 1/17/2011    Carpal tunnel syndrome of left wrist     Chronic kidney disease, stage 3     Diabetic peripheral neuropathy     Diverticulosis     History of colon polyps     History of diverticulitis of colon 1/2014    Hyperlipemia     Hypertension     Hypothyroidism     followed by endocrinology, Dr. Green    Mild aortic stenosis     OA (osteoarthritis) of knee     Obesity     Sciatica     Type II or unspecified type diabetes mellitus with neurological manifestations, uncontrolled(250.62)      Past Surgical History:   Procedure Laterality Date    CATARACT EXTRACTION      COLONOSCOPY N/A 10/3/2017    [facility]formed [facility]O (4TH FLR)    COSMETIC SURGERY      ESOPHAGOGASTRODUODENOSCOPY (EGD) N/A 12/12/2017    Performed by Katrin [facility]re [facility] at [facility] ENDO (4TH FLR)    HYSTERECTOMY      partial    JOINT REPLACEMENT      LUNG BIOPSY  in her  40's    REPLACEMENT-KNEE-TOTAL Right 6/13/2014    Performed by [facility] at [facility] OR 2ND FLR    TOTAL KNEE ARTHROPLASTY Right 6/13/2014    TKR      reports that she quit smoking about 56 years ago. She has a 0.50 pack-year smoking history. She quit smokeless tobacco use about 48 years ago. She reports that she drinks about 1.2 oz of alcohol per week. She reports that she does not use drugs.  Review of Systems   Constitutional: Negative for activity change.   HENT:        No swelling to lips, tongue or uvula    Respiratory: Negative for cough, chest tightness, shortness of breath and wheezing.         Denies sob or wheezing    Cardiovascular: Negative for chest pain, palpitations and leg swelling.   Gastrointestinal: Negative for abdominal pain, constipation, diarrhea, nausea and vomiting.   Musculoskeletal: Negative for gait problem.   Skin: Positive for rash. Negative for color change.   Neurological: Negative for dizziness, syncope and light-headedness.       Objective:      Physical Exam   Constitutional: She is oriented to person, place, and time. She appears well-developed and well-nourished. No distress.   Eyes: Pupils are equal, round, and reactive to light. Conjunctivae and EOM are normal. Right eye exhibits no discharge. Left eye exhibits no discharge. No scleral icterus.   Neck: Normal range of motion. Neck supple. No JVD present.   Cardiovascular: Normal rate, regular rhythm and normal heart sounds.   No murmur heard.  Pulmonary/Chest: Effort normal and breath sounds normal. No respiratory distress. She has no wheezes. She has no rales.   Abdominal: Soft. Bowel sounds are normal. There is no tenderness.   Musculoskeletal: Normal range of motion. She exhibits no edema.   Neurological: She is alert and oriented to person, place, and time.   Skin: Skin is warm and dry. Rash noted. She is not diaphoretic.   See photos    Psychiatric: She has a normal mood and affect.           Assessment:       1.  Contact dermatitis, unspecified contact dermatitis type, unspecified trigger    2. Type 2 diabetes, controlled, with neuropathy    3. Obesity (BMI 30-39.9)    4. Mild aortic stenosis    5. Hyperlipidemia LDL goal <100    6. History of colon polyps    7. Gastroesophageal reflux disease without esophagitis    8. Atherosclerosis of abdominal aorta        Plan:         Contact dermatitis, unspecified contact dermatitis type, unspecified trigger  -     Ambulatory consult to Dermatology  - Continue Hydroxyzine 25 mg every hs for itching   - Triamcinolone cream twice a day to rash     Type 2 diabetes, controlled, with neuropathy  - The current medical regimen is effective;  continue present plan and medications.    Obesity (BMI 30-39.9)  The patient is asked to make an attempt to improve diet and exercise patterns to aid in medical management of this problem.    Mild aortic stenosis  - followed by Dr. Whitfield    Hyperlipidemia LDL goal <100  - The current medical regimen is effective;  continue present plan and medications.    History of colon polyps  - UTD on colonoscopy    Gastroesophageal reflux disease without esophagitis  - The current medical regimen is effective;  continue present plan and medications.    Atherosclerosis of abdominal aorta  - Stable / Asymptomatic is on blood pressure and cholesterol lowering medications    Other orders  -     triamcinolone acetonide 0.5% (KENALOG) 0.5 % Cr ea; Apply topically 2 (two) times daily. for 10 days  Dispense: 30 G; Refill: 1

## 2019-05-20 ENCOUNTER — OFFICE VISIT (OUTPATIENT)
Dept: CARDIOLOGY | Facility: CLINIC | Age: 84
End: 2019-05-20
Payer: MEDICARE

## 2019-05-20 VITALS
HEIGHT: 66 IN | HEART RATE: 81 BPM | DIASTOLIC BLOOD PRESSURE: 67 MMHG | BODY MASS INDEX: 31.53 KG/M2 | SYSTOLIC BLOOD PRESSURE: 128 MMHG | OXYGEN SATURATION: 98 % | WEIGHT: 196.19 LBS

## 2019-05-20 DIAGNOSIS — I10 HYPERTENSION: ICD-10-CM

## 2019-05-20 DIAGNOSIS — E78.5 HYPERLIPIDEMIA LDL GOAL <100: Primary | ICD-10-CM

## 2019-05-20 DIAGNOSIS — I35.0 MILD AORTIC STENOSIS: ICD-10-CM

## 2019-05-20 PROCEDURE — 93010 ELECTROCARDIOGRAM REPORT: CPT | Mod: S$PBB,,, | Performed by: INTERNAL MEDICINE

## 2019-05-20 PROCEDURE — 93010 EKG 12-LEAD: ICD-10-PCS | Mod: S$PBB,,, | Performed by: INTERNAL MEDICINE

## 2019-05-20 PROCEDURE — 99999 PR PBB SHADOW E&M-EST. PATIENT-LVL IV: CPT | Mod: PBBFAC,,, | Performed by: INTERNAL MEDICINE

## 2019-05-20 PROCEDURE — 99213 PR OFFICE/OUTPT VISIT, EST, LEVL III, 20-29 MIN: ICD-10-PCS | Mod: S$PBB,,, | Performed by: INTERNAL MEDICINE

## 2019-05-20 PROCEDURE — 99999 PR PBB SHADOW E&M-EST. PATIENT-LVL IV: ICD-10-PCS | Mod: PBBFAC,,, | Performed by: INTERNAL MEDICINE

## 2019-05-20 PROCEDURE — 99213 OFFICE O/P EST LOW 20 MIN: CPT | Mod: S$PBB,,, | Performed by: INTERNAL MEDICINE

## 2019-05-20 PROCEDURE — 93005 ELECTROCARDIOGRAM TRACING: CPT | Mod: PBBFAC | Performed by: INTERNAL MEDICINE

## 2019-05-20 PROCEDURE — 99214 OFFICE O/P EST MOD 30 MIN: CPT | Mod: PBBFAC | Performed by: INTERNAL MEDICINE

## 2019-05-20 NOTE — PROGRESS NOTES
Subjective:    Patient ID:  Tonya Cohn is a 87 y.o. female who presents for follow-up of Valvular Heart Disease      HPI        HTN, AS-mild, HLD, DM     Stress test 10/17/16  LVEF: >= 70 %  Impression: NORMAL MYOCARDIAL PERFUSION  1. The perfusion scan is free of evidence for myocardial ischemia or injury.   2. Resting wall motion is physiologic.   3. Resting LV function is normal.     Echo 10/30/18  · Left ventricle ejection fraction is normal at 65%  · No wall motion abnormalities.  · Left ventricle shows concentric remodeling.  · Mild-to-moderate aortic valve stenosis.  · AMAIRANI is 1.49 cm2; peak velocity is 2.18 m/s; mean gradient is 10.77 mmHg.  · Trace mitral regurgitation.  · Mild tricuspid regurgitation.  · The estimated PA systolic pressure is 34.01 mm Hg           5/22/18 Stays active for 86, denies CP or SOB  EKG NSR - ok     11/9/18 Denies CP or SOB  Stays active - [place]  EKG NSR - ok    Stays active  Gets some indigestion type pain after spicy foods  Denies SOB  EKG NSR - ok       Review of Systems   Constitution: Negative for decreased appetite.   HENT: Negative for ear discharge.    Eyes: Negative for blurred vision.   Respiratory: Negative for hemoptysis.    Endocrine: Negative for polyphagia.   Hematologic/Lymphatic: Negative for adenopathy.   Skin: Negative for color change.   Musculoskeletal: Negative for joint swelling.   Genitourinary: Negative for bladder incontinence.   Neurological: Negative for brief paralysis.   Psychiatric/Behavioral: Negative for hallucinations.   Allergic/Immunologic: Negative for hives.        Objective:    Physical Exam   Constitutional: She is oriented to person, place, and time. She appears well-developed and well-nourished.   HENT:   Head: Normocephalic and atraumatic.   Eyes: Pupils are equal, round, and reactive to light. Conjunctivae are normal.   Neck: Normal range of motion. Neck supple.   Cardiovascular: Normal rate and intact distal  pulses.   Murmur heard.   Early systolic murmur is present with a grade of 2/6.  Pulmonary/Chest: Effort normal and breath sounds normal.   Abdominal: Soft. Bowel sounds are normal.   Musculoskeletal: Normal range of motion.   Neurological: She is alert and oriented to person, place, and time.   Skin: Skin is warm and dry.         Assessment:       1. Hyperlipidemia LDL goal <100    2. Mild aortic stenosis         Plan:       Cardiac stable  OV 6 months with repeat echo

## 2019-06-24 ENCOUNTER — OFFICE VISIT (OUTPATIENT)
Dept: FAMILY MEDICINE | Facility: CLINIC | Age: 84
End: 2019-06-24
Payer: MEDICARE

## 2019-06-24 VITALS
HEART RATE: 71 BPM | DIASTOLIC BLOOD PRESSURE: 62 MMHG | HEIGHT: 66 IN | BODY MASS INDEX: 32.06 KG/M2 | SYSTOLIC BLOOD PRESSURE: 130 MMHG | WEIGHT: 199.5 LBS | OXYGEN SATURATION: 97 % | TEMPERATURE: 98 F | RESPIRATION RATE: 16 BRPM

## 2019-06-24 DIAGNOSIS — M47.892 OTHER OSTEOARTHRITIS OF SPINE, CERVICAL REGION: ICD-10-CM

## 2019-06-24 DIAGNOSIS — E78.5 HYPERLIPIDEMIA LDL GOAL <100: ICD-10-CM

## 2019-06-24 DIAGNOSIS — R01.1 HEART MURMUR: ICD-10-CM

## 2019-06-24 DIAGNOSIS — I70.0 ATHEROSCLEROSIS OF ABDOMINAL AORTA: ICD-10-CM

## 2019-06-24 DIAGNOSIS — K21.9 GASTROESOPHAGEAL REFLUX DISEASE WITHOUT ESOPHAGITIS: ICD-10-CM

## 2019-06-24 DIAGNOSIS — M43.10 SPONDYLOLISTHESIS, GRADE 1: ICD-10-CM

## 2019-06-24 DIAGNOSIS — I35.0 MILD AORTIC STENOSIS: ICD-10-CM

## 2019-06-24 DIAGNOSIS — M85.89 OSTEOPENIA OF MULTIPLE SITES: ICD-10-CM

## 2019-06-24 DIAGNOSIS — H04.123 BILATERAL DRY EYES: ICD-10-CM

## 2019-06-24 DIAGNOSIS — Z00.00 ENCOUNTER FOR PREVENTIVE HEALTH EXAMINATION: Primary | ICD-10-CM

## 2019-06-24 DIAGNOSIS — J30.9 ALLERGIC RHINITIS, UNSPECIFIED SEASONALITY, UNSPECIFIED TRIGGER: ICD-10-CM

## 2019-06-24 DIAGNOSIS — Z23 NEED FOR SHINGLES VACCINE: ICD-10-CM

## 2019-06-24 DIAGNOSIS — Z86.010 HISTORY OF COLON POLYPS: ICD-10-CM

## 2019-06-24 DIAGNOSIS — Z74.09 IMPAIRED FUNCTIONAL MOBILITY AND ACTIVITY TOLERANCE: ICD-10-CM

## 2019-06-24 DIAGNOSIS — I10 ESSENTIAL HYPERTENSION: ICD-10-CM

## 2019-06-24 DIAGNOSIS — M51.36 DDD (DEGENERATIVE DISC DISEASE), LUMBAR: ICD-10-CM

## 2019-06-24 DIAGNOSIS — M17.0 PRIMARY OSTEOARTHRITIS OF BOTH KNEES: ICD-10-CM

## 2019-06-24 DIAGNOSIS — E03.9 HYPOTHYROIDISM (ACQUIRED): ICD-10-CM

## 2019-06-24 DIAGNOSIS — Z96.1 PSEUDOPHAKIA OF BOTH EYES: ICD-10-CM

## 2019-06-24 DIAGNOSIS — E11.40 TYPE 2 DIABETES, CONTROLLED, WITH NEUROPATHY: ICD-10-CM

## 2019-06-24 DIAGNOSIS — E66.09 CLASS 1 OBESITY DUE TO EXCESS CALORIES WITH SERIOUS COMORBIDITY AND BODY MASS INDEX (BMI) OF 32.0 TO 32.9 IN ADULT: ICD-10-CM

## 2019-06-24 PROCEDURE — G0439 PR MEDICARE ANNUAL WELLNESS SUBSEQUENT VISIT: ICD-10-PCS | Mod: S$GLB,,, | Performed by: NURSE PRACTITIONER

## 2019-06-24 PROCEDURE — 99999 PR PBB SHADOW E&M-EST. PATIENT-LVL IV: CPT | Mod: PBBFAC,,, | Performed by: NURSE PRACTITIONER

## 2019-06-24 PROCEDURE — 99214 OFFICE O/P EST MOD 30 MIN: CPT | Mod: PBBFAC,PO | Performed by: NURSE PRACTITIONER

## 2019-06-24 PROCEDURE — G0439 PPPS, SUBSEQ VISIT: HCPCS | Mod: S$GLB,,, | Performed by: NURSE PRACTITIONER

## 2019-06-24 PROCEDURE — 99999 PR PBB SHADOW E&M-EST. PATIENT-LVL IV: ICD-10-PCS | Mod: PBBFAC,,, | Performed by: NURSE PRACTITIONER

## 2019-06-24 NOTE — PATIENT INSTRUCTIONS
Counseling and Referral of Other Preventative  (Italic type indicates deductible and co-insurance are waived)    Patient Name: Tonya Cohn  Today's Date: 6/24/2019    Health Maintenance       Date Due Completion Date    Shingles Vaccine (2 of 3) 11/12/2012 9/17/2012    Influenza Vaccine 08/01/2019 10/3/2018    Hemoglobin A1c 10/09/2019 4/9/2019    Eye Exam 03/29/2020 3/29/2019    Override on 9/20/2016: Done (Dr. Kole Lucero- negative for diabetic retinopathy bilaterally)    Override on 9/9/2015: Done (No diabetic retinopathy)    Override on 8/14/2014: Done (no diabetic retinopathy; Dr. Lucero)    Override on 4/21/2014: Done (Pt states her eye exam is scheduled for next month)    Override on 4/1/2013: Done    Foot Exam 04/03/2020 4/3/2019    Override on 4/3/2019: Done    Override on 3/21/2018: Done    Override on 5/1/2017: Done (- mild neuropathy)    Override on 10/28/2016: Done    Override on 1/30/2015: Done (Mr. Davenport)    Lipid Panel 04/09/2020 4/9/2019    Override on 2/26/2016: Done ([facility] - total 189, TG 62, , HDL 76)    Urine Microalbumin 04/09/2020 4/9/2019    Colonoscopy 10/03/2022 10/3/2017    TETANUS VACCINE 12/06/2028 12/6/2018    Override on 12/6/2018: Done        No orders of the defined types were placed in this encounter.    The following information is provided to all patients.  This information is to help you find resources for any of the problems found today that may be affecting your health:                Living healthy guide: www.[facility].[place].gov      Understanding Diabetes: www.diabetes.org      Eating healthy: www.cdc.gov/healthyweight      CDC home safety checklist: www.cdc.gov/steadi/patient.html      Agency on Aging: www.goea.[place].[facility]      Alcoholics anonymous (AA): www.aa.org      Physical Activity: www.david.nih.gov/wg1otii      Tobacco use: www.quitwithusla.org

## 2019-07-03 ENCOUNTER — PATIENT OUTREACH (OUTPATIENT)
Dept: ADMINISTRATIVE | Facility: HOSPITAL | Age: 84
End: 2019-07-03

## 2019-07-17 ENCOUNTER — OFFICE VISIT (OUTPATIENT)
Dept: FAMILY MEDICINE | Facility: CLINIC | Age: 84
End: 2019-07-17
Payer: MEDICARE

## 2019-07-17 VITALS
OXYGEN SATURATION: 98 % | TEMPERATURE: 98 F | BODY MASS INDEX: 32.01 KG/M2 | HEART RATE: 65 BPM | WEIGHT: 199.19 LBS | HEIGHT: 66 IN | SYSTOLIC BLOOD PRESSURE: 138 MMHG | DIASTOLIC BLOOD PRESSURE: 60 MMHG

## 2019-07-17 DIAGNOSIS — I10 ESSENTIAL HYPERTENSION: ICD-10-CM

## 2019-07-17 DIAGNOSIS — I70.0 ATHEROSCLEROSIS OF ABDOMINAL AORTA: ICD-10-CM

## 2019-07-17 DIAGNOSIS — I35.0 MILD AORTIC STENOSIS: ICD-10-CM

## 2019-07-17 DIAGNOSIS — E78.5 HYPERLIPIDEMIA LDL GOAL <100: ICD-10-CM

## 2019-07-17 DIAGNOSIS — K21.9 GASTROESOPHAGEAL REFLUX DISEASE WITHOUT ESOPHAGITIS: ICD-10-CM

## 2019-07-17 DIAGNOSIS — E11.40 TYPE 2 DIABETES, CONTROLLED, WITH NEUROPATHY: Primary | ICD-10-CM

## 2019-07-17 DIAGNOSIS — M85.89 OSTEOPENIA OF MULTIPLE SITES: ICD-10-CM

## 2019-07-17 DIAGNOSIS — K13.0 LIP LESION: ICD-10-CM

## 2019-07-17 DIAGNOSIS — N60.82 SEBACEOUS CYST OF BREAST, LEFT: ICD-10-CM

## 2019-07-17 PROCEDURE — 99397 PER PM REEVAL EST PAT 65+ YR: CPT | Mod: S$PBB,,, | Performed by: INTERNAL MEDICINE

## 2019-07-17 PROCEDURE — 99999 PR PBB SHADOW E&M-EST. PATIENT-LVL IV: CPT | Mod: PBBFAC,,, | Performed by: INTERNAL MEDICINE

## 2019-07-17 PROCEDURE — 99214 OFFICE O/P EST MOD 30 MIN: CPT | Mod: PBBFAC,PO | Performed by: INTERNAL MEDICINE

## 2019-07-17 PROCEDURE — 99999 PR PBB SHADOW E&M-EST. PATIENT-LVL IV: ICD-10-PCS | Mod: PBBFAC,,, | Performed by: INTERNAL MEDICINE

## 2019-07-17 PROCEDURE — 99397 PR PREVENTIVE VISIT,EST,65 & OVER: ICD-10-PCS | Mod: S$PBB,,, | Performed by: INTERNAL MEDICINE

## 2019-07-17 NOTE — PROGRESS NOTES
HISTORY OF PRESENT ILLNESS:  Tonya Cohn is a 87 y.o. female who presents to the clinic today for Diabetes (f/u)  .   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  She does not check her blood pressures or blood sugars at home.  She reports compliance with current medication.  I did decrease the glyburide to once daily.  She denies any problems with low blood sugars.  She denies cardiac chest pain or shortness of breath.  She stays active and go shopping frequently.  She is going on a trip out of the country in August with family.  She denies any significant problems with heartburn or reflux.  Her primary concerns today are a lesion on her left breast that itches.  There is a blackhead there.  She thinks she may have had a drain once in the past.  She is also concerned about a right upper lip lesion.  She states that in the morning the lesion tends to be larger than later on in the day.  It otherwise does not bother her.      PAST MEDICAL HISTORY:  Past Medical History:   Diagnosis Date    AR (allergic rhinitis)     Atherosclerosis of abdominal aorta     noted on CT scan 1/17/2011    Carpal tunnel syndrome of left wrist     Chronic kidney disease, stage 3     Diabetic peripheral neuropathy     Diverticulosis     History of colon polyps     History of diverticulitis of colon 1/2014    Hyperlipemia     Hypertension     Hypothyroidism     followed by endocrinology, Dr. Green    Mild aortic stenosis     OA (osteoarthritis) of knee     Obesity     Sciatica     Type II or unspecified type diabetes mellitus with neurological manifestations, uncontrolled(250.62)        PAST SURGICAL HISTORY:  Past Surgical History:   Procedure Laterality Date    CATARACT EXTRACTION      COLONOSCOPY N/A 10/3/2017    [facility]    COSMETIC SURGERY       ESOPHAGOGASTRODUODENOSCOPY (EGD) N/A 2017    Performed by Katrin [facility] Torre [facility] at [facility] (4TH [facility])    HYSTERECTOMY      partial    JOINT REPLACEMENT Right     LUNG BIOPSY  in her 40's    REPLACEMENT-KNEE-TOTAL Right 2014    Performed by [facility]aur [facility] at [facility] OR 2[facility] FLR    TOTAL KNEE ARTHROPLASTY Right 2014    TKR       SOCIAL HISTORY:  Social History     Socioeconomic History    Marital status:      Spouse name: Not on file    Number of children: 7    Years of education: Not on file    Highest education level: Not on file   Occupational History    Occupation: retired - house cleaning   Social Needs    Financial resource strain: Not on file    Food insecurity:     Worry: Not on file     Inability: Not on file    Transportation needs:     Medical: Not on file     Non-medical: Not on file   Tobacco Use    Smoking status: Former Smoker     Packs/day: 0.25     Years: 2.00     Pack years: 0.50     Start date: 1955     Last attempt to quit: 1962     Years since quittin.0    Smokeless tobacco: Former User     Quit date: 1970   Substance and Sexual Activity    Alcohol use: Yes     Alcohol/week: 0.6 oz     Types: 1 Cans of beer per week     Comment: occasionally    Drug use: No    Sexual activity: Not Currently     Partners: Male   Lifestyle    Physical activity:     Days per week: Not on file     Minutes per session: Not on file    Stress: Not on file   Relationships    Social connections:     Talks on phone: Not on file     Gets together: Not on file     Attends Druze service: Not on file     Active member of club or organization: Not on file     Attends meetings of clubs or organizations: Not on file     Relationship status: Not on file   Other Topics Concern    Are you pregnant or think you may be? No    Breast-feeding No   Social History Narrative    Not on file       FAMILY HISTORY:  Family History   Problem Relation Age of Onset    Cancer  Mother         shoulder tumor - cancer    Hypertension Mother     Heart disease Father         valve replacement    Hypertension Father     Heart attack Father     Diabetes Sister     Arthritis Sister         s/p knee surgery    Diabetes Brother     Heart disease Brother     Heart failure Brother     Stroke Brother     Arthritis Brother         back problems    Skin cancer Brother     Cancer Brother     Throat cancer Brother     Cancer Son         jaw    Melanoma Neg Hx     Eczema Neg Hx     Lupus Neg Hx     Psoriasis Neg Hx     Breast cancer Neg Hx     Colon cancer Neg Hx        ALLERGIES AND MEDICATIONS: updated and reviewed.  Review of patient's allergies indicates:   Allergen Reactions    Lipitor [atorvastatin]      Medication List with Changes/Refills   Current Medications    ASPIRIN 81 MG CHEW    Take 1 tablet (81 mg total) by mouth once daily.    FISH OIL-FAT ACID COMB.8- (OMEGA 3-6-9) 1,200 MG CAP        FLUOCINONIDE 0.1 % CREA    as needed.     GLIPIZIDE (GLUCOTROL) 5 MG TABLET    TAKE 1 TABLET BY MOUTH TWICE DAILY WITH MEALS    LEVOTHYROXINE (SYNTHROID) 88 MCG TABLET    TAKE 1 TABLET(88 MCG) BY MOUTH EVERY DAY    LOSARTAN (COZAAR) 50 MG TABLET    By mouth once daily    LOVASTATIN (MEVACOR) 40 MG TABLET    Take 2 tablets (80 mg total) by mouth every evening.    METFORMIN (GLUCOPHAGE) 1000 MG TABLET    TAKE 1/2 TABLET BY MOUTH TWICE DAILY WITH MEALS    MULTIVIT,CALC,MINS/IRON/FOLIC (ONE-A-DAY WOMENS FORMULA ORAL)    Take 1 tablet by mouth once daily.    OMEPRAZOLE (PRILOSEC) 20 MG CAPSULE    Take 2 capsules (40 mg total) by mouth once daily.          CARE TEAM:  Patient Care Team:  Tere Hughes MD as PCP - General (Internal Medicine)  Tere Hughes MD as PCP - MSSP Attributed  Preston Whitfield MD as Consulting Physician (Cardiology)  Criss Baird OD as Consulting Physician (Optometry)  Marvin Roach MD as Consulting Physician (Rheumatology)  Wendy TOLEDO  "[place], LPN as Licensed Practical Nurse         REVIEW OF SYSTEMS:  Review of Systems   Constitutional: Negative for chills, fatigue, fever and unexpected weight change.   HENT: Negative for congestion and postnasal drip.    Eyes: Negative for pain and visual disturbance.   Respiratory: Negative for cough, shortness of breath and wheezing.    Cardiovascular: Negative for chest pain, palpitations and leg swelling.   Gastrointestinal: Negative for abdominal pain, constipation, diarrhea, nausea and vomiting.   Genitourinary: Negative for dysuria.   Musculoskeletal: Negative for arthralgias and back pain.   Skin: Positive for rash (- see HPI).   Neurological: Negative for weakness and headaches.   Psychiatric/Behavioral: Negative for dysphoric mood and sleep disturbance. The patient is not nervous/anxious.          PHYSICAL EXAM:   Vitals:    07/17/19 1141   BP: 138/60   Pulse:    Temp:      Weight: 90.4 kg (199 lb 3 oz)   Height: 5' 6" (167.6 cm)   Body mass index is 32.15 kg/m².     General appearance - alert, well appearing, and in no distress and obese  Mental status - alert, oriented to person, place, and time, normal mood, behavior, speech, dress, motor activity, and thought processes  Eyes - pupils equal and reactive, extraocular eye movements intact, sclera anicteric  Mouth - mucous membranes moist, pharynx normal without lesions and Round violaceous nodule noted on right upper lip without erythema/warmth/pain to palpation  Neck - supple, no significant adenopathy, carotids upstroke normal bilaterally, no bruits  Lymphatics - no palpable lymphadenopathy  Chest - clear to auscultation, no wheezes, rales or rhonchi, symmetric air entry  Heart - normal rate and regular rhythm, systolic murmur 3/6 radiates to carotids  Back exam - Mild limited range of motion, no pain with motion noted during exam; in depth exam deferred   Neurological - alert, oriented, normal speech, no focal findings or movement disorder " noted, cranial nerves II through XII intact  Musculoskeletal - no muscular tenderness noted, Mild-Moderate osteoarthritic changes noted to both knee joints. No joint effusions noted.   Extremities - peripheral pulses normal, no pedal edema, no clubbing or cyanosis  Skin - normal coloration and turgor, no rashes, no suspicious skin lesions noted; she had what appears to be something like a blood blister on her right upper lip.  She also had what seemed like a sebaceous cyst on her left breast without any signs of infection noted      Lab Results   Component Value Date    HGBA1C 6.3 (H) 04/09/2019    HGBA1C 5.8 (H) 10/03/2018    HGBA1C 5.8 (H) 03/19/2018      Lab Results   Component Value Date    CHOL 179 04/09/2019    CHOL 193 03/19/2018    CHOL 201 (H) 08/22/2017     Lab Results   Component Value Date    LDLCALC 90.8 04/09/2019    LDLCALC 110.0 03/19/2018    LDLCALC 115.6 08/22/2017          ASSESSMENT AND PLAN:  1. Type 2 diabetes, controlled, with neuropathy  Diabetes currently is controlled for age and comorbid conditions. We discussed diabetic diet and regular exercise. We discussed home blood sugar monitoring, if appropriate - the patient does not need to test daily but can test only as needed. Continue current medication regimen.  Diabetic complications addressed: Neuropathy pain controlled.  Patient was counseled on the need for yearly eye exam to screen for/monitor diabetic retinopathy and yearly diabetic foot exam.     2. Essential hypertension/3. Mild aortic stenosis  Discussed sodium restriction, maintaining ideal body weight and regular exercise program as physiologic means to achieve blood pressure control. The patient will strive towards this. The current medical regimen is effective;  continue present plan and medications. Recommended patient to check home readings to monitor and see me for followup as scheduled or sooner as needed. Patient was educated that both decongestant and anti-inflammatory  medication may raise blood pressure.     4. Hyperlipidemia LDL goal <100  We discussed low fat diet and regular exercise.The current medical regimen is effective;  continue present plan and medications.      5. Gastroesophageal reflux disease without esophagitis  Symptoms controlled: yes. Reflux precautions discussed (eliminate tobacco if a smoker; minimize caffeine, chocolate and red/white peppermint intake; avoid heavy and spicy meals; don't lay down within 2-3 hours after eating; minimize the intake of NSAIDs). Medication as needed. Patient asked to take medication breaks, if possible - discussed chronic use can limit calcium absorption (which can lead to osteopenia/osteoporosis), increases the risk for intestinal infections, and can cause kidney damage. There are also some newer studies that show possible increased risk of mortality.     6. Atherosclerosis of abdominal aorta  Patient with Atherosclerosis of the Aorta.  Stable/asymptomatic. Currently stable on lipid lowering medication and b/p monitoring.     7. Osteopenia of multiple sites  We discussed adequate calcium and vitamin D supplementation. We discussed fall precautions. She is up to date on her BMD. No need for prescription medication at this time     8. Sebaceous cyst of breast, left  I will refer her to breast surgery for further evaluation and treatment.  - Ambulatory Referral to Breast Surgery    9. Lip lesion  I will refer her to Dermatology for further evaluation and treatment.  - Ambulatory referral to Dermatology           Follow up in about 6 months (around 1/17/2020), or if symptoms worsen or fail to improve, for follow up chronic medical conditions.. or sooner as needed.

## 2019-08-13 RX ORDER — GLIPIZIDE 5 MG/1
TABLET ORAL
Qty: 180 TABLET | Refills: 1 | Status: SHIPPED | OUTPATIENT
Start: 2019-08-13 | End: 2019-08-20 | Stop reason: SDUPTHER

## 2019-08-20 ENCOUNTER — OFFICE VISIT (OUTPATIENT)
Dept: SURGERY | Facility: CLINIC | Age: 84
End: 2019-08-20
Payer: MEDICARE

## 2019-08-20 VITALS
DIASTOLIC BLOOD PRESSURE: 68 MMHG | HEART RATE: 85 BPM | TEMPERATURE: 98 F | HEIGHT: 66 IN | BODY MASS INDEX: 32.29 KG/M2 | SYSTOLIC BLOOD PRESSURE: 153 MMHG | WEIGHT: 200.94 LBS

## 2019-08-20 DIAGNOSIS — L72.3 SEBACEOUS CYST: Primary | ICD-10-CM

## 2019-08-20 PROCEDURE — 99999 PR PBB SHADOW E&M-EST. PATIENT-LVL III: ICD-10-PCS | Mod: PBBFAC,,, | Performed by: SURGERY

## 2019-08-20 PROCEDURE — 99213 OFFICE O/P EST LOW 20 MIN: CPT | Mod: PBBFAC | Performed by: SURGERY

## 2019-08-20 PROCEDURE — 99999 PR PBB SHADOW E&M-EST. PATIENT-LVL III: CPT | Mod: PBBFAC,,, | Performed by: SURGERY

## 2019-08-20 PROCEDURE — 99203 PR OFFICE/OUTPT VISIT, NEW, LEVL III, 30-44 MIN: ICD-10-PCS | Mod: S$PBB,,, | Performed by: SURGERY

## 2019-08-20 PROCEDURE — 99203 OFFICE O/P NEW LOW 30 MIN: CPT | Mod: S$PBB,,, | Performed by: SURGERY

## 2019-08-20 NOTE — PROGRESS NOTES
History and Physical  [facility]  Department of Surgery    REFERRING PROVIDER: Tere Hughes Md  [address]  [place] [address]    CHIEF COMPLAINT: Breast cyst of the left breast    Subjective:      Tonya Cohn is a 87 y.o. postmenopausal female referred for evaluation of a breast cyst of the left breast. Patient reports cyst was diagnosed at least 2 years ago by mammogram. Patient notes it is intermittently itchy. She denies nipple discharge, pain, or erythema. Patient denies a personal history of breast cancer.    GYN History:  Age of menarche was 17yo. Age of menopause was 45.Patient admits to hormonal therapy, unsure of duration. Patient is . Age of first live birth was 21yo.      Past Medical History:   Diagnosis Date    AR (allergic rhinitis)     Atherosclerosis of abdominal aorta     noted on CT scan 2011    Carpal tunnel syndrome of left wrist     Chronic kidney disease, stage 3     Diabetic peripheral neuropathy     Diverticulosis     History of colon polyps     History of diverticulitis of colon 2014    Hyperlipemia     Hypertension     Hypothyroidism     followed by endocrinology, Dr. Green    Mild aortic stenosis     OA (osteoarthritis) of knee     Obesity     Sciatica     Type II or unspecified type diabetes mellitus with neurological manifestations, uncontrolled(250.62)      Past Surgical History:   Procedure Laterality Date    CATARACT EXTRACTION      COLONOSCOPY N/A 10/3/2017    Performed by [facility] at [facility] (4TH [facility]LR)    COSMETIC SURGERY      ESOPHAGOGASTRODUODENOSCOPY (EGD) N/A 2017    Performed by Katrin De La Torre MD at [facility] END[facility] (4TH FLR)    HYSTERECTOMY      partial    JOINT REPLACEMENT Right     LUNG BIOPSY  in her 40's    REPLACEMENT-KNEE-TOTAL Right 2014    Performed by [facility] at [facility] OR 2ND FLR    TOTAL KNEE ARTHROPLASTY Right 2014    TKR     Current Outpatient Medications on File Prior to  Visit   Medication Sig Dispense Refill    aspirin 81 MG Chew Take 1 tablet (81 mg total) by mouth once daily.  0    fish oil-fat acid comb.8-hb137 (OMEGA 3-6-9) 1,200 mg Cap       fluocinonide 0.1 % Crea as needed.       glipiZIDE (GLUCOTROL) 5 MG tablet TAKE 1 TABLET BY MOUTH TWICE DAILY WITH MEALS 180 tablet 0    glipiZIDE (GLUCOTROL) 5 MG tablet TAKE 1 TABLET BY MOUTH TWICE DAILY WITH MEALS 180 tablet 1    levothyroxine (SYNTHROID) 88 MCG tablet TAKE 1 TABLET(88 MCG) BY MOUTH EVERY DAY 90 tablet 1    losartan (COZAAR) 50 MG tablet By mouth once daily 90 tablet 3    lovastatin (MEVACOR) 40 MG tablet Take 2 tablets (80 mg total) by mouth every evening. 180 tablet 1    metFORMIN (GLUCOPHAGE) 1000 MG tablet TAKE 1/2 TABLET BY MOUTH TWICE DAILY WITH MEALS 90 tablet 1    multivit,calc,mins/iron/folic (ONE-A-DAY WOMENS FORMULA ORAL) Take 1 tablet by mouth once daily.      omeprazole (PRILOSEC) 20 MG capsule Take 2 capsules (40 mg total) by mouth once daily. 180 capsule 1     No current facility-administered medications on file prior to visit.      Social History     Socioeconomic History    Marital status:      Spouse name: Not on file    Number of children: 7    Years of education: Not on file    Highest education level: Not on file   Occupational History    Occupation: retired - house cleaning   Social Needs    Financial resource strain: Not on file    Food insecurity:     Worry: Not on file     Inability: Not on file    Transportation needs:     Medical: Not on file     Non-medical: Not on file   Tobacco Use    Smoking status: Former Smoker     Packs/day: 0.25     Years: 2.00     Pack years: 0.50     Start date: 1955     Last attempt to quit: 1962     Years since quittin.1    Smokeless tobacco: Former User     Quit date: 1970   Substance and Sexual Activity    Alcohol use: Yes     Alcohol/week: 0.6 oz     Types: 1 Cans of beer per week     Comment: occasionally    Drug  use: No    Sexual activity: Not Currently     Partners: Male   Lifestyle    Physical activity:     Days per week: Not on file     Minutes per session: Not on file    Stress: Not on file   Relationships    Social connections:     Talks on phone: Not on file     Gets together: Not on file     Attends Anabaptist service: Not on file     Active member of club or organization: Not on file     Attends meetings of clubs or organizations: Not on file     Relationship status: Not on file   Other Topics Concern    Are you pregnant or think you may be? No    Breast-feeding No   Social History Narrative    Not on file     Family History   Problem Relation Age of Onset    Cancer Mother         shoulder tumor - cancer    Hypertension Mother     Heart disease Father         valve replacement    Hypertension Father     Heart attack Father     Diabetes Sister     Arthritis Sister         s/p knee surgery    Diabetes Brother     Heart disease Brother     Heart failure Brother     Stroke Brother     Arthritis Brother         back problems    Skin cancer Brother     Cancer Brother     Throat cancer Brother     Cancer Son         jaw    Melanoma Neg Hx     Eczema Neg Hx     Lupus Neg Hx     Psoriasis Neg Hx     Breast cancer Neg Hx     Colon cancer Neg Hx        Review of Systems  Review of Systems   Constitutional: Negative for chills and fever.   Respiratory: Negative for cough and shortness of breath.    Cardiovascular: Negative for chest pain.   Gastrointestinal: Negative for nausea and vomiting.   Skin: Positive for itching. Negative for rash.       Objective:     Vitals:    08/20/19 1120   BP: (!) 153/68   Pulse: 85   Temp: 97.6 °F (36.4 °C)       General Appearance:    Alert, cooperative, no distress, appears stated age   Head:    Normocephalic, without obvious abnormality, atraumatic   Eyes:    PERRL, lids normal   Neck:   Supple, symmetrical, no adenopathy   Lungs:     respirations unlabored; no  obvious deformity   Chest Wall:    No tenderness or deformity   Heart::   Regular rate and rhythm   Abdomen:     Soft, non-tender, nondistended   Extremities:   Extremities normal, atraumatic   Skin:   Skin color, texture, turgor normal, no rashes or lesions   Lymph nodes:   No Cervical or supraclavicular adenopathy   Neuro/Psych:   Alert and oriented, good judgement   BREAST exam:  Left: no masses, skin changes. No nipple discharge or inverted nipple.  No axillary LAD. 1cm cyst with blackhead at 7 o'clock at Left breast. No erythema.   Right: no masses, skin changes. No nipple discharge or inverted nipple.  No axillary LAD      Assessment:      Tonya Cohn is a 87 y.o. postmenopausal female referred for evaluation of an breast cyst of the left breast.     Plan:   1. Cyst removal with local anesthetic on 9/20/2019.   I have personally taken the history and examined this patient and agree with the resident's note as stated above.  The patient has a 2 year history of a 1 cm size sebaceous cyst/epidermal inclusion cyst of the left breast skin in the inferior 7:00 o'clock region.    There are no signs of infection.  It has slightly grown in the interval over the past 2 years and she desires elective local excision.    She has been consented and scheduled for the minor procedure room on September 20, 2019 at 8:00 a.m. for local excision of left inferior breast cyst.

## 2019-08-20 NOTE — LETTER
August 22, 2019      Tere Hughes MD  [address]  [place] [address]           [facility]  [address]  [facility] [address]  Phone: [phone]  Fax: [phone]          Patient: Tonya Cohn   MR Number: [id number]   YOB: 1932   Date of Visit: 8/20/2019       Dear Dr. Tere Hughes:    Thank you for referring Tonya Cohn to me for evaluation. Attached you will find relevant portions of my assessment and plan of care.    If you have questions, please do not hesitate to call me. I look forward to following Tonya Cohn along with you.    Sincerely,    Landon Gardner MD    Enclosure  CC:  No Recipients    If you would like to receive this communication electronically, please contact [email or web address] or [phone] to request more information on [employer] Link access.    For providers and/or their staff who would like to refer a patient to [facility], please contact us through our one-stop-shop provider referral line, [facility], at [phone].    If you feel you have received this communication in error or would no longer like to receive these types of communications, please e-mail [email or web address]

## 2019-08-22 PROBLEM — L72.3 SEBACEOUS CYST: Status: ACTIVE | Noted: 2019-08-22

## 2019-08-29 ENCOUNTER — INITIAL CONSULT (OUTPATIENT)
Dept: DERMATOLOGY | Facility: CLINIC | Age: 84
End: 2019-08-29
Payer: MEDICARE

## 2019-08-29 VITALS — BODY MASS INDEX: 32.28 KG/M2 | WEIGHT: 200 LBS

## 2019-08-29 DIAGNOSIS — Z87.2 HISTORY OF DERMATITIS: ICD-10-CM

## 2019-08-29 DIAGNOSIS — R23.8 VENOUS LAKE OF LIP: Primary | ICD-10-CM

## 2019-08-29 PROCEDURE — 99999 PR PBB SHADOW E&M-EST. PATIENT-LVL II: CPT | Mod: PBBFAC,,, | Performed by: DERMATOLOGY

## 2019-08-29 PROCEDURE — 99212 OFFICE O/P EST SF 10 MIN: CPT | Mod: S$PBB,,, | Performed by: DERMATOLOGY

## 2019-08-29 PROCEDURE — 99212 OFFICE O/P EST SF 10 MIN: CPT | Mod: PBBFAC,PO | Performed by: DERMATOLOGY

## 2019-08-29 PROCEDURE — 99212 PR OFFICE/OUTPT VISIT, EST, LEVL II, 10-19 MIN: ICD-10-PCS | Mod: S$PBB,,, | Performed by: DERMATOLOGY

## 2019-08-29 PROCEDURE — 99999 PR PBB SHADOW E&M-EST. PATIENT-LVL II: ICD-10-PCS | Mod: PBBFAC,,, | Performed by: DERMATOLOGY

## 2019-08-29 NOTE — LETTER
August 29, 2019      Tere Hughes MD  [address] [facility]  [place] [address]           [place] [facility]  [address] [facility].  [place] [address]  Phone: [phone]  Fax: [phone]          Patient: Tonya Cohn   MR Number: [id number]   YOB: 1932   Date of Visit: 8/29/2019       Dear Dr. Tere Hughes:    Thank you for referring Tonya Cohn to me for evaluation. Attached you will find relevant portions of my assessment and plan of care.    If you have questions, please do not hesitate to call me. I look forward to following Tonya Cohn along with you.    Sincerely,    Emma Aldridge MD    Enclosure  CC:  No Recipients    If you would like to receive this communication electronically, please contact [email or web address] or [phone] to request more information on [employer] Link access.    For providers and/or their staff who would like to refer a patient to [facility], please contact us through our one-stop-shop provider referral line, [facility], at [phone].    If you feel you have received this communication in error or would no longer like to receive these types of communications, please e-mail [email or web address]

## 2019-08-29 NOTE — PROGRESS NOTES
Subjective:       Patient ID:  Tonya Cohn is a 87 y.o. female who presents for   Chief Complaint   Patient presents with    Lesion     lip     Lesion on lip for over 5 years not painful no tx no changes in lesion.    Lesion  - Initial  Affected locations: face  Signs / symptoms: asymptomatic  Aggravated by: nothing  Relieving factors/Treatments tried: nothing  Improvement on treatment: no relief        Review of Systems   Constitutional: Negative for fever, chills, weight loss, weight gain, fatigue, night sweats and malaise.   Skin: Positive for wears hat. Negative for daily sunscreen use and activity-related sunscreen use.   Hematologic/Lymphatic: Bruises/bleeds easily.        Objective:    Physical Exam   Constitutional: She appears well-developed and well-nourished. No distress.   Neurological: She is alert and oriented to person, place, and time. She is not disoriented.   Psychiatric: She has a normal mood and affect.   Skin:   Areas Examined (abnormalities noted in diagram):   Head / Face Inspection Performed  Neck Inspection Performed  RUE Inspected  LUE Inspection Performed              Diagram Legend     Erythematous scaling macule/papule c/w actinic keratosis       Vascular papule c/w angioma      Pigmented verrucoid papule/plaque c/w seborrheic keratosis      Yellow umbilicated papule c/w sebaceous hyperplasia      Irregularly shaped tan macule c/w lentigo     1-2 mm smooth white papules consistent with Milia      Movable subcutaneous cyst with punctum c/w epidermal inclusion cyst      Subcutaneous movable cyst c/w pilar cyst      Firm pink to brown papule c/w dermatofibroma      Pedunculated fleshy papule(s) c/w skin tag(s)      Evenly pigmented macule c/w junctional nevus     Mildly variegated pigmented, slightly irregular-bordered macule c/w mildly atypical nevus      Flesh colored to evenly pigmented papule c/w intradermal nevus       Pink pearly papule/plaque c/w basal cell carcinoma       Erythematous hyperkeratotic cursted plaque c/w SCC      Surgical scar with no sign of skin cancer recurrence      Open and closed comedones      Inflammatory papules and pustules      Verrucoid papule consistent consistent with wart     Erythematous eczematous patches and plaques     Dystrophic onycholytic nail with subungual debris c/w onychomycosis     Umbilicated papule    Erythematous-base heme-crusted tan verrucoid plaque consistent with inflamed seborrheic keratosis     Erythematous Silvery Scaling Plaque c/w Psoriasis     See annotation      Assessment / Plan:        Venous lake of lip  (See note 2014)  Call if grows, bleeds    History of dermatitis  See previous notes, no recurrences of hand, neck or leg dermatitis             Follow up if symptoms worsen or fail to improve.

## 2019-09-19 ENCOUNTER — TELEPHONE (OUTPATIENT)
Dept: SURGERY | Facility: CLINIC | Age: 84
End: 2019-09-19

## 2019-09-19 NOTE — TELEPHONE ENCOUNTER
Spoke to Patient's Son-in-Law to verify her 0800 appointment with Dr. Gardner on 9-20-19.  Directions given to the General Surgery Department.  Verbalized understanding of directions.

## 2019-09-20 ENCOUNTER — PROCEDURE VISIT (OUTPATIENT)
Dept: SURGERY | Facility: CLINIC | Age: 84
End: 2019-09-20
Payer: MEDICARE

## 2019-09-20 VITALS
DIASTOLIC BLOOD PRESSURE: 61 MMHG | BODY MASS INDEX: 32.13 KG/M2 | SYSTOLIC BLOOD PRESSURE: 133 MMHG | HEIGHT: 66 IN | HEART RATE: 94 BPM | WEIGHT: 199.94 LBS | TEMPERATURE: 98 F

## 2019-09-20 DIAGNOSIS — N60.89 SEBACEOUS CYST OF SKIN OF BREAST, UNSPECIFIED LATERALITY: ICD-10-CM

## 2019-09-20 DIAGNOSIS — N60.02 BREAST CYST, LEFT: Primary | ICD-10-CM

## 2019-09-20 PROCEDURE — 11402 EXC TR-EXT B9+MARG 1.1-2 CM: CPT | Mod: S$PBB,,, | Performed by: SURGERY

## 2019-09-20 PROCEDURE — 88305 TISSUE SPECIMEN TO PATHOLOGY, SURGERY: ICD-10-PCS | Mod: 26,,, | Performed by: DERMATOLOGY

## 2019-09-20 PROCEDURE — 12042 INTMD RPR N-HF/GENIT2.6-7.5: CPT | Mod: PBBFAC | Performed by: SURGERY

## 2019-09-20 PROCEDURE — 88305 TISSUE EXAM BY PATHOLOGIST: CPT | Mod: 26,,, | Performed by: DERMATOLOGY

## 2019-09-20 PROCEDURE — 11402 PR EXC SKIN BENIG 1.1-2 CM TRUNK,ARM,LEG: ICD-10-PCS | Mod: S$PBB,,, | Performed by: SURGERY

## 2019-09-20 PROCEDURE — 88305 TISSUE EXAM BY PATHOLOGIST: CPT | Performed by: DERMATOLOGY

## 2019-09-20 PROCEDURE — 11402 EXC TR-EXT B9+MARG 1.1-2 CM: CPT | Mod: PBBFAC | Performed by: SURGERY

## 2019-09-20 PROCEDURE — 12042 INTMD RPR N-HF/GENIT2.6-7.5: CPT | Mod: 51,S$PBB,, | Performed by: SURGERY

## 2019-09-20 PROCEDURE — 12042 PR LAYR CLOS WND REST BODY 2.6-7.5 CM: ICD-10-PCS | Mod: 51,S$PBB,, | Performed by: SURGERY

## 2019-09-20 RX ORDER — LOVASTATIN 20 MG/1
TABLET ORAL
Refills: 1 | COMMUNITY
Start: 2019-09-03 | End: 2019-10-14 | Stop reason: SDUPTHER

## 2019-09-20 NOTE — PROCEDURES
DATE OF PROCEDURE:  09/20/2019    PRIMARY SURGEON:  Landon Gardner MD    ASSISTANT:  Tere Casillas MD, surgery resident.     PREOPERATIVE DIAGNOSIS:  A 1 cm benign epidermal inclusion cyst/sebaceous cyst   of the left inferior breast.    POSTOPERATIVE DIAGNOSIS:  A 1 cm benign epidermal inclusion cyst/sebaceous cyst   of the left inferior breast.    PROCEDURE:  Wide local excision of approximately 1 cm epidermal inclusion cyst   with a transverse skin ellipse to achieve local excision without cyst rupture   measuring 1.5 x 3 cm with primary closure of wound in multiple layers of   absorbable suture, incision length 3 cm.    PROCEDURE IN DETAIL:  The patient underwent informed consent.  The note was   reviewed and updated.  She was brought to the minor procedure room.  She was   placed in a supine position.  The left breast was prepped and draped in a   sterile fashion.  Approximately 1 cm marble sized epidermal inclusion cyst was   marked with a couple of millimeters of grossly clear margins around it, so that   we would not rupture it during its dissection and local excision.  The   transverse ellipse measured 1.5 cm from superior to inferior and 3 cm wide.    Local anesthesia was infiltrated.  After sterile prep and drape, the skin was   incised sharply with full thickness skin incised sharply.  The subcutaneous   tissue was dissected sharply around the cyst, so as to not rupture the cyst.    The cyst was removed intact without rupture and sent to Pathology for permanent   sectioning.  The 1.5 x 3 cm defect was then made hemostatic within the   subcutaneous tissue with cautery.  The deep dermal layer was reapproximated with   interrupted 3-0 Vicryl suture with intermittent deep three-point fixation down   into the deeper subcutaneous tissue to obliterate any potential dead space with   the deep dermal and subcutaneous layers reapproximated to close off the dead   space with interrupted Vicryl sutures.  The  skin was then closed with a running   4-0 Monocryl subcuticular skin closure.  Incision length was 3 cm.  Dermabond   was placed.  Postop wound care and analgesic instructions were provided.  The   patient will follow up p.r.n. and we will phone review the pathology results,   for which we expect a benign epidermal inclusion cyst/sebaceous cyst of the   skin.  She tolerated the procedure well without complications.      KATE/IN  dd: 09/20/2019 09:26:03 (CDT)  td: 09/21/2019 02:05:08 (CDT)  Doc ID   #[id number]  Job ID #[id number]    CC:     Job # [id number]

## 2019-10-02 ENCOUNTER — TELEPHONE (OUTPATIENT)
Dept: SURGERY | Facility: CLINIC | Age: 84
End: 2019-10-02

## 2019-10-02 NOTE — TELEPHONE ENCOUNTER
I tried calling this patient to discuss her benign pathology results but she didn't answer.  I will try to reach her again tomorrow morning.    ~Kelsie Mdconald PA-C

## 2019-10-03 ENCOUNTER — OFFICE VISIT (OUTPATIENT)
Dept: SURGERY | Facility: CLINIC | Age: 84
End: 2019-10-03
Payer: MEDICARE

## 2019-10-03 VITALS
BODY MASS INDEX: 32.08 KG/M2 | WEIGHT: 199.63 LBS | DIASTOLIC BLOOD PRESSURE: 68 MMHG | TEMPERATURE: 98 F | HEIGHT: 66 IN | HEART RATE: 88 BPM | SYSTOLIC BLOOD PRESSURE: 136 MMHG

## 2019-10-03 DIAGNOSIS — N60.82 SEBACEOUS CYST OF BREAST, LEFT: Primary | ICD-10-CM

## 2019-10-03 PROCEDURE — 99999 PR PBB SHADOW E&M-EST. PATIENT-LVL III: ICD-10-PCS | Mod: PBBFAC,,, | Performed by: SURGERY

## 2019-10-03 PROCEDURE — 99213 OFFICE O/P EST LOW 20 MIN: CPT | Mod: PBBFAC | Performed by: SURGERY

## 2019-10-03 PROCEDURE — 99024 PR POST-OP FOLLOW-UP VISIT: ICD-10-PCS | Mod: POP,,, | Performed by: SURGERY

## 2019-10-03 PROCEDURE — 99999 PR PBB SHADOW E&M-EST. PATIENT-LVL III: CPT | Mod: PBBFAC,,, | Performed by: SURGERY

## 2019-10-03 PROCEDURE — 99024 POSTOP FOLLOW-UP VISIT: CPT | Mod: POP,,, | Performed by: SURGERY

## 2019-10-03 NOTE — PROGRESS NOTES
The patient is an 87-year-old female status post local excision of a benign follicular cyst of the infundibular type from the left inferior 6:00 a.m. region of the breast in the minor procedure room on 09/20/2019.  She offers no subjective complaints in the incision site is well healed.  It is clean dry and intact with no signs of infection.  A copy the pathology result was provided to the patient and reviewed closure. She will follow up with me on a p.r.n. basis.  She will continue with annual screening bilateral mammography as previously outlined.

## 2019-10-14 DIAGNOSIS — E03.9 ACQUIRED HYPOTHYROIDISM: ICD-10-CM

## 2019-10-14 RX ORDER — LOVASTATIN 20 MG/1
10 TABLET ORAL DAILY
Qty: 45 TABLET | Refills: 0 | Status: SHIPPED | OUTPATIENT
Start: 2019-10-14 | End: 2019-12-11 | Stop reason: SDUPTHER

## 2019-10-14 RX ORDER — LEVOTHYROXINE SODIUM 88 UG/1
TABLET ORAL
Qty: 90 TABLET | Refills: 0 | Status: SHIPPED | OUTPATIENT
Start: 2019-10-14 | End: 2020-01-10

## 2019-10-14 NOTE — TELEPHONE ENCOUNTER
----- Message from Randi Sapp sent at 10/14/2019 11:44 AM CDT -----  Contact: pt  Type: RX Refill Request    Who Called: pt    Have you contacted your pharmacy:    Refill or New Rx:lovastatin (MEVACOR) 20 MG tablet -90 day    levothyroxine (SYNTHROID) 88 MCG tablet - 90 day    RX Name and Strength:    How is the patient currently taking it? (ex. 1XDay):    Is this a 30 day or 90 day RX:    Preferred Pharmacy with phone number:  [id number] - [facility] [address] [facility] [address] [facility] [address]  [address]  [facility] [address]  Phone: [phone] Fax: [phone]        Local or Mail Order:    Ordering Provider:    Would the patient rather a call back or a response via [facility]? Call back    Best Call Back Number:[phone]  Additional Information:

## 2019-10-22 RX ORDER — METFORMIN HYDROCHLORIDE 1000 MG/1
TABLET ORAL
Qty: 90 TABLET | Refills: 1 | Status: SHIPPED | OUTPATIENT
Start: 2019-10-22 | End: 2020-06-06

## 2019-10-30 ENCOUNTER — HOSPITAL ENCOUNTER (OUTPATIENT)
Dept: CARDIOLOGY | Facility: HOSPITAL | Age: 84
Discharge: HOME | End: 2019-10-30
Attending: INTERNAL MEDICINE
Payer: MEDICARE

## 2019-10-30 ENCOUNTER — OFFICE VISIT (OUTPATIENT)
Dept: FAMILY MEDICINE | Facility: CLINIC | Age: 84
End: 2019-10-30
Payer: MEDICARE

## 2019-10-30 VITALS
SYSTOLIC BLOOD PRESSURE: 140 MMHG | DIASTOLIC BLOOD PRESSURE: 70 MMHG | WEIGHT: 200.94 LBS | HEART RATE: 88 BPM | BODY MASS INDEX: 32.29 KG/M2 | OXYGEN SATURATION: 96 % | HEIGHT: 66 IN | TEMPERATURE: 98 F

## 2019-10-30 DIAGNOSIS — M62.838 NECK MUSCLE SPASM: Primary | ICD-10-CM

## 2019-10-30 DIAGNOSIS — J34.89 RHINORRHEA: ICD-10-CM

## 2019-10-30 DIAGNOSIS — Z12.31 VISIT FOR SCREENING MAMMOGRAM: ICD-10-CM

## 2019-10-30 DIAGNOSIS — I35.0 MILD AORTIC STENOSIS: ICD-10-CM

## 2019-10-30 DIAGNOSIS — E78.5 HYPERLIPIDEMIA LDL GOAL <100: ICD-10-CM

## 2019-10-30 DIAGNOSIS — I70.0 ATHEROSCLEROSIS OF ABDOMINAL AORTA: ICD-10-CM

## 2019-10-30 DIAGNOSIS — Z23 NEED FOR INFLUENZA VACCINATION: ICD-10-CM

## 2019-10-30 DIAGNOSIS — R05.9 COUGH: ICD-10-CM

## 2019-10-30 DIAGNOSIS — E11.40 TYPE 2 DIABETES, CONTROLLED, WITH NEUROPATHY: ICD-10-CM

## 2019-10-30 LAB
AORTIC ROOT ANNULUS: 2.58 CM
AORTIC VALVE CUSP SEPERATION: 1.66 CM
ASCENDING AORTA: 2.92 CM
AV INDEX (PROSTH): 0.61
AV MEAN GRADIENT: 8 MMHG
AV PEAK GRADIENT: 15 MMHG
AV VALVE AREA: 1.63 CM2
AV VELOCITY RATIO: 0.53
CV ECHO LV RWT: 0.68 CM
DOP CALC AO PEAK VEL: 1.94 M/S
DOP CALC AO VTI: 43.75 CM
DOP CALC LVOT AREA: 2.7 CM2
DOP CALC LVOT DIAMETER: 1.85 CM
DOP CALC LVOT PEAK VEL: 1.03 M/S
DOP CALC LVOT STROKE VOLUME: 71.33 CM3
DOP CALCLVOT PEAK VEL VTI: 26.55 CM
E WAVE DECELERATION TIME: 293.17 MSEC
E/A RATIO: 1.07
ECHO LV POSTERIOR WALL: 1.28 CM (ref 0.6–1.1)
FRACTIONAL SHORTENING: 34 % (ref 28–44)
INTERVENTRICULAR SEPTUM: 1.26 CM (ref 0.6–1.1)
IVRT: 0.05 MSEC
LA MAJOR: 5.79 CM
LA MINOR: 5.56 CM
LA WIDTH: 3.47 CM
LEFT ATRIUM SIZE: 4.83 CM
LEFT ATRIUM VOLUME: 80.81 CM3
LEFT INTERNAL DIMENSION IN SYSTOLE: 2.52 CM (ref 2.1–4)
LEFT VENTRICLE DIASTOLIC VOLUME: 61.69 ML
LEFT VENTRICLE SYSTOLIC VOLUME: 22.87 ML
LEFT VENTRICULAR INTERNAL DIMENSION IN DIASTOLE: 3.79 CM (ref 3.5–6)
LEFT VENTRICULAR MASS: 166.33 G
MV MEAN GRADIENT: 3 MMHG
MV PEAK A VEL: 1.54 M/S
MV PEAK E VEL: 1.65 M/S
MV STENOSIS PRESSURE HALF TIME: 82 MS
MV VALVE AREA P 1/2 METHOD: 2.68 CM2
PISA TR MAX VEL: 2.6 M/S
PULM VEIN S/D RATIO: 1.18
PV PEAK D VEL: 0.49 M/S
PV PEAK S VEL: 0.58 M/S
PV PEAK VELOCITY: 0.98 CM/S
RA MAJOR: 5.3 CM
RA PRESSURE: 8 MMHG
RA WIDTH: 2.9 CM
RIGHT VENTRICULAR END-DIASTOLIC DIMENSION: 2.78 CM
RV TISSUE DOPPLER FREE WALL SYSTOLIC VELOCITY 1 (APICAL 4 CHAMBER VIEW): 11.11 CM/S
SINUS: 2.99 CM
STJ: 2.33 CM
TR MAX PG: 27 MMHG
TRICUSPID ANNULAR PLANE SYSTOLIC EXCURSION: 1.99 CM
TV REST PULMONARY ARTERY PRESSURE: 35 MMHG

## 2019-10-30 PROCEDURE — 90662 IIV NO PRSV INCREASED AG IM: CPT | Mod: PBBFAC,PO

## 2019-10-30 PROCEDURE — 99214 OFFICE O/P EST MOD 30 MIN: CPT | Mod: S$PBB,,, | Performed by: NURSE PRACTITIONER

## 2019-10-30 PROCEDURE — 99999 PR PBB SHADOW E&M-EST. PATIENT-LVL IV: ICD-10-PCS | Mod: PBBFAC,,, | Performed by: NURSE PRACTITIONER

## 2019-10-30 PROCEDURE — 99999 PR PBB SHADOW E&M-EST. PATIENT-LVL IV: CPT | Mod: PBBFAC,,, | Performed by: NURSE PRACTITIONER

## 2019-10-30 PROCEDURE — 93306 TTE W/DOPPLER COMPLETE: CPT | Mod: 26,,, | Performed by: INTERNAL MEDICINE

## 2019-10-30 PROCEDURE — 93306 TRANSTHORACIC ECHO (TTE) COMPLETE (CUPID ONLY): ICD-10-PCS | Mod: 26,,, | Performed by: INTERNAL MEDICINE

## 2019-10-30 PROCEDURE — 93306 TTE W/DOPPLER COMPLETE: CPT

## 2019-10-30 PROCEDURE — 99214 PR OFFICE/OUTPT VISIT, EST, LEVL IV, 30-39 MIN: ICD-10-PCS | Mod: S$PBB,,, | Performed by: NURSE PRACTITIONER

## 2019-10-30 PROCEDURE — 99214 OFFICE O/P EST MOD 30 MIN: CPT | Mod: PBBFAC,25,PO | Performed by: NURSE PRACTITIONER

## 2019-10-30 RX ORDER — BENZONATATE 200 MG/1
200 CAPSULE ORAL 3 TIMES DAILY PRN
Qty: 30 CAPSULE | Refills: 0 | Status: SHIPPED | OUTPATIENT
Start: 2019-10-30 | End: 2019-11-09

## 2019-10-30 RX ORDER — TIZANIDINE 4 MG/1
4 TABLET ORAL 2 TIMES DAILY PRN
Qty: 20 TABLET | Refills: 0 | Status: SHIPPED | OUTPATIENT
Start: 2019-10-30 | End: 2019-11-09

## 2019-10-30 NOTE — PATIENT INSTRUCTIONS
Neck Spasm     A spasm of the neck muscles can happen after a sudden awkward neck movement. Sleeping with your neck in a crooked position can also cause spasm. Some people respond to emotional stress by tensing the muscles of their neck, shoulders, and upper back. If neck spasm lasts long enough, it can cause headache.  The treatment described below will usually help the pain to go away in 5 to 7 days. Pain that continues may need further evaluation or other types of treatment such as physical therapy.  Home care  · Rest and relax the muscles. Use a comfortable pillow that supports the head and keeps the spine in a neutral position. The position of the head should not be tilted forward or backward. A rolled up towel may help for a custom fit.  · Some people find relief with heat. Heat can be applied with either a warm shower or bath or a moist towel heated in the microwave and massage. Others prefer cold packs. You can make an ice pack by filling a plastic bag that seals at the top with ice cubes or crushed ice and then wrapping it with a thin towel. Try both and use the method that feels best for 15 to 20 minutes, several times a day.  · Whether using ice or heat, be careful that you do not injure your skin. Never put ice directly on the skin. Always wrap the ice in a towel or other type of cloth. This is very important, especially in people with poor skin sensations.  · Try to reduce your stress level. Emotional stress can lead to neck muscle tension and get in the way of or delay the healing process.  · You may use over-the-counter pain medicine to control pain, unless another medicine was prescribed.If you have chronic liver or kidney disease or ever had a stomach ulcer or GI bleeding, talk with your healthcare provider before using these medicines.  Follow-up care  Follow up with your healthcare provider if your symptoms do not show signs of improvement after one week. Physical therapy or further tests may be  needed.  If X-rays, CT scans, or MRI scans were taken, you will be told of any new findings that may affect your care.  Call 911  Call 911 if you have:  · Sudden weakness or numbness in one or both arms  · Neck swelling, difficulty or painful swallowing  · Difficulty breathing  · Chest pain  When to seek medical advice  Call your healthcare provider right away if any of these occur:  · Pain becomes worse or spreads into one or both arms  · Increasing headache with nausea or vomiting  · Fever of 100.4°F (38°C) or above lasting for 24 to 48 hours  Date Last Reviewed: 11/21/2015  © 1942-0429 [employer]. [address] [place] [address], [place] [address]. All rights reserved. This information is not intended as a substitute for professional medical care. Always follow your healthcare professional's instructions.        Muscle Spasm  A muscle spasm is a sudden tightening of the muscle you cant control. This may be caused by strain, overworking the muscle, or injury. It can also be caused by dehydration, electrolyte imbalance, diabetes, alcohol use, and certain medicines. If it goes on long enough the muscle spasm causes pain. Common areas for muscle spasm are the legs, neck, and back.  Home care  · Heat, massage, and stretching will help relax muscle spasm.  · When the spasm is in your arm or leg, stretch the muscle passively. To do this, have someone bend or straighten the joint above or below the muscle until you feel the stretch on the sore muscle. You can stretch the muscle actively by moving the affected body part. This will stretch the muscle that is in spasm. For example, if the spasm is in your calf, bend the ankle so your toes point upward toward your knee. This will stretch your calf muscle.  · You may use over-the-counter pain medicine to control pain, unless another medicine was prescribed. If you have chronic liver or kidney disease or ever had a stomach ulcer or GI bleeding, talk with your healthcare  provider before using these medicines.  Follow-up care  Follow up with your healthcare provider, or as advised.    When to seek medical advice  Call your healthcare provider right away if any of the following occur:  · Fingers or toes become swollen, cold, blue, numb, or tingly  · You develop weakness in the affected arm or leg  · Pain increases and is not controlled by the above measures  Date Last Reviewed: 11/21/2015  © 3321-3330 [employer]. [address] [place] [address], [place] [address]. All rights reserved. This information is not intended as a substitute for professional medical care. Always follow your healthcare professional's instructions.

## 2019-10-31 ENCOUNTER — PATIENT OUTREACH (OUTPATIENT)
Dept: ADMINISTRATIVE | Facility: OTHER | Age: 84
End: 2019-10-31

## 2019-11-01 ENCOUNTER — HOSPITAL ENCOUNTER (EMERGENCY)
Facility: HOSPITAL | Age: 84
Discharge: HOME | End: 2019-11-01
Attending: EMERGENCY MEDICINE
Payer: MEDICARE

## 2019-11-01 VITALS
BODY MASS INDEX: 32.14 KG/M2 | WEIGHT: 200 LBS | SYSTOLIC BLOOD PRESSURE: 177 MMHG | DIASTOLIC BLOOD PRESSURE: 79 MMHG | HEIGHT: 66 IN | TEMPERATURE: 98 F | HEART RATE: 71 BPM | RESPIRATION RATE: 18 BRPM | OXYGEN SATURATION: 100 %

## 2019-11-01 DIAGNOSIS — M62.838 NECK MUSCLE SPASM: Primary | ICD-10-CM

## 2019-11-01 PROCEDURE — 99284 EMERGENCY DEPT VISIT MOD MDM: CPT | Mod: 25

## 2019-11-01 PROCEDURE — 63600175 PHARM REV CODE 636 W HCPCS: Performed by: PHYSICIAN ASSISTANT

## 2019-11-01 PROCEDURE — 96372 THER/PROPH/DIAG INJ SC/IM: CPT

## 2019-11-01 RX ORDER — LIDOCAINE 50 MG/G
1 PATCH TOPICAL DAILY
Qty: 5 PATCH | Refills: 0 | Status: SHIPPED | OUTPATIENT
Start: 2019-11-01 | End: 2019-11-06

## 2019-11-01 RX ORDER — LIDOCAINE 50 MG/G
1 PATCH TOPICAL DAILY
Qty: 5 PATCH | Refills: 0 | Status: SHIPPED | OUTPATIENT
Start: 2019-11-01 | End: 2019-11-01 | Stop reason: SDUPTHER

## 2019-11-01 RX ORDER — NAPROXEN 500 MG/1
500 TABLET ORAL 2 TIMES DAILY WITH MEALS
Qty: 14 TABLET | Refills: 0 | Status: SHIPPED | OUTPATIENT
Start: 2019-11-01 | End: 2021-04-30

## 2019-11-01 RX ORDER — MORPHINE SULFATE 2 MG/ML
2 INJECTION, SOLUTION INTRAMUSCULAR; INTRAVENOUS
Status: DISCONTINUED | OUTPATIENT
Start: 2019-11-01 | End: 2019-11-01

## 2019-11-01 RX ORDER — KETOROLAC TROMETHAMINE 30 MG/ML
15 INJECTION, SOLUTION INTRAMUSCULAR; INTRAVENOUS
Status: DISCONTINUED | OUTPATIENT
Start: 2019-11-01 | End: 2019-11-01

## 2019-11-01 RX ORDER — KETOROLAC TROMETHAMINE 30 MG/ML
15 INJECTION, SOLUTION INTRAMUSCULAR; INTRAVENOUS
Status: COMPLETED | OUTPATIENT
Start: 2019-11-01 | End: 2019-11-01

## 2019-11-01 RX ADMIN — KETOROLAC TROMETHAMINE 15 MG: 30 INJECTION, SOLUTION INTRAMUSCULAR at 02:11

## 2019-11-01 NOTE — ED TRIAGE NOTES
Pt presents to ED with C/O 0/10 bilat neck pain and stiffness with onset Monday and has progressively gotten worse. Pt states she did see the NP on wed and was proscribed muscle relaaxers with no relief.

## 2019-11-01 NOTE — ED PROVIDER NOTES
Encounter Date: 11/1/2019       History     Chief Complaint   Patient presents with    Neck Pain     pt presents to ED today c/o pain to neck onset Monday pt reports seeing a NP and given rx for muscle relaxers with no relief      Tonya Cohn, a 87 y.o. female  has a past medical history of AR (allergic rhinitis), Atherosclerosis of abdominal aorta, Carpal tunnel syndrome of left wrist, Chronic kidney disease, stage 3, Diabetic peripheral neuropathy, Diverticulosis, History of colon polyps, History of diverticulitis of colon (1/2014), Hyperlipemia, Hypertension, Hypothyroidism, Mild aortic stenosis, OA (osteoarthritis) of knee, Obesity, Sciatica, and Type II or unspecified type diabetes mellitus with neurological manifestations, uncontrolled(250.62).     She presents to the ED evaluation of left sided neck pain that started on Wednesday.  Patient states that she was seen by her NP and given an RX of a muscle relaxer of an unknown name.  States she has been taking this medication twice a day but has not gotten any improvement, feels that the pain is getting worse.  Denies any trauma or injury to site.  Pain is worse when movement of her head and better when she is still.  No previous surgery to site.  Patient is hypertensive in the ED and has taken her medications.  Denies any CP, SOB, HA.        The history is provided by the patient.     Review of patient's allergies indicates:   Allergen Reactions    Lipitor [atorvastatin]      Past Medical History:   Diagnosis Date    AR (allergic rhinitis)     Atherosclerosis of abdominal aorta     noted on CT scan 1/17/2011    Carpal tunnel syndrome of left wrist     Chronic kidney disease, stage 3     Diabetic peripheral neuropathy     Diverticulosis     History of colon polyps     History of diverticulitis of colon 1/2014    Hyperlipemia     Hypertension     Hypothyroidism     followed by endocrinology, Dr. Green    Mild aortic stenosis     OA  (osteoarthritis) of knee     Obesity     Sciatica     Type II or unspecified type diabetes mellitus with neurological manifestations, uncontrolled(250.62)      Past Surgical History:   Procedure Laterality Date    CATARACT EXTRACTION      COLONOSCOPY N/A 10/3/2017    Procedure: COLONOSCOPY;  Surgeon: Alin Gonzalez MD;  Location: [facility] ([address] [place] [address]);  Service: Endoscopy;  Laterality: N/A;    COSMETIC SURGERY      HYSTERECTOMY      partial    JOINT REPLACEMENT Right     LUNG BIOPSY  in her 40's    TOTAL KNEE ARTHROPLASTY Right 2014    TKR     Family History   Problem Relation Age of Onset    Cancer Mother         shoulder tumor - cancer    Hypertension Mother     Heart disease Father         valve replacement    Hypertension Father     Heart attack Father     Diabetes Sister     Arthritis Sister         s/p knee surgery    Diabetes Brother     Heart disease Brother     Heart failure Brother     Stroke Brother     Arthritis Brother         back problems    Skin cancer Brother     Cancer Brother     Throat cancer Brother     Cancer Son         jaw    Melanoma Neg Hx     Eczema Neg Hx     Lupus Neg Hx     Psoriasis Neg Hx     Breast cancer Neg Hx     Colon cancer Neg Hx      Social History     Tobacco Use    Smoking status: Former Smoker     Packs/day: 0.25     Years: 2.00     Pack years: 0.50     Start date: 1955     Last attempt to quit: 1962     Years since quittin.3    Smokeless tobacco: Former User     Quit date: 1970   Substance Use Topics    Alcohol use: Yes     Alcohol/week: 1.0 standard drinks     Types: 1 Cans of beer per week     Comment: occasionally    Drug use: No     Review of Systems   Constitutional: Negative for fever.   Respiratory: Negative for shortness of breath.    Cardiovascular: Negative for chest pain.   Gastrointestinal: Negative for nausea and vomiting.   Musculoskeletal: Positive for neck pain and neck stiffness.   Neurological:  Negative for dizziness, weakness and numbness.   All other systems reviewed and are negative.      Physical Exam     Initial Vitals [11/01/19 1341]   BP Pulse Resp Temp SpO2   (!) 192/84 89 16 98.1 °F (36.7 °C) 98 %      MAP       --         Physical Exam    Nursing note and vitals reviewed.  Constitutional: She appears well-developed and well-nourished.   HENT:   Head: Normocephalic and atraumatic.   Right Ear: External ear normal.   Left Ear: External ear normal.   Nose: Nose normal.   Eyes: Conjunctivae and EOM are normal.   Neck: Muscular tenderness present. No spinous process tenderness present. Decreased range of motion present. No neck rigidity.       Cardiovascular: Normal rate and regular rhythm.   Pulmonary/Chest: Breath sounds normal. No respiratory distress.   Neurological: She is alert and oriented to person, place, and time.   Skin: Skin is warm and dry. Capillary refill takes less than 2 seconds. No rash noted. No erythema.   Psychiatric: She has a normal mood and affect. Thought content normal.         ED Course   Procedures  Labs Reviewed - No data to display       Imaging Results          X-Ray Cervical Spine AP And Lateral (Final result)  Result time 11/01/19 14:30:21    Final result by Errol Armenta MD (11/01/19 14:30:21)                 Impression:      No acute fractures, findings as noted.      Electronically signed by: Errol Armenta  Date:    11/01/2019  Time:    14:30             Narrative:    EXAMINATION:  XR CERVICAL SPINE AP LATERAL    CLINICAL HISTORY:  cervical strain;    TECHNIQUE:  AP, lateral, swimmer's lateral, and open mouth views of the cervical spine were performed.    COMPARISON:  August 7, 2013    FINDINGS:  Osteopenia.  Reversal of normal cervical lordosis.  Lower aspect of C7 vertebral body not well seen on lateral or swimmer's lateral images, appears unremarkable on frontal radiograph.  No acute fractures.  Unremarkable pre dental space and paraspinal soft tissues.  Disc  narrowing, endplate osteophytes, and uncovertebral spurring seen at the C4-C5, C5-C6, and left-sided C3-C4 and C6-C7 levels.                                 Medical Decision Making:   Initial Assessment:   Right neck pain   Differential Diagnosis:   OA, herniated disk, muscular strain   Clinical Tests:   Radiological Study: Ordered and Reviewed  ED Management:  Patient presents to ED for evaluation of neck pain that improved with muscle relaxers or Lidoderm patches.  TTP over right trapezius muscle.  X-ray today shows no acute abnormality.  She will be discharged home with additional Lidoderm patches and anti-inflammatories will be added on as well. She was encouraged to follow up with her primary care provider if no relief with this intervention.  She verbalized understanding and agreement with plan.                      Clinical Impression:       ICD-10-CM ICD-9-CM   1. Neck muscle spasm M62.838 728.85                                Anne Goetz PA-C  11/01/19 2213

## 2019-11-04 ENCOUNTER — OFFICE VISIT (OUTPATIENT)
Dept: FAMILY MEDICINE | Facility: CLINIC | Age: 84
End: 2019-11-04
Payer: MEDICARE

## 2019-11-04 VITALS
SYSTOLIC BLOOD PRESSURE: 130 MMHG | WEIGHT: 202.06 LBS | HEIGHT: 66 IN | OXYGEN SATURATION: 99 % | BODY MASS INDEX: 32.47 KG/M2 | DIASTOLIC BLOOD PRESSURE: 70 MMHG | HEART RATE: 73 BPM | TEMPERATURE: 98 F

## 2019-11-04 DIAGNOSIS — M62.838 NECK MUSCLE SPASM: ICD-10-CM

## 2019-11-04 DIAGNOSIS — Z09 FOLLOW UP: Primary | ICD-10-CM

## 2019-11-04 PROCEDURE — 99213 PR OFFICE/OUTPT VISIT, EST, LEVL III, 20-29 MIN: ICD-10-PCS | Mod: S$PBB,,, | Performed by: FAMILY MEDICINE

## 2019-11-04 PROCEDURE — 99999 PR PBB SHADOW E&M-EST. PATIENT-LVL III: CPT | Mod: PBBFAC,,, | Performed by: FAMILY MEDICINE

## 2019-11-04 PROCEDURE — 99213 OFFICE O/P EST LOW 20 MIN: CPT | Mod: PBBFAC,PO | Performed by: FAMILY MEDICINE

## 2019-11-04 PROCEDURE — 99999 PR PBB SHADOW E&M-EST. PATIENT-LVL III: ICD-10-PCS | Mod: PBBFAC,,, | Performed by: FAMILY MEDICINE

## 2019-11-04 PROCEDURE — 99213 OFFICE O/P EST LOW 20 MIN: CPT | Mod: S$PBB,,, | Performed by: FAMILY MEDICINE

## 2019-11-04 NOTE — PROGRESS NOTES
Office Visit    Patient Name: Tonya Cohn    : 1932  MRN: [id number]      Assessment/Plan:  Tonya Cohn is a 87 y.o. female who presents today for :    Follow up  Neck muscle spasm  -resolved s/p Naproxen/Zanflex, if Sx return, advised to add heat/massage and avoid provocative movements that could worsen pain. Also counseled patienton stretching/strengthening exercises, maintaining good posture as demonstrated in clinic (handout also given) to help with decreasing risk for future injury.          Follow up for any evaluation as needed.     This note was created by combination of typed  and Dragon dictation.  Transcription errors may be present.  If there are any questions, please contact me.        ----------------------------------------------------------------------------------------------------------------------      HPI:  Patient Care Team:  Tere Hughes MD as PCP - General (Internal Medicine)  Tere Hughes MD as PCP - MSSP Attributed  Preston Whitfield MD as Consulting Physician (Cardiology)  Criss Baird OD as Consulting Physician (Optometry)  Marvin Roach MD as Consulting Physician (Rheumatology)  Wendy Garcia LPN as Licensed Practical Nurse    Tonya is a 87 y.o. female with      Patient Active Problem List   Diagnosis    Primary osteoarthritis of both knees    Hyperlipidemia LDL goal <100    Type 2 diabetes, controlled, with neuropathy    Hypothyroidism (acquired)    Class 1 obesity due to excess calories with serious comorbidity and body mass index (BMI) of 32.0 to 32.9 in adult    GERD (gastroesophageal reflux disease)    DDD (degenerative disc disease), lumbar    Spondylolisthesis, grade 1    DJD (degenerative joint disease), cervical    History of colon polyps    AR (allergic rhinitis)    Mild aortic stenosis    Atherosclerosis of abdominal aorta    Osteopenia of multiple sites    Essential hypertension    Impaired functional  mobility and activity tolerance    Bilateral dry eyes    Pseudophakia of both eyes    Sebaceous cyst    Breast cyst, left         Patient presents today for :  Neck Pain and Follow-up  that started 5 days ago on the right side of the neck, she was seen by one of our providers and given Zanaflex, which she states didn't help, so 3 days ago she went to the ED because the pain remained at 10/10 - her nexk XR didn't show any acute abnormality, she was given Naproxen and lidocaine patch, which had resolved her Sx. She has been pain free since yesterday - today's pain level is 0/10. She had good mobility of her neck. Denies recent injury/trauma to area. No slips/falls. She is able to go back to her normal daily routine. No tingling/numbness/weakness.  Denies pain radiation        Additional ROS    No CP/SOB/palpitations/swelling  No cough/wheezing/SOB  No nausea/vomiting/abd pain  No rash, no history of allergies to any specific substances              Patient Active Problem List   Diagnosis    Primary osteoarthritis of both knees    Hyperlipidemia LDL goal <100    Type 2 diabetes, controlled, with neuropathy    Hypothyroidism (acquired)    Class 1 obesity due to excess calories with serious comorbidity and body mass index (BMI) of 32.0 to 32.9 in adult    GERD (gastroesophageal reflux disease)    DDD (degenerative disc disease), lumbar    Spondylolisthesis, grade 1    DJD (degenerative joint disease), cervical    History of colon polyps    AR (allergic rhinitis)    Mild aortic stenosis    Atherosclerosis of abdominal aorta    Osteopenia of multiple sites    Essential hypertension    Impaired functional mobility and activity tolerance    Bilateral dry eyes    Pseudophakia of both eyes    Sebaceous cyst    Breast cyst, left       Current Medications  Medications reviewed/updated.     Current Outpatient Medications on File Prior to Visit   Medication Sig Dispense Refill    benzonatate (TESSALON) 200  MG capsule Take 1 capsule (200 mg total) by mouth 3 (three) times daily as needed. 30 capsule 0    fish oil-fat acid [id number] (OMEGA 3-6-9) 1,200 mg Cap       fluocinonide 0.1 % Crea as needed.       glipiZIDE (GLUCOTROL) 5 MG tablet TAKE 1 TABLET BY MOUTH TWICE DAILY WITH MEALS 180 tablet 0    levothyroxine (SYNTHROID) 88 MCG tablet TAKE 1 TABLET(88 MCG) BY MOUTH EVERY DAY 90 tablet 0    lidocaine (LIDODERM) 5 % Place 1 patch onto the skin once daily. Remove & Discard patch within 12 hours or as directed by MD for 5 days 5 patch 0    losartan (COZAAR) 50 MG tablet By mouth once daily 90 tablet 3    lovastatin (MEVACOR) 20 MG tablet Take 0.5 tablets (10 mg total) by mouth Daily. 45 tablet 0    metFORMIN (GLUCOPHAGE) 1000 MG tablet TAKE 1/2 TABLET BY MOUTH TWICE DAILY WITH MEALS 90 tablet 1    multivit,calc,mins/iron/folic (ONE-A-DAY WOMENS FORMULA ORAL) Take 1 tablet by mouth once daily.      naproxen (NAPROSYN) 500 MG tablet Take 1 tablet (500 mg total) by mouth 2 (two) times daily with meals. 14 tablet 0    omeprazole (PRILOSEC) 20 MG capsule Take 2 capsules (40 mg total) by mouth once daily. 180 capsule 1    tiZANidine (ZANAFLEX) 4 MG tablet Take 1 tablet (4 mg total) by mouth 2 (two) times daily as needed. 20 tablet 0    aspirin 81 MG Chew Take 1 tablet (81 mg total) by mouth once daily.  0     No current facility-administered medications on file prior to visit.        Past Surgical History:   Procedure Laterality Date    CATARACT EXTRACTION      COLONOSCOPY N/A 10/3/2017    Procedure: COLONOSCOPY;  Surgeon: Alin Gonzalez MD;  Location: [address]);  Service: Endoscopy;  Laterality: N/A;    COSMETIC SURGERY      HYSTERECTOMY      partial    JOINT REPLACEMENT Right     LUNG BIOPSY  in her 40's    TOTAL KNEE ARTHROPLASTY Right 6/13/2014    TKR       Family History   Problem Relation Age of Onset    Cancer Mother         shoulder tumor - cancer    Hypertension Mother     Heart  disease Father         valve replacement    Hypertension Father     Heart attack Father     Diabetes Sister     Arthritis Sister         s/p knee surgery    Diabetes Brother     Heart disease Brother     Heart failure Brother     Stroke Brother     Arthritis Brother         back problems    Skin cancer Brother     Cancer Brother     Throat cancer Brother     Cancer Son         jaw    Melanoma Neg Hx     Eczema Neg Hx     Lupus Neg Hx     Psoriasis Neg Hx     Breast cancer Neg Hx     Colon cancer Neg Hx        Social History     Socioeconomic History    Marital status:      Spouse name: Not on file    Number of children: 7    Years of education: Not on file    Highest education level: Not on file   Occupational History    Occupation: retired - house cleaning   Social Needs    Financial resource strain: Not on file    Food insecurity:     Worry: Not on file     Inability: Not on file    Transportation needs:     Medical: Not on file     Non-medical: Not on file   Tobacco Use    Smoking status: Former Smoker     Packs/day: 0.25     Years: 2.00     Pack years: 0.50     Start date: 1955     Last attempt to quit: 1962     Years since quittin.3    Smokeless tobacco: Former User     Quit date: 1970   Substance and Sexual Activity    Alcohol use: Yes     Alcohol/week: 1.0 standard drinks     Types: 1 Cans of beer per week     Comment: occasionally    Drug use: No    Sexual activity: Not Currently     Partners: Male   Lifestyle    Physical activity:     Days per week: Not on file     Minutes per session: Not on file    Stress: Not on file   Relationships    Social connections:     Talks on phone: Not on file     Gets together: Not on file     Attends Islam service: Not on file     Active member of club or organization: Not on file     Attends meetings of clubs or organizations: Not on file     Relationship status: Not on file   Other Topics Concern    Are you  "pregnant or think you may be? No    Breast-feeding No   Social History Narrative    Not on file           Allergies   Review of patient's allergies indicates:   Allergen Reactions    Lipitor [atorvastatin]              Review of Systems  See HPI      Physical Exam  /70 (BP Location: Right arm, Patient Position: Sitting, BP Method: Large (Manual))   Pulse 73   Temp 97.9 °F (36.6 °C) (Oral)   Ht 5' 6" (1.676 m)   Wt 91.7 kg (202 lb 0.8 oz)   SpO2 99%   BMI 32.61 kg/m²     GEN: NAD, well developed, pleasant, well nourished  HEENT: NCAT, PERRLA, EOMI, sclera clear, anicteric  NECK: normal, supple with midline trachea, no LAD, no thyromegaly  LUNGS: CTAB, no w/r/r, no increased work of breathing   HEART: RRR, normal S1 and S2, no m/r/g, no edema  SKIN: normal turgor, no rashes  PSYCH: AOx3, appropriate mood and affect  MSK: warm/well perfused, normal ROM in all extremities, no c/c/e.  Neck: has FROM. No TTP nor mm spasm noted on exam. Neg Spurling's.  No swelling/redness.                "

## 2019-11-05 ENCOUNTER — HOSPITAL ENCOUNTER (OUTPATIENT)
Dept: RADIOLOGY | Facility: HOSPITAL | Age: 84
Discharge: HOME | End: 2019-11-05
Attending: NURSE PRACTITIONER
Payer: MEDICARE

## 2019-11-05 ENCOUNTER — OFFICE VISIT (OUTPATIENT)
Dept: CARDIOLOGY | Facility: CLINIC | Age: 84
End: 2019-11-05
Payer: MEDICARE

## 2019-11-05 VITALS
HEIGHT: 66 IN | HEART RATE: 64 BPM | WEIGHT: 202.81 LBS | SYSTOLIC BLOOD PRESSURE: 151 MMHG | DIASTOLIC BLOOD PRESSURE: 68 MMHG | OXYGEN SATURATION: 98 % | BODY MASS INDEX: 32.59 KG/M2

## 2019-11-05 DIAGNOSIS — N18.30 BENIGN HYPERTENSION WITH CHRONIC KIDNEY DISEASE, STAGE III: ICD-10-CM

## 2019-11-05 DIAGNOSIS — E11.40 TYPE 2 DIABETES, CONTROLLED, WITH NEUROPATHY: ICD-10-CM

## 2019-11-05 DIAGNOSIS — I12.9 BENIGN HYPERTENSION WITH CHRONIC KIDNEY DISEASE, STAGE III: ICD-10-CM

## 2019-11-05 DIAGNOSIS — E78.5 HYPERLIPIDEMIA LDL GOAL <100: Primary | ICD-10-CM

## 2019-11-05 DIAGNOSIS — Z12.31 VISIT FOR SCREENING MAMMOGRAM: ICD-10-CM

## 2019-11-05 DIAGNOSIS — I10 ESSENTIAL HYPERTENSION: ICD-10-CM

## 2019-11-05 DIAGNOSIS — I35.0 MILD AORTIC STENOSIS: ICD-10-CM

## 2019-11-05 PROCEDURE — 99214 PR OFFICE/OUTPT VISIT, EST, LEVL IV, 30-39 MIN: ICD-10-PCS | Mod: S$PBB,,, | Performed by: INTERNAL MEDICINE

## 2019-11-05 PROCEDURE — 77067 SCR MAMMO BI INCL CAD: CPT | Mod: TC,PO

## 2019-11-05 PROCEDURE — 77067 SCR MAMMO BI INCL CAD: CPT | Mod: 26,,, | Performed by: RADIOLOGY

## 2019-11-05 PROCEDURE — 99214 OFFICE O/P EST MOD 30 MIN: CPT | Mod: S$PBB,,, | Performed by: INTERNAL MEDICINE

## 2019-11-05 PROCEDURE — 77063 BREAST TOMOSYNTHESIS BI: CPT | Mod: 26,,, | Performed by: RADIOLOGY

## 2019-11-05 PROCEDURE — 99999 PR PBB SHADOW E&M-EST. PATIENT-LVL III: CPT | Mod: PBBFAC,,, | Performed by: INTERNAL MEDICINE

## 2019-11-05 PROCEDURE — 99213 OFFICE O/P EST LOW 20 MIN: CPT | Mod: PBBFAC | Performed by: INTERNAL MEDICINE

## 2019-11-05 PROCEDURE — 77067 MAMMO DIGITAL SCREENING BILAT WITH TOMOSYNTHESIS_CAD: ICD-10-PCS | Mod: 26,,, | Performed by: RADIOLOGY

## 2019-11-05 PROCEDURE — 99999 PR PBB SHADOW E&M-EST. PATIENT-LVL III: ICD-10-PCS | Mod: PBBFAC,,, | Performed by: INTERNAL MEDICINE

## 2019-11-05 PROCEDURE — 77063 MAMMO DIGITAL SCREENING BILAT WITH TOMOSYNTHESIS_CAD: ICD-10-PCS | Mod: 26,,, | Performed by: RADIOLOGY

## 2019-11-05 RX ORDER — LOSARTAN POTASSIUM 100 MG/1
TABLET ORAL
Qty: 90 TABLET | Refills: 3 | Status: SHIPPED | OUTPATIENT
Start: 2019-11-05 | End: 2020-07-15 | Stop reason: ALTCHOICE

## 2019-11-05 NOTE — PROGRESS NOTES
Subjective:    Patient ID:  Tonya Cohn is a 87 y.o. female who presents for follow-up of Hospital Follow Up      HPI     HTN, AS-mild, HLD, DM     Stress test 10/17/16  LVEF: >= 70 %  Impression: NORMAL MYOCARDIAL PERFUSION  1. The perfusion scan is free of evidence for myocardial ischemia or injury.   2. Resting wall motion is physiologic.   3. Resting LV function is normal.     Echo 10/30/19  · Normal left ventricular systolic function. The estimated ejection fraction is 60%  · Mild concentric left ventricular hypertrophy.  · No wall motion abnormalities.  · Normal right ventricular systolic function.  · Mild aortic valve stenosis.  · Aortic valve area is 1.63 cm2; peak velocity is 1.94 m/s; mean gradient is 8 mmHg.  · Cannot exclude mild mitral stenosis. The mean diastolic gradient across the mitral valve is 3 mmHg at a heart rate of 70 bpm.  · Mild tricuspid regurgitation.  · The estimated PA systolic pressure is 35 mm Hg           5/22/18 Stays active for 86, denies CP or SOB  EKG NSR - ok     11/9/18 Denies CP or SOB  Stays active - likes to go [place] the casino  EKG NSR - ok     5/20/19 Stays active  Gets some indigestion type pain after spicy foods  Denies SOB  EKG NSR - ok    Went to the ER 11/1/19  Tonya Cohn, a 87 y.o. female  has a past medical history of AR (allergic rhinitis), Atherosclerosis of abdominal aorta, Carpal tunnel syndrome of left wrist, Chronic kidney disease, stage 3, Diabetic peripheral neuropathy, Diverticulosis, History of colon polyps, History of diverticulitis of colon (1/2014), Hyperlipemia, Hypertension, Hypothyroidism, Mild aortic stenosis, OA (osteoarthritis) of knee, Obesity, Sciatica, and Type II or unspecified type diabetes mellitus with neurological manifestations, uncontrolled(250.62).      She presents to the ED evaluation of left sided neck pain that started on Wednesday.  Patient states that she was seen by her NP and given an RX of a muscle relaxer of an unknown  name.  States she has been taking this medication twice a day but has not gotten any improvement, feels that the pain is getting worse.  Denies any trauma or injury to site.  Pain is worse when movement of her head and better when she is still.  No previous surgery to site.  Patient is hypertensive in the ED and has taken her medications.  Denies any CP, SOB, HA.       Denies CP or SOB  BP elevated    Review of Systems   Constitution: Negative for decreased appetite.   HENT: Negative for ear discharge.    Eyes: Negative for blurred vision.   Respiratory: Negative for hemoptysis.    Endocrine: Negative for polyphagia.   Hematologic/Lymphatic: Negative for adenopathy.   Skin: Negative for color change.   Musculoskeletal: Negative for joint swelling.   Genitourinary: Negative for bladder incontinence.   Neurological: Negative for brief paralysis.   Psychiatric/Behavioral: Negative for hallucinations.   Allergic/Immunologic: Negative for hives.        Objective:    Physical Exam   Constitutional: She is oriented to person, place, and time. She appears well-developed and well-nourished.   HENT:   Head: Normocephalic and atraumatic.   Eyes: Pupils are equal, round, and reactive to light. Conjunctivae are normal.   Neck: Normal range of motion. Neck supple.   Cardiovascular: Normal rate and intact distal pulses.   Murmur heard.   Early systolic murmur is present with a grade of 2/6.  Pulmonary/Chest: Effort normal and breath sounds normal.   Abdominal: Soft. Bowel sounds are normal.   Musculoskeletal: Normal range of motion.   Neurological: She is alert and oriented to person, place, and time.   Skin: Skin is warm and dry.         Assessment:       1. Hyperlipidemia LDL goal <100    2. Mild aortic stenosis    3. Essential hypertension    4. Type 2 diabetes, controlled, with neuropathy         Plan:       Increase losartan 100 qd  Otherwise cardiac stable  AS mild

## 2019-11-06 RX ORDER — TIZANIDINE 4 MG/1
TABLET ORAL
Qty: 20 TABLET | Refills: 0 | OUTPATIENT
Start: 2019-11-06

## 2019-12-11 RX ORDER — LOVASTATIN 20 MG/1
10 TABLET ORAL DAILY
Qty: 45 TABLET | Refills: 0 | Status: SHIPPED | OUTPATIENT
Start: 2019-12-11 | End: 2020-01-17

## 2019-12-11 NOTE — TELEPHONE ENCOUNTER
----- Message from Angela García sent at 12/11/2019  9:54 AM CST -----  Contact: Self   Type: RX Refill Request    Who Called:  Self   Have you contacted your pharmacy: yes   Refill or New Rx: Refill     RX Name and Strength: lovastatin (MEVACOR) 20 MG tablet  How is the patient currently taking it? (ex. 1XDay):    Is this a 30 day or 90 day RX: 90    Preferred Pharmacy with phone number:   [id number] - [place] [address] AT [facility] [address]   [address] [place] [address]  Phone: [phone] Fax: [phone]      Local or Mail Order: local   Ordering Provider: antony   Would the patient rather a call back or a response via [facility]?  Call   Best Call Back Number: [phone]    Additional Information:

## 2019-12-12 ENCOUNTER — TELEPHONE (OUTPATIENT)
Dept: FAMILY MEDICINE | Facility: CLINIC | Age: 84
End: 2019-12-12

## 2019-12-12 NOTE — TELEPHONE ENCOUNTER
----- Message from Amaliataylor Rose sent at 12/12/2019  8:58 AM CST -----  Contact: Self [phone]  Type: Patient Call Back    Who called:Self    What is the request in detail: pt is calling in regards to lovastatin (MEVACOR) 20 MG tablet. The patient is stating that the dosage is incorrect    Can the clinic reply by [facility]? Call back    Would the patient rather a call back or a response via [facility]? Call back    Best call back number:[phone]

## 2019-12-12 NOTE — TELEPHONE ENCOUNTER
Spoke with patient and she informed me that the pharmacy said that her insurance will not fill Mevacor 20 mg for 90 days. Spoke with pharmacist at [facility] and they informed me that medication is to soon to refill. Patient can't get refill until 12/20/19. Patient informed of this. Patient said that she was taking a whole tablet. Was not aware she was suppose to be taking a 1/2 of tablet.

## 2019-12-20 DIAGNOSIS — I12.9 BENIGN HYPERTENSION WITH CHRONIC KIDNEY DISEASE, STAGE III: ICD-10-CM

## 2019-12-20 DIAGNOSIS — N18.30 BENIGN HYPERTENSION WITH CHRONIC KIDNEY DISEASE, STAGE III: ICD-10-CM

## 2019-12-20 RX ORDER — LOSARTAN POTASSIUM 50 MG/1
TABLET ORAL
Qty: 90 TABLET | Refills: 0 | Status: SHIPPED | OUTPATIENT
Start: 2019-12-20 | End: 2020-01-17 | Stop reason: ALTCHOICE

## 2019-12-26 ENCOUNTER — OFFICE VISIT (OUTPATIENT)
Dept: INTERNAL MEDICINE | Facility: CLINIC | Age: 84
End: 2019-12-26
Payer: MEDICARE

## 2019-12-26 DIAGNOSIS — J06.9 UPPER RESPIRATORY TRACT INFECTION, UNSPECIFIED TYPE: Primary | ICD-10-CM

## 2019-12-26 PROCEDURE — 99213 PR OFFICE/OUTPT VISIT, EST, LEVL III, 20-29 MIN: ICD-10-PCS | Mod: S$PBB,,, | Performed by: INTERNAL MEDICINE

## 2019-12-26 PROCEDURE — 99214 OFFICE O/P EST MOD 30 MIN: CPT | Mod: PBBFAC | Performed by: INTERNAL MEDICINE

## 2019-12-26 PROCEDURE — 99213 OFFICE O/P EST LOW 20 MIN: CPT | Mod: S$PBB,,, | Performed by: INTERNAL MEDICINE

## 2019-12-26 PROCEDURE — 1126F PR PAIN SEVERITY QUANTIFIED, NO PAIN PRESENT: ICD-10-PCS | Mod: ,,, | Performed by: INTERNAL MEDICINE

## 2019-12-26 PROCEDURE — 1159F PR MEDICATION LIST DOCUMENTED IN MEDICAL RECORD: ICD-10-PCS | Mod: ,,, | Performed by: INTERNAL MEDICINE

## 2019-12-26 PROCEDURE — 99999 PR PBB SHADOW E&M-EST. PATIENT-LVL IV: ICD-10-PCS | Mod: PBBFAC,,, | Performed by: INTERNAL MEDICINE

## 2019-12-26 PROCEDURE — 99999 PR PBB SHADOW E&M-EST. PATIENT-LVL IV: CPT | Mod: PBBFAC,,, | Performed by: INTERNAL MEDICINE

## 2019-12-26 PROCEDURE — 1126F AMNT PAIN NOTED NONE PRSNT: CPT | Mod: ,,, | Performed by: INTERNAL MEDICINE

## 2019-12-26 PROCEDURE — 1159F MED LIST DOCD IN RCRD: CPT | Mod: ,,, | Performed by: INTERNAL MEDICINE

## 2019-12-26 RX ORDER — AMOXICILLIN 875 MG/1
875 TABLET, FILM COATED ORAL 2 TIMES DAILY
Qty: 14 TABLET | Refills: 0 | Status: SHIPPED | OUTPATIENT
Start: 2019-12-26 | End: 2020-01-02

## 2019-12-27 RX ORDER — GLIPIZIDE 5 MG/1
TABLET ORAL
Qty: 180 TABLET | Refills: 0 | Status: SHIPPED | OUTPATIENT
Start: 2019-12-27 | End: 2020-10-01

## 2019-12-29 VITALS
DIASTOLIC BLOOD PRESSURE: 82 MMHG | HEART RATE: 96 BPM | OXYGEN SATURATION: 98 % | SYSTOLIC BLOOD PRESSURE: 136 MMHG | HEIGHT: 66 IN | BODY MASS INDEX: 30.69 KG/M2 | WEIGHT: 190.94 LBS | TEMPERATURE: 98 F

## 2019-12-29 NOTE — PROGRESS NOTES
Subjective:       Patient ID: Tonya Cohn is a 87 y.o. female.    Chief Complaint: Nasal Congestion    HPI  She complains of sinus congestion and green nasal discharge. No fever, chills, night sweats  Review of Systems   Constitutional: Negative for chills, fatigue, fever and unexpected weight change.   Respiratory: Negative for chest tightness and shortness of breath.    Cardiovascular: Negative for chest pain and palpitations.   Gastrointestinal: Negative for abdominal pain and blood in stool.   Neurological: Negative for dizziness, syncope, numbness and headaches.       Objective:      Physical Exam   HENT:   Right Ear: External ear normal.   Left Ear: External ear normal.   Nose: Nose normal.   Mouth/Throat: Oropharynx is clear and moist.   Eyes: Pupils are equal, round, and reactive to light.   Neck: Normal range of motion.   Cardiovascular: Normal rate and regular rhythm.   No murmur heard.  Pulmonary/Chest: Breath sounds normal.   Abdominal: She exhibits no distension. There is no hepatosplenomegaly. There is no tenderness.   Lymphadenopathy:     She has no cervical adenopathy.     She has no axillary adenopathy.   Neurological: She has normal strength and normal reflexes. No cranial nerve deficit or sensory deficit.       Assessment/Plan           assessment and plan:  Upper respiratory infection:  Amoxil 875 mg p.o. b.i.d. x7 days.  Call if no relief.  She was here for urgent care only appointment.  She will follow up with her primary care physician for a physical

## 2019-12-30 ENCOUNTER — TELEPHONE (OUTPATIENT)
Dept: FAMILY MEDICINE | Facility: CLINIC | Age: 84
End: 2019-12-30

## 2019-12-30 NOTE — TELEPHONE ENCOUNTER
----- Message from Amalia Rose sent at 12/30/2019 12:23 PM CST -----  Contact: Self [phone] or [phone]  Type: Patient Call Back    Who called:Self    What is the request in detail: pt is calling in regards to finding out if the doctor can call in a RX for cough medication because the patient has a really bad cough that bothers her a lot at night. Pt pharmacy is   [id number] [place] [address] AT [facility] [address] [address]  Phone: [phone] Fax: [phone]    Can the clinic reply by [facility]? Call back    Would the patient rather a call back or a response via [facility]? Call back    Best call back number:[phone] or [phone]

## 2019-12-30 NOTE — TELEPHONE ENCOUNTER
Recommend otc delsym, warm liquids, cough drops and cool mist humidifier. recommend office visit is needs Rx,

## 2019-12-31 NOTE — TELEPHONE ENCOUNTER
Recommend otc delsym, warm liquids, cough drops and cool mist humidifier. recommend office visit is needs Rx,    Spoke with patient and informed of recommendations and patient verbalized understandings.

## 2020-01-03 ENCOUNTER — PATIENT OUTREACH (OUTPATIENT)
Dept: ADMINISTRATIVE | Facility: HOSPITAL | Age: 85
End: 2020-01-03

## 2020-01-10 DIAGNOSIS — E03.9 ACQUIRED HYPOTHYROIDISM: ICD-10-CM

## 2020-01-10 RX ORDER — LEVOTHYROXINE SODIUM 88 UG/1
TABLET ORAL
Qty: 90 TABLET | Refills: 1 | Status: SHIPPED | OUTPATIENT
Start: 2020-01-10 | End: 2020-07-11

## 2020-01-17 ENCOUNTER — OFFICE VISIT (OUTPATIENT)
Dept: FAMILY MEDICINE | Facility: CLINIC | Age: 85
End: 2020-01-17
Payer: MEDICARE

## 2020-01-17 VITALS
WEIGHT: 200.19 LBS | HEIGHT: 66 IN | HEART RATE: 88 BPM | SYSTOLIC BLOOD PRESSURE: 118 MMHG | OXYGEN SATURATION: 99 % | TEMPERATURE: 97 F | BODY MASS INDEX: 32.17 KG/M2 | DIASTOLIC BLOOD PRESSURE: 56 MMHG

## 2020-01-17 DIAGNOSIS — K21.9 GASTROESOPHAGEAL REFLUX DISEASE WITHOUT ESOPHAGITIS: ICD-10-CM

## 2020-01-17 DIAGNOSIS — E11.40 TYPE 2 DIABETES, CONTROLLED, WITH NEUROPATHY: ICD-10-CM

## 2020-01-17 DIAGNOSIS — I70.0 ATHEROSCLEROSIS OF ABDOMINAL AORTA: ICD-10-CM

## 2020-01-17 DIAGNOSIS — L29.9 ITCHING: ICD-10-CM

## 2020-01-17 DIAGNOSIS — I35.0 MILD AORTIC STENOSIS: ICD-10-CM

## 2020-01-17 DIAGNOSIS — E78.5 HYPERLIPIDEMIA LDL GOAL <100: ICD-10-CM

## 2020-01-17 DIAGNOSIS — R05.8 POST-VIRAL COUGH SYNDROME: ICD-10-CM

## 2020-01-17 DIAGNOSIS — M85.89 OSTEOPENIA OF MULTIPLE SITES: ICD-10-CM

## 2020-01-17 DIAGNOSIS — R20.0 LEFT ARM NUMBNESS: ICD-10-CM

## 2020-01-17 DIAGNOSIS — E03.9 HYPOTHYROIDISM (ACQUIRED): ICD-10-CM

## 2020-01-17 DIAGNOSIS — Z00.00 ROUTINE MEDICAL EXAM: Primary | ICD-10-CM

## 2020-01-17 DIAGNOSIS — I10 ESSENTIAL HYPERTENSION: ICD-10-CM

## 2020-01-17 DIAGNOSIS — E66.09 CLASS 1 OBESITY DUE TO EXCESS CALORIES WITH SERIOUS COMORBIDITY AND BODY MASS INDEX (BMI) OF 32.0 TO 32.9 IN ADULT: ICD-10-CM

## 2020-01-17 PROCEDURE — 99214 OFFICE O/P EST MOD 30 MIN: CPT | Mod: PBBFAC,PO | Performed by: INTERNAL MEDICINE

## 2020-01-17 PROCEDURE — 99999 PR PBB SHADOW E&M-EST. PATIENT-LVL IV: CPT | Mod: PBBFAC,,, | Performed by: INTERNAL MEDICINE

## 2020-01-17 PROCEDURE — 99999 PR PBB SHADOW E&M-EST. PATIENT-LVL IV: ICD-10-PCS | Mod: PBBFAC,,, | Performed by: INTERNAL MEDICINE

## 2020-01-17 PROCEDURE — 99397 PR PREVENTIVE VISIT,EST,65 & OVER: ICD-10-PCS | Mod: S$PBB,,, | Performed by: INTERNAL MEDICINE

## 2020-01-17 PROCEDURE — 99397 PER PM REEVAL EST PAT 65+ YR: CPT | Mod: S$PBB,,, | Performed by: INTERNAL MEDICINE

## 2020-01-17 RX ORDER — LOVASTATIN 10 MG/1
10 TABLET ORAL DAILY
Qty: 90 TABLET | Refills: 1 | Status: SHIPPED | OUTPATIENT
Start: 2020-01-17 | End: 2020-08-09

## 2020-01-17 NOTE — PROGRESS NOTES
HISTORY OF PRESENT ILLNESS:  Tonya Cohn is a 87 y.o. female who presents to the clinic today for a routine physical exam. Her last physical exam was approximately 1 years(s) ago.        PAST MEDICAL HISTORY:  Past Medical History:   Diagnosis Date    AR (allergic rhinitis)     Atherosclerosis of abdominal aorta     noted on CT scan 1/17/2011    Carpal tunnel syndrome of left wrist     Chronic kidney disease, stage 3     Diabetic peripheral neuropathy     Diverticulosis     History of colon polyps     History of diverticulitis of colon 1/2014    Hyperlipemia     Hypertension     Hypothyroidism     followed by endocrinology, Dr. Green    Mild aortic stenosis     OA (osteoarthritis) of knee     Obesity     Sciatica     Type II or unspecified type diabetes mellitus with neurological manifestations, uncontrolled(250.62)        PAST SURGICAL HISTORY:  Past Surgical History:   Procedure Laterality Date    CATARACT EXTRACTION      COLONOSCOPY N/A 10/3/2017    Procedure: COLONOSCOPY;  Surgeon: Alin Gonzalez MD;  Location: [address]);  Service: Endoscopy;  Laterality: N/A;    COSMETIC SURGERY      HYSTERECTOMY      partial    JOINT REPLACEMENT Right     LUNG BIOPSY  in her 40's    TOTAL KNEE ARTHROPLASTY Right 6/13/2014    TKR       SOCIAL HISTORY:  Social History     Socioeconomic History    Marital status:      Spouse name: Not on file    Number of children: 7    Years of education: Not on file    Highest education level: Not on file   Occupational History    Occupation: retired - house cleaning   Social Needs    Financial resource strain: Not on file    Food insecurity:     Worry: Not on file     Inability: Not on file    Transportation needs:     Medical: Not on file     Non-medical: Not on file   Tobacco Use    Smoking status: Former Smoker     Packs/day: 0.25     Years: 2.00     Pack years: 0.50     Start date: 1/1/1955     Last attempt to quit: 7/19/1962      Years since quittin.5    Smokeless tobacco: Former User     Quit date: 1970   Substance and Sexual Activity    Alcohol use: Yes     Alcohol/week: 1.0 standard drinks     Types: 1 Cans of beer per week     Comment: occasionally    Drug use: No    Sexual activity: Not Currently     Partners: Male   Lifestyle    Physical activity:     Days per week: Not on file     Minutes per session: Not on file    Stress: Not on file   Relationships    Social connections:     Talks on phone: Not on file     Gets together: Not on file     Attends Orthodox service: Not on file     Active member of club or organization: Not on file     Attends meetings of clubs or organizations: Not on file     Relationship status: Not on file   Other Topics Concern    Are you pregnant or think you may be? No    Breast-feeding No   Social History Narrative    Not on file       FAMILY HISTORY:  Family History   Problem Relation Age of Onset    Cancer Mother         shoulder tumor - cancer    Hypertension Mother     Heart disease Father         valve replacement    Hypertension Father     Heart attack Father     Diabetes Sister     Arthritis Sister         s/p knee surgery    Diabetes Brother     Heart disease Brother     Heart failure Brother     Stroke Brother     Arthritis Brother         back problems    Skin cancer Brother     Cancer Brother     Throat cancer Brother     Cancer Son         jaw    Melanoma Neg Hx     Eczema Neg Hx     Lupus Neg Hx     Psoriasis Neg Hx     Breast cancer Neg Hx     Colon cancer Neg Hx        ALLERGIES AND MEDICATIONS: updated and reviewed.  Review of patient's allergies indicates:   Allergen Reactions    Lipitor [atorvastatin]      Medication List with Changes/Refills   Current Medications    ASPIRIN 81 MG CHEW    Take 1 tablet (81 mg total) by mouth once daily.    FISH OIL-FAT ACID COMB.8- (OMEGA 3-6-9) 1,200 MG CAP        FLUOCINONIDE 0.1 % CREA    as needed.      GLIPIZIDE (GLUCOTROL) 5 MG TABLET    TAKE 1 TABLET BY MOUTH TWICE DAILY WITH MEALS    LEVOTHYROXINE (SYNTHROID) 88 MCG TABLET    TAKE 1 TABLET(88 MCG) BY MOUTH EVERY DAY    LOSARTAN (COZAAR) 100 MG TABLET    By mouth once daily    METFORMIN (GLUCOPHAGE) 1000 MG TABLET    TAKE 1/2 TABLET BY MOUTH TWICE DAILY WITH MEALS    MULTIVIT,CALC,MINS/IRON/FOLIC (ONE-A-DAY WOMENS FORMULA ORAL)    Take 1 tablet by mouth once daily.    NAPROXEN (NAPROSYN) 500 MG TABLET    Take 1 tablet (500 mg total) by mouth 2 (two) times daily with meals.    OMEPRAZOLE (PRILOSEC) 20 MG CAPSULE    Take 2 capsules (40 mg total) by mouth once daily.   Changed and/or Refilled Medications    Modified Medication Previous Medication    LOVASTATIN (MEVACOR) 10 MG TABLET lovastatin (MEVACOR) 20 MG tablet       Take 1 tablet (10 mg total) by mouth Daily.    Take 0.5 tablets (10 mg total) by mouth Daily.   Discontinued Medications    LOSARTAN (COZAAR) 50 MG TABLET    TAKE 1 TABLET BY MOUTH EVERY DAY         CARE TEAM:  Patient Care Team:  Tere Hugehs MD as PCP - General (Internal Medicine)  Preston Whitfield MD as Consulting Physician (Cardiology)  Criss Baird OD as Consulting Physician (Optometry)  Marvin Roach MD as Consulting Physician (Rheumatology)  Wendy Garcia LPN as Licensed Practical Nurse           SCREENING HISTORY:  Health Maintenance       Date Due Completion Date    Eye Exam 03/29/2020 3/29/2019    Override on 9/20/2016: Done (Dr. Kole Lucero- negative for diabetic retinopathy bilaterally)    Override on 9/9/2015: Done (No diabetic retinopathy)    Override on 8/14/2014: Done (no diabetic retinopathy; Dr. Lucero)    Override on 4/21/2014: Done (Pt states her eye exam is scheduled for next month)    Override on 4/1/2013: Done    Foot Exam 04/03/2020 4/3/2019    Override on 5/1/2017: Done (- mild neuropathy)    Override on 10/28/2016: Done    Override on 1/30/2015: Done (Mr. Davenport)    Lipid Panel 04/09/2020  "4/9/2019    Override on 2/26/2016: Done ([facility] - total 189, TG 62, , HDL 76)    Urine Microalbumin 04/09/2020 4/9/2019    Hemoglobin A1c 04/30/2020 10/30/2019    Mammogram 11/05/2020 11/5/2019    Colonoscopy 10/03/2022 10/3/2017    TETANUS VACCINE 12/06/2028 12/6/2018            REVIEW OF SYSTEMS:   The patient reports: good dietary habits.  The patient reports : that they do not exercise, but stay active.  Review of Systems   Constitutional: Negative for chills, fatigue, fever and unexpected weight change.   HENT: Negative for congestion and postnasal drip.    Eyes: Negative for pain and visual disturbance.   Respiratory: Negative for cough, shortness of breath and wheezing.    Cardiovascular: Negative for chest pain, palpitations and leg swelling.   Gastrointestinal: Negative for abdominal pain, constipation, diarrhea, nausea and vomiting.   Genitourinary: Negative for dysuria.   Musculoskeletal: Positive for arthralgias. Negative for back pain.   Skin: Negative for rash.        She reports intermittent itch on her lower back.   Neurological: Positive for numbness (- left hand - mostly at night; wears wrist brace occasionally). Negative for weakness and headaches.   Psychiatric/Behavioral: Negative for dysphoric mood and sleep disturbance. The patient is not nervous/anxious.       ROS (Optional)-: no pelvic pain  Breast ROS (Optional)-: negative for breast lumps/discharge            Physical Examination:   Vitals:    01/17/20 0926   BP: (!) 118/56   Pulse: 88   Temp: 97.4 °F (36.3 °C)     Weight: 90.8 kg (200 lb 2.8 oz)   Height: 5' 6" (167.6 cm)   Body mass index is 32.31 kg/m².      Patient did not require to have a chaperone present during the exam today.    General appearance - alert, well appearing, and in no distress, obese  Psychiatric - alert, oriented to person, place, and time, normal mood, behavior, speech, dress, motor activity, and thought processes  Eyes - pupils equal " and reactive, extraocular eye movements intact, sclera anicteric  Ears - bilateral TM's and external ear canals normal, scant cerumen noted - bilateral  Mouth - mucous membranes moist, pharynx normal without lesions  Neck - supple, no significant adenopathy, carotids upstroke normal bilaterally, no bruits, thyroid exam: thyroid is normal in size without nodules or tenderness  Lymphatics - no palpable cervical lymphadenopathy  Chest - clear to auscultation, no wheezes, rales or rhonchi, symmetric air entry  Heart - normal rate and regular rhythm, no gallops noted, systolic murmur 2/6   Abdomen - soft, nontender, nondistended, no masses or organomegaly  Back exam - mild limit in range of motion noted on exam due to age, no significant pain with motion noted during exam, in depth exam deferred  Neurological - alert, normal speech, no focal findings or movement disorder noted, cranial nerves II through XII intact; she had a negative Tinel sign on the left.  She had a mild positive Phalen sign on the left.  Musculoskeletal - patient noted to have Moderate osteoarthritic changes to both knee joints. No joint effusions noted., no muscular tenderness noted  Extremities - peripheral pulses normal, no pedal edema, no clubbing or cyanosis  Skin - normal coloration, no suspicious skin lesions, she had a couple of small patches of dry hyperkeratotic skin noted on mid lower back      Labs:  Lab Results   Component Value Date    HGBA1C 6.1 (H) 10/30/2019    HGBA1C 6.3 (H) 04/09/2019    HGBA1C 5.8 (H) 10/03/2018      Lab Results   Component Value Date    CHOL 179 04/09/2019    CHOL 193 03/19/2018    CHOL 201 (H) 08/22/2017     Lab Results   Component Value Date    LDLCALC 90.8 04/09/2019    LDLCALC 110.0 03/19/2018    LDLCALC 115.6 08/22/2017         ASSESSMENT AND PLAN:  1. Routine medical exam  Counseled on age appropriate medical preventative services including age appropriate cancer screenings, age appropriate eye and dental  exams, over all nutritional health, need for a consistent exercise regimen, and an over all push towards maintaining a vigorous and active lifestyle.  Counseled on age appropriate vaccines and discussed upcoming health care needs based on age/gender. Discussed good sleep hygiene and stress management.    2. Type 2 diabetes, controlled, with neuropathy  Diabetes is under good control control at this time for age and comorbid conditions. We discussed diabetic diet and regular exercise. We discussed home blood sugar monitoring, if appropriate - the patient should test once daily and as needed. Continue current medication regimen.  Diabetic complications addressed: Neuropathy pain controlled.  Patient was counseled on the need for yearly eye exam to screen for/monitor diabetic retinopathy and yearly diabetic foot exam.  - Ambulatory Referral to Outpatient Case Management    3. Essential hypertension  Discussed sodium restriction, maintaining ideal body weight and regular exercise program as physiologic means to achieve blood pressure control. The patient will strive towards this. The current medical regimen is effective;  continue present plan and medications. Recommended patient to check home readings to monitor and see me for followup as scheduled or sooner as needed. Patient was educated that both decongestant and anti-inflammatory medication may raise blood pressure.    4. Mild aortic stenosis  Stable. Asymptomatic. Observe.  - Ambulatory Referral to Outpatient Case Management    5. Hyperlipidemia LDL goal <100  We discussed low fat diet and regular exercise.The current medical regimen is effective;  continue present plan and medications.   - lovastatin (MEVACOR) 10 MG tablet; Take 1 tablet (10 mg total) by mouth Daily.  Dispense: 90 tablet; Refill: 1    6. Atherosclerosis of abdominal aorta  Patient with Atherosclerosis of the Aorta.  Stable/asymptomatic. Currently stable on lipid lowering medication and b/p  monitoring.    7. Hypothyroidism (acquired)  Patient is clinically euthyroid. Continue current regimen.    8. Gastroesophageal reflux disease without esophagitis  Symptoms controlled: yes - takes medication occasionally as needed.   Reflux precautions discussed (eliminate tobacco if a smoker; minimize caffeine, chocolate and red/white peppermint intake; avoid heavy and spicy meals; don't lay down within 2-3 hours after eating; minimize the intake of NSAIDs). Medication as needed. Patient asked to take medication breaks, if possible - discussed chronic use can limit calcium absorption (which can lead to osteopenia/osteoporosis), increases the risk for intestinal infections, and can cause kidney damage. There are also some newer studies that show possible increased risk of mortality.    9. Osteopenia of multiple sites  We discussed adequate calcium and vitamin D supplementation. We discussed fall precautions. She is up to date on her BMD. No need for prescription medication at this time    10. Class 1 obesity due to excess calories with serious comorbidity and body mass index (BMI) of 32.0 to 32.9 in adult  The patient is asked to make an attempt to improve diet and exercise patterns to aid in medical management of this problem.    11. Left arm numbness  I suspect carpal tunnel syndrome.  We discussed wearing a wrist brace every night.  She is not interested in further evaluation or treatment at this time.    12. Itching  I suspect this is due to dry skin.  We discussed minimizing exposure to water/sitting in the bathtub, hot water, and soapy water.  Discussed moisturization.  Consider Dermatology consultation if symptoms worsen or persist.    13. Post-viral cough syndrome  The patient reports her symptoms overall are improving.  Continue over-the-counter cough syrup as needed.  No need for antibiotics.          Follow up in about 6 months (around 7/17/2020), or if symptoms worsen or fail to improve, for follow up  chronic medical conditions.. or sooner as needed.

## 2020-01-20 ENCOUNTER — OUTPATIENT CASE MANAGEMENT (OUTPATIENT)
Dept: ADMINISTRATIVE | Facility: OTHER | Age: 85
End: 2020-01-20

## 2020-01-20 NOTE — PATIENT INSTRUCTIONS
Eating Out When You Have Diabetes  Eating right is an important part of keeping your blood sugar in your target range. You just need to make healthy choices.    Tips for restaurant meals  When you eat away from home try these tips:  · Try to schedule your dining-out meal at your normal meal time. Make a reservation if possible, so you don't have to wait to eat. If you can't make a reservation, try to arrive at the restaurant at a less-busy time to cut down your wait time. Eat a small fruit or starch snack at your regular mealtime if your restaurant meal is going to be later than usual.   · Call ahead to see if the restaurant can meet your dietary needs if you've never been there before. Or you can go online to see the menu ahead of time.  · Carry some crackers with you in case the restaurant needs you to wait until you can be served.  · Ask how foods are prepared before you order.  · Instead of fried, sautéed, or breaded foods, choose ones that are broiled, steamed, grilled, or baked.  · Ask for sauces, gravies, and dressings on the side.  · Only eat an amount that fits your meal plan. Remember: You can take home the leftovers.  · Save dessert for special occasions. Then choose a small dessert or share one with a friend or family member.  Make healthy choices  Fast food  · Garden salad with light dressing on the side  · Baked potato with vegetables or herbs  · Broiled, roasted, or grilled chicken sandwich  · Sliced turkey or lean roast beef sandwich  Mexican  · Chicken enchilada, without cheese or sour cream   · Small burrito with whole beans and chicken  · Whole beans (not refried) and rice  · Chicken or fish fajitas  Steakhouse  · Grilled or broiled lean cuts of beef  · Baked potato with vegetables or herbs  · Broiled or baked chicken. Dont eat the skin.  · Steamed vegetables  Asian  · Steamed dumplings or potstickers  · Broiled, boiled, or steamed meats or fish  · Sushi or sashimi  · Steamed rice or boiled  noodles. One serving is equal to 1/3 cup.  Date Last Reviewed: 6/1/2016  © 2612-4826 [employer]. [address] [place] [address], [place] [address]. All rights reserved. This information is not intended as a substitute for professional medical care. Always follow your healthcare professional's instructions.        Diabetes: Inspecting Your Feet    Diabetes increases your chances of developing foot problems. So inspect your feet every day. This helps you find small skin irritations before they become serious ulcers or infections. If you have trouble seeing the bottoms of your feet, use a mirror or ask a family member or friend to help.  How to check your feet  Below are tips to help you look for foot problems. Try to check your feet at the same time each day, such as when you get out of bed in the morning:  · Check the top of each foot. The tops of toes, back of the heel, and outer edge of the foot can get a lot of rubbing from poor-fitting shoes.  · Check the bottom of each foot. Daily wear and tear often leads to problems at pressure spots.  · Check the toes and nails. Fungal infections often occur between toes. Toenail problems can also be a sign of fungal infections or lead to breaks in the skin.  · Check your shoes, too. Loose objects inside a shoe can injure the foot. Use your hand to feel inside your shoes for things like reji, loose stitching, or rough areas that could irritate your skin.  Warning signs  Look for any color changes in the foot. Redness with streaks can signal a severe infection, which needs immediate medical attention. Tell your healthcare provider right away if you have any of these problems:  · Swelling, sometimes with color changes, may be a sign of poor blood flow or infection. Symptoms include tenderness and an increase in the size of your foot.  · Warm or hot areas on your feet may be signs of infection. A foot that is cold may not be getting enough blood.  · Sensations such as  burning, tingling, or pins and needles can be signs of a problem. Also check for areas that may be numb.  · Hot spots are caused by friction or pressure. Look for hot spots in areas that get a lot of rubbing. Hot spots can turn into blisters, calluses, or sores.  · Cracks and sores are caused by dry or irritated skin. They are a sign that the skin is breaking down, which can lead to infection.  · Toenail problems to watch for include nails growing into the skin (ingrown toenail) and causing redness or pain. Thick, yellow, or discolored nails can signal a fungal infection.  · Drainage and odor can develop from untreated sores and ulcers. Call your healthcare provider right away if you notice white or yellow drainage, bleeding, or unpleasant odor.   Date Last Reviewed: 6/1/2016  © 4778-2418 [employer]. [address] [place] [address], [place] [address]. All rights reserved. This information is not intended as a substitute for professional medical care. Always follow your healthcare professional's instructions.

## 2020-01-20 NOTE — LETTER
January 20, 2020    Tonya Cohn  [address] [place] [address]  [place] [address]             [facility] [address] [place] [address] Dear Ms. Tonya Cohn,    Welcome to [facility]s Complex Care Management Program.  It was a pleasure talking with you today.  My name is Kelsie Dye, RN and I look forward to being your Care Manager.  My goal is to help you function at the healthiest and highest level possible.  You can contact me directly at [phone].    As an [facility] patient, some of the services we may be able to provide include:      Development of an individualized care plan with a Registered Nurse    Connection with a    Connection with available resources and services     Coordinate communication among your care team members    Provide coaching and education    Help you understand your doctors treatment plan   Help you obtain information about your insurance coverage.     All services provided by [facility]s Complex Care Managers and other care team members are coordinated with and communicated to your primary care team.    As part of your enrollment, you will be receiving education materials and more information about these services in your [facility] account, by phone or through the mail.  If you do not wish to participate or receive information, please contact our office at [phone].      Sincerely,    Kelsie Corrigan, RN  [facility]   Out-patient RN Complex Care Manager

## 2020-01-22 ENCOUNTER — OUTPATIENT CASE MANAGEMENT (OUTPATIENT)
Dept: ADMINISTRATIVE | Facility: OTHER | Age: 85
End: 2020-01-22

## 2020-01-22 NOTE — PROGRESS NOTES
LMSW first follow up attempt. Left VM with contact information requesting return call. Will attempt to contact patient again.

## 2020-01-22 NOTE — LETTER
January 23, 2020    Tonya Cohn  [address]  [place] [address]             [facility] [address] [place] [address] Dear. Ms. Cohn:    I am writing from the Outpatient Complex Care Management Department at [facility].  I received a referral from NAYELI Iglesias to contact you regarding any needs you may have.  I have been unable to reach you by phone. Please contact me if you would like to discuss any needs you may have.    I can be reached at [phone] Monday thru Friday from 8:00am to 4:30pm.  [facility] also has a program with a nurse available 24/7 to answer questions or provide medical advice. [facility] can be reached at [phone].    Sincerely,        Cynthia Pérez LMSW     [facility]

## 2020-01-23 NOTE — PROGRESS NOTES
Second attempt to complete SW assessment for [facility]; left message requesting return call. Letter sent with LMSW contact information

## 2020-01-30 NOTE — ACP (ADVANCE CARE PLANNING)
SW spoke with patient about advanced care planning. Pt has POA and Living Will on [place] and both documents are not legible.     Pt not interested in completing new documents. Reports speaking with her daughter about this already.     [facility] RN mailed a copy of advanced directives to pt home.       Cynthia Pérez [facility]  Outpatient Case Management  [phone]

## 2020-01-30 NOTE — PROGRESS NOTES
LMSW spoke with pt regarding advanced directives referral from [facility] RN Kelsie. She stated that she spoke with her daughter about this. That her daughter received the paperwork mailed by [facility] RN but isn't worried about that. She stated she doesn't need any additional services, and hung up the phone. No other concerns were voiced.LMSW closing case. RN notified.

## 2020-02-04 ENCOUNTER — OUTPATIENT CASE MANAGEMENT (OUTPATIENT)
Dept: ADMINISTRATIVE | Facility: OTHER | Age: 85
End: 2020-02-04

## 2020-02-06 ENCOUNTER — OFFICE VISIT (OUTPATIENT)
Dept: CARDIOLOGY | Facility: CLINIC | Age: 85
End: 2020-02-06
Payer: MEDICARE

## 2020-02-06 VITALS
HEART RATE: 94 BPM | SYSTOLIC BLOOD PRESSURE: 139 MMHG | WEIGHT: 200.63 LBS | HEIGHT: 66 IN | OXYGEN SATURATION: 99 % | DIASTOLIC BLOOD PRESSURE: 78 MMHG | BODY MASS INDEX: 32.24 KG/M2

## 2020-02-06 DIAGNOSIS — E11.40 TYPE 2 DIABETES, CONTROLLED, WITH NEUROPATHY: ICD-10-CM

## 2020-02-06 DIAGNOSIS — I10 ESSENTIAL HYPERTENSION: ICD-10-CM

## 2020-02-06 DIAGNOSIS — I10 HYPERTENSION: ICD-10-CM

## 2020-02-06 DIAGNOSIS — I35.0 MILD AORTIC STENOSIS: Primary | ICD-10-CM

## 2020-02-06 DIAGNOSIS — E78.5 HYPERLIPIDEMIA LDL GOAL <100: ICD-10-CM

## 2020-02-06 PROCEDURE — 99999 PR PBB SHADOW E&M-EST. PATIENT-LVL IV: ICD-10-PCS | Mod: PBBFAC,,, | Performed by: INTERNAL MEDICINE

## 2020-02-06 PROCEDURE — 99214 PR OFFICE/OUTPT VISIT, EST, LEVL IV, 30-39 MIN: ICD-10-PCS | Mod: S$PBB,,, | Performed by: INTERNAL MEDICINE

## 2020-02-06 PROCEDURE — 99214 OFFICE O/P EST MOD 30 MIN: CPT | Mod: S$PBB,,, | Performed by: INTERNAL MEDICINE

## 2020-02-06 PROCEDURE — 93010 EKG 12-LEAD: ICD-10-PCS | Mod: S$PBB,,, | Performed by: INTERNAL MEDICINE

## 2020-02-06 PROCEDURE — 93005 ELECTROCARDIOGRAM TRACING: CPT | Mod: PBBFAC,PO | Performed by: INTERNAL MEDICINE

## 2020-02-06 PROCEDURE — 93010 ELECTROCARDIOGRAM REPORT: CPT | Mod: S$PBB,,, | Performed by: INTERNAL MEDICINE

## 2020-02-06 PROCEDURE — 99999 PR PBB SHADOW E&M-EST. PATIENT-LVL IV: CPT | Mod: PBBFAC,,, | Performed by: INTERNAL MEDICINE

## 2020-02-06 PROCEDURE — 99214 OFFICE O/P EST MOD 30 MIN: CPT | Mod: PBBFAC,PO | Performed by: INTERNAL MEDICINE

## 2020-02-06 NOTE — PROGRESS NOTES
Subjective:    Patient ID:  Tonya Cohn is a 87 y.o. female who presents for follow-up of Hypertension      HPI     HTN, AS-mild, HLD, DM     Stress test 10/17/16  LVEF: >= 70 %  Impression: NORMAL MYOCARDIAL PERFUSION  1. The perfusion scan is free of evidence for myocardial ischemia or injury.   2. Resting wall motion is physiologic.   3. Resting LV function is normal.     Echo 10/30/19  · Normal left ventricular systolic function. The estimated ejection fraction is 60%  · Mild concentric left ventricular hypertrophy.  · No wall motion abnormalities.  · Normal right ventricular systolic function.  · Mild aortic valve stenosis.  · Aortic valve area is 1.63 cm2; peak velocity is 1.94 m/s; mean gradient is 8 mmHg.  · Cannot exclude mild mitral stenosis. The mean diastolic gradient across the mitral valve is 3 mmHg at a heart rate of 70 bpm.  · Mild tricuspid regurgitation.  · The estimated PA systolic pressure is 35 mm Hg      5/22/18 Stays active for 86, denies CP or SOB  EKG NSR - ok     11/9/18 Denies CP or SOB  Stays active - likes to go to the [place]  EKG NSR - ok     5/20/19 Stays active  Gets some indigestion type pain after spicy foods  Denies SOB  EKG NSR - ok     11/5/19 Denies CP or SOB  BP elevated  Increase losartan 100 qd  Otherwise cardiac stable  AS mild    2/6/20 Denies CP or SOB  EKG NSR - ok  BP controlled    Review of Systems   Constitution: Negative for decreased appetite.   HENT: Negative for ear discharge.    Eyes: Negative for blurred vision.   Respiratory: Negative for hemoptysis.    Endocrine: Negative for polyphagia.   Hematologic/Lymphatic: Negative for adenopathy.   Skin: Negative for color change.   Musculoskeletal: Negative for joint swelling.   Genitourinary: Negative for bladder incontinence.   Neurological: Negative for brief paralysis.   Psychiatric/Behavioral: Negative for hallucinations.   Allergic/Immunologic: Negative for hives.        Objective:    Physical Exam    Constitutional: She is oriented to person, place, and time. She appears well-developed and well-nourished.   HENT:   Head: Normocephalic and atraumatic.   Eyes: Pupils are equal, round, and reactive to light. Conjunctivae are normal.   Neck: Normal range of motion. Neck supple.   Cardiovascular: Normal rate and intact distal pulses.   Murmur heard.   Early systolic murmur is present with a grade of 2/6.  Pulmonary/Chest: Effort normal and breath sounds normal.   Abdominal: Soft. Bowel sounds are normal.   Musculoskeletal: Normal range of motion.   Neurological: She is alert and oriented to person, place, and time.   Skin: Skin is warm and dry.         Assessment:       1. Mild aortic stenosis    2. Hyperlipidemia LDL goal <100    3. Essential hypertension    4. Type 2 diabetes, controlled, with neuropathy         Plan:       Cardiac stable  OV 1 year with repeat echo

## 2020-02-13 ENCOUNTER — OUTPATIENT CASE MANAGEMENT (OUTPATIENT)
Dept: ADMINISTRATIVE | Facility: OTHER | Age: 85
End: 2020-02-13

## 2020-02-28 ENCOUNTER — OUTPATIENT CASE MANAGEMENT (OUTPATIENT)
Dept: ADMINISTRATIVE | Facility: OTHER | Age: 85
End: 2020-02-28

## 2020-02-28 NOTE — PROGRESS NOTES
Outpatient Care Management  Plan of Care Follow Up Visit    Patient: Tonya Cohn  MRN: [id number]  Date of Service: 02/28/2020  Completed by: Kelsie Dye RN  Referral Date: 01/17/2020  Program: Case Management (High Risk)    Reason for Visit   Patient presents with    OPCM RN Third Follow-Up Attempt       Brief Summary: Case closure due to unable to reach.    Patient Summary     Involvement of Care:  Do I have permission to speak with other family members about your care?       Patient Reported Labs & Vitals:  1.  Any Patient Reported Labs & Vitals?     2.  Patient Reported Blood Pressure:     3.  Patient Reported Pulse:     4.  Patient Reported Weight (Kg):     5.  Patient Reported Blood Glucose (mg/dl):       Medical History:  Reviewed medical history with patient and/or caregiver    Social History:  Reviewed social history with patient and/or caregiver    Clinical Assessment     Reviewed and provided basic information on available community resources for mental health, transportation, wellness resources, and palliative care programs with patient and/or caregiver.    Complex Care Plan     Care plan was discussed and completed today with input from patient and/or caregiver.    Goals      HEMOGLOBIN A1C < 7.0      Patient/caregiver will have an action plan in place to manage and prevent complications of diabetes prior to discharge from OPCM. - Priority: High      Overall Time to Completion  2 months from 01/20/2020    OPCM Identified Patient Needs:  Advanced Care Planning:  [place] [facility] [place]       OPCM Identified Disease Education Needs:  Diabetes:  Care plan created      Short Term Goals  Patient/caregiver will measure and record the capillary blood glucose 1 time per day for 2 weeks.    Interventions   · Assess for availability of working glucometer in home setting.  · Collaborate with Physician as appropriate to meet patient needs.  · Recognize and provide educational material  (SILVANOMES).  Patient/Caregiver agrees to check bs once a day by  2 weeks.  · Patient/Caregiver agrees to OPCM follow up within 1 week to assess progress to goal.   ·   ·      Status  · Partially met    Patient/caregiver will verbalize 2 diabetic food items within 2 weeks.  Interventions   · Assess for retention of the signs and symptoms of disease specific exacerbation.  · Collaborate with Physician as appropriate to meet patient needs.  · Empower patient/caregiver to discuss treatment plan with Physician/care team.  · Encourage compliance with Physician follow-ups.  · Encourage Dietary Compliance.  · Recognize and provide educational material (SILVANOMES).  · Review eating/nutrition habits.     Status  · Partially met        Patient/caregiver will verbalize 2 risk factors of Diabetes, Hyperglycemia, and Hypoglycemia within 2 weeks.  Interventions   · Collaborate with Physician as appropriate to meet patient needs.  · Empower patient/caregiver to discuss treatment plan with Physician/care team.  · Encourage Dietary Compliance.  · Encourage Exercise.  · Encourage Medication Compliance.  · Mail Blood glucose logs for home use.  · Recognize and provide educational material (SILVANOMES).  ·      Status  · Partially met      Patient/caregiver will verbalize 2 signs and symptoms of Diabetes, Hyperglycemia, and Hypoglycemia within 3 weeks.   Interventions   · Assess for availability of working glucometer in home setting.  · Assess for retention of the signs and symptoms of disease specific exacerbation.  · Collaborate with Physician as appropriate to meet patient needs.  · Recognize and provide educational material (KRAMES).     Status  · Partially met      Patient/caregiver will verbalize current HbA1c value and HbA1c goal within 3 weeks.  Interventions   · Collaborate with Physician as appropriate to meet patient needs.  · Recognize and provide educational material (SILVANOMES).     Status  · Partially met      Patient/caregiver will  verbalize food required to create a well-balanced food plate within 3 weeks.  Interventions   · Collaborate with Physician as appropriate to meet patient needs.  · Encourage Dietary Compliance   · Recognize and provide educational material (TOREY).  · Review eating/nutrition habits.     Status  · Partially met           Clinical Reference Documents Added to Patient Instructions       Document    DIABETES, EATING OUT WHEN YOU HAVE (ENGLISH)    DIABETES: INSPECTING YOUR FEET (ENGLISH)                  Patient Instructions     Instructions were provided via the [facility] [employer] patient resources and are available for the patient to view on the patient portal.    Next Steps:N/A  No follow-ups on file.      Todays OPCM Self-Management Care Plan was developed with the patients/caregivers input and was based on identified barriers from todays assessment.  Goals were written today with the patient/caregiver and the patient has agreed to work towards these goals to improve his/her overall well-being. Patient verbalized understanding of the care plan, goals, and all of today's instructions. Encouraged patient/caregiver to communicate with his/her physician and health care team about health conditions and the treatment plan.  Provided my contact information today and encouraged patient/caregiver to call me with any questions as needed.

## 2020-03-20 DIAGNOSIS — N18.30 BENIGN HYPERTENSION WITH CHRONIC KIDNEY DISEASE, STAGE III: ICD-10-CM

## 2020-03-20 DIAGNOSIS — I12.9 BENIGN HYPERTENSION WITH CHRONIC KIDNEY DISEASE, STAGE III: ICD-10-CM

## 2020-03-20 RX ORDER — LOSARTAN POTASSIUM 50 MG/1
TABLET ORAL
Qty: 90 TABLET | Refills: 0 | Status: SHIPPED | OUTPATIENT
Start: 2020-03-20 | End: 2020-12-11 | Stop reason: SDUPTHER

## 2020-04-08 ENCOUNTER — TELEPHONE (OUTPATIENT)
Dept: FAMILY MEDICINE | Facility: CLINIC | Age: 85
End: 2020-04-08

## 2020-04-08 NOTE — TELEPHONE ENCOUNTER
What is she taking for congestion?  Is she doing any type of antihistamine?  Is she using any saline nasal rinse or nose sprays?

## 2020-04-08 NOTE — TELEPHONE ENCOUNTER
This is likely related to allergies as there is a lot of pollen in the air right now.  She can try some over-the-counter Zyrtec at bedtime along with the regimen she is already doing.  Also she can try a cool mist remit a fire at night.  She should call as in 1-2 weeks if symptoms do not improve or if they worsen.

## 2020-04-08 NOTE — TELEPHONE ENCOUNTER
Spoke with patient she states she has a tickle in her throat and dry cough mostly at night x 1 week when she lye down. No fever, she has been taking OTC medications her daughter has been going to get her such as Robitussin w/honey but she states it is not working. Please advise

## 2020-04-08 NOTE — TELEPHONE ENCOUNTER
Patient has been taking Robitussin and Delsym x 1 week along with cough drops. No nasal rinse or sprays, she states she does not have a problem with her nose.

## 2020-04-08 NOTE — TELEPHONE ENCOUNTER
----- Message from Hilary Steele sent at 4/8/2020  1:16 PM CDT -----  Contact: Self/  [phone]  Type: Patient Call Back    Who called:  Patient    What is the request in detail:  Patient has a dry cough at night, she's tried medication OTC but nothing is working.  Thank you    Would the patient rather a call back or a response via [facility]?  Call back    Best call back number: [phone]

## 2020-04-09 ENCOUNTER — TELEPHONE (OUTPATIENT)
Dept: FAMILY MEDICINE | Facility: CLINIC | Age: 85
End: 2020-04-09

## 2020-04-09 NOTE — TELEPHONE ENCOUNTER
This is likely related to allergies as there is a lot of pollen in the air right now.  She can try some over-the-counter Zyrtec at bedtime along with the regimen she is already doing.  Also she can try a cool mist remit a fire at night.  She should call as in 1-2 weeks if symptoms do not improve or if they worsen.        Spoke with patient and informed of recommendations and patient verbalized understandings.

## 2020-04-09 NOTE — TELEPHONE ENCOUNTER
----- Message from Felipa Trimble sent at 4/8/2020  4:45 PM CDT -----  Contact: Self  Type: Patient Call Back    Who called:Tonya    What is the request in detail:Patient called to request Rx for cough syrup. Please call to advise    Can the clinic reply by [facility]?    Would the patient rather a call back or a response via [facility]?Call    Best call back number:[phone]

## 2020-05-07 DIAGNOSIS — E11.40 TYPE 2 DIABETES, CONTROLLED, WITH NEUROPATHY: ICD-10-CM

## 2020-06-16 ENCOUNTER — OFFICE VISIT (OUTPATIENT)
Dept: PODIATRY | Facility: CLINIC | Age: 85
End: 2020-06-16
Payer: MEDICARE

## 2020-06-16 VITALS — WEIGHT: 200 LBS | HEIGHT: 66 IN | BODY MASS INDEX: 32.14 KG/M2

## 2020-06-16 DIAGNOSIS — B35.3 TINEA PEDIS, UNSPECIFIED LATERALITY: ICD-10-CM

## 2020-06-16 DIAGNOSIS — M21.619 BUNION: ICD-10-CM

## 2020-06-16 DIAGNOSIS — M20.40 HAMMER TOE, UNSPECIFIED LATERALITY: ICD-10-CM

## 2020-06-16 DIAGNOSIS — E11.40 TYPE 2 DIABETES, CONTROLLED, WITH NEUROPATHY: Primary | ICD-10-CM

## 2020-06-16 PROCEDURE — 99213 OFFICE O/P EST LOW 20 MIN: CPT | Mod: S$PBB,,, | Performed by: PODIATRIST

## 2020-06-16 PROCEDURE — 99999 PR PBB SHADOW E&M-EST. PATIENT-LVL III: ICD-10-PCS | Mod: PBBFAC,,, | Performed by: PODIATRIST

## 2020-06-16 PROCEDURE — 99999 PR PBB SHADOW E&M-EST. PATIENT-LVL III: CPT | Mod: PBBFAC,,, | Performed by: PODIATRIST

## 2020-06-16 PROCEDURE — 99213 PR OFFICE/OUTPT VISIT, EST, LEVL III, 20-29 MIN: ICD-10-PCS | Mod: S$PBB,,, | Performed by: PODIATRIST

## 2020-06-16 PROCEDURE — 99213 OFFICE O/P EST LOW 20 MIN: CPT | Mod: PBBFAC,PO | Performed by: PODIATRIST

## 2020-06-16 RX ORDER — KETOCONAZOLE 20 MG/G
CREAM TOPICAL DAILY
Qty: 1 TUBE | Refills: 3 | Status: SHIPPED | OUTPATIENT
Start: 2020-06-16 | End: 2021-04-30

## 2020-06-17 NOTE — PROGRESS NOTES
Subjective:      Patient ID: Tonya Cohn is a 88 y.o. female.    Chief Complaint: No chief complaint on file.    Tonya is a 88 y.o. female who presents to the clinic upon referral from Dr. Heather werner. provider found  for evaluation and treatment of diabetic feet. Tonya has a past medical history of AR (allergic rhinitis), Atherosclerosis of abdominal aorta, Carpal tunnel syndrome of left wrist, Chronic kidney disease, stage 3, Diabetic peripheral neuropathy, Diverticulosis, History of colon polyps, History of diverticulitis of colon (1/2014), Hyperlipemia, Hypertension, Hypothyroidism, Mild aortic stenosis, OA (osteoarthritis) of knee, Obesity, Sciatica, and Type II or unspecified type diabetes mellitus with neurological manifestations, uncontrolled(250.62). Presents for diabetic foot risk assessment. Reports elongated nails that are difficult to trim.       PCP: Tere Hughes MD    Date Last Seen by PCP: 1/17/20    Current shoe gear: Tennis shoes    Hemoglobin A1C   Date Value Ref Range Status   10/30/2019 6.1 (H) 4.0 - 5.6 % Final     Comment:     ADA Screening Guidelines:  5.7-6.4%  Consistent with prediabetes  >or=6.5%  Consistent with diabetes  High levels of fetal hemoglobin interfere with the HbA1C  assay. Heterozygous hemoglobin variants (HbS, HgC, etc)do  not significantly interfere with this assay.   However, presence of multiple variants may affect accuracy.     04/09/2019 6.3 (H) 4.0 - 5.6 % Final     Comment:     ADA Screening Guidelines:  5.7-6.4%  Consistent with prediabetes  >or=6.5%  Consistent with diabetes  High levels of fetal hemoglobin interfere with the HbA1C  assay. Heterozygous hemoglobin variants (HbS, HgC, etc)do  not significantly interfere with this assay.   However, presence of multiple variants may affect accuracy.     10/03/2018 5.8 (H) 4.0 - 5.6 % Final     Comment:     ADA Screening Guidelines:  5.7-6.4%  Consistent with prediabetes  >or=6.5%  Consistent with diabetes  High  levels of fetal hemoglobin interfere with the HbA1C  assay. Heterozygous hemoglobin variants (HbS, HgC, etc)do  not significantly interfere with this assay.   However, presence of multiple variants may affect accuracy.             Review of Systems   Constitution: Negative for chills.   Cardiovascular: Negative for chest pain and claudication.   Respiratory: Negative for cough.    Skin: Positive for color change, dry skin and nail changes.   Musculoskeletal: Positive for joint pain.   Gastrointestinal: Negative for nausea.   Neurological: Positive for paresthesias. Negative for numbness.           Objective:      Physical Exam  Constitutional:       Appearance: She is well-developed.      Comments: Oriented to time, place, and person.   Cardiovascular:      Comments: DP and PT pulses are palpable bilaterally. 3 sec capillary refill time and toes and feet are warm to touch proximally .  There is  hair growth on the feet and toes b/l. There is no edema b/l. No spider veins or varicosities present b/l.     Musculoskeletal:      Comments: Equinus noted b/l ankles with < 10 deg DF noted. MMT 5/5 in DF/PF/Inv/Ev resistance with no reproduction of pain in any direction. Passive range of motion of ankle and pedal joints is painless b/l.     Feet:      Right foot:      Skin integrity: No callus or dry skin.      Left foot:      Skin integrity: No callus or dry skin.   Lymphadenopathy:      Comments: Negative lymphadenopathy bilateral popliteal fossa and tarsal tunnel.   Skin:     Comments: No open lesions, lacerations or wounds noted.Interdigital spaces clean, dry and intact b/l. No erythema noted to b/l foot.    Toenails 1-5 bilaterally are elongated by 2-3 mm, thickened by 2-3 mm, discolored/yellowed, dystrophic, brittle with subungual debris.    Scaling dryness in a moccasin distribution is noted to the bilateral lower extremities .         Neurological:      Mental Status: She is alert.      Comments: Light touch,  proprioception, and sharp/dull sensation are all intact bilaterally. Protective threshold with the [place]-Wienstein monofilament is intact bilaterally.    Psychiatric:         Behavior: Behavior is cooperative.               Assessment:       Encounter Diagnoses   Name Primary?    Type 2 diabetes, controlled, with neuropathy Yes    Bunion     Hammer toe, unspecified laterality     Tinea pedis, unspecified laterality          Plan:       Diagnoses and all orders for this visit:    Type 2 diabetes, controlled, with neuropathy  -     DIABETIC SHOES FOR HOME USE    Bunion  -     DIABETIC SHOES FOR HOME USE    Hammer toe, unspecified laterality  -     DIABETIC SHOES FOR HOME USE    Tinea pedis, unspecified laterality    Other orders  -     ketoconazole (NIZORAL) 2 % cream; Apply topically once daily.      I counseled the patient on her conditions, their implications and medical management.      - Shoe inspection. Diabetic Foot Education. Patient reminded of the importance of good nutrition and blood sugar control to help prevent podiatric complications of diabetes. Patient instructed on proper foot hygeine. We discussed wearing proper shoe gear, daily foot inspections, never walking without protective shoe gear, caution putting sharp instruments to feet     - Discussed DM foot care:  Wear comfortable, proper fitting shoes. Wash feet daily. Dry well. After drying, apply moisturizer to feet (no lotion to webspaces). Inspect feet daily for skin breaks, blisters, swelling, or redness. Wear cotton socks (preferably white)  Change socks every day. Do NOT walk barefoot. Do NOT use heating pads or warm/hot water soaks     Ketoconazole 2% topical cream prescribed for treatment of aforementioned tinea pedis. Patient will use this medication as directed in addition to thourougly drying between toes daily, and applying powder as needed    Rx diabetic shoes with custom molded inserts to be worn at all times while ambulating.  Prescription provided with list of local retailers.     F/u one year DM foot exams sooner PRN.

## 2020-07-15 ENCOUNTER — OFFICE VISIT (OUTPATIENT)
Dept: FAMILY MEDICINE | Facility: CLINIC | Age: 85
End: 2020-07-15
Payer: MEDICARE

## 2020-07-15 VITALS
TEMPERATURE: 97 F | HEIGHT: 66 IN | OXYGEN SATURATION: 96 % | SYSTOLIC BLOOD PRESSURE: 116 MMHG | HEART RATE: 80 BPM | WEIGHT: 198.31 LBS | BODY MASS INDEX: 31.87 KG/M2 | DIASTOLIC BLOOD PRESSURE: 56 MMHG

## 2020-07-15 VITALS
SYSTOLIC BLOOD PRESSURE: 118 MMHG | OXYGEN SATURATION: 99 % | HEIGHT: 66 IN | TEMPERATURE: 97 F | DIASTOLIC BLOOD PRESSURE: 70 MMHG | HEART RATE: 87 BPM | WEIGHT: 198.31 LBS | BODY MASS INDEX: 31.87 KG/M2

## 2020-07-15 DIAGNOSIS — I10 ESSENTIAL HYPERTENSION: ICD-10-CM

## 2020-07-15 DIAGNOSIS — M43.10 SPONDYLOLISTHESIS, GRADE 1: ICD-10-CM

## 2020-07-15 DIAGNOSIS — E11.40 TYPE 2 DIABETES, CONTROLLED, WITH NEUROPATHY: Primary | ICD-10-CM

## 2020-07-15 DIAGNOSIS — Z00.00 ENCOUNTER FOR PREVENTIVE HEALTH EXAMINATION: Primary | ICD-10-CM

## 2020-07-15 DIAGNOSIS — E78.5 HYPERLIPIDEMIA LDL GOAL <100: ICD-10-CM

## 2020-07-15 DIAGNOSIS — E66.09 CLASS 1 OBESITY DUE TO EXCESS CALORIES WITH SERIOUS COMORBIDITY AND BODY MASS INDEX (BMI) OF 32.0 TO 32.9 IN ADULT: ICD-10-CM

## 2020-07-15 DIAGNOSIS — Z86.010 HISTORY OF COLON POLYPS: ICD-10-CM

## 2020-07-15 DIAGNOSIS — M85.89 OSTEOPENIA OF MULTIPLE SITES: ICD-10-CM

## 2020-07-15 DIAGNOSIS — J30.9 ALLERGIC RHINITIS, UNSPECIFIED SEASONALITY, UNSPECIFIED TRIGGER: ICD-10-CM

## 2020-07-15 DIAGNOSIS — M17.0 PRIMARY OSTEOARTHRITIS OF BOTH KNEES: ICD-10-CM

## 2020-07-15 DIAGNOSIS — E03.9 HYPOTHYROIDISM (ACQUIRED): ICD-10-CM

## 2020-07-15 DIAGNOSIS — Z74.09 IMPAIRED FUNCTIONAL MOBILITY AND ACTIVITY TOLERANCE: ICD-10-CM

## 2020-07-15 DIAGNOSIS — M47.892 OTHER OSTEOARTHRITIS OF SPINE, CERVICAL REGION: ICD-10-CM

## 2020-07-15 DIAGNOSIS — K21.9 GASTROESOPHAGEAL REFLUX DISEASE WITHOUT ESOPHAGITIS: ICD-10-CM

## 2020-07-15 DIAGNOSIS — N60.02 BREAST CYST, LEFT: ICD-10-CM

## 2020-07-15 DIAGNOSIS — I35.0 MILD AORTIC STENOSIS: ICD-10-CM

## 2020-07-15 DIAGNOSIS — I70.0 ATHEROSCLEROSIS OF ABDOMINAL AORTA: ICD-10-CM

## 2020-07-15 DIAGNOSIS — M51.36 DDD (DEGENERATIVE DISC DISEASE), LUMBAR: ICD-10-CM

## 2020-07-15 DIAGNOSIS — E11.40 TYPE 2 DIABETES, CONTROLLED, WITH NEUROPATHY: ICD-10-CM

## 2020-07-15 PROCEDURE — 99999 PR PBB SHADOW E&M-EST. PATIENT-LVL IV: CPT | Mod: PBBFAC,,, | Performed by: INTERNAL MEDICINE

## 2020-07-15 PROCEDURE — 99214 OFFICE O/P EST MOD 30 MIN: CPT | Mod: S$PBB,,, | Performed by: INTERNAL MEDICINE

## 2020-07-15 PROCEDURE — 99999 PR PBB SHADOW E&M-EST. PATIENT-LVL V: ICD-10-PCS | Mod: PBBFAC,,, | Performed by: NURSE PRACTITIONER

## 2020-07-15 PROCEDURE — 99214 PR OFFICE/OUTPT VISIT, EST, LEVL IV, 30-39 MIN: ICD-10-PCS | Mod: S$PBB,,, | Performed by: INTERNAL MEDICINE

## 2020-07-15 PROCEDURE — G0439 PPPS, SUBSEQ VISIT: HCPCS | Mod: S$GLB,,, | Performed by: NURSE PRACTITIONER

## 2020-07-15 PROCEDURE — 99999 PR PBB SHADOW E&M-EST. PATIENT-LVL V: CPT | Mod: PBBFAC,,, | Performed by: NURSE PRACTITIONER

## 2020-07-15 PROCEDURE — 99215 OFFICE O/P EST HI 40 MIN: CPT | Mod: PBBFAC,27,PO | Performed by: NURSE PRACTITIONER

## 2020-07-15 PROCEDURE — 99214 OFFICE O/P EST MOD 30 MIN: CPT | Mod: PBBFAC,PO | Performed by: INTERNAL MEDICINE

## 2020-07-15 PROCEDURE — 99999 PR PBB SHADOW E&M-EST. PATIENT-LVL IV: ICD-10-PCS | Mod: PBBFAC,,, | Performed by: INTERNAL MEDICINE

## 2020-07-15 PROCEDURE — G0439 PR MEDICARE ANNUAL WELLNESS SUBSEQUENT VISIT: ICD-10-PCS | Mod: S$GLB,,, | Performed by: NURSE PRACTITIONER

## 2020-07-15 RX ORDER — CALCIUM CARBONATE 500(1250)
1 TABLET ORAL EVERY OTHER DAY
COMMUNITY
Start: 2020-07-15

## 2020-07-15 NOTE — PATIENT INSTRUCTIONS
Counseling and Referral of Other Preventative  (Italic type indicates deductible and co-insurance are waived)    Patient Name: Tonya Cohn  Today's Date: 7/15/2020    Health Maintenance       Date Due Completion Date    Eye Exam 03/29/2020 3/29/2019    Override on 9/20/2016: Done (Dr. Kole Lucero- negative for diabetic retinopathy bilaterally)    Override on 9/9/2015: Done (No diabetic retinopathy)    Override on 8/14/2014: Done (no diabetic retinopathy; Dr. Lucero)    Override on 4/21/2014: Done (Pt states her eye exam is scheduled for next month)    Override on 4/1/2013: Done    Lipid Panel 04/09/2020 4/9/2019    Override on 2/26/2016: Done ([place] - total 189, TG 62, , HDL 76)    Urine Microalbumin 04/09/2020 4/9/2019    Hemoglobin A1c 04/30/2020 10/30/2019    Influenza Vaccine (1) 09/01/2020 10/30/2019    Mammogram 11/05/2020 11/5/2019    Foot Exam 06/16/2021 6/16/2020 (Done)    Override on 6/16/2020: Done    Override on 5/1/2017: Done (- mild neuropathy)    Override on 10/28/2016: Done    Override on 1/30/2015: Done (Mr. Davenport)    Colonoscopy 10/03/2022 10/3/2017    TETANUS VACCINE 12/06/2028 12/6/2018        No orders of the defined types were placed in this encounter.    The following information is provided to all patients.  This information is to help you find resources for any of the problems found today that may be affecting your health:                Living healthy guide: www.[facility].[place].gov      Understanding Diabetes: www.diabetes.org      Eating healthy: www.cdc.gov/healthyweight      CDC home safety checklist: www.cdc.gov/steadi/patient.html      Agency on Aging: www.goea.[place].gov      Alcoholics anonymous (AA): www.aa.org      Physical Activity: www.david.nih.gov/jf5qfgj      Tobacco use: www.quitwithusla.org       Go see eye doctor

## 2020-07-15 NOTE — PROGRESS NOTES
HISTORY OF PRESENT ILLNESS:  Tonya Cohn is a 88 y.o. female who presents to the clinic today for Hypertension and Diabetes  .   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  She denies cardiac chest pain or shortness of breath.  She is trying to stay isolated during this COVID-19 pandemic.  She states she has been doing a lot of cooking for her family members. The patient reports compliance with current medication- patient denies missing any  medication doses.  She denies any significant aches or pains.      PAST MEDICAL HISTORY:  Past Medical History:   Diagnosis Date    AR (allergic rhinitis)     Atherosclerosis of abdominal aorta     noted on CT scan 1/17/2011    Carpal tunnel syndrome of left wrist     Chronic kidney disease, stage 3     Diabetic peripheral neuropathy     Diverticulosis     History of colon polyps     History of diverticulitis of colon 1/2014    Hyperlipemia     Hypertension     Hypothyroidism     followed by endocrinology, Dr. Geren    Mild aortic stenosis     OA (osteoarthritis) of knee     Obesity     Sciatica     Type II or unspecified type diabetes mellitus with neurological manifestations, uncontrolled(250.62)        PAST SURGICAL HISTORY:  Past Surgical History:   Procedure Laterality Date    CATARACT EXTRACTION      COLONOSCOPY N/A 10/3/2017    Procedure: COLONOSCOPY;  Surgeon: Alin Gonzalez MD;  Location: [address]);  Service: Endoscopy;  Laterality: N/A;    COSMETIC SURGERY      HYSTERECTOMY      partial    JOINT REPLACEMENT Right     LUNG BIOPSY  in her 40's    TOTAL KNEE ARTHROPLASTY Right 6/13/2014    TKR       SOCIAL HISTORY:  Social History     Socioeconomic History    Marital status:      Spouse name: Not on file    Number of children: 7    Years of education: Not on file    Highest education level: Not on file    Occupational History    Occupation: retired - house cleaning   Social Needs    Financial resource strain: Not on file    Food insecurity     Worry: Not on file     Inability: Not on file    Transportation needs     Medical: Not on file     Non-medical: Not on file   Tobacco Use    Smoking status: Former Smoker     Packs/day: 0.25     Years: 2.00     Pack years: 0.50     Start date: 1955     Quit date: 1962     Years since quittin.0    Smokeless tobacco: Former User     Quit date: 1970   Substance and Sexual Activity    Alcohol use: Yes     Alcohol/week: 1.0 standard drinks     Types: 1 Cans of beer per week     Comment: occasionally    Drug use: No    Sexual activity: Not Currently     Partners: Male   Lifestyle    Physical activity     Days per week: Not on file     Minutes per session: Not on file    Stress: Not on file   Relationships    Social connections     Talks on phone: Not on file     Gets together: Not on file     Attends Mandaeism service: Not on file     Active member of club or organization: Not on file     Attends meetings of clubs or organizations: Not on file     Relationship status: Not on file   Other Topics Concern    Are you pregnant or think you may be? No    Breast-feeding No   Social History Narrative    Not on file       FAMILY HISTORY:  Family History   Problem Relation Age of Onset    Cancer Mother         shoulder tumor - cancer    Hypertension Mother     Heart disease Father         valve replacement    Hypertension Father     Heart attack Father     Diabetes Sister     Arthritis Sister         s/p knee surgery    Diabetes Brother     Heart disease Brother     Heart failure Brother     Stroke Brother     Arthritis Brother         back problems    Skin cancer Brother     Cancer Brother     Throat cancer Brother     Cancer Son         jaw    Melanoma Neg Hx     Eczema Neg Hx     Lupus Neg Hx     Psoriasis Neg Hx     Breast cancer Neg  Hx     Colon cancer Neg Hx        ALLERGIES AND MEDICATIONS: updated and reviewed.  Review of patient's allergies indicates:   Allergen Reactions    Lipitor [atorvastatin]      Medication List with Changes/Refills   Current Medications    ASPIRIN 81 MG CHEW    Take 1 tablet (81 mg total) by mouth once daily.    FISH OIL-FAT ACID COMB.8- (OMEGA 3-6-9) 1,200 MG CAP        FLUOCINONIDE 0.1 % CREA    as needed.     GLIPIZIDE (GLUCOTROL) 5 MG TABLET    TAKE 1 TABLET BY MOUTH TWICE DAILY WITH MEALS    KETOCONAZOLE (NIZORAL) 2 % CREAM    Apply topically once daily.    LEVOTHYROXINE (SYNTHROID) 88 MCG TABLET    TAKE 1 TABLET(88 MCG) BY MOUTH EVERY DAY    LOSARTAN (COZAAR) 50 MG TABLET    TAKE 1 TABLET BY MOUTH EVERY DAY    LOVASTATIN (MEVACOR) 10 MG TABLET    Take 1 tablet (10 mg total) by mouth Daily.    METFORMIN (GLUCOPHAGE) 1000 MG TABLET    TAKE 1/2 TABLET BY MOUTH TWICE DAILY WITH MEALS    MULTIVIT,CALC,MINS/IRON/FOLIC (ONE-A-DAY WOMENS FORMULA ORAL)    Take 1 tablet by mouth once daily.    NAPROXEN (NAPROSYN) 500 MG TABLET    Take 1 tablet (500 mg total) by mouth 2 (two) times daily with meals.    OMEPRAZOLE (PRILOSEC) 20 MG CAPSULE    Take 2 capsules (40 mg total) by mouth once daily.    TRIAMCINOLONE ACETONIDE 0.5% (KENALOG) 0.5 % CREA    Apply twice a day to affected areas   Discontinued Medications    LOSARTAN (COZAAR) 100 MG TABLET    By mouth once daily         CARE TEAM:  Patient Care Team:  Tere Hughes MD as PCP - General (Internal Medicine)  LUIS ANGEL Rowan as PCP - [facility] Att[facility]  Preston Whitfield MD as Consulting Physician (Cardiology)  Criss Baird OD (Inactive) as Consulting Physician (Optometry)  Marvin Roach MD as Consulting Physician (Rheumatology)  Wendy Garcia LPN as Licensed Practical Nurse         REVIEW OF SYSTEMS:  Review of Systems   Constitutional: Negative for chills, fatigue, fever and unexpected weight change.   HENT: Negative for congestion  "and postnasal drip.    Eyes: Negative for pain and visual disturbance.   Respiratory: Negative for cough, shortness of breath and wheezing.    Cardiovascular: Negative for chest pain, palpitations and leg swelling.   Gastrointestinal: Negative for abdominal pain, constipation, diarrhea, nausea and vomiting.   Genitourinary: Negative for dysuria.        She reports getting up 2-3 times every night to urinate   Musculoskeletal: Negative for arthralgias and back pain.   Skin: Negative for rash.        She reports occasional itching on her lower extremities.   Neurological: Negative for weakness and headaches.   Psychiatric/Behavioral: Negative for dysphoric mood and sleep disturbance. The patient is not nervous/anxious.          PHYSICAL EXAM:   Vitals:    07/15/20 0930   BP: 118/70   Pulse: 87   Temp: 97.3 °F (36.3 °C)     Weight: 89.9 kg (198 lb 4.9 oz)   Height: 5' 6" (167.6 cm)   Body mass index is 32.01 kg/m².      General appearance - alert, well appearing, and in no distress, obese  Psychiatric - alert, oriented to person, place, and time, normal mood, behavior, speech, dress, motor activity, and thought processes  Eyes - pupils equal and reactive, extraocular eye movements intact, sclera anicteric  Mouth - not examined; patient wearing mask due to Covid 19 pandemic  Neck - supple, no significant adenopathy, carotids upstroke normal bilaterally, no bruits, thyroid exam: thyroid is normal in size without nodules or tenderness  Lymphatics - no palpable cervical lymphadenopathy  Chest - clear to auscultation, no wheezes, rales or rhonchi, symmetric air entry  Heart - normal rate and regular rhythm, no gallops noted  Neurological - alert, normal speech, no focal findings or movement disorder noted, cranial nerves II through XII intact  Musculoskeletal - patient noted to have Mild-Moderate osteoarthritic changes to both knee joints. No joint effusions noted., no muscular tenderness noted  Extremities - peripheral " pulses normal, no pedal edema, no clubbing or cyanosis  Skin - normal coloration and turgor, no rashes, no suspicious skin lesions noted      Labs:  Ordered/Scheduled      ASSESSMENT AND PLAN:  1. Type 2 diabetes, controlled, with neuropathy  Diabetes is under good control control at this time for age and comorbid conditions. We discussed diabetic diet and regular exercise. We discussed home blood sugar monitoring, if appropriate - the patient does not need to test daily but can test only as needed. Continue current medication regimen.  Diabetic complications addressed: Neuropathy pain controlled.  Patient was counseled on the need for yearly eye exam to screen for/monitor diabetic retinopathy and yearly diabetic foot exam.  - Hemoglobin A1C; Future  - Microalbumin/creatinine urine ratio; Future    2. Essential hypertension  Discussed sodium restriction, maintaining ideal body weight and regular exercise program as physiologic means to achieve blood pressure control. The patient will strive towards this.   The current medical regimen is effective;  continue present plan and medications. Recommended patient to check home readings to monitor and see me for followup as scheduled or sooner as needed.   Patient was educated that both decongestant and anti-inflammatory medication may raise blood pressure.  The patient is not interested in the digital hypertension program.  - CBC auto differential; Future    3. Hyperlipidemia LDL goal <100  We discussed low fat diet and regular exercise.The current medical regimen is effective;  continue present plan and medications.   - Comprehensive metabolic panel; Future  - Lipid Panel; Future    4. Hypothyroidism (acquired)  Patient is clinically euthyroid. Continue current regimen.    5. Class 1 obesity due to excess calories with serious comorbidity and body mass index (BMI) of 32.0 to 32.9 in adult  The patient is asked to make an attempt to improve diet and exercise patterns to  aid in medical management of this problem.         Orders Placed This Encounter   Procedures    CBC auto differential    Comprehensive metabolic panel    Lipid Panel    Hemoglobin A1C    Microalbumin/creatinine urine ratio      Follow up in about 6 months (around 1/15/2021), or if symptoms worsen or fail to improve, for annual exam. or sooner as needed.

## 2020-07-17 DIAGNOSIS — Z71.89 COMPLEX CARE COORDINATION: ICD-10-CM

## 2020-09-28 ENCOUNTER — PATIENT OUTREACH (OUTPATIENT)
Dept: ADMINISTRATIVE | Facility: OTHER | Age: 85
End: 2020-09-28

## 2020-09-29 NOTE — PROGRESS NOTES
Chart reviewed.   Immunizations: updated  Orders placed: n/a  Upcoming appts to satisfy JACQUELYN topics: Optometry 9/30

## 2020-10-01 RX ORDER — GLIPIZIDE 5 MG/1
TABLET ORAL
Qty: 180 TABLET | Refills: 1 | Status: SHIPPED | OUTPATIENT
Start: 2020-10-01 | End: 2021-03-30

## 2020-10-17 NOTE — PROGRESS NOTES
Subjective:       Patient ID: Tonya Cohn is a 87 y.o. female.    Chief Complaint: Neck Pain (patient states right side neck pain.)    Neck Pain    This is a new problem. The problem occurs constantly. The problem has been unchanged. The pain is associated with nothing. The pain is present in the right side. The pain is at a severity of 10/10. The pain is moderate. The symptoms are aggravated by twisting. Pertinent negatives include no fever or visual change. She has tried acetaminophen for the symptoms. The treatment provided mild relief.       Past Medical History:   Diagnosis Date    AR (allergic rhinitis)     Atherosclerosis of abdominal aorta     noted on CT scan 2011    Carpal tunnel syndrome of left wrist     Chronic kidney disease, stage 3     Diabetic peripheral neuropathy     Diverticulosis     History of colon polyps     History of diverticulitis of colon 2014    Hyperlipemia     Hypertension     Hypothyroidism     followed by endocrinology, Dr. Green    Mild aortic stenosis     OA (osteoarthritis) of knee     Obesity     Sciatica     Type II or unspecified type diabetes mellitus with neurological manifestations, uncontrolled(250.62)        Social History     Socioeconomic History    Marital status:      Spouse name: Not on file    Number of children: 7    Years of education: Not on file    Highest education level: Not on file   Occupational History    Occupation: retired - house cleaning   Social Needs    Financial resource strain: Not on file    Food insecurity:     Worry: Not on file     Inability: Not on file    Transportation needs:     Medical: Not on file     Non-medical: Not on file   Tobacco Use    Smoking status: Former Smoker     Packs/day: 0.25     Years: 2.00     Pack years: 0.50     Start date: 1955     Last attempt to quit: 1962     Years since quittin.3    Smokeless tobacco: Former User     Quit date: 1970   Substance and  "Sexual Activity    Alcohol use: Yes     Alcohol/week: 1.0 standard drinks     Types: 1 Cans of beer per week     Comment: occasionally    Drug use: No    Sexual activity: Not Currently     Partners: Male   Lifestyle    Physical activity:     Days per week: Not on file     Minutes per session: Not on file    Stress: Not on file   Relationships    Social connections:     Talks on phone: Not on file     Gets together: Not on file     Attends Yazidi service: Not on file     Active member of club or organization: Not on file     Attends meetings of clubs or organizations: Not on file     Relationship status: Not on file   Other Topics Concern    Are you pregnant or think you may be? No    Breast-feeding No   Social History Narrative    Not on file       Past Surgical History:   Procedure Laterality Date    CATARACT EXTRACTION      COLONOSCOPY N/A 10/3/2017    Procedure: COLONOSCOPY;  Surgeon: Alin Gonzalez MD;  [facility] [place] [address]);  Service: Endoscopy;  Laterality: N/A;    COSMETIC SURGERY      HYSTERECTOMY      partial    JOINT REPLACEMENT Right     LUNG BIOPSY  in her 40's    TOTAL KNEE ARTHROPLASTY Right 6/13/2014    TKR       Review of Systems   Constitutional: Negative for chills and fever.   HENT: Positive for ear pain, rhinorrhea and sneezing. Negative for congestion, ear discharge, postnasal drip, sinus pressure, sinus pain and sore throat.    Respiratory: Positive for cough.    Musculoskeletal: Positive for neck pain.   All other systems reviewed and are negative.      Objective:   BP (!) 150/72 (BP Location: Left arm, Patient Position: Sitting, BP Method: Large (Manual))   Pulse 88   Temp 98 °F (36.7 °C) (Oral)   Ht 5' 6" (1.676 m)   Wt 91.2 kg (200 lb 15.2 oz)   SpO2 96%   BMI 32.43 kg/m²      Physical Exam   Constitutional: She is oriented to person, place, and time. She appears well-developed and well-nourished.   HENT:   Head: Normocephalic and atraumatic.   Right Ear: " Hearing, tympanic membrane, external ear and ear canal normal.   Left Ear: Hearing, tympanic membrane, external ear and ear canal normal.   Nose: Rhinorrhea present.   Neck: Normal carotid pulses and no JVD present. Muscular tenderness present. No spinous process tenderness present. Carotid bruit is not present. Decreased range of motion present.   Cardiovascular: Normal rate, regular rhythm, normal heart sounds and normal pulses.   Neurological: She is alert and oriented to person, place, and time.   Skin: She is not diaphoretic. No pallor.   Psychiatric: She has a normal mood and affect. Her speech is normal and behavior is normal.       Assessment:       1. Neck muscle spasm    2. Rhinorrhea    3. Cough    4. Type 2 diabetes, controlled, with neuropathy    5. Need for influenza vaccination    6. Visit for screening mammogram    7. Atherosclerosis of abdominal aorta        Plan:       Tonya was seen today for neck pain.    Diagnoses and all orders for this visit:    Neck muscle spasm  -     tiZANidine (ZANAFLEX) 4 MG tablet; Take 1 tablet (4 mg total) by mouth 2 (two) times daily as needed.  -     Apply heat and ice.  -     Home care instructions given to patient on muscle spasms. She verbalized understanding    Rhinorrhea  -     Improving    Cough  -     benzonatate (TESSALON) 200 MG capsule; Take 1 capsule (200 mg total) by mouth 3 (three) times daily as needed.    Type 2 diabetes, controlled, with neuropathy  -     Hemoglobin A1c; Future  -     Ordered and scheduled    Need for influenza vaccination  -     Influenza - High Dose (65+) (PF) (IM)    Visit for screening mammogram  -     Mammo Digital Screening Bilat w/ Ketan; Future  -     Scheduled    Problem List Items Addressed This Visit     Type 2 diabetes, controlled, with neuropathy    Current Assessment & Plan     Stable and controlled. Continue current treatment plan as previously prescribed with your PCP.   The patient is asked to make an attempt to  improve diet and exercise patterns to aid in medical management of this problem.  Followed by PCP           Relevant Orders    Hemoglobin A1c    Atherosclerosis of abdominal aorta    Overview     noted on CT scan 1/17/2011         Current Assessment & Plan     Stable. Continue to monitor             Other Visit Diagnoses     Neck muscle spasm    -  Primary    Rhinorrhea        Cough        Need for influenza vaccination        Relevant Orders    Influenza - High Dose (65+) (PF) (IM) (Completed)    Visit for screening mammogram        Relevant Orders    Mammo Digital Screening Bilat w/ Ketan          Follow up if symptoms worsen or fail to improve.       denies rehab admission denies detox rehab admission, however, notes, past outpatient treatment at "[address] [place] [address]" (address for [facility])

## 2020-10-26 ENCOUNTER — CLINICAL SUPPORT (OUTPATIENT)
Dept: FAMILY MEDICINE | Facility: CLINIC | Age: 85
End: 2020-10-26
Payer: MEDICARE

## 2020-10-26 DIAGNOSIS — Z23 NEED FOR INFLUENZA VACCINATION: Primary | ICD-10-CM

## 2020-10-26 PROCEDURE — 90694 VACC AIIV4 NO PRSRV 0.5ML IM: CPT | Mod: PBBFAC,PO | Performed by: INTERNAL MEDICINE

## 2020-10-26 PROCEDURE — G0008 ADMIN INFLUENZA VIRUS VAC: HCPCS | Mod: PBBFAC,PO | Performed by: INTERNAL MEDICINE

## 2020-11-09 ENCOUNTER — PATIENT OUTREACH (OUTPATIENT)
Dept: ADMINISTRATIVE | Facility: OTHER | Age: 85
End: 2020-11-09

## 2020-11-09 ENCOUNTER — OFFICE VISIT (OUTPATIENT)
Dept: OPTOMETRY | Facility: CLINIC | Age: 85
End: 2020-11-09
Payer: MEDICARE

## 2020-11-09 DIAGNOSIS — H52.7 REFRACTIVE ERROR: ICD-10-CM

## 2020-11-09 DIAGNOSIS — Z83.511 FAMILY HISTORY OF GLAUCOMA: ICD-10-CM

## 2020-11-09 DIAGNOSIS — E11.9 TYPE 2 DIABETES MELLITUS WITHOUT RETINOPATHY: Primary | ICD-10-CM

## 2020-11-09 DIAGNOSIS — Z96.1 PSEUDOPHAKIA OF BOTH EYES: ICD-10-CM

## 2020-11-09 PROCEDURE — 92015 DETERMINE REFRACTIVE STATE: CPT | Mod: ,,, | Performed by: OPTOMETRIST

## 2020-11-09 PROCEDURE — 99999 PR PBB SHADOW E&M-EST. PATIENT-LVL III: ICD-10-PCS | Mod: PBBFAC,,, | Performed by: OPTOMETRIST

## 2020-11-09 PROCEDURE — 92015 PR REFRACTION: ICD-10-PCS | Mod: ,,, | Performed by: OPTOMETRIST

## 2020-11-09 PROCEDURE — 92014 COMPRE OPH EXAM EST PT 1/>: CPT | Mod: S$PBB,,, | Performed by: OPTOMETRIST

## 2020-11-09 PROCEDURE — 99213 OFFICE O/P EST LOW 20 MIN: CPT | Mod: PBBFAC,PO | Performed by: OPTOMETRIST

## 2020-11-09 PROCEDURE — 92014 PR EYE EXAM, EST PATIENT,COMPREHESV: ICD-10-PCS | Mod: S$PBB,,, | Performed by: OPTOMETRIST

## 2020-11-09 PROCEDURE — 99999 PR PBB SHADOW E&M-EST. PATIENT-LVL III: CPT | Mod: PBBFAC,,, | Performed by: OPTOMETRIST

## 2020-11-09 NOTE — PROGRESS NOTES
Subjective:       Patient ID: Tonya Cohn is a 88 y.o. female      Chief Complaint   Patient presents with    Concerns About Ocular Health    Diabetic Eye Exam     Dls: 3/29/19 Dr. Baird    87 y/o female presents today for diabetic eye exam.  Pt states no changes in vision. Pt wears bifocal glasses.   Pt wants a new rx for glasses.     LBS ?     No tearing  No itching  + burning  No pain  No ha's  No floaters  No flashes    Eye meds  Tears ou  Prn     Hemoglobin A1C       Date                     Value               Ref Range           Status                07/16/2020               6.0 (H)             4.0 - 5.6 %         Final                  10/30/2019               6.1 (H)             4.0 - 5.6 %         Final                 04/09/2019               6.3 (H)             4.0 - 5.6 %         Final                    Assessment/Plan:     1. Type 2 diabetes mellitus without retinopathy  No diabetic retinopathy. Discussed with pt the effects of diabetes on vision, importance of good blood sugar control, compliance with meds, and follow up care with PCP. Return in 1 year for dilated eye exam, sooner PRN.    2. Pseudophakia of both eyes  Well-centered. Clear.     3. Family history of glaucoma  No changes related to glaucoma. Monitor yearly.    4. Refractive error  Educated patient on refractive error and discussed lens options. Dispensed updated spectacle Rx. Educated about adaptation period to new specs.    Eyeglass Final Rx     Eyeglass Final Rx       Sphere Cylinder Axis Add    Right -1.25 +1.75 015 +2.50    Left -1.00 +1.25 005 +2.50    Expiration Date: 11/10/2021                  Follow up in about 1 year (around 11/9/2021) for Diabetic Eye Exam.

## 2020-12-09 DIAGNOSIS — E78.5 HYPERLIPIDEMIA LDL GOAL <100: ICD-10-CM

## 2020-12-09 RX ORDER — LOVASTATIN 10 MG/1
10 TABLET ORAL DAILY
Qty: 90 TABLET | Refills: 0 | Status: SHIPPED | OUTPATIENT
Start: 2020-12-09 | End: 2021-03-12 | Stop reason: SDUPTHER

## 2020-12-11 ENCOUNTER — TELEPHONE (OUTPATIENT)
Dept: FAMILY MEDICINE | Facility: CLINIC | Age: 85
End: 2020-12-11

## 2020-12-11 DIAGNOSIS — I12.9 BENIGN HYPERTENSION WITH CHRONIC KIDNEY DISEASE, STAGE III: ICD-10-CM

## 2020-12-11 DIAGNOSIS — N18.30 BENIGN HYPERTENSION WITH CHRONIC KIDNEY DISEASE, STAGE III: ICD-10-CM

## 2020-12-11 NOTE — TELEPHONE ENCOUNTER
----- Message from Felipa Rachel sent at 12/11/2020 10:05 AM CST -----  Regarding: self [phone]  .Type: Patient Call Back    Who called: self    What is the request in detail: Requesting to have another prescription for diabetic shoes to be put in and fax over to Dr. Nicole Hughes office, pt stated the last prescription was from June and requesting a new one     Can the clinic reply by [facility]? Call back     Would the patient rather a call back or a response via [facility]? Call back     Best call back number: [phone]      Additional Information: FAX: [phone]

## 2020-12-14 ENCOUNTER — TELEPHONE (OUTPATIENT)
Dept: PODIATRY | Facility: CLINIC | Age: 85
End: 2020-12-14

## 2020-12-14 RX ORDER — LOSARTAN POTASSIUM 50 MG/1
50 TABLET ORAL DAILY
Qty: 90 TABLET | Refills: 3 | Status: SHIPPED | OUTPATIENT
Start: 2020-12-14 | End: 2021-03-12 | Stop reason: SDUPTHER

## 2020-12-14 NOTE — TELEPHONE ENCOUNTER
----- Message from Beverley Estrada DPM sent at 12/14/2020  2:38 PM CST -----  Contact: Self [phone]    ----- Message -----  From: Natalie Pinto  Sent: 12/14/2020   2:32 PM CST  To: Sean Lowery Staff    Type: Patient Call Back    Who called:Self     What is the request in detail:Pt is calling in regards to place an order for diabetic shoes     Can the clinic reply by [facility]? Call back    Would the patient rather a call back or a response via [facility]? Call back    Best call back number: [phone]            Thank You

## 2020-12-15 ENCOUNTER — TELEPHONE (OUTPATIENT)
Dept: FAMILY MEDICINE | Facility: CLINIC | Age: 85
End: 2020-12-15

## 2020-12-15 ENCOUNTER — TELEPHONE (OUTPATIENT)
Dept: PODIATRY | Facility: CLINIC | Age: 85
End: 2020-12-15

## 2020-12-15 DIAGNOSIS — M20.40 HAMMER TOE, UNSPECIFIED LATERALITY: ICD-10-CM

## 2020-12-15 DIAGNOSIS — M21.619 BUNION: ICD-10-CM

## 2020-12-15 DIAGNOSIS — E11.40 TYPE 2 DIABETES, CONTROLLED, WITH NEUROPATHY: Primary | ICD-10-CM

## 2020-12-15 DIAGNOSIS — Z12.31 VISIT FOR SCREENING MAMMOGRAM: Primary | ICD-10-CM

## 2020-12-15 NOTE — TELEPHONE ENCOUNTER
----- Message from Mj Hayward sent at 12/15/2020  9:56 AM CST -----  Name of Who is Calling : ANTHONY LOWERY [[id number]]    What is the request in detail :     Patient is wanting orders for a MMG so she can schedule her appointment with annual exam    .....Please contact to further discuss and advise.    Can the clinic reply by [facility] : no     What Number to Call Back  :  [phone]

## 2020-12-28 DIAGNOSIS — E03.9 ACQUIRED HYPOTHYROIDISM: ICD-10-CM

## 2020-12-28 RX ORDER — LEVOTHYROXINE SODIUM 88 UG/1
TABLET ORAL
Qty: 90 TABLET | Refills: 0 | Status: SHIPPED | OUTPATIENT
Start: 2020-12-28 | End: 2021-03-30

## 2021-01-09 ENCOUNTER — IMMUNIZATION (OUTPATIENT)
Dept: OBSTETRICS AND GYNECOLOGY | Facility: CLINIC | Age: 86
End: 2021-01-09
Payer: MEDICARE

## 2021-01-09 DIAGNOSIS — Z23 NEED FOR VACCINATION: ICD-10-CM

## 2021-01-09 PROCEDURE — 91300 COVID-19, MRNA, LNP-S, PF, 30 MCG/0.3 ML DOSE VACCINE: CPT | Mod: PBBFAC

## 2021-01-15 ENCOUNTER — OFFICE VISIT (OUTPATIENT)
Dept: FAMILY MEDICINE | Facility: CLINIC | Age: 86
End: 2021-01-15
Payer: MEDICARE

## 2021-01-15 ENCOUNTER — HOSPITAL ENCOUNTER (OUTPATIENT)
Dept: RADIOLOGY | Facility: HOSPITAL | Age: 86
Discharge: HOME | End: 2021-01-15
Attending: INTERNAL MEDICINE
Payer: MEDICARE

## 2021-01-15 VITALS
BODY MASS INDEX: 31.67 KG/M2 | SYSTOLIC BLOOD PRESSURE: 138 MMHG | TEMPERATURE: 98 F | WEIGHT: 197.06 LBS | OXYGEN SATURATION: 99 % | HEART RATE: 68 BPM | DIASTOLIC BLOOD PRESSURE: 70 MMHG | HEIGHT: 66 IN

## 2021-01-15 DIAGNOSIS — M51.36 DDD (DEGENERATIVE DISC DISEASE), LUMBAR: ICD-10-CM

## 2021-01-15 DIAGNOSIS — E11.40 TYPE 2 DIABETES, CONTROLLED, WITH NEUROPATHY: Primary | ICD-10-CM

## 2021-01-15 DIAGNOSIS — M47.892 OTHER OSTEOARTHRITIS OF SPINE, CERVICAL REGION: ICD-10-CM

## 2021-01-15 DIAGNOSIS — I35.0 MILD AORTIC STENOSIS: ICD-10-CM

## 2021-01-15 DIAGNOSIS — I10 ESSENTIAL HYPERTENSION: ICD-10-CM

## 2021-01-15 DIAGNOSIS — E03.9 HYPOTHYROIDISM (ACQUIRED): ICD-10-CM

## 2021-01-15 DIAGNOSIS — I70.0 ATHEROSCLEROSIS OF ABDOMINAL AORTA: ICD-10-CM

## 2021-01-15 DIAGNOSIS — M20.40 HAMMER TOE, UNSPECIFIED LATERALITY: ICD-10-CM

## 2021-01-15 DIAGNOSIS — Z12.31 VISIT FOR SCREENING MAMMOGRAM: ICD-10-CM

## 2021-01-15 DIAGNOSIS — J30.9 ALLERGIC RHINITIS, UNSPECIFIED SEASONALITY, UNSPECIFIED TRIGGER: ICD-10-CM

## 2021-01-15 DIAGNOSIS — Z00.00 ROUTINE MEDICAL EXAM: Primary | ICD-10-CM

## 2021-01-15 DIAGNOSIS — M21.619 BUNION: ICD-10-CM

## 2021-01-15 DIAGNOSIS — M85.89 OSTEOPENIA OF MULTIPLE SITES: ICD-10-CM

## 2021-01-15 DIAGNOSIS — E78.5 HYPERLIPIDEMIA LDL GOAL <100: ICD-10-CM

## 2021-01-15 DIAGNOSIS — K21.9 GASTROESOPHAGEAL REFLUX DISEASE WITHOUT ESOPHAGITIS: ICD-10-CM

## 2021-01-15 DIAGNOSIS — E66.09 CLASS 1 OBESITY DUE TO EXCESS CALORIES WITH SERIOUS COMORBIDITY AND BODY MASS INDEX (BMI) OF 32.0 TO 32.9 IN ADULT: ICD-10-CM

## 2021-01-15 DIAGNOSIS — E11.40 TYPE 2 DIABETES, CONTROLLED, WITH NEUROPATHY: ICD-10-CM

## 2021-01-15 DIAGNOSIS — R05.9 COUGH: ICD-10-CM

## 2021-01-15 PROCEDURE — 99214 OFFICE O/P EST MOD 30 MIN: CPT | Mod: PBBFAC,PO | Performed by: INTERNAL MEDICINE

## 2021-01-15 PROCEDURE — 77067 MAMMO DIGITAL SCREENING BILAT WITH TOMO: ICD-10-PCS | Mod: 26,,, | Performed by: RADIOLOGY

## 2021-01-15 PROCEDURE — 99397 PR PREVENTIVE VISIT,EST,65 & OVER: ICD-10-PCS | Mod: S$PBB,,, | Performed by: INTERNAL MEDICINE

## 2021-01-15 PROCEDURE — 99999 PR PBB SHADOW E&M-EST. PATIENT-LVL IV: ICD-10-PCS | Mod: PBBFAC,,, | Performed by: INTERNAL MEDICINE

## 2021-01-15 PROCEDURE — 99999 PR PBB SHADOW E&M-EST. PATIENT-LVL IV: CPT | Mod: PBBFAC,,, | Performed by: INTERNAL MEDICINE

## 2021-01-15 PROCEDURE — 77063 MAMMO DIGITAL SCREENING BILAT WITH TOMO: ICD-10-PCS | Mod: 26,,, | Performed by: RADIOLOGY

## 2021-01-15 PROCEDURE — 77067 SCR MAMMO BI INCL CAD: CPT | Mod: 26,,, | Performed by: RADIOLOGY

## 2021-01-15 PROCEDURE — 99397 PER PM REEVAL EST PAT 65+ YR: CPT | Mod: S$PBB,,, | Performed by: INTERNAL MEDICINE

## 2021-01-15 PROCEDURE — 77067 SCR MAMMO BI INCL CAD: CPT | Mod: TC,PO

## 2021-01-15 PROCEDURE — 77063 BREAST TOMOSYNTHESIS BI: CPT | Mod: 26,,, | Performed by: RADIOLOGY

## 2021-01-30 ENCOUNTER — IMMUNIZATION (OUTPATIENT)
Dept: OBSTETRICS AND GYNECOLOGY | Facility: CLINIC | Age: 86
End: 2021-01-30
Payer: MEDICARE

## 2021-01-30 DIAGNOSIS — Z23 NEED FOR VACCINATION: Primary | ICD-10-CM

## 2021-01-30 PROCEDURE — 0002A COVID-19, MRNA, LNP-S, PF, 30 MCG/0.3 ML DOSE VACCINE: CPT | Mod: PBBFAC | Performed by: FAMILY MEDICINE

## 2021-01-30 PROCEDURE — 91300 COVID-19, MRNA, LNP-S, PF, 30 MCG/0.3 ML DOSE VACCINE: CPT | Mod: PBBFAC | Performed by: FAMILY MEDICINE

## 2021-02-04 ENCOUNTER — OFFICE VISIT (OUTPATIENT)
Dept: CARDIOLOGY | Facility: CLINIC | Age: 86
End: 2021-02-04
Payer: MEDICARE

## 2021-02-04 VITALS
OXYGEN SATURATION: 98 % | HEIGHT: 66 IN | SYSTOLIC BLOOD PRESSURE: 139 MMHG | BODY MASS INDEX: 31.76 KG/M2 | DIASTOLIC BLOOD PRESSURE: 80 MMHG | WEIGHT: 197.63 LBS | HEART RATE: 82 BPM

## 2021-02-04 DIAGNOSIS — I70.0 ATHEROSCLEROSIS OF ABDOMINAL AORTA: ICD-10-CM

## 2021-02-04 DIAGNOSIS — I35.0 MILD AORTIC STENOSIS: ICD-10-CM

## 2021-02-04 DIAGNOSIS — E11.40 TYPE 2 DIABETES, CONTROLLED, WITH NEUROPATHY: ICD-10-CM

## 2021-02-04 DIAGNOSIS — E78.5 HYPERLIPIDEMIA LDL GOAL <100: ICD-10-CM

## 2021-02-04 DIAGNOSIS — I10 ESSENTIAL HYPERTENSION: Primary | ICD-10-CM

## 2021-02-04 PROCEDURE — 99214 OFFICE O/P EST MOD 30 MIN: CPT | Mod: PBBFAC,PO,25 | Performed by: INTERNAL MEDICINE

## 2021-02-04 PROCEDURE — 93010 EKG 12-LEAD: ICD-10-PCS | Mod: S$PBB,,, | Performed by: INTERNAL MEDICINE

## 2021-02-04 PROCEDURE — 99214 OFFICE O/P EST MOD 30 MIN: CPT | Mod: S$PBB,,, | Performed by: INTERNAL MEDICINE

## 2021-02-04 PROCEDURE — 99214 PR OFFICE/OUTPT VISIT, EST, LEVL IV, 30-39 MIN: ICD-10-PCS | Mod: S$PBB,,, | Performed by: INTERNAL MEDICINE

## 2021-02-04 PROCEDURE — 93010 ELECTROCARDIOGRAM REPORT: CPT | Mod: S$PBB,,, | Performed by: INTERNAL MEDICINE

## 2021-02-04 PROCEDURE — 93005 ELECTROCARDIOGRAM TRACING: CPT | Mod: PBBFAC,PO | Performed by: INTERNAL MEDICINE

## 2021-02-04 PROCEDURE — 99999 PR PBB SHADOW E&M-EST. PATIENT-LVL IV: ICD-10-PCS | Mod: PBBFAC,,, | Performed by: INTERNAL MEDICINE

## 2021-02-04 PROCEDURE — 99999 PR PBB SHADOW E&M-EST. PATIENT-LVL IV: CPT | Mod: PBBFAC,,, | Performed by: INTERNAL MEDICINE

## 2021-02-24 ENCOUNTER — OFFICE VISIT (OUTPATIENT)
Dept: FAMILY MEDICINE | Facility: CLINIC | Age: 86
End: 2021-02-24
Payer: MEDICARE

## 2021-02-24 VITALS
WEIGHT: 196.56 LBS | SYSTOLIC BLOOD PRESSURE: 110 MMHG | TEMPERATURE: 98 F | OXYGEN SATURATION: 98 % | HEART RATE: 97 BPM | HEIGHT: 66 IN | BODY MASS INDEX: 31.59 KG/M2 | DIASTOLIC BLOOD PRESSURE: 62 MMHG

## 2021-02-24 DIAGNOSIS — L25.9 CONTACT DERMATITIS, UNSPECIFIED CONTACT DERMATITIS TYPE, UNSPECIFIED TRIGGER: Primary | ICD-10-CM

## 2021-02-24 PROCEDURE — 99213 OFFICE O/P EST LOW 20 MIN: CPT | Mod: S$PBB,,, | Performed by: FAMILY MEDICINE

## 2021-02-24 PROCEDURE — 99213 OFFICE O/P EST LOW 20 MIN: CPT | Mod: PBBFAC,PO | Performed by: FAMILY MEDICINE

## 2021-02-24 PROCEDURE — 99999 PR PBB SHADOW E&M-EST. PATIENT-LVL III: CPT | Mod: PBBFAC,,, | Performed by: FAMILY MEDICINE

## 2021-02-24 PROCEDURE — 99999 PR PBB SHADOW E&M-EST. PATIENT-LVL III: ICD-10-PCS | Mod: PBBFAC,,, | Performed by: FAMILY MEDICINE

## 2021-02-24 PROCEDURE — 99213 PR OFFICE/OUTPT VISIT, EST, LEVL III, 20-29 MIN: ICD-10-PCS | Mod: S$PBB,,, | Performed by: FAMILY MEDICINE

## 2021-02-24 RX ORDER — BETAMETHASONE DIPROPIONATE 0.5 MG/G
OINTMENT TOPICAL 2 TIMES DAILY
Qty: 45 G | Refills: 0 | Status: SHIPPED | OUTPATIENT
Start: 2021-02-24 | End: 2021-03-16 | Stop reason: SDUPTHER

## 2021-02-28 RX ORDER — CLOBETASOL PROPIONATE 0.5 MG/G
CREAM TOPICAL 2 TIMES DAILY
Qty: 30 G | Refills: 0 | Status: SHIPPED | OUTPATIENT
Start: 2021-02-28 | End: 2022-08-08

## 2021-03-12 DIAGNOSIS — I12.9 BENIGN HYPERTENSION WITH CHRONIC KIDNEY DISEASE, STAGE III: ICD-10-CM

## 2021-03-12 DIAGNOSIS — N18.30 BENIGN HYPERTENSION WITH CHRONIC KIDNEY DISEASE, STAGE III: ICD-10-CM

## 2021-03-12 DIAGNOSIS — E78.5 HYPERLIPIDEMIA LDL GOAL <100: ICD-10-CM

## 2021-03-12 RX ORDER — LOVASTATIN 10 MG/1
10 TABLET ORAL DAILY
Qty: 90 TABLET | Refills: 1 | Status: SHIPPED | OUTPATIENT
Start: 2021-03-12 | End: 2021-07-15 | Stop reason: SDUPTHER

## 2021-03-12 RX ORDER — LOSARTAN POTASSIUM 50 MG/1
50 TABLET ORAL DAILY
Qty: 90 TABLET | Refills: 1 | Status: SHIPPED | OUTPATIENT
Start: 2021-03-12 | End: 2021-12-14 | Stop reason: SDUPTHER

## 2021-03-16 ENCOUNTER — OFFICE VISIT (OUTPATIENT)
Dept: PODIATRY | Facility: CLINIC | Age: 86
End: 2021-03-16
Payer: MEDICARE

## 2021-03-16 VITALS
WEIGHT: 196.44 LBS | HEIGHT: 66 IN | SYSTOLIC BLOOD PRESSURE: 116 MMHG | BODY MASS INDEX: 31.57 KG/M2 | DIASTOLIC BLOOD PRESSURE: 78 MMHG

## 2021-03-16 DIAGNOSIS — L25.9 CONTACT DERMATITIS, UNSPECIFIED CONTACT DERMATITIS TYPE, UNSPECIFIED TRIGGER: ICD-10-CM

## 2021-03-16 DIAGNOSIS — E11.40 TYPE 2 DIABETES, CONTROLLED, WITH NEUROPATHY: Primary | ICD-10-CM

## 2021-03-16 PROCEDURE — 99999 PR PBB SHADOW E&M-EST. PATIENT-LVL III: ICD-10-PCS | Mod: PBBFAC,,, | Performed by: PODIATRIST

## 2021-03-16 PROCEDURE — 99213 OFFICE O/P EST LOW 20 MIN: CPT | Mod: PBBFAC,PO | Performed by: PODIATRIST

## 2021-03-16 PROCEDURE — 99214 PR OFFICE/OUTPT VISIT, EST, LEVL IV, 30-39 MIN: ICD-10-PCS | Mod: S$PBB,,, | Performed by: PODIATRIST

## 2021-03-16 PROCEDURE — 99214 OFFICE O/P EST MOD 30 MIN: CPT | Mod: S$PBB,,, | Performed by: PODIATRIST

## 2021-03-16 PROCEDURE — 99999 PR PBB SHADOW E&M-EST. PATIENT-LVL III: CPT | Mod: PBBFAC,,, | Performed by: PODIATRIST

## 2021-03-16 RX ORDER — BETAMETHASONE DIPROPIONATE 0.5 MG/G
OINTMENT TOPICAL 2 TIMES DAILY
Qty: 45 G | Refills: 0 | Status: SHIPPED | OUTPATIENT
Start: 2021-03-16 | End: 2021-03-18 | Stop reason: SDUPTHER

## 2021-03-18 ENCOUNTER — TELEPHONE (OUTPATIENT)
Dept: PODIATRY | Facility: CLINIC | Age: 86
End: 2021-03-18

## 2021-03-18 DIAGNOSIS — L25.9 CONTACT DERMATITIS, UNSPECIFIED CONTACT DERMATITIS TYPE, UNSPECIFIED TRIGGER: ICD-10-CM

## 2021-03-18 RX ORDER — BETAMETHASONE DIPROPIONATE 0.5 MG/G
OINTMENT TOPICAL 2 TIMES DAILY
Qty: 45 G | Refills: 0 | Status: SHIPPED | OUTPATIENT
Start: 2021-03-18

## 2021-03-31 ENCOUNTER — EXTERNAL CHRONIC CARE MANAGEMENT (OUTPATIENT)
Dept: PRIMARY CARE CLINIC | Facility: CLINIC | Age: 86
End: 2021-03-31
Payer: MEDICARE

## 2021-03-31 PROCEDURE — 99490 CHRNC CARE MGMT STAFF 1ST 20: CPT | Mod: S$PBB,,, | Performed by: INTERNAL MEDICINE

## 2021-03-31 PROCEDURE — 99490 CHRNC CARE MGMT STAFF 1ST 20: CPT | Mod: PBBFAC,PO | Performed by: INTERNAL MEDICINE

## 2021-03-31 PROCEDURE — 99490 PR CHRONIC CARE MGMT, 1ST 20 MIN: ICD-10-PCS | Mod: S$PBB,,, | Performed by: INTERNAL MEDICINE

## 2021-04-19 RX ORDER — METFORMIN HYDROCHLORIDE 1000 MG/1
TABLET ORAL
Qty: 90 TABLET | Refills: 0 | Status: SHIPPED | OUTPATIENT
Start: 2021-04-19 | End: 2021-06-28

## 2021-04-26 ENCOUNTER — TELEPHONE (OUTPATIENT)
Dept: FAMILY MEDICINE | Facility: CLINIC | Age: 86
End: 2021-04-26

## 2021-04-30 ENCOUNTER — OFFICE VISIT (OUTPATIENT)
Dept: FAMILY MEDICINE | Facility: CLINIC | Age: 86
End: 2021-04-30
Payer: MEDICARE

## 2021-04-30 ENCOUNTER — EXTERNAL CHRONIC CARE MANAGEMENT (OUTPATIENT)
Dept: PRIMARY CARE CLINIC | Facility: CLINIC | Age: 86
End: 2021-04-30
Payer: MEDICARE

## 2021-04-30 VITALS
WEIGHT: 194.69 LBS | TEMPERATURE: 98 F | HEART RATE: 93 BPM | OXYGEN SATURATION: 97 % | SYSTOLIC BLOOD PRESSURE: 138 MMHG | DIASTOLIC BLOOD PRESSURE: 72 MMHG | HEIGHT: 66 IN | BODY MASS INDEX: 31.29 KG/M2

## 2021-04-30 DIAGNOSIS — R25.2 LEG CRAMPING: Primary | ICD-10-CM

## 2021-04-30 DIAGNOSIS — E11.40 TYPE 2 DIABETES, CONTROLLED, WITH NEUROPATHY: ICD-10-CM

## 2021-04-30 DIAGNOSIS — I10 ESSENTIAL HYPERTENSION: ICD-10-CM

## 2021-04-30 DIAGNOSIS — M79.605 LEFT LEG PAIN: ICD-10-CM

## 2021-04-30 DIAGNOSIS — E78.5 HYPERLIPIDEMIA LDL GOAL <100: ICD-10-CM

## 2021-04-30 PROCEDURE — 99490 PR CHRONIC CARE MGMT, 1ST 20 MIN: ICD-10-PCS | Mod: S$PBB,,, | Performed by: INTERNAL MEDICINE

## 2021-04-30 PROCEDURE — 99999 PR PBB SHADOW E&M-EST. PATIENT-LVL IV: CPT | Mod: PBBFAC,,, | Performed by: FAMILY MEDICINE

## 2021-04-30 PROCEDURE — 99214 OFFICE O/P EST MOD 30 MIN: CPT | Mod: PBBFAC,PO,25 | Performed by: FAMILY MEDICINE

## 2021-04-30 PROCEDURE — 99999 PR PBB SHADOW E&M-EST. PATIENT-LVL IV: ICD-10-PCS | Mod: PBBFAC,,, | Performed by: FAMILY MEDICINE

## 2021-04-30 PROCEDURE — 99490 CHRNC CARE MGMT STAFF 1ST 20: CPT | Mod: S$PBB,,, | Performed by: INTERNAL MEDICINE

## 2021-04-30 PROCEDURE — 99214 OFFICE O/P EST MOD 30 MIN: CPT | Mod: S$PBB,,, | Performed by: FAMILY MEDICINE

## 2021-04-30 PROCEDURE — 99214 PR OFFICE/OUTPT VISIT, EST, LEVL IV, 30-39 MIN: ICD-10-PCS | Mod: S$PBB,,, | Performed by: FAMILY MEDICINE

## 2021-04-30 PROCEDURE — 99490 CHRNC CARE MGMT STAFF 1ST 20: CPT | Mod: PBBFAC,PO | Performed by: INTERNAL MEDICINE

## 2021-05-31 ENCOUNTER — EXTERNAL CHRONIC CARE MANAGEMENT (OUTPATIENT)
Dept: PRIMARY CARE CLINIC | Facility: CLINIC | Age: 86
End: 2021-05-31
Payer: MEDICARE

## 2021-05-31 PROCEDURE — 99490 PR CHRONIC CARE MGMT, 1ST 20 MIN: ICD-10-PCS | Mod: S$PBB,,, | Performed by: INTERNAL MEDICINE

## 2021-05-31 PROCEDURE — 99490 CHRNC CARE MGMT STAFF 1ST 20: CPT | Mod: PBBFAC,PO | Performed by: INTERNAL MEDICINE

## 2021-05-31 PROCEDURE — 99490 CHRNC CARE MGMT STAFF 1ST 20: CPT | Mod: S$PBB,,, | Performed by: INTERNAL MEDICINE

## 2021-06-07 ENCOUNTER — PES CALL (OUTPATIENT)
Dept: ADMINISTRATIVE | Facility: CLINIC | Age: 86
End: 2021-06-07

## 2021-06-09 ENCOUNTER — OFFICE VISIT (OUTPATIENT)
Dept: ORTHOPEDICS | Facility: CLINIC | Age: 86
End: 2021-06-09
Payer: MEDICARE

## 2021-06-09 ENCOUNTER — HOSPITAL ENCOUNTER (OUTPATIENT)
Dept: RADIOLOGY | Facility: HOSPITAL | Age: 86
Discharge: HOME | End: 2021-06-09
Attending: PHYSICIAN ASSISTANT
Payer: MEDICARE

## 2021-06-09 VITALS
DIASTOLIC BLOOD PRESSURE: 67 MMHG | HEART RATE: 105 BPM | RESPIRATION RATE: 18 BRPM | WEIGHT: 194.69 LBS | SYSTOLIC BLOOD PRESSURE: 139 MMHG | HEIGHT: 66 IN | BODY MASS INDEX: 31.29 KG/M2

## 2021-06-09 DIAGNOSIS — M17.12 OSTEOARTHRITIS OF LEFT KNEE, UNSPECIFIED OSTEOARTHRITIS TYPE: Primary | ICD-10-CM

## 2021-06-09 DIAGNOSIS — M25.562 ACUTE PAIN OF LEFT KNEE: ICD-10-CM

## 2021-06-09 PROCEDURE — 73562 X-RAY EXAM OF KNEE 3: CPT | Mod: 26,RT,, | Performed by: RADIOLOGY

## 2021-06-09 PROCEDURE — 99203 PR OFFICE/OUTPT VISIT, NEW, LEVL III, 30-44 MIN: ICD-10-PCS | Mod: S$PBB,,, | Performed by: PHYSICIAN ASSISTANT

## 2021-06-09 PROCEDURE — 99214 OFFICE O/P EST MOD 30 MIN: CPT | Mod: PBBFAC,25 | Performed by: PHYSICIAN ASSISTANT

## 2021-06-09 PROCEDURE — 73564 X-RAY EXAM KNEE 4 OR MORE: CPT | Mod: 26,LT,, | Performed by: RADIOLOGY

## 2021-06-09 PROCEDURE — 73564 XR KNEE ORTHO LEFT WITH FLEXION: ICD-10-PCS | Mod: 26,LT,, | Performed by: RADIOLOGY

## 2021-06-09 PROCEDURE — 99999 PR PBB SHADOW E&M-EST. PATIENT-LVL IV: CPT | Mod: PBBFAC,,, | Performed by: PHYSICIAN ASSISTANT

## 2021-06-09 PROCEDURE — 73562 XR KNEE ORTHO LEFT WITH FLEXION: ICD-10-PCS | Mod: 26,RT,, | Performed by: RADIOLOGY

## 2021-06-09 PROCEDURE — 99203 OFFICE O/P NEW LOW 30 MIN: CPT | Mod: S$PBB,,, | Performed by: PHYSICIAN ASSISTANT

## 2021-06-09 PROCEDURE — 73564 X-RAY EXAM KNEE 4 OR MORE: CPT | Mod: TC,LT

## 2021-06-09 PROCEDURE — 99999 PR PBB SHADOW E&M-EST. PATIENT-LVL IV: ICD-10-PCS | Mod: PBBFAC,,, | Performed by: PHYSICIAN ASSISTANT

## 2021-06-09 RX ORDER — DICLOFENAC SODIUM 10 MG/G
GEL TOPICAL
Qty: 1 TUBE | Refills: 3 | Status: SHIPPED | OUTPATIENT
Start: 2021-06-09

## 2021-06-28 RX ORDER — METFORMIN HYDROCHLORIDE 1000 MG/1
TABLET ORAL
Qty: 90 TABLET | Refills: 0 | Status: SHIPPED | OUTPATIENT
Start: 2021-06-28 | End: 2021-11-15

## 2021-06-29 DIAGNOSIS — I10 ESSENTIAL HYPERTENSION: ICD-10-CM

## 2021-06-29 DIAGNOSIS — E11.40 TYPE 2 DIABETES, CONTROLLED, WITH NEUROPATHY: ICD-10-CM

## 2021-06-29 DIAGNOSIS — E78.5 HYPERLIPIDEMIA LDL GOAL <100: Primary | ICD-10-CM

## 2021-06-29 DIAGNOSIS — E03.9 HYPOTHYROIDISM (ACQUIRED): ICD-10-CM

## 2021-06-30 ENCOUNTER — EXTERNAL CHRONIC CARE MANAGEMENT (OUTPATIENT)
Dept: PRIMARY CARE CLINIC | Facility: CLINIC | Age: 86
End: 2021-06-30
Payer: MEDICARE

## 2021-06-30 PROCEDURE — 99490 PR CHRONIC CARE MGMT, 1ST 20 MIN: ICD-10-PCS | Mod: S$PBB,,, | Performed by: INTERNAL MEDICINE

## 2021-06-30 PROCEDURE — 99490 CHRNC CARE MGMT STAFF 1ST 20: CPT | Mod: PBBFAC,PO | Performed by: INTERNAL MEDICINE

## 2021-06-30 PROCEDURE — 99490 CHRNC CARE MGMT STAFF 1ST 20: CPT | Mod: S$PBB,,, | Performed by: INTERNAL MEDICINE

## 2021-07-07 ENCOUNTER — OFFICE VISIT (OUTPATIENT)
Dept: FAMILY MEDICINE | Facility: CLINIC | Age: 86
End: 2021-07-07
Payer: MEDICARE

## 2021-07-07 VITALS
HEART RATE: 83 BPM | DIASTOLIC BLOOD PRESSURE: 68 MMHG | TEMPERATURE: 98 F | WEIGHT: 194.44 LBS | OXYGEN SATURATION: 97 % | HEIGHT: 66 IN | SYSTOLIC BLOOD PRESSURE: 124 MMHG | BODY MASS INDEX: 31.25 KG/M2

## 2021-07-07 DIAGNOSIS — E11.40 TYPE 2 DIABETES, CONTROLLED, WITH NEUROPATHY: Primary | ICD-10-CM

## 2021-07-07 DIAGNOSIS — E03.9 HYPOTHYROIDISM (ACQUIRED): ICD-10-CM

## 2021-07-07 DIAGNOSIS — E66.09 CLASS 1 OBESITY DUE TO EXCESS CALORIES WITH SERIOUS COMORBIDITY AND BODY MASS INDEX (BMI) OF 32.0 TO 32.9 IN ADULT: ICD-10-CM

## 2021-07-07 DIAGNOSIS — K21.9 GASTROESOPHAGEAL REFLUX DISEASE WITHOUT ESOPHAGITIS: ICD-10-CM

## 2021-07-07 DIAGNOSIS — R05.9 COUGH: ICD-10-CM

## 2021-07-07 DIAGNOSIS — G89.29 CHRONIC PAIN OF LEFT KNEE: ICD-10-CM

## 2021-07-07 DIAGNOSIS — E78.5 HYPERLIPIDEMIA LDL GOAL <100: ICD-10-CM

## 2021-07-07 DIAGNOSIS — I70.0 ATHEROSCLEROSIS OF ABDOMINAL AORTA: ICD-10-CM

## 2021-07-07 DIAGNOSIS — I35.0 MILD AORTIC STENOSIS: ICD-10-CM

## 2021-07-07 DIAGNOSIS — M25.562 CHRONIC PAIN OF LEFT KNEE: ICD-10-CM

## 2021-07-07 DIAGNOSIS — M85.89 OSTEOPENIA OF MULTIPLE SITES: ICD-10-CM

## 2021-07-07 DIAGNOSIS — I10 ESSENTIAL HYPERTENSION: ICD-10-CM

## 2021-07-07 DIAGNOSIS — R21 RASH: ICD-10-CM

## 2021-07-07 DIAGNOSIS — Z71.89 ADVANCED DIRECTIVES, COUNSELING/DISCUSSION: ICD-10-CM

## 2021-07-07 DIAGNOSIS — J30.9 ALLERGIC RHINITIS, UNSPECIFIED SEASONALITY, UNSPECIFIED TRIGGER: ICD-10-CM

## 2021-07-07 PROCEDURE — 99999 PR PBB SHADOW E&M-EST. PATIENT-LVL IV: ICD-10-PCS | Mod: PBBFAC,,, | Performed by: INTERNAL MEDICINE

## 2021-07-07 PROCEDURE — 99214 PR OFFICE/OUTPT VISIT, EST, LEVL IV, 30-39 MIN: ICD-10-PCS | Mod: S$PBB,,, | Performed by: INTERNAL MEDICINE

## 2021-07-07 PROCEDURE — 99214 OFFICE O/P EST MOD 30 MIN: CPT | Mod: PBBFAC,PO | Performed by: INTERNAL MEDICINE

## 2021-07-07 PROCEDURE — 99214 OFFICE O/P EST MOD 30 MIN: CPT | Mod: S$PBB,,, | Performed by: INTERNAL MEDICINE

## 2021-07-07 PROCEDURE — 99999 PR PBB SHADOW E&M-EST. PATIENT-LVL IV: CPT | Mod: PBBFAC,,, | Performed by: INTERNAL MEDICINE

## 2021-07-08 ENCOUNTER — PES CALL (OUTPATIENT)
Dept: ADMINISTRATIVE | Facility: CLINIC | Age: 86
End: 2021-07-08

## 2021-07-15 DIAGNOSIS — E78.5 HYPERLIPIDEMIA LDL GOAL <100: ICD-10-CM

## 2021-07-15 DIAGNOSIS — E03.9 ACQUIRED HYPOTHYROIDISM: ICD-10-CM

## 2021-07-15 RX ORDER — LEVOTHYROXINE SODIUM 88 UG/1
88 TABLET ORAL
Qty: 90 TABLET | Refills: 1 | Status: SHIPPED | OUTPATIENT
Start: 2021-07-15 | End: 2021-12-29

## 2021-07-15 RX ORDER — LOVASTATIN 10 MG/1
10 TABLET ORAL DAILY
Qty: 90 TABLET | Refills: 1 | Status: SHIPPED | OUTPATIENT
Start: 2021-07-15 | End: 2022-02-10

## 2021-07-31 ENCOUNTER — EXTERNAL CHRONIC CARE MANAGEMENT (OUTPATIENT)
Dept: PRIMARY CARE CLINIC | Facility: CLINIC | Age: 86
End: 2021-07-31
Payer: MEDICARE

## 2021-07-31 PROCEDURE — 99439 CHRNC CARE MGMT STAF EA ADDL: CPT | Mod: PBBFAC,PO,27 | Performed by: INTERNAL MEDICINE

## 2021-07-31 PROCEDURE — 99439 CHRNC CARE MGMT STAF EA ADDL: CPT | Mod: S$PBB,,, | Performed by: INTERNAL MEDICINE

## 2021-07-31 PROCEDURE — 99439 PR CHRONIC CARE MGMT, EA ADDTL 20 MIN: ICD-10-PCS | Mod: S$PBB,,, | Performed by: INTERNAL MEDICINE

## 2021-07-31 PROCEDURE — 99490 CHRNC CARE MGMT STAFF 1ST 20: CPT | Mod: S$PBB,,, | Performed by: INTERNAL MEDICINE

## 2021-07-31 PROCEDURE — 99490 CHRNC CARE MGMT STAFF 1ST 20: CPT | Mod: PBBFAC,PO | Performed by: INTERNAL MEDICINE

## 2021-07-31 PROCEDURE — 99490 PR CHRONIC CARE MGMT, 1ST 20 MIN: ICD-10-PCS | Mod: S$PBB,,, | Performed by: INTERNAL MEDICINE

## 2021-08-13 ENCOUNTER — NURSE TRIAGE (OUTPATIENT)
Dept: ADMINISTRATIVE | Facility: CLINIC | Age: 86
End: 2021-08-13

## 2021-08-31 ENCOUNTER — EXTERNAL CHRONIC CARE MANAGEMENT (OUTPATIENT)
Dept: PRIMARY CARE CLINIC | Facility: CLINIC | Age: 86
End: 2021-08-31
Payer: MEDICARE

## 2021-08-31 PROCEDURE — 99490 CHRNC CARE MGMT STAFF 1ST 20: CPT | Mod: PBBFAC,PO | Performed by: INTERNAL MEDICINE

## 2021-08-31 PROCEDURE — 99490 PR CHRONIC CARE MGMT, 1ST 20 MIN: ICD-10-PCS | Mod: S$PBB,,, | Performed by: INTERNAL MEDICINE

## 2021-08-31 PROCEDURE — 99490 CHRNC CARE MGMT STAFF 1ST 20: CPT | Mod: S$PBB,,, | Performed by: INTERNAL MEDICINE

## 2021-09-13 ENCOUNTER — OFFICE VISIT (OUTPATIENT)
Dept: FAMILY MEDICINE | Facility: CLINIC | Age: 86
End: 2021-09-13
Payer: MEDICARE

## 2021-09-13 VITALS
TEMPERATURE: 98 F | HEIGHT: 66 IN | DIASTOLIC BLOOD PRESSURE: 70 MMHG | HEART RATE: 73 BPM | SYSTOLIC BLOOD PRESSURE: 130 MMHG | BODY MASS INDEX: 31 KG/M2 | OXYGEN SATURATION: 97 % | WEIGHT: 192.88 LBS

## 2021-09-13 DIAGNOSIS — M25.562 CHRONIC PAIN OF LEFT KNEE: Primary | ICD-10-CM

## 2021-09-13 DIAGNOSIS — G89.29 CHRONIC PAIN OF LEFT KNEE: Primary | ICD-10-CM

## 2021-09-13 DIAGNOSIS — M17.0 PRIMARY OSTEOARTHRITIS OF BOTH KNEES: ICD-10-CM

## 2021-09-13 PROCEDURE — 99214 OFFICE O/P EST MOD 30 MIN: CPT | Mod: PBBFAC,PO | Performed by: FAMILY MEDICINE

## 2021-09-13 PROCEDURE — 99999 PR PBB SHADOW E&M-EST. PATIENT-LVL IV: CPT | Mod: PBBFAC,,, | Performed by: FAMILY MEDICINE

## 2021-09-13 PROCEDURE — 99214 OFFICE O/P EST MOD 30 MIN: CPT | Mod: S$PBB,,, | Performed by: FAMILY MEDICINE

## 2021-09-13 PROCEDURE — 99214 PR OFFICE/OUTPT VISIT, EST, LEVL IV, 30-39 MIN: ICD-10-PCS | Mod: S$PBB,,, | Performed by: FAMILY MEDICINE

## 2021-09-13 PROCEDURE — 99999 PR PBB SHADOW E&M-EST. PATIENT-LVL IV: ICD-10-PCS | Mod: PBBFAC,,, | Performed by: FAMILY MEDICINE

## 2021-09-13 RX ORDER — ACETAMINOPHEN 500 MG
500 TABLET ORAL EVERY 4 HOURS PRN
Qty: 60 TABLET | Refills: 2
Start: 2021-09-13

## 2021-09-15 ENCOUNTER — PES CALL (OUTPATIENT)
Dept: ADMINISTRATIVE | Facility: CLINIC | Age: 86
End: 2021-09-15

## 2021-09-27 RX ORDER — GLIPIZIDE 5 MG/1
TABLET ORAL
Qty: 180 TABLET | Refills: 0 | Status: SHIPPED | OUTPATIENT
Start: 2021-09-27 | End: 2022-03-01

## 2021-09-30 ENCOUNTER — PES CALL (OUTPATIENT)
Dept: ADMINISTRATIVE | Facility: CLINIC | Age: 86
End: 2021-09-30

## 2021-09-30 ENCOUNTER — IMMUNIZATION (OUTPATIENT)
Dept: INTERNAL MEDICINE | Facility: CLINIC | Age: 86
End: 2021-09-30
Payer: MEDICARE

## 2021-09-30 ENCOUNTER — EXTERNAL CHRONIC CARE MANAGEMENT (OUTPATIENT)
Dept: PRIMARY CARE CLINIC | Facility: CLINIC | Age: 86
End: 2021-09-30
Payer: MEDICARE

## 2021-09-30 DIAGNOSIS — Z23 NEED FOR VACCINATION: Primary | ICD-10-CM

## 2021-09-30 PROCEDURE — 99439 PR CHRONIC CARE MGMT, EA ADDTL 20 MIN: ICD-10-PCS | Mod: S$PBB,,, | Performed by: INTERNAL MEDICINE

## 2021-09-30 PROCEDURE — 99439 CHRNC CARE MGMT STAF EA ADDL: CPT | Mod: S$PBB,,, | Performed by: INTERNAL MEDICINE

## 2021-09-30 PROCEDURE — 0003A COVID-19, MRNA, LNP-S, PF, 30 MCG/0.3 ML DOSE VACCINE: CPT | Mod: PBBFAC | Performed by: FAMILY MEDICINE

## 2021-09-30 PROCEDURE — 99490 PR CHRONIC CARE MGMT, 1ST 20 MIN: ICD-10-PCS | Mod: S$PBB,,, | Performed by: INTERNAL MEDICINE

## 2021-09-30 PROCEDURE — 99490 CHRNC CARE MGMT STAFF 1ST 20: CPT | Mod: PBBFAC,PO,27 | Performed by: INTERNAL MEDICINE

## 2021-09-30 PROCEDURE — 99439 CHRNC CARE MGMT STAF EA ADDL: CPT | Mod: PBBFAC,PO | Performed by: INTERNAL MEDICINE

## 2021-09-30 PROCEDURE — 91300 COVID-19, MRNA, LNP-S, PF, 30 MCG/0.3 ML DOSE VACCINE: CPT | Mod: PBBFAC | Performed by: FAMILY MEDICINE

## 2021-09-30 PROCEDURE — 99490 CHRNC CARE MGMT STAFF 1ST 20: CPT | Mod: S$PBB,,, | Performed by: INTERNAL MEDICINE

## 2021-10-08 ENCOUNTER — TELEPHONE (OUTPATIENT)
Dept: FAMILY MEDICINE | Facility: CLINIC | Age: 86
End: 2021-10-08

## 2021-10-11 ENCOUNTER — HOSPITAL ENCOUNTER (OUTPATIENT)
Dept: CARDIOLOGY | Facility: HOSPITAL | Age: 86
Discharge: HOME | End: 2021-10-11
Attending: INTERNAL MEDICINE
Payer: MEDICARE

## 2021-10-11 VITALS — HEIGHT: 66 IN | BODY MASS INDEX: 30.86 KG/M2 | WEIGHT: 192 LBS

## 2021-10-11 DIAGNOSIS — E11.40 TYPE 2 DIABETES, CONTROLLED, WITH NEUROPATHY: ICD-10-CM

## 2021-10-11 DIAGNOSIS — I10 ESSENTIAL HYPERTENSION: ICD-10-CM

## 2021-10-11 DIAGNOSIS — I70.0 ATHEROSCLEROSIS OF ABDOMINAL AORTA: ICD-10-CM

## 2021-10-11 DIAGNOSIS — E78.5 HYPERLIPIDEMIA LDL GOAL <100: ICD-10-CM

## 2021-10-11 DIAGNOSIS — I35.0 MILD AORTIC STENOSIS: ICD-10-CM

## 2021-10-11 LAB
AV INDEX (PROSTH): 0.42
AV MEAN GRADIENT: 10 MMHG
AV PEAK GRADIENT: 18 MMHG
AV VALVE AREA: 0.74 CM2
AV VELOCITY RATIO: 0.42
BSA FOR ECHO PROCEDURE: 2.01 M2
CV ECHO LV RWT: 0.47 CM
DOP CALC AO PEAK VEL: 2.13 M/S
DOP CALC AO VTI: 45.02 CM
DOP CALC LVOT AREA: 1.8 CM2
DOP CALC LVOT DIAMETER: 1.5 CM
DOP CALC LVOT PEAK VEL: 0.89 M/S
DOP CALC LVOT STROKE VOLUME: 33.4 CM3
DOP CALC MV VTI: 49.13 CM
DOP CALCLVOT PEAK VEL VTI: 18.91 CM
E WAVE DECELERATION TIME: 358.69 MSEC
E/A RATIO: 0.84
E/E' RATIO: 22.4 M/S
ECHO LV POSTERIOR WALL: 0.97 CM (ref 0.6–1.1)
EJECTION FRACTION: 60 %
FRACTIONAL SHORTENING: 23 % (ref 28–44)
HR MV ECHO: 64 BPM
INTERVENTRICULAR SEPTUM: 1.15 CM (ref 0.6–1.1)
IVRT: 166.09 MSEC
LA MAJOR: 6.35 CM
LA MINOR: 6.07 CM
LA WIDTH: 4.94 CM
LEFT ATRIUM SIZE: 5.49 CM
LEFT ATRIUM VOLUME INDEX: 72.6 ML/M2
LEFT ATRIUM VOLUME: 143.08 CM3
LEFT INTERNAL DIMENSION IN SYSTOLE: 3.19 CM (ref 2.1–4)
LEFT VENTRICLE DIASTOLIC VOLUME INDEX: 38.02 ML/M2
LEFT VENTRICLE DIASTOLIC VOLUME: 74.9 ML
LEFT VENTRICLE MASS INDEX: 73 G/M2
LEFT VENTRICLE SYSTOLIC VOLUME INDEX: 20.6 ML/M2
LEFT VENTRICLE SYSTOLIC VOLUME: 40.52 ML
LEFT VENTRICULAR INTERNAL DIMENSION IN DIASTOLE: 4.12 CM (ref 3.5–6)
LEFT VENTRICULAR MASS: 144.57 G
LV LATERAL E/E' RATIO: 18.67 M/S
LV SEPTAL E/E' RATIO: 28 M/S
MV MEAN GRADIENT: 4 MMHG
MV PEAK A VEL: 1.34 M/S
MV PEAK E VEL: 1.12 M/S
MV PEAK GRADIENT: 10 MMHG
MV STENOSIS PRESSURE HALF TIME: 115 MS
MV VALVE AREA BY CONTINUITY EQUATION: 0.68 CM2
MV VALVE AREA P 1/2 METHOD: 1.91 CM2
PISA TR MAX VEL: 2.28 M/S
PV PEAK VELOCITY: 0.97 CM/S
RA MAJOR: 5.55 CM
RA PRESSURE: 8 MMHG
RA WIDTH: 4.33 CM
RIGHT VENTRICULAR END-DIASTOLIC DIMENSION: 3.26 CM
RV TISSUE DOPPLER FREE WALL SYSTOLIC VELOCITY 1 (APICAL 4 CHAMBER VIEW): 13.72 CM/S
SINUS: 2.84 CM
STJ: 2.04 CM
TDI LATERAL: 0.06 M/S
TDI SEPTAL: 0.04 M/S
TDI: 0.05 M/S
TR MAX PG: 21 MMHG
TRICUSPID ANNULAR PLANE SYSTOLIC EXCURSION: 2.29 CM
TV REST PULMONARY ARTERY PRESSURE: 29 MMHG

## 2021-10-11 PROCEDURE — 93306 TTE W/DOPPLER COMPLETE: CPT

## 2021-10-11 PROCEDURE — 93306 ECHO (CUPID ONLY): ICD-10-PCS | Mod: 26,,, | Performed by: INTERNAL MEDICINE

## 2021-10-11 PROCEDURE — 93306 TTE W/DOPPLER COMPLETE: CPT | Mod: 26,,, | Performed by: INTERNAL MEDICINE

## 2021-10-20 ENCOUNTER — OFFICE VISIT (OUTPATIENT)
Dept: CARDIOLOGY | Facility: CLINIC | Age: 86
End: 2021-10-20
Payer: MEDICARE

## 2021-10-20 VITALS
HEART RATE: 71 BPM | DIASTOLIC BLOOD PRESSURE: 84 MMHG | HEIGHT: 66 IN | OXYGEN SATURATION: 97 % | SYSTOLIC BLOOD PRESSURE: 122 MMHG | WEIGHT: 192 LBS | BODY MASS INDEX: 30.86 KG/M2

## 2021-10-20 DIAGNOSIS — I70.0 ATHEROSCLEROSIS OF ABDOMINAL AORTA: ICD-10-CM

## 2021-10-20 DIAGNOSIS — E78.5 HYPERLIPIDEMIA LDL GOAL <100: ICD-10-CM

## 2021-10-20 DIAGNOSIS — I10 ESSENTIAL HYPERTENSION: ICD-10-CM

## 2021-10-20 DIAGNOSIS — E11.40 TYPE 2 DIABETES, CONTROLLED, WITH NEUROPATHY: ICD-10-CM

## 2021-10-20 DIAGNOSIS — I35.0 MILD AORTIC STENOSIS: Primary | ICD-10-CM

## 2021-10-20 PROCEDURE — 99214 OFFICE O/P EST MOD 30 MIN: CPT | Mod: S$PBB,,, | Performed by: INTERNAL MEDICINE

## 2021-10-20 PROCEDURE — 99999 PR PBB SHADOW E&M-EST. PATIENT-LVL III: ICD-10-PCS | Mod: PBBFAC,,, | Performed by: INTERNAL MEDICINE

## 2021-10-20 PROCEDURE — 99213 OFFICE O/P EST LOW 20 MIN: CPT | Mod: PBBFAC | Performed by: INTERNAL MEDICINE

## 2021-10-20 PROCEDURE — 99214 PR OFFICE/OUTPT VISIT, EST, LEVL IV, 30-39 MIN: ICD-10-PCS | Mod: S$PBB,,, | Performed by: INTERNAL MEDICINE

## 2021-10-20 PROCEDURE — 99999 PR PBB SHADOW E&M-EST. PATIENT-LVL III: CPT | Mod: PBBFAC,,, | Performed by: INTERNAL MEDICINE

## 2021-10-26 ENCOUNTER — OFFICE VISIT (OUTPATIENT)
Dept: FAMILY MEDICINE | Facility: CLINIC | Age: 86
End: 2021-10-26
Payer: MEDICARE

## 2021-10-26 VITALS
BODY MASS INDEX: 31.03 KG/M2 | WEIGHT: 192.25 LBS | HEART RATE: 86 BPM | SYSTOLIC BLOOD PRESSURE: 130 MMHG | TEMPERATURE: 98 F | DIASTOLIC BLOOD PRESSURE: 50 MMHG | OXYGEN SATURATION: 99 %

## 2021-10-26 DIAGNOSIS — E11.40 TYPE 2 DIABETES, CONTROLLED, WITH NEUROPATHY: Primary | ICD-10-CM

## 2021-10-26 DIAGNOSIS — L20.9 ATOPIC DERMATITIS, UNSPECIFIED TYPE: Primary | ICD-10-CM

## 2021-10-26 DIAGNOSIS — Z74.09 IMPAIRED FUNCTIONAL MOBILITY AND ACTIVITY TOLERANCE: ICD-10-CM

## 2021-10-26 DIAGNOSIS — E66.09 CLASS 1 OBESITY DUE TO EXCESS CALORIES WITH SERIOUS COMORBIDITY AND BODY MASS INDEX (BMI) OF 32.0 TO 32.9 IN ADULT: ICD-10-CM

## 2021-10-26 DIAGNOSIS — I10 ESSENTIAL HYPERTENSION: ICD-10-CM

## 2021-10-26 DIAGNOSIS — K13.0 LESION OF LIP: ICD-10-CM

## 2021-10-26 PROCEDURE — 99999 PR PBB SHADOW E&M-EST. PATIENT-LVL IV: CPT | Mod: PBBFAC,,, | Performed by: NURSE PRACTITIONER

## 2021-10-26 PROCEDURE — 99999 PR PBB SHADOW E&M-EST. PATIENT-LVL IV: ICD-10-PCS | Mod: PBBFAC,,, | Performed by: NURSE PRACTITIONER

## 2021-10-26 PROCEDURE — 99214 OFFICE O/P EST MOD 30 MIN: CPT | Mod: S$PBB,,, | Performed by: NURSE PRACTITIONER

## 2021-10-26 PROCEDURE — 99214 PR OFFICE/OUTPT VISIT, EST, LEVL IV, 30-39 MIN: ICD-10-PCS | Mod: S$PBB,,, | Performed by: NURSE PRACTITIONER

## 2021-10-26 PROCEDURE — 99214 OFFICE O/P EST MOD 30 MIN: CPT | Mod: PBBFAC,PO | Performed by: NURSE PRACTITIONER

## 2021-10-26 RX ORDER — HYDROXYZINE HYDROCHLORIDE 10 MG/1
10 TABLET, FILM COATED ORAL 3 TIMES DAILY PRN
Qty: 30 TABLET | Refills: 0 | Status: SHIPPED | OUTPATIENT
Start: 2021-10-26 | End: 2023-02-13

## 2021-10-26 RX ORDER — HYDROCORTISONE 25 MG/G
CREAM TOPICAL 2 TIMES DAILY
Qty: 20 G | Refills: 1 | Status: SHIPPED | OUTPATIENT
Start: 2021-10-26

## 2021-10-31 ENCOUNTER — EXTERNAL CHRONIC CARE MANAGEMENT (OUTPATIENT)
Dept: PRIMARY CARE CLINIC | Facility: CLINIC | Age: 86
End: 2021-10-31
Payer: MEDICARE

## 2021-10-31 PROCEDURE — 99490 CHRNC CARE MGMT STAFF 1ST 20: CPT | Mod: PBBFAC,PO | Performed by: INTERNAL MEDICINE

## 2021-10-31 PROCEDURE — 99490 PR CHRONIC CARE MGMT, 1ST 20 MIN: ICD-10-PCS | Mod: S$PBB,,, | Performed by: INTERNAL MEDICINE

## 2021-10-31 PROCEDURE — 99490 CHRNC CARE MGMT STAFF 1ST 20: CPT | Mod: S$PBB,,, | Performed by: INTERNAL MEDICINE

## 2021-11-03 ENCOUNTER — HOSPITAL ENCOUNTER (OUTPATIENT)
Dept: RADIOLOGY | Facility: HOSPITAL | Age: 86
Discharge: HOME | End: 2021-11-03
Attending: PHYSICIAN ASSISTANT
Payer: MEDICARE

## 2021-11-03 ENCOUNTER — OFFICE VISIT (OUTPATIENT)
Dept: ORTHOPEDICS | Facility: CLINIC | Age: 86
End: 2021-11-03
Payer: MEDICARE

## 2021-11-03 DIAGNOSIS — M71.22 BAKER'S CYST OF KNEE, LEFT: ICD-10-CM

## 2021-11-03 DIAGNOSIS — M17.12 OSTEOARTHRITIS OF LEFT KNEE, UNSPECIFIED OSTEOARTHRITIS TYPE: ICD-10-CM

## 2021-11-03 DIAGNOSIS — M17.12 OSTEOARTHRITIS OF LEFT KNEE, UNSPECIFIED OSTEOARTHRITIS TYPE: Primary | ICD-10-CM

## 2021-11-03 PROCEDURE — 99999 PR PBB SHADOW E&M-EST. PATIENT-LVL III: CPT | Mod: PBBFAC,,, | Performed by: PHYSICIAN ASSISTANT

## 2021-11-03 PROCEDURE — 73562 XR KNEE ORTHO LEFT WITH FLEXION: ICD-10-PCS | Mod: 26,RT,, | Performed by: RADIOLOGY

## 2021-11-03 PROCEDURE — 20610 DRAIN/INJ JOINT/BURSA W/O US: CPT | Mod: PBBFAC | Performed by: PHYSICIAN ASSISTANT

## 2021-11-03 PROCEDURE — 73564 XR KNEE ORTHO LEFT WITH FLEXION: ICD-10-PCS | Mod: 26,LT,, | Performed by: RADIOLOGY

## 2021-11-03 PROCEDURE — 99499 UNLISTED E&M SERVICE: CPT | Mod: S$PBB,,, | Performed by: PHYSICIAN ASSISTANT

## 2021-11-03 PROCEDURE — 99213 OFFICE O/P EST LOW 20 MIN: CPT | Mod: PBBFAC | Performed by: PHYSICIAN ASSISTANT

## 2021-11-03 PROCEDURE — 20610 PR DRAIN/INJECT LARGE JOINT/BURSA: ICD-10-PCS | Mod: S$PBB,LT,, | Performed by: PHYSICIAN ASSISTANT

## 2021-11-03 PROCEDURE — 73562 X-RAY EXAM OF KNEE 3: CPT | Mod: 26,RT,, | Performed by: RADIOLOGY

## 2021-11-03 PROCEDURE — 20610 DRAIN/INJ JOINT/BURSA W/O US: CPT | Mod: S$PBB,LT,, | Performed by: PHYSICIAN ASSISTANT

## 2021-11-03 PROCEDURE — 99999 PR PBB SHADOW E&M-EST. PATIENT-LVL III: ICD-10-PCS | Mod: PBBFAC,,, | Performed by: PHYSICIAN ASSISTANT

## 2021-11-03 PROCEDURE — 73564 X-RAY EXAM KNEE 4 OR MORE: CPT | Mod: 26,LT,, | Performed by: RADIOLOGY

## 2021-11-03 PROCEDURE — 99499 NO LOS: ICD-10-PCS | Mod: S$PBB,,, | Performed by: PHYSICIAN ASSISTANT

## 2021-11-03 PROCEDURE — 73564 X-RAY EXAM KNEE 4 OR MORE: CPT | Mod: TC,LT

## 2021-11-03 RX ADMIN — TRIAMCINOLONE ACETONIDE 40 MG: 40 INJECTION, SUSPENSION INTRA-ARTICULAR; INTRAMUSCULAR at 06:11

## 2021-11-05 RX ORDER — TRIAMCINOLONE ACETONIDE 40 MG/ML
40 INJECTION, SUSPENSION INTRA-ARTICULAR; INTRAMUSCULAR
Status: COMPLETED | OUTPATIENT
Start: 2021-11-05 | End: 2021-11-03

## 2021-11-15 RX ORDER — METFORMIN HYDROCHLORIDE 1000 MG/1
TABLET ORAL
Qty: 90 TABLET | Refills: 0 | Status: SHIPPED | OUTPATIENT
Start: 2021-11-15 | End: 2022-02-09

## 2021-11-30 ENCOUNTER — EXTERNAL CHRONIC CARE MANAGEMENT (OUTPATIENT)
Dept: PRIMARY CARE CLINIC | Facility: CLINIC | Age: 86
End: 2021-11-30
Payer: MEDICARE

## 2021-11-30 PROCEDURE — 99490 CHRNC CARE MGMT STAFF 1ST 20: CPT | Mod: PBBFAC,PO | Performed by: INTERNAL MEDICINE

## 2021-11-30 PROCEDURE — 99490 PR CHRONIC CARE MGMT, 1ST 20 MIN: ICD-10-PCS | Mod: S$PBB,,, | Performed by: INTERNAL MEDICINE

## 2021-11-30 PROCEDURE — 99490 CHRNC CARE MGMT STAFF 1ST 20: CPT | Mod: S$PBB,,, | Performed by: INTERNAL MEDICINE

## 2021-12-03 ENCOUNTER — OFFICE VISIT (OUTPATIENT)
Dept: ORTHOPEDICS | Facility: CLINIC | Age: 86
End: 2021-12-03
Payer: MEDICARE

## 2021-12-03 VITALS — HEIGHT: 66 IN | WEIGHT: 191.38 LBS | BODY MASS INDEX: 30.76 KG/M2

## 2021-12-03 DIAGNOSIS — M17.0 PRIMARY OSTEOARTHRITIS OF BOTH KNEES: ICD-10-CM

## 2021-12-03 DIAGNOSIS — M17.12 PRIMARY OSTEOARTHRITIS OF LEFT KNEE: Primary | ICD-10-CM

## 2021-12-03 PROCEDURE — 99213 OFFICE O/P EST LOW 20 MIN: CPT | Mod: S$PBB,,, | Performed by: ORTHOPAEDIC SURGERY

## 2021-12-03 PROCEDURE — 99999 PR PBB SHADOW E&M-EST. PATIENT-LVL III: CPT | Mod: PBBFAC,,, | Performed by: ORTHOPAEDIC SURGERY

## 2021-12-03 PROCEDURE — 99213 OFFICE O/P EST LOW 20 MIN: CPT | Mod: PBBFAC | Performed by: ORTHOPAEDIC SURGERY

## 2021-12-03 PROCEDURE — 99213 PR OFFICE/OUTPT VISIT, EST, LEVL III, 20-29 MIN: ICD-10-PCS | Mod: S$PBB,,, | Performed by: ORTHOPAEDIC SURGERY

## 2021-12-03 PROCEDURE — 99999 PR PBB SHADOW E&M-EST. PATIENT-LVL III: ICD-10-PCS | Mod: PBBFAC,,, | Performed by: ORTHOPAEDIC SURGERY

## 2021-12-14 DIAGNOSIS — N18.30 BENIGN HYPERTENSION WITH CHRONIC KIDNEY DISEASE, STAGE III: ICD-10-CM

## 2021-12-14 DIAGNOSIS — I12.9 BENIGN HYPERTENSION WITH CHRONIC KIDNEY DISEASE, STAGE III: ICD-10-CM

## 2021-12-14 RX ORDER — LOSARTAN POTASSIUM 50 MG/1
50 TABLET ORAL DAILY
Qty: 90 TABLET | Refills: 0 | Status: SHIPPED | OUTPATIENT
Start: 2021-12-14 | End: 2022-02-09

## 2021-12-15 ENCOUNTER — PATIENT OUTREACH (OUTPATIENT)
Dept: ADMINISTRATIVE | Facility: OTHER | Age: 86
End: 2021-12-15
Payer: MEDICARE

## 2021-12-27 NOTE — TELEPHONE ENCOUNTER
----- Message from Giaubree DuranJessica sent at 12/27/2021 12:23 PM CST -----  Who Called: ANTHONY LOWERY    Refill or New Rx: Refill    RX Name and Strength: triamcinolone acetonide 0.5% (KENALOG) 0.5 % Crea    How is the patient currently taking it? (ex. 1XDay): As needed    Is this a 30 day or 90 day RX: 90 day    Preferred Pharmacy with phone number: [id number] - [facility] [address] [address] [facility] [address]    Local or Mail Order: Local    Ordering Provider: LUIS ANGEL Akers    Best Call Back Number: [phone]    Additional Information:

## 2021-12-27 NOTE — TELEPHONE ENCOUNTER
No new care gaps identified.  Powered by [employer]. Reference number: [id number].   12/27/2021 2:39:44 PM CST

## 2021-12-28 ENCOUNTER — TELEPHONE (OUTPATIENT)
Dept: PAIN MEDICINE | Facility: CLINIC | Age: 86
End: 2021-12-28

## 2021-12-28 ENCOUNTER — OFFICE VISIT (OUTPATIENT)
Dept: PAIN MEDICINE | Facility: CLINIC | Age: 86
End: 2021-12-28
Payer: MEDICARE

## 2021-12-28 DIAGNOSIS — G89.29 CHRONIC PAIN OF LEFT KNEE: Primary | ICD-10-CM

## 2021-12-28 DIAGNOSIS — M76.899 HAMSTRING TENDINITIS: ICD-10-CM

## 2021-12-28 DIAGNOSIS — M17.12 PRIMARY OSTEOARTHRITIS OF LEFT KNEE: ICD-10-CM

## 2021-12-28 DIAGNOSIS — M25.562 CHRONIC PAIN OF LEFT KNEE: Primary | ICD-10-CM

## 2021-12-28 PROCEDURE — 99999 PR PBB SHADOW E&M-EST. PATIENT-LVL IV: ICD-10-PCS | Mod: PBBFAC,,, | Performed by: PHYSICAL MEDICINE & REHABILITATION

## 2021-12-28 PROCEDURE — 99204 OFFICE O/P NEW MOD 45 MIN: CPT | Mod: S$PBB,,, | Performed by: PHYSICAL MEDICINE & REHABILITATION

## 2021-12-28 PROCEDURE — 99999 PR PBB SHADOW E&M-EST. PATIENT-LVL IV: CPT | Mod: PBBFAC,,, | Performed by: PHYSICAL MEDICINE & REHABILITATION

## 2021-12-28 PROCEDURE — 99204 PR OFFICE/OUTPT VISIT, NEW, LEVL IV, 45-59 MIN: ICD-10-PCS | Mod: S$PBB,,, | Performed by: PHYSICAL MEDICINE & REHABILITATION

## 2021-12-28 PROCEDURE — 99214 OFFICE O/P EST MOD 30 MIN: CPT | Mod: PBBFAC,PO | Performed by: PHYSICAL MEDICINE & REHABILITATION

## 2021-12-29 DIAGNOSIS — E03.9 ACQUIRED HYPOTHYROIDISM: ICD-10-CM

## 2021-12-29 RX ORDER — LEVOTHYROXINE SODIUM 88 UG/1
TABLET ORAL
Qty: 90 TABLET | Refills: 0 | Status: SHIPPED | OUTPATIENT
Start: 2021-12-29 | End: 2022-03-28

## 2021-12-31 ENCOUNTER — EXTERNAL CHRONIC CARE MANAGEMENT (OUTPATIENT)
Dept: PRIMARY CARE CLINIC | Facility: CLINIC | Age: 86
End: 2021-12-31
Payer: MEDICARE

## 2021-12-31 PROCEDURE — 99490 CHRNC CARE MGMT STAFF 1ST 20: CPT | Mod: PBBFAC,PO | Performed by: INTERNAL MEDICINE

## 2021-12-31 PROCEDURE — 99490 PR CHRONIC CARE MGMT, 1ST 20 MIN: ICD-10-PCS | Mod: S$PBB,,, | Performed by: INTERNAL MEDICINE

## 2021-12-31 PROCEDURE — 99490 CHRNC CARE MGMT STAFF 1ST 20: CPT | Mod: S$PBB,,, | Performed by: INTERNAL MEDICINE

## 2022-01-01 RX ORDER — TRIAMCINOLONE ACETONIDE 5 MG/G
CREAM TOPICAL
Qty: 30 G | Refills: 1 | Status: SHIPPED | OUTPATIENT
Start: 2022-01-01 | End: 2022-01-03 | Stop reason: SDUPTHER

## 2022-01-01 NOTE — TELEPHONE ENCOUNTER
Refill Authorization Note   Tonya Cohn  is requesting a refill authorization.  Brief Assessment and Rationale for Refill:  Approve     Medication Therapy Plan:       Medication Reconciliation Completed: No   Comments:   --->Care Gap information included below if applicable.   Orders Placed This Encounter    triamcinolone acetonide 0.5% (KENALOG) 0.5 % Crea      Requested Prescriptions   Signed Prescriptions Disp Refills    triamcinolone acetonide 0.5% (KENALOG) 0.5 % Crea 30 g 1     Sig: APPLY EXTERNALLY TO THE AFFECTED AREA TWICE DAILY       Dermatology: Corticosteroids - desonide, flurandrenolide, and triamcinolone Passed - 12/27/2021  2:39 PM        Passed - Patient is at least 18 years old        Passed - Office visit in past 12 months.     Recent Visits  Date Type Provider Dept   07/07/21 Office Visit Tere Hughes MD [facility] Family Med/ Internal Med/ Peds   01/15/21 Office Visit Tere Hughes MD [facility] Family Med/ Internal Med/ Peds   07/15/20 Office Visit Tere Hughes MD [facility] Family Med/ Internal Med/ Peds   01/17/20 Office Visit Tere Hughes MD [facility] Family Med/ Internal Med/ Peds   Showing recent visits within past 720 days and meeting all other requirements  Future Appointments  No visits were found meeting these conditions.  Showing future appointments within next 150 days and meeting all other requirements      Future Appointments              In 2 days [facility] Dermatology [address], [facility]    In 3 days Jayda Alfred, PT [facility] - Rehab, [facility]    In 3 weeks Tere Hughes MD [facility] - Family Medicine, [place]    In 2 months Lina L. Blanca, MD [place] - Pain Management, [facility]                Passed - Manual Review: Max refill duration of 6 months.            Appointments  past 12m or future 3m with PCP    Date Provider   Last Visit   7/7/2021 Tere Hughes MD   Next Visit    Tere Hughes MD   ED visits in past 90 days: 0      Note composed:5:33 PM 01/01/2022

## 2022-01-03 ENCOUNTER — OFFICE VISIT (OUTPATIENT)
Dept: DERMATOLOGY | Facility: CLINIC | Age: 87
End: 2022-01-03
Payer: MEDICARE

## 2022-01-03 DIAGNOSIS — R23.8 VENOUS LAKE OF LIP: Primary | ICD-10-CM

## 2022-01-03 DIAGNOSIS — L30.0 NUMMULAR ECZEMA: ICD-10-CM

## 2022-01-03 PROCEDURE — 99214 OFFICE O/P EST MOD 30 MIN: CPT | Mod: S$GLB,,, | Performed by: DERMATOLOGY

## 2022-01-03 PROCEDURE — 99214 PR OFFICE/OUTPT VISIT, EST, LEVL IV, 30-39 MIN: ICD-10-PCS | Mod: S$GLB,,, | Performed by: DERMATOLOGY

## 2022-01-03 PROCEDURE — 99999 PR PBB SHADOW E&M-EST. PATIENT-LVL III: ICD-10-PCS | Mod: PBBFAC,,, | Performed by: DERMATOLOGY

## 2022-01-03 PROCEDURE — 99213 OFFICE O/P EST LOW 20 MIN: CPT | Mod: PBBFAC | Performed by: DERMATOLOGY

## 2022-01-03 PROCEDURE — 99999 PR PBB SHADOW E&M-EST. PATIENT-LVL III: CPT | Mod: PBBFAC,,, | Performed by: DERMATOLOGY

## 2022-01-03 RX ORDER — TRIAMCINOLONE ACETONIDE 5 MG/G
CREAM TOPICAL
Qty: 454 G | Refills: 2 | Status: SHIPPED | OUTPATIENT
Start: 2022-01-03 | End: 2023-08-25 | Stop reason: SDUPTHER

## 2022-01-03 NOTE — PROGRESS NOTES
.  Patient Information  Name: Tonya Cohn  : 1932  MRN: [id number]     Referring Physician:  Dr. Hayward   Primary Care Physician:  Dr. Tere Hughes MD   Date of Visit: 2022      Subjective:       Tonya Cohn is a 89 y.o. female with hx of hand dermatitis, nummular eczema who presents for evaluation of lip lesion and rash.    HPI  Per pt and daughter, she noticed flaring of rash that started on her bilateral forearms and legs after receiving COVID vaccination.  Following flare, pt saw PCP who initially gave pt HC 2.5% cream which helped, but did not drastically improve rash.  She was then given TAC 0.5% cream with noted drastic improvement.  Pt and daughter states that she only uses cream sparingly during flares.  Endorses recurrence of flare around when she got the COVID booster shot.   She denies any new soaps or detergents.  Uses dove sensitive skin, but does not use sensitive laundry detergent.  Denies any recent flares to hand eczema.    Regarding, pt's lip lesion, pt states that lesion has been present for years.  Denies any major growth, pain, or bleeding from lesion.  She states that lesion is uncomfortable when eating and putting on lipstick.    Patient was last seen: 2 years ago at which time she was evaluated for hand dermatitis and nummular eczema.    Prior notes by myself reviewed.   Clinical documentation obtained by nursing staff reviewed.    Review of Systems     Objective:    Physical Exam   Constitutional: She appears well-developed and well-nourished. No distress.   Neurological: She is alert and oriented to person, place, and time. She is not disoriented.   Psychiatric: She has a normal mood and affect.   Skin:   Areas Examined (abnormalities noted in diagram):   Head / Face Inspection Performed  RLE Inspected  LLE Inspection Performed                                       Assessment / Plan:        Nummular eczema  - known hx of nummular eczema  and hand dermatitis  possibly exacerbated by COVID vaccination  -pt wishes to continue with current TAC 0.5% cream PRN for flares (refills placed); counseled extensively on using sparingly and side effects of use  -dry skin precautions; continue use of emollients and Dove sensitive skin soap      Venous lake of lip - advise removal due to size and irritation  -pt reassured as to likely benign nature of lesion  -referral placed to ENT (Dr. Eller) for removal; pt given number to make appointment       Follow up if symptoms worsen or fail to improve.    ARABELLA Nolan MD

## 2022-01-04 PROBLEM — G89.29 CHRONIC PAIN OF LEFT KNEE: Status: ACTIVE | Noted: 2022-01-04

## 2022-01-04 PROBLEM — M25.562 CHRONIC PAIN OF LEFT KNEE: Status: ACTIVE | Noted: 2022-01-04

## 2022-01-04 PROBLEM — R53.1 WEAKNESS: Status: ACTIVE | Noted: 2022-01-04

## 2022-01-24 DIAGNOSIS — E11.9 DIABETES MELLITUS WITH HEMOGLOBIN A1C GOAL OF 7.0%-8.0%: Primary | ICD-10-CM

## 2022-01-24 NOTE — TELEPHONE ENCOUNTER
----- Message from Tere Hughes MD sent at 1/20/2022  1:52 PM CST -----  Please call patient to schedule labs (A1c only) and address health maintenance prior to next office visit.

## 2022-01-26 ENCOUNTER — TELEPHONE (OUTPATIENT)
Dept: FAMILY MEDICINE | Facility: CLINIC | Age: 87
End: 2022-01-26

## 2022-01-26 ENCOUNTER — OFFICE VISIT (OUTPATIENT)
Dept: FAMILY MEDICINE | Facility: CLINIC | Age: 87
End: 2022-01-26
Payer: MEDICARE

## 2022-01-26 ENCOUNTER — PES CALL (OUTPATIENT)
Dept: ADMINISTRATIVE | Facility: CLINIC | Age: 87
End: 2022-01-26
Payer: MEDICARE

## 2022-01-26 VITALS
OXYGEN SATURATION: 97 % | HEIGHT: 66 IN | WEIGHT: 192.38 LBS | HEART RATE: 79 BPM | BODY MASS INDEX: 30.92 KG/M2 | TEMPERATURE: 98 F | SYSTOLIC BLOOD PRESSURE: 126 MMHG | DIASTOLIC BLOOD PRESSURE: 60 MMHG

## 2022-01-26 DIAGNOSIS — M85.89 OSTEOPENIA OF MULTIPLE SITES: ICD-10-CM

## 2022-01-26 DIAGNOSIS — J06.9 VIRAL URI WITH COUGH: ICD-10-CM

## 2022-01-26 DIAGNOSIS — E66.09 CLASS 1 OBESITY DUE TO EXCESS CALORIES WITH SERIOUS COMORBIDITY AND BODY MASS INDEX (BMI) OF 32.0 TO 32.9 IN ADULT: ICD-10-CM

## 2022-01-26 DIAGNOSIS — E78.5 HYPERLIPIDEMIA LDL GOAL <100: ICD-10-CM

## 2022-01-26 DIAGNOSIS — I12.9 BENIGN HYPERTENSION WITH CHRONIC KIDNEY DISEASE, STAGE III: ICD-10-CM

## 2022-01-26 DIAGNOSIS — I35.0 MILD AORTIC STENOSIS: ICD-10-CM

## 2022-01-26 DIAGNOSIS — R23.8 VENOUS LAKE OF LIP: ICD-10-CM

## 2022-01-26 DIAGNOSIS — N18.30 BENIGN HYPERTENSION WITH CHRONIC KIDNEY DISEASE, STAGE III: ICD-10-CM

## 2022-01-26 DIAGNOSIS — K21.9 GASTROESOPHAGEAL REFLUX DISEASE WITHOUT ESOPHAGITIS: ICD-10-CM

## 2022-01-26 DIAGNOSIS — E11.40 TYPE 2 DIABETES, CONTROLLED, WITH NEUROPATHY: ICD-10-CM

## 2022-01-26 DIAGNOSIS — I10 ESSENTIAL HYPERTENSION: ICD-10-CM

## 2022-01-26 DIAGNOSIS — I70.0 ATHEROSCLEROSIS OF ABDOMINAL AORTA: ICD-10-CM

## 2022-01-26 DIAGNOSIS — E03.9 HYPOTHYROIDISM (ACQUIRED): ICD-10-CM

## 2022-01-26 DIAGNOSIS — Z00.00 ROUTINE MEDICAL EXAM: Primary | ICD-10-CM

## 2022-01-26 PROCEDURE — 1126F PR PAIN SEVERITY QUANTIFIED, NO PAIN PRESENT: ICD-10-PCS | Mod: CPTII,S$GLB,, | Performed by: INTERNAL MEDICINE

## 2022-01-26 PROCEDURE — 99397 PR PREVENTIVE VISIT,EST,65 & OVER: ICD-10-PCS | Mod: S$GLB,,, | Performed by: INTERNAL MEDICINE

## 2022-01-26 PROCEDURE — 1160F RVW MEDS BY RX/DR IN RCRD: CPT | Mod: CPTII,S$GLB,, | Performed by: INTERNAL MEDICINE

## 2022-01-26 PROCEDURE — 1101F PR PT FALLS ASSESS DOC 0-1 FALLS W/OUT INJ PAST YR: ICD-10-PCS | Mod: CPTII,S$GLB,, | Performed by: INTERNAL MEDICINE

## 2022-01-26 PROCEDURE — 1126F AMNT PAIN NOTED NONE PRSNT: CPT | Mod: CPTII,S$GLB,, | Performed by: INTERNAL MEDICINE

## 2022-01-26 PROCEDURE — 3288F PR FALLS RISK ASSESSMENT DOCUMENTED: ICD-10-PCS | Mod: CPTII,S$GLB,, | Performed by: INTERNAL MEDICINE

## 2022-01-26 PROCEDURE — 3288F FALL RISK ASSESSMENT DOCD: CPT | Mod: CPTII,S$GLB,, | Performed by: INTERNAL MEDICINE

## 2022-01-26 PROCEDURE — 1159F PR MEDICATION LIST DOCUMENTED IN MEDICAL RECORD: ICD-10-PCS | Mod: CPTII,S$GLB,, | Performed by: INTERNAL MEDICINE

## 2022-01-26 PROCEDURE — 99397 PER PM REEVAL EST PAT 65+ YR: CPT | Mod: S$GLB,,, | Performed by: INTERNAL MEDICINE

## 2022-01-26 PROCEDURE — 1160F PR REVIEW ALL MEDS BY PRESCRIBER/CLIN PHARMACIST DOCUMENTED: ICD-10-PCS | Mod: CPTII,S$GLB,, | Performed by: INTERNAL MEDICINE

## 2022-01-26 PROCEDURE — 1159F MED LIST DOCD IN RCRD: CPT | Mod: CPTII,S$GLB,, | Performed by: INTERNAL MEDICINE

## 2022-01-26 PROCEDURE — 1157F ADVNC CARE PLAN IN RCRD: CPT | Mod: CPTII,S$GLB,, | Performed by: INTERNAL MEDICINE

## 2022-01-26 PROCEDURE — 1101F PT FALLS ASSESS-DOCD LE1/YR: CPT | Mod: CPTII,S$GLB,, | Performed by: INTERNAL MEDICINE

## 2022-01-26 PROCEDURE — 99999 PR PBB SHADOW E&M-EST. PATIENT-LVL V: ICD-10-PCS | Mod: PBBFAC,,, | Performed by: INTERNAL MEDICINE

## 2022-01-26 PROCEDURE — 99999 PR PBB SHADOW E&M-EST. PATIENT-LVL V: CPT | Mod: PBBFAC,,, | Performed by: INTERNAL MEDICINE

## 2022-01-26 PROCEDURE — 1157F PR ADVANCE CARE PLAN OR EQUIV PRESENT IN MEDICAL RECORD: ICD-10-PCS | Mod: CPTII,S$GLB,, | Performed by: INTERNAL MEDICINE

## 2022-01-26 RX ORDER — PROMETHAZINE HYDROCHLORIDE AND DEXTROMETHORPHAN HYDROBROMIDE 6.25; 15 MG/5ML; MG/5ML
5 SYRUP ORAL EVERY 12 HOURS PRN
Qty: 180 ML | Refills: 0 | Status: SHIPPED | OUTPATIENT
Start: 2022-01-26 | End: 2022-02-05

## 2022-01-26 NOTE — TELEPHONE ENCOUNTER
----- Message from Sarina June sent at 1/26/2022 11:39 AM CST -----      Name of Who is Calling: ANTHONY LOWERY [[id number]]      What is the request in detail: Pt returned call to the office.Please contact to further discuss and advise.          Can the clinic reply by [facility]: N      What Number [facility] Back [facility]: [phone]

## 2022-01-26 NOTE — TELEPHONE ENCOUNTER
No new care gaps identified.  Powered by [employer]. Reference number: [id number].   1/26/2022 6:12:27 AM CST

## 2022-01-26 NOTE — PROGRESS NOTES
HISTORY OF PRESENT ILLNESS:  Tonya Cohn is a 89 y.o. female who presents to the clinic today for a routine physical exam. Her last physical exam was approximately 1 years(s) ago. She was accompanied by her daughter.        PAST MEDICAL HISTORY:  Past Medical History:   Diagnosis Date    AR (allergic rhinitis)     Atherosclerosis of abdominal aorta     noted on CT scan 2011    Carpal tunnel syndrome of left wrist     Chronic kidney disease, stage 3     Diabetic peripheral neuropathy     Diverticulosis     History of colon polyps     History of diverticulitis of colon 2014    Hyperlipemia     Hypertension     Hypothyroidism     followed by endocrinology, Dr. Green    Mild aortic stenosis     OA (osteoarthritis) of knee     Obesity     Sciatica     Type II or unspecified type diabetes mellitus with neurological manifestations, uncontrolled(250.62)        PAST SURGICAL HISTORY:  Past Surgical History:   Procedure Laterality Date    CATARACT EXTRACTION Bilateral     COLONOSCOPY N/A 10/3/2017    Procedure: COLONOSCOPY;  Surgeon: Alin Gonzalez MD;  Location: [facility] ([address] [place] [address]);  Service: Endoscopy;  Laterality: N/A;    COSMETIC SURGERY      HYSTERECTOMY      partial    JOINT REPLACEMENT Right     LUNG BIOPSY  in her 40's    TOTAL KNEE ARTHROPLASTY Right 2014    TKR       SOCIAL HISTORY:  Social History     Socioeconomic History    Marital status:     Number of children: 7   Occupational History    Occupation: retired - house cleaning   Tobacco Use    Smoking status: Former Smoker     Packs/day: 0.25     Years: 2.00     Pack years: 0.50     Start date: 1955     Quit date: 1962     Years since quittin.5    Smokeless tobacco: Former User     Quit date: 1970   Substance and Sexual Activity    Alcohol use: Yes     Alcohol/week: 1.0 standard drink     Types: 1 Cans of beer per week     Comment: occasionally    Drug use: No    Sexual activity: Not  Currently     Partners: Male   Other Topics Concern    Are you pregnant or think you may be? No    Breast-feeding No       FAMILY HISTORY:  Family History   Problem Relation Age of Onset    Cancer Mother         shoulder tumor - cancer    Hypertension Mother     Glaucoma Mother     Heart disease Father         valve replacement    Hypertension Father     Heart attack Father     Diabetes Sister     Arthritis Sister         s/p knee surgery    Diabetes Brother     Heart disease Brother     Heart failure Brother     Stroke Brother     Arthritis Brother         back problems    Skin cancer Brother     Cancer Brother     Throat cancer Brother     No Known Problems Maternal Grandmother     No Known Problems Maternal Grandfather     No Known Problems Paternal Grandmother     No Known Problems Paternal Grandfather     Cancer Son         jaw    Cataracts Son     No Known Problems Maternal Aunt     No Known Problems Maternal Uncle     No Known Problems Paternal Aunt     No Known Problems Paternal Uncle     Melanoma Neg Hx     Eczema Neg Hx     Lupus Neg Hx     Psoriasis Neg Hx     Breast cancer Neg Hx     Colon cancer Neg Hx     Amblyopia Neg Hx     Blindness Neg Hx     Macular degeneration Neg Hx     Retinal detachment Neg Hx     Strabismus Neg Hx     Thyroid disease Neg Hx        ALLERGIES AND MEDICATIONS: updated and reviewed.  Review of patient's allergies indicates:   Allergen Reactions    Lipitor [atorvastatin]      Medication List with Changes/Refills   New Medications    PROMETHAZINE-DEXTROMETHORPHAN (PROMETHAZINE-DM) 6.25-15 MG/5 ML SYRP    Take 5 mLs by mouth every 12 (twelve) hours as needed (cough).   Current Medications    ACETAMINOPHEN (TYLENOL) 500 MG TABLET    Take 1 tablet (500 mg total) by mouth every 4 (four) hours as needed for Pain.    ASPIRIN 81 MG CHEW    Take 1 tablet (81 mg total) by mouth once daily.    BETAMETHASONE DIPROPIONATE (DIPROLENE) 0.05 % OINTMENT     Apply topically 2 (two) times daily.    CALCIUM CARBONATE (OS-MIRACLE) 500 MG CALCIUM (1,250 MG) TABLET    Take 1 tablet by mouth every other day.    CLOBETASOL (TEMOVATE) 0.05 % CREAM    Apply topically 2 (two) times daily. for 7 days    DICLOFENAC SODIUM (VOLTAREN) 1 % GEL    Apply 4 grams to effected area 4 x daily do not exceed 32 grams per day    FISH OIL-FAT ACID COMB.8- 1,200 MG (400 [id number]) CAP        FLUOCINONIDE 0.1 % CREA    as needed.     GLIPIZIDE (GLUCOTROL) 5 MG TABLET    TAKE 1 TABLET BY MOUTH TWICE DAILY WITH MEALS    HYDROCORTISONE 2.5 % CREAM    Apply topically 2 (two) times daily.    HYDROXYZINE HCL (ATARAX) 10 MG TAB    Take 1 tablet (10 mg total) by mouth 3 (three) times daily as needed for Itching.    LEVOTHYROXINE (SYNTHROID) 88 MCG TABLET    TAKE 1 TABLET(88 MCG) BY MOUTH EVERY DAY    LOSARTAN (COZAAR) 50 MG TABLET    Take 1 tablet (50 mg total) by mouth once daily.    LOVASTATIN (MEVACOR) 10 MG TABLET    Take 1 tablet (10 mg total) by mouth once daily.    METFORMIN (GLUCOPHAGE) 1000 MG TABLET    TAKE 1/2 TABLET BY MOUTH TWICE DAILY WITH MEALS    MULTIVIT,CALC,MINS/IRON/FOLIC (ONE-A-DAY WOMENS FORMULA ORAL)    Take 1 tablet by mouth once daily.    TRIAMCINOLONE ACETONIDE 0.5% (KENALOG) 0.5 % CREA    APPLY EXTERNALLY TO THE AFFECTED AREA TWICE DAILY as needed for flares; do not use for more than 2 weeks in a row without one week break          CARE TEAM:  Patient Care Team:  Tere Hughes MD as PCP - General (Internal Medicine)  Preston Whitfield MD as Consulting Physician (Cardiology)  Criss Baird OD (Inactive) as Consulting Physician (Optometry)  Marvin Roach MD as Consulting Physician (Rheumatology)  Wendy Garcia LPN as Licensed Practical Nurse           SCREENING HISTORY:  Health Maintenance       Date Due Completion Date    Eye Exam 11/09/2021 11/9/2020    Override on 9/20/2016: Done (Dr. Kole Lucero- negative for diabetic retinopathy bilaterally)  "   Override on 9/9/2015: Done (No diabetic retinopathy)    Override on 8/14/2014: Done (no diabetic retinopathy; Dr. Lucero)    Override on 4/21/2014: Done (Pt states her eye exam is scheduled for next month)    Override on 4/1/2013: Done    DEXA SCAN 03/19/2022 3/19/2018    Diabetes Urine Screening 07/02/2022 7/2/2021    Lipid Panel 07/02/2022 7/2/2021    Override on 2/26/2016: Done ([facility] - total 189, TG 62, , HDL 76)    Hemoglobin A1c 07/25/2022 1/25/2022    Colonoscopy 10/03/2022 10/3/2017    TETANUS VACCINE 12/06/2028 12/6/2018            REVIEW OF SYSTEMS:   The patient reports: good dietary habits.  The patient reports : that they do not exercise regularly  Review of Systems   Constitutional: Negative for chills, fatigue, fever and unexpected weight change.   HENT: Positive for congestion. Negative for ear pain, sinus pain and sore throat.    Eyes: Negative for pain and visual disturbance.   Respiratory: Positive for cough. Negative for shortness of breath and wheezing.    Cardiovascular: Negative for chest pain, palpitations and leg swelling.   Gastrointestinal: Negative for abdominal pain, constipation, diarrhea, nausea and vomiting.   Genitourinary: Negative for dysuria.   Musculoskeletal: Negative for arthralgias and back pain.   Skin: Negative for rash.        She has a benign venous lake lesion on her right upper lip. She saw dermatology a few months ago who recommended removal. She is waiting on an appointment with ENT.   Neurological: Negative for weakness and headaches.   Psychiatric/Behavioral: Negative for dysphoric mood and sleep disturbance. The patient is not nervous/anxious.       ROS (Optional)-: no pelvic pain  Breast ROS (Optional)-: negative for breast lumps/discharge            Physical Examination:   Vitals:    01/26/22 1003   BP: 126/60   Pulse: 79   Temp: 97.8 °F (36.6 °C)     Weight: 87.2 kg (192 lb 5.6 oz)   Height: 5' 6" (167.6 cm)   Body mass index is " 31.05 kg/m².      Patient did not require to have a chaperone present during the exam today.    General appearance - alert, well appearing, and in no distress, obese, pleasant elderly female  Psychiatric - alert, oriented to person, place, and time, normal behavior, speech, dress, motor activity and thought process  Eyes - pupils equal and reactive, extraocular eye movements intact, sclera anicteric  Ears - right TM/ear canal obscured by cerumen, left ear normal with scant cerumen  Nose - normal and patent, no erythema, discharge or polyps, mild mucosal congestion, mild mucosal erythema, no significant turbinate enlargement noted  Mouth - mucous membranes moist, pharynx normal without lesions  Neck - supple, no significant adenopathy, carotids upstroke normal bilaterally, no bruits  Lymphatics - no palpable cervical lymphadenopathy  Chest - clear to auscultation, no wheezes, rales or rhonchi, symmetric air entry  Heart - normal rate and regular rhythm, no gallops noted  Neurological - alert, normal speech, no focal findings or movement disorder noted, cranial nerves II through XII intact  Musculoskeletal - patient noted to have Moderate osteoarthritic changes to both knee joints. No joint effusions noted., no muscular tenderness noted, mild osteoarthritic changes noted in both hands  Extremities - no pedal edema noted  Skin - normal coloration, no suspicious skin lesions, she had a venous lake lesion noted on her right upper lip      Labs:  Lab Results   Component Value Date    HGBA1C 6.1 (H) 01/25/2022    HGBA1C 6.1 (H) 07/02/2021    HGBA1C 6.0 (H) 01/15/2021      Lab Results   Component Value Date    CHOL 193 07/02/2021    CHOL 207 (H) 07/16/2020    CHOL 179 04/09/2019     Lab Results   Component Value Date    LDLCALC 117.0 07/02/2021    LDLCALC 122.2 07/16/2020    LDLCALC 90.8 04/09/2019         ASSESSMENT AND PLAN:  1. Routine medical exam  Counseled on age appropriate medical preventative services including age  appropriate cancer screenings, age appropriate eye and dental exams, over all nutritional health, need for a consistent exercise regimen, and an over all push towards maintaining a vigorous and active lifestyle.  Counseled on age appropriate vaccines and discussed upcoming health care needs based on age/gender. Discussed good sleep hygiene and stress management.    2. Type 2 diabetes, controlled, with neuropathy  Diabetes is under good control at this time for age and comorbid conditions.   We discussed diabetic diet and regular exercise.   We discussed home blood sugar monitoring, if appropriate - the patient does not need to test daily but can test only as needed.   Continue current medication regimen. Recheck A1c in 6 months.  Diabetic complications addressed: Neuropathy pain controlled.  Patient was counseled on the need for yearly eye exam to screen for/monitor diabetic retinopathy and yearly diabetic foot exam.    3. Essential hypertension  The current medical regimen is effective;  continue present plan and medications. Recommended patient to check home readings to monitor and see me for followup as scheduled or sooner as needed.   Discussed sodium restriction, maintaining ideal body weight and regular exercise program as physiologic means to continue to achieve blood pressure control in addition to medication compliance.  Patient was educated that both decongestant and anti-inflammatory medication may raise blood pressure.  The patient is not active on the digital hypertension program.    4. Mild aortic stenosis  Stable. Asymptomatic. Observe.    5. Hyperlipidemia LDL goal <100  We discussed low fat diet and regular exercise.The current medical regimen is effective;  continue present plan and medications.     6. Hypothyroidism (acquired)  Patient is clinically euthyroid. Continue current regimen.    7. Gastroesophageal reflux disease without esophagitis  Symptoms controlled: yes - takes medication  occasionally as needed.   Reflux precautions discussed (eliminate tobacco if a smoker; minimize caffeine, chocolate and red/white peppermint intake; avoid heavy and spicy meals; don't lay down within 2-3 hours after eating; minimize the intake of NSAIDs). Medication as needed.   Patient asked to take medication breaks, if possible - discussed chronic use can limit calcium absorption (which can lead to osteopenia/osteoporosis), increases the risk for intestinal infections, and can cause kidney damage. There are also some newer studies that show possible increased risk of mortality.    8. Atherosclerosis of abdominal aorta  Patient with Atherosclerosis of the Aorta.  Stable/asymptomatic. Currently stable on lipid lowering medication and blood pressure monitoring.    9. Osteopenia of multiple sites  We discussed adequate calcium and vitamin D supplementation. We discussed fall precautions. She is up to date on her BMD. No need for prescription medication at this time.    10. Class 1 obesity due to excess calories with serious comorbidity and body mass index (BMI) of 32.0 to 32.9 in adult  The patient is asked to make an attempt to improve diet and exercise patterns to aid in medical management of this problem.    11. Viral URI with cough  I discussed with the patient that she  has a viral illness that will need to run its course.  No need for antibiotics at this time.  We discussed the utilization of over-the-counter medications for symptomatic treatment.  The patient will call me or return to the clinic if symptoms worsen or persist for reevaluation.  - promethazine-dextromethorphan (PROMETHAZINE-DM) 6.25-15 mg/5 mL Syrp; Take 5 mLs by mouth every 12 (twelve) hours as needed (cough).  Dispense: 180 mL; Refill: 0    12. Venous lake of lip  Referral placed to ENT to discuss possible removal.  - Ambulatory referral/consult to ENT; Future          Orders Placed This Encounter   Procedures    Ambulatory referral/consult  to ENT      Follow up in about 6 months (around 7/26/2022), or if symptoms worsen or fail to improve, for follow up chronic medical conditions.. or sooner as needed.

## 2022-01-31 ENCOUNTER — EXTERNAL CHRONIC CARE MANAGEMENT (OUTPATIENT)
Dept: PRIMARY CARE CLINIC | Facility: CLINIC | Age: 87
End: 2022-01-31
Payer: MEDICARE

## 2022-01-31 PROCEDURE — 99439 CHRNC CARE MGMT STAF EA ADDL: CPT | Mod: S$GLB,,, | Performed by: INTERNAL MEDICINE

## 2022-01-31 PROCEDURE — 99490 PR CHRONIC CARE MGMT, 1ST 20 MIN: ICD-10-PCS | Mod: S$GLB,,, | Performed by: INTERNAL MEDICINE

## 2022-01-31 PROCEDURE — 99439 PR CHRONIC CARE MGMT, EA ADDTL 20 MIN: ICD-10-PCS | Mod: S$GLB,,, | Performed by: INTERNAL MEDICINE

## 2022-01-31 PROCEDURE — 99490 CHRNC CARE MGMT STAFF 1ST 20: CPT | Mod: S$GLB,,, | Performed by: INTERNAL MEDICINE

## 2022-02-08 ENCOUNTER — OFFICE VISIT (OUTPATIENT)
Dept: OTOLARYNGOLOGY | Facility: CLINIC | Age: 87
End: 2022-02-08
Payer: MEDICARE

## 2022-02-08 VITALS — DIASTOLIC BLOOD PRESSURE: 64 MMHG | SYSTOLIC BLOOD PRESSURE: 117 MMHG | HEART RATE: 118 BPM

## 2022-02-08 DIAGNOSIS — R23.8 VENOUS LAKE OF LIP: ICD-10-CM

## 2022-02-08 PROCEDURE — 99024 POSTOP FOLLOW-UP VISIT: CPT | Mod: S$GLB,,, | Performed by: OTOLARYNGOLOGY

## 2022-02-08 PROCEDURE — 1101F PR PT FALLS ASSESS DOC 0-1 FALLS W/OUT INJ PAST YR: ICD-10-PCS | Mod: CPTII,S$GLB,, | Performed by: OTOLARYNGOLOGY

## 2022-02-08 PROCEDURE — 1159F PR MEDICATION LIST DOCUMENTED IN MEDICAL RECORD: ICD-10-PCS | Mod: CPTII,S$GLB,, | Performed by: OTOLARYNGOLOGY

## 2022-02-08 PROCEDURE — 1159F MED LIST DOCD IN RCRD: CPT | Mod: CPTII,S$GLB,, | Performed by: OTOLARYNGOLOGY

## 2022-02-08 PROCEDURE — 99024 PR POST-OP FOLLOW-UP VISIT: ICD-10-PCS | Mod: S$GLB,,, | Performed by: OTOLARYNGOLOGY

## 2022-02-08 PROCEDURE — 1160F PR REVIEW ALL MEDS BY PRESCRIBER/CLIN PHARMACIST DOCUMENTED: ICD-10-PCS | Mod: CPTII,S$GLB,, | Performed by: OTOLARYNGOLOGY

## 2022-02-08 PROCEDURE — 1101F PT FALLS ASSESS-DOCD LE1/YR: CPT | Mod: CPTII,S$GLB,, | Performed by: OTOLARYNGOLOGY

## 2022-02-08 PROCEDURE — 99999 PR PBB SHADOW E&M-EST. PATIENT-LVL III: CPT | Mod: PBBFAC,,, | Performed by: OTOLARYNGOLOGY

## 2022-02-08 PROCEDURE — 1157F PR ADVANCE CARE PLAN OR EQUIV PRESENT IN MEDICAL RECORD: ICD-10-PCS | Mod: CPTII,S$GLB,, | Performed by: OTOLARYNGOLOGY

## 2022-02-08 PROCEDURE — 1126F AMNT PAIN NOTED NONE PRSNT: CPT | Mod: CPTII,S$GLB,, | Performed by: OTOLARYNGOLOGY

## 2022-02-08 PROCEDURE — 1160F RVW MEDS BY RX/DR IN RCRD: CPT | Mod: CPTII,S$GLB,, | Performed by: OTOLARYNGOLOGY

## 2022-02-08 PROCEDURE — 1157F ADVNC CARE PLAN IN RCRD: CPT | Mod: CPTII,S$GLB,, | Performed by: OTOLARYNGOLOGY

## 2022-02-08 PROCEDURE — 3288F PR FALLS RISK ASSESSMENT DOCUMENTED: ICD-10-PCS | Mod: CPTII,S$GLB,, | Performed by: OTOLARYNGOLOGY

## 2022-02-08 PROCEDURE — 99999 PR PBB SHADOW E&M-EST. PATIENT-LVL III: ICD-10-PCS | Mod: PBBFAC,,, | Performed by: OTOLARYNGOLOGY

## 2022-02-08 PROCEDURE — 1126F PR PAIN SEVERITY QUANTIFIED, NO PAIN PRESENT: ICD-10-PCS | Mod: CPTII,S$GLB,, | Performed by: OTOLARYNGOLOGY

## 2022-02-08 PROCEDURE — 3288F FALL RISK ASSESSMENT DOCD: CPT | Mod: CPTII,S$GLB,, | Performed by: OTOLARYNGOLOGY

## 2022-02-09 RX ORDER — LOSARTAN POTASSIUM 50 MG/1
TABLET ORAL
Qty: 90 TABLET | Refills: 1 | Status: SHIPPED | OUTPATIENT
Start: 2022-02-09 | End: 2022-03-21 | Stop reason: SDUPTHER

## 2022-02-10 ENCOUNTER — TELEPHONE (OUTPATIENT)
Dept: FAMILY MEDICINE | Facility: CLINIC | Age: 87
End: 2022-02-10
Payer: MEDICARE

## 2022-02-10 DIAGNOSIS — Z12.31 ENCOUNTER FOR SCREENING MAMMOGRAM FOR BREAST CANCER: Primary | ICD-10-CM

## 2022-02-10 RX ORDER — METFORMIN HYDROCHLORIDE 1000 MG/1
TABLET ORAL
Qty: 90 TABLET | Refills: 1 | Status: SHIPPED | OUTPATIENT
Start: 2022-02-10 | End: 2022-06-21

## 2022-02-10 RX ORDER — LOVASTATIN 10 MG/1
TABLET ORAL
Qty: 90 TABLET | Refills: 1 | Status: SHIPPED | OUTPATIENT
Start: 2022-02-10 | End: 2022-03-21 | Stop reason: SDUPTHER

## 2022-02-10 NOTE — TELEPHONE ENCOUNTER
Refill Routing Note   Medication(s) are not appropriate for processing by [facility] for the following reason(s):      - Drug-Disease Interaction (metFORMIN and Benign hypertension with chronic kidney disease, stage III)  - Allergy/Contraindication (lovastatin)    [facility] action(s):  Defer  Approve Medication-related problems identified: Drug-disease interaction        --->Care Gap information included in message below if applicable.   Medication reconciliation completed: No   Automatic Epic Generated Protocol Data:    Orders Placed This Encounter    losartan (COZAAR) 50 MG tablet      Requested Prescriptions   Pending Prescriptions Disp Refills    lovastatin (MEVACOR) 10 MG tablet [Pharmacy Med Name: LOVASTATIN 10MG TABLETS] 90 tablet 1     Sig: TAKE 1 TABLET(10 MG) BY MOUTH EVERY DAY       Cardiovascular:  Antilipid - Statins Passed - 1/26/2022  2:16 PM        Passed - Patient is at least 18 years old        Passed - Valid encounter within last 15 months     Recent Visits  Date Type Provider Dept   01/26/22 Office Visit Tere Hughes MD [facility] Family Med/ Internal Med/ Peds   07/07/21 Office Visit Tere Hughes MD [facility] Family Med/ Internal Med/ Peds   01/15/21 Office Visit Tere Hughes MD [facility] Family Med/ Internal Med/ Peds   07/15/20 Office Visit Tere Hughes MD [facility] Family Med/ Internal Med/ Peds   Showing recent visits within past 720 days and meeting all other requirements  Future Appointments  No visits were found meeting these conditions.  Showing future appointments within next 150 days and meeting all other requirements      Future Appointments              Tomorrow PRN[facility]PTA, [facility]    In 5 days Johnnie Miranda MD [facility][id number], Saman Cotto    In 6 days PRN-PTA, [facility], [facility]    In 1 week Jayda Alfred PT [facility]    In 1 month MD Jocelyn Recinos - [facility]  Management, Jocelyn Millan    In 1 month Erika Serrano OD [facility] - Optometry, [place]    In 6 months Tere Hughes MD [facility] - Family Medicine, [place]                Passed - ALT is 131 or below and within 360 days     ALT   Date Value Ref Range Status   07/02/2021 9 (L) 10 - 44 U/L Final   07/16/2020 11 10 - 44 U/L Final   04/09/2019 14 10 - 44 U/L Final              Passed - AST is 119 or below and within 360 days     AST   Date Value Ref Range Status   07/02/2021 25 15 - 46 U/L Final   07/16/2020 24 15 - 46 U/L Final   04/09/2019 20 15 - 46 U/L Final              Passed - Total Cholesterol within 360 days     Lab Results   Component Value Date    CHOL 193 07/02/2021    CHOL 207 (H) 07/16/2020    CHOL 179 04/09/2019              Passed - LDL within 360 days     LDL Cholesterol   Date Value Ref Range Status   07/02/2021 117.0 63.0 - 159.0 mg/dL Final     Comment:     The National Cholesterol Education Program (NCEP) has set the  following guidelines (reference values) for LDL Cholesterol:  Optimal.......................<130 mg/dL  Borderline High...............130-159 mg/dL  High..........................160-189 mg/dL  Very High.....................>190 mg/dL              Passed - HDL within 360 days     HDL   Date Value Ref Range Status   07/02/2021 66 40 - 75 mg/dL Final     Comment:     The National Cholesterol Education Program (NCEP) has set the  following guidelines (reference values) for HDL Cholesterol:  Low...............<40 mg/dL  Optimal...........>60 mg/dL              Passed - Triglycerides within 360 days     Lab Results   Component Value Date    TRIG 50 07/02/2021    TRIG 69 07/16/2020    TRIG 36 04/09/2019                metFORMIN (GLUCOPHAGE) 1000 MG tablet [Pharmacy Med Name: METFORMIN 1000MG TABLETS] 90 tablet 0     Sig: TAKE 1/2 TABLET BY MOUTH TWICE DAILY WITH MEALS       Endocrinology:  Diabetes - Biguanides Passed - 1/26/2022  2:16 PM        Passed - Patient is at least 18 years old         Passed - Valid encounter within last 15 months     Recent Visits  Date Type Provider Dept   01/26/22 Office Visit Tere Hughes MD [facility] Family Med/ Internal Med/ Peds   07/07/21 Office Visit Tere Hughes MD [facility] Family Med/ Internal Med/ Peds   01/15/21 Office Visit Tere Hughes MD [facility] Family Med/ Internal Med/ Peds   07/15/20 Office Visit Tere Hughes MD [facility] Family Med/ Internal Med/ Peds   Showing recent visits within past 720 days and meeting all other requirements  Future Appointments  No visits were found meeting these conditions.  Showing future appointments within next 150 days and meeting all other requirements      Future Appointments              Tom[place] [facility]    In 5 days Johnnie Miranda MD [facility]/[id number], Saman Cotto    In 6 days PRN-PTA, [place] [facility], [facility]    In 1 week Jayda Alfred, PT [facility], [facility]    In 1 month MD Jocelyn Recinos - Pain Management, [facility]    In 1 month Erika Serrano OD [facility] - Optometry, [place]    In 6 months Tere Hughes MD [facility] - Family Medicine, [place]                Passed - Cr is 1.39 or below and within 360 days     Lab Results   Component Value Date    CREATININE 0.77 07/02/2021    CREATININE 0.86 07/16/2020    CREATININE 0.87 04/09/2019              Passed - HBA1C within 180 days     Lab Results   Component Value Date    HGBA1C 6.1 (H) 01/25/2022    HGBA1C 6.1 (H) 07/02/2021    HGBA1C 6.0 (H) 01/15/2021              Passed - eGFR is 45 or above and within 360 days     Lab Results   Component Value Date    EGFRNONAA >60.0 07/02/2021    EGFRNONAA >60.0 07/16/2020    EGFRNONAA >60.0 04/09/2019                 Signed Prescriptions Disp Refills    losartan (COZAAR) 50 MG tablet 90 tablet 1     Sig: TAKE 1 TABLET(50 MG) BY MOUTH EVERY DAY       Cardiovascular:  Angiotensin Receptor Blockers Passed - 1/26/2022  2:16 PM         Passed - Patient is at least 18 years old        Passed - Last BP in normal range within 360 days     BP Readings from Last 1 Encounters:   02/08/22 117/64               Passed - Valid encounter within last 15 months     Recent Visits  Date Type Provider Dept   01/26/22 Office Visit Tere Hughes MD [facility] Family Med/ Internal Med/ Peds   07/07/21 Office Visit Tere Hughes MD [facility] Family Med/ Internal Med/ Peds   01/15/21 Office Visit Tere Hughes MD [facility] Family Med/ Internal Med/ Peds   07/15/20 Office Visit Tere Hughes MD [facility] Family Med/ Internal Med/ Peds   Showing recent visits within past 720 days and meeting all other requirements  Future Appointments  No visits were found meeting these conditions.  Showing future appointments within next 150 days and meeting all other requirements      Future Appointments              Tomorrow [facility]    In 5 days Johnnie Miranda MD [facility]/[id number], Saman Cotto    In 6 days PRN-PTA, [facility] - Rehab, [facility]    In 1 week Jayda Alfred, PT [facility], [facility]    In 1 month MD Jocelyn Recinos - Pain Management, [facility]    In 1 month Erika Serrano OD [facility] - Optometry, [place]    In 6 months Tere Hughes MD [facility] - Family Medicine, [place]                Passed - Cr is 1.39 or below and within 360 days     Lab Results   Component Value Date    CREATININE 0.77 07/02/2021    CREATININE 0.86 07/16/2020    CREATININE 0.87 04/09/2019              Passed - K is 5.2 or below and within 360 days     Potassium   Date Value Ref Range Status   07/02/2021 4.2 3.5 - 5.1 mmol/L Final   07/16/2020 4.0 3.5 - 5.1 mmol/L Final   04/09/2019 4.5 3.5 - 5.1 mmol/L Final              Passed - eGFR within 360 days     Lab Results   Component Value Date    EGFRNONAA >60.0 07/02/2021    EGFRNONAA >60.0 07/16/2020    EGFRNONAA >60.0 04/09/2019                       Appointments  past 12m or future 3m with PCP    Date Provider   Last Visit   7/7/2021 Tere Hughes MD   Next Visit   1/26/2022 Tere Hughes MD   ED visits in past 90 days: 0        Note composed:7:55 PM 02/09/2022

## 2022-02-14 ENCOUNTER — OFFICE VISIT (OUTPATIENT)
Dept: OTOLARYNGOLOGY | Facility: CLINIC | Age: 87
End: 2022-02-14
Payer: MEDICARE

## 2022-02-14 VITALS
BODY MASS INDEX: 30.99 KG/M2 | SYSTOLIC BLOOD PRESSURE: 158 MMHG | DIASTOLIC BLOOD PRESSURE: 95 MMHG | WEIGHT: 192 LBS | HEART RATE: 101 BPM

## 2022-02-14 DIAGNOSIS — K13.0 LIP LESION: Primary | ICD-10-CM

## 2022-02-14 PROCEDURE — 1160F RVW MEDS BY RX/DR IN RCRD: CPT | Mod: CPTII,S$GLB,, | Performed by: OTOLARYNGOLOGY

## 2022-02-14 PROCEDURE — 1157F ADVNC CARE PLAN IN RCRD: CPT | Mod: CPTII,S$GLB,, | Performed by: OTOLARYNGOLOGY

## 2022-02-14 PROCEDURE — 99999 PR PBB SHADOW E&M-EST. PATIENT-LVL III: CPT | Mod: PBBFAC,,, | Performed by: OTOLARYNGOLOGY

## 2022-02-14 PROCEDURE — 1159F PR MEDICATION LIST DOCUMENTED IN MEDICAL RECORD: ICD-10-PCS | Mod: CPTII,S$GLB,, | Performed by: OTOLARYNGOLOGY

## 2022-02-14 PROCEDURE — 99999 PR PBB SHADOW E&M-EST. PATIENT-LVL III: ICD-10-PCS | Mod: PBBFAC,,, | Performed by: OTOLARYNGOLOGY

## 2022-02-14 PROCEDURE — 1126F PR PAIN SEVERITY QUANTIFIED, NO PAIN PRESENT: ICD-10-PCS | Mod: CPTII,S$GLB,, | Performed by: OTOLARYNGOLOGY

## 2022-02-14 PROCEDURE — 1157F PR ADVANCE CARE PLAN OR EQUIV PRESENT IN MEDICAL RECORD: ICD-10-PCS | Mod: CPTII,S$GLB,, | Performed by: OTOLARYNGOLOGY

## 2022-02-14 PROCEDURE — 99204 PR OFFICE/OUTPT VISIT, NEW, LEVL IV, 45-59 MIN: ICD-10-PCS | Mod: 25,S$GLB,, | Performed by: OTOLARYNGOLOGY

## 2022-02-14 PROCEDURE — 1126F AMNT PAIN NOTED NONE PRSNT: CPT | Mod: CPTII,S$GLB,, | Performed by: OTOLARYNGOLOGY

## 2022-02-14 PROCEDURE — 88305 TISSUE EXAM BY PATHOLOGIST: CPT | Mod: 26,,, | Performed by: PATHOLOGY

## 2022-02-14 PROCEDURE — 11441 EXC FACE-MM B9+MARG 0.6-1 CM: CPT | Mod: S$GLB,,, | Performed by: OTOLARYNGOLOGY

## 2022-02-14 PROCEDURE — 99204 OFFICE O/P NEW MOD 45 MIN: CPT | Mod: 25,S$GLB,, | Performed by: OTOLARYNGOLOGY

## 2022-02-14 PROCEDURE — 1159F MED LIST DOCD IN RCRD: CPT | Mod: CPTII,S$GLB,, | Performed by: OTOLARYNGOLOGY

## 2022-02-14 PROCEDURE — 88305 TISSUE EXAM BY PATHOLOGIST: ICD-10-PCS | Mod: 26,,, | Performed by: PATHOLOGY

## 2022-02-14 PROCEDURE — 88305 TISSUE EXAM BY PATHOLOGIST: CPT | Performed by: PATHOLOGY

## 2022-02-14 PROCEDURE — 1160F PR REVIEW ALL MEDS BY PRESCRIBER/CLIN PHARMACIST DOCUMENTED: ICD-10-PCS | Mod: CPTII,S$GLB,, | Performed by: OTOLARYNGOLOGY

## 2022-02-14 PROCEDURE — 11441 PR EXC SKIN BENIG 0.6-1 CM FACE,FACIAL: ICD-10-PCS | Mod: S$GLB,,, | Performed by: OTOLARYNGOLOGY

## 2022-02-14 NOTE — PROGRESS NOTES
History of Present Illness:   Tonya Cohn is a 89 y.o. year old female evaluated on 2/14/2022, in the Otolaryngology-Head and Neck Surgery Clinic at [facility]. The patient was initially on schedule of Dr. Segal for evaluation of lip lesion last week and she deferred to me for excision.  Patient reports history of a lip lesion that has been growing slowly for the past 3 years.  She reports no trauma to the area prior to this happening.  Her she reports no ulceration or bleeding from the lesion or any pain.  Lesion is aggravating her with speech and with eating and she would like it removed.              Past Medical/Surgical History  Past Medical History:   Diagnosis Date    AR (allergic rhinitis)     Atherosclerosis of abdominal aorta     noted on CT scan 1/17/2011    Carpal tunnel syndrome of left wrist     Chronic kidney disease, stage 3     Diabetic peripheral neuropathy     Diverticulosis     History of colon polyps     History of diverticulitis of colon 1/2014    Hyperlipemia     Hypertension     Hypothyroidism     followed by endocrinology, Dr. Green    Mild aortic stenosis     OA (osteoarthritis) of knee     Obesity     Sciatica     Type II or unspecified type diabetes mellitus with neurological manifestations, uncontrolled(250.62)      Her  has a past surgical history that includes Hysterectomy; Lung biopsy (in her 40's); Cosmetic surgery; Total knee arthroplasty (Right, 6/13/2014); Colonoscopy (N/A, 10/3/2017); Joint replacement (Right); and Cataract extraction (Bilateral).     Past Family/Social History  Her family history includes Arthritis in her brother and sister; Cancer in her brother, mother, and son; Cataracts in her son; Diabetes in her brother and sister; Glaucoma in her mother; Heart attack in her father; Heart disease in her brother and father; Heart failure in her brother; Hypertension in her father and mother; No Known Problems in her maternal aunt,  maternal grandfather, maternal grandmother, maternal uncle, paternal aunt, paternal grandfather, paternal grandmother, and paternal uncle; Skin cancer in her brother; Stroke in her brother; Throat cancer in her brother.  She  reports that she quit smoking about 59 years ago. She started smoking about 67 years ago. She has a 0.50 pack-year smoking history. She quit smokeless tobacco use about 51 years ago. She reports current alcohol use of about 1.0 standard drink of alcohol per week. She reports that she does not use drugs.     Medications/Allergies/Immunizations  Her current medication(s) include:   Current Outpatient Medications   Medication Sig Dispense Refill    acetaminophen (TYLENOL) 500 MG tablet Take 1 tablet (500 mg total) by mouth every 4 (four) hours as needed for Pain. 60 tablet 2    aspirin 81 MG Chew Take 1 tablet (81 mg total) by mouth once daily.  0    betamethasone dipropionate (DIPROLENE) 0.05 % ointment Apply topically 2 (two) times daily. 45 g 0    calcium carbonate (OS-MIRACLE) 500 mg calcium (1,250 mg) tablet Take 1 tablet by mouth every other day.      clobetasoL (TEMOVATE) 0.05 % cream Apply topically 2 (two) times daily. for 7 days 30 g 0    diclofenac sodium (VOLTAREN) 1 % Gel Apply 4 grams to effected area 4 x daily do not exceed 32 grams per day 1 Tube 3    fish oil-fat acid [id number].8-[id number] 1,200 mg (400 [id number]) Cap       fluocinonide 0.1 % Crea as needed.       glipiZIDE (GLUCOTROL) 5 MG tablet TAKE 1 TABLET BY MOUTH TWICE DAILY WITH MEALS 180 tablet 0    hydrocortisone 2.5 % cream Apply topically 2 (two) times daily. (Patient not taking: Reported on 1/26/2022) 20 g 1    hydrOXYzine HCL (ATARAX) 10 MG Tab Take 1 tablet (10 mg total) by mouth 3 (three) times daily as needed for Itching. 30 tablet 0    levothyroxine (SYNTHROID) 88 MCG tablet TAKE 1 TABLET(88 MCG) BY MOUTH EVERY DAY 90 tablet 0    losartan (COZAAR) 50 MG tablet TAKE 1 TABLET(50 MG) BY MOUTH EVERY DAY 90  tablet 1    lovastatin (MEVACOR) 10 MG tablet TAKE 1 TABLET(10 MG) BY MOUTH EVERY DAY 90 tablet 1    metFORMIN (GLUCOPHAGE) 1000 MG tablet TAKE 1/2 TABLET BY MOUTH TWICE DAILY WITH MEALS 90 tablet 1    multivit,calc,mins/iron/folic (ONE-A-DAY WOMENS FORMULA ORAL) Take 1 tablet by mouth once daily.      triamcinolone acetonide 0.5% (KENALOG) 0.5 % Crea APPLY EXTERNALLY TO THE AFFECTED AREA TWICE DAILY as needed for flares; do not use for more than 2 weeks in a row without one week break 454 g 2     No current facility-administered medications for this visit.        Allergies: Lipitor [atorvastatin]     Immunizations:   Immunization History   Administered Date(s) Administered    COVID-19, MRNA, LN-S, PF (Pfizer) (Purple Cap) 01/09/2021, 01/30/2021, 09/30/2021    Influenza 11/09/2007, 10/28/2008, 10/27/2009, 11/01/2011, 09/30/2013, 09/23/2014    Influenza (FLUAD) - Quadrivalent - Adjuvanted - PF *Preferred* (65+) 10/26/2020    Influenza - High Dose - PF (65 years and older) 10/10/2012, 09/23/2015, 12/02/2016, 10/03/2017, 10/03/2018, 10/30/2019    Influenza - Quadrivalent - High Dose - PF (65 years and older) 10/20/2021    Influenza - Quadrivalent - PF *Preferred* (6 months and older) 11/09/2007, 10/28/2008, 10/27/2009, 11/01/2011, 09/30/2013, 09/23/2014    Influenza - Trivalent (ADULT) 09/23/2014    Influenza A (H1N1) 2009 Monovalent - IM 01/05/2010    Influenza Split 09/30/2013    Pneumococcal Conjugate - 13 Valent 02/19/2016    Pneumococcal Polysaccharide - 23 Valent 09/30/2013    Td (ADULT) 12/06/2018    Td - PF (ADULT) 12/06/2018    Zoster 09/17/2012    Zoster Recombinant 07/03/2019, 07/03/2019, 09/24/2019         Review of Systems   Constitutional: Negative for fever, weight loss and weight gain.  Skin: Negative for rash, itchiness, dryness  HENT:  As per HPI  Cardiovascular: Negative for chest pain and dyspnea on exertion .   Respiratory: Is not experiencing shortness of breath.    Gastrointestinal: Negative for nausea and vomiting.   Neurological: Negative for headaches.   Lymph/Heme: Negative for lymphadenopathy or easy bruising  Musculoskeletal: Negative for joint or muscle pain  Psychiatric: The patient is not nervous/anxious.        All other systems are negative except for that listed in the HPI.      PHYSICAL EXAM:   Vital Signs:  BP (!) 158/95 (Patient Position: Sitting)   Pulse 101   Wt 87.1 kg (192 lb)   BMI 30.99 kg/m²      General:  Well-developed, well-nourished  Communication and Voice:  Clear pitch and clarity  Hearing: Hearing adequate for verbal communication bilaterally   Inspection:  Normocephalic and atraumatic without mass or lesion  Palpation:  Facial skeleton intact without bony stepoffs  Parotid Glands:  No mass or tenderness  Facial Strength:  Facial motility symmetric and full bilaterally  Pinna:  External ear intact and fully developed  External canal:  Canal is patent with intact skin  Tympanic Membrane:  Clear and mobile  External nose:  No scar or anatomic deformity  Internal Nose:  Septum intact and midline.  No edema, polyp, or rhinorrhea.  TMJ:  No pain to palpation with full mobility  Oral cavity, Lips, Teeth, and Gums:  Mucosa and teeth intact and viable, 7 x 5 mm lesion that appears vascular to the right upper lip at the red lip at least 2-3 mm from vermilion border  Oropharynx: No erythema or exudate, no masses or ulcerations, non-obstructive tonsils  Nasopharynx:  No mass or lesion with intact mucosa  Hypopharynx:  Not well visualized secondary to gagging  Larynx:  Not well visualized secondary to gagging  Neck, Trachea, Lymphatics:  Midline trachea without mass or lesion, no lymphadenopathy  Thyroid:  No mass or nodularity  Eyes: No nystagmus with equal extraocular motion bilaterally  Neuro/Psych/Balance: Patient oriented and appropriate in interaction;  Appropriate mood and affect;  Gait is intact with no imbalance; Cranial nerves I-XII are  intact  Respiratory effort:  Equal inspiration and expiration without stridor  Peripheral Vascular:  Warm extremities with equal pulses     Procedure:  Excision of lip lesion  Indications:  Lip lesion  Surgeon: Johnnie Miranda MD  Procedure note/findings: After informed discussion of the risks, benefits, and alternatives and signed consent was obtained right upper lip was infiltrated with approximately 3 cc of 1% lidocaine with 1 to 196545 of epinephrine around the base of the lesion.  Lip was cleaned with Betadine and 15 blade was used to make skin incision around the base of the lesion through the mucosa.  Scissors were then used to freed the lesion from orbicular is muscle and lesion was sent to pathology.  Excised area was 1 cm by half a cm.  Hemostasis was obtained with bipolar and lip was closed with 2 interrupted 4-0 chromic suture.  She tolerated this well.          IMPRESSION:   My impression is that Ms. Cohn has lip lesion consistent with vascular malformation status post excision in clinic today    PLAN:      Not manipulating the sutures with tongue was discussed with the patient.  Explained to her that the sutures will dissolve on their own.  I will call her with pathology results.  Follow-up with me as needed

## 2022-02-18 LAB
FINAL PATHOLOGIC DIAGNOSIS: NORMAL
GROSS: NORMAL
Lab: NORMAL

## 2022-02-25 DIAGNOSIS — E11.21 TYPE 2 DIABETES MELLITUS WITH DIABETIC NEPHROPATHY, UNSPECIFIED WHETHER LONG TERM INSULIN USE: Primary | ICD-10-CM

## 2022-02-25 NOTE — TELEPHONE ENCOUNTER
No new care gaps identified.  Powered by [employer]. Reference number: [id number].   2/25/2022 11:16:40 AM CST

## 2022-02-28 ENCOUNTER — EXTERNAL CHRONIC CARE MANAGEMENT (OUTPATIENT)
Dept: PRIMARY CARE CLINIC | Facility: CLINIC | Age: 87
End: 2022-02-28
Payer: MEDICARE

## 2022-02-28 ENCOUNTER — HOSPITAL ENCOUNTER (OUTPATIENT)
Dept: RADIOLOGY | Facility: HOSPITAL | Age: 87
Discharge: HOME | End: 2022-02-28
Attending: INTERNAL MEDICINE
Payer: MEDICARE

## 2022-02-28 DIAGNOSIS — Z12.31 ENCOUNTER FOR SCREENING MAMMOGRAM FOR BREAST CANCER: ICD-10-CM

## 2022-02-28 PROCEDURE — 77063 BREAST TOMOSYNTHESIS BI: CPT | Mod: TC

## 2022-02-28 PROCEDURE — 77063 MAMMO DIGITAL SCREENING BILAT WITH TOMO: ICD-10-PCS | Mod: 26,,, | Performed by: RADIOLOGY

## 2022-02-28 PROCEDURE — 77063 BREAST TOMOSYNTHESIS BI: CPT | Mod: 26,,, | Performed by: RADIOLOGY

## 2022-02-28 PROCEDURE — 77067 MAMMO DIGITAL SCREENING BILAT WITH TOMO: ICD-10-PCS | Mod: 26,,, | Performed by: RADIOLOGY

## 2022-02-28 PROCEDURE — 77067 SCR MAMMO BI INCL CAD: CPT | Mod: TC

## 2022-02-28 PROCEDURE — 77067 SCR MAMMO BI INCL CAD: CPT | Mod: 26,,, | Performed by: RADIOLOGY

## 2022-02-28 PROCEDURE — 99490 PR CHRONIC CARE MGMT, 1ST 20 MIN: ICD-10-PCS | Mod: S$GLB,,, | Performed by: INTERNAL MEDICINE

## 2022-02-28 PROCEDURE — 99490 CHRNC CARE MGMT STAFF 1ST 20: CPT | Mod: S$GLB,,, | Performed by: INTERNAL MEDICINE

## 2022-03-01 RX ORDER — GLIPIZIDE 5 MG/1
TABLET ORAL
Qty: 180 TABLET | Refills: 0 | Status: SHIPPED | OUTPATIENT
Start: 2022-03-01 | End: 2022-06-17

## 2022-03-02 NOTE — TELEPHONE ENCOUNTER
Refill Authorization Note   Tonya Cohn  is requesting a refill authorization.  Brief Assessment and Rationale for Refill:  Approve     Medication Therapy Plan:       Medication Reconciliation Completed: No   Comments:   --->Care Gap information included below if applicable.       Requested Prescriptions   Pending Prescriptions Disp Refills    glipiZIDE (GLUCOTROL) 5 MG tablet [Pharmacy Med Name: GLIPIZIDE 5MG TABLETS] 180 tablet 0     Sig: TAKE 1 TABLET BY MOUTH TWICE DAILY WITH MEALS       Endocrinology:  Diabetes - Sulfonylureas Passed - 3/1/2022  6:18 PM        Passed - Patient is at least 18 years old        Passed - Valid encounter within last 15 months     Recent Visits  Date Type Provider Dept   01/26/22 Office Visit Tere Hughes MD [facility] Family Med/ Internal Med/ Peds   07/07/21 Office Visit Tere Hughes MD [facility] Family Med/ Internal Med/ Peds   01/15/21 Office Visit Tere Hughes MD [facility] Family Med/ Internal Med/ Peds   07/15/20 Office Visit Tere Hughes MD [facility] Family Med/ Internal Med/ Peds   Showing recent visits within past 720 days and meeting all other requirements  Future Appointments  No visits were found meeting these conditions.  Showing future appointments within next 150 days and meeting all other requirements      Future Appointments              In 1 month MD Jocelyn Recinos - Pain Management, Jocelyn Browni    In 1 month [facility], [place]    In 5 months Tere Hughes MD [facility] - [facility]                Passed - HBA1C within 180 days     Lab Results   Component Value Date    HGBA1C 6.1 (H) 01/25/2022    HGBA1C 6.1 (H) 07/02/2021    HGBA1C 6.0 (H) 01/15/2021              Passed - Cr is 1.39 or below and within 360 days     Lab Results   Component Value Date    CREATININE 0.77 07/02/2021    CREATININE 0.86 07/16/2020    CREATININE 0.87 04/09/2019              Passed - eGFR is 59 or above and within 360 days     Lab Results    Component Value Date    EGFRNONAA >60.0 07/02/2021    EGFRNONAA >60.0 07/16/2020    EGFRNONAA >60.0 04/09/2019                    Appointments  past 12m or future 3m with PCP    Date Provider   Last Visit   1/26/2022 Tere Hughes MD   Next Visit   8/8/2022 Tere Hughes MD   ED visits in past 90 days: 0     Note composed:6:19 PM 03/01/2022

## 2022-03-15 ENCOUNTER — PES CALL (OUTPATIENT)
Dept: ADMINISTRATIVE | Facility: CLINIC | Age: 87
End: 2022-03-15
Payer: MEDICARE

## 2022-03-21 DIAGNOSIS — I12.9 BENIGN HYPERTENSION WITH CHRONIC KIDNEY DISEASE, STAGE III: ICD-10-CM

## 2022-03-21 DIAGNOSIS — E78.5 HYPERLIPIDEMIA LDL GOAL <100: ICD-10-CM

## 2022-03-21 DIAGNOSIS — N18.30 BENIGN HYPERTENSION WITH CHRONIC KIDNEY DISEASE, STAGE III: ICD-10-CM

## 2022-03-21 NOTE — TELEPHONE ENCOUNTER
----- Message from Danna Bucio sent at 3/21/2022 11:07 AM CDT -----  Regarding: refill  Type:  RX Refill Request    Who Called: Tonya  Refill or New Rx:refill  RX Name and Strength:losartan (COZAAR) 50 MG tablet, lovastatin (MEVACOR) 10 MG tablet  How is the patient currently taking it? (ex. 1XDay):1  Is this a 30 day or 90 day RX:90  Preferred Pharmacy with phone number:[id number] - [place] - [address] [facility] [address]  Local or Mail Order:local  Ordering Provider:  Would the patient rather a call back or a response via [facility]? call  Best Call Back Number:[phone]  Additional Information: patient is out of medication

## 2022-03-21 NOTE — TELEPHONE ENCOUNTER
No new care gaps identified.  Powered by [employer]. Reference number: [id number].   3/21/2022 11:29:38 AM CDT

## 2022-03-22 RX ORDER — LOSARTAN POTASSIUM 50 MG/1
50 TABLET ORAL DAILY
Qty: 90 TABLET | Refills: 1 | Status: SHIPPED | OUTPATIENT
Start: 2022-03-22 | End: 2022-05-18 | Stop reason: SDUPTHER

## 2022-03-22 RX ORDER — LOVASTATIN 10 MG/1
10 TABLET ORAL DAILY
Qty: 90 TABLET | Refills: 1 | Status: SHIPPED | OUTPATIENT
Start: 2022-03-22 | End: 2022-12-15

## 2022-03-22 NOTE — TELEPHONE ENCOUNTER
Refill Routing Note   Medication(s) are not appropriate for processing by [facility] for the following reason(s):      - Required vitals are abnormal  - Allergy/Contraindication (DRUG CLASS MATCH with ATORVASTATIN (Class: STATINS-HMG-COA REDUCTASE INHIBITORS).)    [facility] action(s):  Defer Medication-related problems identified: Adverse drug effects     Medication Therapy Plan: BP ELEVATED 158/95 NOT TO OR STANDARDS ABNORMAL VITALS, PLEASE ADVISE  --->Care Gap information included in message below if applicable.   Medication reconciliation completed: No   Automatic Epic Generated Protocol Data:        Requested Prescriptions   Pending Prescriptions Disp Refills    losartan (COZAAR) 50 MG tablet 90 tablet 1     Sig: Take 1 tablet (50 mg total) by mouth once daily.       Cardiovascular:  Angiotensin Receptor Blockers Failed - 3/21/2022 11:29 AM        Failed - Last BP in normal range within 360 days     BP Readings from Last 3 Encounters:   02/14/22 (!) 158/95   02/08/22 117/64   01/26/22 126/60               Passed - Patient is at least 18 years old        Passed - Valid encounter within last 15 months     Recent Visits  Date Type Provider Dept   01/26/22 Office Visit Tere Hughes MD [facility] Family Med/ Internal Med/ Peds   07/07/21 Office Visit Tere Hughes MD [facility] Family Med/ Internal Med/ Peds   01/15/21 Office Visit Tere Hughes MD [facility] Family Med/ Internal Med/ Peds   07/15/20 Office Visit Tere Hughes MD [facility] Family Med/ Internal Med/ Peds   Showing recent visits within past 720 days and meeting all other requirements  Future Appointments  No visits were found meeting these conditions.  Showing future appointments within next 150 days and meeting all other requirements      Future Appointments              In 3 days KEILY Preciado - Family Medicine, [place]    In 1 week MD Jocelyn Recinos - Pain Management, [facility]    In 2 weeks JOSE Cornelius -  [facility]    [facility] months [facility]                Passed - Cr is 1.39 or below and within 360 days     Lab Results   Component Value Date    CREATININE 0.77 07/02/2021    CREATININE 0.86 07/16/2020    CREATININE 0.87 04/09/2019              Passed - K is 5.2 or below and within 360 days     Potassium   Date Value Ref Range Status   07/02/2021 4.2 3.5 - 5.1 mmol/L Final   07/16/2020 4.0 3.5 - 5.1 mmol/L Final   04/09/2019 4.5 3.5 - 5.1 mmol/L Final              Passed - eGFR within 360 days     Lab Results   Component Value Date    EGFRNONAA >60.0 07/02/2021    EGFRNONAA >60.0 07/16/2020    EGFRNONAA >60.0 04/09/2019                  lovastatin (MEVACOR) 10 MG tablet 90 tablet 1     Sig: Take 1 tablet (10 mg total) by mouth once daily.       Cardiovascular:  Antilipid - Statins Passed - 3/21/2022 11:29 AM        Passed - Patient is at least 18 years old        Passed - Valid encounter within last 15 months     Recent Visits  Date Type Provider Dept   01/26/22 Office Visit Tere Hughes MD [facility] Family Med/ Internal Med/ Peds   07/07/21 Office Visit Tere Hughes MD [facility] Family Med/ Internal Med/ Peds   01/15/21 Office Visit Tere Hughes MD [facility] Family Med/ Internal Med/ Peds   07/15/20 Office Visit Tere Hughes MD [facility] Family Med/ Internal Med/ Peds   Showing recent visits within past 720 days and meeting all other requirements  Future Appointments  No visits were found meeting these conditions.  Showing future appointments within next 150 days and meeting all other requirements      Future Appointments              In 3 days KEILY Preciadobeverley [facility], [place]    In 1 week MD Jocelyn Recinos - Pain Management, [facility]oc[facility]    In 2 weeks JOSE Corneliuso - [facility]    In 4 months MD Betzaida Hernándezbeverley [facility] [place]                Passed - ALT is 131 or below and within 360 days     ALT    Date Value Ref Range Status   07/02/2021 9 (L) 10 - 44 U/L Final   07/16/2020 11 10 - 44 U/L Final   04/09/2019 14 10 - 44 U/L Final              Passed - AST is 119 or below and within 360 days     AST   Date Value Ref Range Status   07/02/2021 25 15 - 46 U/L Final   07/16/2020 24 15 - 46 U/L Final   04/09/2019 20 15 - 46 U/L Final              Passed - Total Cholesterol within 360 days     Lab Results   Component Value Date    CHOL 193 07/02/2021    CHOL 207 (H) 07/16/2020    CHOL 179 04/09/2019              Passed - LDL within 360 days     LDL Cholesterol   Date Value Ref Range Status   07/02/2021 117.0 63.0 - 159.0 mg/dL Final     Comment:     The National Cholesterol Education Program (NCEP) has set the  following guidelines (reference values) for LDL Cholesterol:  Optimal.......................<130 mg/dL  Borderline High...............130-159 mg/dL  High..........................160-189 mg/dL  Very High.....................>190 mg/dL              Passed - HDL within 360 days     HDL   Date Value Ref Range Status   07/02/2021 66 40 - 75 mg/dL Final     Comment:     The National Cholesterol Education Program (NCEP) has set the  following guidelines (reference values) for HDL Cholesterol:  Low...............<40 mg/dL  Optimal...........>60 mg/dL              Passed - Triglycerides within 360 days     Lab Results   Component Value Date    TRIG 50 07/02/2021    TRIG 69 07/16/2020    TRIG 36 04/09/2019                    Appointments  past 12m or future 3m with PCP    Date Provider   Last Visit   1/26/2022 Tere Hughes MD   Next Visit   8/8/2022 Tere Hughes MD   ED visits in past 90 days: 0        Note composed:8:11 PM 03/21/2022

## 2022-03-22 NOTE — TELEPHONE ENCOUNTER
----- Message from Michelle Hayward sent at 3/22/2022  8:35 AM CDT -----  Type: RX Refill Request    Who Called:  Self     Have you contacted your pharmacy:yes     Refill or New Rx: Refill     RX Name and Strength:    losartan (COZAAR) 50 MG tablet  lovastatin (MEVACOR) 10 MG tablet    Preferred Pharmacy with phone number:[id number] [place] [address] AT [facility] [address]   Phone:  [phone]  Fax:  [phone]          Local or Mail Order:Local     Ordering Provider: Dr. Hughes     Would the patient rather a call back or a response via [facility]? Call     Best Call Back Number:.[phone] (home) [phone] (work)

## 2022-03-25 ENCOUNTER — PES CALL (OUTPATIENT)
Dept: ADMINISTRATIVE | Facility: CLINIC | Age: 87
End: 2022-03-25
Payer: MEDICARE

## 2022-03-28 ENCOUNTER — PES CALL (OUTPATIENT)
Dept: ADMINISTRATIVE | Facility: CLINIC | Age: 87
End: 2022-03-28
Payer: MEDICARE

## 2022-03-31 ENCOUNTER — OFFICE VISIT (OUTPATIENT)
Dept: PAIN MEDICINE | Facility: CLINIC | Age: 87
End: 2022-03-31
Payer: MEDICARE

## 2022-03-31 ENCOUNTER — EXTERNAL CHRONIC CARE MANAGEMENT (OUTPATIENT)
Dept: PRIMARY CARE CLINIC | Facility: CLINIC | Age: 87
End: 2022-03-31
Payer: MEDICARE

## 2022-03-31 VITALS — SYSTOLIC BLOOD PRESSURE: 145 MMHG | HEART RATE: 90 BPM | DIASTOLIC BLOOD PRESSURE: 78 MMHG

## 2022-03-31 DIAGNOSIS — G89.29 CHRONIC PAIN OF LEFT KNEE: ICD-10-CM

## 2022-03-31 DIAGNOSIS — M76.899 HAMSTRING TENDINITIS: ICD-10-CM

## 2022-03-31 DIAGNOSIS — M17.12 PRIMARY OSTEOARTHRITIS OF LEFT KNEE: Primary | ICD-10-CM

## 2022-03-31 DIAGNOSIS — M25.562 CHRONIC PAIN OF LEFT KNEE: ICD-10-CM

## 2022-03-31 PROCEDURE — 99490 CHRNC CARE MGMT STAFF 1ST 20: CPT | Mod: S$GLB,,, | Performed by: INTERNAL MEDICINE

## 2022-03-31 PROCEDURE — 99213 PR OFFICE/OUTPT VISIT, EST, LEVL III, 20-29 MIN: ICD-10-PCS | Mod: S$GLB,,, | Performed by: PHYSICAL MEDICINE & REHABILITATION

## 2022-03-31 PROCEDURE — 1160F RVW MEDS BY RX/DR IN RCRD: CPT | Mod: CPTII,S$GLB,, | Performed by: PHYSICAL MEDICINE & REHABILITATION

## 2022-03-31 PROCEDURE — 1160F PR REVIEW ALL MEDS BY PRESCRIBER/CLIN PHARMACIST DOCUMENTED: ICD-10-PCS | Mod: CPTII,S$GLB,, | Performed by: PHYSICAL MEDICINE & REHABILITATION

## 2022-03-31 PROCEDURE — 1157F ADVNC CARE PLAN IN RCRD: CPT | Mod: CPTII,S$GLB,, | Performed by: PHYSICAL MEDICINE & REHABILITATION

## 2022-03-31 PROCEDURE — 99999 PR PBB SHADOW E&M-EST. PATIENT-LVL III: CPT | Mod: PBBFAC,,, | Performed by: PHYSICAL MEDICINE & REHABILITATION

## 2022-03-31 PROCEDURE — 1159F PR MEDICATION LIST DOCUMENTED IN MEDICAL RECORD: ICD-10-PCS | Mod: CPTII,S$GLB,, | Performed by: PHYSICAL MEDICINE & REHABILITATION

## 2022-03-31 PROCEDURE — 99439 CHRNC CARE MGMT STAF EA ADDL: CPT | Mod: S$GLB,,, | Performed by: INTERNAL MEDICINE

## 2022-03-31 PROCEDURE — 99999 PR PBB SHADOW E&M-EST. PATIENT-LVL III: ICD-10-PCS | Mod: PBBFAC,,, | Performed by: PHYSICAL MEDICINE & REHABILITATION

## 2022-03-31 PROCEDURE — 1157F PR ADVANCE CARE PLAN OR EQUIV PRESENT IN MEDICAL RECORD: ICD-10-PCS | Mod: CPTII,S$GLB,, | Performed by: PHYSICAL MEDICINE & REHABILITATION

## 2022-03-31 PROCEDURE — 1125F PR PAIN SEVERITY QUANTIFIED, PAIN PRESENT: ICD-10-PCS | Mod: CPTII,S$GLB,, | Performed by: PHYSICAL MEDICINE & REHABILITATION

## 2022-03-31 PROCEDURE — 99490 PR CHRONIC CARE MGMT, 1ST 20 MIN: ICD-10-PCS | Mod: S$GLB,,, | Performed by: INTERNAL MEDICINE

## 2022-03-31 PROCEDURE — 99213 OFFICE O/P EST LOW 20 MIN: CPT | Mod: S$GLB,,, | Performed by: PHYSICAL MEDICINE & REHABILITATION

## 2022-03-31 PROCEDURE — 99439 PR CHRONIC CARE MGMT, EA ADDTL 20 MIN: ICD-10-PCS | Mod: S$GLB,,, | Performed by: INTERNAL MEDICINE

## 2022-03-31 PROCEDURE — 1125F AMNT PAIN NOTED PAIN PRSNT: CPT | Mod: CPTII,S$GLB,, | Performed by: PHYSICAL MEDICINE & REHABILITATION

## 2022-03-31 PROCEDURE — 1159F MED LIST DOCD IN RCRD: CPT | Mod: CPTII,S$GLB,, | Performed by: PHYSICAL MEDICINE & REHABILITATION

## 2022-03-31 NOTE — PROGRESS NOTES
[facility]      Chief Complaint:   Chief Complaint   Patient presents with    Knee Pain     Left        History of Present Illness: Tonya Cohn is a 89 y.o. female referred by Dr. Colt Ricks for leg pain.      Onset: Sept 2021, no specific injury  Location: left knee posteriorly  Radiation: Back of thigh  Timing: intermittent, pain resolves with laying down at night  Quality: Aching and Deep  Exacerbating Factors: When first getting up from sitting after prolonged periods. Activity  Alleviating Factors: medications (tylenol).  Associated Symptoms: denies locking, catching, popping. Denies night fever/night sweats, significant weight loss, significant motor weakness and loss of sensations.  Denies falls    Severity: Currently: 2/10   Typical Range: 2-10/10     Exacerbation: 10/10     She saw Orthopedic surgery, recommended that she consider possible left total knee arthroplasty, but she did not want to go through surgery at her age, so she was referred to Pain Management.    Interval History (03/31/2022):  Tonya Cohn returns today for follow up.  At the last clinic visit, referred to physical therapy.    Physical therapy provided 80% relief.     Currently, the knee pain is improved. She is feeling much better after physical therapy. Still very active with household activities and stays on her feet often. Pain is still predominantly in the posterior aspect of the knee and is most notable when sitting and letting the left leg dangle.     No change in the location or quality of the pain since the most recent visit is reported.  No significant interval events or traumas. No change in bowel or bladder function, & no new weakness or numbness is reported. No new musculoskeletal pain complaints.    Current Pain Scales:  Current: 6/10              Typical Range: 6-10/10         Previous Interventions:  -     Previous Therapies:  PT/OT: no   Relevant Surgery: yes    - Right TKA with good relief.    Previous Medications:   - NSAIDS: Tylenol with incomplete relief.   - Muscle Relaxants:    - TCAs:   - SNRIs:   - Topicals:   - Anticonvulsants:    - Opioids:     Current Pain Medications:  1. Tylenol     Blood Thinners: ASA 81 mg. Fish oil    Full Medication List:    Current Outpatient Medications:     acetaminophen (TYLENOL) 500 MG tablet, Take 1 tablet (500 mg total) by mouth every 4 (four) hours as needed for Pain., Disp: 60 tablet, Rfl: 2    betamethasone dipropionate (DIPROLENE) 0.05 % ointment, Apply topically 2 (two) times daily., Disp: 45 g, Rfl: 0    calcium carbonate (OS-MIRACLE) 500 mg calcium (1,250 mg) tablet, Take 1 tablet by mouth every other day., Disp: , Rfl:     diclofenac sodium (VOLTAREN) 1 % Gel, Apply 4 grams to effected area 4 x daily do not exceed 32 grams per day, Disp: 1 Tube, Rfl: 3    fish oil-fat acid comb.8-hb137 1,200 mg (400 pl-437pk-282mm) Cap, , Disp: , Rfl:     fluocinonide 0.1 % Crea, as needed. , Disp: , Rfl:     glipiZIDE (GLUCOTROL) 5 MG tablet, TAKE 1 TABLET BY MOUTH TWICE DAILY WITH MEALS, Disp: 180 tablet, Rfl: 0    hydrocortisone 2.5 % cream, Apply topically 2 (two) times daily., Disp: 20 g, Rfl: 1    hydrOXYzine HCL (ATARAX) 10 MG Tab, Take 1 tablet (10 mg total) by mouth 3 (three) times daily as needed for Itching., Disp: 30 tablet, Rfl: 0    levothyroxine (SYNTHROID) 88 MCG tablet, TAKE 1 TABLET(88 MCG) BY MOUTH EVERY DAY, Disp: 90 tablet, Rfl: 1    losartan (COZAAR) 50 MG tablet, Take 1 tablet (50 mg total) by mouth once daily., Disp: 90 tablet, Rfl: 1    lovastatin (MEVACOR) 10 MG tablet, Take 1 tablet (10 mg total) by mouth once daily., Disp: 90 tablet, Rfl: 1    metFORMIN (GLUCOPHAGE) 1000 MG tablet, TAKE 1/2 TABLET BY MOUTH TWICE DAILY WITH MEALS, Disp: 90 tablet, Rfl: 1    multivit,calc,mins/iron/folic (ONE-A-DAY WOMENS FORMULA ORAL), Take 1 tablet by mouth once daily., Disp: , Rfl:     triamcinolone acetonide 0.5% (KENALOG) 0.5 % Crea, APPLY  EXTERNALLY TO THE AFFECTED AREA TWICE DAILY as needed for flares; do not use for more than 2 weeks in a row without one week break, Disp: 454 g, Rfl: 2    aspirin 81 MG Chew, Take 1 tablet (81 mg total) by mouth once daily., Disp: , Rfl: 0    clobetasoL (TEMOVATE) 0.05 % cream, Apply topically 2 (two) times daily. for 7 days, Disp: 30 g, Rfl: 0     Review of Systems:  Review of Systems   Musculoskeletal: Positive for joint pain.   Neurological: Negative for tingling and weakness.   Psychiatric/Behavioral: The patient does not have insomnia.        Allergies:  Lipitor [atorvastatin]     Medical History:   has a past medical history of AR (allergic rhinitis), Atherosclerosis of abdominal aorta, Carpal tunnel syndrome of left wrist, Chronic kidney disease, stage 3, Diabetic peripheral neuropathy, Diverticulosis, History of colon polyps, History of diverticulitis of colon (1/2014), Hyperlipemia, Hypertension, Hypothyroidism, Mild aortic stenosis, OA (osteoarthritis) of knee, Obesity, Sciatica, and Type II or unspecified type diabetes mellitus with neurological manifestations, uncontrolled(250.62).    Surgical History:   has a past surgical history that includes Hysterectomy; Lung biopsy (in her 40's); Cosmetic surgery; Total knee arthroplasty (Right, 6/13/2014); Colonoscopy (N/A, 10/3/2017); Joint replacement (Right); and Cataract extraction (Bilateral).    Family History:  family history includes Arthritis in her brother and sister; Cancer in her brother, mother, and son; Cataracts in her son; Diabetes in her brother and sister; Glaucoma in her mother; Heart attack in her father; Heart disease in her brother and father; Heart failure in her brother; Hypertension in her father and mother; No Known Problems in her maternal aunt, maternal grandfather, maternal grandmother, maternal uncle, paternal aunt, paternal grandfather, paternal grandmother, and paternal uncle; Skin cancer in her brother; Stroke in her brother;  Throat cancer in her brother.    Social History:   reports that she quit smoking about 59 years ago. She started smoking about 67 years ago. She has a 0.50 pack-year smoking history. She quit smokeless tobacco use about 51 years ago. She reports current alcohol use of about 1.0 standard drink of alcohol per week. She reports that she does not use drugs.    Physical Exam:  BP (!) 145/78   Pulse 90   GEN: No acute distress. Calm, comfortable  HENT: Normocephalic, atraumatic, moist mucous membranes  EYE: Anicteric sclera, non-injected.   CV: Non-diaphoretic.   RESP: Breathing comfortably. Chest expansion symmetric.  EXT: No clubbing, cyanosis.   SKIN: Warm, & dry to palpation. No visible rashes or lesions of exposed skin.   PSYCH: Pleasant mood and appropriate affect. Recent and remote memory intact.   GAIT: Independent, antalgic ambulation with decreased stance phase on the left  Knee Exam:     Inspection: Slight effusion on the left, and healed longitudinal incision on the right.      Palpation: (+) mild TTP at left posterior medial and lateral hamstring tendons and proximal into muscle bellies. (-) TTP at medial joint line, lateral joint line, pes anserine, patella tendon, superior pole of patella, inferior pole of patella on the left.     ROM: No Limitation in extension on the left.  Limitation in flexion on the left to about 110 degrees.      (+) Crepitus on the left     Ligamentous Laxity: None apparent with Lachman, Posterior drawer, Varus/Valgus stress  Neurologic Exam:     Alert. Speech is fluent and appropriate.     Strength: Able to stand and walk independently with use of single point cane.    Imaging:  - X-ray Knee Ortho Left with Flexion 11/03/2021  FINDINGS:  I see no acute fracture.  Tricompartmental osteophyte formation with severe narrowing medial tibiofemoral compartment and moderate narrowing patellofemoral compartment.  Chondrocalcinosis.  Small suprapatellar joint effusion.  No significant soft  tissue edema.  Impression:  Osteoarthritis with severe joint space narrowing.  Small suprapatellar joint effusion.      Labs:  BMP  Lab Results   Component Value Date     07/02/2021    K 4.2 07/02/2021     07/02/2021    CO2 31 (H) 07/02/2021    BUN 21 (H) 07/02/2021    CREATININE 0.77 07/02/2021    CALCIUM 9.6 07/02/2021    ANIONGAP 9 07/02/2021    ESTGFRAFRICA >60.0 07/02/2021    EGFRNONAA >60.0 07/02/2021     Lab Results   Component Value Date    ALT 9 (L) 07/02/2021    AST 25 07/02/2021    ALKPHOS 62 07/02/2021    BILITOT 0.4 07/02/2021     Lab Results   Component Value Date     07/02/2021       Assessment:  Tonya Cohn is a 89 y.o. female with the following diagnoses based on history, exam, and imaging:    Problem List Items Addressed This Visit        Orthopedic    Chronic pain of left knee      Other Visit Diagnoses     Primary osteoarthritis of left knee    -  Primary    Hamstring tendinitis              This is a pleasant 89 y.o. lady presenting with:     - chronic left knee pain with severe osteoarthritis of the left knee.  She also an aspect hamstring tendinitis on the left, though her history is very consistent with osteoarthritic pain  - Comorbidities: CKD. BMI > 30. HypoTH. Aortic stenosis. DM2. GERD, Osteopenia.     Treatment Plan:   - PT/OT/HEP: Continue with HEP as taught by her therapists. Discussed benefits of exercise for pain and recommend she try the seated pedal system her daughter has for her.   - Procedures:     - Consider viscosupplementation via ultrasound guidance in the future. Today she is pleased with the relief she's felt after PT and would like to defer injections.   - Plan for genicular nerve block on the left if no relief with viscosupplementation.  - Medications: Continue using Tylenol 1000 mg every 8 hours as needed for pain.  Do not take this with any other medications containing acetaminophen.  Do not exceed 3000 mg of acetaminophen in 24 hours.  -  Imaging: Reviewed.   - Labs: Reviewed.  Medications are appropriately dosed for current hepatorenal function.    Follow Up: RTC PRN.     Carmen Kincaid MD  PM&R PGY-2      Lina Rios M.D.  Interventional Pain Medicine / Physical Medicine & Rehabilitation    Disclaimer: This note was partly generated using dictation software which may occasionally result in transcription errors.

## 2022-04-01 RX ORDER — INSULIN PUMP SYRINGE, 3 ML
EACH MISCELLANEOUS
Qty: 1 EACH | Refills: 0 | Status: SHIPPED | OUTPATIENT
Start: 2022-04-01 | End: 2023-04-01

## 2022-04-01 NOTE — TELEPHONE ENCOUNTER
Care Due:                  Date            Visit Type   Department     Provider  --------------------------------------------------------------------------------                                [facility]      MED/ INTERNAL  Laurentia Sandee  Last Visit: 01-      CARE ([facility])   MED/ PEDS      Souravkeltiffanie Hughes                              [facility]/ INTERNAL  [facility]  Next Visit: 08-      CARE ([facility])   MED/ PEDS      Souravkeler  Ellen                                                            Last  Test          Frequency    Reason                     Performed    Due Date  --------------------------------------------------------------------------------    CMP.........  12 months..  glipiZIDE, losartan,       07- 06-                             lovastatin, metFORMIN....    Lipid Panel.  12 months..  lovastatin...............  07- 06-    Powered by [employer] by [employer] Reference number: [id number].   4/01/2022 11:50:13 AM CDT

## 2022-04-05 ENCOUNTER — OFFICE VISIT (OUTPATIENT)
Dept: OPTOMETRY | Facility: CLINIC | Age: 87
End: 2022-04-05
Payer: MEDICARE

## 2022-04-05 DIAGNOSIS — H52.7 REFRACTIVE ERROR: ICD-10-CM

## 2022-04-05 DIAGNOSIS — E11.9 TYPE 2 DIABETES MELLITUS WITHOUT RETINOPATHY: Primary | ICD-10-CM

## 2022-04-05 DIAGNOSIS — Z96.1 PSEUDOPHAKIA OF BOTH EYES: ICD-10-CM

## 2022-04-05 PROCEDURE — 1157F ADVNC CARE PLAN IN RCRD: CPT | Mod: CPTII,S$GLB,, | Performed by: OPTOMETRIST

## 2022-04-05 PROCEDURE — 2023F PR DILATED RETINAL EXAM W/O EVID OF RETINOPATHY: ICD-10-PCS | Mod: CPTII,S$GLB,, | Performed by: OPTOMETRIST

## 2022-04-05 PROCEDURE — 3288F PR FALLS RISK ASSESSMENT DOCUMENTED: ICD-10-PCS | Mod: CPTII,S$GLB,, | Performed by: OPTOMETRIST

## 2022-04-05 PROCEDURE — 3288F FALL RISK ASSESSMENT DOCD: CPT | Mod: CPTII,S$GLB,, | Performed by: OPTOMETRIST

## 2022-04-05 PROCEDURE — 99999 PR PBB SHADOW E&M-EST. PATIENT-LVL III: CPT | Mod: PBBFAC,,, | Performed by: OPTOMETRIST

## 2022-04-05 PROCEDURE — 99999 PR PBB SHADOW E&M-EST. PATIENT-LVL III: ICD-10-PCS | Mod: PBBFAC,,, | Performed by: OPTOMETRIST

## 2022-04-05 PROCEDURE — 1157F PR ADVANCE CARE PLAN OR EQUIV PRESENT IN MEDICAL RECORD: ICD-10-PCS | Mod: CPTII,S$GLB,, | Performed by: OPTOMETRIST

## 2022-04-05 PROCEDURE — 1160F RVW MEDS BY RX/DR IN RCRD: CPT | Mod: CPTII,S$GLB,, | Performed by: OPTOMETRIST

## 2022-04-05 PROCEDURE — 92014 COMPRE OPH EXAM EST PT 1/>: CPT | Mod: S$GLB,,, | Performed by: OPTOMETRIST

## 2022-04-05 PROCEDURE — 1159F MED LIST DOCD IN RCRD: CPT | Mod: CPTII,S$GLB,, | Performed by: OPTOMETRIST

## 2022-04-05 PROCEDURE — 1101F PT FALLS ASSESS-DOCD LE1/YR: CPT | Mod: CPTII,S$GLB,, | Performed by: OPTOMETRIST

## 2022-04-05 PROCEDURE — 1101F PR PT FALLS ASSESS DOC 0-1 FALLS W/OUT INJ PAST YR: ICD-10-PCS | Mod: CPTII,S$GLB,, | Performed by: OPTOMETRIST

## 2022-04-05 PROCEDURE — 2023F DILAT RTA XM W/O RTNOPTHY: CPT | Mod: CPTII,S$GLB,, | Performed by: OPTOMETRIST

## 2022-04-05 PROCEDURE — 1159F PR MEDICATION LIST DOCUMENTED IN MEDICAL RECORD: ICD-10-PCS | Mod: CPTII,S$GLB,, | Performed by: OPTOMETRIST

## 2022-04-05 PROCEDURE — 92015 DETERMINE REFRACTIVE STATE: CPT | Mod: S$GLB,,, | Performed by: OPTOMETRIST

## 2022-04-05 PROCEDURE — 92014 PR EYE EXAM, EST PATIENT,COMPREHESV: ICD-10-PCS | Mod: S$GLB,,, | Performed by: OPTOMETRIST

## 2022-04-05 PROCEDURE — 1160F PR REVIEW ALL MEDS BY PRESCRIBER/CLIN PHARMACIST DOCUMENTED: ICD-10-PCS | Mod: CPTII,S$GLB,, | Performed by: OPTOMETRIST

## 2022-04-05 PROCEDURE — 92015 PR REFRACTION: ICD-10-PCS | Mod: S$GLB,,, | Performed by: OPTOMETRIST

## 2022-04-05 NOTE — PROGRESS NOTES
Subjective:       Patient ID: Tonya Cohn is a 89 y.o. female      Chief Complaint   Patient presents with    Concerns About Ocular Health    Diabetic Eye Exam     History of Present Illness  Dls: 11/9/20 Dr. Serrano     88 y/o female presents today for diabetic eye exam.  Pt states no changes in vision. Pt wears bifocal glasses.     LBS ?     No tearing  No itching  No burning  No pain  No ha's  No floaters  No flashes    Eye meds  None    Hemoglobin A1C       Date                     Value               Ref Range             Status                01/25/2022               6.1 (H)             4.0 - 5.6 %           Final                  07/02/2021               6.1 (H)             4.0 - 5.6 %           Final                  01/15/2021               6.0 (H)             4.0 - 5.6 %           Final                 Assessment/Plan:     1. Type 2 diabetes mellitus without retinopathy  No diabetic retinopathy. Discussed with pt the effects of diabetes on vision, importance of good blood sugar control, compliance with meds, and follow up care with PCP. Return in 1 year for dilated eye exam, sooner PRN.    2. Pseudophakia of both eyes  Well-centered. Clear.     3. Refractive error  Educated patient on refractive error and discussed lens options. Dispensed updated spectacle Rx. Educated about adaptation period to new specs.    Eyeglass Final Rx     Eyeglass Final Rx       Sphere Cylinder Axis Add    Right -1.25 +1.75 015 +2.50    Left -1.00 +1.25 005 +2.50    Expiration Date: 4/5/2023                  Follow up in about 1 year (around 4/5/2023) for Diabetic Eye Exam.

## 2022-04-28 ENCOUNTER — PES CALL (OUTPATIENT)
Dept: ADMINISTRATIVE | Facility: CLINIC | Age: 87
End: 2022-04-28
Payer: MEDICARE

## 2022-04-30 ENCOUNTER — EXTERNAL CHRONIC CARE MANAGEMENT (OUTPATIENT)
Dept: PRIMARY CARE CLINIC | Facility: CLINIC | Age: 87
End: 2022-04-30
Payer: MEDICARE

## 2022-04-30 PROCEDURE — 99490 CHRNC CARE MGMT STAFF 1ST 20: CPT | Mod: S$GLB,,, | Performed by: INTERNAL MEDICINE

## 2022-04-30 PROCEDURE — 99490 PR CHRONIC CARE MGMT, 1ST 20 MIN: ICD-10-PCS | Mod: S$GLB,,, | Performed by: INTERNAL MEDICINE

## 2022-05-03 ENCOUNTER — TELEPHONE (OUTPATIENT)
Dept: ADMINISTRATIVE | Facility: CLINIC | Age: 87
End: 2022-05-03
Payer: MEDICARE

## 2022-05-04 ENCOUNTER — OFFICE VISIT (OUTPATIENT)
Dept: INTERNAL MEDICINE | Facility: CLINIC | Age: 87
End: 2022-05-04
Payer: MEDICARE

## 2022-05-04 ENCOUNTER — IMMUNIZATION (OUTPATIENT)
Dept: INTERNAL MEDICINE | Facility: CLINIC | Age: 87
End: 2022-05-04
Payer: MEDICARE

## 2022-05-04 VITALS
SYSTOLIC BLOOD PRESSURE: 118 MMHG | BODY MASS INDEX: 30.9 KG/M2 | WEIGHT: 192.25 LBS | RESPIRATION RATE: 16 BRPM | DIASTOLIC BLOOD PRESSURE: 64 MMHG | HEIGHT: 66 IN | HEART RATE: 80 BPM

## 2022-05-04 DIAGNOSIS — E11.9 TYPE 2 DIABETES MELLITUS WITHOUT RETINOPATHY: ICD-10-CM

## 2022-05-04 DIAGNOSIS — Z00.00 ENCOUNTER FOR PREVENTIVE HEALTH EXAMINATION: Primary | ICD-10-CM

## 2022-05-04 DIAGNOSIS — Z23 NEED FOR VACCINATION: Primary | ICD-10-CM

## 2022-05-04 DIAGNOSIS — M51.36 DDD (DEGENERATIVE DISC DISEASE), LUMBAR: ICD-10-CM

## 2022-05-04 DIAGNOSIS — I70.0 ATHEROSCLEROSIS OF ABDOMINAL AORTA: ICD-10-CM

## 2022-05-04 DIAGNOSIS — E11.40 TYPE 2 DIABETES, CONTROLLED, WITH NEUROPATHY: ICD-10-CM

## 2022-05-04 DIAGNOSIS — E78.5 HYPERLIPIDEMIA ASSOCIATED WITH TYPE 2 DIABETES MELLITUS: ICD-10-CM

## 2022-05-04 DIAGNOSIS — M85.89 OSTEOPENIA OF MULTIPLE SITES: ICD-10-CM

## 2022-05-04 DIAGNOSIS — E11.59 HYPERTENSION ASSOCIATED WITH DIABETES: ICD-10-CM

## 2022-05-04 DIAGNOSIS — K21.9 GASTROESOPHAGEAL REFLUX DISEASE WITHOUT ESOPHAGITIS: ICD-10-CM

## 2022-05-04 DIAGNOSIS — Z78.0 ASYMPTOMATIC POSTMENOPAUSAL STATE: ICD-10-CM

## 2022-05-04 DIAGNOSIS — Z74.09 OTHER REDUCED MOBILITY: ICD-10-CM

## 2022-05-04 DIAGNOSIS — Z99.89 DEPENDENCE ON OTHER ENABLING MACHINES AND DEVICES: ICD-10-CM

## 2022-05-04 DIAGNOSIS — E11.69 HYPERLIPIDEMIA ASSOCIATED WITH TYPE 2 DIABETES MELLITUS: ICD-10-CM

## 2022-05-04 DIAGNOSIS — I15.2 HYPERTENSION ASSOCIATED WITH DIABETES: ICD-10-CM

## 2022-05-04 DIAGNOSIS — E11.69 TYPE 2 DIABETES MELLITUS WITH OBESITY: ICD-10-CM

## 2022-05-04 DIAGNOSIS — M17.0 PRIMARY OSTEOARTHRITIS OF BOTH KNEES: ICD-10-CM

## 2022-05-04 DIAGNOSIS — E66.9 TYPE 2 DIABETES MELLITUS WITH OBESITY: ICD-10-CM

## 2022-05-04 DIAGNOSIS — M47.892 OTHER OSTEOARTHRITIS OF SPINE, CERVICAL REGION: ICD-10-CM

## 2022-05-04 DIAGNOSIS — E03.9 HYPOTHYROIDISM (ACQUIRED): ICD-10-CM

## 2022-05-04 PROCEDURE — 1170F PR FUNCTIONAL STATUS ASSESSED: ICD-10-PCS | Mod: CPTII,S$GLB,, | Performed by: NURSE PRACTITIONER

## 2022-05-04 PROCEDURE — 1157F PR ADVANCE CARE PLAN OR EQUIV PRESENT IN MEDICAL RECORD: ICD-10-PCS | Mod: CPTII,S$GLB,, | Performed by: NURSE PRACTITIONER

## 2022-05-04 PROCEDURE — 1126F PR PAIN SEVERITY QUANTIFIED, NO PAIN PRESENT: ICD-10-PCS | Mod: CPTII,S$GLB,, | Performed by: NURSE PRACTITIONER

## 2022-05-04 PROCEDURE — 1160F RVW MEDS BY RX/DR IN RCRD: CPT | Mod: CPTII,S$GLB,, | Performed by: NURSE PRACTITIONER

## 2022-05-04 PROCEDURE — 1157F ADVNC CARE PLAN IN RCRD: CPT | Mod: CPTII,S$GLB,, | Performed by: NURSE PRACTITIONER

## 2022-05-04 PROCEDURE — 1170F FXNL STATUS ASSESSED: CPT | Mod: CPTII,S$GLB,, | Performed by: NURSE PRACTITIONER

## 2022-05-04 PROCEDURE — 91300 COVID-19, MRNA, LNP-S, PF, 30 MCG/0.3 ML DOSE VACCINE: CPT | Mod: PBBFAC | Performed by: INTERNAL MEDICINE

## 2022-05-04 PROCEDURE — 1101F PR PT FALLS ASSESS DOC 0-1 FALLS W/OUT INJ PAST YR: ICD-10-PCS | Mod: CPTII,S$GLB,, | Performed by: NURSE PRACTITIONER

## 2022-05-04 PROCEDURE — 3288F FALL RISK ASSESSMENT DOCD: CPT | Mod: CPTII,S$GLB,, | Performed by: NURSE PRACTITIONER

## 2022-05-04 PROCEDURE — 1126F AMNT PAIN NOTED NONE PRSNT: CPT | Mod: CPTII,S$GLB,, | Performed by: NURSE PRACTITIONER

## 2022-05-04 PROCEDURE — 99999 PR PBB SHADOW E&M-EST. PATIENT-LVL V: ICD-10-PCS | Mod: PBBFAC,,, | Performed by: NURSE PRACTITIONER

## 2022-05-04 PROCEDURE — 1101F PT FALLS ASSESS-DOCD LE1/YR: CPT | Mod: CPTII,S$GLB,, | Performed by: NURSE PRACTITIONER

## 2022-05-04 PROCEDURE — 1159F MED LIST DOCD IN RCRD: CPT | Mod: CPTII,S$GLB,, | Performed by: NURSE PRACTITIONER

## 2022-05-04 PROCEDURE — G0439 PPPS, SUBSEQ VISIT: HCPCS | Mod: S$GLB,,, | Performed by: NURSE PRACTITIONER

## 2022-05-04 PROCEDURE — 3288F PR FALLS RISK ASSESSMENT DOCUMENTED: ICD-10-PCS | Mod: CPTII,S$GLB,, | Performed by: NURSE PRACTITIONER

## 2022-05-04 PROCEDURE — 1160F PR REVIEW ALL MEDS BY PRESCRIBER/CLIN PHARMACIST DOCUMENTED: ICD-10-PCS | Mod: CPTII,S$GLB,, | Performed by: NURSE PRACTITIONER

## 2022-05-04 PROCEDURE — G0439 PR MEDICARE ANNUAL WELLNESS SUBSEQUENT VISIT: ICD-10-PCS | Mod: S$GLB,,, | Performed by: NURSE PRACTITIONER

## 2022-05-04 PROCEDURE — 99999 PR PBB SHADOW E&M-EST. PATIENT-LVL V: CPT | Mod: PBBFAC,,, | Performed by: NURSE PRACTITIONER

## 2022-05-04 PROCEDURE — 1159F PR MEDICATION LIST DOCUMENTED IN MEDICAL RECORD: ICD-10-PCS | Mod: CPTII,S$GLB,, | Performed by: NURSE PRACTITIONER

## 2022-05-04 NOTE — PROGRESS NOTES
"Tonya Cohn presented for a  Medicare AWV and comprehensive Health Risk Assessment today. The following components were reviewed and updated:    · Medical history  · Family History  · Social history  · Allergies and Current Medications  · Health Risk Assessment  · Health Maintenance  · Care Team         ** See Completed Assessments for Annual Wellness Visit within the encounter summary.**         The following assessments were completed:  · Living Situation  · CAGE  · Depression Screening  · Timed Get Up and Go - ambulates slowly with antalgic gait  · Whisper Test  · Cognitive Function Screening    · Nutrition Screening  · ADL Screening  · PAQ Screening        Vitals:    05/04/22 1348   BP: 118/64   BP Location: Right arm   Patient Position: Sitting   BP Method: Large (Manual)   Pulse: 80   Resp: 16   Weight: 87.2 kg (192 lb 3.9 oz)   Height: 5' 6" (1.676 m)     Body mass index is 31.03 kg/m².     Physical Exam  Vitals reviewed.   Constitutional:       Appearance: Normal appearance.   HENT:      Head: Normocephalic.   Cardiovascular:      Rate and Rhythm: Normal rate.   Pulmonary:      Effort: Pulmonary effort is normal.   Abdominal:      General: Bowel sounds are normal.   Musculoskeletal:      Cervical back: Normal range of motion.      Right lower leg: Edema present.      Left lower leg: Edema present.      Comments: Ambulates slowly with cane, antalgic gait   Skin:     General: Skin is warm and dry.      Capillary Refill: Capillary refill takes less than 2 seconds.   Neurological:      Mental Status: She is alert and oriented to person, place, and time.   Psychiatric:         Behavior: Behavior normal.         Thought Content: Thought content normal.         Judgment: Judgment normal.               Diagnoses and health risks identified today and associated recommendations/orders:    1. Encounter for preventive health examination  Assessments completed.   recommendations reviewed. DEXA ordered. Covid booster " as scheduled.  F/u with PCP as instructed.    2. Atherosclerosis of abdominal aorta  Chronic, stable on current regimen. Followed by PCP.    3. Type 2 diabetes, controlled, with neuropathy  Chronic, stable on current regimen. Followed by PCP.    4. Type 2 diabetes mellitus with obesity  Chronic, stable on current regimen. Followed by PCP.    5. Type 2 diabetes mellitus without retinopathy  Chronic, stable on current regimen. Followed by optometry.    6. Hypertension associated with diabetes  Chronic, stable on current regimen. Followed by PCP.    7. Hyperlipidemia associated with type 2 diabetes mellitus  Chronic, stable on current regimen. Followed by PCP.    8. DDD (degenerative disc disease), lumbar  Chronic, stable on current regimen. Followed by PCP.    9. Other osteoarthritis of spine, cervical region  Chronic, stable on current regimen. Followed by PCP.    10. Primary osteoarthritis of both knees  Chronic, stable on current regimen. Followed by PCP.    11. Hypothyroidism (acquired)  Chronic, stable on current regimen. Followed by PCP.    12. Gastroesophageal reflux disease without esophagitis  Chronic, stable on current regimen. Followed by PCP.    13. Osteopenia of multiple sites  Chronic, stable on current regimen. Followed by PCP.    14. Asymptomatic postmenopausal state  DEXA ordered, patient to schedule.  - DXA Bone Density Spine And Hip; Future    15. Dependence on other enabling machines and devices  Chronic, stable. Using cane. No recent falls. Followed by PCP.    16. Other reduced mobility  Chronic, stable. Using cane. No recent falls. Followed by PCP.        Provided Tonya with a 5-10 year written screening schedule and personal prevention plan. Recommendations were developed using the USPSTF age appropriate recommendations. Education, counseling, and referrals were provided as needed. After Visit Summary printed and given to patient which includes a list of additional screenings\tests  needed.    Follow up in about 1 year (around 5/4/2023) for Medicare AWV and with PCP as instructed.       Pily Jacob NP    I offered to discuss advanced care planning, including how to pick a person who would make decisions for you if you were unable to make them for yourself, called a health care power of , and what kind of decisions you might make such as use of life sustaining treatments such as ventilators and tube feeding when faced with a life limiting illness recorded on a living will that they will need to know. (How you want to be cared for as you near the end of your natural life)     X  Patient has advanced directives on file, which we reviewed, and they do not wish to make changes.

## 2022-05-04 NOTE — PATIENT INSTRUCTIONS
1. Follow up with Dr. Tere Hughes MD as scheduled.    2. Schedule bone density scan.    3. Covid booster as scheduled.    Counseling and Referral of Other Preventative  (Italic type indicates deductible and co-insurance are waived)    Patient Name: Tonya Cohn  Today's Date: 5/4/2022    Health Maintenance       Date Due Completion Date    COVID-19 Vaccine (4 - Booster for Pfizer series) 12/30/2021 9/30/2021    DEXA Scan 03/19/2022 3/19/2018    Diabetes Urine Screening 07/02/2022 7/2/2021    Lipid Panel 07/02/2022 7/2/2021    Override on 2/26/2016: Done ([facility] - total 189, TG 62, , HDL 76)    Hemoglobin A1c 07/25/2022 1/25/2022    Colonoscopy 10/03/2022 10/3/2017    Eye Exam 04/05/2023 4/5/2022    Override on 9/20/2016: Done (Dr. Kole Lucero- negative for diabetic retinopathy bilaterally)    Override on 9/9/2015: Done (No diabetic retinopathy)    Override on 8/14/2014: Done (no diabetic retinopathy; Dr. Lucero)    Override on 4/21/2014: Done (Pt states her eye exam is scheduled for next month)    Override on 4/1/2013: Done    TETANUS VACCINE 12/06/2028 12/6/2018        Orders Placed This Encounter   Procedures    DXA Bone Density Spine And Hip     The following information is provided to all patients.  This information is to help you find resources for any of the problems found today that may be affecting your health:                Living healthy guide: www.[facility].[place].gov      Understanding Diabetes: www.diabetes.org      Eating healthy: www.cdc.gov/healthyweight      CDC home safety checklist: www.cdc.gov/steadi/patient.html      Agency on Aging: www.goea.[place].gov      Alcoholics anonymous (AA): www.aa.org      Physical Activity: www.david.nih.gov/uv8unjq      Tobacco use: www.quitwithusla.org

## 2022-05-18 DIAGNOSIS — I12.9 BENIGN HYPERTENSION WITH CHRONIC KIDNEY DISEASE, STAGE III: ICD-10-CM

## 2022-05-18 DIAGNOSIS — N18.30 BENIGN HYPERTENSION WITH CHRONIC KIDNEY DISEASE, STAGE III: ICD-10-CM

## 2022-05-18 NOTE — TELEPHONE ENCOUNTER
----- Message from Tanika Thompson sent at 5/18/2022 11:48 AM CDT -----  Type: RX Refill Request    Who Called: self     Have you contacted your pharmacy: yes     Refill or New Rx: new rx     RX Name and Strength: losartan (COZAAR) 50 MG tablet    Preferred Pharmacy with phone number:   [id number] - [place] [address] AT [facility] [address] [address]  Phone: [phone] Fax: [phone]    Local or Mail Order: local     Would the patient rather a call back or a response via [facility]? Call back     Best Call Back Number: [phone]

## 2022-05-18 NOTE — TELEPHONE ENCOUNTER
Care Due:                  Date            Visit Type   Department     Provider  --------------------------------------------------------------------------------                                [facility]                              PRIMARY      MED[facility]  Last Visit: 01-      CARE ([facility])   MED/ PEDS      Roxanne Hughes                              [facility]      MED/ INTERNAL  [facility]  Next Visit: 08-      CARE ([facility])   MED/ PEDS      Souravkeler  Ellen                                                            Last  Test          Frequency    Reason                     Performed    Due Date  --------------------------------------------------------------------------------    HBA1C.......  6 months...  glipiZIDE, metFORMIN.....  01- 07-    [facility] Embedded Care Gaps. Reference number: [id number]. 5/18/2022   1:33:24 PM CDT

## 2022-05-19 RX ORDER — LOSARTAN POTASSIUM 50 MG/1
50 TABLET ORAL DAILY
Qty: 90 TABLET | Refills: 0 | Status: SHIPPED | OUTPATIENT
Start: 2022-05-19 | End: 2023-03-26

## 2022-05-22 NOTE — PROGRESS NOTES
Form has been faxed and sent to scanning.     Ravi Owen,      Subjective:       Patient ID: Tonya Cohn is a 88 y.o. female.    Chief Complaint: Health Risk Assessment    HPI  Past Medical History:   Diagnosis Date    AR (allergic rhinitis)     Atherosclerosis of abdominal aorta     noted on CT scan 1/17/2011    Carpal tunnel syndrome of left wrist     Chronic kidney disease, stage 3     Diabetic peripheral neuropathy     Diverticulosis     History of colon polyps     History of diverticulitis of colon 1/2014    Hyperlipemia     Hypertension     Hypothyroidism     followed by endocrinology, Dr. Green    Mild aortic stenosis     OA (osteoarthritis) of knee     Obesity     Sciatica     Type II or unspecified type diabetes mellitus with neurological manifestations, uncontrolled(250.62)      Past Surgical History:   Procedure Laterality Date    CATARACT EXTRACTION      COLONOSCOPY N/A 10/3/2017    Procedure: COLONOSCOPY;  Surgeon: Alin Gonzalez MD;  Location: [facility] ([address] [place] [address]);  Service: Endoscopy;  Laterality: N/A;    COSMETIC SURGERY      HYSTERECTOMY      partial    JOINT REPLACEMENT Right     LUNG BIOPSY  in her 40's    TOTAL KNEE ARTHROPLASTY Right 6/13/2014    TKR      reports that she quit smoking about 58 years ago. She started smoking about 65 years ago. She has a 0.50 pack-year smoking history. She quit smokeless tobacco use about 50 years ago. She reports current alcohol use of about 1.0 standard drinks of alcohol per week. She reports that she does not use drugs.  Review of Systems    Objective:      Physical Exam  Constitutional:       Appearance: Normal appearance.   Neurological:      Mental Status: She is alert and oriented to person, place, and time.   Psychiatric:         Mood and Affect: Mood normal.         Behavior: Behavior normal.         Thought Content: Thought content normal.         Judgment: Judgment normal.           Assessment:       1. Encounter for preventive health examination    2. Allergic rhinitis, unspecified  seasonality, unspecified trigger    3. Atherosclerosis of abdominal aorta    4. Breast cyst, left    5. Class 1 obesity due to excess calories with serious comorbidity and body mass index (BMI) of 32.0 to 32.9 in adult    6. DDD (degenerative disc disease), lumbar    7. Other osteoarthritis of spine, cervical region    8. Essential hypertension    9. Gastroesophageal reflux disease without esophagitis    10. History of colon polyps    11. Hyperlipidemia LDL goal <100    12. Hypothyroidism (acquired)    13. Impaired functional mobility and activity tolerance    14. Mild aortic stenosis    15. Osteopenia of multiple sites    16. Primary osteoarthritis of both knees    17. Spondylolisthesis, grade 1    18. Type 2 diabetes, controlled, with neuropathy        Plan:         Encounter for preventive health examination  - would like to see optometry here at [facility] since it is closer     Allergic rhinitis, unspecified seasonality, unspecified trigger  - OTC medication as needed    Atherosclerosis of abdominal aorta  - Stable / Asymptomatic is on blood pressure and cholesterol lowering medications    Breast cyst, left  - stable, observe    Class 1 obesity due to excess calories with serious comorbidity and body mass index (BMI) of 32.0 to 32.9 in adult  - The patient is asked to make an attempt to improve diet and exercise patterns to aid in medical management of this problem.    DDD (degenerative disc disease), lumbar  - Tylenol as needed    Other osteoarthritis of spine, cervical region  - Tylenol as needed    Essential hypertension  - The current medical regimen is effective;  continue present plan and medications.    Gastroesophageal reflux disease without esophagitis  - OTC medications as needed    History of colon polyps  - colonoscopy UTD    Hyperlipidemia LDL goal <100  - The current medical regimen is effective;  continue present plan and medications.    Hypothyroidism (acquired)  - The current medical regimen is  effective;  continue present plan and medications.    Impaired functional mobility and activity tolerance  - only uses cane when she had her knee replacement    Mild aortic stenosis  - stable, observe, asymptomatic     Osteopenia of multiple sites  - continue calcium and vitamin D    Primary osteoarthritis of both knees  - no further problems since knee replacement     Spondylolisthesis, grade 1  - The current medical regimen is effective;  continue present plan and medications.    Type 2 diabetes, controlled, with neuropathy  -     Ambulatory referral/consult to Optometry; Future; Expected date: 07/22/2020  - The current medical regimen is effective;  continue present plan and medications.           Yes

## 2022-05-31 ENCOUNTER — EXTERNAL CHRONIC CARE MANAGEMENT (OUTPATIENT)
Dept: PRIMARY CARE CLINIC | Facility: CLINIC | Age: 87
End: 2022-05-31
Payer: MEDICARE

## 2022-05-31 PROCEDURE — 99490 PR CHRONIC CARE MGMT, 1ST 20 MIN: ICD-10-PCS | Mod: S$GLB,,, | Performed by: INTERNAL MEDICINE

## 2022-05-31 PROCEDURE — 99490 CHRNC CARE MGMT STAFF 1ST 20: CPT | Mod: S$GLB,,, | Performed by: INTERNAL MEDICINE

## 2022-05-31 PROCEDURE — 99439 PR CHRONIC CARE MGMT, EA ADDTL 20 MIN: ICD-10-PCS | Mod: S$GLB,,, | Performed by: INTERNAL MEDICINE

## 2022-05-31 PROCEDURE — 99439 CHRNC CARE MGMT STAF EA ADDL: CPT | Mod: S$GLB,,, | Performed by: INTERNAL MEDICINE

## 2022-06-21 RX ORDER — METFORMIN HYDROCHLORIDE 1000 MG/1
TABLET ORAL
Qty: 90 TABLET | Refills: 0 | Status: SHIPPED | OUTPATIENT
Start: 2022-06-21 | End: 2022-08-08

## 2022-06-21 NOTE — TELEPHONE ENCOUNTER
No new care gaps identified.  [facility] Embedded Care Gaps. Reference number: [id number]. 6/21/2022   6:12:33 AM CDT

## 2022-06-21 NOTE — TELEPHONE ENCOUNTER
Refill Authorization Note   Tonya Cohn  is requesting a refill authorization.  Brief Assessment and Rationale for Refill:  Approve     Medication Therapy Plan:       Medication Reconciliation Completed: No   Comments:     No Care Gaps recommended.     Note composed:4:08 PM 06/21/2022

## 2022-06-30 ENCOUNTER — EXTERNAL CHRONIC CARE MANAGEMENT (OUTPATIENT)
Dept: PRIMARY CARE CLINIC | Facility: CLINIC | Age: 87
End: 2022-06-30
Payer: MEDICARE

## 2022-06-30 PROCEDURE — 99490 CHRNC CARE MGMT STAFF 1ST 20: CPT | Mod: S$GLB,,, | Performed by: INTERNAL MEDICINE

## 2022-06-30 PROCEDURE — 99490 PR CHRONIC CARE MGMT, 1ST 20 MIN: ICD-10-PCS | Mod: S$GLB,,, | Performed by: INTERNAL MEDICINE

## 2022-07-13 DIAGNOSIS — E11.40 TYPE 2 DIABETES, CONTROLLED, WITH NEUROPATHY: ICD-10-CM

## 2022-07-19 DIAGNOSIS — E03.9 HYPOTHYROIDISM (ACQUIRED): ICD-10-CM

## 2022-07-19 DIAGNOSIS — E11.40 TYPE 2 DIABETES, CONTROLLED, WITH NEUROPATHY: ICD-10-CM

## 2022-07-19 DIAGNOSIS — E78.5 HYPERLIPIDEMIA LDL GOAL <100: Primary | ICD-10-CM

## 2022-07-19 DIAGNOSIS — I10 ESSENTIAL HYPERTENSION: ICD-10-CM

## 2022-07-20 ENCOUNTER — TELEPHONE (OUTPATIENT)
Dept: FAMILY MEDICINE | Facility: CLINIC | Age: 87
End: 2022-07-20
Payer: MEDICARE

## 2022-07-20 NOTE — TELEPHONE ENCOUNTER
----- Message from Tere Hughes MD sent at 7/19/2022  4:01 PM CDT -----  Regarding: Labs and HM prior to next ov  Please call patient to schedule labs and address health maintenance prior to next office visit.

## 2022-07-31 ENCOUNTER — EXTERNAL CHRONIC CARE MANAGEMENT (OUTPATIENT)
Dept: PRIMARY CARE CLINIC | Facility: CLINIC | Age: 87
End: 2022-07-31
Payer: MEDICARE

## 2022-07-31 PROCEDURE — 99490 PR CHRONIC CARE MGMT, 1ST 20 MIN: ICD-10-PCS | Mod: S$GLB,,, | Performed by: INTERNAL MEDICINE

## 2022-07-31 PROCEDURE — 99490 CHRNC CARE MGMT STAFF 1ST 20: CPT | Mod: S$GLB,,, | Performed by: INTERNAL MEDICINE

## 2022-08-01 ENCOUNTER — TELEPHONE (OUTPATIENT)
Dept: FAMILY MEDICINE | Facility: CLINIC | Age: 87
End: 2022-08-01
Payer: MEDICARE

## 2022-08-01 NOTE — TELEPHONE ENCOUNTER
----- Message from Terrell Hayward sent at 8/1/2022 11:55 AM CDT -----  Regarding: Call  Type: Patient Call Back    Who called:Patient/[facility] [phone] ([facility])    What is the request in detail: Patient is requesting a call back. A fax was sent 08/01 and needs confirmation. Please advise.    Can the clinic reply by [facility]? No    Would the patient rather a call back or a response via [facility]? Call    Best call back number:[phone]    Additional Information:    Thanks

## 2022-08-01 NOTE — TELEPHONE ENCOUNTER
Spoke with pt she states she does not know anything about this request.Pt stated she will contact her daughter to see if she has any information.

## 2022-08-01 NOTE — TELEPHONE ENCOUNTER
----- Message from Tamra Lo sent at 8/1/2022 10:16 AM CDT -----  Type: Patient Call Back    Who called [facility]     What is the request in detail: A fax was sent over for Pt freestyle [employer] Provider just has to sign on the 2nd page and fax back. Please fax back asap he states or within 24 hours     Can the clinic reply by [facility]?    Would the patient rather a call back or a response via [facility]? Fax back     Best call back number: [phone]     Additional Information: Fax [phone]

## 2022-08-02 ENCOUNTER — TELEPHONE (OUTPATIENT)
Dept: FAMILY MEDICINE | Facility: CLINIC | Age: 87
End: 2022-08-02
Payer: MEDICARE

## 2022-08-02 NOTE — TELEPHONE ENCOUNTER
----- Message from Randi Sapp sent at 8/2/2022 10:13 AM CDT -----  Type: RX Refill Request    Who Called: [facility] pharmacy [phone] - second request    Have you contacted your pharmacy:    Refill or New Rx:freestyle najma    RX Name and Strength:    How is the patient currently taking it? (ex. 1XDay):    Is this a 30 day or 90 day RX:    Preferred Pharmacy with phone number:    Local or Mail Order:    Ordering Provider:    Would the patient rather a call back or a response via [facility]?     Best Call Back Number:    Additional Information:

## 2022-08-02 NOTE — TELEPHONE ENCOUNTER
Left message for the pharmacy to advise exactly what needs to be ordered from the [employer] Kit. If the entire kit is needed, they need to inform as to why.

## 2022-08-05 NOTE — TELEPHONE ENCOUNTER
Spoke with Braydon at [facility] pharmacy, paperwork was faxed over and given to provider

## 2022-08-05 NOTE — TELEPHONE ENCOUNTER
Form filled out though I doubt that patient is going to get the freestyle najma system approved - on oral meds, does not meet requirements for CGM

## 2022-08-08 ENCOUNTER — OFFICE VISIT (OUTPATIENT)
Dept: FAMILY MEDICINE | Facility: CLINIC | Age: 87
End: 2022-08-08
Payer: MEDICARE

## 2022-08-08 VITALS
BODY MASS INDEX: 31.16 KG/M2 | WEIGHT: 193.88 LBS | TEMPERATURE: 98 F | HEART RATE: 69 BPM | DIASTOLIC BLOOD PRESSURE: 60 MMHG | OXYGEN SATURATION: 96 % | HEIGHT: 66 IN | SYSTOLIC BLOOD PRESSURE: 136 MMHG

## 2022-08-08 DIAGNOSIS — E03.9 HYPOTHYROIDISM (ACQUIRED): ICD-10-CM

## 2022-08-08 DIAGNOSIS — M85.89 OSTEOPENIA OF MULTIPLE SITES: ICD-10-CM

## 2022-08-08 DIAGNOSIS — M43.10 SPONDYLOLISTHESIS, GRADE 1: ICD-10-CM

## 2022-08-08 DIAGNOSIS — E11.40 TYPE 2 DIABETES, CONTROLLED, WITH NEUROPATHY: Primary | ICD-10-CM

## 2022-08-08 DIAGNOSIS — E66.09 CLASS 1 OBESITY DUE TO EXCESS CALORIES WITH SERIOUS COMORBIDITY AND BODY MASS INDEX (BMI) OF 32.0 TO 32.9 IN ADULT: ICD-10-CM

## 2022-08-08 DIAGNOSIS — E11.21 TYPE 2 DIABETES MELLITUS WITH DIABETIC NEPHROPATHY, UNSPECIFIED WHETHER LONG TERM INSULIN USE: ICD-10-CM

## 2022-08-08 DIAGNOSIS — G89.29 CHRONIC PAIN OF LEFT KNEE: ICD-10-CM

## 2022-08-08 DIAGNOSIS — E78.5 HYPERLIPIDEMIA LDL GOAL <100: ICD-10-CM

## 2022-08-08 DIAGNOSIS — R20.0 NUMBNESS AND TINGLING IN LEFT HAND: ICD-10-CM

## 2022-08-08 DIAGNOSIS — M25.562 CHRONIC PAIN OF LEFT KNEE: ICD-10-CM

## 2022-08-08 DIAGNOSIS — Z74.09 IMPAIRED FUNCTIONAL MOBILITY AND ACTIVITY TOLERANCE: ICD-10-CM

## 2022-08-08 DIAGNOSIS — I35.0 MILD AORTIC STENOSIS: ICD-10-CM

## 2022-08-08 DIAGNOSIS — I70.0 ATHEROSCLEROSIS OF ABDOMINAL AORTA: ICD-10-CM

## 2022-08-08 DIAGNOSIS — I10 ESSENTIAL HYPERTENSION: ICD-10-CM

## 2022-08-08 DIAGNOSIS — R20.2 NUMBNESS AND TINGLING IN LEFT HAND: ICD-10-CM

## 2022-08-08 DIAGNOSIS — K21.9 GASTROESOPHAGEAL REFLUX DISEASE WITHOUT ESOPHAGITIS: ICD-10-CM

## 2022-08-08 PROCEDURE — 99214 OFFICE O/P EST MOD 30 MIN: CPT | Mod: S$GLB,,, | Performed by: INTERNAL MEDICINE

## 2022-08-08 PROCEDURE — 1159F MED LIST DOCD IN RCRD: CPT | Mod: CPTII,S$GLB,, | Performed by: INTERNAL MEDICINE

## 2022-08-08 PROCEDURE — 1101F PR PT FALLS ASSESS DOC 0-1 FALLS W/OUT INJ PAST YR: ICD-10-PCS | Mod: CPTII,S$GLB,, | Performed by: INTERNAL MEDICINE

## 2022-08-08 PROCEDURE — 99214 PR OFFICE/OUTPT VISIT, EST, LEVL IV, 30-39 MIN: ICD-10-PCS | Mod: S$GLB,,, | Performed by: INTERNAL MEDICINE

## 2022-08-08 PROCEDURE — 1101F PT FALLS ASSESS-DOCD LE1/YR: CPT | Mod: CPTII,S$GLB,, | Performed by: INTERNAL MEDICINE

## 2022-08-08 PROCEDURE — 3288F FALL RISK ASSESSMENT DOCD: CPT | Mod: CPTII,S$GLB,, | Performed by: INTERNAL MEDICINE

## 2022-08-08 PROCEDURE — 1157F ADVNC CARE PLAN IN RCRD: CPT | Mod: CPTII,S$GLB,, | Performed by: INTERNAL MEDICINE

## 2022-08-08 PROCEDURE — 1159F PR MEDICATION LIST DOCUMENTED IN MEDICAL RECORD: ICD-10-PCS | Mod: CPTII,S$GLB,, | Performed by: INTERNAL MEDICINE

## 2022-08-08 PROCEDURE — 1160F RVW MEDS BY RX/DR IN RCRD: CPT | Mod: CPTII,S$GLB,, | Performed by: INTERNAL MEDICINE

## 2022-08-08 PROCEDURE — 3288F PR FALLS RISK ASSESSMENT DOCUMENTED: ICD-10-PCS | Mod: CPTII,S$GLB,, | Performed by: INTERNAL MEDICINE

## 2022-08-08 PROCEDURE — 1160F PR REVIEW ALL MEDS BY PRESCRIBER/CLIN PHARMACIST DOCUMENTED: ICD-10-PCS | Mod: CPTII,S$GLB,, | Performed by: INTERNAL MEDICINE

## 2022-08-08 PROCEDURE — 1126F PR PAIN SEVERITY QUANTIFIED, NO PAIN PRESENT: ICD-10-PCS | Mod: CPTII,S$GLB,, | Performed by: INTERNAL MEDICINE

## 2022-08-08 PROCEDURE — 1157F PR ADVANCE CARE PLAN OR EQUIV PRESENT IN MEDICAL RECORD: ICD-10-PCS | Mod: CPTII,S$GLB,, | Performed by: INTERNAL MEDICINE

## 2022-08-08 PROCEDURE — 99999 PR PBB SHADOW E&M-EST. PATIENT-LVL V: ICD-10-PCS | Mod: PBBFAC,,, | Performed by: INTERNAL MEDICINE

## 2022-08-08 PROCEDURE — 1126F AMNT PAIN NOTED NONE PRSNT: CPT | Mod: CPTII,S$GLB,, | Performed by: INTERNAL MEDICINE

## 2022-08-08 PROCEDURE — 99999 PR PBB SHADOW E&M-EST. PATIENT-LVL V: CPT | Mod: PBBFAC,,, | Performed by: INTERNAL MEDICINE

## 2022-08-08 RX ORDER — GLIPIZIDE 5 MG/1
5 TABLET ORAL
Qty: 90 TABLET | Refills: 0
Start: 2022-08-08 | End: 2022-11-30

## 2022-08-08 NOTE — PROGRESS NOTES
274}  HISTORY OF PRESENT ILLNESS:  Tonya Cohn is a 89 y.o. female who presents to the clinic today for General Diabetes Follow-up  .     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  She denies cardiac chest pain or shortness of breath.  She does not check blood pressure or blood sugars at home.  She denies any problems with low blood sugars.  She recently did blood work and is here today to discuss the results.  She denies temperature intolerance or unexplained changes in her weight.  She does report chronic left knee pain for which she would like to see orthopedics.  She also reports left hand numbness.  This is worse at night, but can occur during the day as well.  She has tried an arthritis glove with minimal relief of her symptoms.  She denies any hand weakness.  She is right-handed.      PAST MEDICAL HISTORY:  Past Medical History:   Diagnosis Date    AR (allergic rhinitis)     Atherosclerosis of abdominal aorta     noted on CT scan 1/17/2011    Carpal tunnel syndrome of left wrist     Chronic kidney disease, stage 3     Diabetic peripheral neuropathy     Diverticulosis     History of colon polyps     History of diverticulitis of colon 1/2014    Hyperlipemia     Hypertension     Hypothyroidism     followed by endocrinology, Dr. Green    Mild aortic stenosis     OA (osteoarthritis) of knee     Obesity     Sciatica     Type II or unspecified type diabetes mellitus with neurological manifestations, uncontrolled(250.62)        PAST SURGICAL HISTORY:  Past Surgical History:   Procedure Laterality Date    CATARACT EXTRACTION Bilateral     COLONOSCOPY N/A 10/3/2017    Procedure: COLONOSCOPY;  Surgeon: Alin Gonzalez MD;  Location: [address]);  Service: Endoscopy;  Laterality: N/A;    COSMETIC SURGERY      HYSTERECTOMY      partial    JOINT REPLACEMENT Right      LUNG BIOPSY  in her 40's    TOTAL KNEE ARTHROPLASTY Right 2014    TKR       SOCIAL HISTORY:  Social History     Socioeconomic History    Marital status:     Number of children: 7   Occupational History    Occupation: retired - house cleaning   Tobacco Use    Smoking status: Former Smoker     Packs/day: 0.25     Years: 2.00     Pack years: 0.50     Start date: 1955     Quit date: 1962     Years since quittin.0    Smokeless tobacco: Former User     Quit date: 1970   Substance and Sexual Activity    Alcohol use: Yes     Alcohol/week: 1.0 standard drink     Types: 1 Cans of beer per week     Comment: occasionally    Drug use: No    Sexual activity: Not Currently     Partners: Male   Other Topics Concern    Are you pregnant or think you may be? No    Breast-feeding No     Social Determinants of Health     Financial Resource Strain: Low Risk     Difficulty of Paying Living Expenses: Not hard at all   Food Insecurity: No Food Insecurity    Worried About Running Out of Food in the Last Year: Never true    Ran Out of Food in the Last Year: Never true   Transportation Needs: No Transportation Needs    Lack of Transportation (Medical): No    Lack of Transportation (Non-Medical): No   Physical Activity: Inactive    Days of Exercise per Week: 0 days    Minutes of Exercise per Session: 0 min   Stress: No Stress Concern Present    Feeling of Stress : Not at all   Social Connections: Moderately Isolated    Frequency of Communication with Friends and Family: More than three times a week    Frequency of Social Gatherings with Friends and Family: More than three times a week    Attends Orthodoxy Services: More than 4 times per year    Active Member of Clubs or Organizations: No    Attends Club or Organization Meetings: Never    Marital Status:    Housing Stability: Low Risk     Unable to Pay for Housing in the Last Year: No    Number of Places Lived in the Last Year: 1     Unstable Housing in the Last Year: No       FAMILY HISTORY:  Family History   Problem Relation Age of Onset    Cancer Mother         shoulder tumor - cancer    Hypertension Mother     Glaucoma Mother     Heart disease Father         valve replacement    Hypertension Father     Heart attack Father     Diabetes Sister     Arthritis Sister         s/p knee surgery    Diabetes Brother     Heart disease Brother     Heart failure Brother     Stroke Brother     Arthritis Brother         back problems    Skin cancer Brother     Cancer Brother     Throat cancer Brother     No Known Problems Maternal Grandmother     No Known Problems Maternal Grandfather     No Known Problems Paternal Grandmother     No Known Problems Paternal Grandfather     Cancer Son         jaw    Cataracts Son     No Known Problems Maternal Aunt     No Known Problems Maternal Uncle     No Known Problems Paternal Aunt     No Known Problems Paternal Uncle     Melanoma Neg Hx     Eczema Neg Hx     Lupus Neg Hx     Psoriasis Neg Hx     Breast cancer Neg Hx     Colon cancer Neg Hx     Amblyopia Neg Hx     Blindness Neg Hx     Macular degeneration Neg Hx     Retinal detachment Neg Hx     Strabismus Neg Hx     Thyroid disease Neg Hx        ALLERGIES AND MEDICATIONS: updated and reviewed.  Review of patient's allergies indicates:   Allergen Reactions    Lipitor [atorvastatin]      Medication List with Changes/Refills   Current Medications    ACETAMINOPHEN (TYLENOL) 500 MG TABLET    Take 1 tablet (500 mg total) by mouth every 4 (four) hours as needed for Pain.    ASPIRIN 81 MG CHEW    Take 1 tablet (81 mg total) by mouth once daily.    BETAMETHASONE DIPROPIONATE (DIPROLENE) 0.05 % OINTMENT    Apply topically 2 (two) times daily.    BLOOD-GLUCOSE METER (FREESTYLE SYSTEM KIT) KIT    Use as instructed    CALCIUM CARBONATE (OS-MIRACLE) 500 MG CALCIUM (1,250 MG) TABLET    Take 1 tablet by mouth every other day.    CLOBETASOL  (TEMOVATE) 0.05 % CREAM    Apply topically 2 (two) times daily. for 7 days    DICLOFENAC SODIUM (VOLTAREN) 1 % GEL    Apply 4 grams to effected area 4 x daily do not exceed 32 grams per day    FISH OIL-FAT ACID COMB.8- 1,200 MG (400 [id number]) CAP        FLUOCINONIDE 0.1 % CREA    as needed.     HYDROCORTISONE 2.5 % CREAM    Apply topically 2 (two) times daily.    HYDROXYZINE HCL (ATARAX) 10 MG TAB    Take 1 tablet (10 mg total) by mouth 3 (three) times daily as needed for Itching.    LEVOTHYROXINE (SYNTHROID) 88 MCG TABLET    TAKE 1 TABLET(88 MCG) BY MOUTH EVERY DAY    LOSARTAN (COZAAR) 50 MG TABLET    Take 1 tablet (50 mg total) by mouth once daily.    LOVASTATIN (MEVACOR) 10 MG TABLET    Take 1 tablet (10 mg total) by mouth once daily.    MULTIVIT,CALC,MINS/IRON/FOLIC (ONE-A-DAY WOMENS FORMULA ORAL)    Take 1 tablet by mouth once daily.    TRIAMCINOLONE ACETONIDE 0.5% (KENALOG) 0.5 % CREA    APPLY EXTERNALLY TO THE AFFECTED AREA TWICE DAILY as needed for flares; do not use for more than 2 weeks in a row without one week break   Changed and/or Refilled Medications    Modified Medication Previous Medication    GLIPIZIDE (GLUCOTROL) 5 MG TABLET glipiZIDE (GLUCOTROL) 5 MG tablet       Take 1 tablet (5 mg total) by mouth daily with breakfast.    TAKE 1 TABLET BY MOUTH TWICE DAILY WITH MEALS   Discontinued Medications    METFORMIN (GLUCOPHAGE) 1000 MG TABLET    TAKE 1/2 TABLET BY MOUTH TWICE DAILY WITH MEALS         CARE TEAM:  Patient Care Team:  Tere Hughes MD as PCP - General (Internal Medicine)  Preston Whitfield MD as Consulting Physician (Cardiology)  Criss Baird OD (Inactive) as Consulting Physician (Optometry)  Marvin Roach MD as Consulting Physician (Rheumatology)  Wendy Garcia LPN as Licensed Practical Nurse         REVIEW OF SYSTEMS:  Review of Systems   Constitutional: Negative for chills, fever and unexpected weight change.   HENT: Negative for congestion.   "  Respiratory: Negative for shortness of breath.    Cardiovascular: Negative for chest pain.   Gastrointestinal: Negative for abdominal pain.   Genitourinary: Negative for dysuria and hematuria.   Musculoskeletal: Positive for arthralgias. Negative for gait problem.   Neurological: Positive for numbness (- left hand).         PHYSICAL EXAM: {  Chaya  :[id number]::"   "}274}  Vitals:    08/08/22 0925   BP: 136/60   Pulse: 69   Temp: 98 °F (36.7 °C)     Weight: 87.9 kg (193 lb 14.3 oz)   Height: 5' 6" (167.6 cm)   Body mass index is 31.3 kg/m².      General appearance - alert, well appearing, and in no distress, obese  Psychiatric - alert, oriented to person, place, and time, normal behavior, speech, dress, motor activity and thought process  Eyes - pupils equal and reactive, extraocular eye movements intact, sclera anicteric  Mouth - not examined  Chest - clear to auscultation, no wheezes, rales or rhonchi, symmetric air entry  Heart - normal rate and regular rhythm, no gallops noted  Neurological - alert, normal speech, no focal findings, cranial nerves II through XII intact  Musculoskeletal - patient noted to have Moderate osteoarthritic changes to both knee joints. No joint effusions noted., no muscular tenderness noted  Extremities - no pedal edema noted  Skin - normal coloration, no suspicious skin lesions      Labs:  Lab Results   Component Value Date    HGBA1C 5.9 (H) 08/02/2022    HGBA1C 6.1 (H) 01/25/2022    HGBA1C 6.1 (H) 07/02/2021      Lab Results   Component Value Date    CHOL 203 (H) 08/02/2022    CHOL 193 07/02/2021    CHOL 207 (H) 07/16/2020     Lab Results   Component Value Date    LDLCALC 119.4 08/02/2022    LDLCALC 117.0 07/02/2021    LDLCALC 122.2 07/16/2020         ASSESSMENT AND PLAN:  274}  1. Type 2 diabetes, controlled, with neuropathy/2. Type 2 diabetes mellitus with diabetic nephropathy, unspecified whether long term insulin use  Lab Results   Component Value Date    HGBA1C 5.9 (H) " 08/02/2022     Diabetes is under good control at this time for age and comorbid conditions.   We discussed diabetic diet and regular exercise.   We discussed home blood sugar monitoring, if appropriate - the patient does not need to test daily but can test only as needed.   Medication changes were made today:  Her A1c is very well controlled and I worry about her possibly developing low blood sugars.  She will stop the metformin.  I will decrease her glipizide to once daily.. Recheck A1c in 6 months.  Diabetic complications addressed: Neuropathy pain controlled.   Patient was counseled on the need for yearly eye exam to screen for/monitor diabetic retinopathy and yearly diabetic foot exam.  - Hemoglobin A1C; Future    3. Essential hypertension  The current medical regimen is effective;  continue present plan and medications. Recommended patient to check home readings to monitor and see me for followup as scheduled or sooner as needed.   Discussed sodium restriction, maintaining ideal body weight and regular exercise program as physiologic means to continue to achieve blood pressure control in addition to medication compliance.  Patient was educated that both decongestant and anti-inflammatory medication may raise blood pressure.  The patient is not active on the digital hypertension program.    4. Mild aortic stenosis  Stable. Observe.    5. Hyperlipidemia LDL goal <100  Lab Results   Component Value Date    LDLCALC 119.4 08/02/2022     We discussed low fat diet and regular exercise.The current medical regimen is effective;  continue present plan and medications.     6. Atherosclerosis of abdominal aorta  Patient with Atherosclerosis of the Aorta.  Stable/asymptomatic. Currently stable on lipid lowering medication and blood pressure monitoring.    7. Hypothyroidism (acquired)  Patient is clinically euthyroid. Continue current regimen.    8. Gastroesophageal reflux disease without esophagitis  Symptoms controlled:  yes - takes medication occasionally as needed.   Reflux precautions discussed (eliminate tobacco if a smoker; minimize caffeine, chocolate and red/white peppermint intake; avoid heavy and spicy meals; don't lay down within 2-3 hours after eating; minimize the intake of NSAIDs). Medication as needed.   Patient asked to take medication breaks, if possible - discussed chronic use can limit calcium absorption (which can lead to osteopenia/osteoporosis), increases the risk for intestinal infections, and can cause kidney damage. There are also some newer studies that show possible increased risk of mortality.    9. Osteopenia of multiple sites  We discussed adequate calcium and vitamin D supplementation. We discussed fall precautions. She is up to date on her BMD. No need for prescription medication at this time.    10. Chronic pain of left knee  Her pain is likely due to arthritis.  I will refer her to orthopedics for further evaluation.  - Ambulatory referral/consult to Orthopedics; Future    11. Spondylolisthesis, grade 1  Stable. Observe. Encouraged HEP.    12. Class 1 obesity due to excess calories with serious comorbidity and body mass index (BMI) of 32.0 to 32.9 in adult  BMI Readings from Last 3 Encounters:   08/08/22 31.30 kg/m²   05/04/22 31.03 kg/m²   02/14/22 30.99 kg/m²     The patient is asked to make an attempt to improve diet and exercise patterns to aid in medical management of this problem.    13. Impaired functional mobility and activity tolerance  We discussed fall precautions.    14. Numbness and tingling in left hand  I suspect carpal tunnel syndrome.  She will switch to a brace that will not allow her to bend her wrist when she sleeps at night.  She does not need to wear the brace during the day.  Consider referral to a hand specialist if symptoms worsen or persist.         Orders Placed This Encounter   Procedures    Hemoglobin A1C    Ambulatory referral/consult to Orthopedics      Follow up in  about 6 months (around 2/8/2023), or if symptoms worsen or fail to improve, for annual exam. or sooner as needed.

## 2022-08-10 ENCOUNTER — TELEPHONE (OUTPATIENT)
Dept: FAMILY MEDICINE | Facility: CLINIC | Age: 87
End: 2022-08-10
Payer: MEDICARE

## 2022-08-10 NOTE — TELEPHONE ENCOUNTER
Spoke with pt's daughter she states that she nor her mom requested the freestyle najma. Daughter states she is not familiar with company who's requesting.Also she states she was informed that her mom's insurance wouldn't cover it. Informed her I will notify provider of this.Verbalized understanding.

## 2022-08-10 NOTE — TELEPHONE ENCOUNTER
"----- Message from Miguelina Herrera sent at 8/9/2022  9:44 AM CDT -----  Regarding: cong"[facility]" [phone]  .Type: Patient Call Back    Who called: cong"[facility]     What is the request in detail:    Can the clinic reply by [facility]? Requesting most recent office notes to be faxed to [phone]    Would the patient rather a call back or a response via [facility]? Call back     Best call back number: [phone]        "

## 2022-08-16 DIAGNOSIS — M17.12 PRIMARY OSTEOARTHRITIS OF LEFT KNEE: Primary | ICD-10-CM

## 2022-08-18 ENCOUNTER — OFFICE VISIT (OUTPATIENT)
Dept: ORTHOPEDICS | Facility: CLINIC | Age: 87
End: 2022-08-18
Payer: MEDICARE

## 2022-08-18 VITALS
SYSTOLIC BLOOD PRESSURE: 136 MMHG | OXYGEN SATURATION: 98 % | WEIGHT: 193 LBS | BODY MASS INDEX: 31.15 KG/M2 | HEART RATE: 72 BPM | DIASTOLIC BLOOD PRESSURE: 70 MMHG | RESPIRATION RATE: 18 BRPM

## 2022-08-18 DIAGNOSIS — G89.29 CHRONIC PAIN OF LEFT KNEE: ICD-10-CM

## 2022-08-18 DIAGNOSIS — M25.562 CHRONIC PAIN OF LEFT KNEE: ICD-10-CM

## 2022-08-18 DIAGNOSIS — M17.12 PRIMARY OSTEOARTHRITIS OF LEFT KNEE: Primary | ICD-10-CM

## 2022-08-18 PROCEDURE — 1159F MED LIST DOCD IN RCRD: CPT | Mod: CPTII,S$GLB,, | Performed by: ORTHOPAEDIC SURGERY

## 2022-08-18 PROCEDURE — 1157F ADVNC CARE PLAN IN RCRD: CPT | Mod: CPTII,S$GLB,, | Performed by: ORTHOPAEDIC SURGERY

## 2022-08-18 PROCEDURE — 1125F AMNT PAIN NOTED PAIN PRSNT: CPT | Mod: CPTII,S$GLB,, | Performed by: ORTHOPAEDIC SURGERY

## 2022-08-18 PROCEDURE — 99213 OFFICE O/P EST LOW 20 MIN: CPT | Mod: 25,S$GLB,, | Performed by: ORTHOPAEDIC SURGERY

## 2022-08-18 PROCEDURE — 99999 PR PBB SHADOW E&M-EST. PATIENT-LVL IV: CPT | Mod: PBBFAC,,, | Performed by: ORTHOPAEDIC SURGERY

## 2022-08-18 PROCEDURE — 1101F PT FALLS ASSESS-DOCD LE1/YR: CPT | Mod: CPTII,S$GLB,, | Performed by: ORTHOPAEDIC SURGERY

## 2022-08-18 PROCEDURE — 1101F PR PT FALLS ASSESS DOC 0-1 FALLS W/OUT INJ PAST YR: ICD-10-PCS | Mod: CPTII,S$GLB,, | Performed by: ORTHOPAEDIC SURGERY

## 2022-08-18 PROCEDURE — 99213 PR OFFICE/OUTPT VISIT, EST, LEVL III, 20-29 MIN: ICD-10-PCS | Mod: 25,S$GLB,, | Performed by: ORTHOPAEDIC SURGERY

## 2022-08-18 PROCEDURE — 20610 DRAIN/INJ JOINT/BURSA W/O US: CPT | Mod: LT,S$GLB,, | Performed by: ORTHOPAEDIC SURGERY

## 2022-08-18 PROCEDURE — 99999 PR PBB SHADOW E&M-EST. PATIENT-LVL IV: ICD-10-PCS | Mod: PBBFAC,,, | Performed by: ORTHOPAEDIC SURGERY

## 2022-08-18 PROCEDURE — 1157F PR ADVANCE CARE PLAN OR EQUIV PRESENT IN MEDICAL RECORD: ICD-10-PCS | Mod: CPTII,S$GLB,, | Performed by: ORTHOPAEDIC SURGERY

## 2022-08-18 PROCEDURE — 20610 LARGE JOINT ASPIRATION/INJECTION: L KNEE: ICD-10-PCS | Mod: LT,S$GLB,, | Performed by: ORTHOPAEDIC SURGERY

## 2022-08-18 PROCEDURE — 1125F PR PAIN SEVERITY QUANTIFIED, PAIN PRESENT: ICD-10-PCS | Mod: CPTII,S$GLB,, | Performed by: ORTHOPAEDIC SURGERY

## 2022-08-18 PROCEDURE — 3288F FALL RISK ASSESSMENT DOCD: CPT | Mod: CPTII,S$GLB,, | Performed by: ORTHOPAEDIC SURGERY

## 2022-08-18 PROCEDURE — 3288F PR FALLS RISK ASSESSMENT DOCUMENTED: ICD-10-PCS | Mod: CPTII,S$GLB,, | Performed by: ORTHOPAEDIC SURGERY

## 2022-08-18 PROCEDURE — 1159F PR MEDICATION LIST DOCUMENTED IN MEDICAL RECORD: ICD-10-PCS | Mod: CPTII,S$GLB,, | Performed by: ORTHOPAEDIC SURGERY

## 2022-08-18 RX ORDER — TRIAMCINOLONE ACETONIDE 40 MG/ML
40 INJECTION, SUSPENSION INTRA-ARTICULAR; INTRAMUSCULAR
Status: DISCONTINUED | OUTPATIENT
Start: 2022-08-18 | End: 2022-08-18 | Stop reason: HOSPADM

## 2022-08-18 RX ADMIN — TRIAMCINOLONE ACETONIDE 40 MG: 40 INJECTION, SUSPENSION INTRA-ARTICULAR; INTRAMUSCULAR at 11:08

## 2022-08-18 NOTE — PROCEDURES
Large Joint Aspiration/Injection: L knee    Date/Time: 8/18/2022 11:00 AM  Performed by: Delano Boudreaux MD  Authorized by: Delano Boudreaux MD     Consent Done?:  Yes (Verbal)  Indications:  Arthritis  Site marked: the procedure site was marked    Timeout: prior to procedure the correct patient, procedure, and site was verified    Prep: patient was prepped and draped in usual sterile fashion      Local anesthesia used?: Yes    Local anesthetic:  Topical anesthetic and lidocaine 1% without epinephrine    Details:  Needle Size:  22 G  Approach:  Anterolateral  Location:  Knee  Site:  L knee  Medications:  40 mg triamcinolone acetonide 40 mg/mL  Patient tolerance:  Patient tolerated the procedure well with no immediate complications

## 2022-08-18 NOTE — PROGRESS NOTES
NEW PATIENT ORTHOPAEDIC: Knee    PRIMARY CARE PHYSICIAN: Tere Hughes MD   REFERRING PROVIDER: Tere Hughes MD  [address] [facility] [place] [address]     ASSESSMENT & PLAN:    Impression:  Left Knee Severe Degenerative Osteoarthritis, Primary   H/o Right Total Knee Arthroplasty     Follow Up Plan: PRN     Injection:     Tonya Cohn has physical exam evidence of above and wishes to pursue an injection. We discussed alternative non operative modalities including physical therapy, anti-inflammatories, bracing, maintenance of appropriate weight, rest, ambulatory devices. We discussed the risk, benefits, and expectations regarding injection. They have elected to proceed with injection. Should injections fail next step would be repeat steroid vs visco. See procedure note for billing purposes.     Non operative care:    Tonya Cohn has physical exam evidence of above and wishes to pursue an non-operative care. I am recommending the following: steroid injection.  Patient has a long history of left knee pain.  She has had this pain for many years and has tried multiple nonoperative modalities including oral medicines and physical therapy.  Her primary complaint is posterior knee pain with knee flexion.  Otherwise when her knees extended or she is ambulating she does not have any significant amount of pain.  She has a history of a right total knee replacement done in 2014 with good result and no residual issues.  She is not interested in surgical intervention for her left knee.  She had had 1 steroid injection previously which did provide some relief.  She would be interested in having this attempted again.  She is not keen on viscosupplementation injections.  If steroid injections fail she may be getting to a point where activity modification is her only method of treatment.  But hopefully steroid injection provided some relief for her and if so she can continue to have these done in the future.    The  patient has been ordered:  Office Intraarticular injection    CONSULTS:   None    ACTIVE PROBLEM LIST  Patient Active Problem List   Diagnosis    Primary osteoarthritis of both knees    Hyperlipidemia LDL goal <100    Type 2 diabetes, controlled, with neuropathy    Hypothyroidism (acquired)    Class 1 obesity due to excess calories with serious comorbidity and body mass index (BMI) of 32.0 to 32.9 in adult    GERD (gastroesophageal reflux disease)    DDD (degenerative disc disease), lumbar    Spondylolisthesis, grade 1    DJD (degenerative joint disease), cervical    History of colon polyps    AR (allergic rhinitis)    Mild aortic stenosis    Atherosclerosis of abdominal aorta    Refractive error    Osteopenia of multiple sites    Essential hypertension    Impaired functional mobility and activity tolerance    Bilateral dry eyes    Pseudophakia of both eyes    Sebaceous cyst    Breast cyst, left    Atopic dermatitis    Lesion of lip    Chronic pain of left knee    Weakness           SUBJECTIVE    CHIEF COMPLAINT: Knee Pain    HPI:   Tonya Cohn is a 89 y.o. female here for evaluation and management of left knee pain. There is not a specific incident that brought about this pain. she has had progressive problems with the knee(s) starting 3 years ago but is now progressing to interfere with activities which include: walking, functional household ADL's, rising from a sitting position, standing for prolonged periods of time, dressing and climbing stairs     Currently the pain in the joint is rated at severe with activity. The pain is intermittent and is located in the knee, located posterior. The pain is described as aching, sharp and stiffness. Relieving factors include ambulatory device, rest, ice or heat, over the counter medication  and repositioning.     There is associated Popping.     Tonya Cohn has no additional complaints.     PROGRESSIVE SYMPTOMS:  Pain effecting living  situation    FUNCTIONAL STATUS:   Walk a block or two on level ground     PREVIOUS TREATMENTS:  Medical: Steroid Injections and Tylenol  Physical Therapy: Use of Ambulatory Aid, Braces and Formal Physical Therapy for 6 weeks  Previous Orthopaedic Surgery: R TKA by Rojas Kaur in 2014    REVIEW OF SYSTEMS:  PAIN ASSESSMENT:  See HPI.  MUSCULOSKELETAL: See HPI.  OTHER 10 point review of systems is negative except as stated in HPI above    PAST MEDICAL HISTORY   has a past medical history of AR (allergic rhinitis), Atherosclerosis of abdominal aorta, Carpal tunnel syndrome of left wrist, Chronic kidney disease, stage 3, Diabetic peripheral neuropathy, Diverticulosis, History of colon polyps, History of diverticulitis of colon (1/2014), Hyperlipemia, Hypertension, Hypothyroidism, Mild aortic stenosis, OA (osteoarthritis) of knee, Obesity, Sciatica, and Type II or unspecified type diabetes mellitus with neurological manifestations, uncontrolled(250.62).     PAST SURGICAL HISTORY   has a past surgical history that includes Hysterectomy; Lung biopsy (in her 40's); Cosmetic surgery; Total knee arthroplasty (Right, 6/13/2014); Colonoscopy (N/A, 10/3/2017); Joint replacement (Right); and Cataract extraction (Bilateral).     FAMILY HISTORY  family history includes Arthritis in her brother and sister; Cancer in her brother, mother, and son; Cataracts in her son; Diabetes in her brother and sister; Glaucoma in her mother; Heart attack in her father; Heart disease in her brother and father; Heart failure in her brother; Hypertension in her father and mother; No Known Problems in her maternal aunt, maternal grandfather, maternal grandmother, maternal uncle, paternal aunt, paternal grandfather, paternal grandmother, and paternal uncle; Skin cancer in her brother; Stroke in her brother; Throat cancer in her brother.     SOCIAL HISTORY   reports that she quit smoking about 60 years ago. She started smoking about 67 years ago. She has  a 0.50 pack-year smoking history. She quit smokeless tobacco use about 52 years ago. She reports current alcohol use of about 1.0 standard drink of alcohol per week. She reports that she does not use drugs.     ALLERGIES   Review of patient's allergies indicates:   Allergen Reactions    Lipitor [atorvastatin]         MEDICATIONS  Current Outpatient Medications on File Prior to Visit   Medication Sig Dispense Refill    acetaminophen (TYLENOL) 500 MG tablet Take 1 tablet (500 mg total) by mouth every 4 (four) hours as needed for Pain. 60 tablet 2    betamethasone dipropionate (DIPROLENE) 0.05 % ointment Apply topically 2 (two) times daily. 45 g 0    blood-glucose meter (FREESTYLE SYSTEM KIT) kit Use as instructed 1 each 0    calcium carbonate (OS-MIRACLE) 500 mg calcium (1,250 mg) tablet Take 1 tablet by mouth every other day.      diclofenac sodium (VOLTAREN) 1 % Gel Apply 4 grams to effected area 4 x daily do not exceed 32 grams per day 1 Tube 3    fish oil-fat acid comb.8-hb137 1,200 mg (400 [id number]) Cap       fluocinonide 0.1 % Crea as needed.       glipiZIDE (GLUCOTROL) 5 MG tablet Take 1 tablet (5 mg total) by mouth daily with breakfast. 90 tablet 0    hydrocortisone 2.5 % cream Apply topically 2 (two) times daily. 20 g 1    hydrOXYzine HCL (ATARAX) 10 MG Tab Take 1 tablet (10 mg total) by mouth 3 (three) times daily as needed for Itching. 30 tablet 0    levothyroxine (SYNTHROID) 88 MCG tablet TAKE 1 TABLET(88 MCG) BY MOUTH EVERY DAY 90 tablet 0    losartan (COZAAR) 50 MG tablet Take 1 tablet (50 mg total) by mouth once daily. 90 tablet 0    lovastatin (MEVACOR) 10 MG tablet Take 1 tablet (10 mg total) by mouth once daily. 90 tablet 1    multivit,calc,mins/iron/folic (ONE-A-DAY WOMENS FORMULA ORAL) Take 1 tablet by mouth once daily.      triamcinolone acetonide 0.5% (KENALOG) 0.5 % Crea APPLY EXTERNALLY TO THE AFFECTED AREA TWICE DAILY as needed for flares; do not use for more than 2 weeks in  a row without one week break 454 g 2    aspirin 81 MG Chew Take 1 tablet (81 mg total) by mouth once daily.  0    clobetasoL (TEMOVATE) 0.05 % cream Apply topically 2 (two) times daily. for 7 days 30 g 0     No current facility-administered medications on file prior to visit.          PHYSICAL EXAM   weight is 87.5 kg (193 lb). Her blood pressure is 136/70 and her pulse is 72. Her respiration is 18 and oxygen saturation is 98%.   Body mass index is 31.15 kg/m².      All other systems deferred.  GENERAL:  No acute distress  HABITUS: Normal  GAIT: Antalgic  SKIN: Normal     KNEE EXAM:    left:   Effusion: Small joint effusion  TTP: no over Medial/Lateral joint line, MCL, LCL, IT band, Pes bursa   yes crepitus with passive knee ROM  Passive ROM: Extension 0, Flexion 95  No pain with manipulation of patella  Stable to varus/valgus stress. No increased laxity to anterior/posterior drawer testing  negative Vinita's test  No pain with IR/ER rotation of the hip  5/5 strength in knee flexion and extension, ankle plantarflexion and dorsiflexion  Neurovascular Status: Sensation intact to light touch in Sural, Saphenous, SPN, DPN, Tibial nerve distribution  2+ pulse DP/PT, normal capillary refill, foot has normal warmth    DATA:  Diagnostic tests reviewed for today's visit:     4v of the left knee reveal Severe degenerative changes of the Medial and Patellofemoral compartment. There is evidence of advanced osteoarthritis changes with Osteophyte formation and Joint space narrowing. The limb is in varus alignment. The patella is tracking midline.    3v knee right radiographs reveal components to be in appropriate position with good alignment. There is no evidence of loosening or abnormal wear.

## 2022-08-31 ENCOUNTER — EXTERNAL CHRONIC CARE MANAGEMENT (OUTPATIENT)
Dept: PRIMARY CARE CLINIC | Facility: CLINIC | Age: 87
End: 2022-08-31
Payer: MEDICARE

## 2022-08-31 PROCEDURE — 99490 PR CHRONIC CARE MGMT, 1ST 20 MIN: ICD-10-PCS | Mod: S$GLB,,, | Performed by: INTERNAL MEDICINE

## 2022-08-31 PROCEDURE — 99490 CHRNC CARE MGMT STAFF 1ST 20: CPT | Mod: S$GLB,,, | Performed by: INTERNAL MEDICINE

## 2022-09-14 ENCOUNTER — OFFICE VISIT (OUTPATIENT)
Dept: FAMILY MEDICINE | Facility: CLINIC | Age: 87
End: 2022-09-14
Payer: MEDICARE

## 2022-09-14 VITALS
BODY MASS INDEX: 30.08 KG/M2 | WEIGHT: 187.19 LBS | TEMPERATURE: 99 F | DIASTOLIC BLOOD PRESSURE: 68 MMHG | SYSTOLIC BLOOD PRESSURE: 134 MMHG | OXYGEN SATURATION: 96 % | HEART RATE: 97 BPM | HEIGHT: 66 IN

## 2022-09-14 DIAGNOSIS — R21 RASH: Primary | ICD-10-CM

## 2022-09-14 DIAGNOSIS — Z23 NEEDS FLU SHOT: ICD-10-CM

## 2022-09-14 PROCEDURE — 1160F RVW MEDS BY RX/DR IN RCRD: CPT | Mod: CPTII,S$GLB,, | Performed by: INTERNAL MEDICINE

## 2022-09-14 PROCEDURE — 1101F PR PT FALLS ASSESS DOC 0-1 FALLS W/OUT INJ PAST YR: ICD-10-PCS | Mod: CPTII,S$GLB,, | Performed by: INTERNAL MEDICINE

## 2022-09-14 PROCEDURE — 1157F ADVNC CARE PLAN IN RCRD: CPT | Mod: CPTII,S$GLB,, | Performed by: INTERNAL MEDICINE

## 2022-09-14 PROCEDURE — 99213 PR OFFICE/OUTPT VISIT, EST, LEVL III, 20-29 MIN: ICD-10-PCS | Mod: S$GLB,,, | Performed by: INTERNAL MEDICINE

## 2022-09-14 PROCEDURE — 1125F AMNT PAIN NOTED PAIN PRSNT: CPT | Mod: CPTII,S$GLB,, | Performed by: INTERNAL MEDICINE

## 2022-09-14 PROCEDURE — 1101F PT FALLS ASSESS-DOCD LE1/YR: CPT | Mod: CPTII,S$GLB,, | Performed by: INTERNAL MEDICINE

## 2022-09-14 PROCEDURE — G0008 ADMIN INFLUENZA VIRUS VAC: HCPCS | Mod: S$GLB,,, | Performed by: INTERNAL MEDICINE

## 2022-09-14 PROCEDURE — 99999 PR PBB SHADOW E&M-EST. PATIENT-LVL V: CPT | Mod: PBBFAC,,, | Performed by: INTERNAL MEDICINE

## 2022-09-14 PROCEDURE — 99999 PR PBB SHADOW E&M-EST. PATIENT-LVL V: ICD-10-PCS | Mod: PBBFAC,,, | Performed by: INTERNAL MEDICINE

## 2022-09-14 PROCEDURE — 90694 FLU VACCINE - QUADRIVALENT - ADJUVANTED: ICD-10-PCS | Mod: S$GLB,,, | Performed by: INTERNAL MEDICINE

## 2022-09-14 PROCEDURE — 3288F FALL RISK ASSESSMENT DOCD: CPT | Mod: CPTII,S$GLB,, | Performed by: INTERNAL MEDICINE

## 2022-09-14 PROCEDURE — 1160F PR REVIEW ALL MEDS BY PRESCRIBER/CLIN PHARMACIST DOCUMENTED: ICD-10-PCS | Mod: CPTII,S$GLB,, | Performed by: INTERNAL MEDICINE

## 2022-09-14 PROCEDURE — G0008 FLU VACCINE - QUADRIVALENT - ADJUVANTED: ICD-10-PCS | Mod: S$GLB,,, | Performed by: INTERNAL MEDICINE

## 2022-09-14 PROCEDURE — 1159F PR MEDICATION LIST DOCUMENTED IN MEDICAL RECORD: ICD-10-PCS | Mod: CPTII,S$GLB,, | Performed by: INTERNAL MEDICINE

## 2022-09-14 PROCEDURE — 1159F MED LIST DOCD IN RCRD: CPT | Mod: CPTII,S$GLB,, | Performed by: INTERNAL MEDICINE

## 2022-09-14 PROCEDURE — 99213 OFFICE O/P EST LOW 20 MIN: CPT | Mod: S$GLB,,, | Performed by: INTERNAL MEDICINE

## 2022-09-14 PROCEDURE — 3288F PR FALLS RISK ASSESSMENT DOCUMENTED: ICD-10-PCS | Mod: CPTII,S$GLB,, | Performed by: INTERNAL MEDICINE

## 2022-09-14 PROCEDURE — 1125F PR PAIN SEVERITY QUANTIFIED, PAIN PRESENT: ICD-10-PCS | Mod: CPTII,S$GLB,, | Performed by: INTERNAL MEDICINE

## 2022-09-14 PROCEDURE — 1157F PR ADVANCE CARE PLAN OR EQUIV PRESENT IN MEDICAL RECORD: ICD-10-PCS | Mod: CPTII,S$GLB,, | Performed by: INTERNAL MEDICINE

## 2022-09-14 PROCEDURE — 90694 VACC AIIV4 NO PRSRV 0.5ML IM: CPT | Mod: S$GLB,,, | Performed by: INTERNAL MEDICINE

## 2022-09-14 RX ORDER — BETAMETHASONE VALERATE 1.2 MG/G
CREAM TOPICAL 2 TIMES DAILY
Qty: 45 G | Refills: 0 | Status: SHIPPED | OUTPATIENT
Start: 2022-09-14

## 2022-09-14 NOTE — PROGRESS NOTES
Two patient identifier utilized.  VIS given and patient tolerated flu vaccine well.  Patient advised to wait 15 minutes.

## 2022-09-14 NOTE — PROGRESS NOTES
This note was created by combination of typed  and M-Modal dictation.  Transcription errors may be present.  If there are any questions, please contact me.    Assessment and Plan:   Rash  -contact dermatitis verses arthropod?  Does not look like shingles, does not look like erysipelas/cellulitis, too aggressive to be fungal.  Do not think this is monkeypox.  Does not look like drug reaction.  Could be from gardening, could be arthropod.    Symptomatic treatment, trial of betamethasone topical use sparingly.  -     betamethasone valerate 0.1% (VALISONE) 0.1 % Crea; Apply topically 2 (two) times daily.  Dispense: 45 g; Refill: 0    Needs flu shot  -     Influenza - Quadrivalent (Adjuvanted)        There are no discontinued medications.    meds sent this encounter:  Medications Ordered This Encounter   Medications    betamethasone valerate 0.1% (VALISONE) 0.1 % Crea     Sig: Apply topically 2 (two) times daily.     Dispense:  45 g     Refill:  0       Follow Up: No follow-ups on file.    Subjective:   No chief complaint on file.      DEISI Castaneda is a 89 y.o. female, last appointment with this clinic was 2022.  Social History     Tobacco Use    Smoking status: Former     Packs/day: 0.25     Years: 2.00     Pack years: 0.50     Types: Cigarettes     Start date: 1955     Quit date: 1962     Years since quittin.1    Smokeless tobacco: Former     Quit date: 1970   Substance Use Topics    Alcohol use: Yes     Alcohol/week: 1.0 standard drink     Types: 1 Cans of beer per week     Comment: occasionally      Social History     Occupational History    Occupation: retired - house cleaning      Social History     Social History Narrative    Not on file       No LMP recorded. Patient has had a hysterectomy.    Patient of Dr. Hughes.    Comes in accompanied by family.  History from the patient.    Complaining of an itchy rash on her right flexor forearm.  For about a week.  Seems to be multiplying.   Now she seeing some spots on her left forearm.  No new lotions, soaps, detergents.  Lives alone, has a dog, dog has not been itching.  No systemic fevers chills, arthralgias or myalgias.  No diarrhea.    She has been applying topical triamcinolone 0.05% as well as over-the-counter topical salicylic acid but the itching persists.  Initially did not recall any exposures, however on consideration, she has been doing some weeding around her ivy, pulling up weeds, she uses disposable rubber gloves but she wears short sleeve shirt.  No other areas involved beyond the forearm, not the chest, abdomen, back, face.  But possibly coming up on her left lateral lower leg    Patient Care Team:  Tere Hughes MD as PCP - General (Internal Medicine)  Preston Whitfield MD as Consulting Physician (Cardiology)  Criss Baird OD (Inactive) as Consulting Physician (Optometry)  Marvin Roach MD as Consulting Physician (Rheumatology)  Wendy Garcia LPN as Licensed Practical Nurse    Patient Active Problem List    Diagnosis Date Noted    Chronic pain of left knee 01/04/2022    Weakness 01/04/2022    Atopic dermatitis 10/26/2021    Lesion of lip 10/26/2021    Breast cyst, left 09/20/2019    Sebaceous cyst 08/22/2019    Bilateral dry eyes 03/29/2019    Pseudophakia of both eyes 03/29/2019    Impaired functional mobility and activity tolerance 09/11/2018    Essential hypertension 03/21/2018    Osteopenia of multiple sites 03/20/2018     -3/2018 - No need for Rx tx at this time.  6/2022 - No need for Rx tx at this time.          Refractive error 10/13/2017    Atherosclerosis of abdominal aorta      noted on CT scan 1/17/2011      Mild aortic stenosis      - followed by cardiology,  Dr. Whitfield      History of colon polyps     AR (allergic rhinitis)     DJD (degenerative joint disease), cervical 01/10/2014    Spondylolisthesis, grade 1 12/04/2013    DDD (degenerative disc disease), lumbar 11/12/2013    GERD  (gastroesophageal reflux disease) 09/30/2013    Class 1 obesity due to excess calories with serious comorbidity and body mass index (BMI) of 32.0 to 32.9 in adult     Hyperlipidemia LDL goal <100 09/16/2012    Type 2 diabetes, controlled, with neuropathy 09/16/2012    Hypothyroidism (acquired) 09/16/2012    Primary osteoarthritis of both knees 07/19/2012       PAST MEDICAL PROBLEMS, PAST SURGICAL HISTORY: please see relevant portions of the electronic medical record    ALLERGIES AND MEDICATIONS: updated and reviewed.  Review of patient's allergies indicates:   Allergen Reactions    Lipitor [atorvastatin]        Medication List with Changes/Refills   New Medications    BETAMETHASONE VALERATE 0.1% (VALISONE) 0.1 % CREA    Apply topically 2 (two) times daily.   Current Medications    ACETAMINOPHEN (TYLENOL) 500 MG TABLET    Take 1 tablet (500 mg total) by mouth every 4 (four) hours as needed for Pain.    ASPIRIN 81 MG CHEW    Take 1 tablet (81 mg total) by mouth once daily.    BETAMETHASONE DIPROPIONATE (DIPROLENE) 0.05 % OINTMENT    Apply topically 2 (two) times daily.    BLOOD-GLUCOSE METER (FREESTYLE SYSTEM KIT) KIT    Use as instructed    CALCIUM CARBONATE (OS-MIRACLE) 500 MG CALCIUM (1,250 MG) TABLET    Take 1 tablet by mouth every other day.    CLOBETASOL (TEMOVATE) 0.05 % CREAM    Apply topically 2 (two) times daily. for 7 days    DICLOFENAC SODIUM (VOLTAREN) 1 % GEL    Apply 4 grams to effected area 4 x daily do not exceed 32 grams per day    FISH OIL-FAT ACID COMB.8- 1,200 MG ([id number]) CAP        FLUOCINONIDE 0.1 % CREA    as needed.     GLIPIZIDE (GLUCOTROL) 5 MG TABLET    Take 1 tablet (5 mg total) by mouth daily with breakfast.    HYDROCORTISONE 2.5 % CREAM    Apply topically 2 (two) times daily.    HYDROXYZINE HCL (ATARAX) 10 MG TAB    Take 1 tablet (10 mg total) by mouth 3 (three) times daily as needed for Itching.    LEVOTHYROXINE (SYNTHROID) 88 MCG TABLET    TAKE 1 TABLET(88 MCG) BY MOUTH  "EVERY DAY    LOSARTAN (COZAAR) 50 MG TABLET    Take 1 tablet (50 mg total) by mouth once daily.    LOVASTATIN (MEVACOR) 10 MG TABLET    Take 1 tablet (10 mg total) by mouth once daily.    MULTIVIT,CALC,MINS/IRON/FOLIC (ONE-A-DAY WOMENS FORMULA ORAL)    Take 1 tablet by mouth once daily.    TRIAMCINOLONE ACETONIDE 0.5% (KENALOG) 0.5 % CREA    APPLY EXTERNALLY TO THE AFFECTED AREA TWICE DAILY as needed for flares; do not use for more than 2 weeks in a row without one week break        Objective:   Physical Exam   Vitals:    09/14/22 1117   BP: 134/68   BP Location: Left arm   Patient Position: Sitting   BP Method: Medium (Manual)   Pulse: 97   Temp: 98.8 °F (37.1 °C)   TempSrc: Oral   SpO2: 96%   Weight: 84.9 kg (187 lb 2.7 oz)   Height: 5' 6" (1.676 m)    Body mass index is 30.21 kg/m².  Weight: 84.9 kg (187 lb 2.7 oz)   Height: 5' 6" (167.6 cm)     Physical Exam  Constitutional:       General: She is not in acute distress.     Appearance: She is well-developed.   HENT:      Head: Normocephalic and atraumatic.   Eyes:      General: No scleral icterus.  Pulmonary:      Effort: Pulmonary effort is normal.   Skin:     General: Skin is warm and dry.      Findings: Rash present.      Comments: Please see the photographs-on the right flexor forearm with numerous scattered papules with some evidence of excoriation, 1 seems to be bigger than the other.  No pustules.  Some minimal surrounding erythema.  Nondermatomal.    On the left flexor forearm small patch of scattered papules clustered.  Do not extend beyond the elbow.   Neurological:      Mental Status: She is alert and oriented to person, place, and time.   Psychiatric:         Behavior: Behavior normal.         Thought Content: Thought content normal.                 "

## 2022-09-17 ENCOUNTER — HOSPITAL ENCOUNTER (EMERGENCY)
Facility: HOSPITAL | Age: 87
Discharge: HOME | End: 2022-09-17
Attending: EMERGENCY MEDICINE
Payer: MEDICARE

## 2022-09-17 ENCOUNTER — NURSE TRIAGE (OUTPATIENT)
Dept: ADMINISTRATIVE | Facility: CLINIC | Age: 87
End: 2022-09-17
Payer: MEDICARE

## 2022-09-17 VITALS
HEIGHT: 66 IN | HEART RATE: 65 BPM | TEMPERATURE: 98 F | SYSTOLIC BLOOD PRESSURE: 155 MMHG | RESPIRATION RATE: 16 BRPM | BODY MASS INDEX: 30.05 KG/M2 | DIASTOLIC BLOOD PRESSURE: 75 MMHG | WEIGHT: 187 LBS | OXYGEN SATURATION: 98 %

## 2022-09-17 DIAGNOSIS — R21 RASH: Primary | ICD-10-CM

## 2022-09-17 PROCEDURE — 99284 EMERGENCY DEPT VISIT MOD MDM: CPT | Mod: ER

## 2022-09-17 RX ORDER — PREDNISONE 10 MG/1
10 TABLET ORAL DAILY
Qty: 21 TABLET | Refills: 0 | Status: SHIPPED | OUTPATIENT
Start: 2022-09-17 | End: 2023-02-13 | Stop reason: ALTCHOICE

## 2022-09-17 RX ORDER — HYDROXYZINE HYDROCHLORIDE 25 MG/1
25 TABLET, FILM COATED ORAL EVERY 4 HOURS PRN
Qty: 30 TABLET | Refills: 0 | Status: SHIPPED | OUTPATIENT
Start: 2022-09-17

## 2022-09-17 RX ORDER — DOXYCYCLINE 100 MG/1
100 CAPSULE ORAL 2 TIMES DAILY
Qty: 20 CAPSULE | Refills: 0 | Status: SHIPPED | OUTPATIENT
Start: 2022-09-17 | End: 2022-09-27

## 2022-09-17 NOTE — TELEPHONE ENCOUNTER
Spoke with patient states she was seen on Wednesday related to rash.  Patient states rash is now spreading on her arms, legs, and fingers.  Patient also states she has blisters to site.  Advised patient to be seen in 3-4 hours.  Patient verbalized understanding.  Urgent Care information given.   Reason for Disposition   Large or small blisters on skin (i.e., fluid filled bubbles or sacs)    Additional Information   Negative: [1] Life-threatening reaction (anaphylaxis) in the past to similar substance (e.g., food, insect bite/sting, chemical, etc.) AND [2] < 2 hours since exposure   Negative: [1] Sudden onset of rash (within last 2 hours) AND [2] difficulty breathing or swallowing   Negative: Shock suspected (e.g., cold/pale/clammy skin, too weak to stand, low BP, rapid pulse)   Negative: Difficult to awaken or acting confused (e.g., disoriented, slurred speech)   Negative: [1] Purple or blood-colored spots or dots AND [2] fever   Negative: Sounds like a life-threatening emergency to the triager   Negative: [1] Widespread rash AND [2] bright red, sunburn-like AND [3] current tampon use or nasal packing   Negative: [1] Widespread rash AND [2] bright red, sunburn-like AND [3] wound infection or recent surgery   Negative: [1] Bright red skin AND [2] peels off in sheets   Negative: Stiff neck (can't touch chin to chest)   Negative: Fever   Negative: Joint pain or swelling   Negative: Patient sounds very sick or weak to the triager   Negative: [1] Purple or blood-colored rash (spots or dots) AND [2] no fever AND [3] sounds well to triager    Protocols used: Rash or Redness - Widespread-A-

## 2022-09-17 NOTE — ED PROVIDER NOTES
Encounter Date: 9/17/2022    SCRIBE #1 NOTE: I, Lisa Anand, am scribing for, and in the presence of,  Anselmo Peterson MD. I have scribed the following portions of the note - Other sections scribed: HPI, ROS, PE.     History     Chief Complaint   Patient presents with    Rash     Pt has a rash on both of her fore arms. She states she was seen by her PCP last week and the medication she was given has not help and that it is worsening. PT sates at first she thought she was bit by something but then more sports kept coming up. She states that it is very itchy.      89 year old female with history of Hyperlipidemia, Hypertension, Obesity and Chronic Kidney Disease presents to the ED with a rash to the bilateral forearms beginning two weeks ago. Pt states she was gardening in the days before getting the rash. Patient notes associated symptom of itching. No denied symptoms. No alleviating or exacerbating factors mentioned. Patient reports they have taken Benadryl for symptoms with no relief. Additionally, patient notes they have been taken off their diabetes pills. Allergic to Lipitor.     The history is provided by the patient. No  was used.   Review of patient's allergies indicates:   Allergen Reactions    Lipitor [atorvastatin]      Past Medical History:   Diagnosis Date    AR (allergic rhinitis)     Atherosclerosis of abdominal aorta     noted on CT scan 1/17/2011    Carpal tunnel syndrome of left wrist     Chronic kidney disease, stage 3     Diabetic peripheral neuropathy     Diverticulosis     History of colon polyps     History of diverticulitis of colon 1/2014    Hyperlipemia     Hypertension     Hypothyroidism     followed by endocrinology, Dr. Green    Mild aortic stenosis     OA (osteoarthritis) of knee     Obesity     Sciatica     Type II or unspecified type diabetes mellitus with neurological manifestations, uncontrolled(250.62)      Past Surgical History:   Procedure Laterality  Date    CATARACT EXTRACTION Bilateral     COLONOSCOPY N/A 10/3/2017    Procedure: COLONOSCOPY;  Surgeon: Alin oGnzalez MD;  Location: [facility] ([address] [place] [address]);  Service: Endoscopy;  Laterality: N/A;    COSMETIC SURGERY      HYSTERECTOMY      partial    JOINT REPLACEMENT Right     LUNG BIOPSY  in her 40's    TOTAL KNEE ARTHROPLASTY Right 2014    TKR     Family History   Problem Relation Age of Onset    Cancer Mother         shoulder tumor - cancer    Hypertension Mother     Glaucoma Mother     Heart disease Father         valve replacement    Hypertension Father     Heart attack Father     Diabetes Sister     Arthritis Sister         s/p knee surgery    Diabetes Brother     Heart disease Brother     Heart failure Brother     Stroke Brother     Arthritis Brother         back problems    Skin cancer Brother     Cancer Brother     Throat cancer Brother     No Known Problems Maternal Grandmother     No Known Problems Maternal Grandfather     No Known Problems Paternal Grandmother     No Known Problems Paternal Grandfather     Cancer Son         jaw    Cataracts Son     No Known Problems Maternal Aunt     No Known Problems Maternal Uncle     No Known Problems Paternal Aunt     No Known Problems Paternal Uncle     Melanoma Neg Hx     Eczema Neg Hx     Lupus Neg Hx     Psoriasis Neg Hx     Breast cancer Neg Hx     Colon cancer Neg Hx     Amblyopia Neg Hx     Blindness Neg Hx     Macular degeneration Neg Hx     Retinal detachment Neg Hx     Strabismus Neg Hx     Thyroid disease Neg Hx      Social History     Tobacco Use    Smoking status: Former     Packs/day: 0.25     Years: 2.00     Pack years: 0.50     Types: Cigarettes     Start date: 1955     Quit date: 1962     Years since quittin.2    Smokeless tobacco: Former     Quit date: 1970   Substance Use Topics    Alcohol use: Yes     Alcohol/week: 1.0 standard drink     Types: 1 Cans of beer per week     Comment: occasionally    Drug use: No     Review of  Systems   Constitutional:  Negative for fever.   HENT:  Negative for sore throat.    Eyes:  Negative for visual disturbance.   Respiratory:  Negative for shortness of breath.    Cardiovascular:  Negative for chest pain.   Gastrointestinal:  Negative for abdominal pain.   Genitourinary:  Negative for difficulty urinating.   Musculoskeletal:  Negative for back pain.   Skin:  Positive for rash (bilateral arms).        (+) Itchiness   Neurological:  Negative for headaches.     Physical Exam     Initial Vitals [09/17/22 1243]   BP Pulse Resp Temp SpO2   (!) 160/74 68 16 98.4 °F (36.9 °C) 97 %      MAP       --         Physical Exam    Nursing note and vitals reviewed.  Constitutional: She appears well-developed and well-nourished.   Eyes: EOM are normal. Pupils are equal, round, and reactive to light.   Neck: Neck supple. No thyromegaly present. No JVD present.   Normal range of motion.  Cardiovascular:  Normal rate, regular rhythm, normal heart sounds and intact distal pulses.     Exam reveals no gallop and no friction rub.       No murmur heard.  Pulmonary/Chest: Breath sounds normal. No respiratory distress.   Abdominal: Abdomen is soft. Bowel sounds are normal.   Musculoskeletal:         General: No tenderness or edema. Normal range of motion.      Cervical back: Normal range of motion and neck supple.     Neurological: She is alert and oriented to person, place, and time. She has normal strength.   Skin: Skin is warm and dry. Rash noted.   Left forearm distal linear raised vesicles, 1.5 cm x 2 mm, consistent with contact dermatitis. Right arm has two circular areas with dark  center, 3-4 cm in size with satellite erythematous papules.         ED Course   Procedures  Labs Reviewed - No data to display       Imaging Results    None          Medications - No data to display           Scribe Attestation:   Scribe #1: I performed the above scribed service and the documentation accurately describes the services I  performed. I attest to the accuracy of the note.                 I, Anselmo Peterson, personally performed the services described in this documentation. All medical record entries made by the scribe were at my direction and in my presence.  I have reviewed the chart and agree that the record reflects my personal performance and is accurate and complete    Clinical Impression:   Final diagnoses:  [R21] Rash (Primary)      ED Disposition Condition    Discharge Stable          ED Prescriptions       Medication Sig Dispense Start Date End Date Auth. Provider    predniSONE (DELTASONE) 10 MG tablet Take 1 tablet (10 mg total) by mouth once daily. Take 4 tabs x 3 days, then Take 2 tabs x 3 days, then Take 1 tab x 3 days. 21 tablet 9/17/2022 -- Anselmo Peterson MD    hydrOXYzine HCL (ATARAX) 25 MG tablet Take 1 tablet (25 mg total) by mouth every 4 (four) hours as needed for Itching. 30 tablet 9/17/2022 -- Anselmo Peterson MD    doxycycline (VIBRAMYCIN) 100 MG Cap Take 1 capsule (100 mg total) by mouth 2 (two) times daily. for 10 days 20 capsule 9/17/2022 9/27/2022 Anselmo Peterson MD          Follow-up Information       Follow up With Specialties Details Why Contact Info    Santi Mosley MD Dermatology Schedule an appointment as soon as possible for a visit   [address] [address]  [phone]               Anselmo Peterson MD  09/17/22 [id number]

## 2022-09-19 ENCOUNTER — TELEPHONE (OUTPATIENT)
Dept: DERMATOLOGY | Facility: CLINIC | Age: 87
End: 2022-09-19
Payer: MEDICARE

## 2022-09-19 NOTE — TELEPHONE ENCOUNTER
Spoke with pt - scheduled appointment. Pt voiced understanding    ----- Message from Vivi Gallagher sent at 9/19/2022  9:26 AM CDT -----  Pt's son is calling to f/u with derm after Er visit. Pt was given steroids.     Has blisters in arm & hands , was really red on Saturday.   Stuck by kassie yates    Pt son requesting a cb to advise/schedule   Can call sister, [phone]  Or pt's Home [phone]

## 2022-09-26 ENCOUNTER — OFFICE VISIT (OUTPATIENT)
Dept: DERMATOLOGY | Facility: CLINIC | Age: 87
End: 2022-09-26
Payer: MEDICARE

## 2022-09-26 DIAGNOSIS — L81.0 POSTINFLAMMATORY HYPERPIGMENTATION: ICD-10-CM

## 2022-09-26 DIAGNOSIS — L23.7 ALLERGIC CONTACT DERMATITIS DUE TO PLANTS, EXCEPT FOOD: Primary | ICD-10-CM

## 2022-09-26 PROCEDURE — 1157F PR ADVANCE CARE PLAN OR EQUIV PRESENT IN MEDICAL RECORD: ICD-10-PCS | Mod: CPTII,S$GLB,, | Performed by: DERMATOLOGY

## 2022-09-26 PROCEDURE — 99213 PR OFFICE/OUTPT VISIT, EST, LEVL III, 20-29 MIN: ICD-10-PCS | Mod: S$GLB,,, | Performed by: DERMATOLOGY

## 2022-09-26 PROCEDURE — 1157F ADVNC CARE PLAN IN RCRD: CPT | Mod: CPTII,S$GLB,, | Performed by: DERMATOLOGY

## 2022-09-26 PROCEDURE — 99213 OFFICE O/P EST LOW 20 MIN: CPT | Mod: S$GLB,,, | Performed by: DERMATOLOGY

## 2022-09-26 NOTE — PROGRESS NOTES
Subjective:       Patient ID:  Tonya Cohn is a 89 y.o. female who presents for follow up from ED for rash on the arms after gardening 3 weeks ago.    HPI  89 yo F with no significant pmhx presents for above. Was gardening about 3 weeks ago and then 1 day later developed itchy bumps with blisters on the forearms in a linear pattern. She was wearing gloves and short sleeve shirt. She went to her pcp who gave her steroid ointment. It worsened and she went to the ED for evaluation. She was given prednisone taper for 9 days and doxycycline. She has been improving since then. This happened about 2 years ago when she was gardening as well.    Review of Systems   Negative unless noted above    Objective:    Physical Exam   Constitutional: She appears well-developed and well-nourished.   Neurological: She is alert and oriented to person, place, and time.   Psychiatric: She has a normal mood and affect.   Skin:   Areas Examined (abnormalities noted in diagram):   RUE Inspected  LUE Inspection Performed            Diagram Legend     Erythematous scaling macule/papule c/w actinic keratosis       Vascular papule c/w angioma      Pigmented verrucoid papule/plaque c/w seborrheic keratosis      Yellow umbilicated papule c/w sebaceous hyperplasia      Irregularly shaped tan macule c/w lentigo     1-2 mm smooth white papules consistent with Milia      Movable subcutaneous cyst with punctum c/w epidermal inclusion cyst      Subcutaneous movable cyst c/w pilar cyst      Firm pink to brown papule c/w dermatofibroma      Pedunculated fleshy papule(s) c/w skin tag(s)      Evenly pigmented macule c/w junctional nevus     Mildly variegated pigmented, slightly irregular-bordered macule c/w mildly atypical nevus      Flesh colored to evenly pigmented papule c/w intradermal nevus       Pink pearly papule/plaque c/w basal cell carcinoma      Erythematous hyperkeratotic cursted plaque c/w SCC      Surgical scar with no sign of skin  cancer recurrence      Open and closed comedones      Inflammatory papules and pustules      Verrucoid papule consistent consistent with wart     Erythematous eczematous patches and plaques     Dystrophic onycholytic nail with subungual debris c/w onychomycosis     Umbilicated papule    Erythematous-base heme-crusted tan verrucoid plaque consistent with inflamed seborrheic keratosis     Erythematous Silvery Scaling Plaque c/w Psoriasis     See annotation      Assessment / Plan:        Allergic contact dermatitis due to plants, except food; resolving  Postinflammatory hyperpigmentation  - continue and finish prednisone taper (last pill today)  - recommend to continue betamethasone valerate cream 0.1% prescribed by pcp for 1 more week twice daily to the lesion on the right arm  - advised the dark spots will resolve with time; apply sunscreen 30 spf or better daily   - recommend to avoid touching the plants she was previously in contact with/wear gloves and long sleeves         No follow-ups on file.

## 2022-09-30 ENCOUNTER — EXTERNAL CHRONIC CARE MANAGEMENT (OUTPATIENT)
Dept: PRIMARY CARE CLINIC | Facility: CLINIC | Age: 87
End: 2022-09-30
Payer: MEDICARE

## 2022-09-30 PROCEDURE — 99490 CHRNC CARE MGMT STAFF 1ST 20: CPT | Mod: S$GLB,,, | Performed by: INTERNAL MEDICINE

## 2022-09-30 PROCEDURE — 99490 PR CHRONIC CARE MGMT, 1ST 20 MIN: ICD-10-PCS | Mod: S$GLB,,, | Performed by: INTERNAL MEDICINE

## 2022-10-18 ENCOUNTER — IMMUNIZATION (OUTPATIENT)
Dept: FAMILY MEDICINE | Facility: CLINIC | Age: 87
End: 2022-10-18
Payer: MEDICARE

## 2022-10-18 DIAGNOSIS — Z23 NEED FOR VACCINATION: Primary | ICD-10-CM

## 2022-10-18 PROCEDURE — 91312 COVID-19, MRNA, LNP-S, BIVALENT BOOSTER, PF, 30 MCG/0.3 ML DOSE: CPT | Mod: S$GLB,,, | Performed by: FAMILY MEDICINE

## 2022-10-18 PROCEDURE — 0124A COVID-19, MRNA, LNP-S, BIVALENT BOOSTER, PF, 30 MCG/0.3 ML DOSE: CPT | Mod: PBBFAC | Performed by: FAMILY MEDICINE

## 2022-10-18 PROCEDURE — 91312 COVID-19, MRNA, LNP-S, BIVALENT BOOSTER, PF, 30 MCG/0.3 ML DOSE: ICD-10-PCS | Mod: S$GLB,,, | Performed by: FAMILY MEDICINE

## 2022-10-31 ENCOUNTER — EXTERNAL CHRONIC CARE MANAGEMENT (OUTPATIENT)
Dept: PRIMARY CARE CLINIC | Facility: CLINIC | Age: 87
End: 2022-10-31
Payer: MEDICARE

## 2022-10-31 PROCEDURE — 99490 CHRNC CARE MGMT STAFF 1ST 20: CPT | Mod: S$GLB,,, | Performed by: INTERNAL MEDICINE

## 2022-10-31 PROCEDURE — 99490 PR CHRONIC CARE MGMT, 1ST 20 MIN: ICD-10-PCS | Mod: S$GLB,,, | Performed by: INTERNAL MEDICINE

## 2022-11-25 DIAGNOSIS — M50.30 DDD (DEGENERATIVE DISC DISEASE), CERVICAL: Primary | ICD-10-CM

## 2022-11-28 ENCOUNTER — OFFICE VISIT (OUTPATIENT)
Dept: ORTHOPEDICS | Facility: CLINIC | Age: 87
End: 2022-11-28
Payer: MEDICARE

## 2022-11-28 ENCOUNTER — HOSPITAL ENCOUNTER (OUTPATIENT)
Dept: RADIOLOGY | Facility: HOSPITAL | Age: 87
Discharge: HOME | End: 2022-11-28
Attending: REGISTERED NURSE
Payer: MEDICARE

## 2022-11-28 VITALS — WEIGHT: 187.75 LBS | HEIGHT: 66 IN | BODY MASS INDEX: 30.17 KG/M2

## 2022-11-28 DIAGNOSIS — M50.30 DDD (DEGENERATIVE DISC DISEASE), CERVICAL: ICD-10-CM

## 2022-11-28 DIAGNOSIS — M54.2 NECK PAIN: Primary | ICD-10-CM

## 2022-11-28 PROCEDURE — 72050 X-RAY EXAM NECK SPINE 4/5VWS: CPT | Mod: 26,,, | Performed by: RADIOLOGY

## 2022-11-28 PROCEDURE — 99214 PR OFFICE/OUTPT VISIT, EST, LEVL IV, 30-39 MIN: ICD-10-PCS | Mod: S$GLB,,, | Performed by: REGISTERED NURSE

## 2022-11-28 PROCEDURE — 99999 PR PBB SHADOW E&M-EST. PATIENT-LVL III: ICD-10-PCS | Mod: PBBFAC,,, | Performed by: REGISTERED NURSE

## 2022-11-28 PROCEDURE — 1159F PR MEDICATION LIST DOCUMENTED IN MEDICAL RECORD: ICD-10-PCS | Mod: CPTII,S$GLB,, | Performed by: REGISTERED NURSE

## 2022-11-28 PROCEDURE — 1157F PR ADVANCE CARE PLAN OR EQUIV PRESENT IN MEDICAL RECORD: ICD-10-PCS | Mod: CPTII,S$GLB,, | Performed by: REGISTERED NURSE

## 2022-11-28 PROCEDURE — 99999 PR PBB SHADOW E&M-EST. PATIENT-LVL III: CPT | Mod: PBBFAC,,, | Performed by: REGISTERED NURSE

## 2022-11-28 PROCEDURE — 72050 X-RAY EXAM NECK SPINE 4/5VWS: CPT | Mod: TC

## 2022-11-28 PROCEDURE — 1159F MED LIST DOCD IN RCRD: CPT | Mod: CPTII,S$GLB,, | Performed by: REGISTERED NURSE

## 2022-11-28 PROCEDURE — 72050 XR CERVICAL SPINE AP LAT WITH FLEX EXTEN: ICD-10-PCS | Mod: 26,,, | Performed by: RADIOLOGY

## 2022-11-28 PROCEDURE — 99214 OFFICE O/P EST MOD 30 MIN: CPT | Mod: S$GLB,,, | Performed by: REGISTERED NURSE

## 2022-11-28 PROCEDURE — 1126F PR PAIN SEVERITY QUANTIFIED, NO PAIN PRESENT: ICD-10-PCS | Mod: CPTII,S$GLB,, | Performed by: REGISTERED NURSE

## 2022-11-28 PROCEDURE — 1126F AMNT PAIN NOTED NONE PRSNT: CPT | Mod: CPTII,S$GLB,, | Performed by: REGISTERED NURSE

## 2022-11-28 PROCEDURE — 1157F ADVNC CARE PLAN IN RCRD: CPT | Mod: CPTII,S$GLB,, | Performed by: REGISTERED NURSE

## 2022-11-28 RX ORDER — TIZANIDINE 4 MG/1
4 TABLET ORAL EVERY 8 HOURS PRN
Qty: 40 TABLET | Refills: 4 | Status: SHIPPED | OUTPATIENT
Start: 2022-11-28 | End: 2023-02-13

## 2022-11-28 NOTE — PROGRESS NOTES
DATE: 11/28/2022  PATIENT: Tonya Cohn    Supervising Physician: Kendall Segal M.D.    CHIEF COMPLAINT: muscle spasms in neck    HISTORY:  Tonya Cohn is a 89 y.o. female with pmhx of DM2 (A1c:5.9) and HTN here for initial evaluation of neck pain (Neck - 1). The pain has been present for about 2 weeks. The patient describes the pain as a tickle. The pain is worse with nothing in particular and improved by massage. There is no associated numbness and tingling. There is no subjective weakness. Prior treatments have included tylenol, but no PT, BRIGITTE or surgery.     The patient denies myelopathic symptoms such as handwriting changes or difficulty with buttons/coins/keys. Denies perineal paresthesias, bowel/bladder dysfunction.    PAST MEDICAL/SURGICAL HISTORY:  Past Medical History:   Diagnosis Date    AR (allergic rhinitis)     Atherosclerosis of abdominal aorta     noted on CT scan 1/17/2011    Carpal tunnel syndrome of left wrist     Chronic kidney disease, stage 3     Diabetic peripheral neuropathy     Diverticulosis     History of colon polyps     History of diverticulitis of colon 1/2014    Hyperlipemia     Hypertension     Hypothyroidism     followed by endocrinology, Dr. Green    Mild aortic stenosis     OA (osteoarthritis) of knee     Obesity     Sciatica     Type II or unspecified type diabetes mellitus with neurological manifestations, uncontrolled(250.62)      Past Surgical History:   Procedure Laterality Date    CATARACT EXTRACTION Bilateral     COLONOSCOPY N/A 10/3/2017    Procedure: COLONOSCOPY;  Surgeon: Alin Gonzalez MD;  Location: [address]);  Service: Endoscopy;  Laterality: N/A;    COSMETIC SURGERY      HYSTERECTOMY      partial    JOINT REPLACEMENT Right     LUNG BIOPSY  in her 40's    TOTAL KNEE ARTHROPLASTY Right 6/13/2014    TKR       Medications:  Current Outpatient Medications on File Prior to Visit   Medication Sig Dispense Refill    acetaminophen (TYLENOL) 500 MG  tablet Take 1 tablet (500 mg total) by mouth every 4 (four) hours as needed for Pain. 60 tablet 2    betamethasone dipropionate (DIPROLENE) 0.05 % ointment Apply topically 2 (two) times daily. 45 g 0    betamethasone valerate 0.1% (VALISONE) 0.1 % Crea Apply topically 2 (two) times daily. 45 g 0    blood-glucose meter (FREESTYLE SYSTEM KIT) kit Use as instructed 1 each 0    calcium carbonate (OS-MIRACLE) 500 mg calcium (1,250 mg) tablet Take 1 tablet by mouth every other day.      diclofenac sodium (VOLTAREN) 1 % Gel Apply 4 grams to effected area 4 x daily do not exceed 32 grams per day 1 Tube 3    fish oil-fat acid comb.8-hb137 1,200 mg (400 [id number]) Cap       fluocinonide 0.1 % Crea as needed.       glipiZIDE (GLUCOTROL) 5 MG tablet Take 1 tablet (5 mg total) by mouth daily with breakfast. 90 tablet 0    hydrocortisone 2.5 % cream Apply topically 2 (two) times daily. 20 g 1    hydrOXYzine HCL (ATARAX) 10 MG Tab Take 1 tablet (10 mg total) by mouth 3 (three) times daily as needed for Itching. 30 tablet 0    hydrOXYzine HCL (ATARAX) 25 MG tablet Take 1 tablet (25 mg total) by mouth every 4 (four) hours as needed for Itching. 30 tablet 0    levothyroxine (SYNTHROID) 88 MCG tablet TAKE 1 TABLET(88 MCG) BY MOUTH EVERY DAY 90 tablet 0    losartan (COZAAR) 50 MG tablet Take 1 tablet (50 mg total) by mouth once daily. 90 tablet 0    lovastatin (MEVACOR) 10 MG tablet Take 1 tablet (10 mg total) by mouth once daily. 90 tablet 1    multivit,calc,mins/iron/folic (ONE-A-DAY WOMENS FORMULA ORAL) Take 1 tablet by mouth once daily.      predniSONE (DELTASONE) 10 MG tablet Take 1 tablet (10 mg total) by mouth once daily. Take 4 tabs x 3 days, then Take 2 tabs x 3 days, then Take 1 tab x 3 days. 21 tablet 0    triamcinolone acetonide 0.5% (KENALOG) 0.5 % Crea APPLY EXTERNALLY TO THE AFFECTED AREA TWICE DAILY as needed for flares; do not use for more than 2 weeks in a row without one week break 454 g 2    aspirin 81 MG Chew Take  1 tablet (81 mg total) by mouth once daily.  0    clobetasoL (TEMOVATE) 0.05 % cream Apply topically 2 (two) times daily. for 7 days (Patient not taking: Reported on 2022) 30 g 0     No current facility-administered medications on file prior to visit.       Social History:   Social History     Socioeconomic History    Marital status:     Number of children: 7   Occupational History    Occupation: retired - house cleaning   Tobacco Use    Smoking status: Former     Packs/day: 0.25     Years: 2.00     Pack years: 0.50     Types: Cigarettes     Start date: 1955     Quit date: 1962     Years since quittin.4    Smokeless tobacco: Former     Quit date: 1970   Substance and Sexual Activity    Alcohol use: Yes     Alcohol/week: 1.0 standard drink     Types: 1 Cans of beer per week     Comment: occasionally    Drug use: No    Sexual activity: Not Currently     Partners: Male   Other Topics Concern    Are you pregnant or think you may be? No    Breast-feeding No     Social Determinants of Health     Financial Resource Strain: Low Risk     Difficulty of Paying Living Expenses: Not hard at all   Food Insecurity: No Food Insecurity    Worried About Running Out of Food in the Last Year: Never true    Ran Out of Food in the Last Year: Never true   Transportation Needs: No Transportation Needs    Lack of Transportation (Medical): No    Lack of Transportation (Non-Medical): No   Physical Activity: Inactive    Days of Exercise per Week: 0 days    Minutes of Exercise per Session: 0 min   Stress: No Stress Concern Present    Feeling of Stress : Not at all   Social Connections: Moderately Isolated    Frequency of Communication with Friends and Family: More than three times a week    Frequency of Social Gatherings with Friends and Family: More than three times a week    Attends Scientologist Services: More than 4 times per year    Active Member of Clubs or Organizations: No    Attends Club or Organization  "Meetings: Never    Marital Status:    Housing Stability: Low Risk     Unable to Pay for Housing in the Last Year: No    Number of Places Lived in the Last Year: 1    Unstable Housing in the Last Year: No       REVIEW OF SYSTEMS:  Constitution: Negative. Negative for chills, fever and night sweats.   Cardiovascular: Negative for chest pain and syncope.   Respiratory: Negative for cough and shortness of breath.   Gastrointestinal: See HPI. Negative for nausea/vomiting. Negative for abdominal pain.  Genitourinary: See HPI. Negative for discoloration or dysuria.  Skin: Negative for dry skin, itching and rash.   Hematologic/Lymphatic: Negative for bleeding problem. Does not bruise/bleed easily.   Musculoskeletal: Negative for falls and muscle weakness.   Neurological: See HPI. No seizures.   Endocrine: Negative for polydipsia, polyphagia and polyuria.   Allergic/Immunologic: Negative for hives and persistent infections.  Psychiatric/Behavioral: Negative for depression and insomnia.         EXAM:  Ht 5' 6" (1.676 m)   Wt 85.2 kg (187 lb 11.6 oz)   BMI 30.30 kg/m²     General: The patient is a very pleasant 89 y.o. female in no apparent distress, the patient is oriented to person, place and time.  Psych: Normal mood and affect  HEENT: Vision grossly intact, hearing intact to the spoken word.  Lungs: Respirations unlabored.  Gait: Normal station and gait, no difficulty with toe or heel walk.   Skin: Cervical skin negative for rashes, lesions, hairy patches and surgical scars.  Range of motion: Cervical range of motion is acceptable. There is mild tenderness to palpation.  Spinal Balance: Global saggital and coronal spinal balance acceptable, no significant for scoliosis and kyphosis.  Musculoskeletal: No pain with the range of motion of the bilateral shoulders and elbows. Normal bulk and contour of the bilateral hands.  Vascular: Bilateral hands warm and well perfused, radial pulses 2+ bilaterally.  Neurological: " Normal strength and tone in all major motor groups in the bilateral upper and lower extremities. Normal sensation to light touch in the C5-T1 and L2-S1 dermatomes bilaterally.  Deep tendon reflexes symmetric 2+ in the bilateral upper and lower extremities.  Negative Inverted Radial Reflex and Cortes's bilaterally. Negative Babinski bilaterally.     IMAGING:   Today I personally reviewed AP, Lat and Flex/Ex  upright C-spine films that demonstrate moderate DDD from C4-7.      Body mass index is 30.3 kg/m².    Hemoglobin A1C   Date Value Ref Range Status   08/02/2022 5.9 (H) 4.0 - 5.6 % Final     Comment:     ADA Screening Guidelines:  5.7-6.4%  Consistent with prediabetes  >or=6.5%  Consistent with diabetes    High levels of fetal hemoglobin interfere with the HbA1C  assay. Heterozygous hemoglobin variants (HbS, HgC, etc)do  not significantly interfere with this assay.   However, presence of multiple variants may affect accuracy.     01/25/2022 6.1 (H) 4.0 - 5.6 % Final     Comment:     ADA Screening Guidelines:  5.7-6.4%  Consistent with prediabetes  >or=6.5%  Consistent with diabetes    High levels of fetal hemoglobin interfere with the HbA1C  assay. Heterozygous hemoglobin variants (HbS, HgC, etc)do  not significantly interfere with this assay.   However, presence of multiple variants may affect accuracy.     07/02/2021 6.1 (H) 4.0 - 5.6 % Final     Comment:     ADA Screening Guidelines:  5.7-6.4%  Consistent with prediabetes  >or=6.5%  Consistent with diabetes    High levels of fetal hemoglobin interfere with the HbA1C  assay. Heterozygous hemoglobin variants (HbS, HgC, etc)do  not significantly interfere with this assay.   However, presence of multiple variants may affect accuracy.             ASSESSMENT/PLAN:    Diagnoses and all orders for this visit:    Neck pain  -     tiZANidine (ZANAFLEX) 4 MG tablet; Take 1 tablet (4 mg total) by mouth every 8 (eight) hours as needed (muscle spasms).    Today we discussed  at length all of the different treatment options including anti-inflammatories, acetaminophen, rest, ice, heat, physical therapy including strengthening and stretching exercises, home exercises, ROM, aerobic conditioning, aqua therapy, other modalities including ultrasound, massage, and dry needling, epidural steroid injections and finally surgical intervention.    Zanaflex sent to the pharmacy. The patient would like to hold off on PT for now. She may follow up as needed.

## 2022-11-30 ENCOUNTER — EXTERNAL CHRONIC CARE MANAGEMENT (OUTPATIENT)
Dept: PRIMARY CARE CLINIC | Facility: CLINIC | Age: 87
End: 2022-11-30
Payer: MEDICARE

## 2022-11-30 PROCEDURE — 99490 CHRNC CARE MGMT STAFF 1ST 20: CPT | Mod: S$GLB,,, | Performed by: INTERNAL MEDICINE

## 2022-11-30 PROCEDURE — 99490 PR CHRONIC CARE MGMT, 1ST 20 MIN: ICD-10-PCS | Mod: S$GLB,,, | Performed by: INTERNAL MEDICINE

## 2022-12-05 LAB
LEFT EYE DM RETINOPATHY: NEGATIVE
RIGHT EYE DM RETINOPATHY: NEGATIVE

## 2023-02-07 PROBLEM — R23.8 VENOUS LAKE OF LIP: Status: ACTIVE | Noted: 2021-10-26

## 2023-02-13 ENCOUNTER — OFFICE VISIT (OUTPATIENT)
Dept: FAMILY MEDICINE | Facility: CLINIC | Age: 88
End: 2023-02-13
Payer: MEDICARE

## 2023-02-13 VITALS
TEMPERATURE: 98 F | BODY MASS INDEX: 31.48 KG/M2 | SYSTOLIC BLOOD PRESSURE: 138 MMHG | HEIGHT: 66 IN | HEART RATE: 77 BPM | OXYGEN SATURATION: 95 % | WEIGHT: 195.88 LBS | DIASTOLIC BLOOD PRESSURE: 70 MMHG

## 2023-02-13 DIAGNOSIS — I10 ESSENTIAL HYPERTENSION: ICD-10-CM

## 2023-02-13 DIAGNOSIS — E03.9 HYPOTHYROIDISM (ACQUIRED): ICD-10-CM

## 2023-02-13 DIAGNOSIS — I35.0 MILD AORTIC STENOSIS: ICD-10-CM

## 2023-02-13 DIAGNOSIS — K21.9 GASTROESOPHAGEAL REFLUX DISEASE WITHOUT ESOPHAGITIS: ICD-10-CM

## 2023-02-13 DIAGNOSIS — M85.89 OSTEOPENIA OF MULTIPLE SITES: ICD-10-CM

## 2023-02-13 DIAGNOSIS — E78.5 HYPERLIPIDEMIA LDL GOAL <100: ICD-10-CM

## 2023-02-13 DIAGNOSIS — I70.0 ATHEROSCLEROSIS OF ABDOMINAL AORTA: ICD-10-CM

## 2023-02-13 DIAGNOSIS — Z00.00 ROUTINE MEDICAL EXAM: Primary | ICD-10-CM

## 2023-02-13 DIAGNOSIS — E11.40 TYPE 2 DIABETES, CONTROLLED, WITH NEUROPATHY: ICD-10-CM

## 2023-02-13 DIAGNOSIS — E66.09 CLASS 1 OBESITY DUE TO EXCESS CALORIES WITH SERIOUS COMORBIDITY AND BODY MASS INDEX (BMI) OF 32.0 TO 32.9 IN ADULT: ICD-10-CM

## 2023-02-13 DIAGNOSIS — G56.02 CARPAL TUNNEL SYNDROME OF LEFT WRIST: ICD-10-CM

## 2023-02-13 PROCEDURE — 1126F PR PAIN SEVERITY QUANTIFIED, NO PAIN PRESENT: ICD-10-PCS | Mod: CPTII,S$GLB,, | Performed by: INTERNAL MEDICINE

## 2023-02-13 PROCEDURE — 1126F AMNT PAIN NOTED NONE PRSNT: CPT | Mod: CPTII,S$GLB,, | Performed by: INTERNAL MEDICINE

## 2023-02-13 PROCEDURE — 3288F FALL RISK ASSESSMENT DOCD: CPT | Mod: CPTII,S$GLB,, | Performed by: INTERNAL MEDICINE

## 2023-02-13 PROCEDURE — 99397 PR PREVENTIVE VISIT,EST,65 & OVER: ICD-10-PCS | Mod: S$GLB,,, | Performed by: INTERNAL MEDICINE

## 2023-02-13 PROCEDURE — 1101F PR PT FALLS ASSESS DOC 0-1 FALLS W/OUT INJ PAST YR: ICD-10-PCS | Mod: CPTII,S$GLB,, | Performed by: INTERNAL MEDICINE

## 2023-02-13 PROCEDURE — 1160F RVW MEDS BY RX/DR IN RCRD: CPT | Mod: CPTII,S$GLB,, | Performed by: INTERNAL MEDICINE

## 2023-02-13 PROCEDURE — 3288F PR FALLS RISK ASSESSMENT DOCUMENTED: ICD-10-PCS | Mod: CPTII,S$GLB,, | Performed by: INTERNAL MEDICINE

## 2023-02-13 PROCEDURE — 1160F PR REVIEW ALL MEDS BY PRESCRIBER/CLIN PHARMACIST DOCUMENTED: ICD-10-PCS | Mod: CPTII,S$GLB,, | Performed by: INTERNAL MEDICINE

## 2023-02-13 PROCEDURE — 1157F PR ADVANCE CARE PLAN OR EQUIV PRESENT IN MEDICAL RECORD: ICD-10-PCS | Mod: CPTII,S$GLB,, | Performed by: INTERNAL MEDICINE

## 2023-02-13 PROCEDURE — 99397 PER PM REEVAL EST PAT 65+ YR: CPT | Mod: S$GLB,,, | Performed by: INTERNAL MEDICINE

## 2023-02-13 PROCEDURE — 1159F MED LIST DOCD IN RCRD: CPT | Mod: CPTII,S$GLB,, | Performed by: INTERNAL MEDICINE

## 2023-02-13 PROCEDURE — 99999 PR PBB SHADOW E&M-EST. PATIENT-LVL III: ICD-10-PCS | Mod: PBBFAC,,, | Performed by: INTERNAL MEDICINE

## 2023-02-13 PROCEDURE — 1157F ADVNC CARE PLAN IN RCRD: CPT | Mod: CPTII,S$GLB,, | Performed by: INTERNAL MEDICINE

## 2023-02-13 PROCEDURE — 3072F LOW RISK FOR RETINOPATHY: CPT | Mod: CPTII,S$GLB,, | Performed by: INTERNAL MEDICINE

## 2023-02-13 PROCEDURE — 99999 PR PBB SHADOW E&M-EST. PATIENT-LVL III: CPT | Mod: PBBFAC,,, | Performed by: INTERNAL MEDICINE

## 2023-02-13 PROCEDURE — 1159F PR MEDICATION LIST DOCUMENTED IN MEDICAL RECORD: ICD-10-PCS | Mod: CPTII,S$GLB,, | Performed by: INTERNAL MEDICINE

## 2023-02-13 PROCEDURE — 1101F PT FALLS ASSESS-DOCD LE1/YR: CPT | Mod: CPTII,S$GLB,, | Performed by: INTERNAL MEDICINE

## 2023-02-13 PROCEDURE — 3072F PR LOW RISK FOR RETINOPATHY: ICD-10-PCS | Mod: CPTII,S$GLB,, | Performed by: INTERNAL MEDICINE

## 2023-02-13 NOTE — PROGRESS NOTES
HISTORY OF PRESENT ILLNESS:  Tonya Cohn is a 89 y.o. female who presents to the clinic today for a routine physical exam. Her last physical exam was approximately 1 years(s) ago.      Objective    PAST MEDICAL HISTORY:  Past Medical History:   Diagnosis Date    AR (allergic rhinitis)     Atherosclerosis of abdominal aorta     noted on CT scan 2011    Carpal tunnel syndrome of left wrist     Chronic kidney disease, stage 3     Diabetic peripheral neuropathy     Diverticulosis     History of colon polyps     History of diverticulitis of colon 2014    Hyperlipemia     Hypertension     Hypothyroidism     followed by endocrinology, Dr. Green    Mild aortic stenosis     OA (osteoarthritis) of knee     Obesity     Sciatica     Type II or unspecified type diabetes mellitus with neurological manifestations, uncontrolled(250.62)        PAST SURGICAL HISTORY:  Past Surgical History:   Procedure Laterality Date    CATARACT EXTRACTION Bilateral     COLONOSCOPY N/A 10/3/2017    Procedure: COLONOSCOPY;  Surgeon: Alin Gonzalez MD;  Location: [address]);  Service: Endoscopy;  Laterality: N/A;    COSMETIC SURGERY      HYSTERECTOMY      partial    JOINT REPLACEMENT Right     LUNG BIOPSY  in her 40's    TOTAL KNEE ARTHROPLASTY Right 2014    TKR       SOCIAL HISTORY:  Social History     Socioeconomic History    Marital status:     Number of children: 7   Occupational History    Occupation: retired - house cleaning   Tobacco Use    Smoking status: Former     Packs/day: 0.25     Years: 2.00     Pack years: 0.50     Types: Cigarettes     Start date: 1955     Quit date: 1962     Years since quittin.6    Smokeless tobacco: Former     Quit date: 1970   Substance and Sexual Activity    Alcohol use: Yes     Alcohol/week: 1.0 standard drink     Types: 1 Cans of beer per week     Comment: occasionally    Drug use: No    Sexual activity: Not Currently     Partners: Male   Other Topics  Concern    Are you pregnant or think you may be? No    Breast-feeding No     Social Determinants of Health     Financial Resource Strain: Low Risk     Difficulty of Paying Living Expenses: Not hard at all   Food Insecurity: No Food Insecurity    Worried About Running Out of Food in the Last Year: Never true    Ran Out of Food in the Last Year: Never true   Transportation Needs: No Transportation Needs    Lack of Transportation (Medical): No    Lack of Transportation (Non-Medical): No   Physical Activity: Inactive    Days of Exercise per Week: 0 days    Minutes of Exercise per Session: 0 min   Stress: No Stress Concern Present    Feeling of Stress : Not at all   Social Connections: Moderately Isolated    Frequency of Communication with Friends and Family: More than three times a week    Frequency of Social Gatherings with Friends and Family: More than three times a week    Attends Evangelical Services: More than 4 times per year    Active Member of Clubs or Organizations: No    Attends Club or Organization Meetings: Never    Marital Status:    Housing Stability: Low Risk     Unable to Pay for Housing in the Last Year: No    Number of Places Lived in the Last Year: 1    Unstable Housing in the Last Year: No       FAMILY HISTORY:  Family History   Problem Relation Age of Onset    Cancer Mother         shoulder tumor - cancer    Hypertension Mother     Glaucoma Mother     Heart disease Father         valve replacement    Hypertension Father     Heart attack Father     Diabetes Sister     Arthritis Sister         s/p knee surgery    Diabetes Brother     Heart disease Brother     Heart failure Brother     Stroke Brother     Arthritis Brother         back problems    Skin cancer Brother     Cancer Brother     Throat cancer Brother     No Known Problems Maternal Grandmother     No Known Problems Maternal Grandfather     No Known Problems Paternal Grandmother     No Known Problems Paternal Grandfather     Cancer Son          jaw    Cataracts Son     No Known Problems Maternal Aunt     No Known Problems Maternal Uncle     No Known Problems Paternal Aunt     No Known Problems Paternal Uncle     Melanoma Neg Hx     Eczema Neg Hx     Lupus Neg Hx     Psoriasis Neg Hx     Breast cancer Neg Hx     Colon cancer Neg Hx     Amblyopia Neg Hx     Blindness Neg Hx     Macular degeneration Neg Hx     Retinal detachment Neg Hx     Strabismus Neg Hx     Thyroid disease Neg Hx        ALLERGIES AND MEDICATIONS: updated and reviewed.  Review of patient's allergies indicates:   Allergen Reactions    Lipitor [atorvastatin]      Medication List with Changes/Refills   Current Medications    ACETAMINOPHEN (TYLENOL) 500 MG TABLET    Take 1 tablet (500 mg total) by mouth every 4 (four) hours as needed for Pain.    ASPIRIN 81 MG CHEW    Take 1 tablet (81 mg total) by mouth once daily.    BETAMETHASONE DIPROPIONATE (DIPROLENE) 0.05 % OINTMENT    Apply topically 2 (two) times daily.    BETAMETHASONE VALERATE 0.1% (VALISONE) 0.1 % CREA    Apply topically 2 (two) times daily.    BLOOD-GLUCOSE METER (FREESTYLE SYSTEM KIT) KIT    Use as instructed    CALCIUM CARBONATE (OS-MIRACLE) 500 MG CALCIUM (1,250 MG) TABLET    Take 1 tablet by mouth every other day.    CLOBETASOL (TEMOVATE) 0.05 % CREAM    Apply topically 2 (two) times daily. for 7 days    DICLOFENAC SODIUM (VOLTAREN) 1 % GEL    Apply 4 grams to effected area 4 x daily do not exceed 32 grams per day    FISH OIL-FAT ACID COMB.8- 1,200 MG (400 [id number]) CAP        FLUOCINONIDE 0.1 % CREA    as needed.     GLIPIZIDE (GLUCOTROL) 5 MG TABLET    TAKE 1 TABLET BY MOUTH TWICE DAILY WITH MEALS    HYDROCORTISONE 2.5 % CREAM    Apply topically 2 (two) times daily.    HYDROXYZINE HCL (ATARAX) 10 MG TAB    Take 1 tablet (10 mg total) by mouth 3 (three) times daily as needed for Itching.    HYDROXYZINE HCL (ATARAX) 25 MG TABLET    Take 1 tablet (25 mg total) by mouth every 4 (four) hours as needed for Itching.     LEVOTHYROXINE (SYNTHROID) 88 MCG TABLET    TAKE 1 TABLET(88 MCG) BY MOUTH EVERY DAY    LOSARTAN (COZAAR) 50 MG TABLET    Take 1 tablet (50 mg total) by mouth once daily.    LOVASTATIN (MEVACOR) 10 MG TABLET    TAKE 1 TABLET(10 MG) BY MOUTH EVERY DAY    MULTIVIT,CALC,MINS/IRON/FOLIC (ONE-A-DAY WOMENS FORMULA ORAL)    Take 1 tablet by mouth once daily.    PREDNISONE (DELTASONE) 10 MG TABLET    Take 1 tablet (10 mg total) by mouth once daily. Take 4 tabs x 3 days, then Take 2 tabs x 3 days, then Take 1 tab x 3 days.    TIZANIDINE (ZANAFLEX) 4 MG TABLET    Take 1 tablet (4 mg total) by mouth every 8 (eight) hours as needed (muscle spasms).    TRIAMCINOLONE ACETONIDE 0.5% (KENALOG) 0.5 % CREA    APPLY EXTERNALLY TO THE AFFECTED AREA TWICE DAILY as needed for flares; do not use for more than 2 weeks in a row without one week break          CARE TEAM:  Patient Care Team:  Tere Hughes MD as PCP - General (Internal Medicine)  Preston Whitfield MD as Consulting Physician (Cardiology)  Criss Baird OD (Inactive) as Consulting Physician (Optometry)  Marvin Roach MD as Consulting Physician (Rheumatology)  Wendy Garcia LPN as Licensed Practical Nurse             SCREENING HISTORY:  Health Maintenance         Date Due Completion Date    Eye Exam 04/05/2023 4/5/2022    Override on 9/20/2016: Done (Dr. Kole Lucero- negative for diabetic retinopathy bilaterally)    Override on 9/9/2015: Done (No diabetic retinopathy)    Override on 8/14/2014: Done (no diabetic retinopathy; Dr. Lucero)    Override on 4/21/2014: Done (Pt states her eye exam is scheduled for next month)    Override on 4/1/2013: Done    Diabetes Urine Screening 08/02/2023 8/2/2022    Lipid Panel 08/02/2023 8/2/2022    Override on 2/26/2016: Done ([facility] - total 189, TG 62, , HDL 76)    Hemoglobin A1c 08/06/2023 2/6/2023    DEXA Scan 06/15/2026 6/15/2022    TETANUS VACCINE 12/06/2028 12/6/2018              REVIEW  "OF SYSTEMS:   The patient reports : good dietary habits (she likes to cook).  The patient reports  : that they do not exercise regularly, but stay active. She enjoys going to the [employer] and [employer].  Review of Systems   Constitutional:  Negative for chills and fever.   HENT:  Negative for congestion.    Respiratory:  Negative for cough and shortness of breath.    Cardiovascular:  Negative for chest pain and leg swelling.   Gastrointestinal:  Negative for abdominal pain.   Genitourinary:  Negative for dysuria.   Musculoskeletal:  Positive for arthralgias (mild; stable).   Neurological:  Positive for numbness (left hand; mostly at night; using a topical arthritis rub; did not start brace as discussed at last ov).    ROS (Optional)-: no pelvic pain  Breast ROS (Optional)-: negative for breast lumps/discharge          Physical Examination: 274}  Vitals:    02/13/23 0933   BP: 138/70   Pulse: 77   Temp: 97.5 °F (36.4 °C)     Weight: 88.9 kg (195 lb 14.1 oz)   Height: 5' 6" (167.6 cm)   Body mass index is 31.62 kg/m².        General appearance - alert, well appearing, and in no distress, obese, exam limited as patient did not get onto the examination table  Psychiatric - alert, oriented to person, place, and time, normal behavior, speech, dress, motor activity and thought process  Eyes - pupils equal and reactive, extraocular eye movements intact, sclera anicteric  Mouth - not examined  Neck - supple, no significant adenopathy, carotids upstroke normal bilaterally, no bruits  Lymphatics - no palpable cervical lymphadenopathy  Chest - clear to auscultation, no wheezes, rales or rhonchi, symmetric air entry  Heart - normal rate and regular rhythm  Neurological - alert, normal speech, no focal findings; cranial nerves II through XII intact  Musculoskeletal - patient noted to have Moderate osteoarthritic changes to both knee joints. No joint effusions noted.  Extremities - no pedal edema noted  Skin - normal coloration, no " suspicious skin lesions      Labs:  Lab Results   Component Value Date    HGBA1C 7.1 (H) 02/06/2023    HGBA1C 5.9 (H) 08/02/2022    HGBA1C 6.1 (H) 01/25/2022      Lab Results   Component Value Date    CHOL 203 (H) 08/02/2022    CHOL 193 07/02/2021    CHOL 207 (H) 07/16/2020     Lab Results   Component Value Date    LDLCALC 119.4 08/02/2022    LDLCALC 117.0 07/02/2021    LDLCALC 122.2 07/16/2020         ASSESSMENT AND PLAN:  274}  1. Routine medical exam  Counseled on age appropriate medical preventative services including age appropriate cancer screenings, age appropriate eye and dental exams, over all nutritional health, need for a consistent exercise regimen, and an over all push towards maintaining a vigorous and active lifestyle.  Counseled on age appropriate vaccines and discussed upcoming health care needs based on age/gender. Discussed good sleep hygiene and stress management.    2. Type 2 diabetes, controlled, with neuropathy  Lab Results   Component Value Date    HGBA1C 7.1 (H) 02/06/2023     Diabetes is under good control at this time for age and comorbid conditions. Her A1c is up as she has stopped her metformin and glimepiride since her last office visit with me ( I had instructed to stop metformin and decrease glimepiride to once daily but she stopped both).   We discussed diabetic diet and regular exercise.   We discussed home blood sugar monitoring, if appropriate - the patient does not need to test daily but can test only as needed.   We discussed low sugar/low carbohydrate diet and regular exercise to prevent progression. No need for prescription medication at this time.  Diabetic complications addressed: Neuropathy pain controlled.   Patient was counseled on the need for yearly eye exam to screen for/monitor diabetic retinopathy and yearly diabetic foot exam.  -     Hemoglobin A1C; Future; Expected date: 08/13/2023  -     Microalbumin/Creatinine Ratio, Urine; Future; Expected date: 08/13/2023    3.  Essential hypertension  BP Readings from Last 1 Encounters:   02/13/23 138/70      The current medical regimen is effective;  continue present plan and medications. Recommended patient to check home readings to monitor and see me for followup as scheduled or sooner as needed.   Discussed sodium restriction, maintaining ideal body weight and regular exercise program as physiologic means to continue to achieve blood pressure control in addition to medication compliance.  Patient was educated that both decongestant and anti-inflammatory medication may raise blood pressure.  The patient is not active on the digital hypertension program.  -     CBC Auto Differential; Future; Expected date: 08/13/2023    4. Hyperlipidemia LDL goal <100  Lab Results   Component Value Date    CHOL 203 (H) 08/02/2022     Lab Results   Component Value Date    HDL 72 08/02/2022     Lab Results   Component Value Date    LDLCALC 119.4 08/02/2022     Lab Results   Component Value Date    TRIG 58 08/02/2022     Lab Results   Component Value Date    LDLCALC 119.4 08/02/2022     We discussed low fat diet and regular exercise.The current medical regimen is effective;  continue present plan and medications.   -     Lipid Panel; Future; Expected date: 08/13/2023  -     Comprehensive Metabolic Panel; Future; Expected date: 08/13/2023    5. Mild aortic stenosis  Stable. Asymptomatic. Observe.  Overview:  - followed by cardiology,  Dr. Whitfield      6. Atherosclerosis of abdominal aorta  Patient with Atherosclerosis of the Aorta.  Stable/asymptomatic. Currently stable on lipid lowering medication and blood pressure monitoring.  Overview:  noted on CT scan 1/17/2011      7. Hypothyroidism (acquired)  Lab Results   Component Value Date    TSH 1.280 08/02/2022     Patient is clinically euthyroid. Continue current regimen.  -     TSH; Future; Expected date: 08/13/2023    8. Gastroesophageal reflux disease without esophagitis  Symptoms controlled: yes - takes  medication occasionally as needed.   Reflux precautions discussed (eliminate tobacco if a smoker; minimize caffeine, chocolate and red/white peppermint intake; avoid heavy and spicy meals; don't lay down within 2-3 hours after eating; minimize the intake of NSAIDs). Medication as needed.   Patient asked to take medication breaks, if possible - discussed chronic use can limit calcium absorption (which can lead to osteopenia/osteoporosis), increases the risk for intestinal infections, and can cause kidney damage. There are also some newer studies that show possible increased risk of mortality.    9. Osteopenia of multiple sites  We discussed adequate calcium and vitamin D supplementation. We discussed fall precautions. She is up to date on her BMD. No need for prescription medication at this time.  Overview:  -3/2018 - No need for Rx tx at this time.  6/2022 - No need for Rx tx at this time.          10. Class 1 obesity due to excess calories with serious comorbidity and body mass index (BMI) of 32.0 to 32.9 in adult  BMI Readings from Last 3 Encounters:   02/13/23 31.62 kg/m²   11/28/22 30.30 kg/m²   09/17/22 30.18 kg/m²     The patient is asked to make an attempt to improve diet and exercise patterns to aid in medical management of this problem.    11. Carpal tunnel syndrome of left wrist  Discussed wearing a wrist brace at night. Consider seeing ortho if symptoms worsen or persist.             Orders Placed This Encounter   Procedures    Hemoglobin A1C    Lipid Panel    Comprehensive Metabolic Panel    CBC Auto Differential    TSH    Microalbumin/Creatinine Ratio, Urine      Follow up in about 6 months (around 8/13/2023), or if symptoms worsen or fail to improve, for follow up chronic medical conditions.. or sooner as needed.

## 2023-03-01 ENCOUNTER — TELEPHONE (OUTPATIENT)
Dept: FAMILY MEDICINE | Facility: CLINIC | Age: 88
End: 2023-03-01
Payer: MEDICARE

## 2023-03-01 DIAGNOSIS — N60.02 BREAST CYST, LEFT: Primary | ICD-10-CM

## 2023-03-01 DIAGNOSIS — Z12.31 ENCOUNTER FOR SCREENING MAMMOGRAM FOR MALIGNANT NEOPLASM OF BREAST: ICD-10-CM

## 2023-03-01 NOTE — TELEPHONE ENCOUNTER
----- Message from Tere Hughes MD sent at 2/13/2023  1:43 PM CST -----  Please call patient to schedule 6 mos follow up appointment with labs.

## 2023-03-26 DIAGNOSIS — I12.9 BENIGN HYPERTENSION WITH CHRONIC KIDNEY DISEASE, STAGE III: ICD-10-CM

## 2023-03-26 DIAGNOSIS — N18.30 BENIGN HYPERTENSION WITH CHRONIC KIDNEY DISEASE, STAGE III: ICD-10-CM

## 2023-03-26 RX ORDER — LOSARTAN POTASSIUM 50 MG/1
TABLET ORAL
Qty: 90 TABLET | Refills: 1 | Status: SHIPPED | OUTPATIENT
Start: 2023-03-26 | End: 2023-09-20

## 2023-03-26 NOTE — TELEPHONE ENCOUNTER
No new care gaps identified.  [facility] Embedded Care Gaps. Reference number: [id number]. 3/26/2023   12:58:40 PM CDT

## 2023-03-27 NOTE — TELEPHONE ENCOUNTER
Refill Decision Note   Tonya Lalit  is requesting a refill authorization.  Brief Assessment and Rationale for Refill:  Approve     Medication Therapy Plan:       Medication Reconciliation Completed: No   Comments:     No Care Gaps recommended.     Note composed:9:24 PM 03/26/2023

## 2023-05-02 DIAGNOSIS — E03.9 ACQUIRED HYPOTHYROIDISM: ICD-10-CM

## 2023-05-02 RX ORDER — LEVOTHYROXINE SODIUM 88 UG/1
TABLET ORAL
Qty: 90 TABLET | Refills: 1 | Status: SHIPPED | OUTPATIENT
Start: 2023-05-02 | End: 2023-09-15

## 2023-05-02 NOTE — TELEPHONE ENCOUNTER
Care Due:                  Date            Visit Type   Department     Provider  --------------------------------------------------------------------------------                                [facility]                              PRIMARY      MED/ INTERNAL  [facility]  Last Visit: 02-      CARE ([facility])   MED/ PEDS      Roxanne Hughes                              [facility]                              PRIMARY      MED/ INTERNAL  Laurentia Sandee  Next Visit: 08-      CARE ([facility])   MED/ PEDS      Roxanne Hughes                                                            Last  Test          Frequency    Reason                     Performed    Due Date  --------------------------------------------------------------------------------    CMP.........  12 months..  losartan, lovastatin.....  08- 07-    Lipid Panel.  12 months..  lovastatin...............  08- 07-    [facility] Embedded Care Due Messages. Reference number: [id number].   5/02/2023 10:43:59 AM CDT

## 2023-05-02 NOTE — TELEPHONE ENCOUNTER
----- Message from Prachi Dupree sent at 5/2/2023 11:19 AM CDT -----  Regarding: [id number]  Type: RX Refill Request    Who Called: self    Have you contacted your pharmacy:yes     Refill or New Rx:refill    RX Name and Strength:levothyroxine (SYNTHROID) 88 MCG tablet      Preferred Pharmacy with phone number:  [id number] - [place] [address] AT [facility] [address] [place] [id number] [address]  Phone: [phone] Fax: [phone]          Local or Mail Order:local    Ordering Provider:Ellen    Would the patient rather a call back or a response via My [facility]? Call back     Best Call Back Number:[phone]      Additional Information: 90 day refills

## 2023-06-21 NOTE — PROGRESS NOTES
"Tonya Cohn presented for a  Medicare AWV and comprehensive Health Risk Assessment today. The following components were reviewed and updated:    · Medical history  · Family History  · Social history  · Allergies and Current Medications  · Health Risk Assessment  · Health Maintenance  · Care Team     ** See Completed Assessments for Annual Wellness Visit within the encounter summary.**       The following assessments were completed:  · Living Situation  · CAGE  · Depression Screening  · Timed Get Up and Go  · Whisper Test  · Cognitive Function Screening  · Nutrition Screening  · ADL Screening  · PAQ Screening    Vitals:    06/24/19 1003   BP: 130/62   BP Location: Right arm   Patient Position: Sitting   BP Method: Small (Manual)   Pulse: 71   Resp: 16   Temp: 97.9 °F (36.6 °C)   TempSrc: Oral   SpO2: 97%   Weight: 90.5 kg (199 lb 8.3 oz)   Height: 5' 6" (1.676 m)     Body mass index is 32.2 kg/m².      Diagnoses and health risks identified today and associated recommendations/orders:    1. Encounter for preventive health examination  Provided Tonya with a 5-10 year written screening schedule and personal prevention plan. Recommendations were developed using the USPSTF age appropriate recommendations. Education, counseling, and referrals were provided as needed. After Visit Summary printed and given to patient which includes a list of additional screenings\tests needed.    2. Need for shingles vaccine  - varicella-zoster [id number], PF, (SHINGRIX, PF,) 50 mcg/0.5 mL injection; Inject 0.5 mLs into the muscle once. for 1 dose  Dispense: 0.5 mL; Refill: 0  - Sent to preferred pharmacy    3. DDD (degenerative disc disease), lumbar  - Stable. Continue present management. Followed by PCP.     4. Other osteoarthritis of spine, cervical region  - Stable. Continue present management. Followed by PCP.     5. Bilateral dry eyes  - Stable. Continue present management. Followed by Dr. Baird.    6. Pseudophakia of both eyes  - " Stable. Continue present management. Followed by Dr. Baird.    7. Hyperlipidemia LDL goal <100  - Stable. Continue present management. Followed by PCP. Last labs 4/9/2019.    8. Heart murmur  - Stable. Continue present management. Followed by Dr. Whitfield.     9. Mild aortic stenosis  - Stable. Continue present management. Followed by Dr. Whitfield.     10. Atherosclerosis of abdominal aorta  - Stable. Continue present management. Followed by Dr. Whitfield.     11. Type 2 diabetes, controlled, with neuropathy  - Stable. Continue present management. Followed by PCP. Last labs 4/9/2019.    12. Hypothyroidism (acquired)  - Stable. Continue present management. Followed by PCP.     13. Class 1 obesity due to excess calories with serious comorbidity and body mass index (BMI) of 32.0 to 32.9 in adult  - Stable. Continue present management. Followed by PCP.   - Counseled to exercise 150 minutes per week    14. Gastroesophageal reflux disease without esophagitis  - Stable. Continue present management. Followed by PCP.     15. History of colon polyps  - Stable. Continue present management. Followed by PCP. Next colonoscopy due 10/3/2022.    16. Primary osteoarthritis of both knees  - Stable. Continue present management. Followed by PCP.     17. Spondylolisthesis, grade 1  - Stable. Continue present management. Followed by PCP.     18. Osteopenia of multiple sites  - Stable. Continue present management. Followed by PCP.     19. Allergic rhinitis, unspecified seasonality, unspecified trigger  - Stable. Continue present management. Followed by PCP.     20. Impaired functional mobility and activity tolerance  - Stable. Continue present management. Followed by PCP.     21. Essential Hypertension  - BP controlled presently - reviewed anti-hypertensive regimen - continue current therapy      Follow up in about 1 year (around 6/24/2020) for Annual Wellness Visit.    Mireya Gomez, JOSHUA   Suturegard Intro: Intraoperative tissue expansion was performed, utilizing the SUTUREGARD device, in order to reduce wound tension.

## 2023-08-03 ENCOUNTER — OFFICE VISIT (OUTPATIENT)
Dept: URGENT CARE | Facility: CLINIC | Age: 88
End: 2023-08-03
Payer: MEDICARE

## 2023-08-03 VITALS
BODY MASS INDEX: 31.34 KG/M2 | HEART RATE: 62 BPM | SYSTOLIC BLOOD PRESSURE: 151 MMHG | TEMPERATURE: 98 F | RESPIRATION RATE: 16 BRPM | OXYGEN SATURATION: 98 % | HEIGHT: 66 IN | WEIGHT: 195 LBS | DIASTOLIC BLOOD PRESSURE: 66 MMHG

## 2023-08-03 DIAGNOSIS — M25.551 PAIN OF RIGHT HIP: Primary | ICD-10-CM

## 2023-08-03 PROCEDURE — 99214 PR OFFICE/OUTPT VISIT, EST, LEVL IV, 30-39 MIN: ICD-10-PCS | Mod: S$GLB,,, | Performed by: INTERNAL MEDICINE

## 2023-08-03 PROCEDURE — 99214 OFFICE O/P EST MOD 30 MIN: CPT | Mod: S$GLB,,, | Performed by: INTERNAL MEDICINE
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
## 2023-08-03 NOTE — PATIENT INSTRUCTIONS
Please go to [facility] for your X-ray. I will call you with the results. Please use a wheelchair until I call you regarding your results.

## 2023-08-06 ENCOUNTER — TELEPHONE (OUTPATIENT)
Dept: URGENT CARE | Facility: CLINIC | Age: 88
End: 2023-08-06
Payer: MEDICARE

## 2023-08-25 ENCOUNTER — TELEPHONE (OUTPATIENT)
Dept: FAMILY MEDICINE | Facility: CLINIC | Age: 88
End: 2023-08-25

## 2023-08-25 ENCOUNTER — PATIENT OUTREACH (OUTPATIENT)
Dept: ADMINISTRATIVE | Facility: HOSPITAL | Age: 88
End: 2023-08-25
Payer: MEDICARE

## 2023-08-25 ENCOUNTER — OFFICE VISIT (OUTPATIENT)
Dept: FAMILY MEDICINE | Facility: CLINIC | Age: 88
End: 2023-08-25
Payer: MEDICARE

## 2023-08-25 VITALS
WEIGHT: 199.5 LBS | SYSTOLIC BLOOD PRESSURE: 138 MMHG | HEIGHT: 66 IN | BODY MASS INDEX: 32.06 KG/M2 | TEMPERATURE: 98 F | HEART RATE: 93 BPM | OXYGEN SATURATION: 97 % | DIASTOLIC BLOOD PRESSURE: 62 MMHG

## 2023-08-25 DIAGNOSIS — I70.0 ATHEROSCLEROSIS OF ABDOMINAL AORTA: ICD-10-CM

## 2023-08-25 DIAGNOSIS — M54.9 BACK PAIN, UNSPECIFIED BACK LOCATION, UNSPECIFIED BACK PAIN LATERALITY, UNSPECIFIED CHRONICITY: ICD-10-CM

## 2023-08-25 DIAGNOSIS — M85.89 OSTEOPENIA OF MULTIPLE SITES: ICD-10-CM

## 2023-08-25 DIAGNOSIS — L29.9 ITCH: ICD-10-CM

## 2023-08-25 DIAGNOSIS — M17.0 PRIMARY OSTEOARTHRITIS OF BOTH KNEES: ICD-10-CM

## 2023-08-25 DIAGNOSIS — E03.9 HYPOTHYROIDISM (ACQUIRED): ICD-10-CM

## 2023-08-25 DIAGNOSIS — I10 ESSENTIAL HYPERTENSION: ICD-10-CM

## 2023-08-25 DIAGNOSIS — E66.09 CLASS 1 OBESITY DUE TO EXCESS CALORIES WITH SERIOUS COMORBIDITY AND BODY MASS INDEX (BMI) OF 32.0 TO 32.9 IN ADULT: ICD-10-CM

## 2023-08-25 DIAGNOSIS — E11.40 TYPE 2 DIABETES, CONTROLLED, WITH NEUROPATHY: Primary | ICD-10-CM

## 2023-08-25 PROCEDURE — 1157F ADVNC CARE PLAN IN RCRD: CPT | Mod: CPTII,S$GLB,, | Performed by: INTERNAL MEDICINE

## 2023-08-25 PROCEDURE — 1125F AMNT PAIN NOTED PAIN PRSNT: CPT | Mod: CPTII,S$GLB,, | Performed by: INTERNAL MEDICINE

## 2023-08-25 PROCEDURE — 3072F PR LOW RISK FOR RETINOPATHY: ICD-10-PCS | Mod: CPTII,S$GLB,, | Performed by: INTERNAL MEDICINE

## 2023-08-25 PROCEDURE — 1159F MED LIST DOCD IN RCRD: CPT | Mod: CPTII,S$GLB,, | Performed by: INTERNAL MEDICINE

## 2023-08-25 PROCEDURE — 1125F PR PAIN SEVERITY QUANTIFIED, PAIN PRESENT: ICD-10-PCS | Mod: CPTII,S$GLB,, | Performed by: INTERNAL MEDICINE

## 2023-08-25 PROCEDURE — 1101F PT FALLS ASSESS-DOCD LE1/YR: CPT | Mod: CPTII,S$GLB,, | Performed by: INTERNAL MEDICINE

## 2023-08-25 PROCEDURE — 1159F PR MEDICATION LIST DOCUMENTED IN MEDICAL RECORD: ICD-10-PCS | Mod: CPTII,S$GLB,, | Performed by: INTERNAL MEDICINE

## 2023-08-25 PROCEDURE — 99214 OFFICE O/P EST MOD 30 MIN: CPT | Mod: S$GLB,,, | Performed by: INTERNAL MEDICINE

## 2023-08-25 PROCEDURE — 1101F PR PT FALLS ASSESS DOC 0-1 FALLS W/OUT INJ PAST YR: ICD-10-PCS | Mod: CPTII,S$GLB,, | Performed by: INTERNAL MEDICINE

## 2023-08-25 PROCEDURE — 99214 PR OFFICE/OUTPT VISIT, EST, LEVL IV, 30-39 MIN: ICD-10-PCS | Mod: S$GLB,,, | Performed by: INTERNAL MEDICINE

## 2023-08-25 PROCEDURE — 3288F PR FALLS RISK ASSESSMENT DOCUMENTED: ICD-10-PCS | Mod: CPTII,S$GLB,, | Performed by: INTERNAL MEDICINE

## 2023-08-25 PROCEDURE — 1160F PR REVIEW ALL MEDS BY PRESCRIBER/CLIN PHARMACIST DOCUMENTED: ICD-10-PCS | Mod: CPTII,S$GLB,, | Performed by: INTERNAL MEDICINE

## 2023-08-25 PROCEDURE — 1160F RVW MEDS BY RX/DR IN RCRD: CPT | Mod: CPTII,S$GLB,, | Performed by: INTERNAL MEDICINE

## 2023-08-25 PROCEDURE — 99999 PR PBB SHADOW E&M-EST. PATIENT-LVL IV: CPT | Mod: PBBFAC,,, | Performed by: INTERNAL MEDICINE

## 2023-08-25 PROCEDURE — 3072F LOW RISK FOR RETINOPATHY: CPT | Mod: CPTII,S$GLB,, | Performed by: INTERNAL MEDICINE

## 2023-08-25 PROCEDURE — 1157F PR ADVANCE CARE PLAN OR EQUIV PRESENT IN MEDICAL RECORD: ICD-10-PCS | Mod: CPTII,S$GLB,, | Performed by: INTERNAL MEDICINE

## 2023-08-25 PROCEDURE — 3288F FALL RISK ASSESSMENT DOCD: CPT | Mod: CPTII,S$GLB,, | Performed by: INTERNAL MEDICINE

## 2023-08-25 PROCEDURE — 99999 PR PBB SHADOW E&M-EST. PATIENT-LVL IV: ICD-10-PCS | Mod: PBBFAC,,, | Performed by: INTERNAL MEDICINE

## 2023-08-25 RX ORDER — TRIAMCINOLONE ACETONIDE 5 MG/G
CREAM TOPICAL
Qty: 454 G | Refills: 0 | Status: SHIPPED | OUTPATIENT
Start: 2023-08-25

## 2023-08-25 RX ORDER — TRIAMCINOLONE ACETONIDE 5 MG/G
CREAM TOPICAL
Qty: 454 G | Refills: 2 | Status: CANCELLED | OUTPATIENT
Start: 2023-08-25

## 2023-08-25 NOTE — TELEPHONE ENCOUNTER
No care due was identified.  [facility] Embedded Care Due Messages. Reference number: [id number].   8/25/2023 2:14:03 PM CDT
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
## 2023-08-25 NOTE — PROGRESS NOTES
Patient had health maintenance eye exam  done outside [facility] . Message sent to cc coordinator for results to update chart.

## 2023-08-30 ENCOUNTER — TELEPHONE (OUTPATIENT)
Dept: FAMILY MEDICINE | Facility: CLINIC | Age: 88
End: 2023-08-30
Payer: MEDICARE

## 2023-08-30 ENCOUNTER — PATIENT OUTREACH (OUTPATIENT)
Dept: ADMINISTRATIVE | Facility: HOSPITAL | Age: 88
End: 2023-08-30
Payer: MEDICARE

## 2023-08-30 DIAGNOSIS — M54.9 BACK PAIN, UNSPECIFIED BACK LOCATION, UNSPECIFIED BACK PAIN LATERALITY, UNSPECIFIED CHRONICITY: Primary | ICD-10-CM

## 2023-08-30 NOTE — TELEPHONE ENCOUNTER
----- Message from Jennyfer June sent at 8/30/2023  2:03 PM CDT -----  Type:  Patient Returning Call    Who Called: Grand Daughter     Who Left Message for Patient: Aggie Gandara    Does the patient know what this is regarding?:PT Referral    Would the patient rather a call back or a response via [facility]? Call    Best Call Back Number:[phone]    Additional Information:
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
## 2023-08-31 ENCOUNTER — TELEPHONE (OUTPATIENT)
Dept: FAMILY MEDICINE | Facility: CLINIC | Age: 88
End: 2023-08-31
Payer: MEDICARE

## 2023-08-31 NOTE — TELEPHONE ENCOUNTER
Spoke with patient's daughter. Patient was seen at [facility] in [facility].

## 2023-08-31 NOTE — TELEPHONE ENCOUNTER
----- Message from Wendy Garcia LPN sent at 8/25/2023  2:17 PM CDT -----  Clarification needed: is this [facility] [place] I didn't find anyone with that name on [facility].  ----- Message -----  From: Hunter Goldman MA  Sent: 8/25/2023   2:10 PM CDT  To: MILLA Gooden, Patient reports that   recently had a/an eye exam with Dr. Oconnell office on [facility]. Can you please update chart with results? Thank you.

## 2023-09-15 DIAGNOSIS — E03.9 ACQUIRED HYPOTHYROIDISM: ICD-10-CM

## 2023-09-15 RX ORDER — LEVOTHYROXINE SODIUM 88 UG/1
TABLET ORAL
Qty: 90 TABLET | Refills: 3 | Status: SHIPPED | OUTPATIENT
Start: 2023-09-15

## 2023-09-16 NOTE — TELEPHONE ENCOUNTER
No care due was identified.  [facility] Embedded Care Due Messages. Reference number: [id number].   9/15/2023 8:58:19 PM CDT

## 2023-09-20 DIAGNOSIS — N18.30 BENIGN HYPERTENSION WITH CHRONIC KIDNEY DISEASE, STAGE III: ICD-10-CM

## 2023-09-20 DIAGNOSIS — E78.5 HYPERLIPIDEMIA LDL GOAL <100: ICD-10-CM

## 2023-09-20 DIAGNOSIS — I12.9 BENIGN HYPERTENSION WITH CHRONIC KIDNEY DISEASE, STAGE III: ICD-10-CM

## 2023-09-20 DIAGNOSIS — E03.9 ACQUIRED HYPOTHYROIDISM: ICD-10-CM

## 2023-09-20 RX ORDER — LEVOTHYROXINE SODIUM 88 UG/1
TABLET ORAL
Qty: 90 TABLET | Refills: 3 | OUTPATIENT
Start: 2023-09-20

## 2023-09-20 RX ORDER — LOVASTATIN 10 MG/1
TABLET ORAL
Qty: 90 TABLET | Refills: 3 | Status: SHIPPED | OUTPATIENT
Start: 2023-09-20

## 2023-09-20 RX ORDER — LOSARTAN POTASSIUM 50 MG/1
TABLET ORAL
Qty: 90 TABLET | Refills: 3 | Status: SHIPPED | OUTPATIENT
Start: 2023-09-20

## 2023-09-20 NOTE — TELEPHONE ENCOUNTER
No care due was identified.  [facility] Embedded Care Due Messages. Reference number: [id number].   9/20/2023 6:08:03 AM CDT

## 2023-09-20 NOTE — TELEPHONE ENCOUNTER
Refill Decision Note   Tonya Lalit  is requesting a refill authorization.  Brief Assessment and Rationale for Refill:  Approve  Quick Discontinue     Medication Therapy Plan:  Levothyroxine: Receipt confirmed by pharmacy (9/15/2023 10:37 PM CDT)    Medication Reconciliation Completed: No   Comments:     No Care Gaps recommended.     Note composed:9:45 AM 09/20/2023

## 2023-10-02 NOTE — PROGRESS NOTES
CC: Pain of the Right Knee      HPI: Pt with pain in the right leg that starts in the buttock and radiates down the leg for the past week or so. She reports that the pain is worse when she lays down. She has not tried any medication or treatments. She has a history of a right knee replacement and she was concerned that the pain was coming from her knee. She denies knee pain. She is ambulating with a cane. There is not a limp.    ROS  General: denies fever and chills  Resp: no c/o sob  CVS: no c/o cp  MSK: c/o right leg pain which starts in the buttock and radiates down the leg    PE  General: AAOx3, pleasant and cooperative  Resp: respirations even and unlabored  MSK: right knee exam  0 degrees extension  120 degrees flexion  No warmth or erythema   - effusion    Xray:  Reviewed by me: Right-sided knee arthroplasty identified.  The position alignment is satisfactory and unchanged as compared to the previous study.  Significant DJD and joint space narrowing identified involving the left knee.  No fracture or bone destruction identified    Assessment:  Right leg pain due to sciatica    Plan:  Medrol dose pack for sciatica  F/u with back and spine if no improvement    
no

## 2023-10-03 ENCOUNTER — PATIENT MESSAGE (OUTPATIENT)
Dept: FAMILY MEDICINE | Facility: CLINIC | Age: 88
End: 2023-10-03
Payer: MEDICARE

## 2023-12-05 LAB
LEFT EYE DM RETINOPATHY: NEGATIVE
RIGHT EYE DM RETINOPATHY: NEGATIVE

## 2023-12-18 ENCOUNTER — PATIENT OUTREACH (OUTPATIENT)
Dept: ADMINISTRATIVE | Facility: HOSPITAL | Age: 88
End: 2023-12-18
Payer: MEDICARE

## 2023-12-18 RX ORDER — TRIAMCINOLONE ACETONIDE 0.25 MG/G
CREAM TOPICAL 2 TIMES DAILY
COMMUNITY
Start: 2023-08-28

## 2024-03-04 ENCOUNTER — OFFICE VISIT (OUTPATIENT)
Dept: FAMILY MEDICINE | Facility: CLINIC | Age: 89
End: 2024-03-04
Payer: MEDICARE

## 2024-03-04 VITALS
DIASTOLIC BLOOD PRESSURE: 76 MMHG | SYSTOLIC BLOOD PRESSURE: 122 MMHG | OXYGEN SATURATION: 97 % | TEMPERATURE: 98 F | BODY MASS INDEX: 31.82 KG/M2 | HEART RATE: 131 BPM | WEIGHT: 198 LBS | HEIGHT: 66 IN

## 2024-03-04 DIAGNOSIS — L20.89 OTHER ATOPIC DERMATITIS: ICD-10-CM

## 2024-03-04 DIAGNOSIS — E78.5 HYPERLIPIDEMIA LDL GOAL <100: ICD-10-CM

## 2024-03-04 DIAGNOSIS — K21.9 GASTROESOPHAGEAL REFLUX DISEASE WITHOUT ESOPHAGITIS: ICD-10-CM

## 2024-03-04 DIAGNOSIS — I10 ESSENTIAL HYPERTENSION: ICD-10-CM

## 2024-03-04 DIAGNOSIS — E03.9 HYPOTHYROIDISM (ACQUIRED): ICD-10-CM

## 2024-03-04 DIAGNOSIS — Z00.00 ROUTINE MEDICAL EXAM: Primary | ICD-10-CM

## 2024-03-04 DIAGNOSIS — I70.0 ATHEROSCLEROSIS OF ABDOMINAL AORTA: ICD-10-CM

## 2024-03-04 DIAGNOSIS — M85.89 OSTEOPENIA OF MULTIPLE SITES: ICD-10-CM

## 2024-03-04 DIAGNOSIS — J30.9 ALLERGIC RHINITIS, UNSPECIFIED SEASONALITY, UNSPECIFIED TRIGGER: ICD-10-CM

## 2024-03-04 DIAGNOSIS — E11.40 TYPE 2 DIABETES, CONTROLLED, WITH NEUROPATHY: ICD-10-CM

## 2024-03-04 DIAGNOSIS — Z99.89 AMBULATES WITH CANE: ICD-10-CM

## 2024-03-04 DIAGNOSIS — E66.09 CLASS 1 OBESITY DUE TO EXCESS CALORIES WITH SERIOUS COMORBIDITY AND BODY MASS INDEX (BMI) OF 32.0 TO 32.9 IN ADULT: ICD-10-CM

## 2024-03-04 DIAGNOSIS — Z23 FLU VACCINE NEED: ICD-10-CM

## 2024-03-04 PROCEDURE — 1159F MED LIST DOCD IN RCRD: CPT | Mod: CPTII,S$GLB,, | Performed by: INTERNAL MEDICINE

## 2024-03-04 PROCEDURE — 1125F AMNT PAIN NOTED PAIN PRSNT: CPT | Mod: CPTII,S$GLB,, | Performed by: INTERNAL MEDICINE

## 2024-03-04 PROCEDURE — 3288F FALL RISK ASSESSMENT DOCD: CPT | Mod: CPTII,S$GLB,, | Performed by: INTERNAL MEDICINE

## 2024-03-04 PROCEDURE — 99999 PR PBB SHADOW E&M-EST. PATIENT-LVL IV: CPT | Mod: PBBFAC,,, | Performed by: INTERNAL MEDICINE

## 2024-03-04 PROCEDURE — 99397 PER PM REEVAL EST PAT 65+ YR: CPT | Mod: S$GLB,,, | Performed by: INTERNAL MEDICINE

## 2024-03-04 PROCEDURE — 1160F RVW MEDS BY RX/DR IN RCRD: CPT | Mod: CPTII,S$GLB,, | Performed by: INTERNAL MEDICINE

## 2024-03-04 PROCEDURE — 1157F ADVNC CARE PLAN IN RCRD: CPT | Mod: CPTII,S$GLB,, | Performed by: INTERNAL MEDICINE

## 2024-03-04 PROCEDURE — 1101F PT FALLS ASSESS-DOCD LE1/YR: CPT | Mod: CPTII,S$GLB,, | Performed by: INTERNAL MEDICINE

## 2024-03-04 NOTE — PROGRESS NOTES
CHIEF COMPLAINT:   Chief Complaint   Patient presents with    Annual Exam          HISTORY OF PRESENT ILLNESS:  Tonya Cohn is a 89 y.o. female who presents to the clinic today for a routine physical exam. Her last physical exam was approximately 1 years(s) ago.    Subjective    PAST MEDICAL HISTORY:  Past Medical History:   Diagnosis Date    AR (allergic rhinitis)     Atherosclerosis of abdominal aorta     noted on CT scan 2011    Carpal tunnel syndrome of left wrist     Chronic kidney disease, stage 3     Diabetic peripheral neuropathy     Diverticulosis     History of colon polyps     History of diverticulitis of colon 2014    Hyperlipemia     Hypertension     Hypothyroidism     followed by endocrinology, Dr. Green    Mild aortic stenosis     OA (osteoarthritis) of knee     Obesity     Sciatica     Type II or unspecified type diabetes mellitus with neurological manifestations, uncontrolled(250.62)        PAST SURGICAL HISTORY:  Past Surgical History:   Procedure Laterality Date    CATARACT EXTRACTION Bilateral     COLONOSCOPY N/A 10/3/2017    Procedure: COLONOSCOPY;  Surgeon: Alin Gonzalez MD;  Location: [facility] ([address] [place] [address]);  Service: Endoscopy;  Laterality: N/A;    COSMETIC SURGERY      HYSTERECTOMY      partial    JOINT REPLACEMENT Right     LUNG BIOPSY  in her 40's    TOTAL KNEE ARTHROPLASTY Right 2014    TKR       SOCIAL HISTORY:  Social History     Socioeconomic History    Marital status:     Number of children: 7   Occupational History    Occupation: retired - house cleaning   Tobacco Use    Smoking status: Former     Current packs/day: 0.00     Average packs/day: 0.3 packs/day for 7.5 years (1.9 ttl pk-yrs)     Types: Cigarettes     Start date: 1955     Quit date: 1962     Years since quittin.6    Smokeless tobacco: Former     Quit date: 1970   Substance and Sexual Activity    Alcohol use: Yes     Alcohol/week: 1.0 standard drink of alcohol     Types: 1 Cans  of beer per week     Comment: occasionally    Drug use: No    Sexual activity: Not Currently     Partners: Male   Other Topics Concern    Are you pregnant or think you may be? No    Breast-feeding No     Social Determinants of Health     Financial Resource Strain: Low Risk  (5/4/2022)    Overall Financial Resource Strain (CARDIA)     Difficulty of Paying Living Expenses: Not hard at all   Food Insecurity: No Food Insecurity (5/4/2022)    Hunger Vital Sign     Worried About Running Out of Food in the Last Year: Never true     Ran Out of Food in the Last Year: Never true   Transportation Needs: No Transportation Needs (5/4/2022)    PRAPARE - Transportation     Lack of Transportation (Medical): No     Lack of Transportation (Non-Medical): No   Physical Activity: Inactive (5/4/2022)    Exercise Vital Sign     Days of Exercise per Week: 0 days     Minutes of Exercise per Session: 0 min   Stress: No Stress Concern Present (5/4/2022)    [employer] - Occupational Stress Questionnaire     Feeling of Stress : Not at all   Social Connections: Moderately Isolated (5/4/2022)    Social Connection and Isolation Panel [NHANES]     Frequency of Communication with Friends and Family: More than three times a week     Frequency of Social Gatherings with Friends and Family: More than three times a week     Attends Jew Services: More than 4 times per year     Active Member of Clubs or Organizations: No     Attends Club or Organization Meetings: Never     Marital Status:    Housing Stability: Low Risk  (5/4/2022)    Housing Stability Vital Sign     Unable to Pay for Housing in the Last Year: No     Number of Places Lived in the Last Year: 1     Unstable Housing in the Last Year: No       FAMILY HISTORY:  Family History   Problem Relation Age of Onset    Cancer Mother         shoulder tumor - cancer    Hypertension Mother     Glaucoma Mother     Heart disease Father         valve replacement     Hypertension Father     Heart attack Father     Diabetes Sister     Arthritis Sister         s/p knee surgery    Diabetes Brother     Heart disease Brother     Heart failure Brother     Stroke Brother     Arthritis Brother         back problems    Skin cancer Brother     Cancer Brother     Throat cancer Brother     No Known Problems Maternal Grandmother     No Known Problems Maternal Grandfather     No Known Problems Paternal Grandmother     No Known Problems Paternal Grandfather     Cancer Son         jaw    Cataracts Son     No Known Problems Maternal Aunt     No Known Problems Maternal Uncle     No Known Problems Paternal Aunt     No Known Problems Paternal Uncle     Melanoma Neg Hx     Eczema Neg Hx     Lupus Neg Hx     Psoriasis Neg Hx     Breast cancer Neg Hx     Colon cancer Neg Hx     Amblyopia Neg Hx     Blindness Neg Hx     Macular degeneration Neg Hx     Retinal detachment Neg Hx     Strabismus Neg Hx     Thyroid disease Neg Hx        ALLERGIES AND MEDICATIONS: updated and reviewed.  Review of patient's allergies indicates:   Allergen Reactions    Lipitor [atorvastatin]      Medication List with Changes/Refills   Current Medications    ACETAMINOPHEN (TYLENOL) 500 MG TABLET    Take 1 tablet (500 mg total) by mouth every 4 (four) hours as needed for Pain.    ASPIRIN 81 MG CHEW    Take 1 tablet (81 mg total) by mouth once daily.    BETAMETHASONE DIPROPIONATE (DIPROLENE) 0.05 % OINTMENT    Apply topically 2 (two) times daily.    BETAMETHASONE VALERATE 0.1% (VALISONE) 0.1 % CREA    Apply topically 2 (two) times daily.    BLOOD-GLUCOSE METER (FREESTYLE SYSTEM KIT) KIT    Use as instructed    CALCIUM CARBONATE (OS-MIRACLE) 500 MG CALCIUM (1,250 MG) TABLET    Take 1 tablet by mouth every other day.    CETIRIZINE (ZYRTEC) 10 MG TABLET    Take 1 tablet (10 mg total) by mouth once daily. for 7 days    CLOBETASOL (TEMOVATE) 0.05 % CREAM    Apply topically 2 (two) times daily. for 7 days    DICLOFENAC SODIUM (VOLTAREN) 1  % GEL    Apply 4 grams to effected area 4 x daily do not exceed 32 grams per day    FISH OIL-FAT ACID COMB.8- 1,200 MG (400 [id number]) CAP        FLUOCINONIDE 0.1 % CREA    as needed.     HYDROCORTISONE 2.5 % CREAM    Apply topically 2 (two) times daily.    HYDROXYZINE HCL (ATARAX) 25 MG TABLET    Take 1 tablet (25 mg total) by mouth every 4 (four) hours as needed for Itching.    LEVOTHYROXINE (SYNTHROID) 88 MCG TABLET    TAKE 1 TABLET(88 MCG) BY MOUTH EVERY DAY    LOSARTAN (COZAAR) 50 MG TABLET    TAKE 1 TABLET(50 MG) BY MOUTH EVERY DAY    LOVASTATIN (MEVACOR) 10 MG TABLET    TAKE 1 TABLET(10 MG) BY MOUTH EVERY DAY    MULTIVIT,CALC,MINS/IRON/FOLIC (ONE-A-DAY WOMENS FORMULA ORAL)    Take 1 tablet by mouth once daily.    TRIAMCINOLONE ACETONIDE 0.025% (KENALOG) 0.025 % CREAM    Apply topically 2 (two) times daily.    TRIAMCINOLONE ACETONIDE 0.5% (KENALOG) 0.5 % CREA    APPLY EXTERNALLY TO THE AFFECTED AREA TWICE DAILY as needed for flares; do not use for more than 2 weeks in a row without one week break          CARE TEAM:  Patient Care Team:  Tere Hughes MD as PCP - General (Internal Medicine)  Preston Whitfield MD as Consulting Physician (Cardiology)  Criss Baird OD (Inactive) as Consulting Physician (Optometry)  Marvin Roach MD as Consulting Physician (Rheumatology)  Wendy Garcia LPN as Care Coordinator       SCREENING HISTORY:  Health Maintenance         Date Due Completion Date    Diabetes Urine Screening 08/18/2024 8/18/2023    Lipid Panel 08/18/2024 8/18/2023    Override on 2/26/2016: Done ([facility] - total 189, TG 62, , HDL 76)    Hemoglobin A1c 08/26/2024 2/26/2024    Eye Exam 12/05/2024 12/5/2023    Override on 9/20/2016: Done (Dr. Kole Lucero- negative for diabetic retinopathy bilaterally)    Override on 9/9/2015: Done (No diabetic retinopathy)    Override on 8/14/2014: Done (no diabetic retinopathy; Dr. Lucero)    Override on  "4/21/2014: Done (Pt states her eye exam is scheduled for next month)    Override on 4/1/2013: Done    TETANUS VACCINE 12/06/2028 12/6/2018              REVIEW OF SYSTEMS:  The patient reports : good dietary habits.  The patient reports : that they are unable to exercise regularly because  of orthopaedic limitations.  Review of Systems   Constitutional:  Negative for chills, fever and unexpected weight change.   HENT:  Positive for postnasal drip.    Respiratory:  Positive for cough (chronic; mostly at night; states she takes otc allergy pill niglhtly but no nose sprays). Negative for shortness of breath and wheezing.    Cardiovascular:  Negative for chest pain and leg swelling.   Gastrointestinal:  Negative for abdominal pain.   Genitourinary:  Negative for dysuria.   Skin:  Positive for rash (chronic; itchy; mostly on LEs and lower back; takes bubble baths; using otc creams and topical steroids prn).      ROS (Optional)-: no pelvic pain  Breast ROS (Optional)-: negative for breast lumps/discharge          Objective    PHYSICAL EXAMINATION/VITALS:  Vitals:    03/04/24 1450   BP: 122/76   Pulse: (!) 131   Temp: 98 °F (36.7 °C)   TempSrc: Oral   SpO2: 97%   Weight: 89.8 kg (197 lb 15.6 oz)   Height: 5' 6" (1.676 m)        Body mass index is 31.95 kg/m².      General appearance - alert, well appearing, and in no distress, obese, pleasant elderly female, exam limited as patient did not get onto the examination table  Psychiatric - alert, oriented to person, place, and time, normal behavior, speech, dress, motor activity and thought process  Eyes - pupils equal and reactive, extraocular eye movements intact, sclera anicteric  Neck - supple, no significant adenopathy, carotids upstroke normal bilaterally, no bruits  Lymphatics - no palpable cervical lymphadenopathy  Chest - clear to auscultation, no wheezes, rales or rhonchi, symmetric air entry  Heart - normal rate and regular rhythm  Neurological - alert, normal " speech, no focal findings; cranial nerves II through XII intact  Musculoskeletal - patient noted to have Moderate osteoarthritic changes to both knee joints. No joint effusions noted., patient ambulated with a cane  Extremities - pedal edema trace  Skin - normal coloration, no suspicious skin lesions, dermatitis noted: atopic dermatitis with some hyperpigmentation on LEs and lower back      LABS:  Lab Results   Component Value Date    HGBA1C 7.0 (H) 02/26/2024    HGBA1C 7.2 (H) 08/18/2023    HGBA1C 7.1 (H) 02/06/2023      Lab Results   Component Value Date    CHOL 227 (H) 08/18/2023    CHOL 203 (H) 08/02/2022    CHOL 193 07/02/2021     Lab Results   Component Value Date    LDLCALC 142.8 08/18/2023    LDLCALC 119.4 08/02/2022    LDLCALC 117.0 07/02/2021               ASSESSMENT AND PLAN:   1. Routine medical exam  Counseled on age appropriate medical preventative services including age appropriate cancer screenings, age appropriate eye and dental exams, over all nutritional health, need for a consistent exercise regimen, and an over all push towards maintaining a vigorous and active lifestyle.  Counseled on age appropriate vaccines and discussed upcoming health care needs based on age/gender. Discussed good sleep hygiene and stress management.    2. Essential hypertension  BP Readings from Last 1 Encounters:   03/04/24 122/76      The current medical regimen is effective;  continue present plan and medications. Recommended patient to check home readings to monitor and see me for followup as scheduled or sooner as needed.   Discussed sodium restriction, maintaining ideal body weight and regular exercise program as physiologic means to continue to achieve blood pressure control in addition to medication compliance.  Patient was educated that both decongestant and anti-inflammatory medication may raise blood pressure.  The patient is not active on the digital hypertension program.    3. Type 2 diabetes, controlled, with  neuropathy  Lab Results   Component Value Date    HGBA1C 7.0 (H) 02/26/2024     Diabetes is under good control at this time for age and comorbid conditions.   We discussed diabetic diet and regular exercise.   We discussed home blood sugar monitoring, if appropriate - the patient does not need to test daily but can test only as needed.   Continue current medication regimen.  Recheck A1c in 6 months.  Diabetic complications addressed: None addressed today.  Patient was counseled on the need for yearly eye exam to screen for/monitor diabetic retinopathy and yearly diabetic foot exam.    4. Hyperlipidemia LDL goal <100  Lab Results   Component Value Date    CHOL 227 (H) 08/18/2023     Lab Results   Component Value Date    HDL 66 08/18/2023     Lab Results   Component Value Date    LDLCALC 142.8 08/18/2023     Lab Results   Component Value Date    TRIG 91 08/18/2023     Lab Results   Component Value Date    LDLCALC 142.8 08/18/2023     We discussed low fat diet and regular exercise.The current medical regimen is effective;  continue present plan and medications.     5. Atherosclerosis of abdominal aorta  Patient with Atherosclerosis of the Aorta.  Stable/asymptomatic. Currently stable on lipid lowering medication and blood pressure monitoring.  Overview:  noted on CT scan 1/17/2011      6. Hypothyroidism (acquired)  Lab Results   Component Value Date    TSH 1.910 08/18/2023     Patient is clinically euthyroid. Continue current regimen.    7. Allergic rhinitis, unspecified seasonality, unspecified trigger/cough  We discussed several treatment strategies:   antihistamine at bedtime  saline nasal rinse daily (preferably in the evening before bedtime)  allergy covers for pillow and mattress   Patient will let me know if symptoms worsen or persist.    8. Gastroesophageal reflux disease without esophagitis  Symptoms controlled: yes - takes medication occasionally as needed.   Reflux precautions discussed (eliminate tobacco if  a smoker; minimize caffeine, chocolate and red/white peppermint intake; avoid heavy and spicy meals; don't lay down within 2-3 hours after eating; minimize the intake of NSAIDs). Medication as needed.   Patient asked to take medication breaks, if possible - discussed chronic use can limit calcium absorption (which can lead to osteopenia/osteoporosis), increases the risk for intestinal infections, and can cause kidney damage. There are also some newer studies that show possible increased risk of mortality.    9. Osteopenia of multiple sites  We discussed adequate calcium intake (preferably from diet) and vitamin D supplementation. We discussed fall precautions. She is up to date on her BMD. No need for prescription medication at this time.  Overview:  -3/2018 - No need for Rx tx at this time.  6/2022 - No need for Rx tx at this time.          10. Class 1 obesity due to excess calories with serious comorbidity and body mass index (BMI) of 32.0 to 32.9 in adult  BMI Readings from Last 3 Encounters:   03/04/24 31.95 kg/m²   08/25/23 32.20 kg/m²   08/03/23 31.47 kg/m²     The patient is asked to make an attempt to improve diet and exercise patterns to aid in medical management of this problem.    11. Flu vaccine need  Previously completed. Chart updated.    12. Other atopic dermatitis  We discussed routine skin care: Take short warm (not hot) showers, if possible. Pat skin dry. Apply OTC Eucerin plus cream bid, especially after taking a shower. Use prescribed steroid cream bid for up to 14 days on affected areas but take breaks from usage.  Consider referral to Dermatology if symptoms worsen or persist.    13. Ambulates with cane  We discussed fall precautions.       Visit today included increased complexity associated with the care of the episodic problem Hypertension, Diabetes addressed and managing the longitudinal care of the patient due to the serious and/or complex managed problem(s) age and limited  mobility.      No orders of the defined types were placed in this encounter.     FOLLOW UP: Follow up in about 6 months (around 9/4/2024), or if symptoms worsen or fail to improve, for follow up chronic medical conditions.. or sooner as needed.

## 2024-03-15 ENCOUNTER — TELEPHONE (OUTPATIENT)
Dept: FAMILY MEDICINE | Facility: CLINIC | Age: 89
End: 2024-03-15
Payer: MEDICARE

## 2024-03-15 DIAGNOSIS — L20.89 OTHER ATOPIC DERMATITIS: Primary | ICD-10-CM

## 2024-03-15 NOTE — TELEPHONE ENCOUNTER
Spoke with daughter. Daughter verbalized understanding that consult was placed and someone will be reaching out to schedule an appointment.

## 2024-03-15 NOTE — TELEPHONE ENCOUNTER
----- Message from Vargas Tong MA sent at 3/15/2024  1:59 PM CDT -----  Type:  Patient Returning Call    Who Called: Daughter Freeman Reynoso    Who Left Message for Patient: Hunter Goldman MA    Does the patient know what this is regarding?:yes    Would the patient rather a call back or a response via [facility]? Yes, call     Best Call Back Number: [phone]

## 2024-03-15 NOTE — TELEPHONE ENCOUNTER
----- Message from Flavia Elder sent at 3/15/2024 11:32 AM CDT -----  Regarding: Requesting advice  .Type:  Needs Medical Advice/Symptom-based Call    Who Called: self     Symptoms (please be specific): rash; itching     How long has patient had these symptoms: states rash comes and goes      Would the patient rather a call back or a response via [facility]? Call     Best Call Back Number: . .[phone]        Additional Information: caller would like to know who she needs to see for her rash, states she already spoke with PCP regarding rash

## 2024-04-24 ENCOUNTER — OFFICE VISIT (OUTPATIENT)
Dept: DERMATOLOGY | Facility: CLINIC | Age: 89
End: 2024-04-24
Payer: MEDICARE

## 2024-04-24 VITALS — BODY MASS INDEX: 31.8 KG/M2 | WEIGHT: 197 LBS

## 2024-04-24 DIAGNOSIS — L20.89 OTHER ATOPIC DERMATITIS: ICD-10-CM

## 2024-04-24 DIAGNOSIS — L30.9 DERMATITIS: Primary | ICD-10-CM

## 2024-04-24 PROCEDURE — 99999 PR PBB SHADOW E&M-EST. PATIENT-LVL IV: CPT | Mod: PBBFAC,,, | Performed by: DERMATOLOGY

## 2024-04-24 PROCEDURE — 99214 OFFICE O/P EST MOD 30 MIN: CPT | Mod: S$GLB,,, | Performed by: DERMATOLOGY

## 2024-04-24 PROCEDURE — 1101F PT FALLS ASSESS-DOCD LE1/YR: CPT | Mod: CPTII,S$GLB,, | Performed by: DERMATOLOGY

## 2024-04-24 PROCEDURE — 1157F ADVNC CARE PLAN IN RCRD: CPT | Mod: CPTII,S$GLB,, | Performed by: DERMATOLOGY

## 2024-04-24 PROCEDURE — 1126F AMNT PAIN NOTED NONE PRSNT: CPT | Mod: CPTII,S$GLB,, | Performed by: DERMATOLOGY

## 2024-04-24 PROCEDURE — 3288F FALL RISK ASSESSMENT DOCD: CPT | Mod: CPTII,S$GLB,, | Performed by: DERMATOLOGY

## 2024-04-24 PROCEDURE — 1160F RVW MEDS BY RX/DR IN RCRD: CPT | Mod: CPTII,S$GLB,, | Performed by: DERMATOLOGY

## 2024-04-24 PROCEDURE — 1159F MED LIST DOCD IN RCRD: CPT | Mod: CPTII,S$GLB,, | Performed by: DERMATOLOGY

## 2024-04-24 RX ORDER — MOMETASONE FUROATE 1 MG/G
CREAM TOPICAL
Qty: 50 G | Refills: 3 | Status: SHIPPED | OUTPATIENT
Start: 2024-04-24

## 2024-04-24 NOTE — PROGRESS NOTES
Subjective:      Patient ID:  Tonya Cohn is a 89 y.o. female who presents for   Chief Complaint   Patient presents with    Rash     Bilateral legs      Follow up eczema on lower legs, see previous note Dr Coreas.  Still with pruritus and some hyperpigmentation.     Rash - Follow-up  Affected locations: left lower leg and right lower leg  Signs / symptoms: itching      Review of Systems   Constitutional:  Negative for fever, chills, weight loss, weight gain, fatigue, night sweats and malaise.   Skin:  Positive for rash. Negative for daily sunscreen use, activity-related sunscreen use and wears hat.   Hematologic/Lymphatic: Does not bruise/bleed easily.       Objective:   Physical Exam   Constitutional: She appears well-developed and well-nourished.   Neurological: She is alert and oriented to person, place, and time.   Psychiatric: She has a normal mood and affect.   Skin:   Areas Examined (abnormalities noted in diagram):   Genitals / Buttocks / Groin Inspection Performed  RLE Inspected  LLE Inspection Performed            Diagram Legend     Erythematous scaling macule/papule c/w actinic keratosis       Vascular papule c/w angioma      Pigmented verrucoid papule/plaque c/w seborrheic keratosis      Yellow umbilicated papule c/w sebaceous hyperplasia      Irregularly shaped tan macule c/w lentigo     1-2 mm smooth white papules consistent with Milia      Movable subcutaneous cyst with punctum c/w epidermal inclusion cyst      Subcutaneous movable cyst c/w pilar cyst      Firm pink to brown papule c/w dermatofibroma      Pedunculated fleshy papule(s) c/w skin tag(s)      Evenly pigmented macule c/w junctional nevus     Mildly variegated pigmented, slightly irregular-bordered macule c/w mildly atypical nevus      Flesh colored to evenly pigmented papule c/w intradermal nevus       Pink pearly papule/plaque c/w basal cell carcinoma      Erythematous hyperkeratotic cursted plaque c/w SCC      Surgical scar with no  sign of skin cancer recurrence      Open and closed comedones      Inflammatory papules and pustules      Verrucoid papule consistent consistent with wart     Erythematous eczematous patches and plaques     Dystrophic onycholytic nail with subungual debris c/w onychomycosis     Umbilicated papule    Erythematous-base heme-crusted tan verrucoid plaque consistent with inflamed seborrheic keratosis     Erythematous Silvery Scaling Plaque c/w Psoriasis     See annotation      Assessment / Plan:        Dermatitis likley nummular with pip  -     mometasone 0.1% (ELOCON) 0.1 % cream; Use daily  Dispense: 50 g; Refill: 3  No hot water  Dermeleve lotion for pruritus     Other atopic dermatitis  -     Ambulatory referral/consult to Dermatology             No follow-ups on file.

## 2024-05-09 ENCOUNTER — TELEPHONE (OUTPATIENT)
Dept: OPHTHALMOLOGY | Facility: CLINIC | Age: 89
End: 2024-05-09
Payer: MEDICARE

## 2024-05-09 ENCOUNTER — HOSPITAL ENCOUNTER (EMERGENCY)
Facility: HOSPITAL | Age: 89
Discharge: HOME | End: 2024-05-09
Attending: STUDENT IN AN ORGANIZED HEALTH CARE EDUCATION/TRAINING PROGRAM
Payer: MEDICARE

## 2024-05-09 VITALS
BODY MASS INDEX: 31.8 KG/M2 | OXYGEN SATURATION: 99 % | HEART RATE: 95 BPM | TEMPERATURE: 98 F | RESPIRATION RATE: 18 BRPM | DIASTOLIC BLOOD PRESSURE: 86 MMHG | WEIGHT: 197 LBS | SYSTOLIC BLOOD PRESSURE: 164 MMHG

## 2024-05-09 DIAGNOSIS — H40.051 OCULAR HYPERTENSION OF RIGHT EYE: Primary | ICD-10-CM

## 2024-05-09 PROCEDURE — 99283 EMERGENCY DEPT VISIT LOW MDM: CPT

## 2024-05-09 PROCEDURE — 25000003 PHARM REV CODE 250: Performed by: STUDENT IN AN ORGANIZED HEALTH CARE EDUCATION/TRAINING PROGRAM

## 2024-05-09 RX ORDER — DORZOLAMIDE HYDROCHLORIDE AND TIMOLOL MALEATE 20; 5 MG/ML; MG/ML
1 SOLUTION/ DROPS OPHTHALMIC 2 TIMES DAILY
Qty: 10 ML | Refills: 0 | Status: SHIPPED | OUTPATIENT
Start: 2024-05-09 | End: 2024-06-08

## 2024-05-09 RX ORDER — TETRACAINE HYDROCHLORIDE 5 MG/ML
2 SOLUTION OPHTHALMIC
Status: DISCONTINUED | OUTPATIENT
Start: 2024-05-09 | End: 2024-05-09

## 2024-05-09 RX ORDER — PROPARACAINE HYDROCHLORIDE 5 MG/ML
1 SOLUTION/ DROPS OPHTHALMIC
Status: COMPLETED | OUTPATIENT
Start: 2024-05-09 | End: 2024-05-09

## 2024-05-09 RX ADMIN — PROPARACAINE HYDROCHLORIDE 1 DROP: 5 SOLUTION/ DROPS OPHTHALMIC at 01:05

## 2024-05-09 NOTE — CONSULTS
Consultation Report  Ophthalmology Service    Date: 05/09/2024    Chief complaint/Reason for Consult: Concern for glaucoma       History of Present Illness: Tonya Cohn is a 89 y.o. female with POcularHx of cataract surgery in early 2000s in both eyes and PMHx of T2DM (non-insulin), HTN who presents with 1 day of right eye pain and blurry vision that she states felt like an ache over the right brow that was intermittent throughout the day on 5/8/24. She states by 5PM was constant and getting a bit worse and asked her daughter to bring her to the emergency room. Denies any similar symptoms prior to this. No history of trauma to the right eye besides cataract surgery. Presented to outside ED where IOP was noted to be in mid 50s OD and 18 OS. Given 1 dose of timolol, brimonidine, and 500mg PO Diamox. Patient states pain and blurriness has greatly improved since receiving those medicines and thinks vision is back to its baseline and pain has resolved. Denies any scalp pain/tenderness,jaw pain, fatigue, nausea/vomiting, difficulty reaching hands above head.      Patient denies any visual changes, visual disturbances, such as flashes, floaters, or curtain-veil in visual field, and ocular discomfort OU.    POcularHx: CEIOL both eyes in early 2000s - last seen by ophthalmologist who did this surgery in Nov 2023 where she was dilated and planned for 1 year follow up    Current eye gtts: Denies     Family Hx: Denies family history of glaucoma, macular degeneration, or blindness. family history includes Arthritis in her brother and sister; Cancer in her brother, mother, and son; Cataracts in her son; Diabetes in her brother and sister; Glaucoma in her mother; Heart attack in her father; Heart disease in her brother and father; Heart failure in her brother; Hypertension in her father and mother; No Known Problems in her maternal aunt, maternal grandfather, maternal grandmother, maternal uncle, paternal aunt, paternal  grandfather, paternal grandmother, and paternal uncle; Skin cancer in her brother; Stroke in her brother; Throat cancer in her brother.     PMHx:  has a past medical history of AR (allergic rhinitis), Atherosclerosis of abdominal aorta, Carpal tunnel syndrome of left wrist, Chronic kidney disease, stage 3, Diabetic peripheral neuropathy, Diverticulosis, History of colon polyps, History of diverticulitis of colon (1/2014), Hyperlipemia, Hypertension, Hypothyroidism, Mild aortic stenosis, OA (osteoarthritis) of knee, Obesity, Sciatica, and Type II or unspecified type diabetes mellitus with neurological manifestations, uncontrolled(250.62).     PSurgHx:  has a past surgical history that includes Hysterectomy; Lung biopsy (in her 40's); Cosmetic surgery; Total knee arthroplasty (Right, 6/13/2014); Colonoscopy (N/A, 10/3/2017); Joint replacement (Right); and Cataract extraction (Bilateral).     Home Medications:   Prior to Admission medications    Medication Sig Start Date End Date Taking? Authorizing Provider   acetaminophen (TYLENOL) 500 MG tablet Take 1 tablet (500 mg total) by mouth every 4 (four) hours as needed for Pain. 9/13/21   Figueroa Goldman MD   aspirin 81 MG Chew Take 1 tablet (81 mg total) by mouth once daily. 10/3/18 9/14/22  Tere Hughes MD   betamethasone dipropionate (DIPROLENE) 0.05 % ointment Apply topically 2 (two) times daily. 3/18/21   Beverley Estrada DPM   betamethasone valerate 0.1% (VALISONE) 0.1 % Crea Apply topically 2 (two) times daily. 9/14/22   Miguel Burks MD   blood-glucose meter (FREESTYLE SYSTEM KIT) kit Use as instructed 4/1/22 4/1/23  Tere Hughes MD   calcium carbonate (OS-MIRACLE) 500 mg calcium (1,250 mg) tablet Take 1 tablet by mouth every other day. 7/15/20   Provider, Bridgette   cetirizine (ZYRTEC) 10 MG tablet Take 1 tablet (10 mg total) by mouth once daily. for 7 days 4/7/23 4/14/23  Nancy Nieves,    clobetasoL (TEMOVATE) 0.05 % cream Apply topically 2  (two) times daily. for 7 days  Patient not taking: Reported on 9/14/2022 2/28/21 8/8/22  Farheen Becker, DO   diclofenac sodium (VOLTAREN) 1 % Gel Apply 4 grams to effected area 4 x daily do not exceed 32 grams per day 6/9/21   May Pike PA-C   diphenhydrAMINE (BENADRYL) 25 mg capsule Take 1 capsule (25 mg total) by mouth every 6 (six) hours as needed for Itching or Allergies. 4/13/24   Marlon Kendall MD   fish oil-fat acid comb.8-hb137 1,200 mg (400 qp-[id number]) Cap     Provider, Historical   fluocinonide 0.1 % Crea as needed.  8/4/17   Provider, Historical   hydrocortisone 2.5 % cream Apply topically 2 (two) times daily. 10/26/21   Clyde Hayward, NP   hydrOXYzine HCL (ATARAX) 25 MG tablet Take 1 tablet (25 mg total) by mouth every 4 (four) hours as needed for Itching. 9/17/22   Anselmo Peterson MD   levothyroxine (SYNTHROID) 88 MCG tablet TAKE 1 TABLET(88 MCG) BY MOUTH EVERY DAY 9/15/23   Tere Hughes MD   losartan (COZAAR) 50 MG tablet TAKE 1 TABLET(50 MG) BY MOUTH EVERY DAY 9/20/23   Tere Hughes MD   lovastatin (MEVACOR) 10 MG tablet TAKE 1 TABLET(10 MG) BY MOUTH EVERY DAY 9/20/23   Tere Hughes MD   mometasone 0.1% (ELOCON) 0.1 % cream Use daily 4/24/24   Emma Aldridge MD   multivit,calc,mins/iron/folic (ONE-A-DAY WOMENS FORMULA ORAL) Take 1 tablet by mouth once daily.    Provider, Historical   triamcinolone acetonide 0.025% (KENALOG) 0.025 % cream Apply topically 2 (two) times daily. 8/28/23   Provider, Historical   triamcinolone acetonide 0.5% (KENALOG) 0.5 % Crea APPLY EXTERNALLY TO THE AFFECTED AREA TWICE DAILY as needed for flares; do not use for more than 2 weeks in a row without one week break 8/25/23   Tere Hughes MD        Medications this encounter:     Allergies: is allergic to lipitor [atorvastatin].     Social:  reports that she quit smoking about 61 years ago. Her smoking use included cigarettes. She started smoking about 69 years ago. She  has a 1.9 pack-year smoking history. She quit smokeless tobacco use about 53 years ago. She reports current alcohol use of about 1.0 standard drink of alcohol per week. She reports that she does not use drugs.     ROS: As per HPI    Ocular examination/Dilated fundus examination:  Base Eye Exam       Visual Acuity (Snellen - Linear)         Right Left    Dist sc 20/30 20/30    Dist ph cc 20/20 -1 20/20 -1              Tonometry (Tonopen, 2:53 AM)         Right Left    Pressure 21 14              Tonometry #2 (Tonopen, 2:53 AM)         Right Left    Pressure 20               Tonometry #3 (Tonopen, 3:25 AM)         Right Left    Pressure 20               Tonometry Comments    T3 post dilation             Gonioscopy         Right Left    Temporal Open to TM, pigment and areas of iris processes 360 Open to TM, pigment and areas of iris processes 360    Nasal Open to TM, pigment and areas of iris processes 360 Open to TM, pigment and areas of iris processes 360    Superior Open to TM, pigment and areas of iris processes 360 Open to TM, pigment and areas of iris processes 360    Inferior Open to TM, pigment and areas of iris processes 360 Open to TM, pigment and areas of iris processes 360              Pupils         Dark Light Shape React APD    Right 2 2 Round Minimal None by reverse    Left 3 2 Round Brisk None              Visual Fields         Right Left     Full Full              Extraocular Movement         Right Left     Full, Ortho Full, Ortho              Neuro/Psych       Oriented x3: Yes    Mood/Affect: Normal              Dilation       Both eyes: 2.5% Phenylephrine, 1% Mydriacyl @ 2:54 AM                  Slit Lamp and Fundus Exam       External Exam         Right Left    External Normal Normal              Slit Lamp Exam         Right Left    Lids/Lashes Dermatochalasis Dermatochalasis    Conjunctiva/Sclera Pinguecula, Pigmentation Pinguecula, Pigmentation    Cornea Arcus, Guttata Arcus, Guttata    Anterior  Chamber Deep and quiet Deep and quiet    Iris Round and reactive, No NVI Round and reactive, No NVI    Lens Three-piece IOL Three piece IOL, open posterior capsule    Anterior Vitreous PVD, syneresis PVD, syneresis              Fundus Exam         Right Left    Disc Pink, sharp Pink, sharp    C/D Ratio 0.3 0.25    Macula Flat, no holes Flat, no holes    Vessels attenuated attenuated    Periphery No holes, tears, detachments 1 large drusen nasal near periphery                      Assessment/Plan:     1. Ocular hypertension vs glaucoma suspect, OD  - Patient with 1 days of right sided brow ache that has progressed to constant right eye aching pain associated with blurriness  - Seen at OSH where IOP noted mid 50s // high teens. Given 500mg IV diamox and 1 dose of topical brimonidine and timolol. Patient states since then has had improvement in all symptoms  - Does have possible family history of glaucoma (mom took eye drops daily)  - [facility] 20/30 ph20/20-1 OU, IOP 21//14 (IOP 20 post-dilation), EOMI, CVF grossly full, pupils 2->2 // 3->2, no APD by reverse OD and no APD OS.   - Anterior exam with gutatae (?) vs endopigment OU, PEEs, deep chamber. No NVI appreciated.  - Gonioscopy appears angle is open to at least pTM though with iris processes and pigment 360  - DFE wnl  - Differential includes: POAG, intermittent angle closure, dry eyes, corneal edema  - Would be extremely rare for patient to be in angle closure s/p cataract extraction and findings seen on gonioscopy. Unclear clinic picture given patient given IV diamox and anti-ocular hypertensives at OSH with apparent normalization of pressure on my examination. There is no evidence of NVI or posterior segment pathology that could explain possible angle closure. Unclear if pressure intermittently elevated from other causes? Med list reviewed without any 2ndary causes of angle closure identified.  - Start Cosopt BID right eye  - Return precautions discussed  - Will have  patient follow up with glaucoma specialist in 1-2 days for pressure check and continued follow up. 's messaged.    Discussed with Dr Lama Upton    Patient's Best Contact Number: [phone]    Armin Valdes MD PGY-2  [employer] Ophthalmology Resident  05/09/2024  2:55 AM

## 2024-05-09 NOTE — TELEPHONE ENCOUNTER
Pt was rescheduled to next week. I spoke to resident and they stated that pt was not in angle closure and that they did not need to be seen today. Resident also reviewed it with

## 2024-05-09 NOTE — DISCHARGE INSTRUCTIONS
Use Cosopt eye drops two times per day in the right eye for treatment of the high pressure in your right eye.  Return if symptoms worsen.  Follow-up with ophthalmology.  They will call you to make an appointment.

## 2024-05-09 NOTE — ED PROVIDER NOTES
Encounter Date: 5/8/2024       History     Chief Complaint   Patient presents with    Transfer     Transfer from [facility] for ophthalmology     HPI  89-year-old female, with history of CKD, diabetes, hypertension, hyperlipidemia, hypo thyroidism, transferred to the main campus for ophthalmology consultation, concerning for glaucoma.  Patient reports that she started having right sided eye pain and blurry vision since this morning.  She was seen at the outside hospital ED, found to have elevated right IOP, concerning for close ankle glaucoma, patient was given Diamox, timolol, brimonidine, upon arrival, patient reports that her symptoms has been improving significantly.  Denies vision loss, or headache, or other complaints.  Review of patient's allergies indicates:   Allergen Reactions    Lipitor [atorvastatin]      Past Medical History:   Diagnosis Date    AR (allergic rhinitis)     Atherosclerosis of abdominal aorta     noted on CT scan 1/17/2011    Carpal tunnel syndrome of left wrist     Chronic kidney disease, stage 3     Diabetic peripheral neuropathy     Diverticulosis     History of colon polyps     History of diverticulitis of colon 1/2014    Hyperlipemia     Hypertension     Hypothyroidism     followed by endocrinology, Dr. Green    Mild aortic stenosis     OA (osteoarthritis) of knee     Obesity     Sciatica     Type II or unspecified type diabetes mellitus with neurological manifestations, uncontrolled(250.62)      Past Surgical History:   Procedure Laterality Date    CATARACT EXTRACTION Bilateral     COLONOSCOPY N/A 10/3/2017    Procedure: COLONOSCOPY;  Surgeon: Alin Gonzalez MD;  Location: [facility] ([address] [place] [address]);  Service: Endoscopy;  Laterality: N/A;    COSMETIC SURGERY      HYSTERECTOMY      partial    JOINT REPLACEMENT Right     LUNG BIOPSY  in her 40's    TOTAL KNEE ARTHROPLASTY Right 6/13/2014    TKR     Family History   Problem Relation Name Age of Onset    Cancer Mother          shoulder tumor -  cancer    Hypertension Mother      Glaucoma Mother      Heart disease Father          valve replacement    Hypertension Father      Heart attack Father      Diabetes Sister Lidya     Arthritis Sister Lovinia         s/p knee surgery    Diabetes Brother Benoris     Heart disease Brother Benoris     Heart failure Brother Benoris     Stroke Brother Ardell     Arthritis Brother Vitla         back problems    Skin cancer Brother Bogdan     Cancer Brother Bogdan     Throat cancer Brother Bogdan     No Known Problems Maternal Grandmother      No Known Problems Maternal Grandfather      No Known Problems Paternal Grandmother      No Known Problems Paternal Grandfather      Cancer Son Johnathan         jaw    Cataracts Son Johnathan     No Known Problems Maternal Aunt      No Known Problems Maternal Uncle      No Known Problems Paternal Aunt      No Known Problems Paternal Uncle      Melanoma Neg Hx      Eczema Neg Hx      Lupus Neg Hx      Psoriasis Neg Hx      Breast cancer Neg Hx      Colon cancer Neg Hx      Amblyopia Neg Hx      Blindness Neg Hx      Macular degeneration Neg Hx      Retinal detachment Neg Hx      Strabismus Neg Hx      Thyroid disease Neg Hx       Social History     Tobacco Use    Smoking status: Former     Current packs/day: 0.00     Average packs/day: 0.3 packs/day for 7.5 years (1.9 ttl pk-yrs)     Types: Cigarettes     Start date: 1955     Quit date: 1962     Years since quittin.8    Smokeless tobacco: Former     Quit date: 1970   Substance Use Topics    Alcohol use: Yes     Alcohol/week: 1.0 standard drink of alcohol     Types: 1 Cans of beer per week     Comment: occasionally    Drug use: No     Review of Systems    Physical Exam     Initial Vitals [24 0055]   BP Pulse Resp Temp SpO2   (!) 164/86 95 18 97.9 °F (36.6 °C) 99 %      MAP       --         Physical Exam    Nursing note and vitals reviewed.  Constitutional: She appears well-developed. No distress.   HENT:   Head:  Normocephalic and atraumatic.   Mouth/Throat: Oropharynx is clear and moist.   Eyes: Conjunctivae and EOM are normal. Pupils are equal, round, and reactive to light.   Right IOP 25  Left IOP 13   Neck: No JVD present.   Normal range of motion.  Cardiovascular:  Normal rate, regular rhythm, normal heart sounds and intact distal pulses.           Pulmonary/Chest: Breath sounds normal. No respiratory distress.   Abdominal: Abdomen is soft. She exhibits no distension. There is no abdominal tenderness.   Musculoskeletal:         General: No tenderness or edema. Normal range of motion.      Cervical back: Normal range of motion.     Neurological: She is alert and oriented to person, place, and time. She has normal strength.   Skin: Skin is warm and dry. Capillary refill takes less than 2 seconds.         ED Course   Procedures  Labs Reviewed - No data to display       Imaging Results    None          Medications   proparacaine 0.5 % ophthalmic solution 1 drop (1 drop Both Eyes Given 5/9/24 0114)     Medical Decision Making  89-year-old female, with history of CKD, diabetes, hypertension, hyperlipidemia, hypo thyroidism, transferred to the main campus for ophthalmology consultation, concerning for glaucoma.  Patient is well-appearing, vital signs within normal limits.     Amount and/or Complexity of Data Reviewed  External Data Reviewed: notes.    Risk  Prescription drug management.              Attending Attestation:   Physician Attestation Statement for Resident:  As the supervising MD   Physician Attestation Statement: I have personally seen and examined this patient.   I agree with the above history.  -:   As the supervising MD I agree with the above PE.     As the supervising MD I agree with the above treatment, course, plan, and disposition.                    ED Course as of 05/09/24 0327   Thu May 09, 2024   0119 Right IOP 26. Left IOP 13 [NC]   0130 Discuss the case with ophthalmology, will evaluate patient at  bedside. No other recommendation at this point.  [NC]   0323 89-year-old female transferred for the evaluation of ocular hypertension of the right eye.  Intra-ocular pressures 50 to 60 and associated with headache prior to transfer.  Patient received timolol, acetazolamide at the outside hospital.  On arrival here, she reports improvement in pain.  Intra-ocular pressures normalized.  Ophthalmology evaluated the patient, doubt acute angle glaucoma given history of cataract surgery, diagnosed her with ocular hypertension, will discharge her with Cosopt eyedrops, follow-up evaluation by glaucoma specialist tomorrow or the following day.  The patient is comfortable with this plan, feels well, and is stable for discharge with outpatient follow-up [BS]      ED Course User Index  [BS] Anthony Santiago MD  [NC] Alphonso Stringer MD                           Clinical Impression:  Final diagnoses:  [H40.051] Ocular hypertension of right eye (Primary)          ED Disposition Condition    Discharge Stable          ED Prescriptions       Medication Sig Dispense Start Date End Date Auth. Provider    dorzolamide-timolol 2-0.5% (COSOPT) 22.3-6.8 mg/mL ophthalmic solution Place 1 drop into the right eye 2 (two) times daily. 10 mL 5/9/2024 6/8/2024 Anthony Santiago MD          Follow-up Information       Follow up With Specialties Details Why Contact Info Additional Information    [facility] - Emergency Dept Emergency Medicine Go to  If symptoms worsen [facility] [facility] [id number] [phone]     [facility] - [address] [place] [address] Ophthalmology Go to  To recheck today's symptoms.  They will call you to set up an appointment [address] [place] [id number] [phone] Please arrive on the 10th floor for check-in.             Alphonso Stringer MD  Resident  05/09/24 0327       Anthony Santiago MD  05/09/24 [id number]

## 2024-05-14 ENCOUNTER — OFFICE VISIT (OUTPATIENT)
Dept: OPHTHALMOLOGY | Facility: CLINIC | Age: 89
End: 2024-05-14
Payer: MEDICARE

## 2024-05-14 DIAGNOSIS — Z96.1 PSEUDOPHAKIA OF BOTH EYES: ICD-10-CM

## 2024-05-14 DIAGNOSIS — H04.123 BILATERAL DRY EYES: ICD-10-CM

## 2024-05-14 DIAGNOSIS — H40.023 OPEN ANGLE WITH BORDERLINE FINDINGS AND HIGH GLAUCOMA RISK IN BOTH EYES: Primary | ICD-10-CM

## 2024-05-14 DIAGNOSIS — H57.11 PAIN OF RIGHT EYE: ICD-10-CM

## 2024-05-14 DIAGNOSIS — H40.053 BILATERAL OCULAR HYPERTENSION: ICD-10-CM

## 2024-05-14 PROCEDURE — 1157F ADVNC CARE PLAN IN RCRD: CPT | Mod: CPTII,S$GLB,, | Performed by: OPHTHALMOLOGY

## 2024-05-14 PROCEDURE — 1160F RVW MEDS BY RX/DR IN RCRD: CPT | Mod: CPTII,S$GLB,, | Performed by: OPHTHALMOLOGY

## 2024-05-14 PROCEDURE — 1126F AMNT PAIN NOTED NONE PRSNT: CPT | Mod: CPTII,S$GLB,, | Performed by: OPHTHALMOLOGY

## 2024-05-14 PROCEDURE — 92020 GONIOSCOPY: CPT | Mod: S$GLB,,, | Performed by: OPHTHALMOLOGY

## 2024-05-14 PROCEDURE — 99999 PR PBB SHADOW E&M-EST. PATIENT-LVL III: CPT | Mod: PBBFAC,,, | Performed by: OPHTHALMOLOGY

## 2024-05-14 PROCEDURE — 1101F PT FALLS ASSESS-DOCD LE1/YR: CPT | Mod: CPTII,S$GLB,, | Performed by: OPHTHALMOLOGY

## 2024-05-14 PROCEDURE — 3288F FALL RISK ASSESSMENT DOCD: CPT | Mod: CPTII,S$GLB,, | Performed by: OPHTHALMOLOGY

## 2024-05-14 PROCEDURE — 76514 ECHO EXAM OF EYE THICKNESS: CPT | Mod: S$GLB,,, | Performed by: OPHTHALMOLOGY

## 2024-05-14 PROCEDURE — 99204 OFFICE O/P NEW MOD 45 MIN: CPT | Mod: S$GLB,,, | Performed by: OPHTHALMOLOGY

## 2024-05-14 PROCEDURE — G2211 COMPLEX E/M VISIT ADD ON: HCPCS | Mod: S$GLB,,, | Performed by: OPHTHALMOLOGY

## 2024-05-14 PROCEDURE — 1159F MED LIST DOCD IN RCRD: CPT | Mod: CPTII,S$GLB,, | Performed by: OPHTHALMOLOGY

## 2024-05-14 NOTE — PROGRESS NOTES
HPI     Glaucoma     Additional comments: ED f/u- High IOP chk           Comments    89 y/o female- saw Dr. Barry in ED(ED transfer) for high IOP, right eye   pain and glaucoma w/u on 5/9/2024.   Pt states her eyes are feeling well today. No pain. No discharge. Her va   is doing well for the distance and near.    PCIOL OU    Cosopt BID OU            Last edited by Mehdi Phillip on 5/14/2024  3:39 PM.            Assessment /Plan     For exam results, see Encounter Report.    Open angle with borderline findings and high glaucoma risk in both eyes    Bilateral dry eyes    Pain of right eye    Pseudophakia of both eyes    Bilateral ocular hypertension      Patient with daughter    Presented to Outside ER --> [facility] ER with OD Pain & IOP 50s  Rx'd at outside ER with diamox    Neg ROS GCA    OHT // supect POAG    Likely not HVF taker    CCT  577 // 528    Target < 22 --> achieved    Both eyes --> Tolerating well & good adherence --> CSM  Cosopt BID    PC IOL OU  Quiet  Observe    Plan  RTC 2-3 months IOP & OCT RNFL and Adherence --> then DFE & photos  RTC sooner prn with good understanding

## 2024-05-15 ENCOUNTER — OFFICE VISIT (OUTPATIENT)
Dept: FAMILY MEDICINE | Facility: CLINIC | Age: 89
End: 2024-05-15
Payer: MEDICARE

## 2024-05-15 VITALS
OXYGEN SATURATION: 94 % | WEIGHT: 195.13 LBS | SYSTOLIC BLOOD PRESSURE: 134 MMHG | HEIGHT: 66 IN | DIASTOLIC BLOOD PRESSURE: 76 MMHG | BODY MASS INDEX: 31.36 KG/M2 | TEMPERATURE: 98 F | HEART RATE: 85 BPM

## 2024-05-15 DIAGNOSIS — M54.50 ACUTE BILATERAL LOW BACK PAIN WITHOUT SCIATICA: Primary | ICD-10-CM

## 2024-05-15 DIAGNOSIS — I10 ESSENTIAL HYPERTENSION: Chronic | ICD-10-CM

## 2024-05-15 DIAGNOSIS — E78.5 HYPERLIPIDEMIA LDL GOAL <100: Chronic | ICD-10-CM

## 2024-05-15 PROCEDURE — 1101F PT FALLS ASSESS-DOCD LE1/YR: CPT | Mod: CPTII,S$GLB,, | Performed by: FAMILY MEDICINE

## 2024-05-15 PROCEDURE — 99999 PR PBB SHADOW E&M-EST. PATIENT-LVL V: CPT | Mod: PBBFAC,,, | Performed by: FAMILY MEDICINE

## 2024-05-15 PROCEDURE — 1157F ADVNC CARE PLAN IN RCRD: CPT | Mod: CPTII,S$GLB,, | Performed by: FAMILY MEDICINE

## 2024-05-15 PROCEDURE — 99214 OFFICE O/P EST MOD 30 MIN: CPT | Mod: S$GLB,,, | Performed by: FAMILY MEDICINE

## 2024-05-15 PROCEDURE — 3288F FALL RISK ASSESSMENT DOCD: CPT | Mod: CPTII,S$GLB,, | Performed by: FAMILY MEDICINE

## 2024-05-15 PROCEDURE — 1159F MED LIST DOCD IN RCRD: CPT | Mod: CPTII,S$GLB,, | Performed by: FAMILY MEDICINE

## 2024-05-15 PROCEDURE — 1160F RVW MEDS BY RX/DR IN RCRD: CPT | Mod: CPTII,S$GLB,, | Performed by: FAMILY MEDICINE

## 2024-05-15 PROCEDURE — 1125F AMNT PAIN NOTED PAIN PRSNT: CPT | Mod: CPTII,S$GLB,, | Performed by: FAMILY MEDICINE

## 2024-05-15 RX ORDER — TIZANIDINE 2 MG/1
2 TABLET ORAL EVERY 8 HOURS PRN
Qty: 45 TABLET | Refills: 0 | Status: SHIPPED | OUTPATIENT
Start: 2024-05-15 | End: 2024-05-30

## 2024-05-16 ENCOUNTER — TELEPHONE (OUTPATIENT)
Dept: FAMILY MEDICINE | Facility: CLINIC | Age: 89
End: 2024-05-16
Payer: MEDICARE

## 2024-05-16 DIAGNOSIS — Z12.31 ENCOUNTER FOR SCREENING MAMMOGRAM FOR MALIGNANT NEOPLASM OF BREAST: Primary | ICD-10-CM

## 2024-05-16 NOTE — TELEPHONE ENCOUNTER
----- Message from Lorin Gardner sent at 5/16/2024  1:06 PM CDT -----  Regarding: Daughter Angeles .[phone]  Caller is requesting to schedule their annual mammogram appointment.  Order is not listed in EPIC.  Please enter order and contact patient to schedule.  Name of Caller:Daughter     Where would they like the mammogram performed? [place] [address]     Would the patient rather a call back or a response via [facility]? Call back     Best Call Back Number: .[phone]      Additional Information:

## 2024-05-19 ENCOUNTER — HOSPITAL ENCOUNTER (EMERGENCY)
Facility: HOSPITAL | Age: 89
Discharge: HOME | End: 2024-05-19
Attending: EMERGENCY MEDICINE
Payer: MEDICARE

## 2024-05-19 VITALS
BODY MASS INDEX: 30.53 KG/M2 | TEMPERATURE: 98 F | DIASTOLIC BLOOD PRESSURE: 78 MMHG | WEIGHT: 190 LBS | HEART RATE: 68 BPM | HEIGHT: 66 IN | SYSTOLIC BLOOD PRESSURE: 145 MMHG | RESPIRATION RATE: 18 BRPM | OXYGEN SATURATION: 97 %

## 2024-05-19 DIAGNOSIS — M51.36 LUMBAR DEGENERATIVE DISC DISEASE: Primary | ICD-10-CM

## 2024-05-19 PROBLEM — I10 ESSENTIAL HYPERTENSION: Chronic | Status: ACTIVE | Noted: 2018-03-21

## 2024-05-19 LAB
BACTERIA #/AREA URNS AUTO: ABNORMAL /HPF
BILIRUB UR QL STRIP: NEGATIVE
CLARITY UR REFRACT.AUTO: CLEAR
COLOR UR AUTO: YELLOW
GLUCOSE UR QL STRIP: NEGATIVE
HGB UR QL STRIP: NEGATIVE
KETONES UR QL STRIP: NEGATIVE
LEUKOCYTE ESTERASE UR QL STRIP: ABNORMAL
MICROSCOPIC COMMENT: ABNORMAL
NITRITE UR QL STRIP: NEGATIVE
PH UR STRIP: 7 [PH] (ref 5–8)
PROT UR QL STRIP: NEGATIVE
RBC #/AREA URNS AUTO: 1 /HPF (ref 0–4)
SP GR UR STRIP: 1.02 (ref 1–1.03)
URN SPEC COLLECT METH UR: ABNORMAL
WBC #/AREA URNS AUTO: 6 /HPF (ref 0–5)

## 2024-05-19 PROCEDURE — 96372 THER/PROPH/DIAG INJ SC/IM: CPT | Performed by: PHYSICIAN ASSISTANT

## 2024-05-19 PROCEDURE — 81001 URINALYSIS AUTO W/SCOPE: CPT | Performed by: PHYSICIAN ASSISTANT

## 2024-05-19 PROCEDURE — 25000003 PHARM REV CODE 250: Performed by: PHYSICIAN ASSISTANT

## 2024-05-19 PROCEDURE — 99284 EMERGENCY DEPT VISIT MOD MDM: CPT | Mod: 25

## 2024-05-19 PROCEDURE — 63600175 PHARM REV CODE 636 W HCPCS: Performed by: PHYSICIAN ASSISTANT

## 2024-05-19 RX ORDER — CEPHALEXIN 500 MG/1
500 CAPSULE ORAL
Status: CANCELLED | OUTPATIENT
Start: 2024-05-19 | End: 2024-05-19

## 2024-05-19 RX ORDER — METHYLPREDNISOLONE SOD SUCC 125 MG
125 VIAL (EA) INJECTION
Status: COMPLETED | OUTPATIENT
Start: 2024-05-19 | End: 2024-05-19

## 2024-05-19 RX ORDER — ACETAMINOPHEN 500 MG
1000 TABLET ORAL
Status: COMPLETED | OUTPATIENT
Start: 2024-05-19 | End: 2024-05-19

## 2024-05-19 RX ORDER — CEPHALEXIN 250 MG/1
250 CAPSULE ORAL EVERY 6 HOURS
Qty: 20 CAPSULE | Refills: 0 | Status: SHIPPED | OUTPATIENT
Start: 2024-05-19 | End: 2024-05-24

## 2024-05-19 RX ORDER — LIDOCAINE 50 MG/G
1 PATCH TOPICAL
Status: DISCONTINUED | OUTPATIENT
Start: 2024-05-19 | End: 2024-05-19 | Stop reason: HOSPADM

## 2024-05-19 RX ORDER — LIDOCAINE 50 MG/G
1 PATCH TOPICAL DAILY
Qty: 30 PATCH | Refills: 0 | Status: SHIPPED | OUTPATIENT
Start: 2024-05-19

## 2024-05-19 RX ADMIN — METHYLPREDNISOLONE SODIUM SUCCINATE 125 MG: 125 INJECTION, POWDER, FOR SOLUTION INTRAMUSCULAR; INTRAVENOUS at 01:05

## 2024-05-19 RX ADMIN — ACETAMINOPHEN 1000 MG: 500 TABLET ORAL at 01:05

## 2024-05-19 RX ADMIN — LIDOCAINE 1 PATCH: 50 PATCH TOPICAL at 01:05

## 2024-05-19 NOTE — PROGRESS NOTES
"  Patient Name: Tonya oChn    : 1932  MRN: [id number]      Subjective:     Patient ID: Tonya is a 89 y.o. female    Chief Complaint:  Back Pain (Back pain x 1wk lower mid back pain travels to both sides of legs)    History of Present Illness  The patient presents for evaluation of low back pain. She is accompanied by her daughter.    The patient reports experiencing low back pain, originating from the center of her back and radiating to both hips. She describes the pain as deep-seated, a symptom that had been present for a duration of 2 to 3 months, but has recently intensified over the past week. The pain is exacerbated during ambulation and is somewhat alleviated by sitting. A month ago, she underwent a dental extraction and was prescribed hydrocodone 5/325, which she recently took for the back pain. She recalls a previous episode of radiating pain into her legs, but this has not recurred in the past week. She denies recent episodes of constipation, diarrhea, dysuria, fever, or chills. Her appetite remains unchanged and she reports no changes in her medication regimen. She experiences muscle discomfort during coughing episodes. She utilizes a cane for mobility and reports feeling steady on her feet. She manages her pain with extra-strength Tylenol. She has a history of right knee replacement.      Review of Systems   Constitutional:  Negative for appetite change, chills, fever and unexpected weight change.   Respiratory:  Negative for chest tightness and shortness of breath.    Gastrointestinal:  Negative for abdominal distention, blood in stool, change in bowel habit, constipation and diarrhea.   Musculoskeletal:  Positive for arthralgias, back pain and gait problem. Negative for neck pain.        Objective:   /76 (BP Location: Left arm, Patient Position: Sitting, BP Method: Large (Manual))   Pulse 85   Temp 97.7 °F (36.5 °C) (Oral)   Ht 5' 6" (1.676 m)   Wt 88.5 kg (195 lb 1.7 oz)   SpO2 (!) " 94%   BMI 31.49 kg/m²     Physical Exam    Physical Exam  Constitutional:       Comments: Using a cane for ambulation   Cardiovascular:      Rate and Rhythm: Normal rate.   Pulmonary:      Effort: Pulmonary effort is normal. No respiratory distress.      Breath sounds: No wheezing or rhonchi.   Abdominal:      General: Abdomen is flat. Bowel sounds are normal.   Musculoskeletal:      Lumbar back: Tenderness present. No spasms or bony tenderness. Negative right straight leg raise test and negative left straight leg raise test.   Neurological:      Mental Status: She is alert.            Assessment        ICD-10-CM ICD-9-CM   1. Acute bilateral low back pain without sciatica  M54.50 724.2     338.19   2. Essential hypertension  I10 401.9   3. Hyperlipidemia LDL goal <100  E78.5 272.4         Plan:     Assessment & Plan  1. Low back pain.  The pain appears to be muscular in nature, The correct method of utilizing the cane were thoroughly explained.  Her cane was set a little too high as her arm was not in a natural position.  I adjusted the cane for her.  A muscle relaxer was prescribed, to be taken every 8 hours as needed to alleviate the pain. The patient was cautioned about the potential sedative effects of muscle relaxers, and advised to monitor their effects before engaging in activities such as running a race. The use of Tylenol Extra Strength, 2 tablets every 8 hours as needed, was recommended. The patient was also advised to continue using the pain patch. Should the patient continue to experience unsteadiness, a referral to physical therapy for gait training will be considered.    2. Hypertension and hyperlipidemia  Continue current management     ORDERS  1. Acute bilateral low back pain without sciatica  -     tiZANidine (ZANAFLEX) 2 MG tablet; Take 1 tablet (2 mg total) by mouth every 8 (eight) hours as needed (back pain).  Dispense: 45 tablet; Refill: 0    2. Essential hypertension    3. Hyperlipidemia LDL  goal <100             -Dick Martinez Jr., MD, AAHIVS      This note was generated with the assistance of ambient listening technology. Verbal consent was obtained by the patient and accompanying visitor(s) for the recording of patient appointment to facilitate this note. I attest to having reviewed and edited the generated note for accuracy, though some syntax or spelling errors may persist. Please contact the author of this note for any clarification.          There are no Patient Instructions on file for this visit.    Follow Up  No follow-ups on file.    Future Appointments   Date Time Provider Department Center   6/3/2024  2:30 PM [facility] HOLOGIC DIMENSIONS [facility]   8/20/2024  1:30 PM Mati Mata MD [facility]   12/18/2024  2:00 PM Tere Hughes MD [facility] [place]

## 2024-05-19 NOTE — ED PROVIDER NOTES
Encounter Date: 5/19/2024       History     Chief Complaint   Patient presents with    Back Pain     Lower back started over week ago, denies bowel /bladder incontinence, seeb wed abd  no better     12:14 PM  Patient is a 89-year-old female with a history of DM 2, hypothyroidism, osteoarthritis, CKD, sciatica, who [place] [facility] [place] emergent evaluation of middle low back pain.    Patient reports onset of worsening back pain on 05/07/2024, about 12 days ago.  She denies any injury, trauma, or heavy lifting.  She has had pain for the past few months but not this bad.  She was seen by PCP 4 days ago and started on Zanaflex, last dose this morning.  She last had Tylenol yesterday.  She presents [place] [facility] with her daughter for intractable pain.  States her pain is deep.  Worse with ambulation and laying down for a prolonged period of time.  Sometimes sitting helps her pain.  She started using a cane when her pain became worse.  Denies any fever.  Noted urinary frequency last night without dysuria, hematuria, diarrhea, constipation, saddle anesthesias, lower extremity paresthesias, lower extremity weakness, or bowel or bladder incontinence.    Lumbar x-ray in 2018 showed:  Vertebral bodies are intact.  Mild degenerative changes seen at L2-L3 level.  The L4-L5 disc space is narrowed with forward subluxation of L4 on L5 and degenerative changes.  No bony destruction is noted.        Review of patient's allergies indicates:   Allergen Reactions    Lipitor [atorvastatin]      Past Medical History:   Diagnosis Date    AR (allergic rhinitis)     Atherosclerosis of abdominal aorta     noted on CT scan 1/17/2011    Carpal tunnel syndrome of left wrist     Chronic kidney disease, stage 3     Diabetic peripheral neuropathy     Diverticulosis     History of colon polyps     History of diverticulitis of colon 1/2014    Hyperlipemia     Hypertension     Hypothyroidism     followed by endocrinology, Dr. Green    Mild aortic  stenosis     OA (osteoarthritis) of knee     Obesity     Sciatica     Type II or unspecified type diabetes mellitus with neurological manifestations, uncontrolled(250.62)      Past Surgical History:   Procedure Laterality Date    CATARACT EXTRACTION Bilateral     COLONOSCOPY N/A 10/3/2017    Procedure: COLONOSCOPY;  Surgeon: Alin Gonazlez MD;  Location: [facility] ([address] [place] [address]);  Service: Endoscopy;  Laterality: N/A;    COSMETIC SURGERY      HYSTERECTOMY      partial    JOINT REPLACEMENT Right     LUNG BIOPSY  in her 40's    TOTAL KNEE ARTHROPLASTY Right 6/13/2014    TKR     Family History   Problem Relation Name Age of Onset    Cancer Mother          shoulder tumor - cancer    Hypertension Mother      Glaucoma Mother      Heart disease Father          valve replacement    Hypertension Father      Heart attack Father      Diabetes Sister Lidya     Arthritis Sister Lovinia         s/p knee surgery    Diabetes Brother Benoris     Heart disease Brother Benoris     Heart failure Brother Benoris     Stroke Brother Ardell     Arthritis Brother Vital         back problems    Skin cancer Brother Bogdan     Cancer Brother Bogdan     Throat cancer Brother Bogadn     No Known Problems Maternal Grandmother      No Known Problems Maternal Grandfather      No Known Problems Paternal Grandmother      No Known Problems Paternal Grandfather      Cancer Son Johnathan         jaw    Cataracts Son Johnathan     No Known Problems Maternal Aunt      No Known Problems Maternal Uncle      No Known Problems Paternal Aunt      No Known Problems Paternal Uncle      Melanoma Neg Hx      Eczema Neg Hx      Lupus Neg Hx      Psoriasis Neg Hx      Breast cancer Neg Hx      Colon cancer Neg Hx      Amblyopia Neg Hx      Blindness Neg Hx      Macular degeneration Neg Hx      Retinal detachment Neg Hx      Strabismus Neg Hx      Thyroid disease Neg Hx       Social History     Tobacco Use    Smoking status: Former     Current packs/day: 0.00     Average  packs/day: 0.3 packs/day for 7.5 years (1.9 ttl pk-yrs)     Types: Cigarettes     Start date: 1955     Quit date: 1962     Years since quittin.8    Smokeless tobacco: Former     Quit date: 1970   Substance Use Topics    Alcohol use: Yes     Alcohol/week: 1.0 standard drink of alcohol     Types: 1 Cans of beer per week     Comment: occasionally    Drug use: No     Review of Systems   Constitutional:  Negative for activity change, appetite change, fatigue and fever.   HENT:  Negative for sore throat.    Respiratory:  Negative for cough and shortness of breath.    Cardiovascular:  Negative for chest pain.   Gastrointestinal:  Negative for abdominal pain, diarrhea, nausea and vomiting.   Genitourinary:  Positive for frequency. Negative for dysuria, hematuria and urgency.   Musculoskeletal:  Positive for back pain. Negative for myalgias.   Skin:  Negative for rash.   Neurological:  Negative for weakness and headaches.   Hematological:  Does not bruise/bleed easily.       Physical Exam     Initial Vitals [24 1038]   BP Pulse Resp Temp SpO2   (!) 150/65 62 18 97.6 °F (36.4 °C) 97 %      MAP       --         Physical Exam    Vitals reviewed.  Constitutional: She appears well-developed and well-nourished. She is not diaphoretic. She is cooperative.  Non-toxic appearance. She does not have a sickly appearance. She does not appear ill. No distress.   HENT:   Head: Normocephalic and atraumatic.   Nose: Nose normal.   Mouth/Throat: No trismus in the jaw.   Eyes: Conjunctivae and EOM are normal.   Neck:   Normal range of motion.  Cardiovascular:  Normal rate and regular rhythm.           Pulmonary/Chest: No accessory muscle usage. No tachypnea. No respiratory distress.   Abdominal: She exhibits no distension.   Musculoskeletal:         General: Normal range of motion.      Cervical back: Normal range of motion. No bony tenderness. Normal range of motion.      Thoracic back: Normal range of motion.       Lumbar back: Tenderness present. No bony tenderness. Normal range of motion. Negative right straight leg raise test and negative left straight leg raise test.        Back:       Comments: Generalized TTP to mid low back without bony step-offs.  Skin without erythema, wounds, rashes, edema, induration.  Intact range of motion and strength of bilateral hips with negative straight leg raise.  No unilateral leg swelling or peripheral edema.  Sensations grossly intact.     Neurological: She is alert. She has normal strength.   Skin: Skin is warm and dry. No erythema. No pallor.         ED Course   Procedures  Labs Reviewed   URINALYSIS, REFLEX TO URINE CULTURE - Abnormal; Notable for the following components:       Result Value    Leukocytes, UA 1+ (*)     All other components within normal limits    Narrative:     Specimen Source->Urine   URINALYSIS MICROSCOPIC - Abnormal; Notable for the following components:    WBC, UA 6 (*)     All other components within normal limits    Narrative:     Specimen Source->Urine          Imaging Results              X-Ray Lumbar Spine Ap And Lateral (Final result)  Result time 05/19/24 13:07:20      Final result by Sandie Burns MD (05/19/24 13:07:20)                   Impression:      Degenerative changes of the spine as described above.      Electronically signed by: Sandie Burns MD  Date:    05/19/2024  Time:    13:07               Narrative:    EXAMINATION:  XR LUMBAR SPINE AP AND LATERAL    CLINICAL HISTORY:  back pain;    TECHNIQUE:  AP, lateral and spot images were performed of the lumbar spine.    COMPARISON:  September 4, 2018    FINDINGS:  There is a mild lumbar dextroscoliosis as previously.  Lumbar vertebral body heights are satisfactorily maintained there is slight loss of height of the T12 vertebral body, new since the previous examination.  There is multilevel lumbar disc space narrowing moderate to severe at L1-2 and L2-3, moderate at L3-4 and L4-5, and mild  at L5-S1.  As previously, there is a 10 mm anterolisthesis of L4 on L5.  Rounded calcifications overlying the pelvis are consistent with phleboliths.  There are atherosclerotic calcifications of the aorta.                                       Medications   acetaminophen tablet 1,000 mg (1,000 mg Oral Given 5/19/24 1308)   methylPREDNISolone sodium succinate injection 125 mg (125 mg Intramuscular Given 5/19/24 1308)     Medical Decision Making  Patient is a 89-year-old female with a history of DM 2, hypothyroidism, osteoarthritis, CKD, sciatica, who [place] [facility] [place] evaluation of middle low back pain.    Differential diagnosis includes but is not limited to DDD, DJD, oa, other inflammatory arthritis, strain, sprain, fracture, dislocation, less likely spinal cord compression or cauda equina given no red flag symptoms.  Could be UTI although her pain is reproducible to palpation so unlikely.    Patient [place] [facility] [place] with acute on chronic pain.  Will give medication (lidocaine patch, oral acetaminophen, and Solu-Medrol intramuscular injection) for symptomatic relief, obtain x-ray, and reassess.    Amount and/or Complexity of Data Reviewed  External Data Reviewed: labs and radiology.  Radiology: ordered.    Risk  OTC drugs.  Prescription drug management.               ED Course as of 05/21/24 0748   Sun May 19, 2024   1137 BP(!): 150/65 [CL]   1137 Temp: 97.6 °F (36.4 °C) [CL]   1137 Pulse: 62 [CL]   1137 Resp: 18 [CL]   1137 SpO2: 97 % [CL]      ED Course User Index  [CL] Leah Thomas PA-C          X-ray shows degenerative changes of the spine in the lumbar region.  No acute processes.  No spinal canal stenosis.    UA with 6 WBCs and rare bacteria.     Patient and daughter updated with results.  I will continue treat conservatively for lumbar degenerative disc disease.  In addition she has signs of mild UTI.  I will treat since she had urinary frequency last night.  Continue OTC acetaminophen for  degenerative back disease.  Use lidocaine patches every 12 hours.  Start Keflex for UTI.  Follow up closely with back and spine if symptoms do not improve.  Follow-up with PCP.  All of patient's questions were answered.  Patient and her daughter are comfortable with plan, and patient is stable for discharge.              Clinical Impression:  Final diagnoses:  [M51.36] Lumbar degenerative disc disease (Primary)          ED Disposition Condition    Discharge Stable          ED Prescriptions       Medication Sig Dispense Start Date End Date Auth. Provider    cephALEXin (KEFLEX) 250 MG capsule Take 1 capsule (250 mg total) by mouth every 6 (six) hours. for 5 days 20 capsule 5/19/2024 5/24/2024 Leah Thomas PA-C    LIDOcaine (LIDODERM) 5 % Place 1 patch onto the skin once daily. Remove & Discard patch within 12 hours or as directed by MD 30 patch 5/19/2024 -- Leah Thomas PA-C          Follow-up Information       Follow up With Specialties Details Why Contact Info Additional Information    [facility] Schedule an appointment as soon as possible for a visit   [address], [address]  [facility] [address] [phone] Turn at Entrance [address] on [address] in [facility] and take elevators to Floor 2. Follow signs to [facility]. Take [facility] [address]vators to Floor 4 for [address].    Tere Hughes MD Internal Medicine, Pediatrics Schedule an appointment as soon as possible for a visit   [address]  [place] [address] [phone]       [facility] - Emergency Dept Emergency Medicine  If symptoms worsen [address] [facility] [address] [phone]              Leah Thomas PA-C  05/21/24 2053

## 2024-05-19 NOTE — ED TRIAGE NOTES
Patient comes into the emergency department by POV with complaints of back pain. Patient states that pain started May 7th, mostly in lower back, denies injury or fall, now pain won't go away and its difficult to walk around.     LOC: The patient is awake, alert and aware of environment with an appropriate affect, the patient is oriented x 3 and speaking appropriately.   APPEARANCE: Patient appears comfortable and in no acute distress, patient is clean and well groomed.  SKIN: The skin is warm and dry, color consistent with ethnicity, patient has normal skin turgor and moist mucus membranes, skin intact, no breakdown or bruising noted.   MUSCULOSKELETAL: Patient moving all extremities spontaneously, no swelling noted. Patient endorsing lower back pain.  RESPIRATORY: Airway is open and patent, respirations are spontaneous, patient has a normal effort and rate, no accessory muscle.  CARDIAC: Pt placed on cardiac monitor. Patient has a normal rate and regular rhythm, no edema noted, capillary refill < 3 seconds.   GASTRO: Soft and non tender to palpation, no distention noted.  : Pt denies any pain or frequency with urination.  NEURO: Pt opens eyes spontaneously, behavior appropriate to situation, follows commands, facial expression symmetrical, bilateral hand grasp equal and even, purposeful motor response noted, normal sensation in all extremities when touched with a finger.

## 2024-05-19 NOTE — DISCHARGE INSTRUCTIONS
You were given a steroid injection in the emergency department.  This will last you about 2-3 days.    Your x-rays do not show any acute fractures or dislocations.  You have arthritis of your lumbar spine.    You can take acetaminophen/tylenol 650 mg every 6 hours or 1000 mg every 8 hours for added relief.  Apply ice to the area for 10-20 minutes every 4 hours. You can apply heat 2 days after for the same duration and frequency.  Apply lidocaine patch the area for 12 hours.  Allow the skin to rest for 12 hours before applying another patch.  Follow up with Back and Spine and PCP.  Return to the ER for new or worsening symptoms.  Future Appointments   Date Time Provider Department Center   6/3/2024  2:30 PM [facility] MAMMO1 HOLOGIC DIMENSIONS [facility]   8/20/2024  1:30 PM Mati Mata MD [facility]   12/18/2024  2:00 PM Tere Hughes MD [facility]     Imaging Results              X-Ray Lumbar Spine Ap And Lateral (Final result)  Result time 05/19/24 13:07:20      Final result by Sandie Burns MD (05/19/24 13:07:20)                   Impression:      Degenerative changes of the spine as described above.      Electronically signed by: Sandie Burns MD  Date:    05/19/2024  Time:    13:07               Narrative:    EXAMINATION:  XR LUMBAR SPINE AP AND LATERAL    CLINICAL HISTORY:  back pain;    TECHNIQUE:  AP, lateral and spot images were performed of the lumbar spine.    COMPARISON:  September 4, 2018    FINDINGS:  There is a mild lumbar dextroscoliosis as previously.  Lumbar vertebral body heights are satisfactorily maintained there is slight loss of height of the T12 vertebral body, new since the previous examination.  There is multilevel lumbar disc space narrowing moderate to severe at L1-2 and L2-3, moderate at L3-4 and L4-5, and mild at L5-S1.  As previously, there is a 10 mm anterolisthesis of L4 on L5.  Rounded calcifications overlying the pelvis are consistent  with phleboliths.  There are atherosclerotic calcifications of the aorta.

## 2024-05-28 ENCOUNTER — TELEPHONE (OUTPATIENT)
Dept: PAIN MEDICINE | Facility: CLINIC | Age: 89
End: 2024-05-28
Payer: MEDICARE

## 2024-05-29 ENCOUNTER — OFFICE VISIT (OUTPATIENT)
Dept: PAIN MEDICINE | Facility: CLINIC | Age: 89
End: 2024-05-29
Attending: ANESTHESIOLOGY
Payer: MEDICARE

## 2024-05-29 ENCOUNTER — HOSPITAL ENCOUNTER (OUTPATIENT)
Dept: RADIOLOGY | Facility: OTHER | Age: 89
Discharge: HOME | End: 2024-05-29
Attending: ANESTHESIOLOGY
Payer: MEDICARE

## 2024-05-29 VITALS
OXYGEN SATURATION: 98 % | TEMPERATURE: 98 F | HEART RATE: 158 BPM | BODY MASS INDEX: 30.67 KG/M2 | RESPIRATION RATE: 18 BRPM | SYSTOLIC BLOOD PRESSURE: 110 MMHG | DIASTOLIC BLOOD PRESSURE: 77 MMHG | WEIGHT: 190.06 LBS

## 2024-05-29 DIAGNOSIS — M47.816 LUMBAR SPONDYLOSIS: ICD-10-CM

## 2024-05-29 DIAGNOSIS — M43.10 SPONDYLOLISTHESIS, UNSPECIFIED SPINAL REGION: Primary | ICD-10-CM

## 2024-05-29 DIAGNOSIS — M43.10 SPONDYLOLISTHESIS, UNSPECIFIED SPINAL REGION: ICD-10-CM

## 2024-05-29 PROCEDURE — 99214 OFFICE O/P EST MOD 30 MIN: CPT | Mod: GC,S$GLB,, | Performed by: ANESTHESIOLOGY

## 2024-05-29 PROCEDURE — 1157F ADVNC CARE PLAN IN RCRD: CPT | Mod: CPTII,S$GLB,, | Performed by: ANESTHESIOLOGY

## 2024-05-29 PROCEDURE — 99999 PR PBB SHADOW E&M-EST. PATIENT-LVL V: CPT | Mod: PBBFAC,,, | Performed by: ANESTHESIOLOGY

## 2024-05-29 PROCEDURE — 72120 X-RAY BEND ONLY L-S SPINE: CPT | Mod: TC,FY

## 2024-05-29 PROCEDURE — 1159F MED LIST DOCD IN RCRD: CPT | Mod: CPTII,S$GLB,, | Performed by: ANESTHESIOLOGY

## 2024-05-29 PROCEDURE — 1101F PT FALLS ASSESS-DOCD LE1/YR: CPT | Mod: CPTII,S$GLB,, | Performed by: ANESTHESIOLOGY

## 2024-05-29 PROCEDURE — 72120 X-RAY BEND ONLY L-S SPINE: CPT | Mod: 26,,, | Performed by: RADIOLOGY

## 2024-05-29 PROCEDURE — 1125F AMNT PAIN NOTED PAIN PRSNT: CPT | Mod: CPTII,S$GLB,, | Performed by: ANESTHESIOLOGY

## 2024-05-29 PROCEDURE — 1160F RVW MEDS BY RX/DR IN RCRD: CPT | Mod: CPTII,S$GLB,, | Performed by: ANESTHESIOLOGY

## 2024-05-29 PROCEDURE — 3288F FALL RISK ASSESSMENT DOCD: CPT | Mod: CPTII,S$GLB,, | Performed by: ANESTHESIOLOGY

## 2024-05-29 NOTE — PROGRESS NOTES
Chronic Pain - New Consult    Referring Physician: No ref. provider found    Chief Complaint:   Chief Complaint   Patient presents with    Low-back Pain    Back Pain    Leg Pain    Hip Pain        SUBJECTIVE:    Tonya Cohn presents to the clinic for the evaluation of low back pain. The pain started 2 weeks ago without inciting event/trauma and symptoms have been unchanged.She recently went to the ED for her back pain where they did LXR. She also received Steroid injection, tylenol, and given Zanaflex by her PCP. The pain is located in the bilateral lumbar paraspinal area without radiating pain.  The pain is described as aching and sharp and is rated as 10/10. The pain is rated with a score of  7/10 on the BEST day and a score of 10/10 on the WORST day.  Symptoms interfere with daily activity. The pain is exacerbated by Standing, Bending, Walking, and Extension.  The pain is mitigated by heat, medications, rest, and sitting. She has been using her walking cane since the pain started to ambulate.     Patient denies night fever/night sweats, urinary incontinence, bowel incontinence, significant weight loss, significant motor weakness, and loss of sensations.    Physical Therapy/Home Exercise: Yes; 1 year ago- 6 weeks      Pain Disability Index Review:      12/28/2021    10:01 AM   Last 3 PDI Scores   Pain Disability Index (PDI) 43       Pain Medications:  Tylenol  Zanaflex     report:  Not applicable    Pain Procedures: None    Imaging:   LXR 5/19/2024:  FINDINGS:  There is a mild lumbar dextroscoliosis as previously.  Lumbar vertebral body heights are satisfactorily maintained there is slight loss of height of the T12 vertebral body, new since the previous examination.  There is multilevel lumbar disc space narrowing moderate to severe at L1-2 and L2-3, moderate at L3-4 and L4-5, and mild at L5-S1.  As previously, there is a 10 mm anterolisthesis of L4 on L5.  Rounded calcifications overlying the pelvis are  consistent with phleboliths.  There are atherosclerotic calcifications of the aorta.     Impression:  Degenerative changes of the spine as described above.    Past Medical History:   Diagnosis Date    AR (allergic rhinitis)     Atherosclerosis of abdominal aorta     noted on CT scan 2011    Carpal tunnel syndrome of left wrist     Chronic kidney disease, stage 3     Diabetic peripheral neuropathy     Diverticulosis     History of colon polyps     History of diverticulitis of colon 2014    Hyperlipemia     Hypertension     Hypothyroidism     followed by endocrinology, Dr. Green    Mild aortic stenosis     OA (osteoarthritis) of knee     Obesity     Sciatica     Type II or unspecified type diabetes mellitus with neurological manifestations, uncontrolled(250.62)      Past Surgical History:   Procedure Laterality Date    CATARACT EXTRACTION Bilateral     COLONOSCOPY N/A 10/3/2017    Procedure: COLONOSCOPY;  Surgeon: Alin Gonzalez MD;  Location: [facility] ([address] [place] [address]);  Service: Endoscopy;  Laterality: N/A;    COSMETIC SURGERY      HYSTERECTOMY      partial    JOINT REPLACEMENT Right     LUNG BIOPSY  in her 40's    TOTAL KNEE ARTHROPLASTY Right 2014    TKR     Social History     Socioeconomic History    Marital status:     Number of children: 7   Occupational History    Occupation: retired - house cleaning   Tobacco Use    Smoking status: Former     Current packs/day: 0.00     Average packs/day: 0.3 packs/day for 7.5 years (1.9 ttl pk-yrs)     Types: Cigarettes     Start date: 1955     Quit date: 1962     Years since quittin.9    Smokeless tobacco: Former     Quit date: 1970   Substance and Sexual Activity    Alcohol use: Yes     Alcohol/week: 1.0 standard drink of alcohol     Types: 1 Cans of beer per week     Comment: occasionally    Drug use: No    Sexual activity: Not Currently     Partners: Male   Other Topics Concern    Are you pregnant or think you may be? No     Breast-feeding No     Social Determinants of Health     Financial Resource Strain: Low Risk  (4/23/2024)    Overall Financial Resource Strain (CARDIA)     Difficulty of Paying Living Expenses: Not hard at all   Food Insecurity: No Food Insecurity (4/23/2024)    Hunger Vital Sign     Worried About Running Out of Food in the Last Year: Never true     Ran Out of Food in the Last Year: Never true   Transportation Needs: No Transportation Needs (4/23/2024)    PRAPARE - Transportation     Lack of Transportation (Medical): No     Lack of Transportation (Non-Medical): No   Physical Activity: Unknown (4/23/2024)    Exercise Vital Sign     Days of Exercise per Week: Patient declined   Stress: No Stress Concern Present (4/23/2024)    [employer] [place] [employer] - Occupational Stress Questionnaire     Feeling of Stress : Not at all   Housing Stability: Low Risk  (5/4/2022)    Housing Stability Vital Sign     Unable to Pay for Housing in the Last Year: No     Number of Places Lived in the Last Year: 1     Unstable Housing in the Last Year: No     Family History   Problem Relation Name Age of Onset    Cancer Mother          shoulder tumor - cancer    Hypertension Mother      Glaucoma Mother      Heart disease Father          valve replacement    Hypertension Father      Heart attack Father      Diabetes Sister Lidya     Arthritis Sister Lovinia         s/p knee surgery    Diabetes Brother Benoris     Heart disease Brother Benoris     Heart failure Brother Benoris     Stroke Brother Ardell     Arthritis Brother Vital         back problems    Skin cancer Brother Bogdan     Cancer Brother Bogdan     Throat cancer Brother Bogdan     No Known Problems Maternal Grandmother      No Known Problems Maternal Grandfather      No Known Problems Paternal Grandmother      No Known Problems Paternal Grandfather      Cancer Son Johnathan         jaw    Cataracts Son Johnathan     No Known Problems Maternal Aunt      No Known Problems  Maternal Uncle      No Known Problems Paternal Aunt      No Known Problems Paternal Uncle      Melanoma Neg Hx      Eczema Neg Hx      Lupus Neg Hx      Psoriasis Neg Hx      Breast cancer Neg Hx      Colon cancer Neg Hx      Amblyopia Neg Hx      Blindness Neg Hx      Macular degeneration Neg Hx      Retinal detachment Neg Hx      Strabismus Neg Hx      Thyroid disease Neg Hx         Review of patient's allergies indicates:   Allergen Reactions    Lipitor [atorvastatin]        Current Outpatient Medications   Medication Sig    acetaminophen (TYLENOL) 500 MG tablet Take 1 tablet (500 mg total) by mouth every 4 (four) hours as needed for Pain.    betamethasone dipropionate (DIPROLENE) 0.05 % ointment Apply topically 2 (two) times daily.    betamethasone valerate 0.1% (VALISONE) 0.1 % Crea Apply topically 2 (two) times daily.    calcium carbonate (OS-MIRACLE) 500 mg calcium (1,250 mg) tablet Take 1 tablet by mouth every other day.    diclofenac sodium (VOLTAREN) 1 % Gel Apply 4 grams to effected area 4 x daily do not exceed 32 grams per day    diphenhydrAMINE (BENADRYL) 25 mg capsule Take 1 capsule (25 mg total) by mouth every 6 (six) hours as needed for Itching or Allergies.    dorzolamide-timolol 2-0.5% (COSOPT) 22.3-6.8 mg/mL ophthalmic solution Place 1 drop into the right eye 2 (two) times daily.    fish oil-fat acid comb.8-hb137 1,200 mg (400 [id number]) Cap     fluocinonide 0.1 % Crea as needed.     hydrocortisone 2.5 % cream Apply topically 2 (two) times daily.    hydrOXYzine HCL (ATARAX) 25 MG tablet Take 1 tablet (25 mg total) by mouth every 4 (four) hours as needed for Itching.    levothyroxine (SYNTHROID) 88 MCG tablet TAKE 1 TABLET(88 MCG) BY MOUTH EVERY DAY    LIDOcaine (LIDODERM) 5 % Place 1 patch onto the skin once daily. Remove & Discard patch within 12 hours or as directed by MD    losartan (COZAAR) 50 MG tablet TAKE 1 TABLET(50 MG) BY MOUTH EVERY DAY    lovastatin (MEVACOR) 10 MG tablet TAKE 1  TABLET(10 MG) BY MOUTH EVERY DAY    mometasone 0.1% (ELOCON) 0.1 % cream Use daily    multivit,calc,mins/iron/folic (ONE-A-DAY WOMENS FORMULA ORAL) Take 1 tablet by mouth once daily.    tiZANidine (ZANAFLEX) 2 MG tablet Take 1 tablet (2 mg total) by mouth every 8 (eight) hours as needed (back pain).    triamcinolone acetonide 0.025% (KENALOG) 0.025 % cream Apply topically 2 (two) times daily.    triamcinolone acetonide 0.5% (KENALOG) 0.5 % Crea APPLY EXTERNALLY TO THE AFFECTED AREA TWICE DAILY as needed for flares; do not use for more than 2 weeks in a row without one week break    aspirin 81 MG Chew Take 1 tablet (81 mg total) by mouth once daily.    blood-glucose meter (FREESTYLE SYSTEM KIT) kit Use as instructed    cetirizine (ZYRTEC) 10 MG tablet Take 1 tablet (10 mg total) by mouth once daily. for 7 days    clobetasoL (TEMOVATE) 0.05 % cream Apply topically 2 (two) times daily. for 7 days (Patient not taking: Reported on 9/14/2022)     No current facility-administered medications for this visit.       REVIEW OF SYSTEMS:    GENERAL:  No weight loss, malaise or fevers.  HEENT:  Negative for frequent or significant headaches.  NECK:  Negative for lumps, goiter, pain and significant neck swelling.  RESPIRATORY:  Negative for cough, wheezing or shortness of breath.  CARDIOVASCULAR:  Negative for chest pain, leg swelling or palpitations. HTN  GI:  Negative for abdominal discomfort, blood in stools or black stools or change in bowel habits.  MUSCULOSKELETAL:  See HPI. Low back pain.   SKIN:  Negative for lesions, rash, and itching.  PSYCH:  Negative for sleep disturbance, mood disorder and recent psychosocial stressors.  HEMATOLOGY/LYMPHOLOGY:  Negative for prolonged bleeding, bruising easily or swollen nodes.  NEURO:   No history of headaches, syncope, paralysis, seizures or tremors.  All other reviewed and negative other than HPI.    OBJECTIVE:    /77   Pulse (!) 158   Temp 97.6 °F (36.4 °C)   Resp 18   Wt  86.2 kg (190 lb 0.6 oz)   SpO2 98%   BMI 30.67 kg/m²     PHYSICAL EXAMINATION:    General appearance: Well appearing, in no acute distress, alert and oriented x3.  Psych:  Mood and affect appropriate.  Skin: Skin color, texture, turgor normal, no rashes or lesions, in both upper and lower body.  Head/face:  Normocephalic, atraumatic. No palpable lymph nodes.  Neck: No pain to palpation over the cervical paraspinous muscles. Spurling Negative. No pain with neck flexion, extension, or lateral flexion.   Cor: RRR  Pulm: CTA  GI:  Soft and non-tender.  Back: Straight leg raising in the sitting and supine positions is negative to radicular pain. Pain to palpation over the spine and costovertebral angles. Limited range of motion with pain reproduction on flexion and extension. + facet loading.   Extremities: Peripheral joint ROM is full and pain free without obvious instability or laxity in all four extremities. No deformities, edema, or skin discoloration. Good capillary refill.  Musculoskeletal: Shoulder, hip, sacroiliac and knee provocative maneuvers are negative. Bilateral upper and lower extremity strength is normal and symmetric.  No atrophy or tone abnormalities are noted.  Neuro: Bilateral upper and lower extremity coordination and muscle stretch reflexes are physiologic and symmetric.  Plantar response are downgoing. No loss of sensation is noted.  Gait: normal.    ASSESSMENT: 89 y.o. year old female with low back pain, consistent with      1. Spondylolisthesis, unspecified spinal region  X-Ray Lumbar Spine Flexion And Extension Only    Ambulatory referral/consult to Physical/Occupational Therapy      2. Lumbar spondylosis  X-Ray Lumbar Spine Flexion And Extension Only    Ambulatory referral/consult to Physical/Occupational Therapy            PLAN:   We discussed with the patient the assessment and recommendations. The following is the plan we agreed on:   - Reviewed imaging and ED notes during visit.     -  Scheduled for lumbar XR with flexion/extension given recent imaging showing anterior spondylolisthesis.     - Referral made for 6 weeks of PT.     - Continue home medications for continued pain relief.     - RTC 6 weeks with SAVANNAH. If pain continues to progress will consider lumbar MRI. If LXR shows instability will need referral for NSGY.    - Counseled patient regarding the importance of activity modification and physical therapy.    The above plan and management options were discussed at length with patient. Patient is in agreement with the above and verbalized understanding. It will be communicated with the referring physician via electronic record, fax, or mail.    Russel Laguna  05/29/2024  I have personally taken the history and examined this patient and agree with the resident's note as stated above.

## 2024-06-01 ENCOUNTER — HOSPITAL ENCOUNTER (INPATIENT)
Facility: HOSPITAL | Age: 89
LOS: 1 days | Discharge: HOME | DRG: 308 | End: 2024-06-03
Attending: EMERGENCY MEDICINE | Admitting: STUDENT IN AN ORGANIZED HEALTH CARE EDUCATION/TRAINING PROGRAM
Payer: MEDICARE

## 2024-06-01 DIAGNOSIS — R79.89 TROPONIN LEVEL ELEVATED: ICD-10-CM

## 2024-06-01 DIAGNOSIS — I48.91 NEW ONSET A-FIB: Primary | ICD-10-CM

## 2024-06-01 DIAGNOSIS — R07.9 CHEST PAIN: ICD-10-CM

## 2024-06-01 DIAGNOSIS — R06.02 SHORTNESS OF BREATH: ICD-10-CM

## 2024-06-01 DIAGNOSIS — R79.89 ELEVATED BRAIN NATRIURETIC PEPTIDE (BNP) LEVEL: ICD-10-CM

## 2024-06-01 DIAGNOSIS — I48.0 PAROXYSMAL ATRIAL FIBRILLATION WITH RVR: ICD-10-CM

## 2024-06-01 DIAGNOSIS — I48.91 NEW ONSET ATRIAL FIBRILLATION: ICD-10-CM

## 2024-06-01 DIAGNOSIS — I50.22 HEART FAILURE WITH MILDLY REDUCED EJECTION FRACTION (HFMREF): ICD-10-CM

## 2024-06-01 DIAGNOSIS — I48.91 ATRIAL FIBRILLATION: ICD-10-CM

## 2024-06-01 DIAGNOSIS — I48.91 ATRIAL FIBRILLATION WITH RVR: ICD-10-CM

## 2024-06-01 PROBLEM — I50.31 ACUTE HEART FAILURE WITH PRESERVED EJECTION FRACTION: Status: ACTIVE | Noted: 2024-06-01

## 2024-06-01 LAB
ALBUMIN SERPL BCP-MCNC: 4 G/DL (ref 3.5–5.2)
ALP SERPL-CCNC: 103 U/L (ref 55–135)
ALT SERPL W/O P-5'-P-CCNC: 75 U/L (ref 10–44)
ANION GAP SERPL CALC-SCNC: 9 MMOL/L (ref 8–16)
APTT PPP: 21.4 SEC (ref 21–32)
APTT PPP: 54.2 SEC (ref 21–32)
ASCENDING AORTA: 2.73 CM
AST SERPL-CCNC: 54 U/L (ref 10–40)
AV INDEX (PROSTH): 0.46
AV MEAN GRADIENT: 6 MMHG
AV PEAK GRADIENT: 10 MMHG
AV VALVE AREA BY VELOCITY RATIO: 1.06 CM²
AV VALVE AREA: 1.3 CM²
AV VELOCITY RATIO: 0.38
BASOPHILS # BLD AUTO: 0.03 K/UL (ref 0–0.2)
BASOPHILS NFR BLD: 0.4 % (ref 0–1.9)
BILIRUB SERPL-MCNC: 0.5 MG/DL (ref 0.1–1)
BILIRUB UR QL STRIP: NEGATIVE
BNP SERPL-MCNC: 589 PG/ML (ref 0–99)
BSA FOR ECHO PROCEDURE: 2.03 M2
BUN SERPL-MCNC: 25 MG/DL (ref 10–30)
CALCIUM SERPL-MCNC: 10 MG/DL (ref 8.7–10.5)
CHLORIDE SERPL-SCNC: 107 MMOL/L (ref 95–110)
CLARITY UR REFRACT.AUTO: ABNORMAL
CO2 SERPL-SCNC: 27 MMOL/L (ref 23–29)
COLOR UR AUTO: YELLOW
CREAT SERPL-MCNC: 1.1 MG/DL (ref 0.5–1.4)
CV ECHO LV RWT: 0.47 CM
DIFFERENTIAL METHOD BLD: ABNORMAL
DOP CALC AO PEAK VEL: 1.6 M/S
DOP CALC AO VTI: 23.84 CM
DOP CALC LVOT AREA: 2.8 CM2
DOP CALC LVOT DIAMETER: 1.9 CM
DOP CALC LVOT PEAK VEL: 0.6 M/S
DOP CALC LVOT STROKE VOLUME: 31.09 CM3
DOP CALC MV VTI: 33.2 CM
DOP CALCLVOT PEAK VEL VTI: 10.97 CM
E WAVE DECELERATION TIME: 196.17 MSEC
E/A RATIO: 3.44
E/E' RATIO: 24.67 M/S
ECHO LV POSTERIOR WALL: 0.93 CM (ref 0.6–1.1)
EJECTION FRACTION: 55 %
EOSINOPHIL # BLD AUTO: 0.1 K/UL (ref 0–0.5)
EOSINOPHIL NFR BLD: 1.2 % (ref 0–8)
ERYTHROCYTE [DISTWIDTH] IN BLOOD BY AUTOMATED COUNT: 13.6 % (ref 11.5–14.5)
EST. GFR  (NO RACE VARIABLE): 47.1 ML/MIN/1.73 M^2
FRACTIONAL SHORTENING: 27 % (ref 28–44)
GLUCOSE SERPL-MCNC: 195 MG/DL (ref 70–110)
GLUCOSE UR QL STRIP: NEGATIVE
HCT VFR BLD AUTO: 34.5 % (ref 37–48.5)
HCV AB SERPL QL IA: NORMAL
HGB BLD-MCNC: 11.7 G/DL (ref 12–16)
HGB UR QL STRIP: ABNORMAL
HIV 1+2 AB+HIV1 P24 AG SERPL QL IA: NORMAL
IMM GRANULOCYTES # BLD AUTO: 0.02 K/UL (ref 0–0.04)
IMM GRANULOCYTES NFR BLD AUTO: 0.2 % (ref 0–0.5)
INFERIOR VENA CAVA SIZE SNIFF: 1.9 CM
INR PPP: 1.1 (ref 0.8–1.2)
INTERVENTRICULAR SEPTUM: 1.07 CM (ref 0.6–1.1)
IVC DIAMETER: 2.3 CM
IVRT: 85.63 MSEC
KETONES UR QL STRIP: NEGATIVE
LA MAJOR: 6.08 CM
LA MINOR: 6.02 CM
LA WIDTH: 3.93 CM
LEFT ATRIUM SIZE: 3.82 CM
LEFT ATRIUM VOLUME INDEX MOD: 32.4 ML/M2
LEFT ATRIUM VOLUME INDEX: 39 ML/M2
LEFT ATRIUM VOLUME MOD: 64.11 CM3
LEFT ATRIUM VOLUME: 77.2 CM3
LEFT INTERNAL DIMENSION IN SYSTOLE: 2.94 CM (ref 2.1–4)
LEFT VENTRICLE DIASTOLIC VOLUME INDEX: 35.36 ML/M2
LEFT VENTRICLE DIASTOLIC VOLUME: 70.01 ML
LEFT VENTRICLE MASS INDEX: 64 G/M2
LEFT VENTRICLE SYSTOLIC VOLUME INDEX: 16.8 ML/M2
LEFT VENTRICLE SYSTOLIC VOLUME: 33.35 ML
LEFT VENTRICULAR INTERNAL DIMENSION IN DIASTOLE: 4 CM (ref 3.5–6)
LEFT VENTRICULAR MASS: 127.06 G
LEUKOCYTE ESTERASE UR QL STRIP: ABNORMAL
LV LATERAL E/E' RATIO: 24.67 M/S
LV SEPTAL E/E' RATIO: 24.67 M/S
LYMPHOCYTES # BLD AUTO: 1.8 K/UL (ref 1–4.8)
LYMPHOCYTES NFR BLD: 22.2 % (ref 18–48)
MAGNESIUM SERPL-MCNC: 2 MG/DL (ref 1.6–2.6)
MAGNESIUM SERPL-MCNC: 2 MG/DL (ref 1.6–2.6)
MCH RBC QN AUTO: 29.3 PG (ref 27–31)
MCHC RBC AUTO-ENTMCNC: 33.9 G/DL (ref 32–36)
MCV RBC AUTO: 87 FL (ref 82–98)
MICROSCOPIC COMMENT: ABNORMAL
MONOCYTES # BLD AUTO: 0.5 K/UL (ref 0.3–1)
MONOCYTES NFR BLD: 6.5 % (ref 4–15)
MV MEAN GRADIENT: 4 MMHG
MV PEAK A VEL: 0.43 M/S
MV PEAK E VEL: 1.48 M/S
MV PEAK GRADIENT: 11 MMHG
MV STENOSIS PRESSURE HALF TIME: 79 MS
MV VALVE AREA BY CONTINUITY EQUATION: 0.94 CM2
MV VALVE AREA P 1/2 METHOD: 2.78 CM2
NEUTROPHILS # BLD AUTO: 5.6 K/UL (ref 1.8–7.7)
NEUTROPHILS NFR BLD: 69.5 % (ref 38–73)
NITRITE UR QL STRIP: NEGATIVE
NON-SQ EPI CELLS #/AREA URNS AUTO: 2 /HPF
NRBC BLD-RTO: 0 /100 WBC
OHS CV RV/LV RATIO: 0.82 CM
PH UR STRIP: 5 [PH] (ref 5–8)
PHOSPHATE SERPL-MCNC: 3.9 MG/DL (ref 2.7–4.5)
PISA TR MAX VEL: 3.09 M/S
PLATELET # BLD AUTO: 238 K/UL (ref 150–450)
PMV BLD AUTO: 11.4 FL (ref 9.2–12.9)
POCT GLUCOSE: 137 MG/DL (ref 70–110)
POCT GLUCOSE: 187 MG/DL (ref 70–110)
POTASSIUM SERPL-SCNC: 4.1 MMOL/L (ref 3.5–5.1)
POTASSIUM SERPL-SCNC: 4.1 MMOL/L (ref 3.5–5.1)
PROT SERPL-MCNC: 7.2 G/DL (ref 6–8.4)
PROT UR QL STRIP: ABNORMAL
PROTHROMBIN TIME: 11.7 SEC (ref 9–12.5)
RA MAJOR: 5.02 CM
RA PRESSURE ESTIMATED: 15 MMHG
RA WIDTH: 4.04 CM
RBC # BLD AUTO: 3.99 M/UL (ref 4–5.4)
RBC #/AREA URNS AUTO: 8 /HPF (ref 0–4)
RIGHT ATRIAL AREA: 17.3 CM2
RIGHT VENTRICULAR END-DIASTOLIC DIMENSION: 3.26 CM
RV TB RVSP: 18 MMHG
SARS-COV-2 RDRP RESP QL NAA+PROBE: NEGATIVE
SINUS: 2.83 CM
SODIUM SERPL-SCNC: 143 MMOL/L (ref 136–145)
SP GR UR STRIP: 1.01 (ref 1–1.03)
SQUAMOUS #/AREA URNS AUTO: 3 /HPF
STJ: 2.05 CM
T4 FREE SERPL-MCNC: 1.19 NG/DL (ref 0.71–1.51)
TDI LATERAL: 0.06 M/S
TDI SEPTAL: 0.06 M/S
TDI: 0.06 M/S
TR MAX PG: 38 MMHG
TRICUSPID ANNULAR PLANE SYSTOLIC EXCURSION: 0.91 CM
TROPONIN I SERPL DL<=0.01 NG/ML-MCNC: 0.04 NG/ML (ref 0–0.03)
TROPONIN I SERPL DL<=0.01 NG/ML-MCNC: 0.05 NG/ML (ref 0–0.03)
TSH SERPL DL<=0.005 MIU/L-ACNC: 0.37 UIU/ML (ref 0.4–4)
TV REST PULMONARY ARTERY PRESSURE: 53 MMHG
URN SPEC COLLECT METH UR: ABNORMAL
WBC # BLD AUTO: 8.12 K/UL (ref 3.9–12.7)
WBC #/AREA URNS AUTO: 46 /HPF (ref 0–5)
Z-SCORE OF LEFT VENTRICULAR DIMENSION IN END DIASTOLE: -3.57
Z-SCORE OF LEFT VENTRICULAR DIMENSION IN END SYSTOLE: -1.41

## 2024-06-01 PROCEDURE — 84484 ASSAY OF TROPONIN QUANT: CPT | Performed by: STUDENT IN AN ORGANIZED HEALTH CARE EDUCATION/TRAINING PROGRAM

## 2024-06-01 PROCEDURE — 93010 ELECTROCARDIOGRAM REPORT: CPT | Mod: ,,, | Performed by: INTERNAL MEDICINE

## 2024-06-01 PROCEDURE — 63600175 PHARM REV CODE 636 W HCPCS: Performed by: STUDENT IN AN ORGANIZED HEALTH CARE EDUCATION/TRAINING PROGRAM

## 2024-06-01 PROCEDURE — G0378 HOSPITAL OBSERVATION PER HR: HCPCS

## 2024-06-01 PROCEDURE — 85730 THROMBOPLASTIN TIME PARTIAL: CPT | Performed by: STUDENT IN AN ORGANIZED HEALTH CARE EDUCATION/TRAINING PROGRAM

## 2024-06-01 PROCEDURE — 84443 ASSAY THYROID STIM HORMONE: CPT | Performed by: EMERGENCY MEDICINE

## 2024-06-01 PROCEDURE — 85025 COMPLETE CBC W/AUTO DIFF WBC: CPT | Performed by: EMERGENCY MEDICINE

## 2024-06-01 PROCEDURE — 84484 ASSAY OF TROPONIN QUANT: CPT | Mod: 91 | Performed by: EMERGENCY MEDICINE

## 2024-06-01 PROCEDURE — 93005 ELECTROCARDIOGRAM TRACING: CPT

## 2024-06-01 PROCEDURE — 84439 ASSAY OF FREE THYROXINE: CPT | Performed by: EMERGENCY MEDICINE

## 2024-06-01 PROCEDURE — 25000003 PHARM REV CODE 250: Performed by: EMERGENCY MEDICINE

## 2024-06-01 PROCEDURE — 85610 PROTHROMBIN TIME: CPT | Performed by: STUDENT IN AN ORGANIZED HEALTH CARE EDUCATION/TRAINING PROGRAM

## 2024-06-01 PROCEDURE — 83735 ASSAY OF MAGNESIUM: CPT | Mod: 91 | Performed by: PHYSICIAN ASSISTANT

## 2024-06-01 PROCEDURE — 63600175 PHARM REV CODE 636 W HCPCS: Performed by: EMERGENCY MEDICINE

## 2024-06-01 PROCEDURE — 96376 TX/PRO/DX INJ SAME DRUG ADON: CPT

## 2024-06-01 PROCEDURE — 25000003 PHARM REV CODE 250

## 2024-06-01 PROCEDURE — 81001 URINALYSIS AUTO W/SCOPE: CPT | Performed by: STUDENT IN AN ORGANIZED HEALTH CARE EDUCATION/TRAINING PROGRAM

## 2024-06-01 PROCEDURE — 84100 ASSAY OF PHOSPHORUS: CPT | Performed by: PHYSICIAN ASSISTANT

## 2024-06-01 PROCEDURE — 80053 COMPREHEN METABOLIC PANEL: CPT | Performed by: EMERGENCY MEDICINE

## 2024-06-01 PROCEDURE — 83735 ASSAY OF MAGNESIUM: CPT | Performed by: EMERGENCY MEDICINE

## 2024-06-01 PROCEDURE — 86803 HEPATITIS C AB TEST: CPT | Performed by: PHYSICIAN ASSISTANT

## 2024-06-01 PROCEDURE — 87086 URINE CULTURE/COLONY COUNT: CPT | Performed by: STUDENT IN AN ORGANIZED HEALTH CARE EDUCATION/TRAINING PROGRAM

## 2024-06-01 PROCEDURE — 25000003 PHARM REV CODE 250: Performed by: STUDENT IN AN ORGANIZED HEALTH CARE EDUCATION/TRAINING PROGRAM

## 2024-06-01 PROCEDURE — U0002 COVID-19 LAB TEST NON-CDC: HCPCS | Performed by: EMERGENCY MEDICINE

## 2024-06-01 PROCEDURE — 25000003 PHARM REV CODE 250: Performed by: FAMILY MEDICINE

## 2024-06-01 PROCEDURE — 84132 ASSAY OF SERUM POTASSIUM: CPT | Performed by: PHYSICIAN ASSISTANT

## 2024-06-01 PROCEDURE — 83880 ASSAY OF NATRIURETIC PEPTIDE: CPT | Performed by: EMERGENCY MEDICINE

## 2024-06-01 PROCEDURE — 82962 GLUCOSE BLOOD TEST: CPT

## 2024-06-01 PROCEDURE — 87389 HIV-1 AG W/HIV-1&-2 AB AG IA: CPT | Performed by: PHYSICIAN ASSISTANT

## 2024-06-01 PROCEDURE — 96375 TX/PRO/DX INJ NEW DRUG ADDON: CPT

## 2024-06-01 PROCEDURE — 96374 THER/PROPH/DIAG INJ IV PUSH: CPT | Mod: 59

## 2024-06-01 PROCEDURE — 99291 CRITICAL CARE FIRST HOUR: CPT

## 2024-06-01 RX ORDER — METOPROLOL TARTRATE 1 MG/ML
5 INJECTION, SOLUTION INTRAVENOUS
Status: COMPLETED | OUTPATIENT
Start: 2024-06-01 | End: 2024-06-01

## 2024-06-01 RX ORDER — NAPROXEN SODIUM 220 MG/1
81 TABLET, FILM COATED ORAL DAILY
Status: DISCONTINUED | OUTPATIENT
Start: 2024-06-01 | End: 2024-06-03 | Stop reason: HOSPADM

## 2024-06-01 RX ORDER — METOPROLOL TARTRATE 1 MG/ML
5 INJECTION, SOLUTION INTRAVENOUS ONCE
Status: COMPLETED | OUTPATIENT
Start: 2024-06-01 | End: 2024-06-01

## 2024-06-01 RX ORDER — METOPROLOL TARTRATE 50 MG/1
50 TABLET ORAL ONCE
Status: DISCONTINUED | OUTPATIENT
Start: 2024-06-01 | End: 2024-06-01

## 2024-06-01 RX ORDER — LIDOCAINE 50 MG/G
1 PATCH TOPICAL
Status: COMPLETED | OUTPATIENT
Start: 2024-06-01 | End: 2024-06-01

## 2024-06-01 RX ORDER — IPRATROPIUM BROMIDE AND ALBUTEROL SULFATE 2.5; .5 MG/3ML; MG/3ML
3 SOLUTION RESPIRATORY (INHALATION) EVERY 6 HOURS PRN
Status: DISCONTINUED | OUTPATIENT
Start: 2024-06-01 | End: 2024-06-03 | Stop reason: HOSPADM

## 2024-06-01 RX ORDER — DILTIAZEM HYDROCHLORIDE 5 MG/ML
10 INJECTION INTRAVENOUS ONCE
Status: COMPLETED | OUTPATIENT
Start: 2024-06-01 | End: 2024-06-01

## 2024-06-01 RX ORDER — METOPROLOL TARTRATE 1 MG/ML
INJECTION, SOLUTION INTRAVENOUS
Status: COMPLETED
Start: 2024-06-01 | End: 2024-06-01

## 2024-06-01 RX ORDER — FUROSEMIDE 10 MG/ML
40 INJECTION INTRAMUSCULAR; INTRAVENOUS ONCE
Status: COMPLETED | OUTPATIENT
Start: 2024-06-01 | End: 2024-06-01

## 2024-06-01 RX ORDER — TALC
6 POWDER (GRAM) TOPICAL NIGHTLY PRN
Status: DISCONTINUED | OUTPATIENT
Start: 2024-06-01 | End: 2024-06-03 | Stop reason: HOSPADM

## 2024-06-01 RX ORDER — POLYETHYLENE GLYCOL 3350 17 G/17G
17 POWDER, FOR SOLUTION ORAL DAILY
Status: DISCONTINUED | OUTPATIENT
Start: 2024-06-01 | End: 2024-06-03 | Stop reason: HOSPADM

## 2024-06-01 RX ORDER — ONDANSETRON 4 MG/1
8 TABLET, ORALLY DISINTEGRATING ORAL EVERY 8 HOURS PRN
Status: DISCONTINUED | OUTPATIENT
Start: 2024-06-01 | End: 2024-06-03 | Stop reason: HOSPADM

## 2024-06-01 RX ORDER — ATORVASTATIN CALCIUM 10 MG/1
10 TABLET, FILM COATED ORAL DAILY
Status: DISCONTINUED | OUTPATIENT
Start: 2024-06-01 | End: 2024-06-01

## 2024-06-01 RX ORDER — HYDROXYZINE HYDROCHLORIDE 25 MG/1
25 TABLET, FILM COATED ORAL EVERY 4 HOURS PRN
Status: DISCONTINUED | OUTPATIENT
Start: 2024-06-01 | End: 2024-06-03 | Stop reason: HOSPADM

## 2024-06-01 RX ORDER — DILTIAZEM HYDROCHLORIDE 5 MG/ML
10 INJECTION INTRAVENOUS EVERY 6 HOURS PRN
Status: DISCONTINUED | OUTPATIENT
Start: 2024-06-01 | End: 2024-06-02

## 2024-06-01 RX ORDER — FUROSEMIDE 10 MG/ML
40 INJECTION INTRAMUSCULAR; INTRAVENOUS
Status: COMPLETED | OUTPATIENT
Start: 2024-06-01 | End: 2024-06-01

## 2024-06-01 RX ORDER — METOPROLOL TARTRATE 25 MG/1
12.5 TABLET ORAL 2 TIMES DAILY
Status: DISCONTINUED | OUTPATIENT
Start: 2024-06-01 | End: 2024-06-01

## 2024-06-01 RX ORDER — HEPARIN SODIUM,PORCINE/D5W 25000/250
0-40 INTRAVENOUS SOLUTION INTRAVENOUS CONTINUOUS
Status: DISCONTINUED | OUTPATIENT
Start: 2024-06-01 | End: 2024-06-02

## 2024-06-01 RX ORDER — NALOXONE HCL 0.4 MG/ML
0.02 VIAL (ML) INJECTION
Status: DISCONTINUED | OUTPATIENT
Start: 2024-06-01 | End: 2024-06-03 | Stop reason: HOSPADM

## 2024-06-01 RX ORDER — METOPROLOL TARTRATE 25 MG/1
25 TABLET, FILM COATED ORAL 2 TIMES DAILY
Status: DISCONTINUED | OUTPATIENT
Start: 2024-06-01 | End: 2024-06-02

## 2024-06-01 RX ORDER — PROCHLORPERAZINE EDISYLATE 5 MG/ML
5 INJECTION INTRAMUSCULAR; INTRAVENOUS EVERY 6 HOURS PRN
Status: DISCONTINUED | OUTPATIENT
Start: 2024-06-01 | End: 2024-06-03 | Stop reason: HOSPADM

## 2024-06-01 RX ORDER — ACETAMINOPHEN 325 MG/1
650 TABLET ORAL
Status: COMPLETED | OUTPATIENT
Start: 2024-06-01 | End: 2024-06-01

## 2024-06-01 RX ORDER — ACETAMINOPHEN 325 MG/1
650 TABLET ORAL EVERY 6 HOURS PRN
Status: DISCONTINUED | OUTPATIENT
Start: 2024-06-01 | End: 2024-06-03 | Stop reason: HOSPADM

## 2024-06-01 RX ORDER — GLUCAGON 1 MG
1 KIT INJECTION
Status: DISCONTINUED | OUTPATIENT
Start: 2024-06-01 | End: 2024-06-03 | Stop reason: HOSPADM

## 2024-06-01 RX ORDER — DIPHENHYDRAMINE HCL 25 MG
25 CAPSULE ORAL EVERY 6 HOURS PRN
Status: DISCONTINUED | OUTPATIENT
Start: 2024-06-01 | End: 2024-06-03 | Stop reason: HOSPADM

## 2024-06-01 RX ORDER — DILTIAZEM HYDROCHLORIDE 5 MG/ML
10 INJECTION INTRAVENOUS
Status: COMPLETED | OUTPATIENT
Start: 2024-06-01 | End: 2024-06-01

## 2024-06-01 RX ORDER — IBUPROFEN 200 MG
16 TABLET ORAL
Status: DISCONTINUED | OUTPATIENT
Start: 2024-06-01 | End: 2024-06-03 | Stop reason: HOSPADM

## 2024-06-01 RX ORDER — METOPROLOL TARTRATE 25 MG/1
25 TABLET, FILM COATED ORAL ONCE
Status: COMPLETED | OUTPATIENT
Start: 2024-06-01 | End: 2024-06-01

## 2024-06-01 RX ORDER — SODIUM CHLORIDE 0.9 % (FLUSH) 0.9 %
10 SYRINGE (ML) INJECTION EVERY 8 HOURS PRN
Status: DISCONTINUED | OUTPATIENT
Start: 2024-06-01 | End: 2024-06-03 | Stop reason: HOSPADM

## 2024-06-01 RX ORDER — IBUPROFEN 200 MG
24 TABLET ORAL
Status: DISCONTINUED | OUTPATIENT
Start: 2024-06-01 | End: 2024-06-03 | Stop reason: HOSPADM

## 2024-06-01 RX ORDER — LEVOTHYROXINE SODIUM 88 UG/1
88 TABLET ORAL DAILY
Status: DISCONTINUED | OUTPATIENT
Start: 2024-06-02 | End: 2024-06-03 | Stop reason: HOSPADM

## 2024-06-01 RX ORDER — METOPROLOL TARTRATE 1 MG/ML
5 INJECTION, SOLUTION INTRAVENOUS EVERY 5 MIN PRN
Status: DISCONTINUED | OUTPATIENT
Start: 2024-06-01 | End: 2024-06-03 | Stop reason: HOSPADM

## 2024-06-01 RX ADMIN — METOPROLOL TARTRATE 25 MG: 25 TABLET, FILM COATED ORAL at 10:06

## 2024-06-01 RX ADMIN — METOPROLOL TARTRATE 5 MG: 1 INJECTION, SOLUTION INTRAVENOUS at 10:06

## 2024-06-01 RX ADMIN — LIDOCAINE 1 PATCH: 700 PATCH TOPICAL at 11:06

## 2024-06-01 RX ADMIN — ASPIRIN 81 MG CHEWABLE TABLET 81 MG: 81 TABLET CHEWABLE at 01:06

## 2024-06-01 RX ADMIN — METOPROLOL TARTRATE 5 MG: 1 INJECTION, SOLUTION INTRAVENOUS at 11:06

## 2024-06-01 RX ADMIN — DILTIAZEM HYDROCHLORIDE 10 MG: 5 INJECTION INTRAVENOUS at 02:06

## 2024-06-01 RX ADMIN — HEPARIN SODIUM 12 UNITS/KG/HR: 10000 INJECTION, SOLUTION INTRAVENOUS at 02:06

## 2024-06-01 RX ADMIN — DILTIAZEM HYDROCHLORIDE 10 MG: 5 INJECTION INTRAVENOUS at 11:06

## 2024-06-01 RX ADMIN — METOROPROLOL TARTRATE 5 MG: 5 INJECTION, SOLUTION INTRAVENOUS at 05:06

## 2024-06-01 RX ADMIN — FUROSEMIDE 40 MG: 10 INJECTION, SOLUTION INTRAVENOUS at 08:06

## 2024-06-01 RX ADMIN — METOPROLOL TARTRATE 12.5 MG: 25 TABLET, FILM COATED ORAL at 01:06

## 2024-06-01 RX ADMIN — METOROPROLOL TARTRATE 5 MG: 5 INJECTION, SOLUTION INTRAVENOUS at 09:06

## 2024-06-01 RX ADMIN — METOPROLOL TARTRATE 25 MG: 25 TABLET, FILM COATED ORAL at 08:06

## 2024-06-01 RX ADMIN — DILTIAZEM HYDROCHLORIDE 10 MG: 5 INJECTION INTRAVENOUS at 04:06

## 2024-06-01 RX ADMIN — FUROSEMIDE 40 MG: 10 INJECTION, SOLUTION INTRAVENOUS at 11:06

## 2024-06-01 RX ADMIN — ACETAMINOPHEN 650 MG: 325 TABLET ORAL at 10:06

## 2024-06-01 NOTE — SUBJECTIVE & OBJECTIVE
Past Medical History:   Diagnosis Date    AR (allergic rhinitis)     Atherosclerosis of abdominal aorta     noted on CT scan 1/17/2011    Carpal tunnel syndrome of left wrist     Chronic kidney disease, stage 3     Diabetic peripheral neuropathy     Diverticulosis     History of colon polyps     History of diverticulitis of colon 1/2014    Hyperlipemia     Hypertension     Hypothyroidism     followed by endocrinology, Dr. Green    Mild aortic stenosis     New onset atrial fibrillation 6/1/2024    OA (osteoarthritis) of knee     Obesity     Sciatica     Type II or unspecified type diabetes mellitus with neurological manifestations, uncontrolled(250.62)        Past Surgical History:   Procedure Laterality Date    CATARACT EXTRACTION Bilateral     COLONOSCOPY N/A 10/3/2017    Procedure: COLONOSCOPY;  Surgeon: Alin Gonzalez MD;  Location: [facility] ([address] [place] [address]);  Service: Endoscopy;  Laterality: N/A;    COSMETIC SURGERY      HYSTERECTOMY      partial    JOINT REPLACEMENT Right     LUNG BIOPSY  in her 40's    TOTAL KNEE ARTHROPLASTY Right 6/13/2014    TKR       Review of patient's allergies indicates:   Allergen Reactions    Lipitor [atorvastatin]        No current facility-administered medications on file prior to encounter.     Current Outpatient Medications on File Prior to Encounter   Medication Sig    acetaminophen (TYLENOL) 500 MG tablet Take 1 tablet (500 mg total) by mouth every 4 (four) hours as needed for Pain.    aspirin 81 MG Chew Take 1 tablet (81 mg total) by mouth once daily.    betamethasone dipropionate (DIPROLENE) 0.05 % ointment Apply topically 2 (two) times daily.    betamethasone valerate 0.1% (VALISONE) 0.1 % Crea Apply topically 2 (two) times daily.    blood-glucose meter (FREESTYLE SYSTEM KIT) kit Use as instructed    calcium carbonate (OS-MIRACLE) 500 mg calcium (1,250 mg) tablet Take 1 tablet by mouth every other day.    cetirizine (ZYRTEC) 10 MG tablet Take 1 tablet (10 mg total) by mouth once  daily. for 7 days    clobetasoL (TEMOVATE) 0.05 % cream Apply topically 2 (two) times daily. for 7 days (Patient not taking: Reported on 9/14/2022)    diclofenac sodium (VOLTAREN) 1 % Gel Apply 4 grams to effected area 4 x daily do not exceed 32 grams per day    diphenhydrAMINE (BENADRYL) 25 mg capsule Take 1 capsule (25 mg total) by mouth every 6 (six) hours as needed for Itching or Allergies.    dorzolamide-timolol 2-0.5% (COSOPT) 22.3-6.8 mg/mL ophthalmic solution Place 1 drop into the right eye 2 (two) times daily.    fish oil-fat acid comb.8-hb137 1,200 mg (400 [id number]) Cap     fluocinonide 0.1 % Crea as needed.     hydrocortisone 2.5 % cream Apply topically 2 (two) times daily.    hydrOXYzine HCL (ATARAX) 25 MG tablet Take 1 tablet (25 mg total) by mouth every 4 (four) hours as needed for Itching.    levothyroxine (SYNTHROID) 88 MCG tablet TAKE 1 TABLET(88 MCG) BY MOUTH EVERY DAY    LIDOcaine (LIDODERM) 5 % Place 1 patch onto the skin once daily. Remove & Discard patch within 12 hours or as directed by MD    losartan (COZAAR) 50 MG tablet TAKE 1 TABLET(50 MG) BY MOUTH EVERY DAY    lovastatin (MEVACOR) 10 MG tablet TAKE 1 TABLET(10 MG) BY MOUTH EVERY DAY    mometasone 0.1% (ELOCON) 0.1 % cream Use daily    multivit,calc,mins/iron/folic (ONE-A-DAY WOMENS FORMULA ORAL) Take 1 tablet by mouth once daily.    triamcinolone acetonide 0.025% (KENALOG) 0.025 % cream Apply topically 2 (two) times daily.    triamcinolone acetonide 0.5% (KENALOG) 0.5 % Crea APPLY EXTERNALLY TO THE AFFECTED AREA TWICE DAILY as needed for flares; do not use for more than 2 weeks in a row without one week break     Family History       Problem Relation (Age of Onset)    Arthritis Sister, Brother    Cancer Mother, Brother, Son    Cataracts Son    Diabetes Sister, Brother    Glaucoma Mother    Heart attack Father    Heart disease Father, Brother    Heart failure Brother    Hypertension Mother, Father    No Known Problems Maternal  Grandmother, Maternal Grandfather, Paternal Grandmother, Paternal Grandfather, Maternal Aunt, Maternal Uncle, Paternal Aunt, Paternal Uncle    Skin cancer Brother    Stroke Brother    Throat cancer Brother          Tobacco Use    Smoking status: Former     Current packs/day: 0.00     Average packs/day: 0.3 packs/day for 7.5 years (1.9 ttl pk-yrs)     Types: Cigarettes     Start date: 1955     Quit date: 1962     Years since quittin.9    Smokeless tobacco: Former     Quit date: 1970   Substance and Sexual Activity    Alcohol use: Yes     Alcohol/week: 1.0 standard drink of alcohol     Types: 1 Cans of beer per week     Comment: occasionally    Drug use: No    Sexual activity: Not Currently     Partners: Male     Review of Systems   Constitutional:  Negative for activity change and appetite change.   HENT:  Negative for congestion and dental problem.    Eyes:  Negative for discharge and itching.   Respiratory:  Positive for shortness of breath. Negative for apnea, cough, chest tightness and wheezing.    Cardiovascular:  Positive for palpitations. Negative for chest pain and leg swelling.   Gastrointestinal:  Negative for abdominal distention and abdominal pain.   Endocrine: Negative for cold intolerance and heat intolerance.   Genitourinary:  Negative for difficulty urinating and dyspareunia.   Musculoskeletal:  Negative for arthralgias and back pain.   Skin:  Negative for color change and pallor.   Allergic/Immunologic: Negative for environmental allergies and food allergies.   Neurological:  Negative for dizziness and facial asymmetry.   Hematological:  Negative for adenopathy. Does not bruise/bleed easily.   Psychiatric/Behavioral:  Negative for agitation and behavioral problems.      Objective:     Vital Signs (Most Recent):  Temp: 97.7 °F (36.5 °C) (24 1010)  Pulse: (!) 131 (24 1302)  Resp: (!) 25 (24 1113)  BP: (!) 130/98 (24 1302)  SpO2: 95 % (24 1302) Vital  Signs (24h Range):  Temp:  [97.7 °F (36.5 °C)] 97.7 °F (36.5 °C)  Pulse:  [] 131  Resp:  [23-28] 25  SpO2:  [95 %-100 %] 95 %  BP: (119-166)/(78-98) 130/98     Weight: 88.9 kg (196 lb)  Body mass index is 31.64 kg/m².     Physical Exam  Constitutional:       General: She is not in acute distress.     Appearance: Normal appearance. She is not ill-appearing or diaphoretic.   HENT:      Head: Normocephalic.      Mouth/Throat:      Mouth: Mucous membranes are moist.   Eyes:      Extraocular Movements: Extraocular movements intact.      Conjunctiva/sclera: Conjunctivae normal.      Pupils: Pupils are equal, round, and reactive to light.   Cardiovascular:      Rate and Rhythm: Tachycardia present. Rhythm irregular.      Pulses: Normal pulses.      Heart sounds: No murmur heard.     No gallop.   Pulmonary:      Effort: Pulmonary effort is normal. No respiratory distress.      Breath sounds: Rales present. No wheezing.   Abdominal:      General: Abdomen is flat. Bowel sounds are normal. There is no distension.      Palpations: Abdomen is soft.      Tenderness: There is no abdominal tenderness.   Musculoskeletal:         General: No swelling. Normal range of motion.      Cervical back: Normal range of motion.      Right lower leg: No edema.      Left lower leg: No edema.   Skin:     General: Skin is warm.      Capillary Refill: Capillary refill takes less than 2 seconds.   Neurological:      General: No focal deficit present.   Psychiatric:         Mood and Affect: Mood normal.         Behavior: Behavior normal.         Thought Content: Thought content normal.         Judgment: Judgment normal.              CRANIAL NERVES     CN III, IV, VI   Pupils are equal, round, and reactive to light.       Significant Labs: All pertinent labs within the past 24 hours have been reviewed.    Significant Imaging: I have reviewed all pertinent imaging results/findings within the past 24 hours.

## 2024-06-01 NOTE — H&P
[facility] - Emergency Dept  [facility]  History & Physical    Patient Name: Tonya Cohn  MRN: [id number]  Patient Class: OP- Observation  Admission Date: 6/1/2024  Attending Physician: Alberto Staples DO   Primary Care Provider: Tere Hughes MD         Patient information was obtained from patient, relative(s), and ER records.     Subjective:     Principal Problem:New onset atrial fibrillation    Chief Complaint:   Chief Complaint   Patient presents with    Shortness of Breath        HPI: This is a 89-year-old female with a history of HTN, HLD, diverticulosis, CKD 3, sciatica who presents with shortness of breath.  States that shortly after waking up she started feeling progressively dyspneic with no exacerbating factors.  Denies orthopnea.  States that shortness of breath continued until shortly before arriving to the ED.  Also states that she felt her heart racing.  Denies any associated lightheadedness or chest pain.  Denies similar symptoms in the past.  States that she does feel slight chest discomfort but otherwise now feels at baseline.  Also denies any fevers, chills, wheezing, nausea, vomiting, abdominal pain, diarrhea, lower extremity swelling    ED course:  Upon arrival HR was in the 140s.  EKG significant for atrial fibrillation with RVR, new onset.  Status post multiple doses IV Lopressor with no improvement.  HR initially improved with IV diltiazem, now tachycardic once again.  CXR with pulmonary edema with .  Troponin 0.038.  Admitted for new onset atrial fibrillation with RVR        Past Medical History:   Diagnosis Date    AR (allergic rhinitis)     Atherosclerosis of abdominal aorta     noted on CT scan 1/17/2011    Carpal tunnel syndrome of left wrist     Chronic kidney disease, stage 3     Diabetic peripheral neuropathy     Diverticulosis     History of colon polyps     History of diverticulitis of colon 1/2014    Hyperlipemia     Hypertension     Hypothyroidism      followed by endocrinology, Dr. Green    Mild aortic stenosis     New onset atrial fibrillation 6/1/2024    OA (osteoarthritis) of knee     Obesity     Sciatica     Type II or unspecified type diabetes mellitus with neurological manifestations, uncontrolled(250.62)        Past Surgical History:   Procedure Laterality Date    CATARACT EXTRACTION Bilateral     COLONOSCOPY N/A 10/3/2017    Procedure: COLONOSCOPY;  Surgeon: Alin Gonzalez MD;  Location: [address]);  Service: Endoscopy;  Laterality: N/A;    COSMETIC SURGERY      HYSTERECTOMY      partial    JOINT REPLACEMENT Right     LUNG BIOPSY  in her 40's    TOTAL KNEE ARTHROPLASTY Right 6/13/2014    TKR       Review of patient's allergies indicates:   Allergen Reactions    Lipitor [atorvastatin]        No current facility-administered medications on file prior to encounter.     Current Outpatient Medications on File Prior to Encounter   Medication Sig    acetaminophen (TYLENOL) 500 MG tablet Take 1 tablet (500 mg total) by mouth every 4 (four) hours as needed for Pain.    aspirin 81 MG Chew Take 1 tablet (81 mg total) by mouth once daily.    betamethasone dipropionate (DIPROLENE) 0.05 % ointment Apply topically 2 (two) times daily.    betamethasone valerate 0.1% (VALISONE) 0.1 % Crea Apply topically 2 (two) times daily.    blood-glucose meter (FREESTYLE SYSTEM KIT) kit Use as instructed    calcium carbonate (OS-MIRACLE) 500 mg calcium (1,250 mg) tablet Take 1 tablet by mouth every other day.    cetirizine (ZYRTEC) 10 MG tablet Take 1 tablet (10 mg total) by mouth once daily. for 7 days    clobetasoL (TEMOVATE) 0.05 % cream Apply topically 2 (two) times daily. for 7 days (Patient not taking: Reported on 9/14/2022)    diclofenac sodium (VOLTAREN) 1 % Gel Apply 4 grams to effected area 4 x daily do not exceed 32 grams per day    diphenhydrAMINE (BENADRYL) 25 mg capsule Take 1 capsule (25 mg total) by mouth every 6 (six) hours as needed for Itching or Allergies.     dorzolamide-timolol 2-0.5% (COSOPT) 22.3-6.8 mg/mL ophthalmic solution Place 1 drop into the right eye 2 (two) times daily.    fish oil-fat acid [id number].[id number] 1,200 mg (400 [id number]) Cap     fluocinonide 0.1 % Crea as needed.     hydrocortisone 2.5 % cream Apply topically 2 (two) times daily.    hydrOXYzine HCL (ATARAX) 25 MG tablet Take 1 tablet (25 mg total) by mouth every 4 (four) hours as needed for Itching.    levothyroxine (SYNTHROID) 88 MCG tablet TAKE 1 TABLET(88 MCG) BY MOUTH EVERY DAY    LIDOcaine (LIDODERM) 5 % Place 1 patch onto the skin once daily. Remove & Discard patch within 12 hours or as directed by MD    losartan (COZAAR) 50 MG tablet TAKE 1 TABLET(50 MG) BY MOUTH EVERY DAY    lovastatin (MEVACOR) 10 MG tablet TAKE 1 TABLET(10 MG) BY MOUTH EVERY DAY    mometasone 0.1% (ELOCON) 0.1 % cream Use daily    multivit,calc,mins/iron/folic (ONE-A-DAY WOMENS FORMULA ORAL) Take 1 tablet by mouth once daily.    triamcinolone acetonide 0.025% (KENALOG) 0.025 % cream Apply topically 2 (two) times daily.    triamcinolone acetonide 0.5% (KENALOG) 0.5 % Crea APPLY EXTERNALLY TO THE AFFECTED AREA TWICE DAILY as needed for flares; do not use for more than 2 weeks in a row without one week break     Family History       Problem Relation (Age of Onset)    Arthritis Sister, Brother    Cancer Mother, Brother, Son    Cataracts Son    Diabetes Sister, Brother    Glaucoma Mother    Heart attack Father    Heart disease Father, Brother    Heart failure Brother    Hypertension Mother, Father    No Known Problems Maternal Grandmother, Maternal Grandfather, Paternal Grandmother, Paternal Grandfather, Maternal Aunt, Maternal Uncle, Paternal Aunt, Paternal Uncle    Skin cancer Brother    Stroke Brother    Throat cancer Brother          Tobacco Use    Smoking status: Former     Current packs/day: 0.00     Average packs/day: 0.3 packs/day for 7.5 years (1.9 ttl pk-yrs)     Types: Cigarettes     Start date: 1/1/1955     Quit  date: 1962     Years since quittin.9    Smokeless tobacco: Former     Quit date: 1970   Substance and Sexual Activity    Alcohol use: Yes     Alcohol/week: 1.0 standard drink of alcohol     Types: 1 Cans of beer per week     Comment: occasionally    Drug use: No    Sexual activity: Not Currently     Partners: Male     Review of Systems   Constitutional:  Negative for activity change and appetite change.   HENT:  Negative for congestion and dental problem.    Eyes:  Negative for discharge and itching.   Respiratory:  Positive for shortness of breath. Negative for apnea, cough, chest tightness and wheezing.    Cardiovascular:  Positive for palpitations. Negative for chest pain and leg swelling.   Gastrointestinal:  Negative for abdominal distention and abdominal pain.   Endocrine: Negative for cold intolerance and heat intolerance.   Genitourinary:  Negative for difficulty urinating and dyspareunia.   Musculoskeletal:  Negative for arthralgias and back pain.   Skin:  Negative for color change and pallor.   Allergic/Immunologic: Negative for environmental allergies and food allergies.   Neurological:  Negative for dizziness and facial asymmetry.   Hematological:  Negative for adenopathy. Does not bruise/bleed easily.   Psychiatric/Behavioral:  Negative for agitation and behavioral problems.      Objective:     Vital Signs (Most Recent):  Temp: 97.7 °F (36.5 °C) (24 1010)  Pulse: (!) 131 (24 1302)  Resp: (!) 25 (24 1113)  BP: (!) 130/98 (24 1302)  SpO2: 95 % (24 1302) Vital Signs (24h Range):  Temp:  [97.7 °F (36.5 °C)] 97.7 °F (36.5 °C)  Pulse:  [] 131  Resp:  [23-28] 25  SpO2:  [95 %-100 %] 95 %  BP: (119-166)/(78-98) 130/98     Weight: 88.9 kg (196 lb)  Body mass index is 31.64 kg/m².     Physical Exam  Constitutional:       General: She is not in acute distress.     Appearance: Normal appearance. She is not ill-appearing or diaphoretic.   HENT:      Head:  Normocephalic.      Mouth/Throat:      Mouth: Mucous membranes are moist.   Eyes:      Extraocular Movements: Extraocular movements intact.      Conjunctiva/sclera: Conjunctivae normal.      Pupils: Pupils are equal, round, and reactive to light.   Cardiovascular:      Rate and Rhythm: Tachycardia present. Rhythm irregular.      Pulses: Normal pulses.      Heart sounds: No murmur heard.     No gallop.   Pulmonary:      Effort: Pulmonary effort is normal. No respiratory distress.      Breath sounds: Rales present. No wheezing.   Abdominal:      General: Abdomen is flat. Bowel sounds are normal. There is no distension.      Palpations: Abdomen is soft.      Tenderness: There is no abdominal tenderness.   Musculoskeletal:         General: No swelling. Normal range of motion.      Cervical back: Normal range of motion.      Right lower leg: No edema.      Left lower leg: No edema.   Skin:     General: Skin is warm.      Capillary Refill: Capillary refill takes less than 2 seconds.   Neurological:      General: No focal deficit present.   Psychiatric:         Mood and Affect: Mood normal.         Behavior: Behavior normal.         Thought Content: Thought content normal.         Judgment: Judgment normal.              CRANIAL NERVES     CN III, IV, VI   Pupils are equal, round, and reactive to light.       Significant Labs: All pertinent labs within the past 24 hours have been reviewed.    Significant Imaging: I have reviewed all pertinent imaging results/findings within the past 24 hours.  Assessment/Plan:     * New onset atrial fibrillation  Patient with Paroxysmal (<7 days) atrial fibrillation which is uncontrolled currently with Beta Blocker. Patient is currently in atrial fibrillation.[id number] Score: 4. HASBLED Score: . Anticoagulation indicated. Anticoagulation done with heparin ggt .    -presenting with acute onset dyspnea, found to have new onset atrial fibrillation with RVR with no significant cardiac  history  -not responsive to multiple IVP Lopressor, initially responsive to diltiazem  -unclear of candidate for DCCV/ablation given age  Plan:  -case discussed with Cardiology, consult placed  -start on Lopressor 12.5 b.i.d..  IVP Lopressor and diltiazem ordered  -started on heparin drip in case procedure warranted  -monitor on telemetry    Shortness of breath  -Likely due to atrial fibrillation  -BNP elevated with pulmonary edema on CXR, no noted history of CHF  -TTE ordered      Elevated troponin I level  -EKG negative for ischemic changes, no acute chest pain  -Unlikely ACS and more likely demand ischemia in the setting of RVR  -trend troponin    Hypothyroidism (acquired)  Continue Synthroid      Hyperlipidemia LDL goal <100  Lovastatin on on formulary, reported allergy to atorvastatin.  Hold for now        VTE Risk Mitigation (From admission, onward)           Ordered     heparin 25,000 units in dextrose 5% (100 units/ml) IV bolus from bag LOW INTENSITY nomogram - [facility]  As needed (PRN)        Question:  Heparin Infusion Adjustment (DO NOT MODIFY ANSWER)  Answer:  \[email or web address]\epic\Images\Pharmacy\HeparinInfusions\heparin LOW INTENSITY nomogram for [facility] [id number].pdf    06/01/24 1317     heparin 25,000 units in dextrose 5% (100 units/ml) IV bolus from bag LOW INTENSITY nomogram - [facility]  As needed (PRN)        Question:  Heparin Infusion Adjustment (DO NOT MODIFY ANSWER)  Answer:  \[email or web address]\epic\Images\Pharmacy\HeparinInfusions\heparin LOW INTENSITY nomogram for [facility] [id number].[email or web address]    06/01/24 1317     heparin 25,000 units in dextrose 5% (100 units/ml) IV bolus from bag LOW INTENSITY nomogram - [facility]  Once        Question:  Heparin Infusion Adjustment (DO NOT MODIFY ANSWER)  Answer:  \[email or web address]\epic\Images\Pharmacy\HeparinInfusions\heparin LOW INTENSITY nomogram for [facility] [id number].pdf    06/01/24 1317     heparin 25,000 units in dextrose 5% 250 mL (100 units/mL) infusion LOW INTENSITY nomogram - [facility]  Continuous         Question:  Begin at (units/kg/hr)  Answer:  12    06/01/24 1317     IP VTE HIGH RISK PATIENT  Once         06/01/24 1241     Place sequential compression device  Until discontinued         06/01/24 1241                       On 06/01/2024, patient should be placed in hospital observation services under my care.             Alberto Staples DO  Department of Hospital Medicine  [facility] - Emergency Dept

## 2024-06-01 NOTE — CONSULTS
[facility] - Emergency Dept  Cardiology  Consult Note    Patient Name: Tonya Cohn  MRN: [id number]  Admission Date: 6/1/2024  Hospital Length of Stay: 0 days  Code Status: Full Code   Attending Provider: Alberto Staples DO   Consulting Provider: Demar Fuentes MD  Primary Care Physician: Tere Hughes MD  Principal Problem:New onset atrial fibrillation    Patient information was obtained from patient and ER records.     Inpatient consult to Cardiology  Consult performed by: Demar Kramer MD  Consult ordered by: Alberto Staples DO        Subjective:     Chief Complaint:  Afib RVR     HPI:   89yo F patient with HTN, HLD, mild AS/MS, T2DM, diverticulosis, CKD3, and sciatica who was admitted to [facility] on 06/01/2024 with CC of shortness of breath after waking up.     She had progressive shortness of breath after waking up. She had shortness of breath until she arrived to the ED.     In the ED /94 and HR 140bpm, physical exam showed JVD, irregular rhythm, wheezing, tachypneic on room air and expiratory wheezing. Labs remarkable for Hb 11.7, WBC 8.12, Cr 1.1 (bl 0.7-0.9), AST/ALT 54/75 (bl 26/15), Trop 0.038-0.052, , TSH 0.374 and FT4 1.19, CXR with bilateral pulmonary congestion,  and EKG with atrial fibrillation with iRBBB.     Given multiple doses of IV lopressor and eventually IV diltiazem, but HR went back up. Admitted with new onset Afib.     Outpatient Cardiology - Preston Whitfield MD.    Past Medical History:   Diagnosis Date    AR (allergic rhinitis)     Atherosclerosis of abdominal aorta     noted on CT scan 1/17/2011    Carpal tunnel syndrome of left wrist     Chronic kidney disease, stage 3     Diabetic peripheral neuropathy     Diverticulosis     History of colon polyps     History of diverticulitis of colon 1/2014    Hyperlipemia     Hypertension     Hypothyroidism     followed by endocrinology, Dr. 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     Current Outpatient Medications on File Prior to Encounter   Medication Sig    acetaminophen (TYLENOL) 500 MG tablet Take 1 tablet (500 mg total) by mouth every 4 (four) hours as needed for Pain.    aspirin 81 MG Chew Take 1 tablet (81 mg total) by mouth once daily.    betamethasone dipropionate (DIPROLENE) 0.05 % ointment Apply topically 2 (two) times daily.    betamethasone valerate 0.1% (VALISONE) 0.1 % Crea Apply topically 2 (two) times daily.    blood-glucose meter (FREESTYLE SYSTEM KIT) kit Use as instructed    calcium carbonate (OS-MIRACLE) 500 mg calcium (1,250 mg) tablet Take 1 tablet by mouth every other day.    cetirizine (ZYRTEC) 10 MG tablet Take 1 tablet (10 mg total) by mouth once daily. for 7 days    clobetasoL (TEMOVATE) 0.05 % cream Apply topically 2 (two) times daily. for 7 days (Patient not taking: Reported on 9/14/2022)    diclofenac sodium (VOLTAREN) 1 % Gel Apply 4 grams to effected area 4 x daily do not exceed 32 grams per day    diphenhydrAMINE (BENADRYL) 25 mg capsule Take 1 capsule (25 mg total) by mouth every 6 (six) hours as needed for Itching or Allergies.    dorzolamide-timolol 2-0.5% (COSOPT) 22.3-6.8 mg/mL ophthalmic solution Place 1  drop into the right eye 2 (two) times daily.    fish oil-fat acid comb.8-hb137 1,200 mg (400 [id number]) Cap     fluocinonide 0.1 % Crea as needed.     hydrocortisone 2.5 % cream Apply topically 2 (two) times daily.    hydrOXYzine HCL (ATARAX) 25 MG tablet Take 1 tablet (25 mg total) by mouth every 4 (four) hours as needed for Itching.    levothyroxine (SYNTHROID) 88 MCG tablet TAKE 1 TABLET(88 MCG) BY MOUTH EVERY DAY    LIDOcaine (LIDODERM) 5 % Place 1 patch onto the skin once daily. Remove & Discard patch within 12 hours or as directed by MD    losartan (COZAAR) 50 MG tablet TAKE 1 TABLET(50 MG) BY MOUTH EVERY DAY    lovastatin (MEVACOR) 10 MG tablet TAKE 1 TABLET(10 MG) BY MOUTH EVERY DAY    mometasone 0.1% (ELOCON) 0.1 % cream Use daily    multivit,calc,mins/iron/folic (ONE-A-DAY WOMENS FORMULA ORAL) Take 1 tablet by mouth once daily.    triamcinolone acetonide 0.025% (KENALOG) 0.025 % cream Apply topically 2 (two) times daily.    triamcinolone acetonide 0.5% (KENALOG) 0.5 % Crea APPLY EXTERNALLY TO THE AFFECTED AREA TWICE DAILY as needed for flares; do not use for more than 2 weeks in a row without one week break     Family History       Problem Relation (Age of Onset)    Arthritis Sister, Brother    Cancer Mother, Brother, Son    Cataracts Son    Diabetes Sister, Brother    Glaucoma Mother    Heart attack Father    Heart disease Father, Brother    Heart failure Brother    Hypertension Mother, Father    No Known Problems Maternal Grandmother, Maternal Grandfather, Paternal Grandmother, Paternal Grandfather, Maternal Aunt, Maternal Uncle, Paternal Aunt, Paternal Uncle    Skin cancer Brother    Stroke Brother    Throat cancer Brother          Tobacco Use    Smoking status: Former     Current packs/day: 0.00     Average packs/day: 0.3 packs/day for 7.5 years (1.9 ttl pk-yrs)     Types: Cigarettes     Start date: 1955     Quit date: 1962     Years since quittin.9    Smokeless tobacco: Former      Quit date: 7/14/1970   Substance and Sexual Activity    Alcohol use: Yes     Alcohol/week: 1.0 standard drink of alcohol     Types: 1 Cans of beer per week     Comment: occasionally    Drug use: No    Sexual activity: Not Currently     Partners: Male     Review of Systems   Constitutional: Negative.   HENT: Negative.     Cardiovascular:  Negative for chest pain, claudication, cyanosis, dyspnea on exertion, irregular heartbeat, leg swelling, near-syncope, orthopnea, palpitations, paroxysmal nocturnal dyspnea and syncope.   Respiratory:  Positive for shortness of breath.    Endocrine: Negative.    Musculoskeletal: Negative.    Gastrointestinal: Negative.    Genitourinary: Negative.    Neurological: Negative.      Objective:     Vital Signs (Most Recent):  Temp: 97.7 °F (36.5 °C) (06/01/24 1010)  Pulse: (!) 132 (06/01/24 1547)  Resp: 20 (06/01/24 1547)  BP: (!) 115/97 (06/01/24 1547)  SpO2: 96 % (06/01/24 1547) Vital Signs (24h Range):  Temp:  [97.7 °F (36.5 °C)] 97.7 °F (36.5 °C)  Pulse:  [] 132  Resp:  [20-28] 20  SpO2:  [95 %-100 %] 96 %  BP: (115-166)/(77-98) 115/97     Weight: 88.9 kg (196 lb)  Body mass index is 31.64 kg/m².    SpO2: 96 %       No intake or output data in the 24 hours ending 06/01/24 1600    Lines/Drains/Airways       Drain  Duration             Female External Urinary Catheter w/ Suction 06/01/24 1203 <1 day              Peripheral Intravenous Line  Duration                  Peripheral IV - Single Lumen 06/01/24 1025 18 G Right Antecubital <1 day                     Physical Exam  Vitals and nursing note reviewed.   Constitutional:       Appearance: Normal appearance.   HENT:      Head: Normocephalic and atraumatic.   Eyes:      Extraocular Movements: Extraocular movements intact.      Pupils: Pupils are equal, round, and reactive to light.   Neck:      Vascular: No carotid bruit.      Comments: EJ distended but not JVD or HJR  Cardiovascular:      Rate and Rhythm: Tachycardia present.  Rhythm irregular.      Pulses: Normal pulses.      Heart sounds: Normal heart sounds. No murmur heard.     No friction rub. No gallop.   Pulmonary:      Effort: Pulmonary effort is normal.      Breath sounds: Normal breath sounds.   Abdominal:      General: Abdomen is flat. Bowel sounds are normal. There is no distension.      Palpations: Abdomen is soft. There is no mass.      Tenderness: There is no abdominal tenderness.   Musculoskeletal:         General: Normal range of motion.      Cervical back: Normal range of motion.      Right lower leg: Edema (Trace) present.      Left lower leg: Edema (Trace) present.   Skin:     General: Skin is warm.      Capillary Refill: Capillary refill takes less than 2 seconds.   Neurological:      General: No focal deficit present.      Mental Status: She is alert and oriented to person, place, and time.          Significant Labs:     Recent Labs   Lab 06/01/24  1039   WBC 8.12   HGB 11.7*   HCT 34.5*          Recent Labs   Lab 06/01/24  1039      K 4.1      CO2 27   BUN 25   CREATININE 1.1   CALCIUM 10.0       Recent Labs   Lab 06/01/24  1039   ALKPHOS 103   BILITOT 0.5   PROT 7.2   ALT 75*   AST 54*     Lab Results   Component Value Date    CHOL 227 (H) 08/18/2023    HDL 66 08/18/2023    LDLCALC 142.8 08/18/2023    TRIG 91 08/18/2023       Recent Labs   Lab 06/01/24  1039 06/01/24  1331   TROPONINI 0.038* 0.052*       Lab Results   Component Value Date    HGBA1C 7.0 (H) 02/26/2024       Lab Results   Component Value Date     (H) 06/01/2024     Significant Imaging:     TTE 10/11/2021  - The left ventricle is normal in size with concentric remodeling and normal systolic function.  - The estimated ejection fraction is 60%.  - Grade II left ventricular diastolic dysfunction.  - Normal right ventricular size with normal right ventricular systolic function.  - There is mild aortic valve stenosis.  - Aortic valve area is 0.74 cm2; peak velocity is 2.13 m/s;  mean gradient is 10 mmHg.  - There is mild mitral stenosis.  - The mean diastolic gradient across the mitral valve is 4 mmHg at a heart rate of 64 bpm.  - The estimated PA systolic pressure is 29 mmHg.    Cardiac Pharmacological Stress  10/2016  1. The EKG portion of this study is negative for ischemia at a peak heart rate of 109 bpm (83% of predicted).   2. Blood pressure remained stable throughout the protocol  (Presenting BP: 141/77 Peak BP: 171/76).   3. No significant arrhythmias were present.   4. There were no symptoms of chest discomfort or significant dyspnea throughout the protocol.     Holter monitor - 24h 07/2014  1. Predominant rhythm is NSR   2. Rare PAC's and PVC's   3. No significant arrhythmias/blocks/pause     Assessment and Plan:     * New onset atrial fibrillation  #Atrial fibrillation - New diagnosis - Paroxysmal - Non-valvular  - EKG since 2002 with NSR  - Risk factors: HTN, Valvular heart disease (mild MS),   - Cause: Could be acute (ie HF, ischemia/MI, acute disease or hypoxia) vs. Chronic (Older age, HTN, valve disease, CMP)  - Anticoagulation - [id number] 4 (Age, Sex, HTN, DM)  - Currently on rate control strategy - - Given multiple doses of lopressor and diltiazem, and primary team ordered metoprolol tartrate 12.5mg bid  - If normal EF, can add diltiazem for rate control, pending TTE    Recommendations:  - Keep K>4 and Mg>2  - Pending TTE  - Rate control: Goal HR<110: Continue metoprolol tartrate but increase dose to 25mg, if good response and BP ok then can do 25mg q6h  - Rhythm control: Will decide best strategy for treatment, discussed with family and they want to talk about it  - Anticoagulation: Continue heparin gtt, and once stable can change to DOACs, unless TTE shows mod/severe mitral valve disease then she will need to be on Warfarin.   - Risk factor reduction - Increase physical activity, control HTN and DM    Acute heart failure with preserved ejection fraction  #HFrEF 60%,  NYHA I before today, ACC/AHA class C:  - Decompensation factor: Atrial fibrillation  - Hemodynamic profile: Wet and perfused  - Devices: None    - Pending today's official read of TTE  - Volume status optimization  - Daily monitoring: Standing weights, Strict I/O, Fluid restriction (<2L/day), Na restriction (<2g/day), Keep K>4 and Mg>2  - Management of comorbidities: Afib rate control for now    Elevated troponin I level  - Prior stress imaging normal  - No ischemic symptoms, however shortness of breath and new onset HF could be a manifestation of CAD (although Afib explains problem too)  - In the setting of tachyarrhythmia  - Trop 0.038-0.052    - No indication of ACS protocol now   - Rhythm control as per Afib  - If new symptoms or concern for ACS, could start ACS protocol        VTE Risk Mitigation (From admission, onward)           Ordered     heparin 25,000 units in dextrose 5% (100 units/ml) IV bolus from bag LOW INTENSITY nomogram - [facility]  As needed (PRN)        Question:  Heparin Infusion Adjustment (DO NOT MODIFY ANSWER)  Answer:  \\[email or web address]\Images\[email or web address]\HeparinInfusions\heparin LOW INTENSITY nomogram for [facility] [id number]    06/01/24 1317     heparin 25,000 units in dextrose 5% (100 units/ml) IV bolus from bag LOW INTENSITY nomogram - [facility]  As needed (PRN)        Question:  Heparin Infusion Adjustment (DO NOT MODIFY ANSWER)  Answer:  \[email or web address]\[email or web address]\[email or web address]\heparin LOW INTENSITY nomogram for [facility] [id number]    06/01/24 1317     heparin 25,000 units in dextrose 5% 250 mL (100 units/mL) infusion LOW INTENSITY nomogram - [facility]  Continuous        Question:  Begin at (units/kg/hr)  Answer:  12    06/01/24 1317     IP VTE HIGH RISK PATIENT  Once         06/01/24 1241     Place sequential compression device  Until discontinued         06/01/24 1241                    Thank you for your consult.     Demar Fuentes MD  Cardiology   [facility]

## 2024-06-01 NOTE — SUBJECTIVE & OBJECTIVE
Past Medical History:   Diagnosis Date    AR (allergic rhinitis)     Atherosclerosis of abdominal aorta     noted on CT scan 1/17/2011    Carpal tunnel syndrome of left wrist     Chronic kidney disease, stage 3     Diabetic peripheral neuropathy     Diverticulosis     History of colon polyps     History of diverticulitis of colon 1/2014    Hyperlipemia     Hypertension     Hypothyroidism     followed by endocrinology, Dr. Green    Mild aortic stenosis     New onset atrial fibrillation 6/1/2024    OA (osteoarthritis) of knee     Obesity     Sciatica     Type II or unspecified type diabetes mellitus with neurological manifestations, uncontrolled(250.62)        Past Surgical History:   Procedure Laterality Date    CATARACT EXTRACTION Bilateral     COLONOSCOPY N/A 10/3/2017    Procedure: COLONOSCOPY;  Surgeon: Alin Gonzalez MD;  Location: [facility] ([address] [place] [address]);  Service: Endoscopy;  Laterality: N/A;    COSMETIC SURGERY      HYSTERECTOMY      partial    JOINT REPLACEMENT Right     LUNG BIOPSY  in her 40's    TOTAL KNEE ARTHROPLASTY Right 6/13/2014    TKR       Review of patient's allergies indicates:   Allergen Reactions    Lipitor [atorvastatin]        No current facility-administered medications on file prior to encounter.     Current Outpatient Medications on File Prior to Encounter   Medication Sig    acetaminophen (TYLENOL) 500 MG tablet Take 1 tablet (500 mg total) by mouth every 4 (four) hours as needed for Pain.    aspirin 81 MG Chew Take 1 tablet (81 mg total) by mouth once daily.    betamethasone dipropionate (DIPROLENE) 0.05 % ointment Apply topically 2 (two) times daily.    betamethasone valerate 0.1% (VALISONE) 0.1 % Crea Apply topically 2 (two) times daily.    blood-glucose meter (FREESTYLE SYSTEM KIT) kit Use as instructed    calcium carbonate (OS-MIRACLE) 500 mg calcium (1,250 mg) tablet Take 1 tablet by mouth every other day.    cetirizine (ZYRTEC) 10 MG tablet Take 1 tablet (10 mg total) by mouth once  daily. for 7 days    clobetasoL (TEMOVATE) 0.05 % cream Apply topically 2 (two) times daily. for 7 days (Patient not taking: Reported on 9/14/2022)    diclofenac sodium (VOLTAREN) 1 % Gel Apply 4 grams to effected area 4 x daily do not exceed 32 grams per day    diphenhydrAMINE (BENADRYL) 25 mg capsule Take 1 capsule (25 mg total) by mouth every 6 (six) hours as needed for Itching or Allergies.    dorzolamide-timolol 2-0.5% (COSOPT) 22.3-6.8 mg/mL ophthalmic solution Place 1 drop into the right eye 2 (two) times daily.    fish oil-fat acid comb.8-hb137 1,200 mg (400 [id number]) Cap     fluocinonide 0.1 % Crea as needed.     hydrocortisone 2.5 % cream Apply topically 2 (two) times daily.    hydrOXYzine HCL (ATARAX) 25 MG tablet Take 1 tablet (25 mg total) by mouth every 4 (four) hours as needed for Itching.    levothyroxine (SYNTHROID) 88 MCG tablet TAKE 1 TABLET(88 MCG) BY MOUTH EVERY DAY    LIDOcaine (LIDODERM) 5 % Place 1 patch onto the skin once daily. Remove & Discard patch within 12 hours or as directed by MD    losartan (COZAAR) 50 MG tablet TAKE 1 TABLET(50 MG) BY MOUTH EVERY DAY    lovastatin (MEVACOR) 10 MG tablet TAKE 1 TABLET(10 MG) BY MOUTH EVERY DAY    mometasone 0.1% (ELOCON) 0.1 % cream Use daily    multivit,calc,mins/iron/folic (ONE-A-DAY WOMENS FORMULA ORAL) Take 1 tablet by mouth once daily.    triamcinolone acetonide 0.025% (KENALOG) 0.025 % cream Apply topically 2 (two) times daily.    triamcinolone acetonide 0.5% (KENALOG) 0.5 % Crea APPLY EXTERNALLY TO THE AFFECTED AREA TWICE DAILY as needed for flares; do not use for more than 2 weeks in a row without one week break     Family History       Problem Relation (Age of Onset)    Arthritis Sister, Brother    Cancer Mother, Brother, Son    Cataracts Son    Diabetes Sister, Brother    Glaucoma Mother    Heart attack Father    Heart disease Father, Brother    Heart failure Brother    Hypertension Mother, Father    No Known Problems Maternal  Grandmother, Maternal Grandfather, Paternal Grandmother, Paternal Grandfather, Maternal Aunt, Maternal Uncle, Paternal Aunt, Paternal Uncle    Skin cancer Brother    Stroke Brother    Throat cancer Brother          Tobacco Use    Smoking status: Former     Current packs/day: 0.00     Average packs/day: 0.3 packs/day for 7.5 years (1.9 ttl pk-yrs)     Types: Cigarettes     Start date: 1955     Quit date: 1962     Years since quittin.9    Smokeless tobacco: Former     Quit date: 1970   Substance and Sexual Activity    Alcohol use: Yes     Alcohol/week: 1.0 standard drink of alcohol     Types: 1 Cans of beer per week     Comment: occasionally    Drug use: No    Sexual activity: Not Currently     Partners: Male     Review of Systems   Constitutional: Negative.   HENT: Negative.     Cardiovascular:  Negative for chest pain, claudication, cyanosis, dyspnea on exertion, irregular heartbeat, leg swelling, near-syncope, orthopnea, palpitations, paroxysmal nocturnal dyspnea and syncope.   Respiratory:  Positive for shortness of breath.    Endocrine: Negative.    Musculoskeletal: Negative.    Gastrointestinal: Negative.    Genitourinary: Negative.    Neurological: Negative.      Objective:     Vital Signs (Most Recent):  Temp: 97.7 °F (36.5 °C) (24 1010)  Pulse: (!) 132 (24 1547)  Resp: 20 (24 1547)  BP: (!) 115/97 (24 1547)  SpO2: 96 % (24 1547) Vital Signs (24h Range):  Temp:  [97.7 °F (36.5 °C)] 97.7 °F (36.5 °C)  Pulse:  [] 132  Resp:  [20-28] 20  SpO2:  [95 %-100 %] 96 %  BP: (115-166)/(77-98) 115/97     Weight: 88.9 kg (196 lb)  Body mass index is 31.64 kg/m².    SpO2: 96 %       No intake or output data in the 24 hours ending 24 1600    Lines/Drains/Airways       Drain  Duration             Female External Urinary Catheter w/ Suction 24 1203 <1 day              Peripheral Intravenous Line  Duration                  Peripheral IV - Single Lumen 24  1025 18 G Right Antecubital <1 day                     Physical Exam  Vitals and nursing note reviewed.   Constitutional:       Appearance: Normal appearance.   HENT:      Head: Normocephalic and atraumatic.   Eyes:      Extraocular Movements: Extraocular movements intact.      Pupils: Pupils are equal, round, and reactive to light.   Neck:      Vascular: No carotid bruit.      Comments: EJ distended but not JVD or HJR  Cardiovascular:      Rate and Rhythm: Tachycardia present. Rhythm irregular.      Pulses: Normal pulses.      Heart sounds: Normal heart sounds. No murmur heard.     No friction rub. No gallop.   Pulmonary:      Effort: Pulmonary effort is normal.      Breath sounds: Normal breath sounds.   Abdominal:      General: Abdomen is flat. Bowel sounds are normal. There is no distension.      Palpations: Abdomen is soft. There is no mass.      Tenderness: There is no abdominal tenderness.   Musculoskeletal:         General: Normal range of motion.      Cervical back: Normal range of motion.      Right lower leg: Edema (Trace) present.      Left lower leg: Edema (Trace) present.   Skin:     General: Skin is warm.      Capillary Refill: Capillary refill takes less than 2 seconds.   Neurological:      General: No focal deficit present.      Mental Status: She is alert and oriented to person, place, and time.          Significant Labs:     Recent Labs   Lab 06/01/24  1039   WBC 8.12   HGB 11.7*   HCT 34.5*          Recent Labs   Lab 06/01/24  1039      K 4.1      CO2 27   BUN 25   CREATININE 1.1   CALCIUM 10.0       Recent Labs   Lab 06/01/24  1039   ALKPHOS 103   BILITOT 0.5   PROT 7.2   ALT 75*   AST 54*     Lab Results   Component Value Date    CHOL 227 (H) 08/18/2023    HDL 66 08/18/2023    LDLCALC 142.8 08/18/2023    TRIG 91 08/18/2023       Recent Labs   Lab 06/01/24  1039 06/01/24  1331   TROPONINI 0.038* 0.052*       Lab Results   Component Value Date    HGBA1C 7.0 (H) 02/26/2024        Lab Results   Component Value Date     (H) 06/01/2024     Significant Imaging:     TTE 10/11/2021  - The left ventricle is normal in size with concentric remodeling and normal systolic function.  - The estimated ejection fraction is 60%.  - Grade II left ventricular diastolic dysfunction.  - Normal right ventricular size with normal right ventricular systolic function.  - There is mild aortic valve stenosis.  - Aortic valve area is 0.74 cm2; peak velocity is 2.13 m/s; mean gradient is 10 mmHg.  - There is mild mitral stenosis.  - The mean diastolic gradient across the mitral valve is 4 mmHg at a heart rate of 64 bpm.  - The estimated PA systolic pressure is 29 mmHg.    Cardiac Pharmacological Stress  10/2016  1. The EKG portion of this study is negative for ischemia at a peak heart rate of 109 bpm (83% of predicted).   2. Blood pressure remained stable throughout the protocol  (Presenting BP: 141/77 Peak BP: 171/76).   3. No significant arrhythmias were present.   4. There were no symptoms of chest discomfort or significant dyspnea throughout the protocol.     Holter monitor - 24h 07/2014  1. Predominant rhythm is NSR   2. Rare PAC's and PVC's   3. No significant arrhythmias/blocks/pause

## 2024-06-01 NOTE — AI DETERIORATION ALERT
"RAPID RESPONSE NURSE AI ALERT       AI alert received.    Chart Reviewed: 06/01/2024, 1:47 PM    MRN: [id number]  Bed: ED 02/02    Dx: New onset atrial fibrillation    Tonya Cohn has a past medical history of AR (allergic rhinitis), Atherosclerosis of abdominal aorta, Carpal tunnel syndrome of left wrist, Chronic kidney disease, stage 3, Diabetic peripheral neuropathy, Diverticulosis, History of colon polyps, History of diverticulitis of colon, Hyperlipemia, Hypertension, Hypothyroidism, Mild aortic stenosis, New onset atrial fibrillation, OA (osteoarthritis) of knee, Obesity, Sciatica, and Type II or unspecified type diabetes mellitus with neurological manifestations, uncontrolled(250.62).    Last VS: BP (!) 119/96   Pulse (!) 135   Temp 97.7 °F (36.5 °C)   Resp (!) 25   Ht 5' 6" (1.676 m)   Wt 88.9 kg (196 lb)   SpO2 95%   BMI 31.64 kg/m²     24H Vital Sign Range:  Temp:  [97.7 °F (36.5 °C)]   Pulse:  []   Resp:  [23-28]   BP: (119-166)/(78-98)   SpO2:  [95 %-100 %]     Level of Consciousness (AVPU): alert    Recent Labs     06/01/24  1039   WBC 8.12   HGB 11.7*   HCT 34.5*          Recent Labs     06/01/24  1039      K 4.1      CO2 27   BUN 25   CREATININE 1.1   *   MG 2.0        OXYGEN:     MEWS score:      Bedside Merry TYLER contacted for tachycardia and reports no distress noted. Vital signs otherwise stable. Diltiazem ordered PRN for HR > 120 per primary team. No additional concerns verbalized at this time. Instructed to call [phone] for further concerns or assistance.    Mireya Gill RN        "

## 2024-06-01 NOTE — ASSESSMENT & PLAN NOTE
- Prior stress imaging normal  - No ischemic symptoms, however shortness of breath and new onset HF could be a manifestation of CAD (although Afib explains problem too)  - In the setting of tachyarrhythmia  - Trop 0.038-0.052    - No indication of ACS protocol now   - Rhythm control as per Afib  - If new symptoms or concern for ACS, could start ACS protocol

## 2024-06-01 NOTE — ASSESSMENT & PLAN NOTE
#HFrEF 60%, NYHA I before today, ACC/AHA class C:  - Decompensation factor: Atrial fibrillation  - Hemodynamic profile: Wet and perfused  - Devices: None    - Pending today's official read of TTE  - Volume status optimization  - Daily monitoring: Standing weights, Strict I/O, Fluid restriction (<2L/day), Na restriction (<2g/day), Keep K>4 and Mg>2  - Management of comorbidities: Afib rate control for now

## 2024-06-01 NOTE — HPI
This is a 89-year-old female with a history of HTN, HLD, diverticulosis, CKD 3, sciatica who presents with shortness of breath.  States that shortly after waking up she started feeling progressively dyspneic with no exacerbating factors.  Denies orthopnea.  States that shortness of breath continued until shortly before arriving to the ED.  Also states that she felt her heart racing.  Denies any associated lightheadedness or chest pain.  Denies similar symptoms in the past.  States that she does feel slight chest discomfort but otherwise now feels at baseline.  Also denies any fevers, chills, wheezing, nausea, vomiting, abdominal pain, diarrhea, lower extremity swelling    ED course:  Upon arrival HR was in the 140s.  EKG significant for atrial fibrillation with RVR, new onset.  Status post multiple doses IV Lopressor with no improvement.  HR initially improved with IV diltiazem, now tachycardic once again.  CXR with pulmonary edema with .  Troponin 0.038.  Admitted for new onset atrial fibrillation with RVR

## 2024-06-01 NOTE — HPI
89yo F patient with HTN, HLD, mild AS/MS, T2DM, diverticulosis, CKD3, and sciatica who was admitted to [facility] on 06/01/2024 with CC of shortness of breath after waking up.     She had progressive shortness of breath after waking up. She had shortness of breath until she arrived to the ED.     In the ED /94 and HR 140bpm, physical exam showed JVD, irregular rhythm, wheezing, tachypneic on room air and expiratory wheezing. Labs remarkable for Hb 11.7, WBC 8.12, Cr 1.1 (bl 0.7-0.9), AST/ALT 54/75 (bl 26/15), Trop 0.038-0.052, , TSH 0.374 and FT4 1.19, CXR with bilateral pulmonary congestion,  and EKG with atrial fibrillation with iRBBB.     Given multiple doses of IV lopressor and eventually IV diltiazem, but HR went back up. Admitted with new onset Afib.     Outpatient Cardiology - Preston Whitfield MD.

## 2024-06-01 NOTE — ASSESSMENT & PLAN NOTE
-Likely due to atrial fibrillation  -BNP elevated with pulmonary edema on CXR, no noted history of CHF  -TTE ordered

## 2024-06-01 NOTE — ASSESSMENT & PLAN NOTE
-EKG negative for ischemic changes, no acute chest pain  -Unlikely ACS and more likely demand ischemia in the setting of RVR  -trend troponin

## 2024-06-01 NOTE — ASSESSMENT & PLAN NOTE
Patient with Paroxysmal (<7 days) atrial fibrillation which is uncontrolled currently with Beta Blocker. Patient is currently in atrial fibrillation.[id number] Score: 4. HASBLED Score: . Anticoagulation indicated. Anticoagulation done with heparin ggt .    -presenting with acute onset dyspnea, found to have new onset atrial fibrillation with RVR with no significant cardiac history  -not responsive to multiple IVP Lopressor, initially responsive to diltiazem  -unclear of candidate for DCCV/ablation given age  Plan:  -case discussed with Cardiology, consult placed  -start on Lopressor 12.5 b.i.d..  IVP Lopressor and diltiazem ordered  -started on heparin drip in case procedure warranted  -monitor on telemetry

## 2024-06-01 NOTE — ED PROVIDER NOTES
Encounter Date: 6/1/2024       History     Chief Complaint   Patient presents with    Shortness of Breath     89 yo W with pmhx HTN, HLD, dm, obesity, hypothyroidism, diverticulosis, CKD 3, sciatica, abdominal aortic atherosclerosis presents with a chief complaint of shortness of breath.  Patient has been suffering from right lower back pain for the past few weeks.  She has had previous ER visits and saw pain management for the symptoms.  She noted associated shortness of breath at that time but it has worsened of late.  Shortness of breath occurs with exertion.  However, she woke up today with more severe shortness of breath.  She was found to be in AFib here.  Denies any history of AFib.  Denies any chest pain.  Rare cough that worsens back pain but not with shortness of breath.  No fever.  History also assisted by patient's daughter.        Review of patient's allergies indicates:   Allergen Reactions    Lipitor [atorvastatin]      Past Medical History:   Diagnosis Date    AR (allergic rhinitis)     Atherosclerosis of abdominal aorta     noted on CT scan 1/17/2011    Carpal tunnel syndrome of left wrist     Chronic kidney disease, stage 3     Diabetic peripheral neuropathy     Diverticulosis     History of colon polyps     History of diverticulitis of colon 1/2014    Hyperlipemia     Hypertension     Hypothyroidism     followed by endocrinology, Dr. Green    Mild aortic stenosis     OA (osteoarthritis) of knee     Obesity     Sciatica     Type II or unspecified type diabetes mellitus with neurological manifestations, uncontrolled(250.62)      Past Surgical History:   Procedure Laterality Date    CATARACT EXTRACTION Bilateral     COLONOSCOPY N/A 10/3/2017    Procedure: COLONOSCOPY;  Surgeon: Alin Gonzalez MD;  Location: [address]);  Service: Endoscopy;  Laterality: N/A;    COSMETIC SURGERY      HYSTERECTOMY      partial    JOINT REPLACEMENT Right     LUNG BIOPSY  in her 40's    TOTAL KNEE ARTHROPLASTY  Right 2014    TKR     Family History   Problem Relation Name Age of Onset    Cancer Mother          shoulder tumor - cancer    Hypertension Mother      Glaucoma Mother      Heart disease Father          valve replacement    Hypertension Father      Heart attack Father      Diabetes Sister Lidya     Arthritis Sister Lovinia         s/p knee surgery    Diabetes Brother Benoris     Heart disease Brother Benoris     Heart failure Brother Benoris     Stroke Brother Ardell     Arthritis Brother Vital         back problems    Skin cancer Brother Bogdan     Cancer Brother Bogdan     Throat cancer Brother Bogdan     No Known Problems Maternal Grandmother      No Known Problems Maternal Grandfather      No Known Problems Paternal Grandmother      No Known Problems Paternal Grandfather      Cancer Son Johnathan         jaw    Cataracts Son Johnathan     No Known Problems Maternal Aunt      No Known Problems Maternal Uncle      No Known Problems Paternal Aunt      No Known Problems Paternal Uncle      Melanoma Neg Hx      Eczema Neg Hx      Lupus Neg Hx      Psoriasis Neg Hx      Breast cancer Neg Hx      Colon cancer Neg Hx      Amblyopia Neg Hx      Blindness Neg Hx      Macular degeneration Neg Hx      Retinal detachment Neg Hx      Strabismus Neg Hx      Thyroid disease Neg Hx       Social History     Tobacco Use    Smoking status: Former     Current packs/day: 0.00     Average packs/day: 0.3 packs/day for 7.5 years (1.9 ttl pk-yrs)     Types: Cigarettes     Start date: 1955     Quit date: 1962     Years since quittin.9    Smokeless tobacco: Former     Quit date: 1970   Substance Use Topics    Alcohol use: Yes     Alcohol/week: 1.0 standard drink of alcohol     Types: 1 Cans of beer per week     Comment: occasionally    Drug use: No     Review of Systems    Physical Exam     Initial Vitals [24 1010]   BP Pulse Resp Temp SpO2   119/88 (!) 144 (!) 28 97.7 °F (36.5 °C) 100 %      MAP       --          Physical Exam    Nursing note and vitals reviewed.  Constitutional: She appears well-developed and well-nourished. She is not diaphoretic. No distress.   HENT:   Head: Normocephalic and atraumatic.   Eyes: Right eye exhibits no discharge. Left eye exhibits no discharge. No scleral icterus.   Neck: Neck supple. JVD present.   Normal range of motion.  Cardiovascular:  Normal heart sounds. An irregularly irregular rhythm present.   Tachycardia present.   Exam reveals no gallop and no friction rub.       No murmur heard.  Pulmonary/Chest: No respiratory distress. She has wheezes. She has no rhonchi. She has no rales.   Tachypneic on room air but speaking complete sentences  Expiratory wheezing most pronounced to the bilateral lower lung zones   Abdominal: Abdomen is soft. She exhibits no distension and no mass. There is no abdominal tenderness.   No right CVA tenderness.  No left CVA tenderness. There is no rebound and no guarding.   Musculoskeletal:         General: No edema. Normal range of motion.      Cervical back: Normal range of motion and neck supple.      Comments: Right SI joint tenderness  No midline tenderness to thoracic or lumbar spine     Neurological: She is alert. She has normal strength. No sensory deficit.   Skin: Skin is warm and dry. Capillary refill takes less than 2 seconds.   Psychiatric: Thought content normal.         ED Course   Procedures  Labs Reviewed   CBC W/ AUTO DIFFERENTIAL - Abnormal; Notable for the following components:       Result Value    RBC 3.99 (*)     Hemoglobin 11.7 (*)     Hematocrit 34.5 (*)     All other components within normal limits    Narrative:     Release to patient->Immediate   COMPREHENSIVE METABOLIC PANEL - Abnormal; Notable for the following components:    Glucose 195 (*)     AST 54 (*)     ALT 75 (*)     eGFR 47.1 (*)     All other components within normal limits    Narrative:     Release to patient->Immediate   TROPONIN I - Abnormal; Notable for the  following components:    Troponin I 0.038 (*)     All other components within normal limits    Narrative:     Release to patient->Immediate   B-TYPE NATRIURETIC PEPTIDE - Abnormal; Notable for the following components:     (*)     All other components within normal limits    Narrative:     Release to patient->Immediate   TSH - Abnormal; Notable for the following components:    TSH 0.374 (*)     All other components within normal limits    Narrative:     Release to patient->Immediate   HIV 1 / 2 ANTIBODY    Narrative:     Release to patient->Immediate   HEPATITIS C ANTIBODY    Narrative:     Release to patient->Immediate   MAGNESIUM    Narrative:     Release to patient->Immediate   SARS-COV-2 RNA AMPLIFICATION, QUAL   URINALYSIS, REFLEX TO URINE CULTURE   T4, FREE     EKG Readings: (Independently Interpreted)   Initial Reading: No STEMI. Rhythm: Atrial Fibrillation. Heart Rate: 146. Conduction: RBBB (Incomplete). ST Segments: Normal ST Segments. T Waves: Normal. Axis: Normal. Clinical Impression: Atrial Fibrillation with RVR       Imaging Results              X-Ray Chest AP Portable (Final result)  Result time 06/01/24 11:44:08      Final result by Lazaro Stephen MD (06/01/24 11:44:08)                   Impression:      Borderline CHF/pulmonary edema pattern.      Electronically signed by: Lazaro Stephen MD  Date:    06/01/2024  Time:    11:44               Narrative:    EXAMINATION:  XR CHEST AP PORTABLE    CLINICAL HISTORY:  CHF;    TECHNIQUE:  Single frontal view of the chest was performed.    COMPARISON:  01/04/2011    FINDINGS:  Enlarged cardiac silhouette.Prominent perihilar vasculature noted.  No lung consolidation.  No pneumothorax.No pleural effusions.                                       Medications   LIDOcaine 5 % patch 1 patch (1 patch Transdermal Patch Applied 6/1/24 1132)   metoprolol injection 5 mg (5 mg Intravenous Given 6/1/24 1032)   acetaminophen tablet 650 mg (650 mg Oral Given 6/1/24  1031)   metoprolol injection 5 mg (5 mg Intravenous Not Given 6/1/24 1100)   metoprolol injection 5 mg (5 mg Intravenous Given 6/1/24 1126)   diltiaZEM injection 10 mg (10 mg Intravenous Given 6/1/24 1132)   furosemide injection 40 mg (40 mg Intravenous Given 6/1/24 1158)     Medical Decision Making  89 yo W with pmhx HTN, HLD, dm, obesity, hypothyroidism, diverticulosis, CKD 3, sciatica, abdominal aortic atherosclerosis presents with a chief complaint of shortness of breath.  She has a new onset AFib with JVD and bilateral lung zone wheezing.    Differential includes, but not limited to:  New onset AFib, CHF, pneumonia, ACS, infectious issues, electrolyte abnormalities    Will obtain labs and chest x-ray.  No history of asthma or COPD, wheezing is likely cardiogenic given JVD in new onset AFib so will hold any albuterol at this time and administer metoprolol 5 mg.  Will administer acetaminophen and lidocaine patch for SI joint tenderness.    Reassessment:  CBC without leukocytosis.  Chest x-ray chest x-ray on my independent interpretation reveals cardiomegaly and pulmonary vascular congestion.  Patient received metoprolol 5 mg x 3.  Heart rate is in the low 130s.  Blood pressure is stable.  She notes her shortness of breath has improved somewhat.  However, she remains in RVR.  Will administer diltiazem.    Reassessment:  Status post diltiazem, patient was not rate controlled with a heart rate of 77.  She remains normotensive.  COVID is negative.  Troponin is slightly elevated at 0.038, will trend.  BNP is elevated at 587.  I suspect that this is slight troponin leak secondary to AFib and CHF and not acute thrombotic ACS.  Will diurese with 40 of IV Lasix.  Will admit. Observation per case management. TSH low, free T4 pending.      Amount and/or Complexity of Data Reviewed  Labs: ordered.  Radiology: ordered.    Risk  OTC drugs.  Prescription drug management.              Attending Attestation:         Attending  Critical Care:   Critical Care Times:   ==============================================================  Total Critical Care Time - exclusive of procedural time: 30 minutes.  ==============================================================  Critical care was necessary to treat or prevent imminent or life-threatening deterioration of the following conditions: cardiac arrhythmia.                                  Clinical Impression:  Final diagnoses:  [R06.02] Shortness of breath  [I48.91] New onset a-fib (Primary)  [I48.91] Atrial fibrillation with RVR  [R79.89] Troponin level elevated  [R79.89] Elevated brain natriuretic peptide (BNP) level                 Momo Dorman MD  06/01/24 1210

## 2024-06-01 NOTE — ASSESSMENT & PLAN NOTE
#Atrial fibrillation - New diagnosis - Paroxysmal - Non-valvular  - EKG since 2002 with NSR  - Risk factors: HTN, Valvular heart disease (mild MS),   - Cause: Could be acute (ie HF, ischemia/MI, acute disease or hypoxia) vs. Chronic (Older age, HTN, valve disease, CMP)  - Anticoagulation - [id number] 4 (Age, Sex, HTN, DM)  - Currently on rate control strategy - - Given multiple doses of lopressor and diltiazem, and primary team ordered metoprolol tartrate 12.5mg bid  - If normal EF, can add diltiazem for rate control, pending TTE    Recommendations:  - Keep K>4 and Mg>2  - Pending TTE  - Rate control: Goal HR<110: Continue metoprolol tartrate but increase dose to 25mg, if good response and BP ok then can do 25mg q6h  - Rhythm control: Will decide best strategy for treatment, discussed with family and they want to talk about it  - Anticoagulation: Continue heparin gtt, and once stable can change to DOACs, unless TTE shows mod/severe mitral valve disease then she will need to be on Warfarin.   - Risk factor reduction - Increase physical activity, control HTN and DM

## 2024-06-02 PROBLEM — I50.9 ACUTE EXACERBATION OF CHF (CONGESTIVE HEART FAILURE): Status: ACTIVE | Noted: 2024-06-01

## 2024-06-02 PROBLEM — I50.22 HEART FAILURE WITH MILDLY REDUCED EJECTION FRACTION (HFMREF): Status: ACTIVE | Noted: 2024-06-01

## 2024-06-02 LAB
APTT PPP: 50.3 SEC (ref 21–32)
BACTERIA UR CULT: NORMAL
BACTERIA UR CULT: NORMAL
OHS QRS DURATION: 106 MS
OHS QRS DURATION: 106 MS
OHS QRS DURATION: 110 MS
OHS QTC CALCULATION: 426 MS
OHS QTC CALCULATION: 429 MS
OHS QTC CALCULATION: 495 MS
POCT GLUCOSE: 148 MG/DL (ref 70–110)
POCT GLUCOSE: 158 MG/DL (ref 70–110)
POCT GLUCOSE: 163 MG/DL (ref 70–110)
POCT GLUCOSE: 172 MG/DL (ref 70–110)
TROPONIN I SERPL DL<=0.01 NG/ML-MCNC: 0.03 NG/ML (ref 0–0.03)

## 2024-06-02 PROCEDURE — 20600001 HC STEP DOWN PRIVATE ROOM

## 2024-06-02 PROCEDURE — 82962 GLUCOSE BLOOD TEST: CPT

## 2024-06-02 PROCEDURE — 93010 ELECTROCARDIOGRAM REPORT: CPT | Mod: ,,, | Performed by: INTERNAL MEDICINE

## 2024-06-02 PROCEDURE — 99233 SBSQ HOSP IP/OBS HIGH 50: CPT | Mod: GC,,, | Performed by: INTERNAL MEDICINE

## 2024-06-02 PROCEDURE — 25000003 PHARM REV CODE 250: Performed by: STUDENT IN AN ORGANIZED HEALTH CARE EDUCATION/TRAINING PROGRAM

## 2024-06-02 PROCEDURE — 96376 TX/PRO/DX INJ SAME DRUG ADON: CPT

## 2024-06-02 PROCEDURE — 63600175 PHARM REV CODE 636 W HCPCS: Performed by: STUDENT IN AN ORGANIZED HEALTH CARE EDUCATION/TRAINING PROGRAM

## 2024-06-02 PROCEDURE — 85730 THROMBOPLASTIN TIME PARTIAL: CPT | Performed by: STUDENT IN AN ORGANIZED HEALTH CARE EDUCATION/TRAINING PROGRAM

## 2024-06-02 PROCEDURE — 93005 ELECTROCARDIOGRAM TRACING: CPT

## 2024-06-02 PROCEDURE — 84484 ASSAY OF TROPONIN QUANT: CPT | Performed by: STUDENT IN AN ORGANIZED HEALTH CARE EDUCATION/TRAINING PROGRAM

## 2024-06-02 RX ORDER — FUROSEMIDE 10 MG/ML
40 INJECTION INTRAMUSCULAR; INTRAVENOUS ONCE
Status: COMPLETED | OUTPATIENT
Start: 2024-06-02 | End: 2024-06-02

## 2024-06-02 RX ORDER — METOPROLOL TARTRATE 50 MG/1
50 TABLET ORAL EVERY 6 HOURS
Status: DISCONTINUED | OUTPATIENT
Start: 2024-06-02 | End: 2024-06-03 | Stop reason: HOSPADM

## 2024-06-02 RX ORDER — METOPROLOL TARTRATE 25 MG/1
25 TABLET, FILM COATED ORAL EVERY 6 HOURS
Status: DISCONTINUED | OUTPATIENT
Start: 2024-06-02 | End: 2024-06-02

## 2024-06-02 RX ADMIN — METOPROLOL TARTRATE 50 MG: 50 TABLET, FILM COATED ORAL at 05:06

## 2024-06-02 RX ADMIN — ASPIRIN 81 MG CHEWABLE TABLET 81 MG: 81 TABLET CHEWABLE at 09:06

## 2024-06-02 RX ADMIN — APIXABAN 5 MG: 5 TABLET, FILM COATED ORAL at 08:06

## 2024-06-02 RX ADMIN — METOPROLOL TARTRATE 25 MG: 25 TABLET, FILM COATED ORAL at 11:06

## 2024-06-02 RX ADMIN — APIXABAN 5 MG: 5 TABLET, FILM COATED ORAL at 09:06

## 2024-06-02 RX ADMIN — LEVOTHYROXINE SODIUM 88 MCG: 88 TABLET ORAL at 09:06

## 2024-06-02 RX ADMIN — METOROPROLOL TARTRATE 5 MG: 5 INJECTION, SOLUTION INTRAVENOUS at 07:06

## 2024-06-02 RX ADMIN — METOPROLOL TARTRATE 50 MG: 50 TABLET, FILM COATED ORAL at 11:06

## 2024-06-02 RX ADMIN — FUROSEMIDE 40 MG: 10 INJECTION, SOLUTION INTRAVENOUS at 09:06

## 2024-06-02 NOTE — CLINICAL REVIEW
"RAPID RESPONSE NURSE CHART REVIEW        Chart Reviewed: 06/02/2024, 1:17 PM    MRN: [id number]  Bed: OBS07/EDOU07    Dx: New onset atrial fibrillation    Tonya Cohn has a past medical history of AR (allergic rhinitis), Atherosclerosis of abdominal aorta, Carpal tunnel syndrome of left wrist, Chronic kidney disease, stage 3, Diabetic peripheral neuropathy, Diverticulosis, History of colon polyps, History of diverticulitis of colon, Hyperlipemia, Hypertension, Hypothyroidism, Mild aortic stenosis, New onset atrial fibrillation, OA (osteoarthritis) of knee, Obesity, Sciatica, and Type II or unspecified type diabetes mellitus with neurological manifestations, uncontrolled(250.62).    Last VS: /71   Pulse (!) 132   Temp 97.8 °F (36.6 °C) (Oral)   Resp 20   Ht 5' 6" (1.676 m)   Wt 88.9 kg (196 lb)   SpO2 98%   BMI 31.64 kg/m²     24H Vital Sign Range:  Temp:  [97.6 °F (36.4 °C)-98.2 °F (36.8 °C)]   Pulse:  []   Resp:  [18-22]   BP: (115-161)/()   SpO2:  [88 %-99 %]     Level of Consciousness (AVPU): alert    Recent Labs     06/01/24  1039   WBC 8.12   HGB 11.7*   HCT 34.5*          Recent Labs     06/01/24  1039 06/01/24  2048     --    K 4.1 4.1     --    CO2 27  --    BUN 25  --    CREATININE 1.1  --    *  --    PHOS  --  3.9   MG 2.0 2.0        OXYGEN:  Flow (L/min) (Oxygen Therapy): 2    MEWS score: 4    Bedside RN, Flavia, contacted. Discussed continued afib w/ HR fluctuating between 110s-150s. BP stable. RN states pt is asymptomatic.  No additional concerns verbalized at this time. Instructed to call [phone] for further concerns or assistance.    Mireya Gill RN       "

## 2024-06-02 NOTE — ED NOTES
Telemetry Verification   Patient placed on Telemetry Box  Verified with War Room  Box # [id number]   Monitor Tech Lindy   Rate 118   Rhythm A-fib

## 2024-06-02 NOTE — NURSING
Nurses Note -- 4 Eyes      6/2/2024   6:44 PM      Skin assessed during: Admit      [x] No Altered Skin Integrity Present    []Prevention Measures Documented      [] Yes- Altered Skin Integrity Present or Discovered   [] LDA Added if Not in Epic (Describe Wound)   [] New Altered Skin Integrity was Present on Admit and Documented in LDA   [] Wound Image Taken    Wound Care Consulted? No    Attending Nurse:  NAYELI Gongora    Second RN/Staff Member:  SHAINA Woody

## 2024-06-02 NOTE — ED NOTES
PTT resulted no change needed for heparin infusion per nomogram. Heparin continues to infuse at the same rate. NAD. No needs noted at this time. Will continue to monitor.

## 2024-06-02 NOTE — ASSESSMENT & PLAN NOTE
#Atrial fibrillation - New diagnosis - Paroxysmal - Non-valvular  - EKG since 2002 with NSR  - Risk factors: HTN, Valvular heart disease (mild MS),   - Cause: Could be acute (ie HF, ischemia/MI, acute disease or hypoxia) vs. Chronic (Older age, HTN, valve disease, CMP)  - Anticoagulation - [id number] 4 (Age, Sex, HTN, DM)  - TTE with mr EF, CCB contraindicated  - Increased dose of metoprolol 25mg and patient still tachycardic, will increase to 50mg q6h and assess BP and rate control  - If patient continues on Afib tomorrow am, will perform SUSY/cardioversion    Recommendations:  - Keep K>4 and Mg>2  - Rate control: Goal HR<110: Continue metoprolol tartrate and increase dosage to 50mg q6h  - Rhythm control: If stays in Afib will perform SUSY/DCCV - Patient and family agree with procedure  - Anticoagulation: Continue heparin gtt, and once stable can change to DOACs  - Risk factor reduction - Increase physical activity, control HTN and DM

## 2024-06-02 NOTE — ASSESSMENT & PLAN NOTE
-TTE obtained with new mrEF 45-50%, unable to assess diastolic function. Reduced EF possibly due to RVR  -BNP elevated with pulmonary edema on CXR, no noted history of CHF  -IV diuresis as needed  -Strict I&O  -Discuss GDMT with cardiology

## 2024-06-02 NOTE — PROGRESS NOTES
[facility] - Emergency Dept  Hospital Medicine  Progress Note    Patient Name: Tonya Cohn  MRN: [id number]  Patient Class: OP- Observation   Admission Date: 6/1/2024  Length of Stay: 0 days  Attending Physician: Alberto Staples DO  Primary Care Provider: Tere Hughes MD        Subjective:     Principal Problem:New onset atrial fibrillation        HPI:  This is a 89-year-old female with a history of HTN, HLD, diverticulosis, CKD 3, sciatica who presents with shortness of breath.  States that shortly after waking up she started feeling progressively dyspneic with no exacerbating factors.  Denies orthopnea.  States that shortness of breath continued until shortly before arriving to the ED.  Also states that she felt her heart racing.  Denies any associated lightheadedness or chest pain.  Denies similar symptoms in the past.  States that she does feel slight chest discomfort but otherwise now feels at baseline.  Also denies any fevers, chills, wheezing, nausea, vomiting, abdominal pain, diarrhea, lower extremity swelling    ED course:  Upon arrival HR was in the 140s.  EKG significant for atrial fibrillation with RVR, new onset.  Status post multiple doses IV Lopressor with no improvement.  HR initially improved with IV diltiazem, now tachycardic once again.  CXR with pulmonary edema with .  Troponin 0.038.  Admitted for new onset atrial fibrillation with RVR        Overview/Hospital Course:  Admitted with new onset atrial fibrillation with RVR and CHF.  HR persistently in the 130s on arrival. BNP also elevated with downtrending troponin likely due to demand ischemia.  Cardiology discussing rhythm control strategy, possibly DCCV after HR more controlled.  Started on beta-blocker with avoidance of Cardizem due to new reduced EF. Volume status being optimized, DOAC started.    Interval History: Seen this AM at bedside.  Continues to be tachycardic, denies any chest pain, palpitations.  Does endorse more  dyspnea this morning, improved with IV Lasix.    Review of Systems  Objective:     Vital Signs (Most Recent):  Temp: 97.8 °F (36.6 °C) (06/02/24 1121)  Pulse: (!) 132 (06/02/24 1140)  Resp: 20 (06/02/24 1121)  BP: 115/71 (06/02/24 1140)  SpO2: 98 % (06/02/24 1121) Vital Signs (24h Range):  Temp:  [97.6 °F (36.4 °C)-98.2 °F (36.8 °C)] 97.8 °F (36.6 °C)  Pulse:  [] 132  Resp:  [18-22] 20  SpO2:  [88 %-99 %] 98 %  BP: (115-161)/() 115/71     Weight: 88.9 kg (196 lb)  Body mass index is 31.64 kg/m².  No intake or output data in the 24 hours ending 06/02/24 1217      Physical Exam  Constitutional:       General: She is not in acute distress.     Appearance: Normal appearance. She is not ill-appearing or diaphoretic.   HENT:      Head: Normocephalic.      Mouth/Throat:      Mouth: Mucous membranes are moist.   Eyes:      Extraocular Movements: Extraocular movements intact.      Conjunctiva/sclera: Conjunctivae normal.      Pupils: Pupils are equal, round, and reactive to light.   Cardiovascular:      Rate and Rhythm: Tachycardia present. Rhythm irregular.      Pulses: Normal pulses.      Heart sounds: No murmur heard.     No gallop.   Pulmonary:      Effort: Pulmonary effort is normal. No respiratory distress.      Breath sounds: No wheezing or rales.   Abdominal:      General: Abdomen is flat. Bowel sounds are normal. There is no distension.      Palpations: Abdomen is soft.      Tenderness: There is no abdominal tenderness.   Musculoskeletal:         General: No swelling. Normal range of motion.      Cervical back: Normal range of motion.      Right lower leg: No edema.      Left lower leg: No edema.   Skin:     General: Skin is warm.      Capillary Refill: Capillary refill takes less than 2 seconds.   Neurological:      General: No focal deficit present.   Psychiatric:         Mood and Affect: Mood normal.         Behavior: Behavior normal.         Thought Content: Thought content normal.         Judgment:  Judgment normal.             Significant Labs: All pertinent labs within the past 24 hours have been reviewed.    Significant Imaging: I have reviewed all pertinent imaging results/findings within the past 24 hours.    Assessment/Plan:      * New onset atrial fibrillation  Patient with Paroxysmal (<7 days) atrial fibrillation which is uncontrolled currently with Beta Blocker. Patient is currently in atrial fibrillation.[id number] Score: 4. HASBLED Score: . Anticoagulation indicated. Anticoagulation done with heparin ggt .    -presenting with acute onset dyspnea, found to have new onset atrial fibrillation with RVR with no significant cardiac history  -not responsive to multiple IVP Lopressor, initially responsive to diltiazem  Plan:  -case discussed with Cardiology, discussing for DCCV when HR more controlled  -increase Lopressor to 25 mg q6h, p.r.n. ordered.  Would avoid Cardizem given reduced EF  -switch to DOAC  -monitor on telemetry    Acute exacerbation of CHF (congestive heart failure)  -TTE obtained with new mrEF 45-50%, unable to assess diastolic function. Reduced EF possibly due to RVR  -BNP elevated with pulmonary edema on CXR, no noted history of CHF  -IV diuresis as needed  -Strict I&O  -Discuss GDMT with cardiology      Elevated troponin I level  -EKG negative for ischemic changes, no acute chest pain  -Unlikely ACS and more likely demand ischemia in the setting of RVR, troponins now downtrending    Hypothyroidism (acquired)  Continue Synthroid      Hyperlipidemia LDL goal <100  Lovastatin on on formulary, reported allergy to atorvastatin.  Hold for now        VTE Risk Mitigation (From admission, onward)           Ordered     apixaban tablet 5 mg  2 times daily         06/02/24 0718     IP VTE HIGH RISK PATIENT  Once         06/01/24 1241     Place sequential compression device  Until discontinued         06/01/24 1241                    Discharge Planning   BARBARA: 6/3/2024     Code Status: Full Code   Is  the patient medically ready for discharge?:     Reason for patient still in hospital (select all that apply): Patient trending condition                     Alberto Staples DO  Department of Hospital Medicine   [facility] - Emergency Dept

## 2024-06-02 NOTE — CARE UPDATE
"RAPID RESPONSE NURSE CHART REVIEW        Chart Reviewed: 06/02/2024, 1:45 AM    MRN: [id number]  Bed: OBS07/EDOU07    Dx: New onset atrial fibrillation    Tonya Cohn has a past medical history of AR (allergic rhinitis), Atherosclerosis of abdominal aorta, Carpal tunnel syndrome of left wrist, Chronic kidney disease, stage 3, Diabetic peripheral neuropathy, Diverticulosis, History of colon polyps, History of diverticulitis of colon, Hyperlipemia, Hypertension, Hypothyroidism, Mild aortic stenosis, New onset atrial fibrillation, OA (osteoarthritis) of knee, Obesity, Sciatica, and Type II or unspecified type diabetes mellitus with neurological manifestations, uncontrolled(250.62).    Last VS: /89   Pulse (!) 124   Temp 97.6 °F (36.4 °C) (Oral)   Resp 18   Ht 5' 6" (1.676 m)   Wt 88.9 kg (196 lb)   SpO2 97%   BMI 31.64 kg/m²     24H Vital Sign Range:  Temp:  [97.6 °F (36.4 °C)-98.2 °F (36.8 °C)]   Pulse:  []   Resp:  [18-28]   BP: (115-166)/()   SpO2:  [88 %-100 %]     Level of Consciousness (AVPU): alert    Recent Labs     06/01/24  1039   WBC 8.12   HGB 11.7*   HCT 34.5*          Recent Labs     06/01/24  1039 06/01/24  2048     --    K 4.1 4.1     --    CO2 27  --    BUN 25  --    CREATININE 1.1  --    *  --    PHOS  --  3.9   MG 2.0 2.0        No results for input(s): "PH", "PCO2", "PO2", "HCO3", "POCSATURATED", "BE" in the last 72 hours.     OXYGEN:  Flow (L/min) (Oxygen Therapy): 2          MEWS score: 4    Chart check completed.  Notified nurse about getting order for Diltiazem d/c'd per cariology note. Assigned nurse Alberto states he would contact team to get order removed. Call [phone] for further concerns or assistance.    Beverley Peter RN        "

## 2024-06-02 NOTE — HOSPITAL COURSE
Admitted with new onset atrial fibrillation with RVR and CHF.  HR persistently in the 130s on arrival. BNP also elevated with downtrending troponin likely due to demand ischemia.  Cardiology discussing rhythm control strategy, possibly DCCV after HR more controlled.  Started on beta-blocker with avoidance of Cardizem due to new reduced EF. Volume status being optimized, DOAC started.  DCCV deferred as atrial fibrillation resolved.  Will discharge on DOAC as well as Toprol 200 mg daily as well as Lasix 20 mg daily.  To follow up with Cardiology outpatient where GDMT will be adjusted

## 2024-06-02 NOTE — ASSESSMENT & PLAN NOTE
#HFmrEF 45-50%, NYHA I before today, ACC/AHA class C:  - Decompensation factor: Atrial fibrillation  - Hemodynamic profile: Wet and perfused  - Devices: None  - TTE showed EF 45-50%, biatrial enlargement, mild AS/MS, PASP 53mmHg, elevated venous pressure 15mmHg    - Volume status optimization - Continue lasix as needed, main goal is rate/rhythm control  - PET scan for ischemic evaluation as outpatient  - Daily monitoring: Standing weights, Strict I/O, Fluid restriction (<2L/day), Na restriction (<2g/day), Keep K>4 and Mg>2  - Management of comorbidities: Afib rate control for now

## 2024-06-02 NOTE — PROGRESS NOTES
"[facility] - [facility] Dept  Cardiology  Progress Note    Patient Name: Tonya Cohn  MRN: [id number]  Admission Date: 6/1/2024  Hospital Length of Stay: 0 days  Code Status: Full Code   Attending Physician: Alberto Staples DO   Primary Care Physician: Tere Hughes MD  Expected Discharge Date: 6/3/2024  Principal Problem:New onset atrial fibrillation    Subjective:     Hospital Course:   No notes on file    Interval History: //94 with a HR . On metoprolol tartrate 25mg bid (2036 and 2248) and received a dose of 5mg IV at 2147h. BP remained SBP >129 and a . Also on heparin gtt for AC. She received furosemide 40mg 1159 and 2036h and last 24h In "not documented" and Out 500cc.      Review of Systems   Constitutional: Negative.   HENT: Negative.     Cardiovascular:  Negative for chest pain, claudication, cyanosis, dyspnea on exertion, irregular heartbeat, leg swelling, near-syncope, orthopnea, palpitations and paroxysmal nocturnal dyspnea.   Respiratory: Negative.     Endocrine: Negative.    Musculoskeletal: Negative.    Gastrointestinal: Negative.    Genitourinary: Negative.    Neurological: Negative.      Objective:     Vital Signs (Most Recent):  Temp: 97.6 °F (36.4 °C) (06/02/24 0050)  Pulse: (!) 124 (06/02/24 0119)  Resp: 18 (06/01/24 2223)  BP: 129/89 (06/02/24 0050)  SpO2: 97 % (06/02/24 0050) Vital Signs (24h Range):  Temp:  [97.6 °F (36.4 °C)-98.2 °F (36.8 °C)] 97.6 °F (36.4 °C)  Pulse:  [] 124  Resp:  [18-28] 18  SpO2:  [88 %-100 %] 97 %  BP: (115-166)/() 129/89     Weight: 88.9 kg (196 lb)  Body mass index is 31.64 kg/m².     SpO2: 97 %         Intake/Output Summary (Last 24 hours) at 6/2/2024 0501  Last data filed at 6/1/2024 1010  Gross per 24 hour   Intake --   Output 500 ml   Net -500 ml       Lines/Drains/Airways       Drain  Duration             Female External Urinary Catheter w/ Suction 06/01/24 1203 <1 day              Peripheral Intravenous Line  Duration "                  Peripheral IV - Single Lumen 06/01/24 1025 18 G Right Antecubital <1 day         Peripheral IV - Single Lumen 06/01/24 1628 20 G Left Antecubital <1 day                       Physical Exam  Vitals and nursing note reviewed.   Constitutional:       Appearance: Normal appearance.   HENT:      Head: Normocephalic and atraumatic.   Eyes:      Extraocular Movements: Extraocular movements intact.      Pupils: Pupils are equal, round, and reactive to light.   Neck:      Vascular: No carotid bruit.      Comments: JVD +  Cardiovascular:      Rate and Rhythm: Tachycardia present. Rhythm irregular.      Pulses: Normal pulses.      Heart sounds: Normal heart sounds. No murmur heard.     No friction rub. No gallop.   Pulmonary:      Effort: Pulmonary effort is normal.      Breath sounds: Wheezing and rales present.   Abdominal:      General: Abdomen is flat. Bowel sounds are normal. There is no distension.      Palpations: Abdomen is soft. There is no mass.      Tenderness: There is no abdominal tenderness.   Musculoskeletal:         General: No swelling. Normal range of motion.      Cervical back: Normal range of motion.   Skin:     General: Skin is warm.      Capillary Refill: Capillary refill takes less than 2 seconds.   Neurological:      General: No focal deficit present.      Mental Status: She is alert and oriented to person, place, and time.            Significant Labs:     Recent Labs   Lab 06/01/24  1039   WBC 8.12   HGB 11.7*   HCT 34.5*          Recent Labs   Lab 06/01/24  1039 06/01/24  2048     --    K 4.1 4.1     --    CO2 27  --    BUN 25  --    CREATININE 1.1  --    CALCIUM 10.0  --    PHOS  --  3.9       Recent Labs   Lab 06/01/24  1039   ALKPHOS 103   BILITOT 0.5   PROT 7.2   ALT 75*   AST 54*     Lab Results   Component Value Date    CHOL 227 (H) 08/18/2023    HDL 66 08/18/2023    LDLCALC 142.8 08/18/2023    TRIG 91 08/18/2023       Recent Labs   Lab 06/01/24  1039  06/01/24  1331   TROPONINI 0.038* 0.052*       Lab Results   Component Value Date    HGBA1C 7.0 (H) 02/26/2024       Lab Results   Component Value Date     (H) 06/01/2024     Significant Imaging:     TTE 06/01/24    Left Ventricle: The left ventricle is normal in size. Normal wall thickness. There is concentric remodeling. Normal wall motion. There is mildly reduced systolic function with a visually estimated ejection fraction of 45 - 50%. Ejection fraction by visual approximation is 55%. Unable to assess diastolic function due to atrial fibrillation. Elevated left ventricular filling pressure.    Right Ventricle: Normal right ventricular cavity size. There is hypertrophy. Right ventricle wall motion  is normal. Systolic function is mildly reduced.    Left Atrium: Left atrium is moderately dilated.    Right Atrium: Right atrium is moderately dilated.    Aortic Valve: There is moderate aortic valve sclerosis. There is moderate annular calcification present. Moderately restricted motion. There is mild stenosis. Aortic valve area by VTI is 1.30 cm². Aortic valve peak velocity is 1.6 m/s. Mean gradient is 6 mmHg. The dimensionless index is 0.46. There is no significant regurgitation.    Mitral Valve: There is moderate posterior mitral annular calcification present. Mildly restricted motion. There is mild stenosis. The mean pressure gradient across the mitral valve is 4 mmHg at a heart rate of 101 bpm. There is mild to moderate regurgitation with a centrally directed jet.    Tricuspid Valve: The tricuspid valve is structurally normal. There is mild to moderate regurgitation.    Pulmonary Artery: There is mild pulmonary hypertension. The estimated pulmonary artery systolic pressure is 53 mmHg.    IVC/SVC: Elevated venous pressure at 15 mmHg.    Pericardium: There is no pericardial effusion.    Significant beat to beat variability in EF with irregular rhythm.    Assessment and Plan:     Brief HPI: 89yo F patient  with HTN, HLD, mild AS/MS, T2DM, diverticulosis, CKD3, and sciatica who was admitted to [facility] on 06/01/2024 with CC of shortness of breath after waking up, diagnosed with Afib RVR and new onset HFmrEF.     * New onset atrial fibrillation  #Atrial fibrillation - New diagnosis - Paroxysmal - Non-valvular  - EKG since 2002 with NSR  - Risk factors: HTN, Valvular heart disease (mild MS),   - Cause: Could be acute (ie HF, ischemia/MI, acute disease or hypoxia) vs. Chronic (Older age, HTN, valve disease, CMP)  - Anticoagulation - [id number] 4 (Age, Sex, HTN, DM)  - TTE with mr EF, CCB contraindicated  - Increased dose of metoprolol 25mg and patient still tachycardic, will increase to 50mg q6h and assess BP and rate control  - If patient continues on Afib tomorrow am, will perform SUSY/cardioversion    Recommendations:  - Keep K>4 and Mg>2  - Rate control: Goal HR<110: Continue metoprolol tartrate and increase dosage to 50mg q6h  - Rhythm control: If stays in Afib will perform SUSY/DCCV - Patient and family agree with procedure  - Anticoagulation: Continue heparin gtt, and once stable can change to DOACs  - Risk factor reduction - Increase physical activity, control HTN and DM    Heart failure with mildly reduced ejection fraction (HFmrEF)  #HFmrEF 45-50%, NYHA I before today, ACC/AHA class C:  - Decompensation factor: Atrial fibrillation  - Hemodynamic profile: Wet and perfused  - Devices: None  - TTE showed EF 45-50%, biatrial enlargement, mild AS/MS, PASP 53mmHg, elevated venous pressure 15mmHg    - Volume status optimization - Continue lasix as needed, main goal is rate/rhythm control  - PET scan for ischemic evaluation as outpatient  - Daily monitoring: Standing weights, Strict I/O, Fluid restriction (<2L/day), Na restriction (<2g/day), Keep K>4 and Mg>2  - Management of comorbidities: Afib rate control for now    Elevated troponin I level  - Prior stress imaging normal  - No ischemic symptoms, however shortness of  breath and new onset HF could be a manifestation of CAD (although Afib explains problem too)  - In the setting of tachyarrhythmia  - Trop 0.038-0.052    - No indication of ACS protocol now   - Rhythm control as per Afib  - If new symptoms or concern for ACS, could start ACS protocol        VTE Risk Mitigation (From admission, onward)           Ordered     apixaban tablet 5 mg  2 times daily         06/02/24 0718     IP VTE HIGH RISK PATIENT  Once         06/01/24 1241     Place sequential compression device  Until discontinued         06/01/24 1241                    Demar Fuentes MD  Cardiology  [facility] - Emergency Dept

## 2024-06-02 NOTE — SUBJECTIVE & OBJECTIVE
"Interval History: //94 with a HR . On metoprolol tartrate 25mg bid (2036 and 2248) and received a dose of 5mg IV at 2147h. BP remained SBP >129 and a . Also on heparin gtt for AC. She received furosemide 40mg 1159 and 2036h and last 24h In "not documented" and Out 500cc.      Review of Systems   Constitutional: Negative.   HENT: Negative.     Cardiovascular:  Negative for chest pain, claudication, cyanosis, dyspnea on exertion, irregular heartbeat, leg swelling, near-syncope, orthopnea, palpitations and paroxysmal nocturnal dyspnea.   Respiratory: Negative.     Endocrine: Negative.    Musculoskeletal: Negative.    Gastrointestinal: Negative.    Genitourinary: Negative.    Neurological: Negative.      Objective:     Vital Signs (Most Recent):  Temp: 97.6 °F (36.4 °C) (06/02/24 0050)  Pulse: (!) 124 (06/02/24 0119)  Resp: 18 (06/01/24 2223)  BP: 129/89 (06/02/24 0050)  SpO2: 97 % (06/02/24 0050) Vital Signs (24h Range):  Temp:  [97.6 °F (36.4 °C)-98.2 °F (36.8 °C)] 97.6 °F (36.4 °C)  Pulse:  [] 124  Resp:  [18-28] 18  SpO2:  [88 %-100 %] 97 %  BP: (115-166)/() 129/89     Weight: 88.9 kg (196 lb)  Body mass index is 31.64 kg/m².     SpO2: 97 %         Intake/Output Summary (Last 24 hours) at 6/2/2024 0501  Last data filed at 6/1/2024 1010  Gross per 24 hour   Intake --   Output 500 ml   Net -500 ml       Lines/Drains/Airways       Drain  Duration             Female External Urinary Catheter w/ Suction 06/01/24 1203 <1 day              Peripheral Intravenous Line  Duration                  Peripheral IV - Single Lumen 06/01/24 1025 18 G Right Antecubital <1 day         Peripheral IV - Single Lumen 06/01/24 1628 20 G Left Antecubital <1 day                       Physical Exam  Vitals and nursing note reviewed.   Constitutional:       Appearance: Normal appearance.   HENT:      Head: Normocephalic and atraumatic.   Eyes:      Extraocular Movements: Extraocular movements intact.      " Pupils: Pupils are equal, round, and reactive to light.   Neck:      Vascular: No carotid bruit.      Comments: JVD +  Cardiovascular:      Rate and Rhythm: Tachycardia present. Rhythm irregular.      Pulses: Normal pulses.      Heart sounds: Normal heart sounds. No murmur heard.     No friction rub. No gallop.   Pulmonary:      Effort: Pulmonary effort is normal.      Breath sounds: Wheezing and rales present.   Abdominal:      General: Abdomen is flat. Bowel sounds are normal. There is no distension.      Palpations: Abdomen is soft. There is no mass.      Tenderness: There is no abdominal tenderness.   Musculoskeletal:         General: No swelling. Normal range of motion.      Cervical back: Normal range of motion.   Skin:     General: Skin is warm.      Capillary Refill: Capillary refill takes less than 2 seconds.   Neurological:      General: No focal deficit present.      Mental Status: She is alert and oriented to person, place, and time.            Significant Labs:     Recent Labs   Lab 06/01/24  1039   WBC 8.12   HGB 11.7*   HCT 34.5*          Recent Labs   Lab 06/01/24  1039 06/01/24  2048     --    K 4.1 4.1     --    CO2 27  --    BUN 25  --    CREATININE 1.1  --    CALCIUM 10.0  --    PHOS  --  3.9       Recent Labs   Lab 06/01/24  1039   ALKPHOS 103   BILITOT 0.5   PROT 7.2   ALT 75*   AST 54*     Lab Results   Component Value Date    CHOL 227 (H) 08/18/2023    HDL 66 08/18/2023    LDLCALC 142.8 08/18/2023    TRIG 91 08/18/2023       Recent Labs   Lab 06/01/24  1039 06/01/24  1331   TROPONINI 0.038* 0.052*       Lab Results   Component Value Date    HGBA1C 7.0 (H) 02/26/2024       Lab Results   Component Value Date     (H) 06/01/2024     Significant Imaging:     TTE 06/01/24    Left Ventricle: The left ventricle is normal in size. Normal wall thickness. There is concentric remodeling. Normal wall motion. There is mildly reduced systolic function with a visually estimated  ejection fraction of 45 - 50%. Ejection fraction by visual approximation is 55%. Unable to assess diastolic function due to atrial fibrillation. Elevated left ventricular filling pressure.    Right Ventricle: Normal right ventricular cavity size. There is hypertrophy. Right ventricle wall motion  is normal. Systolic function is mildly reduced.    Left Atrium: Left atrium is moderately dilated.    Right Atrium: Right atrium is moderately dilated.    Aortic Valve: There is moderate aortic valve sclerosis. There is moderate annular calcification present. Moderately restricted motion. There is mild stenosis. Aortic valve area by VTI is 1.30 cm². Aortic valve peak velocity is 1.6 m/s. Mean gradient is 6 mmHg. The dimensionless index is 0.46. There is no significant regurgitation.    Mitral Valve: There is moderate posterior mitral annular calcification present. Mildly restricted motion. There is mild stenosis. The mean pressure gradient across the mitral valve is 4 mmHg at a heart rate of 101 bpm. There is mild to moderate regurgitation with a centrally directed jet.    Tricuspid Valve: The tricuspid valve is structurally normal. There is mild to moderate regurgitation.    Pulmonary Artery: There is mild pulmonary hypertension. The estimated pulmonary artery systolic pressure is 53 mmHg.    IVC/SVC: Elevated venous pressure at 15 mmHg.    Pericardium: There is no pericardial effusion.    Significant beat to beat variability in EF with irregular rhythm.

## 2024-06-02 NOTE — SUBJECTIVE & OBJECTIVE
Interval History: ***    Review of Systems   Constitutional: Negative.   HENT: Negative.     Cardiovascular:  Negative for chest pain, claudication, cyanosis, dyspnea on exertion, irregular heartbeat, leg swelling, near-syncope, orthopnea, palpitations, paroxysmal nocturnal dyspnea and syncope.   Respiratory: Negative.     Endocrine: Negative.    Musculoskeletal: Negative.    Gastrointestinal: Negative.    Genitourinary: Negative.    Neurological: Negative.      Objective:     Vital Signs (Most Recent):  Temp: 98.1 °F (36.7 °C) (06/01/24 2009)  Pulse: (!) 138 (06/01/24 2009)  Resp: 20 (06/01/24 1641)  BP: (!) 161/105 (06/01/24 2009)  SpO2: 99 % (06/01/24 2015) Vital Signs (24h Range):  Temp:  [97.7 °F (36.5 °C)-98.2 °F (36.8 °C)] 98.1 °F (36.7 °C)  Pulse:  [] 138  Resp:  [20-28] 20  SpO2:  [88 %-100 %] 99 %  BP: (115-166)/() 161/105     Weight: 88.9 kg (196 lb)  Body mass index is 31.64 kg/m².     SpO2: 99 %        Physical Exam  Vitals and nursing note reviewed.   Constitutional:       Appearance: Normal appearance.   HENT:      Head: Normocephalic and atraumatic.   Eyes:      Extraocular Movements: Extraocular movements intact.      Pupils: Pupils are equal, round, and reactive to light.   Neck:      Vascular: No carotid bruit.   Cardiovascular:      Rate and Rhythm: Normal rate and regular rhythm.      Pulses: Normal pulses.      Heart sounds: Normal heart sounds. No murmur heard.     No friction rub. No gallop.   Pulmonary:      Effort: Pulmonary effort is normal.      Breath sounds: Normal breath sounds.   Abdominal:      General: Abdomen is flat. Bowel sounds are normal. There is no distension.      Palpations: Abdomen is soft. There is no mass.      Tenderness: There is no abdominal tenderness.   Musculoskeletal:         General: No swelling. Normal range of motion.      Cervical back: Normal range of motion.   Skin:     General: Skin is warm.      Capillary Refill: Capillary refill takes less  than 2 seconds.   Neurological:      General: No focal deficit present.      Mental Status: She is alert and oriented to person, place, and time.            Significant Labs:     Recent Labs   Lab 06/01/24  1039   WBC 8.12   HGB 11.7*   HCT 34.5*          Recent Labs   Lab 06/01/24  1039 06/01/24  2048     --    K 4.1 4.1     --    CO2 27  --    BUN 25  --    CREATININE 1.1  --    CALCIUM 10.0  --    PHOS  --  3.9       Recent Labs   Lab 06/01/24  1039   ALKPHOS 103   BILITOT 0.5   PROT 7.2   ALT 75*   AST 54*     Lab Results   Component Value Date    CHOL 227 (H) 08/18/2023    HDL 66 08/18/2023    LDLCALC 142.8 08/18/2023    TRIG 91 08/18/2023       Recent Labs   Lab 06/01/24  1039 06/01/24  1331   TROPONINI 0.038* 0.052*       Lab Results   Component Value Date    HGBA1C 7.0 (H) 02/26/2024       Lab Results   Component Value Date     (H) 06/01/2024     Significant Imaging:     TTE 06/01/2024    Left Ventricle: The left ventricle is normal in size. Normal wall thickness. There is concentric remodeling. Normal wall motion. There is mildly reduced systolic function with a visually estimated ejection fraction of 45 - 50%. Ejection fraction by visual approximation is 55%. Unable to assess diastolic function due to atrial fibrillation. Elevated left ventricular filling pressure.    Right Ventricle: Normal right ventricular cavity size. There is hypertrophy. Right ventricle wall motion  is normal. Systolic function is mildly reduced.    Left Atrium: Left atrium is moderately dilated.    Right Atrium: Right atrium is moderately dilated.    Aortic Valve: There is moderate aortic valve sclerosis. There is moderate annular calcification present. Moderately restricted motion. There is mild stenosis. Aortic valve area by VTI is 1.30 cm². Aortic valve peak velocity is 1.6 m/s. Mean gradient is 6 mmHg. The dimensionless index is 0.46. There is no significant regurgitation.    Mitral Valve: There is  moderate posterior mitral annular calcification present. Mildly restricted motion. There is mild stenosis. The mean pressure gradient across the mitral valve is 4 mmHg at a heart rate of 101 bpm. There is mild to moderate regurgitation with a centrally directed jet.    Tricuspid Valve: The tricuspid valve is structurally normal. There is mild to moderate regurgitation.    Pulmonary Artery: There is mild pulmonary hypertension. The estimated pulmonary artery systolic pressure is 53 mmHg.    IVC/SVC: Elevated venous pressure at 15 mmHg.    Pericardium: There is no pericardial effusion.    Significant beat to beat variability in EF with irregular rhythm.

## 2024-06-02 NOTE — ASSESSMENT & PLAN NOTE
Patient with Paroxysmal (<7 days) atrial fibrillation which is uncontrolled currently with Beta Blocker. Patient is currently in atrial fibrillation.[id number] Score: 4. HASBLED Score: . Anticoagulation indicated. Anticoagulation done with heparin ggt .    -presenting with acute onset dyspnea, found to have new onset atrial fibrillation with RVR with no significant cardiac history  -not responsive to multiple IVP Lopressor, initially responsive to diltiazem  Plan:  -case discussed with Cardiology, discussing for DCCV when HR more controlled  -increase Lopressor to 25 mg q6h, p.r.n. ordered.  Would avoid Cardizem given reduced EF  -switch to DOAC  -monitor on telemetry

## 2024-06-02 NOTE — SUBJECTIVE & OBJECTIVE
Interval History: Seen this AM at bedside.  Continues to be tachycardic, denies any chest pain, palpitations.  Does endorse more dyspnea this morning, improved with IV Lasix.    Review of Systems  Objective:     Vital Signs (Most Recent):  Temp: 97.8 °F (36.6 °C) (06/02/24 1121)  Pulse: (!) 132 (06/02/24 1140)  Resp: 20 (06/02/24 1121)  BP: 115/71 (06/02/24 1140)  SpO2: 98 % (06/02/24 1121) Vital Signs (24h Range):  Temp:  [97.6 °F (36.4 °C)-98.2 °F (36.8 °C)] 97.8 °F (36.6 °C)  Pulse:  [] 132  Resp:  [18-22] 20  SpO2:  [88 %-99 %] 98 %  BP: (115-161)/() 115/71     Weight: 88.9 kg (196 lb)  Body mass index is 31.64 kg/m².  No intake or output data in the 24 hours ending 06/02/24 1217      Physical Exam  Constitutional:       General: She is not in acute distress.     Appearance: Normal appearance. She is not ill-appearing or diaphoretic.   HENT:      Head: Normocephalic.      Mouth/Throat:      Mouth: Mucous membranes are moist.   Eyes:      Extraocular Movements: Extraocular movements intact.      Conjunctiva/sclera: Conjunctivae normal.      Pupils: Pupils are equal, round, and reactive to light.   Cardiovascular:      Rate and Rhythm: Tachycardia present. Rhythm irregular.      Pulses: Normal pulses.      Heart sounds: No murmur heard.     No gallop.   Pulmonary:      Effort: Pulmonary effort is normal. No respiratory distress.      Breath sounds: No wheezing or rales.   Abdominal:      General: Abdomen is flat. Bowel sounds are normal. There is no distension.      Palpations: Abdomen is soft.      Tenderness: There is no abdominal tenderness.   Musculoskeletal:         General: No swelling. Normal range of motion.      Cervical back: Normal range of motion.      Right lower leg: No edema.      Left lower leg: No edema.   Skin:     General: Skin is warm.      Capillary Refill: Capillary refill takes less than 2 seconds.   Neurological:      General: No focal deficit present.   Psychiatric:          Mood and Affect: Mood normal.         Behavior: Behavior normal.         Thought Content: Thought content normal.         Judgment: Judgment normal.             Significant Labs: All pertinent labs within the past 24 hours have been reviewed.    Significant Imaging: I have reviewed all pertinent imaging results/findings within the past 24 hours.

## 2024-06-02 NOTE — ED TRIAGE NOTES
Report received from NAYELI Bustos. Assumed care of pt.   89 yo W with pmhx HTN, HLD, dm, obesity, hypothyroidism, diverticulosis, CKD 3, sciatica, abdominal aortic atherosclerosis presents with a chief complaint of shortness of breath. Patient has been suffering from right lower back pain for the past few weeks. She has had previous ER visits and saw pain management for the symptoms. She noted associated shortness of breath at that time but it has worsened of late. Shortness of breath occurs with exertion. However, she woke up today with more severe shortness of breath. She was found to be in AFib here. Denies any history of AFib. Denies any chest pain. Rare cough that worsens back pain but not with shortness of breath. No fever. History also assisted by patient's daughter.

## 2024-06-02 NOTE — ASSESSMENT & PLAN NOTE
-EKG negative for ischemic changes, no acute chest pain  -Unlikely ACS and more likely demand ischemia in the setting of RVR, troponins now downtrending

## 2024-06-03 ENCOUNTER — TELEPHONE (OUTPATIENT)
Dept: FAMILY MEDICINE | Facility: CLINIC | Age: 89
End: 2024-06-03
Payer: MEDICARE

## 2024-06-03 VITALS
DIASTOLIC BLOOD PRESSURE: 67 MMHG | HEIGHT: 66 IN | HEART RATE: 77 BPM | RESPIRATION RATE: 18 BRPM | WEIGHT: 193.31 LBS | OXYGEN SATURATION: 94 % | BODY MASS INDEX: 31.07 KG/M2 | TEMPERATURE: 98 F | SYSTOLIC BLOOD PRESSURE: 154 MMHG

## 2024-06-03 PROBLEM — R79.89 ELEVATED TROPONIN I LEVEL: Status: RESOLVED | Noted: 2024-06-01 | Resolved: 2024-06-03

## 2024-06-03 PROBLEM — I50.9 ACUTE EXACERBATION OF CHF (CONGESTIVE HEART FAILURE): Status: RESOLVED | Noted: 2024-06-01 | Resolved: 2024-06-03

## 2024-06-03 LAB
ANION GAP SERPL CALC-SCNC: 9 MMOL/L (ref 8–16)
BASOPHILS # BLD AUTO: 0.02 K/UL (ref 0–0.2)
BASOPHILS NFR BLD: 0.2 % (ref 0–1.9)
BUN SERPL-MCNC: 27 MG/DL (ref 10–30)
CALCIUM SERPL-MCNC: 9.3 MG/DL (ref 8.7–10.5)
CHLORIDE SERPL-SCNC: 102 MMOL/L (ref 95–110)
CO2 SERPL-SCNC: 29 MMOL/L (ref 23–29)
CREAT SERPL-MCNC: 1 MG/DL (ref 0.5–1.4)
DIFFERENTIAL METHOD BLD: ABNORMAL
EOSINOPHIL # BLD AUTO: 0.2 K/UL (ref 0–0.5)
EOSINOPHIL NFR BLD: 1.7 % (ref 0–8)
ERYTHROCYTE [DISTWIDTH] IN BLOOD BY AUTOMATED COUNT: 13.7 % (ref 11.5–14.5)
EST. GFR  (NO RACE VARIABLE): 52.9 ML/MIN/1.73 M^2
GLUCOSE SERPL-MCNC: 134 MG/DL (ref 70–110)
HCT VFR BLD AUTO: 33.4 % (ref 37–48.5)
HGB BLD-MCNC: 11.5 G/DL (ref 12–16)
IMM GRANULOCYTES # BLD AUTO: 0.03 K/UL (ref 0–0.04)
IMM GRANULOCYTES NFR BLD AUTO: 0.3 % (ref 0–0.5)
LYMPHOCYTES # BLD AUTO: 2.9 K/UL (ref 1–4.8)
LYMPHOCYTES NFR BLD: 28.1 % (ref 18–48)
MAGNESIUM SERPL-MCNC: 1.9 MG/DL (ref 1.6–2.6)
MCH RBC QN AUTO: 29.5 PG (ref 27–31)
MCHC RBC AUTO-ENTMCNC: 34.4 G/DL (ref 32–36)
MCV RBC AUTO: 86 FL (ref 82–98)
MONOCYTES # BLD AUTO: 1.1 K/UL (ref 0.3–1)
MONOCYTES NFR BLD: 10.4 % (ref 4–15)
NEUTROPHILS # BLD AUTO: 6.1 K/UL (ref 1.8–7.7)
NEUTROPHILS NFR BLD: 59.3 % (ref 38–73)
NRBC BLD-RTO: 0 /100 WBC
PHOSPHATE SERPL-MCNC: 3.2 MG/DL (ref 2.7–4.5)
PLATELET # BLD AUTO: 202 K/UL (ref 150–450)
PMV BLD AUTO: 11.8 FL (ref 9.2–12.9)
POCT GLUCOSE: 122 MG/DL (ref 70–110)
POCT GLUCOSE: 151 MG/DL (ref 70–110)
POCT GLUCOSE: 183 MG/DL (ref 70–110)
POTASSIUM SERPL-SCNC: 3.6 MMOL/L (ref 3.5–5.1)
RBC # BLD AUTO: 3.9 M/UL (ref 4–5.4)
SODIUM SERPL-SCNC: 140 MMOL/L (ref 136–145)
WBC # BLD AUTO: 10.23 K/UL (ref 3.9–12.7)

## 2024-06-03 PROCEDURE — 99900035 HC TECH TIME PER 15 MIN (STAT)

## 2024-06-03 PROCEDURE — 36415 COLL VENOUS BLD VENIPUNCTURE: CPT | Performed by: STUDENT IN AN ORGANIZED HEALTH CARE EDUCATION/TRAINING PROGRAM

## 2024-06-03 PROCEDURE — 80048 BASIC METABOLIC PNL TOTAL CA: CPT | Performed by: STUDENT IN AN ORGANIZED HEALTH CARE EDUCATION/TRAINING PROGRAM

## 2024-06-03 PROCEDURE — 25000242 PHARM REV CODE 250 ALT 637 W/ HCPCS: Performed by: STUDENT IN AN ORGANIZED HEALTH CARE EDUCATION/TRAINING PROGRAM

## 2024-06-03 PROCEDURE — 85025 COMPLETE CBC W/AUTO DIFF WBC: CPT | Performed by: STUDENT IN AN ORGANIZED HEALTH CARE EDUCATION/TRAINING PROGRAM

## 2024-06-03 PROCEDURE — 83735 ASSAY OF MAGNESIUM: CPT | Performed by: STUDENT IN AN ORGANIZED HEALTH CARE EDUCATION/TRAINING PROGRAM

## 2024-06-03 PROCEDURE — 27000221 HC OXYGEN, UP TO 24 HOURS

## 2024-06-03 PROCEDURE — 94640 AIRWAY INHALATION TREATMENT: CPT

## 2024-06-03 PROCEDURE — 94761 N-INVAS EAR/PLS OXIMETRY MLT: CPT

## 2024-06-03 PROCEDURE — 25000003 PHARM REV CODE 250: Performed by: STUDENT IN AN ORGANIZED HEALTH CARE EDUCATION/TRAINING PROGRAM

## 2024-06-03 PROCEDURE — 63600175 PHARM REV CODE 636 W HCPCS: Performed by: STUDENT IN AN ORGANIZED HEALTH CARE EDUCATION/TRAINING PROGRAM

## 2024-06-03 PROCEDURE — 99232 SBSQ HOSP IP/OBS MODERATE 35: CPT | Mod: GC,,, | Performed by: INTERNAL MEDICINE

## 2024-06-03 PROCEDURE — 84100 ASSAY OF PHOSPHORUS: CPT | Performed by: STUDENT IN AN ORGANIZED HEALTH CARE EDUCATION/TRAINING PROGRAM

## 2024-06-03 RX ORDER — FUROSEMIDE 10 MG/ML
20 INJECTION INTRAMUSCULAR; INTRAVENOUS ONCE
Status: COMPLETED | OUTPATIENT
Start: 2024-06-03 | End: 2024-06-03

## 2024-06-03 RX ORDER — METOPROLOL SUCCINATE 200 MG/1
200 TABLET, EXTENDED RELEASE ORAL DAILY
Qty: 60 TABLET | Refills: 1 | Status: SHIPPED | OUTPATIENT
Start: 2024-06-03 | End: 2024-06-11

## 2024-06-03 RX ORDER — METOPROLOL SUCCINATE 200 MG/1
200 TABLET, EXTENDED RELEASE ORAL DAILY
Qty: 60 TABLET | Refills: 1 | Status: SHIPPED | OUTPATIENT
Start: 2024-06-03 | End: 2024-06-03

## 2024-06-03 RX ORDER — TIZANIDINE 2 MG/1
4 TABLET ORAL DAILY PRN
COMMUNITY
End: 2024-06-07 | Stop reason: SDUPTHER

## 2024-06-03 RX ORDER — FUROSEMIDE 20 MG/1
20 TABLET ORAL DAILY
Qty: 30 TABLET | Refills: 1 | Status: SHIPPED | OUTPATIENT
Start: 2024-06-03 | End: 2024-06-11

## 2024-06-03 RX ORDER — FUROSEMIDE 20 MG/1
20 TABLET ORAL DAILY
Qty: 30 TABLET | Refills: 1 | Status: SHIPPED | OUTPATIENT
Start: 2024-06-03 | End: 2024-06-03

## 2024-06-03 RX ADMIN — METOPROLOL TARTRATE 50 MG: 50 TABLET, FILM COATED ORAL at 12:06

## 2024-06-03 RX ADMIN — ASPIRIN 81 MG CHEWABLE TABLET 81 MG: 81 TABLET CHEWABLE at 08:06

## 2024-06-03 RX ADMIN — POLYETHYLENE GLYCOL 3350 17 G: 17 POWDER, FOR SOLUTION ORAL at 08:06

## 2024-06-03 RX ADMIN — METOPROLOL TARTRATE 50 MG: 50 TABLET, FILM COATED ORAL at 06:06

## 2024-06-03 RX ADMIN — IPRATROPIUM BROMIDE AND ALBUTEROL SULFATE 3 ML: 2.5; .5 SOLUTION RESPIRATORY (INHALATION) at 02:06

## 2024-06-03 RX ADMIN — LEVOTHYROXINE SODIUM 88 MCG: 88 TABLET ORAL at 08:06

## 2024-06-03 RX ADMIN — FUROSEMIDE 20 MG: 10 INJECTION, SOLUTION INTRAMUSCULAR; INTRAVENOUS at 09:06

## 2024-06-03 RX ADMIN — APIXABAN 5 MG: 5 TABLET, FILM COATED ORAL at 08:06

## 2024-06-03 NOTE — DISCHARGE SUMMARY
[facility] - Cardiology [facility] Medicine  Discharge Summary      Patient Name: Tonya Cohn  MRN: [id number]  LILIANA: [id number]  Patient Class: IP- Inpatient  Admission Date: 6/1/2024  Hospital Length of Stay: 1 days  Discharge Date and Time:  06/03/2024 2:22 PM  Attending Physician: Alberto Staples DO   Discharging Provider: Alberto Staples DO  Primary Care Provider: Tere Hughes MD  [facility] Medicine Team: [facility]OSP MED G Alberto Staples DO  Primary Care Team: [facility] HOSP MED     HPI:   This is a 89-year-old female with a history of HTN, HLD, diverticulosis, CKD 3, sciatica who presents with shortness of breath.  States that shortly after waking up she started feeling progressively dyspneic with no exacerbating factors.  Denies orthopnea.  States that shortness of breath continued until shortly before arriving to the ED.  Also states that she felt her heart racing.  Denies any associated lightheadedness or chest pain.  Denies similar symptoms in the past.  States that she does feel slight chest discomfort but otherwise now feels at baseline.  Also denies any fevers, chills, wheezing, nausea, vomiting, abdominal pain, diarrhea, lower extremity swelling    ED course:  Upon arrival HR was in the 140s.  EKG significant for atrial fibrillation with RVR, new onset.  Status post multiple doses IV Lopressor with no improvement.  HR initially improved with IV diltiazem, now tachycardic once again.  CXR with pulmonary edema with .  Troponin 0.038.  Admitted for new onset atrial fibrillation with RVR        Procedure(s) (LRB):  Cardioversion or Defibrillation (N/A)  ECHOCARDIOGRAM, TRANSESOPHAGEAL (N/A)      Hospital Course:   Admitted with new onset atrial fibrillation with RVR and CHF.  HR persistently in the 130s on arrival. BNP also elevated with downtrending troponin likely due to demand ischemia.  Cardiology discussing rhythm control strategy, possibly DCCV after HR more controlled.  Started on  beta-blocker with avoidance of Cardizem due to new reduced EF. Volume status being optimized, DOAC started.  DCCV deferred as atrial fibrillation resolved.  Will discharge on DOAC as well as Toprol 200 mg daily as well as Lasix 20 mg daily.  To follow up with Cardiology outpatient where GDMT will be adjusted     Physical Exam  Gen: in NAD, appears stated age  Neuro: AAOx3, motor, sensory, and strength grossly intact BL  HEENT: NTNC, EOMI, PERRL, MMM  CVS: RRR, no m/r/g; S1/S2 auscultated with no S3 or S4; capillary refill < 2 sec  Resp: lungs CTAB, no w/r/r; no belabored breathing or accessory muscle use appreciated   Abd: BS+ in all 4 quadrants; NTND, soft to palpation; no organomegaly appreciated   Extrem: pulses full, equal, and regular over all 4 extremities; no UE or LE edema BL    Goals of Care Treatment Preferences:  Code Status: Full Code    Living Will: Yes              Consults:   Consults (From admission, onward)          Status Ordering Provider     Inpatient consult to Cardiology  Once        Provider:  (Not yet assigned)    Completed YOBANI CABRERA            No new Assessment & Plan notes have been filed under this hospital service since the last note was generated.  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] 06/01/2024 Yes    Class 1 obesity due to excess calories with serious comorbidity and body mass index (BMI) of 31.0 to 31.9 in adult [E66.09, Z68.31]  Not Applicable    Hyperlipidemia LDL goal <100 [E78.5] 09/16/2012 Yes     Chronic    Hypothyroidism (acquired) [E03.9] 09/16/2012 Yes      Problems Resolved During this Admission:    Diagnosis Date Noted Date Resolved POA    Acute exacerbation of CHF (congestive heart failure) [I50.9] 06/01/2024 06/03/2024 Yes    Elevated troponin I level [R79.89] 06/01/2024  06/03/2024 Yes       Discharged Condition: good    Disposition: Home or Self Care    Follow Up:    Patient Instructions:      Ambulatory referral/consult to Cardiology   Standing Status: Future   Referral Priority: Routine Referral Type: Consultation   Referral Reason: Specialty Services Required   Requested Specialty: Cardiology   Number of Visits Requested: 1     ACCEPT - Ambulatory referral/consult to [facility]   Standing Status: Future   Referral Priority: Routine Referral Type: Consultation   Referral Reason: Specialty Services Required   Requested Specialty: Cardiology   Number of Visits Requested: 1     Notify your health care provider if you experience any of the following:     Notify your health care provider if you experience any of the following:  increased confusion or weakness     Notify your health care provider if you experience any of the following:  persistent dizziness, light-headedness, or visual disturbances     Notify your health care provider if you experience any of the following:  worsening rash     Notify your health care provider if you experience any of the following:  severe persistent headache     Notify your health care provider if you experience any of the following:  difficulty breathing or increased cough     Notify your health care provider if you experience any of the following:  redness, tenderness, or signs of infection (pain, swelling, redness, odor or green/yellow discharge around incision site)     Notify your health care provider if you experience any of the following:  severe uncontrolled pain     Notify your health care provider if you experience any of the following:  persistent nausea and vomiting or diarrhea     Notify your health care provider if you experience any of the following:  temperature >100.4       Significant Diagnostic Studies: N/A    Pending Diagnostic Studies:       None           Medications:  Reconciled Home Medications:       Medication List        START taking these medications      apixaban 5 mg Tab  Commonly known as: ELIQUIS  Take 1 tablet (5 mg total) by mouth 2 (two) times daily.     furosemide 20 MG tablet  Commonly known as: LASIX  Take 1 tablet (20 mg total) by mouth once daily.     metoprolol succinate 200 MG 24 hr tablet  Commonly known as: TOPROL-XL  Take 1 tablet (200 mg total) by mouth once daily.            CHANGE how you take these medications      aspirin 81 MG Chew  Take 1 tablet (81 mg total) by mouth once daily.  What changed: when to take this     diphenhydrAMINE 25 mg capsule  Commonly known as: BENADRYL  Take 1 capsule (25 mg total) by mouth every 6 (six) hours as needed for Itching or Allergies.  What changed: when to take this     dorzolamide-timolol 2-0.5% 22.3-6.8 mg/mL ophthalmic solution  Commonly known as: COSOPT  Place 1 drop into the right eye 2 (two) times daily.  What changed: when to take this            CONTINUE taking these medications      acetaminophen 500 MG tablet  Commonly known as: TYLENOL  Take 1 tablet (500 mg total) by mouth every 4 (four) hours as needed for Pain.     blood-glucose meter kit  Commonly known as: FREESTYLE SYSTEM KIT  Use as instructed     calcium carbonate 500 mg calcium (1,250 mg) tablet  Commonly known as: OS-MIRACLE  Take 1 tablet by mouth every other day.     fish oil-fat acid [id number].[id number] 1,200 mg (400 [id number]) Cap  Take 1 capsule by mouth once daily.     hydrocortisone 2.5 % cream  Apply topically 2 (two) times daily.     hydrOXYzine HCL 25 MG tablet  Commonly known as: ATARAX  Take 1 tablet (25 mg total) by mouth every 4 (four) hours as needed for Itching.     levothyroxine 88 MCG tablet  Commonly known as: SYNTHROID  TAKE 1 TABLET(88 MCG) BY MOUTH EVERY DAY     LIDOcaine 5 %  Commonly known as: LIDODERM  Place 1 patch onto the skin once daily. Remove & Discard patch within 12 hours or as directed by MD     losartan 50 MG tablet  Commonly known as: COZAAR  TAKE 1  TABLET(50 MG) BY MOUTH EVERY DAY     lovastatin 10 MG tablet  Commonly known as: MEVACOR  TAKE 1 TABLET(10 MG) BY MOUTH EVERY DAY     mometasone 0.1% 0.1 % cream  Commonly known as: ELOCON  Use daily     ONE-A-DAY WOMENS FORMULA ORAL  Take 1 tablet by mouth once daily.     * triamcinolone acetonide 0.5% 0.5 % Crea  Commonly known as: KENALOG  APPLY EXTERNALLY TO THE AFFECTED AREA TWICE DAILY as needed for flares; do not use for more than 2 weeks in a row without one week break     * triamcinolone acetonide 0.025% 0.025 % cream  Commonly known as: KENALOG  Apply topically 2 (two) times daily.           * This list has 2 medication(s) that are the same as other medications prescribed for you. Read the directions carefully, and ask your doctor or other care provider to review them with you.                  Indwelling Lines/Drains at time of discharge:   Lines/Drains/Airways       None                   Time spent on the discharge of patient: 35 minutes     Pt deemed appropriate for discharge. Plan discussed with pt, who was agreeable and amenable; medications were discussed and reviewed, outpatient follow-up scheduled, ER precautions were given, all questions were answered to the pt's satisfaction, and Ms Cohn was subsequently discharged.      Alberto Staples DO  Department of Hospital Medicine  [facility] - Cardiology Stepdown

## 2024-06-03 NOTE — ASSESSMENT & PLAN NOTE
#HFmrEF 45-50%, NYHA I before today, ACC/AHA class C:  - Decompensation factor: Atrial fibrillation  - Hemodynamic profile: Wet and perfused  - Devices: None  - TTE showed EF 45-50%, biatrial enlargement, mild AS/MS, PASP 53mmHg, elevated venous pressure 15mmHg    - Volume status optimization - Would discharge with lasix 20mg po QD, main goal is rate/rhythm control  - PET scan for ischemic evaluation as outpatient  - Daily monitoring: Standing weights, Strict I/O, Fluid restriction (<2L/day), Na restriction (<2g/day), Keep K>4 and Mg>2  - Management of comorbidities: Afib rate control for now

## 2024-06-03 NOTE — PLAN OF CARE
[facility] - Cardiology Stepdown  Initial Discharge Assessment       Primary Care Provider: Tere Hughes MD    Admission Diagnosis: Shortness of breath [R06.02]  New onset a-fib [I48.91]  Troponin level elevated [R79.89]  Elevated brain natriuretic peptide (BNP) level [R79.89]  Chest pain [R07.9]  Atrial fibrillation with RVR [I48.91]  Paroxysmal atrial fibrillation with RVR [I48.0]    Admission Date: 6/1/2024  Expected Discharge Date: 6/3/2024    Transition of Care Barriers: None    Payor: [employer] [facility] [place] MCARE / Plan: [employer] MEDICARE ADVANTAGE / Product Type: Medicare Advantage /     Extended Emergency Contact Information  Primary Emergency Contact: Angeles Cohn  Address: [address] [address]           [place] [address] [facility]  Home Phone: [phone]  Mobile Phone: [phone]  Relation: Daughter  Secondary Emergency Contact: Pily Garcia  Address: [address] [place] [address] [facility]  Home Phone: [phone]  Mobile Phone: [phone]  Relation: Daughter    Discharge Plan A: Home  Discharge Plan B: Home with family      [id number] [address] AT [facility] [address]  [address]  [place] [address]  Phone: [phone] Fax: [phone]    [facility] [place] - [address]  [address]  [place] [address]  Phone: [phone] Fax: [phone]      Initial Assessment (most recent)       Adult Discharge Assessment - 06/03/24 1455          Discharge Assessment    Assessment Type Discharge Planning Assessment     Confirmed/corrected address, phone number and insurance Yes     Confirmed Demographics Correct on Facesheet     Source of Information patient;family     Communicated BARBARA with patient/caregiver Yes     Reason For Admission New onset A-fib     People in Home alone   daughter lives near by.    Facility Arrived From: home     Do you expect to return to your  current living situation? Yes     Do you have help at home or someone to help you manage your care at home? Yes     Who are your caregiver(s) and their phone number(s)? Angeles Cohn (daughter) [phone] checks on her frequently. Has good family support.     Prior to hospitilization cognitive status: Alert/Oriented     Current cognitive status: Alert/Oriented     Walking or Climbing Stairs Difficulty yes     Walking or Climbing Stairs ambulation difficulty, requires equipment     Mobility Management uses a straight cane     Dressing/Bathing Difficulty no     Equipment Currently Used at Home cane, straight     Readmission within 30 days? No     Patient currently being followed by outpatient case management? No     Do you currently have service(s) that help you manage your care at home? No     Do you take prescription medications? Yes     Do you have prescription coverage? Yes     Coverage [facility]     Do you have any problems affording any of your prescribed medications? No     Is the patient taking medications as prescribed? yes     Who is going to help you get home at discharge? Angeles Cohn (sasha) [phone]     How do you get to doctors appointments? family or friend will provide;car, drives self     Are you on dialysis? No     Do you take coumadin? No     Discharge Plan A Home     Discharge Plan B Home with family     DME Needed Upon Discharge  none     Discharge Plan discussed with: Patient;Adult children   Daughter Angeles and son Kristian at the bedside.    Transition of Care Barriers None                      CM met with the patient ,Angeles Cohn (sasha) [phone] and her son Kristian at the bedside and discussed the discharge plan. Gave her the discharge booklet and placed contact numbers on the white board in the room. Patient alert and sitting up in bed.  Patient verified her name , , PCP, Insurance and Pharmacy . Stated she lives alone (but, Angeles Cohn (daughter) [phone] is not far from  her) in a single story house and has 1 steps to point of entry . She is not on coumadin ( Eliquis) nor is she a dialysis patient ,nor has she has HH in last 6 months. DME's include:a straight cane.  Stated she takes  medication as prescribed and has no problems getting  medication.  Patient interested in bedside delivery.    Discharge Plan A and Plan B have been determined by review of patient's clinical status, future medical and therapeutic needs, and coverage/benefits for post-acute care in coordination with multidisciplinary team members.    Maksim Mcdermott RN         [phone]

## 2024-06-03 NOTE — ASSESSMENT & PLAN NOTE
#Atrial fibrillation - New diagnosis - Paroxysmal - Non-valvular  - EKG since 2002 with NSR  - Risk factors: HTN, Valvular heart disease (mild MS),   - Cause: Could be acute (ie HF, ischemia/MI, acute disease or hypoxia) vs. Chronic (Older age, HTN, valve disease, CMP)  - Anticoagulation - [id number] 4 (Age, Sex, HTN, DM)  - TTE with mr EF, CCB contraindicated  - Increased dose of metoprolol to 50mg q6h and patient has remained in normotensive in NSR    Recommendations:  - Keep K>4 and Mg>2  - Rate control: Goal HR<110: Transition from metoprolol tartrate to metoprolol succinate 200mg QD  - Rhythm control: Spontaneously cardioverted; now in NSR  - Anticoagulation: Continue heparin gtt while inpatient, plant to transition to oral AC on discharge  - Risk factor reduction - Increase physical activity, control HTN and DM  - Cardiology to sign off, please reach out if clinical picture changes

## 2024-06-03 NOTE — PLAN OF CARE
06/03/24 1502   Final Note   Assessment Type Final Discharge Note   Anticipated Discharge Disposition Home   What phone number can be called within the next 1-3 days to see how you are doing after discharge? [phone]   Hospital Resources/Appts/Education Provided Provided patient/caregiver with written discharge plan information;Provided education on problems/symptoms using teachback;Appointments scheduled and added to AVS   Post-Acute Status   Discharge Delays None known at this time     Patient discharging home / self care. CHW set up follow up appointment with general cardiology and left a message with her PCP.     Maksim Mcdermott RN    [phone]    Future Appointments   Date Time Provider Department Center   6/20/2024  2:00 PM [facility] [place]   7/12/2024 10:00 AM Rosa Maria Jacome FNP [facility]   7/17/2024  2:30 PM [facility] MAMMO [place]   8/20/2024  1:30 PM Mati Mata MD [facility]   12/18/2024  2:00 PM Tere Hughes MD [facility] [place]

## 2024-06-03 NOTE — CARE UPDATE
I have reviewed the chart of Tonya Cohn who is hospitalized for the following:    Active Hospital Problems    Diagnosis    *New onset atrial fibrillation    Elevated troponin I level    Acute exacerbation of CHF (congestive heart failure)    Heart failure with mildly reduced ejection fraction (HFmrEF)    Class 1 obesity due to excess calories with serious comorbidity and body mass index (BMI) of 31.0 to 31.9 in adult    Hyperlipidemia LDL goal <100    Hypothyroidism (acquired)        Donita Boyce NP  Unit Based [facility]

## 2024-06-03 NOTE — ASSESSMENT & PLAN NOTE
#Atrial fibrillation - New diagnosis - Paroxysmal - Non-valvular  - EKG since 2002 with NSR  - Risk factors: HTN, Valvular heart disease (mild MS),   - Cause: Could be acute (ie HF, ischemia/MI, acute disease or hypoxia) vs. Chronic (Older age, HTN, valve disease, CMP)  - Anticoagulation - [id number] 4 (Age, Sex, HTN, DM)  - TTE with mr EF, CCB contraindicated  - Increased dose of metoprolol to 50mg q6h and patient has remained in normotensive in NSR    Recommendations:  - Keep K>4 and Mg>2  - Rate control: Goal HR<110: Transition from metoprolol tartrate to metoprolol succinate 200mg QD  - Rhythm control: Spontaneously cardioverted; now in NSR  - Anticoagulation: Continue heparin gtt while inpatient, plant to transition to oral AC on discharge  - Risk factor reduction - Increase physical activity, control HTN and DM

## 2024-06-03 NOTE — PLAN OF CARE
CHW met with patient/family at bedside. Patient experience rounding completed and reviewed the following.     Do you know your discharge plan? Yes or No,    If yes, what is the plan? (Home, Home Health, Rehab, SNF, LTAC, or Other) Yes Home    Have you discussed your needs and preferences with your SW/CM? Yes or No  Yes Home    If you are discharging home, do you have help at home? Yes or No Yes (family)    Do you think you will need help additional at home at discharge? Yes or No  No    Do you currently have difficulty keeping up with bills, affording medicine or buying food? Yes or No No    Assigned SW/CM notified of any patient/family needs or concerns. Appropriate resources provided to address patient's needs.  Maksim EVANS      APPOINTMENTS:      Gen Cardiology  Previous cardiologist booked until July 18th  Previous location booked until July 8th ([facility])  [facility] booked until July 8th  Gen cards appointment scheduled 06/20 at 2:00pm. Patient placed on waitlist for sooner appointment.        Primary Care   Message left for PCP team to schedule patient's appointment within a week. Team will reach out to patient when the appointment is scheduled        Nisha Wisdom W  Case Management  [id number]

## 2024-06-03 NOTE — PROGRESS NOTES
[facility] - [facility] Stepdown  Cardiology  Progress Note    Patient Name: Tonya Cohn  MRN: [id number]  Admission Date: 6/1/2024  Hospital Length of Stay: 1 days  Code Status: Full Code   Attending Physician: No att. providers found   Primary Care Physician: Tere Hughes MD  Expected Discharge Date: 6/3/2024  Principal Problem:New onset atrial fibrillation    Subjective:     Hospital Course:   No notes on file    Interval History: //96 with a HR . Continues on metoprolol tartrate 50mg q6h. Patient spontaneously cardioverted to sinus rhythm yesterday afternoon and remains asymptomatic. Remains in NSR on telemetry this morning. Continues on heparin gtt for AC. She received furosemide 40mg IV in the last 24h In 222cc and Out 600cc.      Review of Systems   Constitutional: Negative.   HENT: Negative.     Cardiovascular:  Negative for chest pain, claudication, cyanosis, dyspnea on exertion, irregular heartbeat, leg swelling, near-syncope, orthopnea, palpitations and paroxysmal nocturnal dyspnea.   Respiratory: Negative.     Endocrine: Negative.    Musculoskeletal: Negative.    Gastrointestinal: Negative.    Genitourinary: Negative.    Neurological: Negative.      Objective:     Vital Signs (Most Recent):  Temp: 97.6 °F (36.4 °C) (06/03/24 1635)  Pulse: 77 (06/03/24 1635)  Resp: 18 (06/03/24 1635)  BP: (!) 154/67 (06/03/24 1635)  SpO2: (!) 94 % (06/03/24 1635) Vital Signs (24h Range):  Temp:  [97.4 °F (36.3 °C)-97.9 °F (36.6 °C)] 97.6 °F (36.4 °C)  Pulse:  [] 77  Resp:  [16-20] 18  SpO2:  [94 %-99 %] 94 %  BP: (126-154)/(59-96) 154/67     Weight: 87.7 kg (193 lb 5.5 oz)  Body mass index is 31.21 kg/m².     SpO2: (!) 94 %         Intake/Output Summary (Last 24 hours) at 6/3/2024 1709  Last data filed at 6/2/2024 1951  Gross per 24 hour   Intake 222 ml   Output --   Net 222 ml       Lines/Drains/Airways       None                      Physical Exam  Vitals and nursing note reviewed.    Constitutional:       Appearance: Normal appearance.   HENT:      Head: Normocephalic and atraumatic.   Eyes:      Extraocular Movements: Extraocular movements intact.      Pupils: Pupils are equal, round, and reactive to light.   Neck:      Vascular: No carotid bruit.      Comments: JVD -  Cardiovascular:      Rate and Rhythm: Normal rate and regular rhythm.      Pulses: Normal pulses.      Heart sounds: Normal heart sounds. No murmur heard.     No friction rub. No gallop.   Pulmonary:      Effort: Pulmonary effort is normal.      Breath sounds: No wheezing or rales.   Abdominal:      General: Abdomen is flat. Bowel sounds are normal. There is no distension.      Palpations: Abdomen is soft. There is no mass.      Tenderness: There is no abdominal tenderness.   Musculoskeletal:         General: No swelling. Normal range of motion.      Cervical back: Normal range of motion.   Skin:     General: Skin is warm.      Capillary Refill: Capillary refill takes less than 2 seconds.   Neurological:      General: No focal deficit present.      Mental Status: She is alert and oriented to person, place, and time.            Significant Labs:     Recent Labs   Lab 06/01/24  1039 06/03/24  0607   WBC 8.12 10.23   HGB 11.7* 11.5*   HCT 34.5* 33.4*    202       Recent Labs   Lab 06/01/24  1039 06/01/24  2048 06/03/24  0607     --  140   K 4.1 4.1 3.6     --  102   CO2 27  --  29   BUN 25  --  27   CREATININE 1.1  --  1.0   CALCIUM 10.0  --  9.3   PHOS  --  3.9 3.2       Recent Labs   Lab 06/01/24  1039   ALKPHOS 103   BILITOT 0.5   PROT 7.2   ALT 75*   AST 54*     Lab Results   Component Value Date    CHOL 227 (H) 08/18/2023    HDL 66 08/18/2023    LDLCALC 142.8 08/18/2023    TRIG 91 08/18/2023       Recent Labs   Lab 06/01/24  1039 06/01/24  1331 06/02/24  1036   TROPONINI 0.038* 0.052* 0.030*       Lab Results   Component Value Date    HGBA1C 7.0 (H) 02/26/2024       Lab Results   Component Value Date    BNP  589 (H) 06/01/2024     Significant Imaging:     TTE 06/01/24    Left Ventricle: The left ventricle is normal in size. Normal wall thickness. There is concentric remodeling. Normal wall motion. There is mildly reduced systolic function with a visually estimated ejection fraction of 45 - 50%. Ejection fraction by visual approximation is 55%. Unable to assess diastolic function due to atrial fibrillation. Elevated left ventricular filling pressure.    Right Ventricle: Normal right ventricular cavity size. There is hypertrophy. Right ventricle wall motion  is normal. Systolic function is mildly reduced.    Left Atrium: Left atrium is moderately dilated.    Right Atrium: Right atrium is moderately dilated.    Aortic Valve: There is moderate aortic valve sclerosis. There is moderate annular calcification present. Moderately restricted motion. There is mild stenosis. Aortic valve area by VTI is 1.30 cm². Aortic valve peak velocity is 1.6 m/s. Mean gradient is 6 mmHg. The dimensionless index is 0.46. There is no significant regurgitation.    Mitral Valve: There is moderate posterior mitral annular calcification present. Mildly restricted motion. There is mild stenosis. The mean pressure gradient across the mitral valve is 4 mmHg at a heart rate of 101 bpm. There is mild to moderate regurgitation with a centrally directed jet.    Tricuspid Valve: The tricuspid valve is structurally normal. There is mild to moderate regurgitation.    Pulmonary Artery: There is mild pulmonary hypertension. The estimated pulmonary artery systolic pressure is 53 mmHg.    IVC/SVC: Elevated venous pressure at 15 mmHg.    Pericardium: There is no pericardial effusion.    Significant beat to beat variability in EF with irregular rhythm.    Assessment and Plan:     * New onset atrial fibrillation  #Atrial fibrillation - New diagnosis - Paroxysmal - Non-valvular  - EKG since 2002 with NSR  - Risk factors: HTN, Valvular heart disease (mild MS),   -  Cause: Could be acute (ie HF, ischemia/MI, acute disease or hypoxia) vs. Chronic (Older age, HTN, valve disease, CMP)  - Anticoagulation - [id number] 4 (Age, Sex, HTN, DM)  - TTE with mr EF, CCB contraindicated  - Increased dose of metoprolol to 50mg q6h and patient has remained in normotensive in NSR    Recommendations:  - Keep K>4 and Mg>2  - Rate control: Goal HR<110: Transition from metoprolol tartrate to metoprolol succinate 200mg QD  - Rhythm control: Spontaneously cardioverted; now in NSR  - Anticoagulation: Continue heparin gtt while inpatient, plant to transition to oral AC on discharge  - Risk factor reduction - Increase physical activity, control HTN and DM    Heart failure with mildly reduced ejection fraction (HFmrEF)  #HFmrEF 45-50%, NYHA I before today, ACC/AHA class C:  - Decompensation factor: Atrial fibrillation  - Hemodynamic profile: Wet and perfused  - Devices: None  - TTE showed EF 45-50%, biatrial enlargement, mild AS/MS, PASP 53mmHg, elevated venous pressure 15mmHg    - Volume status optimization - Would discharge with lasix 20mg po QD, main goal is rate/rhythm control  - PET scan for ischemic evaluation as outpatient  - Daily monitoring: Standing weights, Strict I/O, Fluid restriction (<2L/day), Na restriction (<2g/day), Keep K>4 and Mg>2  - Management of comorbidities: Afib rate control for now        VTE Risk Mitigation (From admission, onward)           Ordered     apixaban tablet 5 mg  2 times daily         06/02/24 0718     IP VTE HIGH RISK PATIENT  Once         06/01/24 1241     Place sequential compression device  Until discontinued         06/01/24 1241                    Oswald Doherty MD  Cardiology  [facility] - Cardiology Stepdown

## 2024-06-03 NOTE — NURSING
Patient is ready for discharge. Patient stable alert and oriented. IVs removed. No complaints of pain. Discussed discharge plan. Reviewed medications and side effects, appointments, and answered questions with patient and family. RX delivered at bedside.

## 2024-06-03 NOTE — TELEPHONE ENCOUNTER
----- Message from Alida Tarango sent at 6/3/2024  1:23 PM CDT -----  Name of Who is Calling: Kay with [facility] is calling on behalf of ANTHONY LOWERY [[id number]]                    What is the request in detail: Pt is requesting a call back to get scheduled for a hospital follow up. PT was discharged on 6/3/2024. Pt need to be seen in a week. Please assist.                     Can the clinic reply by [facility]: No                    [facility] Back [facility]  [phone]

## 2024-06-03 NOTE — PHARMACY MED REC
"  Admission Medication History     The home medication history was taken by Yaa Mccann.    You may go to "Admission" then "Reconcile Home Medications" tabs to review and/or act upon these items.     The home medication list has been updated by the Pharmacy department.   Please read ALL comments highlighted in yellow.   Please address this information as you see fit.    Feel free to contact us if you have any questions or require assistance.    Medications listed below were obtained from: Patient/family and Analytic software- [employer] Medications   Medication Sig    acetaminophen (TYLENOL) 500 MG tablet Take 500 mg by mouth daily as needed for Pain (Back Pain).      aspirin 81 MG Chew Take 1 tablet (81 mg total) by mouth once daily.      calcium carbonate (OS-MIRACLE) 500 mg calcium (1,250 mg) tablet   Take 1 tablet by mouth every other day.    diphenhydrAMINE (BENADRYL) 25 mg capsule Take 1 capsule (25 mg total) by mouth every 6 (six) hours as needed for Itching or Allergies.      dorzolamide-timolol 2-0.5% (COSOPT) 22.3-6.8 mg/mL ophthalmic solution   Place 1 drop into the right eye 2 (two) times daily.    fish oil-fat acid [id number].[id number] 1,200 mg (400 [id number]) Cap   Take 1 capsule by mouth once daily.    hydrocortisone 2.5 % cream Apply 15 g topically 2 (two) times daily.        levothyroxine (SYNTHROID) 88 MCG tablet Take 88 mcg by mouth every morning.        LIDOcaine (LIDODERM) 5 % Place 1 patch onto the skin once daily. Remove & Discard patch within 12 hours or as directed by MD      losartan (COZAAR) 50 MG tablet    Take 50 mg by mouth once daily.)    lovastatin (MEVACOR) 10 MG tablet   Take 10 mg by mouth every evening.)    mometasone 0.1% (ELOCON) 0.1 % cream   Use as directed daily.    multivit,calc,mins/iron/folic (ONE-A-DAY WOMENS FORMULA ORAL)   Take 1 tablet by mouth once daily.    tiZANidine (ZANAFLEX) 2 MG tablet   Take 4 mg by mouth daily as needed (muscle spasms).    blood-glucose meter " (FREESTYLE SYSTEM KIT) kit Use as instructed     Yaa Siobhan  EXT [id number]               .

## 2024-06-03 NOTE — SUBJECTIVE & OBJECTIVE
Interval History: //96 with a HR . Continues on metoprolol tartrate 50mg q6h. Patient spontaneously cardioverted to sinus rhythm yesterday afternoon and remains asymptomatic. Remains in NSR on telemetry this morning. Continues on heparin gtt for AC. She received furosemide 40mg IV in the last 24h In 222cc and Out 600cc.      Review of Systems   Constitutional: Negative.   HENT: Negative.     Cardiovascular:  Negative for chest pain, claudication, cyanosis, dyspnea on exertion, irregular heartbeat, leg swelling, near-syncope, orthopnea, palpitations and paroxysmal nocturnal dyspnea.   Respiratory: Negative.     Endocrine: Negative.    Musculoskeletal: Negative.    Gastrointestinal: Negative.    Genitourinary: Negative.    Neurological: Negative.      Objective:     Vital Signs (Most Recent):  Temp: 97.6 °F (36.4 °C) (06/03/24 1635)  Pulse: 77 (06/03/24 1635)  Resp: 18 (06/03/24 1635)  BP: (!) 154/67 (06/03/24 1635)  SpO2: (!) 94 % (06/03/24 1635) Vital Signs (24h Range):  Temp:  [97.4 °F (36.3 °C)-97.9 °F (36.6 °C)] 97.6 °F (36.4 °C)  Pulse:  [] 77  Resp:  [16-20] 18  SpO2:  [94 %-99 %] 94 %  BP: (126-154)/(59-96) 154/67     Weight: 87.7 kg (193 lb 5.5 oz)  Body mass index is 31.21 kg/m².     SpO2: (!) 94 %         Intake/Output Summary (Last 24 hours) at 6/3/2024 1709  Last data filed at 6/2/2024 1951  Gross per 24 hour   Intake 222 ml   Output --   Net 222 ml       Lines/Drains/Airways       None                      Physical Exam  Vitals and nursing note reviewed.   Constitutional:       Appearance: Normal appearance.   HENT:      Head: Normocephalic and atraumatic.   Eyes:      Extraocular Movements: Extraocular movements intact.      Pupils: Pupils are equal, round, and reactive to light.   Neck:      Vascular: No carotid bruit.      Comments: JVD -  Cardiovascular:      Rate and Rhythm: Normal rate and regular rhythm.      Pulses: Normal pulses.      Heart sounds: Normal heart sounds. No  murmur heard.     No friction rub. No gallop.   Pulmonary:      Effort: Pulmonary effort is normal.      Breath sounds: No wheezing or rales.   Abdominal:      General: Abdomen is flat. Bowel sounds are normal. There is no distension.      Palpations: Abdomen is soft. There is no mass.      Tenderness: There is no abdominal tenderness.   Musculoskeletal:         General: No swelling. Normal range of motion.      Cervical back: Normal range of motion.   Skin:     General: Skin is warm.      Capillary Refill: Capillary refill takes less than 2 seconds.   Neurological:      General: No focal deficit present.      Mental Status: She is alert and oriented to person, place, and time.            Significant Labs:     Recent Labs   Lab 06/01/24  1039 06/03/24  0607   WBC 8.12 10.23   HGB 11.7* 11.5*   HCT 34.5* 33.4*    202       Recent Labs   Lab 06/01/24  1039 06/01/24  2048 06/03/24  0607     --  140   K 4.1 4.1 3.6     --  102   CO2 27  --  29   BUN 25  --  27   CREATININE 1.1  --  1.0   CALCIUM 10.0  --  9.3   PHOS  --  3.9 3.2       Recent Labs   Lab 06/01/24  1039   ALKPHOS 103   BILITOT 0.5   PROT 7.2   ALT 75*   AST 54*     Lab Results   Component Value Date    CHOL 227 (H) 08/18/2023    HDL 66 08/18/2023    LDLCALC 142.8 08/18/2023    TRIG 91 08/18/2023       Recent Labs   Lab 06/01/24  1039 06/01/24  1331 06/02/24  1036   TROPONINI 0.038* 0.052* 0.030*       Lab Results   Component Value Date    HGBA1C 7.0 (H) 02/26/2024       Lab Results   Component Value Date     (H) 06/01/2024     Significant Imaging:     TTE 06/01/24    Left Ventricle: The left ventricle is normal in size. Normal wall thickness. There is concentric remodeling. Normal wall motion. There is mildly reduced systolic function with a visually estimated ejection fraction of 45 - 50%. Ejection fraction by visual approximation is 55%. Unable to assess diastolic function due to atrial fibrillation. Elevated left ventricular  filling pressure.    Right Ventricle: Normal right ventricular cavity size. There is hypertrophy. Right ventricle wall motion  is normal. Systolic function is mildly reduced.    Left Atrium: Left atrium is moderately dilated.    Right Atrium: Right atrium is moderately dilated.    Aortic Valve: There is moderate aortic valve sclerosis. There is moderate annular calcification present. Moderately restricted motion. There is mild stenosis. Aortic valve area by VTI is 1.30 cm². Aortic valve peak velocity is 1.6 m/s. Mean gradient is 6 mmHg. The dimensionless index is 0.46. There is no significant regurgitation.    Mitral Valve: There is moderate posterior mitral annular calcification present. Mildly restricted motion. There is mild stenosis. The mean pressure gradient across the mitral valve is 4 mmHg at a heart rate of 101 bpm. There is mild to moderate regurgitation with a centrally directed jet.    Tricuspid Valve: The tricuspid valve is structurally normal. There is mild to moderate regurgitation.    Pulmonary Artery: There is mild pulmonary hypertension. The estimated pulmonary artery systolic pressure is 53 mmHg.    IVC/SVC: Elevated venous pressure at 15 mmHg.    Pericardium: There is no pericardial effusion.    Significant beat to beat variability in EF with irregular rhythm.

## 2024-06-04 ENCOUNTER — DOCUMENTATION ONLY (OUTPATIENT)
Dept: CARDIOLOGY | Facility: CLINIC | Age: 89
End: 2024-06-04
Payer: MEDICARE

## 2024-06-04 DIAGNOSIS — R06.02 SOB (SHORTNESS OF BREATH): Primary | ICD-10-CM

## 2024-06-04 LAB
OHS QRS DURATION: 112 MS
OHS QTC CALCULATION: 490 MS

## 2024-06-04 NOTE — PROGRESS NOTES
[facility] Phone Enrollment Complete.    Phone enrollment completed due to :Patient discharged before enrollment completed.  Called and spoke to Patient's daughter Angeles via phone. Introduced self to pt as [facility] nurse navigator Earnestine Azul RN     Patient education will be completed on initial visit  Reviewed the following key points of [facility] program with pt and family:              1.) Take your medications as directed.               2.) Weight yourself daily              3.) Follow low salt and limited fluid diet.               4.) Stop smoking and start exercising              5.) Go to your appointments and call your team.          Reviewed plan for follow up once discharged to include phone calls, in person and virtual visits to assist pt optimizing their heart failure medication regimen and encouraging healthy lifestyle modifications.  Reminded pt that program will assist them over the next 4-6 weeks and then patient will be transferred to long term care provider .  Reminded pt how to contact [facility] navigator via phone and or via [facility].      Pt given appointment today via phone      Pt also reminded RN will call 48-72 hours after discharge to check on them.      PT and family verbalize read back of information given.  Encouraged pt and family to read over information often and contact team with any questions or concerns.

## 2024-06-07 ENCOUNTER — OFFICE VISIT (OUTPATIENT)
Dept: FAMILY MEDICINE | Facility: CLINIC | Age: 89
End: 2024-06-07
Payer: MEDICARE

## 2024-06-07 VITALS
DIASTOLIC BLOOD PRESSURE: 66 MMHG | BODY MASS INDEX: 31.11 KG/M2 | OXYGEN SATURATION: 95 % | HEIGHT: 66 IN | RESPIRATION RATE: 18 BRPM | SYSTOLIC BLOOD PRESSURE: 138 MMHG | WEIGHT: 193.56 LBS | HEART RATE: 70 BPM | TEMPERATURE: 98 F

## 2024-06-07 DIAGNOSIS — I12.9 BENIGN HYPERTENSION WITH CHRONIC KIDNEY DISEASE, STAGE III: ICD-10-CM

## 2024-06-07 DIAGNOSIS — I48.91 NEW ONSET ATRIAL FIBRILLATION: ICD-10-CM

## 2024-06-07 DIAGNOSIS — Z09 HOSPITAL DISCHARGE FOLLOW-UP: Primary | ICD-10-CM

## 2024-06-07 DIAGNOSIS — E78.5 HYPERLIPIDEMIA LDL GOAL <100: ICD-10-CM

## 2024-06-07 DIAGNOSIS — N18.30 BENIGN HYPERTENSION WITH CHRONIC KIDNEY DISEASE, STAGE III: ICD-10-CM

## 2024-06-07 DIAGNOSIS — M54.50 ACUTE BILATERAL LOW BACK PAIN WITHOUT SCIATICA: ICD-10-CM

## 2024-06-07 DIAGNOSIS — R05.3 CHRONIC COUGH: ICD-10-CM

## 2024-06-07 DIAGNOSIS — E03.9 ACQUIRED HYPOTHYROIDISM: ICD-10-CM

## 2024-06-07 PROCEDURE — 1159F MED LIST DOCD IN RCRD: CPT | Mod: CPTII,S$GLB,,

## 2024-06-07 PROCEDURE — 1125F AMNT PAIN NOTED PAIN PRSNT: CPT | Mod: CPTII,S$GLB,,

## 2024-06-07 PROCEDURE — 1160F RVW MEDS BY RX/DR IN RCRD: CPT | Mod: CPTII,S$GLB,,

## 2024-06-07 PROCEDURE — 99214 OFFICE O/P EST MOD 30 MIN: CPT | Mod: S$GLB,,,

## 2024-06-07 PROCEDURE — 1157F ADVNC CARE PLAN IN RCRD: CPT | Mod: CPTII,S$GLB,,

## 2024-06-07 PROCEDURE — 3288F FALL RISK ASSESSMENT DOCD: CPT | Mod: CPTII,S$GLB,,

## 2024-06-07 PROCEDURE — 1111F DSCHRG MED/CURRENT MED MERGE: CPT | Mod: CPTII,S$GLB,,

## 2024-06-07 PROCEDURE — 99999 PR PBB SHADOW E&M-EST. PATIENT-LVL V: CPT | Mod: PBBFAC,,,

## 2024-06-07 PROCEDURE — 1101F PT FALLS ASSESS-DOCD LE1/YR: CPT | Mod: CPTII,S$GLB,,

## 2024-06-07 RX ORDER — HYDROCODONE BITARTRATE AND ACETAMINOPHEN 5; 325 MG/1; MG/1
1 TABLET ORAL
COMMUNITY
Start: 2024-04-11

## 2024-06-07 RX ORDER — IBUPROFEN 800 MG/1
800 TABLET ORAL EVERY 6 HOURS PRN
COMMUNITY
Start: 2024-04-11

## 2024-06-07 RX ORDER — LOVASTATIN 10 MG/1
10 TABLET ORAL DAILY
Qty: 90 TABLET | Refills: 3 | Status: CANCELLED | OUTPATIENT
Start: 2024-06-07

## 2024-06-07 RX ORDER — LOSARTAN POTASSIUM 50 MG/1
50 TABLET ORAL DAILY
Qty: 90 TABLET | Refills: 3 | Status: CANCELLED | OUTPATIENT
Start: 2024-06-07

## 2024-06-07 RX ORDER — TIZANIDINE 2 MG/1
4 TABLET ORAL DAILY PRN
Qty: 30 TABLET | Refills: 0 | Status: SHIPPED | OUTPATIENT
Start: 2024-06-07 | End: 2024-06-17

## 2024-06-07 RX ORDER — BENZONATATE 200 MG/1
200 CAPSULE ORAL 3 TIMES DAILY PRN
Qty: 30 CAPSULE | Refills: 0 | Status: SHIPPED | OUTPATIENT
Start: 2024-06-07 | End: 2024-06-17

## 2024-06-07 NOTE — PROGRESS NOTES
[facility] Provider Note (Initial Clinic) Consult Note    Age: 89 y.o.  Gender: female  Ethnicity: Black or    Number of admissions for CHF within the preceding year: 1   Duration of CHF: recent admission  Type of Congestive Heart Failure: Combined, HFmrEF  Etiology: Tachycardia induced    Enrolled in Infusion suite: no    Diagnostic Labs:   EKG - 06/04/2024  CXR - 06/01/2024  ECHO - 06/01/2024  Stress test -   Stress echo -   Pharmacologic stress -   Cardiac catheterization -    Cardiac MRI -     Lab Results   Component Value Date     06/03/2024     06/01/2024    K 3.6 06/03/2024    K 4.1 06/01/2024     06/03/2024     06/01/2024    CO2 29 06/03/2024    CO2 27 06/01/2024     (H) 06/03/2024     (H) 06/01/2024    BUN 27 06/03/2024    BUN 25 06/01/2024    CREATININE 1.0 06/03/2024    CREATININE 1.1 06/01/2024    CALCIUM 9.3 06/03/2024    CALCIUM 10.0 06/01/2024    PROT 7.2 06/01/2024    PROT 7.2 08/18/2023    ALBUMIN 4.0 06/01/2024    ALBUMIN 4.2 08/18/2023    BILITOT 0.5 06/01/2024    BILITOT 0.6 08/18/2023    ALKPHOS 103 06/01/2024    ALKPHOS 61 08/18/2023    AST 54 (H) 06/01/2024    AST 26 08/18/2023    ALT 75 (H) 06/01/2024    ALT 15 08/18/2023    ANIONGAP 9 06/03/2024    ANIONGAP 9 06/01/2024    ESTGFRAFRICA >60.0 07/02/2021    ESTGFRAFRICA >60.0 07/16/2020    EGFRNONAA >60.0 07/02/2021    EGFRNONAA >60.0 07/16/2020       Lab Results   Component Value Date    WBC 10.23 06/03/2024    WBC 8.12 06/01/2024    RBC 3.90 (L) 06/03/2024    RBC 3.99 (L) 06/01/2024    HGB 11.5 (L) 06/03/2024    HGB 11.7 (L) 06/01/2024    HCT 33.4 (L) 06/03/2024    HCT 34.5 (L) 06/01/2024    MCV 86 06/03/2024    MCV 87 06/01/2024    MCH 29.5 06/03/2024    MCH 29.3 06/01/2024    MCHC 34.4 06/03/2024    MCHC 33.9 06/01/2024    RDW 13.7 06/03/2024    RDW 13.6 06/01/2024     06/03/2024     06/01/2024    MPV 11.8 06/03/2024    MPV 11.4 06/01/2024    IMMGR 0.3 06/03/2024    IMMGR 0.2  06/01/2024    IGABS 0.03 06/03/2024    IGABS 0.02 06/01/2024    LYMPH 2.9 06/03/2024    LYMPH 28.1 06/03/2024    MONO 1.1 (H) 06/03/2024    MONO 10.4 06/03/2024    EOS 0.2 06/03/2024    EOS 0.1 06/01/2024    BASO 0.02 06/03/2024    BASO 0.03 06/01/2024    NRBC 0 06/03/2024    NRBC 0 06/01/2024    GRAN 6.1 06/03/2024    GRAN 59.3 06/03/2024    EOSINOPHIL 1.7 06/03/2024    EOSINOPHIL 1.2 06/01/2024    BASOPHIL 0.2 06/03/2024    BASOPHIL 0.4 06/01/2024       Lab Results   Component Value Date     (H) 06/01/2024    MG 1.9 06/03/2024    MG 2.0 06/01/2024    PHOS 3.2 06/03/2024    PHOS 3.9 06/01/2024    TROPONINI 0.030 (H) 06/02/2024    TROPONINI 0.052 (H) 06/01/2024    HGBA1C 7.0 (H) 02/26/2024    HGBA1C 7.2 (H) 08/18/2023    TSH 0.374 (L) 06/01/2024    TSH 1.910 08/18/2023    FREET4 1.19 06/01/2024    FREET4 1.05 04/09/2019       Lab Results   Component Value Date    CHOL 227 (H) 08/18/2023    TRIG 91 08/18/2023    HDL 66 08/18/2023    LDLCALC 142.8 08/18/2023    CHOLHDL 29.1 08/18/2023    TOTALCHOLEST 3.4 08/18/2023    NONHDLCHOL 161 08/18/2023    COLORU Yellow 06/01/2024    APPEARANCEUA Hazy (A) 06/01/2024    PHUR 5.0 06/01/2024    SPECGRAV 1.010 06/01/2024    PROTEINUA Trace (A) 06/01/2024    GLUCUA Negative 06/01/2024    KETONESU Negative 06/01/2024    BILIRUBINUA Negative 06/01/2024    OCCULTUA Trace (A) 06/01/2024    NITRITE Negative 06/01/2024    LEUKOCYTESUR 3+ (A) 06/01/2024       No implanted cardiac devices    Current Outpatient Medications on File Prior to Visit   Medication Sig Dispense Refill    acetaminophen (TYLENOL) 500 MG tablet Take 1 tablet (500 mg total) by mouth every 4 (four) hours as needed for Pain. (Patient taking differently: Take 500 mg by mouth daily as needed for Pain (Back Pain).) 60 tablet 2    apixaban (ELIQUIS) 5 mg Tab Take 1 tablet (5 mg total) by mouth 2 (two) times daily. 60 tablet 1    aspirin 81 MG Chew Take 1 tablet (81 mg total) by mouth once daily.  0    blood-glucose  meter (FREESTYLE SYSTEM KIT) kit Use as instructed 1 each 0    calcium carbonate (OS-MIRACLE) 500 mg calcium (1,250 mg) tablet Take 1 tablet by mouth every other day.      diphenhydrAMINE (BENADRYL) 25 mg capsule Take 1 capsule (25 mg total) by mouth every 6 (six) hours as needed for Itching or Allergies. 20 capsule 0    dorzolamide-timolol 2-0.5% (COSOPT) 22.3-6.8 mg/mL ophthalmic solution Place 1 drop into the right eye 2 (two) times daily. 10 mL 0    fish oil-fat acid comb.8-hb137 1,200 mg (400 [id number]) Cap Take 1 capsule by mouth once daily.      furosemide (LASIX) 20 MG tablet Take 1 tablet (20 mg total) by mouth once daily. 30 tablet 1    hydrocortisone 2.5 % cream Apply topically 2 (two) times daily. (Patient taking differently: Apply 15 g topically 2 (two) times daily.) 20 g 1    levothyroxine (SYNTHROID) 88 MCG tablet TAKE 1 TABLET(88 MCG) BY MOUTH EVERY DAY (Patient taking differently: Take 88 mcg by mouth every morning.) 90 tablet 3    LIDOcaine (LIDODERM) 5 % Place 1 patch onto the skin once daily. Remove & Discard patch within 12 hours or as directed by MD 30 patch 0    losartan (COZAAR) 50 MG tablet TAKE 1 TABLET(50 MG) BY MOUTH EVERY DAY (Patient taking differently: Take 50 mg by mouth once daily.) 90 tablet 3    lovastatin (MEVACOR) 10 MG tablet TAKE 1 TABLET(10 MG) BY MOUTH EVERY DAY (Patient taking differently: Take 10 mg by mouth every evening.) 90 tablet 3    metoprolol succinate (TOPROL-XL) 200 MG 24 hr tablet Take 1 tablet (200 mg total) by mouth once daily. 60 tablet 1    mometasone 0.1% (ELOCON) 0.1 % cream Use daily 50 g 3    multivit,calc,mins/iron/folic (ONE-A-DAY WOMENS FORMULA ORAL) Take 1 tablet by mouth once daily.      tiZANidine (ZANAFLEX) 2 MG tablet Take 4 mg by mouth daily as needed (muscle spasms).       No current facility-administered medications on file prior to visit.         HPI:  Patient able to ambulate within home and pace slow. Endorses SOB with walking at quicker  pace. Presents to clinic in wheelchair   Patient sleeping on 2-3 number of pillows   Patient wakes up SOB, has to get out of bed, associated cough- denies PND symptoms but endorses nighttime dry cough with laying in bed   Palpitations - denies   Dizzy, light-headed, pre-syncope or syncope- intermittent dizziness/lightheadedness, denies pre-syncope/syncope   Since discharge frequency of performing weights, home weight and weight change- performing daily weights. Weight within a couple lbs since dc, 189-191lbs   Other information felt pertinent to HPI: Ms. Tonya Cohn is a 89 yo female with a PMHx of HTN, HLD, diverticulosis, CKD 3, sciatica who presents to first [facility] visit following recent admission for new onset AF. On arrival to ED HR in 140s. EKG with new onset AF RVR. CXR with pulmonary edema with . Troponin 0.038. Admitted to  for further evaluation and management of new onset AF and ADHF. 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 Volume status optimized. Started on beta-blocker with avoidance of Cardizem due to new reduction in EF. Converted to NSR so DCCV deferred. She was initiated on DOAC and subsequently discharged.      PHYSICAL:   Vitals:    06/11/24 1307   BP: (!) 146/66   Pulse: (!) 48      @[id number](3)@    JVD: yes, 2cm above clavicle sitting   Heart rhythm: regular, bradycardic  Cardiac murmur: No    S3: no  S4: no  Lungs: crackles   Hepatojugular reflux: yes  Edema: yes, 1+ BLE edema      Echo 6/1/24:    Left Ventricle: The left ventricle is normal in size. Normal wall thickness. There is concentric remodeling. Normal wall motion. There is mildly reduced systolic function with a visually estimated ejection fraction of 45 - 50%. Ejection fraction by visual approximation is 55%. Unable to assess diastolic function due to atrial fibrillation. Elevated left ventricular filling pressure.    Right Ventricle: Normal right ventricular cavity size. There is hypertrophy. Right ventricle wall motion  is  normal. Systolic function is mildly reduced.    Left Atrium: Left atrium is moderately dilated.    Right Atrium: Right atrium is moderately dilated.    Aortic Valve: There is moderate aortic valve sclerosis. There is moderate annular calcification present. Moderately restricted motion. There is mild stenosis. Aortic valve area by VTI is 1.30 cm². Aortic valve peak velocity is 1.6 m/s. Mean gradient is 6 mmHg. The dimensionless index is 0.46. There is no significant regurgitation.    Mitral Valve: There is moderate posterior mitral annular calcification present. Mildly restricted motion. There is mild stenosis. The mean pressure gradient across the mitral valve is 4 mmHg at a heart rate of 101 bpm. There is mild to moderate regurgitation with a centrally directed jet.    Tricuspid Valve: The tricuspid valve is structurally normal. There is mild to moderate regurgitation.    Pulmonary Artery: There is mild pulmonary hypertension. The estimated pulmonary artery systolic pressure is 53 mmHg.    IVC/SVC: Elevated venous pressure at 15 mmHg.    Pericardium: There is no pericardial effusion.    Significant beat to beat variability in EF with irregular rhythm.    ASSESSMENT: HFmrEF    PLAN:      Patient Instructions:   Instruct the patient to notify this clinic if HH, a physician or an advanced care provider wants to change medication one of their HF medications   Activity and Diet restrictions:   Recommend 2-3 gram sodium restriction and 1500cc- 2000cc fluid restriction.  Encourage physical activity with graded exercise program.  Requested patient to weigh themselves daily, and to notify us if their weight increases by more than 3 lbs in 1 day or 5 lbs in 3 days.    Assigned dry weight on home scale: unsure  Medication changes (include current dose and changed dose): NYHA Class III symptoms. Volume up on exam. Bradycardic; EKG today. Reduce toprol from 200mg daily to 100mg daily. Increase lasix frequency to 20mg BID.  Pending fluid trend, consider starting aldactone 25mg once daily next visit for additional GDMT.  Upcoming labs and date anticipated: Gen cards appointment next week. RTC in 2 weeks or sooner prn    Audrey Rodriguez PA-C

## 2024-06-07 NOTE — PROGRESS NOTES
HPI     Chief Complaint:  Chief Complaint   Patient presents with    Hospital Follow Up    Low-back Pain     Pain rate x 7       Tonya Cohn is a 89 y.o. female with multiple medical diagnoses as listed in the medical history and problem list that presents for   Chief Complaint   Patient presents with    Hospital Follow Up    Low-back Pain     Pain rate x 7    .     Patient is not known to me with her last appointment in this department on 3/4/2024.     HPI  Pt presents for hospital follow for Afib. Shortness of breath has improved. Hospital started on Eliquis, Metoprolol and Lasix. Has follow up with cardiology on 6/11 and 6/20.    Hospital Course:   Admitted with new onset atrial fibrillation with RVR and CHF.  HR persistently in the 130s on arrival. BNP also elevated with downtrending troponin likely due to demand ischemia.  Cardiology discussing rhythm control strategy, possibly DCCV after HR more controlled.  Started on beta-blocker with avoidance of Cardizem due to new reduced EF. Volume status being optimized, DOAC started.  DCCV deferred as atrial fibrillation resolved.  Will discharge on DOAC as well as Toprol 200 mg daily as well as Lasix 20 mg daily.  To follow up with Cardiology outpatient where GDMT will be adjusted.      Assessment & Plan     Problem List Items Addressed This Visit          Cardiac/Vascular    Hyperlipidemia LDL goal <100 (Chronic)  Stable, managed with lovastatin. The current medical regimen is effective;  continue present plan and medications.       Other Visit Diagnoses       Hospital discharge follow-up    -  Primary  Hospital follow-up today in clinic for AFib.  Shortness of breath has improved.  Has been taking prescribed Eliquis metoprolol and Lasix.  As follow up with Cardiology on June 11th and 20th.    Acquired hypothyroidism      Stable. The current medical regimen is effective;  continue present plan and medications.      Benign hypertension with chronic kidney  disease, stage III      Blood pressure 144/60 recheck 138/66. The current medical regimen is effective;  continue present plan and medications.      Chronic cough      Cough that is worse at night.  Tessalon Perles have helped in the past, we will refill Tessalon Perles.    Relevant Medications    benzonatate (TESSALON) 200 MG capsule    Acute bilateral low back pain without sciatica    Chronic arthritis pain and lower back and hips.  We will refill tizanidine.      Relevant Medications    tiZANidine (ZANAFLEX) 2 MG tablet    New onset atrial fibrillation  Stable with no acute symptoms.  Continue Eliquis metoprolol and Lasix.  Follow-up with cardiology on June 11th and 20th.              --------------------------------------------      Health Maintenance:  Health Maintenance         Date Due Completion Date    COVID-19 Vaccine (8 - 2023-24 season) 02/10/2024 10/10/2023    Diabetes Urine Screening 08/18/2024 8/18/2023    Lipid Panel 08/18/2024 8/18/2023    Override on 2/26/2016: Done ([facility] - total 189, TG 62, , HDL 76)    Hemoglobin A1c 08/26/2024 2/26/2024    Eye Exam 05/14/2025 5/14/2024    Override on 9/20/2016: Done (Dr. Kole Lucero- negative for diabetic retinopathy bilaterally)    Override on 9/9/2015: Done (No diabetic retinopathy)    Override on 8/14/2014: Done (no diabetic retinopathy; Dr. Lucero)    Override on 4/21/2014: Done (Pt states her eye exam is scheduled for next month)    Override on 4/1/2013: Done    TETANUS VACCINE 12/06/2028 12/6/2018            Health maintenance reviewed    Follow Up:  No follow-ups on file.    Exam     Review of Systems:  (as noted above)  Review of Systems    Physical Exam:   Physical Exam  Constitutional:       Appearance: Normal appearance.      Comments: Ambulates with cane   Cardiovascular:      Rate and Rhythm: Normal rate. Rhythm regularly irregular.   Pulmonary:      Effort: Pulmonary effort is normal.      Breath sounds: Normal  "breath sounds.   Neurological:      Mental Status: She is alert.       Vitals:    24 1029 24 1054   BP: (!) 144/60 138/66   BP Location:  Left arm   Patient Position:  Sitting   BP Method:  Medium (Manual)   Pulse: 70    Resp: 18    Temp: 97.9 °F (36.6 °C)    TempSrc: Oral    SpO2: 95%    Weight: 87.8 kg (193 lb 9 oz)    Height: 5' 6" (1.676 m)       Body mass index is 31.24 kg/m².        History     Past Medical History:  Past Medical History:   Diagnosis Date    AR (allergic rhinitis)     Atherosclerosis of abdominal aorta     noted on CT scan 2011    Carpal tunnel syndrome of left wrist     Chronic kidney disease, stage 3     Diabetic peripheral neuropathy     Diverticulosis     History of colon polyps     History of diverticulitis of colon 2014    Hyperlipemia     Hypertension     Hypothyroidism     followed by endocrinology, Dr. Green    Mild aortic stenosis     New onset atrial fibrillation 2024    OA (osteoarthritis) of knee     Obesity     Sciatica     Type II or unspecified type diabetes mellitus with neurological manifestations, uncontrolled(250.62)        Past Surgical History:  Past Surgical History:   Procedure Laterality Date    CATARACT EXTRACTION Bilateral     COLONOSCOPY N/A 10/3/2017    Procedure: COLONOSCOPY;  Surgeon: Alin Gonzalez MD;  Location: [facility] ([address] [place] [address]);  Service: Endoscopy;  Laterality: N/A;    COSMETIC SURGERY      HYSTERECTOMY      partial    JOINT REPLACEMENT Right     LUNG BIOPSY  in her 40's    TOTAL KNEE ARTHROPLASTY Right 2014    TKR       Social History:  Social History     Socioeconomic History    Marital status:     Number of children: 7   Occupational History    Occupation: retired - house cleaning   Tobacco Use    Smoking status: Former     Current packs/day: 0.00     Average packs/day: 0.3 packs/day for 7.5 years (1.9 ttl pk-yrs)     Types: Cigarettes     Start date: 1955     Quit date: 1962     Years since quittin.9 "    Smokeless tobacco: Former     Quit date: 7/14/1970   Substance and Sexual Activity    Alcohol use: Yes     Alcohol/week: 1.0 standard drink of alcohol     Types: 1 Cans of beer per week     Comment: occasionally    Drug use: No    Sexual activity: Not Currently     Partners: Male   Other Topics Concern    Are you pregnant or think you may be? No    Breast-feeding No     Social Determinants of Health     Financial Resource Strain: Low Risk  (4/23/2024)    Overall Financial Resource Strain (CARDIA)     Difficulty of Paying Living Expenses: Not hard at all   Food Insecurity: No Food Insecurity (4/23/2024)    Hunger Vital Sign     Worried About Running Out of Food in the Last Year: Never true     Ran Out of Food in the Last Year: Never true   Transportation Needs: No Transportation Needs (4/23/2024)    PRAPARE - Transportation     Lack of Transportation (Medical): No     Lack of Transportation (Non-Medical): No   Physical Activity: Unknown (4/23/2024)    Exercise Vital Sign     Days of Exercise per Week: Patient declined   Stress: No Stress Concern Present (4/23/2024)    [employer] [place] [employer] - Occupational Stress Questionnaire     Feeling of Stress : Not at all   Housing Stability: Low Risk  (5/4/2022)    Housing Stability Vital Sign     Unable to Pay for Housing in the Last Year: No     Number of Places Lived in the Last Year: 1     Unstable Housing in the Last Year: No       Family History:  Family History   Problem Relation Name Age of Onset    Cancer Mother          shoulder tumor - cancer    Hypertension Mother      Glaucoma Mother      Heart disease Father          valve replacement    Hypertension Father      Heart attack Father      Diabetes Sister Lidya     Arthritis Sister Lovinia         s/p knee surgery    Diabetes Brother Benoris     Heart disease Brother Benoris     Heart failure Brother Benoris     Stroke Brother Ardell     Arthritis Brother Vital         back problems    Skin  cancer Brother Bogdan     Cancer Brother Bogdan     Throat cancer Brother Bogdan     No Known Problems Maternal Grandmother      No Known Problems Maternal Grandfather      No Known Problems Paternal Grandmother      No Known Problems Paternal Grandfather      Cancer Son Johnathan         jaw    Cataracts Son Johnathan     No Known Problems Maternal Aunt      No Known Problems Maternal Uncle      No Known Problems Paternal Aunt      No Known Problems Paternal Uncle      Melanoma Neg Hx      Eczema Neg Hx      Lupus Neg Hx      Psoriasis Neg Hx      Breast cancer Neg Hx      Colon cancer Neg Hx      Amblyopia Neg Hx      Blindness Neg Hx      Macular degeneration Neg Hx      Retinal detachment Neg Hx      Strabismus Neg Hx      Thyroid disease Neg Hx         Allergies and Medications: (updated and reviewed)  Review of patient's allergies indicates:   Allergen Reactions    Lipitor [atorvastatin]      Current Outpatient Medications   Medication Sig Dispense Refill    acetaminophen (TYLENOL) 500 MG tablet Take 1 tablet (500 mg total) by mouth every 4 (four) hours as needed for Pain. (Patient taking differently: Take 500 mg by mouth daily as needed for Pain (Back Pain).) 60 tablet 2    apixaban (ELIQUIS) 5 mg Tab Take 1 tablet (5 mg total) by mouth 2 (two) times daily. 60 tablet 1    calcium carbonate (OS-MIRACLE) 500 mg calcium (1,250 mg) tablet Take 1 tablet by mouth every other day.      diphenhydrAMINE (BENADRYL) 25 mg capsule Take 1 capsule (25 mg total) by mouth every 6 (six) hours as needed for Itching or Allergies. 20 capsule 0    dorzolamide-timolol 2-0.5% (COSOPT) 22.3-6.8 mg/mL ophthalmic solution Place 1 drop into the right eye 2 (two) times daily. 10 mL 0    fish oil-fat acid comb.8-hb137 1,200 mg (400 [id number]) Cap Take 1 capsule by mouth once daily.      furosemide (LASIX) 20 MG tablet Take 1 tablet (20 mg total) by mouth once daily. 30 tablet 1    HYDROcodone-acetaminophen (NORCO) 5-325 mg per tablet Take 1  tablet by mouth.      hydrocortisone 2.5 % cream Apply topically 2 (two) times daily. (Patient taking differently: Apply 15 g topically 2 (two) times daily.) 20 g 1    ibuprofen (ADVIL,MOTRIN) 800 MG tablet Take 800 mg by mouth every 6 (six) hours as needed.      levothyroxine (SYNTHROID) 88 MCG tablet TAKE 1 TABLET(88 MCG) BY MOUTH EVERY DAY (Patient taking differently: Take 88 mcg by mouth every morning.) 90 tablet 3    LIDOcaine (LIDODERM) 5 % Place 1 patch onto the skin once daily. Remove & Discard patch within 12 hours or as directed by MD 30 patch 0    losartan (COZAAR) 50 MG tablet TAKE 1 TABLET(50 MG) BY MOUTH EVERY DAY (Patient taking differently: Take 50 mg by mouth once daily.) 90 tablet 3    lovastatin (MEVACOR) 10 MG tablet TAKE 1 TABLET(10 MG) BY MOUTH EVERY DAY (Patient taking differently: Take 10 mg by mouth every evening.) 90 tablet 3    metoprolol succinate (TOPROL-XL) 200 MG 24 hr tablet Take 1 tablet (200 mg total) by mouth once daily. 60 tablet 1    mometasone 0.1% (ELOCON) 0.1 % cream Use daily 50 g 3    multivit,calc,mins/iron/folic (ONE-A-DAY WOMENS FORMULA ORAL) Take 1 tablet by mouth once daily.      aspirin 81 MG Chew Take 1 tablet (81 mg total) by mouth once daily.  0    benzonatate (TESSALON) 200 MG capsule Take 1 capsule (200 mg total) by mouth 3 (three) times daily as needed for Cough. 30 capsule 0    blood-glucose meter (FREESTYLE SYSTEM KIT) kit Use as instructed 1 each 0    tiZANidine (ZANAFLEX) 2 MG tablet Take 2 tablets (4 mg total) by mouth daily as needed (muscle spasms). 30 tablet 0     No current facility-administered medications for this visit.       Patient Care Team:  Tere Hughes MD as PCP - General (Internal Medicine)  Preston Whitfield MD as Consulting Physician (Cardiology)  Criss Baird OD (Inactive) as Consulting Physician (Optometry)  Marvin Roach MD as Consulting Physician (Rheumatology)  Wendy Garcia LPN as Care  Coordinator  Earnestine Azul, RN as Registered Nurse  Narciso Briceño LPN as Licensed Practical Nurse  Tammy Berman, RN as Transition Navigator         - The patient is given an After Visit Summary that lists all medications with directions, allergies, education, orders placed during this encounter and follow-up instructions.      - I have reviewed the patient's medical information including past medical, family, and social history sections including the medications and allergies.      - We discussed the patient's current medications.     This note was created by combination of typed  and MModal dictation.  Transcription errors may be present.  If there are any questions, please contact me.       Felipa Hernández NP

## 2024-06-08 NOTE — TELEPHONE ENCOUNTER
Refill Decision Note   Tonya Cohn  is requesting a refill authorization.  Brief Assessment and Rationale for Refill:  Approve     Medication Therapy Plan:         Comments:     Note composed:10:36 PM 09/15/2023             Appointments     Last Visit   8/25/2023 Tere Hughes MD   Next Visit   3/4/2024 Tere Hughes MD       "Goal Outcome Evaluation:                 Outcome Evaluation: [id number] PT AOx4 VSS on RA with exception of elevated temp (100.3) provider notified and scheduled tylenol given. Reports diarrhea but no n/v. Abdominal pain5-7/10, radiating to back. PRN oral and IV dilaudid given. Pt able to sleep after recieving pain meds. Pt somewhat withdrawn, but cooperative. Affirms axiety. Independent in room.    BP 97/54 (BP Location: Right arm)   Pulse 60   Temp 100.3  F (37.9  C) (Oral)   Resp 16   Ht 1.473 m (4' 10\")   Wt 73.1 kg (161 lb 2.5 oz)   SpO2 98%   BMI 33.68 kg/m       "

## 2024-06-11 ENCOUNTER — DOCUMENTATION ONLY (OUTPATIENT)
Dept: CARDIOLOGY | Facility: CLINIC | Age: 89
End: 2024-06-11

## 2024-06-11 ENCOUNTER — OFFICE VISIT (OUTPATIENT)
Dept: CARDIOLOGY | Facility: CLINIC | Age: 89
End: 2024-06-11
Payer: MEDICARE

## 2024-06-11 ENCOUNTER — LAB VISIT (OUTPATIENT)
Dept: LAB | Facility: HOSPITAL | Age: 89
End: 2024-06-11
Payer: MEDICARE

## 2024-06-11 ENCOUNTER — TELEPHONE (OUTPATIENT)
Dept: CARDIOLOGY | Facility: CLINIC | Age: 89
End: 2024-06-11

## 2024-06-11 ENCOUNTER — HOSPITAL ENCOUNTER (OUTPATIENT)
Dept: CARDIOLOGY | Facility: CLINIC | Age: 89
Discharge: HOME | End: 2024-06-11
Payer: MEDICARE

## 2024-06-11 VITALS
WEIGHT: 195.25 LBS | DIASTOLIC BLOOD PRESSURE: 66 MMHG | BODY MASS INDEX: 31.38 KG/M2 | HEART RATE: 48 BPM | OXYGEN SATURATION: 95 % | SYSTOLIC BLOOD PRESSURE: 146 MMHG | HEIGHT: 66 IN

## 2024-06-11 DIAGNOSIS — I50.22 HEART FAILURE WITH MILDLY REDUCED EJECTION FRACTION (HFMREF): Primary | ICD-10-CM

## 2024-06-11 DIAGNOSIS — R06.02 SOB (SHORTNESS OF BREATH): ICD-10-CM

## 2024-06-11 DIAGNOSIS — E66.09 CLASS 1 OBESITY DUE TO EXCESS CALORIES WITH SERIOUS COMORBIDITY AND BODY MASS INDEX (BMI) OF 31.0 TO 31.9 IN ADULT: ICD-10-CM

## 2024-06-11 DIAGNOSIS — I35.0 MILD AORTIC STENOSIS: ICD-10-CM

## 2024-06-11 DIAGNOSIS — I48.91 NEW ONSET ATRIAL FIBRILLATION: ICD-10-CM

## 2024-06-11 DIAGNOSIS — R00.1 BRADYCARDIA: ICD-10-CM

## 2024-06-11 DIAGNOSIS — E78.5 HYPERLIPIDEMIA LDL GOAL <100: Chronic | ICD-10-CM

## 2024-06-11 DIAGNOSIS — I10 ESSENTIAL HYPERTENSION: Chronic | ICD-10-CM

## 2024-06-11 LAB
ALBUMIN SERPL BCP-MCNC: 3.6 G/DL (ref 3.5–5.2)
ALP SERPL-CCNC: 85 U/L (ref 55–135)
ALT SERPL W/O P-5'-P-CCNC: 22 U/L (ref 10–44)
ANION GAP SERPL CALC-SCNC: 7 MMOL/L (ref 8–16)
AST SERPL-CCNC: 19 U/L (ref 10–40)
BASOPHILS # BLD AUTO: 0.04 K/UL (ref 0–0.2)
BASOPHILS NFR BLD: 0.8 % (ref 0–1.9)
BILIRUB SERPL-MCNC: 0.3 MG/DL (ref 0.1–1)
BNP SERPL-MCNC: 716 PG/ML (ref 0–99)
BUN SERPL-MCNC: 15 MG/DL (ref 10–30)
CALCIUM SERPL-MCNC: 9.2 MG/DL (ref 8.7–10.5)
CHLORIDE SERPL-SCNC: 104 MMOL/L (ref 95–110)
CO2 SERPL-SCNC: 30 MMOL/L (ref 23–29)
CREAT SERPL-MCNC: 0.9 MG/DL (ref 0.5–1.4)
DIFFERENTIAL METHOD BLD: ABNORMAL
EOSINOPHIL # BLD AUTO: 0.1 K/UL (ref 0–0.5)
EOSINOPHIL NFR BLD: 2.1 % (ref 0–8)
ERYTHROCYTE [DISTWIDTH] IN BLOOD BY AUTOMATED COUNT: 13.6 % (ref 11.5–14.5)
EST. GFR  (NO RACE VARIABLE): 60 ML/MIN/1.73 M^2
GLUCOSE SERPL-MCNC: 144 MG/DL (ref 70–110)
HCT VFR BLD AUTO: 31 % (ref 37–48.5)
HGB BLD-MCNC: 10.8 G/DL (ref 12–16)
IMM GRANULOCYTES # BLD AUTO: 0.01 K/UL (ref 0–0.04)
IMM GRANULOCYTES NFR BLD AUTO: 0.2 % (ref 0–0.5)
LYMPHOCYTES # BLD AUTO: 1.7 K/UL (ref 1–4.8)
LYMPHOCYTES NFR BLD: 32.1 % (ref 18–48)
MAGNESIUM SERPL-MCNC: 2 MG/DL (ref 1.6–2.6)
MCH RBC QN AUTO: 29.9 PG (ref 27–31)
MCHC RBC AUTO-ENTMCNC: 34.8 G/DL (ref 32–36)
MCV RBC AUTO: 86 FL (ref 82–98)
MONOCYTES # BLD AUTO: 0.6 K/UL (ref 0.3–1)
MONOCYTES NFR BLD: 10.3 % (ref 4–15)
NEUTROPHILS # BLD AUTO: 2.9 K/UL (ref 1.8–7.7)
NEUTROPHILS NFR BLD: 54.5 % (ref 38–73)
NRBC BLD-RTO: 0 /100 WBC
OHS QRS DURATION: 108 MS
OHS QTC CALCULATION: 487 MS
PHOSPHATE SERPL-MCNC: 3 MG/DL (ref 2.7–4.5)
PLATELET # BLD AUTO: 217 K/UL (ref 150–450)
PMV BLD AUTO: 11.2 FL (ref 9.2–12.9)
POTASSIUM SERPL-SCNC: 3.8 MMOL/L (ref 3.5–5.1)
PROT SERPL-MCNC: 6.6 G/DL (ref 6–8.4)
RBC # BLD AUTO: 3.61 M/UL (ref 4–5.4)
SODIUM SERPL-SCNC: 141 MMOL/L (ref 136–145)
WBC # BLD AUTO: 5.32 K/UL (ref 3.9–12.7)

## 2024-06-11 PROCEDURE — 1157F ADVNC CARE PLAN IN RCRD: CPT | Mod: CPTII,S$GLB,,

## 2024-06-11 PROCEDURE — 1111F DSCHRG MED/CURRENT MED MERGE: CPT | Mod: CPTII,S$GLB,,

## 2024-06-11 PROCEDURE — 93005 ELECTROCARDIOGRAM TRACING: CPT | Mod: S$GLB,,,

## 2024-06-11 PROCEDURE — 93010 ELECTROCARDIOGRAM REPORT: CPT | Mod: S$GLB,,, | Performed by: INTERNAL MEDICINE

## 2024-06-11 PROCEDURE — 80053 COMPREHEN METABOLIC PANEL: CPT

## 2024-06-11 PROCEDURE — 1101F PT FALLS ASSESS-DOCD LE1/YR: CPT | Mod: CPTII,S$GLB,,

## 2024-06-11 PROCEDURE — 1159F MED LIST DOCD IN RCRD: CPT | Mod: CPTII,S$GLB,,

## 2024-06-11 PROCEDURE — 1160F RVW MEDS BY RX/DR IN RCRD: CPT | Mod: CPTII,S$GLB,,

## 2024-06-11 PROCEDURE — 99204 OFFICE O/P NEW MOD 45 MIN: CPT | Mod: S$GLB,,,

## 2024-06-11 PROCEDURE — 83735 ASSAY OF MAGNESIUM: CPT

## 2024-06-11 PROCEDURE — 84100 ASSAY OF PHOSPHORUS: CPT

## 2024-06-11 PROCEDURE — 83880 ASSAY OF NATRIURETIC PEPTIDE: CPT

## 2024-06-11 PROCEDURE — 1125F AMNT PAIN NOTED PAIN PRSNT: CPT | Mod: CPTII,S$GLB,,

## 2024-06-11 PROCEDURE — 36415 COLL VENOUS BLD VENIPUNCTURE: CPT

## 2024-06-11 PROCEDURE — 99999 PR PBB SHADOW E&M-EST. PATIENT-LVL V: CPT | Mod: PBBFAC,,,

## 2024-06-11 PROCEDURE — 85025 COMPLETE CBC W/AUTO DIFF WBC: CPT

## 2024-06-11 PROCEDURE — 3288F FALL RISK ASSESSMENT DOCD: CPT | Mod: CPTII,S$GLB,,

## 2024-06-11 RX ORDER — METOPROLOL SUCCINATE 200 MG/1
TABLET, EXTENDED RELEASE ORAL
Start: 2024-06-11

## 2024-06-11 RX ORDER — FUROSEMIDE 20 MG/1
20 TABLET ORAL 2 TIMES DAILY
Qty: 60 TABLET | Refills: 1 | Status: SHIPPED | OUTPATIENT
Start: 2024-06-11 | End: 2024-08-10

## 2024-06-11 NOTE — PROGRESS NOTES
"[facility]) First Week Visit     Pt presents to clinic 6/11/2024 and accompanied by daughter Angeles Cohn, daughter Pily Garcia and the younger daughter.     Most Recent Hospital Discharge Date: 6/3/2024  Last admission Diagnosis/chief complaint: SOB        Visit Vitals:     Wt Readings from Last 3 Encounters:   06/11/24 88.6 kg (195 lb 3.8 oz)   06/07/24 87.8 kg (193 lb 9 oz)   06/03/24 87.7 kg (193 lb 5.5 oz)     Temp Readings from Last 3 Encounters:   06/07/24 97.9 °F (36.6 °C) (Oral)   06/03/24 97.6 °F (36.4 °C) (Oral)   05/29/24 97.6 °F (36.4 °C)     BP Readings from Last 3 Encounters:   06/11/24 (!) 146/66   06/07/24 138/66   06/03/24 (!) 154/67     Pulse Readings from Last 3 Encounters:   06/11/24 (!) 48   06/07/24 70   06/03/24 77            Pt reports the following:  []  Shortness of Breath with activity  []  Shortness of Breath at rest/ sleeping on 2-3 pillows "some days"  []  Fatigue  []  Edema   [] Chest pain or tightness  [] Weight Increase since discharge  [x] None of the above    Pt reports being in the Green (color) Zone. If in yellow/red, reminded that they should be calling [facility] today or now.      Medications:   Medication reconciliation completed today per MA.  Pt reports having all medications available and understands how to take them appropriately. Reminded pt to call prior to making any changes to medications.      Education:    [x] Gave pt new  / Confirmed pt has  "Heart [facility] Home Care Guide" .   Reviewed key points as listed below.      Recommend 2 gram sodium restriction and 1500cc fluid restriction.  Encourage physical activity with graded exercise program.  Requested patient to weigh themselves daily, and to notify us if their weight increases by more than 3 lbs in 1 day or 5 lbs in 3 days.      [x] Gave / Reviewed "Daily Weight and Symptom Tracker".  Reviewed with patient when and how to call  [facility] according to "Yellow " "Zone" and "Red Zone".                  [x] Pt given list of low/high sodium food list                   Watch for these Signs and Symptoms: If any of these occur, contact [facility] immediately:   Increase in shortness of breath with movement   Increase in swelling in your legs and ankles   Weight gain of more than 3 pounds in a night or 5 pounds in 3 days.   Difficulty breathing when you are lying down   Worsening fatigue or tiredness   Stomach bloating, a full feeling or a loss of appetite   Increased coughing--especially when you are lying down     MyChart and Care Companion:              Patient active on myChart? Yes, but patient does not use regularly    Contacting our Team:              Reviewed with pt how to contact [facility] RN via phone or [employer] messaging.      HF TCC Program Plan:  Pt educated on follow-up plan while in [facility] program.   [x]  PT given /reviewed upcoming appointment/check in dates. These will include weekly contact with RN or visits with providers over the next 4-6 weeks.                   [x]  Pt educated that they will transitioned to long term care provider team at week 4-6.  This team will be either Cardiology, PCP or Advanced Heart Failure depending on needs.          Pt was able to verbalize back to LPN in their own words correct diet/fluid restrictions, necessity for exercise, warning signs and symptoms, when and how to contact their TCC team .       Plan:     See PA-C note.      [x]  Pt given AVS with follow up appointment within two weeks 6/25/2024 and medication detail list for ease of use.     [x]  Explained to pt they will have a phone "check in" by Nurse in one week on 6/18/2024     [] Pt met with Mo Leigh Dietician today after visit.  Refer to his note for details.     Will follow up with pt at next clinic visit and Nurse navigator available for pt questions, issues or concerns.     Please refer to provider visit for additional details and assessment.    "

## 2024-06-11 NOTE — TELEPHONE ENCOUNTER
Spoke with Ms. Angeles Cohn to inform her of the following.     Can we call and let her daughter know EKG with sinus bradycardia, not in AF. Same metoprolol recommendations to reduce to 100mg daily.    Ms. Angeles Cohn verbalized understanding.

## 2024-06-18 ENCOUNTER — DOCUMENTATION ONLY (OUTPATIENT)
Dept: CARDIOLOGY | Facility: CLINIC | Age: 89
End: 2024-06-18
Payer: MEDICARE

## 2024-06-18 NOTE — PROGRESS NOTES
"[facility]) weekly phone follow up / triage call completed.     TCC RN Navigator spoke with Daughter Angeles    Current Patient reported weight: 188 lbs   Patient Goal Weight:  Recent Patient reported blood pressure and heart rate: 132/60 Hr 52    Pt reports the following:  []  Shortness of Breath with Activity  []  Shortness of Breath at rest   []  Fatigue  []  Edema   [] Chest pain or tightness  [] Weight Increase since discharge  [x] None of the above    Pt reports using "Daily weight and symptom tracker".    Pt reports being in the GREEN Zone. If in yellow/red, reminded that they should be calling [facility] today or now.     Medications:   Medication compliance reviewed with pt.  Pt reports having medication list available and has all medications at home for use per list. No questions or concerns about medications.     Education:   Confirmed pt still has "Heart Failure [facility] Clinic Home Care Guide"  .     Reminded of key points as listed below.     Recommend 2 -3 gram sodium restriction and 1500 cc-2000 cc fluid restriction.  Encourage physical activity with graded exercise program.  Requested patient to weigh themselves daily, and to notify us if their weight increases by more than 3 lbs in 1 day or 5 lbs in 3 days.   Reminded to use "Daily weight and symptom tracker".  Even if pt does not have a scale, to use symptom tracker.       Watch for these Signs and Symptoms: If any of these occur, contact [facility] immediately:   Increase in shortness of breath with movement   Increase in swelling in your legs and ankles   Weight gain of more than 3 pounds in a day or 5 pounds in 3 days.   Difficulty breathing when you are lying down   Worsening fatigue or tiredness   Stomach bloating, a full feeling or a loss of appetite   Increased coughing--especially when you are lying down      Pt was able to verbalize back to RN in their own words correct diet/fluid restrictions, necessity " for exercise, warning signs and symptoms, when and how to contact their [facility] team.      Pt reminded of upcoming appointment 06/25/2024.  PT reports they will attend.       Pt reminded of how and when to contact [facility]:  [phone] (Mon-Fri, 8a-5p) & for urgent issues on the weekend to page the Heart Transplant MD on call.  Pt also encouraged utilize [facility] messaging as well.      Pt  verbalized understanding and in agreement of plan.       Will follow up with pt at next clinic visit and RN navigator available for pt questions, issues or concerns.

## 2024-06-18 NOTE — PROGRESS NOTES
"Heart Failure [facility]    Attempted to call pt to complete 1 week "check in" call. Unable to reach pt at listed phone numbers.  Was unable to leave message with family nor on voicemail. Voicemail not set up or full. . Not set up.    Will continue to try to reach patient.      "

## 2024-06-20 ENCOUNTER — TELEPHONE (OUTPATIENT)
Dept: CARDIOLOGY | Facility: CLINIC | Age: 89
End: 2024-06-20
Payer: MEDICARE

## 2024-06-20 ENCOUNTER — OFFICE VISIT (OUTPATIENT)
Dept: CARDIOLOGY | Facility: CLINIC | Age: 89
End: 2024-06-20
Payer: MEDICARE

## 2024-06-20 VITALS
HEIGHT: 66 IN | BODY MASS INDEX: 30.22 KG/M2 | WEIGHT: 188.06 LBS | SYSTOLIC BLOOD PRESSURE: 140 MMHG | HEART RATE: 56 BPM | OXYGEN SATURATION: 98 % | DIASTOLIC BLOOD PRESSURE: 68 MMHG

## 2024-06-20 DIAGNOSIS — E03.9 HYPOTHYROIDISM (ACQUIRED): ICD-10-CM

## 2024-06-20 DIAGNOSIS — I48.0 PAROXYSMAL ATRIAL FIBRILLATION: ICD-10-CM

## 2024-06-20 DIAGNOSIS — I10 ESSENTIAL HYPERTENSION: Primary | Chronic | ICD-10-CM

## 2024-06-20 DIAGNOSIS — E11.40 TYPE 2 DIABETES, CONTROLLED, WITH NEUROPATHY: ICD-10-CM

## 2024-06-20 DIAGNOSIS — I48.91 NEW ONSET ATRIAL FIBRILLATION: ICD-10-CM

## 2024-06-20 DIAGNOSIS — E78.5 HYPERLIPIDEMIA LDL GOAL <100: Chronic | ICD-10-CM

## 2024-06-20 DIAGNOSIS — I50.22 HEART FAILURE WITH MILDLY REDUCED EJECTION FRACTION (HFMREF): ICD-10-CM

## 2024-06-20 DIAGNOSIS — K21.9 GASTROESOPHAGEAL REFLUX DISEASE WITHOUT ESOPHAGITIS: ICD-10-CM

## 2024-06-20 DIAGNOSIS — I70.0 ATHEROSCLEROSIS OF ABDOMINAL AORTA: ICD-10-CM

## 2024-06-20 PROCEDURE — 99999 PR PBB SHADOW E&M-EST. PATIENT-LVL V: CPT | Mod: PBBFAC,,,

## 2024-06-20 NOTE — ASSESSMENT & PLAN NOTE
-Goal BP < 140/90  -continue medical therapy- losartan 50 mg  -See HF management  -low salt diet

## 2024-06-20 NOTE — ASSESSMENT & PLAN NOTE
-PAF  -EKG 6/11/24- SB w PAC  -CHADVASC score 4  -continue apixaban 5 mg   -discussed safety while on AC therapy

## 2024-06-20 NOTE — ASSESSMENT & PLAN NOTE
-Continue ASA and lovastatin 10 mg    Patient ID: Liyah is a 16 year old female.    Chief Complaint   Patient presents with   • Earache     left ear discomfort       Presents w  4 weeks bilat ear discomfort LT>Rt , fullness presummed due to cerumen impaction.  Seen in clinic last month ago for same issue, at home procedures attempted after visit, instill Mineral Oil to ear canals, without success, return for ear irrigation.       Patient's medications, allergies, past medical, surgical, social and family histories were reviewed and updated as appropriate.    Review of Systems   Constitutional: Negative.    HENT: Positive for ear pain.    Eyes: Negative.    Respiratory: Negative.    Cardiovascular: Negative.        There is no problem list on file for this patient.      No current outpatient medications on file.     No current facility-administered medications for this visit.        Past Medical History:   Diagnosis Date   • Seizures (CMS/[facility])        Social History     Socioeconomic History   • Marital status: Not on file     Spouse name: Not on file   • Number of children: Not on file   • Years of education: Not on file   • Highest education level: Not on file   Occupational History   • Not on file   Social Needs   • Financial resource strain: Not on file   • Food insecurity:     Worry: Not on file     Inability: Not on file   • Transportation needs:     Medical: Not on file     Non-medical: Not on file   Tobacco Use   • Smoking status: Never Smoker   • Smokeless tobacco: Never Used   Substance and Sexual Activity   • Alcohol use: Never     Frequency: Never   • Drug use: Never   • Sexual activity: Not on file   Lifestyle   • Physical activity:     Days per week: Not on file     Minutes per session: Not on file   • Stress: Not on file   Relationships   • Social connections:     Talks on phone: Not on file     Gets together: Not on file     Attends Voodoo service: Not on file     Active member of club or organization: Not on file     Attends meetings  of clubs or organizations: Not on file     Relationship status: Not on file   • Intimate partner violence:     Fear of current or ex partner: Not on file     Emotionally abused: Not on file     Physically abused: Not on file     Forced sexual activity: Not on file   Other Topics Concern   • Not on file   Social History Narrative   • Not on file       Social History     Tobacco Use   Smoking Status Never Smoker   Smokeless Tobacco Never Used       Past Surgical History:   Procedure Laterality Date   • No past surgeries         Family History   Adopted: Yes   Problem Relation Age of Onset   • Patient is unaware of any medical problems Mother    • Patient is unaware of any medical problems Father        No exam data present    ALLERGIES:  No Known Allergies    Immunization History   Administered Date(s) Administered   • HPV 9-Valent 08/09/2017   • Meningococcal Conjugate MCV4P (Menactra) 10/09/2014   • Tdap 10/09/2014       Visit Vitals  /70   Pulse 70   Temp 98.3 °F (36.8 °C) (Oral)   Resp 16   Ht 5' 4.17\" (1.63 m)   Wt 55.4 kg (122 lb 3.9 oz)   BMI 20.87 kg/m²      Procedures:  Right and left ear canals irrigated with the elephant irrigation system.  Mod amounts of cerumen irrigated out of ears.  Pt tolerated procedure well.  Bilateral canals erythmic with areas of excoriation present in area where wax removed. Small patches of maceration present.  No pain, but patient aggitated w procedure , so stopped before cerumen removed completely.  Patient states discomfort relieved, will continue using Mineral oil at home as needed.       Physical Exam  Vitals signs and nursing note reviewed.   Constitutional:       Appearance: Normal appearance.   HENT:      Right Ear: Hearing and external ear normal.      Left Ear: Hearing and external ear normal.      Nose: Nose normal.      Mouth/Throat:      Lips: Pink.      Pharynx: Oropharynx is clear.   Neurological:      Mental Status: She is alert.         Plan:  Bilateral  impacted cerumen      Discussion:    Discussed with patient findings on exam. Discussed supportive care and treatment plan and that if symptoms are not getting better or feels symptoms are getting worse then they should have further evaluation. Patient verbalized understanding of the plan.          Whitney Sweeney, CNP

## 2024-06-20 NOTE — ASSESSMENT & PLAN NOTE
PLAN:    Patient Instructions:   Instruct the patient to notify this clinic if HH, a physician or an advanced care provider wants to change medication one of their HF medications   Activity and Diet restrictions:   Recommend 2-3 gram sodium restriction and 1500cc- 2000cc fluid restriction.  Encourage physical activity with graded exercise program.  Requested patient to weigh themselves daily, and to notify us if their weight increases by more than 3 lbs in 1 day or 5 lbs in 3 days.  -Followed by Heart failure team and was last seen on 6/11/24 by Audrey Rodriguez PA-C  -continue medical therapy: furosemide 20 mg BID, losartan 50 mg, metoprolol succinate 100 mg

## 2024-06-20 NOTE — PROGRESS NOTES
Subjective:    Patient ID:  Tonya Cohn is a 89 y.o. female who presents for evaluation of No chief complaint on file.      PCP: Tere Hughes MD     Referring Provider: Alberto Staples DO     HPI: Patient is a 89 yo F w/PMH of HTN, HLD, PAF, new HFmrEF (45-50%),mild AS/MS, T2DM , diverticulosis, CKD-3, sciatica who presents today to [facility] care and hospital follow up following recent admission for new onset AF. On arrival to ED HR in 140s. EKG with new onset AF RVR. CXR with pulmonary edema with . Troponin 0.038. Admitted to  for further evaluation and management of new onset AF and ADHF. 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 Volume status optimized. Started on beta-blocker with avoidance of Cardizem due to new reduction in EF. Converted to NSR so DCCV deferred. She was initiated on DOAC and subsequently discharged.       Patient reports doing well since discharge. Patient denies CP, SOB, palpitations, orthopnea, PND, presyncope, LOC, swelling, or claudication. She notes right hip pain and ambulates with a cane. She performs all daily activities independently, including preparing her meals.  Patient monitors her  home BP and ranges 140s/50s. Patient reports medication compliance without side effects  Patient does not exercise regularly, but remains active at home without limitation.    Past Medical History:   Diagnosis Date    AR (allergic rhinitis)     Atherosclerosis of abdominal aorta     noted on CT scan 1/17/2011    Carpal tunnel syndrome of left wrist     Chronic kidney disease, stage 3     Diabetic peripheral neuropathy     Diverticulosis     History of colon polyps     History of diverticulitis of colon 1/2014    Hyperlipemia     Hypertension     Hypothyroidism     followed by endocrinology, Dr. Green    Mild aortic stenosis     New onset atrial fibrillation 6/1/2024    OA (osteoarthritis) of knee     Obesity     Sciatica     Type II or unspecified type diabetes mellitus  with neurological manifestations, uncontrolled(250.62)      Past Surgical History:   Procedure Laterality Date    CATARACT EXTRACTION Bilateral     COLONOSCOPY N/A 10/3/2017    Procedure: COLONOSCOPY;  Surgeon: Alin Gonzalez MD;  Location: [facility] ([address] [place] [address]);  Service: Endoscopy;  Laterality: N/A;    COSMETIC SURGERY      HYSTERECTOMY      partial    JOINT REPLACEMENT Right     LUNG BIOPSY  in her 40's    TOTAL KNEE ARTHROPLASTY Right 2014    TKR     Social History     Socioeconomic History    Marital status:     Number of children: 7   Occupational History    Occupation: retired - house cleaning   Tobacco Use    Smoking status: Former     Current packs/day: 0.00     Average packs/day: 0.3 packs/day for 7.5 years (1.9 ttl pk-yrs)     Types: Cigarettes     Start date: 1955     Quit date: 1962     Years since quittin.9     Passive exposure: Never    Smokeless tobacco: Former     Quit date: 1970   Substance and Sexual Activity    Alcohol use: Yes     Alcohol/week: 1.0 standard drink of alcohol     Types: 1 Cans of beer per week     Comment: occasionally    Drug use: No    Sexual activity: Not Currently     Partners: Male   Other Topics Concern    Are you pregnant or think you may be? No    Breast-feeding No     Social Determinants of Health     Financial Resource Strain: Low Risk  (2024)    Overall Financial Resource Strain (CARDIA)     Difficulty of Paying Living Expenses: Not hard at all   Food Insecurity: No Food Insecurity (2024)    Hunger Vital Sign     Worried About Running Out of Food in the Last Year: Never true     Ran Out of Food in the Last Year: Never true   Transportation Needs: No Transportation Needs (2024)    PRAPARE - Transportation     Lack of Transportation (Medical): No     Lack of Transportation (Non-Medical): No   Physical Activity: Unknown (2024)    Exercise Vital Sign     Days of Exercise per Week: Patient declined    Stress: No Stress Concern Present (4/23/2024)    [employer] - Occupational Stress Questionnaire     Feeling of Stress : Not at all   Housing Stability: Low Risk  (5/4/2022)    Housing Stability Vital Sign     Unable to Pay for Housing in the Last Year: No     Number of Places Lived in the Last Year: 1     Unstable Housing in the Last Year: No     Family History   Problem Relation Name Age of Onset    Cancer Mother          shoulder tumor - cancer    Hypertension Mother      Glaucoma Mother      Heart disease Father          valve replacement    Hypertension Father      Heart attack Father      Diabetes Sister Lidya     Arthritis Sister Lovinia         s/p knee surgery    Diabetes Brother Benoris     Heart disease Brother Benoris     Heart failure Brother Benoris     Stroke Brother Ardell     Arthritis Brother Vital         back problems    Skin cancer Brother Bogdan     Cancer Brother Bogdan     Throat cancer Brother Bogdan     No Known Problems Maternal Grandmother      No Known Problems Maternal Grandfather      No Known Problems Paternal Grandmother      No Known Problems Paternal Grandfather      Cancer Son Johnathan         jaw    Cataracts Son Johnathan     No Known Problems Maternal Aunt      No Known Problems Maternal Uncle      No Known Problems Paternal Aunt      No Known Problems Paternal Uncle      Melanoma Neg Hx      Eczema Neg Hx      Lupus Neg Hx      Psoriasis Neg Hx      Breast cancer Neg Hx      Colon cancer Neg Hx      Amblyopia Neg Hx      Blindness Neg Hx      Macular degeneration Neg Hx      Retinal detachment Neg Hx      Strabismus Neg Hx      Thyroid disease Neg Hx         Review of patient's allergies indicates:   Allergen Reactions    Lipitor [atorvastatin]        Medication List with Changes/Refills   Current Medications    ACETAMINOPHEN (TYLENOL) 500 MG TABLET    Take 1 tablet (500 mg total) by mouth every 4 (four)  hours as needed for Pain.    ASPIRIN 81 MG CHEW    Take 1 tablet (81 mg total) by mouth once daily.    BLOOD-GLUCOSE METER (FREESTYLE SYSTEM KIT) KIT    Use as instructed    CALCIUM CARBONATE (OS-MIRACLE) 500 MG CALCIUM (1,250 MG) TABLET    Take 1 tablet by mouth every other day.    DIPHENHYDRAMINE (BENADRYL) 25 MG CAPSULE    Take 1 capsule (25 mg total) by mouth every 6 (six) hours as needed for Itching or Allergies.    DORZOLAMIDE-TIMOLOL 2-0.5% (COSOPT) 22.3-6.8 MG/ML OPHTHALMIC SOLUTION    Place 1 drop into the right eye 2 (two) times daily.    FISH OIL-FAT ACID COMB.8- 1,200 MG (400 [id number]) CAP    Take 1 capsule by mouth once daily.    FUROSEMIDE (LASIX) 20 MG TABLET    Take 1 tablet (20 mg total) by mouth 2 (two) times daily.    HYDROCODONE-ACETAMINOPHEN (NORCO) 5-325 MG PER TABLET    Take 1 tablet by mouth.    HYDROCORTISONE 2.5 % CREAM    Apply topically 2 (two) times daily.    IBUPROFEN (ADVIL,MOTRIN) 800 MG TABLET    Take 800 mg by mouth every 6 (six) hours as needed.    LEVOTHYROXINE (SYNTHROID) 88 MCG TABLET    TAKE 1 TABLET(88 MCG) BY MOUTH EVERY DAY    LIDOCAINE (LIDODERM) 5 %    Place 1 patch onto the skin once daily. Remove & Discard patch within 12 hours or as directed by MD    LOSARTAN (COZAAR) 50 MG TABLET    TAKE 1 TABLET(50 MG) BY MOUTH EVERY DAY    LOVASTATIN (MEVACOR) 10 MG TABLET    TAKE 1 TABLET(10 MG) BY MOUTH EVERY DAY    METOPROLOL SUCCINATE (TOPROL-XL) 200 MG 24 HR TABLET    Take 100mg (1/2 tablet) once daily.    MOMETASONE 0.1% (ELOCON) 0.1 % CREAM    Use daily    MULTIVIT,CALC,MINS/IRON/FOLIC (ONE-A-DAY WOMENS FORMULA ORAL)    Take 1 tablet by mouth once daily.   Changed and/or Refilled Medications    Modified Medication Previous Medication    APIXABAN (ELIQUIS) 5 MG TAB apixaban (ELIQUIS) 5 mg Tab       Take 1 tablet (5 mg total) by mouth 2 (two) times daily.    Take 1 tablet (5 mg total) by mouth 2 (two) times daily.       Review of Systems   Constitutional: Negative for  "diaphoresis and fever.   HENT:  Negative for congestion and hearing loss.    Eyes:  Negative for blurred vision and pain.   Cardiovascular:  Negative for chest pain, claudication, dyspnea on exertion, leg swelling, near-syncope, palpitations and syncope.   Respiratory:  Negative for shortness of breath and sleep disturbances due to breathing.    Hematologic/Lymphatic: Negative for bleeding problem. Does not bruise/bleed easily.   Skin:  Negative for color change and poor wound healing.   Musculoskeletal:  Positive for joint pain.   Gastrointestinal:  Negative for abdominal pain and nausea.   Genitourinary:  Negative for bladder incontinence and flank pain.   Neurological:  Negative for focal weakness and light-headedness.        Objective:   BP (!) 140/68 (BP Location: Left arm, Patient Position: Sitting, BP Method: Large (Manual))   Pulse (!) 56   Ht 5' 6" (1.676 m)   Wt 85.3 kg (188 lb 0.8 oz)   SpO2 98%   BMI 30.35 kg/m²    Physical Exam  Constitutional:       Appearance: She is well-developed. She is not diaphoretic.   HENT:      Head: Normocephalic and atraumatic.   Eyes:      General: No scleral icterus.     Pupils: Pupils are equal, round, and reactive to light.   Neck:      Vascular: No JVD.   Cardiovascular:      Rate and Rhythm: Normal rate and regular rhythm.      Pulses: Intact distal pulses.           Radial pulses are 2+ on the right side and 2+ on the left side.        Dorsalis pedis pulses are 1+ on the right side and 1+ on the left side.        Posterior tibial pulses are 1+ on the right side and 1+ on the left side.      Heart sounds: S1 normal and S2 normal. Murmur heard.      Harsh systolic murmur is present at the upper right sternal border radiating to the neck.      No friction rub. No gallop.   Pulmonary:      Effort: Pulmonary effort is normal. No respiratory distress.      Breath sounds: Normal breath sounds. No wheezing or rales.   Chest:      Chest wall: No tenderness.   Abdominal: "      General: Bowel sounds are normal. There is no distension.      Palpations: Abdomen is soft. There is no mass.      Tenderness: There is no abdominal tenderness. There is no rebound.   Musculoskeletal:         General: No tenderness. Normal range of motion.      Cervical back: Normal range of motion and neck supple.   Skin:     General: Skin is warm and dry.      Coloration: Skin is not pale.   Neurological:      Mental Status: She is alert and oriented to person, place, and time.      Coordination: Coordination normal.      Deep Tendon Reflexes: Reflexes normal.   Psychiatric:         Behavior: Behavior normal.         Judgment: Judgment normal.         EKG reviewed 6/11/24: SB w PACs, incomplete RBBB   Cardiac echo 6/1/24  Echo 6/1/24:    Left Ventricle: The left ventricle is normal in size. Normal wall thickness. There is concentric remodeling. Normal wall motion. There is mildly reduced systolic function with a visually estimated ejection fraction of 45 - 50%. Ejection fraction by visual approximation is 55%. Unable to assess diastolic function due to atrial fibrillation. Elevated left ventricular filling pressure.    Right Ventricle: Normal right ventricular cavity size. There is hypertrophy. Right ventricle wall motion  is normal. Systolic function is mildly reduced.    Left Atrium: Left atrium is moderately dilated.    Right Atrium: Right atrium is moderately dilated.    Aortic Valve: There is moderate aortic valve sclerosis. There is moderate annular calcification present. Moderately restricted motion. There is mild stenosis. Aortic valve area by VTI is 1.30 cm². Aortic valve peak velocity is 1.6 m/s. Mean gradient is 6 mmHg. The dimensionless index is 0.46. There is no significant regurgitation.    Mitral Valve: There is moderate posterior mitral annular calcification present. Mildly restricted motion. There is mild stenosis. The mean pressure gradient across the mitral valve is 4 mmHg at a heart  rate of 101 bpm. There is mild to moderate regurgitation with a centrally directed jet.    Tricuspid Valve: The tricuspid valve is structurally normal. There is mild to moderate regurgitation.    Pulmonary Artery: There is mild pulmonary hypertension. The estimated pulmonary artery systolic pressure is 53 mmHg.    IVC/SVC: Elevated venous pressure at 15 mmHg.    Pericardium: There is no pericardial effusion.    Significant beat to beat variability in EF with irregular rhythm.    TE 10/11/2021  - The left ventricle is normal in size with concentric remodeling and normal systolic function.  - The estimated ejection fraction is 60%.  - Grade II left ventricular diastolic dysfunction.  - Normal right ventricular size with normal right ventricular systolic function.  - There is mild aortic valve stenosis.  - Aortic valve area is 0.74 cm2; peak velocity is 2.13 m/s; mean gradient is 10 mmHg.  - There is mild mitral stenosis.  - The mean diastolic gradient across the mitral valve is 4 mmHg at a heart rate of 64 bpm.  - The estimated PA systolic pressure is 29 mmHg.     Cardiac Pharmacological Stress  10/2016  1. The EKG portion of this study is negative for ischemia at a peak heart rate of 109 bpm (83% of predicted).   2. Blood pressure remained stable throughout the protocol  (Presenting BP: 141/77 Peak BP: 171/76).   3. No significant arrhythmias were present.   4. There were no symptoms of chest discomfort or significant dyspnea throughout the protocol.      Holter monitor - 24h 07/2014  1. Predominant rhythm is NSR   2. Rare PAC's and PVC's   3. No significant arrhythmias/blocks/pause     Assessment:       1. Essential hypertension    2. Atrial fibrillation    3. Heart failure with mildly reduced ejection fraction (HFmrEF)    4. Atherosclerosis of abdominal aorta    5. Hyperlipidemia LDL goal <100    6. New onset atrial fibrillation    7. Type 2 diabetes, controlled, with neuropathy    8. Hypothyroidism (acquired)     9. Gastroesophageal reflux disease without esophagitis         Plan:         Essential hypertension  -Goal BP < 140/90  -continue medical therapy- losartan 50 mg  -See HF management  -low salt diet      Heart failure with mildly reduced ejection fraction (HFmrEF)  PLAN:    Patient Instructions:   Instruct the patient to notify this clinic if HH, a physician or an advanced care provider wants to change medication one of their HF medications   Activity and Diet restrictions:   Recommend 2-3 gram sodium restriction and 1500cc- 2000cc fluid restriction.  Encourage physical activity with graded exercise program.  Requested patient to weigh themselves daily, and to notify us if their weight increases by more than 3 lbs in 1 day or 5 lbs in 3 days.  -Followed by Heart failure team and was last seen on 6/11/24 by Audrey Rodriguez PA-C  -continue medical therapy: furosemide 20 mg BID, losartan 50 mg, metoprolol succinate 100 mg        Atherosclerosis of abdominal aorta  -Continue ASA and lovastatin 10 mg     Hyperlipidemia LDL goal <100  -Lovastatin 10 mg   -discussed lifestyle modifications      New onset atrial fibrillation  -PAF  -EKG 6/11/24- SB w PAC  -CHADVASC score 4  -continue apixaban 5 mg   -discussed safety while on AC therapy     Type 2 diabetes, controlled, with neuropathy  -Last A1c 7.0 2/26/24  -followed by PCP  -continue medical therapy     Hypothyroidism (acquired)  -followed by PCP  -continue medical therapy- synthroid 88 mcg     GERD (gastroesophageal reflux disease)  -followed by PCP  -treated with OTC medication       Total duration of face to face visit time 30 minutes.  Total time spent counseling greater than fifty percent of total visit time.  Counseling included discussion regarding imaging findings, diagnosis, possibilities, treatment options, risks and benefits.  The patient had many questions regarding the options and long-term effects      Samuel Rader, JOSHUA  Cardiology

## 2024-06-24 NOTE — PROGRESS NOTES
HF TCC Provider Note (Follow-up) Consult Note      HPI:     Patient able to ambulate within home and pace slow. Denies worsening SOB from last visit. Ambulating to clinic today and pace slow using cane for assistance               Patient sleeping on 2-3 number of pillows              Patient wakes up SOB, has to get out of bed, associated cough- denies               Palpitations - denies              Dizzy, light-headed, pre-syncope or syncope- denies recent              Since discharge frequency of performing weights, home weight and weight change- performing daily weights. Weight stable ~188lbs              Other information felt pertinent to HPI: Ms. Tonya Cohn is a 89 yo female with a PMHx of HTN, HLD, diverticulosis, CKD 3, sciatica who presents to second HFTCC visit following recent admission for new onset AF. On arrival to ED HR in 140s. EKG with new onset AF RVR. CXR with pulmonary edema with . Troponin 0.038. Admitted to  for further evaluation and management of new onset AF and ADHF. 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 Volume status optimized. Started on beta-blocker with avoidance of Cardizem due to new reduction in EF. Converted to NSR so DCCV deferred. She was initiated on DOAC and subsequently discharged.      PHYSICAL:   Vitals:    06/25/24 1306   BP: (!) 146/62   Pulse: (!) 56      @[id number](3)@    JVD: no  Heart rhythm: regular  Cardiac murmur: No    S3: no  S4: no  Lungs: clear   Hepatojugular reflux: no  Edema: yes, 1+ BLE edema in ankles    Lab Results   Component Value Date     06/25/2024    K 4.6 06/25/2024    MG 2.0 06/25/2024     06/25/2024    CO2 28 06/25/2024    BUN 16 06/25/2024    CREATININE 1.0 06/25/2024     06/25/2024    CALCIUM 9.7 06/25/2024    AST 19 06/25/2024    ALT 10 06/25/2024    ALBUMIN 4.0 06/25/2024    PROT 7.1 06/25/2024    BILITOT 0.4 06/25/2024     Lab Results   Component Value Date     (H) 06/25/2024     (H) 06/20/2024    BNP  716 (H) 06/11/2024       ASSESSMENT: HFmrEF     PLAN:      Patient Instructions:   Instruct the patient to notify this clinic if HH, a physician or an advanced care provider wants to change medication one of their HF medications   Activity and Diet restrictions:   Recommend 2-3 gram sodium restriction and 1500cc- 2000cc fluid restriction.  Encourage physical activity with graded exercise program.  Requested patient to weigh themselves daily, and to notify us if their weight increases by more than 3 lbs in 1 day or 5 lbs in 3 days.    Assigned dry weight on home scale: 188lbs  Medication changes (include current dose and changed dose): NYHA Class II symptoms. Well compensated on exam, labs stable. Continue lasix 20mg BID.    Upcoming labs and date anticipated: now established with Gen cards. Weekly phone check ins with RTC eda Rodriguez PA-C

## 2024-06-25 ENCOUNTER — OFFICE VISIT (OUTPATIENT)
Dept: CARDIOLOGY | Facility: CLINIC | Age: 89
End: 2024-06-25
Payer: MEDICARE

## 2024-06-25 ENCOUNTER — LAB VISIT (OUTPATIENT)
Dept: LAB | Facility: HOSPITAL | Age: 89
End: 2024-06-25
Payer: MEDICARE

## 2024-06-25 VITALS
DIASTOLIC BLOOD PRESSURE: 62 MMHG | WEIGHT: 188.06 LBS | HEART RATE: 56 BPM | HEIGHT: 66 IN | SYSTOLIC BLOOD PRESSURE: 146 MMHG | BODY MASS INDEX: 30.22 KG/M2

## 2024-06-25 DIAGNOSIS — R06.02 SOB (SHORTNESS OF BREATH): ICD-10-CM

## 2024-06-25 DIAGNOSIS — E78.5 HYPERLIPIDEMIA LDL GOAL <100: ICD-10-CM

## 2024-06-25 DIAGNOSIS — I35.0 MILD AORTIC STENOSIS: ICD-10-CM

## 2024-06-25 DIAGNOSIS — I48.91 NEW ONSET ATRIAL FIBRILLATION: ICD-10-CM

## 2024-06-25 DIAGNOSIS — I50.22 HEART FAILURE WITH MILDLY REDUCED EJECTION FRACTION (HFMREF): Primary | ICD-10-CM

## 2024-06-25 DIAGNOSIS — I10 ESSENTIAL HYPERTENSION: ICD-10-CM

## 2024-06-25 DIAGNOSIS — E66.09 CLASS 1 OBESITY DUE TO EXCESS CALORIES WITH SERIOUS COMORBIDITY AND BODY MASS INDEX (BMI) OF 31.0 TO 31.9 IN ADULT: ICD-10-CM

## 2024-06-25 LAB
ALBUMIN SERPL BCP-MCNC: 4 G/DL (ref 3.5–5.2)
ALP SERPL-CCNC: 82 U/L (ref 55–135)
ALT SERPL W/O P-5'-P-CCNC: 10 U/L (ref 10–44)
ANION GAP SERPL CALC-SCNC: 10 MMOL/L (ref 8–16)
AST SERPL-CCNC: 19 U/L (ref 10–40)
BILIRUB SERPL-MCNC: 0.4 MG/DL (ref 0.1–1)
BNP SERPL-MCNC: 537 PG/ML (ref 0–99)
BUN SERPL-MCNC: 16 MG/DL (ref 10–30)
CALCIUM SERPL-MCNC: 9.7 MG/DL (ref 8.7–10.5)
CHLORIDE SERPL-SCNC: 107 MMOL/L (ref 95–110)
CO2 SERPL-SCNC: 28 MMOL/L (ref 23–29)
CREAT SERPL-MCNC: 1 MG/DL (ref 0.5–1.4)
EST. GFR  (NO RACE VARIABLE): 52.9 ML/MIN/1.73 M^2
GLUCOSE SERPL-MCNC: 105 MG/DL (ref 70–110)
MAGNESIUM SERPL-MCNC: 2 MG/DL (ref 1.6–2.6)
PHOSPHATE SERPL-MCNC: 3.2 MG/DL (ref 2.7–4.5)
POTASSIUM SERPL-SCNC: 4.6 MMOL/L (ref 3.5–5.1)
PROT SERPL-MCNC: 7.1 G/DL (ref 6–8.4)
SODIUM SERPL-SCNC: 145 MMOL/L (ref 136–145)

## 2024-06-25 PROCEDURE — 84100 ASSAY OF PHOSPHORUS: CPT

## 2024-06-25 PROCEDURE — 1159F MED LIST DOCD IN RCRD: CPT | Mod: CPTII,S$GLB,,

## 2024-06-25 PROCEDURE — 83880 ASSAY OF NATRIURETIC PEPTIDE: CPT

## 2024-06-25 PROCEDURE — 1160F RVW MEDS BY RX/DR IN RCRD: CPT | Mod: CPTII,S$GLB,,

## 2024-06-25 PROCEDURE — 1126F AMNT PAIN NOTED NONE PRSNT: CPT | Mod: CPTII,S$GLB,,

## 2024-06-25 PROCEDURE — 36415 COLL VENOUS BLD VENIPUNCTURE: CPT

## 2024-06-25 PROCEDURE — 1157F ADVNC CARE PLAN IN RCRD: CPT | Mod: CPTII,S$GLB,,

## 2024-06-25 PROCEDURE — 99999 PR PBB SHADOW E&M-EST. PATIENT-LVL V: CPT | Mod: PBBFAC,,,

## 2024-06-25 PROCEDURE — 1111F DSCHRG MED/CURRENT MED MERGE: CPT | Mod: CPTII,S$GLB,,

## 2024-06-25 PROCEDURE — 80053 COMPREHEN METABOLIC PANEL: CPT

## 2024-06-25 PROCEDURE — 3288F FALL RISK ASSESSMENT DOCD: CPT | Mod: CPTII,S$GLB,,

## 2024-06-25 PROCEDURE — 83735 ASSAY OF MAGNESIUM: CPT

## 2024-06-25 PROCEDURE — 1101F PT FALLS ASSESS-DOCD LE1/YR: CPT | Mod: CPTII,S$GLB,,

## 2024-06-25 PROCEDURE — 99214 OFFICE O/P EST MOD 30 MIN: CPT | Mod: S$GLB,,,

## 2024-06-25 NOTE — PATIENT INSTRUCTIONS
Notify us ([phone]) with any worsening shortness of breath, swelling or 3lb weight gain on home scale.

## 2024-06-26 ENCOUNTER — TELEPHONE (OUTPATIENT)
Dept: CARDIOLOGY | Facility: CLINIC | Age: 89
End: 2024-06-26
Payer: MEDICARE

## 2024-06-27 ENCOUNTER — TELEPHONE (OUTPATIENT)
Dept: FAMILY MEDICINE | Facility: CLINIC | Age: 89
End: 2024-06-27
Payer: MEDICARE

## 2024-06-27 NOTE — TELEPHONE ENCOUNTER
----- Message from Jennyfer June sent at 6/27/2024  2:48 PM CDT -----  .Type:  Sooner Appointment Request    Patient is requesting a sooner appointment.  Patient declined first available appointment listed as well as another facility and provider .  Patient will not accept being placed on the waitlist and is requesting a message be sent to doctor.    Name of Caller: self    When is the first available appointment? 7/23    Symptoms: back pain    Would the patient rather a call back or a response via My [facility]? call    Best Call Back Number: .[phone] (home) [phone] (work)      Additional Information:

## 2024-07-03 ENCOUNTER — DOCUMENTATION ONLY (OUTPATIENT)
Dept: CARDIOLOGY | Facility: CLINIC | Age: 89
End: 2024-07-03
Payer: MEDICARE

## 2024-07-03 NOTE — PROGRESS NOTES
"[facility]) DISCHARGE VISIT - PHONE     Called and spoke to Ms. Tonya Cohn.    Most Recent Hospital Discharge Date: 6/3/2024  Last admission Diagnosis/chief complaint: SOB    Pt discharge completed by phone related to pt convenience.       Pt reports the following:  []  Shortness of Breath with activity  []  Shortness of Breath at rest   []  Fatigue  []  Edema   [] Chest pain or tightness  [] Weight Increase since discharge  [x] None of the above    Medications:   Pt reports having all medications available and understands how to take them appropriately. Reminded pt to call prior to making any changes to medications.     Education:   [x] Confirmed pt still has  "[facility] Home Care Guide" .   Reviewed key points as listed below.     Recommend 2 gram sodium restriction and 1500cc fluid restriction.  Encourage physical activity with graded exercise program.  Requested patient to weigh themselves daily, and to notify us if their weight increases by more than 3 lbs in 1 day or 5 lbs in 3 days.     [x] Reviewed completed "Daily Weight and Symptom Tracker".  Reviewed with patient when and how to call HFTCC according to "Yellow Zone" and "Red Zone".       Watch for these Signs and Symptoms: If any of these occur, contact HFTCC immediately:   Increase in shortness of breath with movement   Increase in swelling in your legs and ankles   Weight gain of more than 3 pounds in a night or 5 pounds in 3 days.   Difficulty breathing when you are lying down   Worsening fatigue or tiredness   Stomach bloating, a full feeling or a loss of appetite   Increased coughing--especially when you are lying down    MyChart and Care Companion:   Patient active on myChart? Yes, but patient does not use regularly    HF TCC Program Plan:  Pt has successfully completed HFTCC program.  Pt care to be transferred to General Cardiology for long term care.     Pt educated on how to " call their offices and how to call [facility] On call in the event of an after hour issue.    PT reminded to continue to follow recommendations made during the HFTCC program to include monitoring daily weights, taking medications according to list, following up to appointments per provider recommendations, stop smoking/ start exercising and following a heart friendly low salt, low fluid diet.      Pt was able to verbalize back to LPN in their own words correct diet/fluid restrictions, necessity for exercise, warning signs and symptoms, when and how to contact their  Long term care team .      Plan:     The pt will continue long term cardiology care with General Cardiology. The pt has an appt with Samuel Rader NP.    [x]  Discussed upcoming appointments and/or plan for follow-up care with his/her PCP/Cardiology.    All notes created by Audrey Rodriguez PA-C are available to all providers with in our system.     Please refer to provider note for additional details and assessment.

## 2024-07-12 ENCOUNTER — OFFICE VISIT (OUTPATIENT)
Dept: SPINE | Facility: CLINIC | Age: 89
End: 2024-07-12
Payer: MEDICARE

## 2024-07-12 VITALS
DIASTOLIC BLOOD PRESSURE: 68 MMHG | BODY MASS INDEX: 30.11 KG/M2 | HEIGHT: 66 IN | HEART RATE: 51 BPM | RESPIRATION RATE: 12 BRPM | WEIGHT: 187.38 LBS | SYSTOLIC BLOOD PRESSURE: 156 MMHG | OXYGEN SATURATION: 100 %

## 2024-07-12 DIAGNOSIS — R53.1 WEAKNESS: ICD-10-CM

## 2024-07-12 DIAGNOSIS — M47.816 LUMBAR SPONDYLOSIS: Primary | ICD-10-CM

## 2024-07-12 DIAGNOSIS — S22.080D COMPRESSION FRACTURE OF T12 VERTEBRA WITH ROUTINE HEALING, SUBSEQUENT ENCOUNTER: ICD-10-CM

## 2024-07-12 PROCEDURE — 99999 PR PBB SHADOW E&M-EST. PATIENT-LVL V: CPT | Mod: PBBFAC,,, | Performed by: NURSE PRACTITIONER

## 2024-07-12 NOTE — PROGRESS NOTES
Chronic Pain - Established Visit    Referring Physician: No ref. provider found    Chief Complaint:   No chief complaint on file.       SUBJECTIVE:    Interval History 7/12/2024:  The patient returns today for follow up of chronic back pain. She was evaluated by Dr. Sherwood at initial OV and PT was ordered. However, she has not yet started PT. Since previous encounter, she had a hospitalization for new-onset A-fib. She is now taking Eliquis. Her pain is located across the lower back without radiation. No numbness to BLE. She does report numbness to left hand intermittently. Her back pain worsens with standing. She is accompanied by her two daughters who are active in her care. Her pain today is 10/10.    Initial Encounter:  Tonya Cohn presents to the clinic for the evaluation of low back pain. The pain started 2 weeks ago without inciting event/trauma and symptoms have been unchanged.She recently went to the ED for her back pain where they did LXR. She also received Steroid injection, tylenol, and given Zanaflex by her PCP. The pain is located in the bilateral lumbar paraspinal area without radiating pain.  The pain is described as aching and sharp and is rated as 10/10. The pain is rated with a score of  7/10 on the BEST day and a score of 10/10 on the WORST day.  Symptoms interfere with daily activity. The pain is exacerbated by Standing, Bending, Walking, and Extension.  The pain is mitigated by heat, medications, rest, and sitting. She has been using her walking cane since the pain started to ambulate.     Patient denies night fever/night sweats, urinary incontinence, bowel incontinence, significant weight loss, significant motor weakness, and loss of sensations.    Physical Therapy/Home Exercise: Yes; 1 year ago- 6 weeks      Pain Disability Index Review:      12/28/2021    10:01 AM   Last 3 PDI Scores   Pain Disability Index (PDI) 43       Pain Medications:  Tylenol  Zanaflex     report:  Not  applicable    Pain Procedures: None    Imaging:   X-Ray Lumbar Spine Flexion And Extension Only  Order: [id number]  Status: Final result       Visible to patient: Yes (not seen)       Next appt: 07/17/2024 at 01:00 PM in Radiology ([facility] MAMMO1 HOLOGIC DIMENSIONS)       Dx: Lumbar spondylosis; Spondylolisthesis...    0 Result Notes  Details    Reading Physician Reading Date Result Priority   William Diaz MD  [phone] 5/29/2024 Routine     Narrative & Impression  EXAMINATION:  XR LUMBAR SPINE FLEXION AND EXTENSION ONLY     CLINICAL HISTORY:  Spondylolisthesis, site unspecified     TECHNIQUE:  Lateral flexion/extension views of the lumbar spine were performed     COMPARISON:  05/19/2024     FINDINGS:  Grade 2 anterolisthesis of L4 on L5 is unchanged.  There is no significant translation of listhesis in flexion/extension views.  There is a mild compression deformity identified at T12     Impression:     Grade 2 anterolisthesis of L4 on L5 without significant translation on flexion/extension views     Mild compression deformity at T12.     Past Medical History:   Diagnosis Date    AR (allergic rhinitis)     Atherosclerosis of abdominal aorta     noted on CT scan 1/17/2011    Carpal tunnel syndrome of left wrist     Chronic kidney disease, stage 3     Diabetic peripheral neuropathy     Diverticulosis     History of colon polyps     History of diverticulitis of colon 1/2014    Hyperlipemia     Hypertension     Hypothyroidism     followed by endocrinology, Dr. Green    Mild aortic stenosis     New onset atrial fibrillation 6/1/2024    OA (osteoarthritis) of knee     Obesity     Sciatica     Type II or unspecified type diabetes mellitus with neurological manifestations, uncontrolled(250.62)      Past Surgical History:   Procedure Laterality Date    CATARACT EXTRACTION Bilateral     COLONOSCOPY N/A 10/3/2017    Procedure: COLONOSCOPY;  Surgeon: Alin Gonzalez MD;  Location: [facility] ([address] [place] [address]);  Service:  Endoscopy;  Laterality: N/A;    COSMETIC SURGERY      HYSTERECTOMY      partial    JOINT REPLACEMENT Right     LUNG BIOPSY  in her 40's    TOTAL KNEE ARTHROPLASTY Right 2014    TKR     Social History     Socioeconomic History    Marital status:     Number of children: 7   Occupational History    Occupation: retired - house cleaning   Tobacco Use    Smoking status: Former     Current packs/day: 0.00     Average packs/day: 0.3 packs/day for 7.5 years (1.9 ttl pk-yrs)     Types: Cigarettes     Start date: 1955     Quit date: 1962     Years since quittin.0     Passive exposure: Never    Smokeless tobacco: Former     Quit date: 1970   Substance and Sexual Activity    Alcohol use: Yes     Alcohol/week: 1.0 standard drink of alcohol     Types: 1 Cans of beer per week     Comment: occasionally    Drug use: No    Sexual activity: Not Currently     Partners: Male   Other Topics Concern    Are you pregnant or think you may be? No    Breast-feeding No     Social Determinants of Health     Financial Resource Strain: Low Risk  (2024)    Overall Financial Resource Strain (CARDIA)     Difficulty of Paying Living Expenses: Not hard at all   Food Insecurity: No Food Insecurity (2024)    Hunger Vital Sign     Worried About Running Out of Food in the Last Year: Never true     Ran Out of Food in the Last Year: Never true   Transportation Needs: No Transportation Needs (2024)    PRAPARE - Transportation     Lack of Transportation (Medical): No     Lack of Transportation (Non-Medical): No   Physical Activity: Unknown (2024)    Exercise Vital Sign     Days of Exercise per Week: Patient declined   Stress: No Stress Concern Present (2024)    [employer] - Occupational Stress Questionnaire     Feeling of Stress : Not at all   Housing Stability: Low Risk  (2022)    Housing Stability Vital Sign     Unable to Pay for Housing in the Last Year: No     Number  of Places Lived in the Last Year: 1     Unstable Housing in the Last Year: No     Family History   Problem Relation Name Age of Onset    Cancer Mother          shoulder tumor - cancer    Hypertension Mother      Glaucoma Mother      Heart disease Father          valve replacement    Hypertension Father      Heart attack Father      Diabetes Sister Lidya     Arthritis Sister Lovinia         s/p knee surgery    Diabetes Brother Benoris     Heart disease Brother Benoris     Heart failure Brother Benoris     Stroke Brother Ardell     Arthritis Brother Vital         back problems    Skin cancer Brother Bogdan     Cancer Brother Bogdan     Throat cancer Brother Bogdan     No Known Problems Maternal Grandmother      No Known Problems Maternal Grandfather      No Known Problems Paternal Grandmother      No Known Problems Paternal Grandfather      Cancer Son Johnathan         jaw    Cataracts Son Johnathan     No Known Problems Maternal Aunt      No Known Problems Maternal Uncle      No Known Problems Paternal Aunt      No Known Problems Paternal Uncle      Melanoma Neg Hx      Eczema Neg Hx      Lupus Neg Hx      Psoriasis Neg Hx      Breast cancer Neg Hx      Colon cancer Neg Hx      Amblyopia Neg Hx      Blindness Neg Hx      Macular degeneration Neg Hx      Retinal detachment Neg Hx      Strabismus Neg Hx      Thyroid disease Neg Hx         Review of patient's allergies indicates:   Allergen Reactions    Lipitor [atorvastatin]        Current Outpatient Medications   Medication Sig    acetaminophen (TYLENOL) 500 MG tablet Take 1 tablet (500 mg total) by mouth every 4 (four) hours as needed for Pain. (Patient taking differently: Take 500 mg by mouth daily as needed for Pain (Back Pain).)    apixaban (ELIQUIS) 5 mg Tab Take 1 tablet (5 mg total) by mouth 2 (two) times daily.    calcium carbonate (OS-MIRACLE) 500 mg calcium (1,250 mg) tablet Take 1 tablet by mouth every other day.    diphenhydrAMINE (BENADRYL) 25 mg capsule Take  1 capsule (25 mg total) by mouth every 6 (six) hours as needed for Itching or Allergies.    fish oil-fat acid [id number] 1,200 mg (400 [id number]) Cap Take 1 capsule by mouth once daily.    furosemide (LASIX) 20 MG tablet Take 1 tablet (20 mg total) by mouth 2 (two) times daily.    HYDROcodone-acetaminophen (NORCO) 5-325 mg per tablet Take 1 tablet by mouth.    hydrocortisone 2.5 % cream Apply topically 2 (two) times daily. (Patient taking differently: Apply 15 g topically 2 (two) times daily.)    ibuprofen (ADVIL,MOTRIN) 800 MG tablet Take 800 mg by mouth every 6 (six) hours as needed.    levothyroxine (SYNTHROID) 88 MCG tablet TAKE 1 TABLET(88 MCG) BY MOUTH EVERY DAY (Patient taking differently: Take 88 mcg by mouth every morning.)    LIDOcaine (LIDODERM) 5 % Place 1 patch onto the skin once daily. Remove & Discard patch within 12 hours or as directed by MD    losartan (COZAAR) 50 MG tablet TAKE 1 TABLET(50 MG) BY MOUTH EVERY DAY (Patient taking differently: Take 50 mg by mouth once daily.)    lovastatin (MEVACOR) 10 MG tablet TAKE 1 TABLET(10 MG) BY MOUTH EVERY DAY (Patient taking differently: Take 10 mg by mouth every evening.)    metoprolol succinate (TOPROL-XL) 200 MG 24 hr tablet Take 100mg (1/2 tablet) once daily.    mometasone 0.1% (ELOCON) 0.1 % cream Use daily    multivit,calc,mins/iron/folic (ONE-A-DAY WOMENS FORMULA ORAL) Take 1 tablet by mouth once daily.    aspirin 81 MG Chew Take 1 tablet (81 mg total) by mouth once daily.    blood-glucose meter (FREESTYLE SYSTEM KIT) kit Use as instructed    dorzolamide-timolol 2-0.5% (COSOPT) 22.3-6.8 mg/mL ophthalmic solution Place 1 drop into the right eye 2 (two) times daily.     No current facility-administered medications for this visit.       REVIEW OF SYSTEMS:    GENERAL:  No weight loss, malaise or fevers.  HEENT:  Negative for frequent or significant headaches.  NECK:  Negative for lumps, goiter, pain and significant neck swelling.  RESPIRATORY:   "Negative for cough, wheezing or shortness of breath.  CARDIOVASCULAR:  Negative for chest pain, leg swelling or palpitations. HTN  GI:  Negative for abdominal discomfort, blood in stools or black stools or change in bowel habits.  MUSCULOSKELETAL:  See HPI. Low back pain.   SKIN:  Negative for lesions, rash, and itching.  PSYCH:  Negative for sleep disturbance, mood disorder and recent psychosocial stressors.  HEMATOLOGY/LYMPHOLOGY:  Negative for prolonged bleeding, bruising easily or swollen nodes.  NEURO:   No history of headaches, syncope, paralysis, seizures or tremors.  All other reviewed and negative other than HPI.    OBJECTIVE:    BP (!) 156/68   Pulse (!) 51   Resp 12   Ht 5' 6" (1.676 m)   Wt 85 kg (187 lb 6.3 oz)   SpO2 100%   BMI 30.25 kg/m²     PHYSICAL EXAMINATION:    General appearance: Well appearing, in no acute distress, alert and oriented x3.  Psych:  Mood and affect appropriate.  Skin: Skin color, texture, turgor normal, no rashes or lesions, in both upper and lower body.  Head/face:  Normocephalic, atraumatic. No palpable lymph nodes.  Back: Straight leg raising in the sitting and supine positions is negative to radicular pain. Pain to palpation over the lumbar facet joints. Limited range of motion with pain reproduction on flexion and extension. Positive facet loading.   Extremities: Peripheral joint ROM is full and pain free without obvious instability or laxity in all four extremities. No deformities, edema, or skin discoloration. Good capillary refill.  Musculoskeletal: Bilateral upper and lower extremity strength is normal and symmetric.  No atrophy or tone abnormalities are noted.  Neuro: No loss of sensation is noted.  Gait: Antalgic- ambulates without assistance.    ASSESSMENT: 89 y.o. year old female with lower back pain, consistent with the following diagnoses:    1. Lumbar spondylosis  Ambulatory referral/consult to Physical/Occupational Therapy    MRI Lumbar Spine Without " Contrast      2. Weakness  Ambulatory referral/consult to Physical/Occupational Therapy      3. Compression fracture of T12 vertebra with routine healing, subsequent encounter  MRI Lumbar Spine Without Contrast              PLAN:   We discussed with the patient the assessment and recommendations. The following is the plan we agreed on:     - Recent lumbar XRAYs reviewed. No instability. There is T12 compression fracture. Will order lumbar MRI for further evaluation with note to capture T12.    - New order placed for PT.    - Continue home medications for continued pain relief.     - RTC in 2-3 months or sooner if needed.    - Counseled patient regarding the importance of activity modification and physical therapy.      The above plan and management options were discussed at length with patient. Patient is in agreement with the above and verbalized understanding.     Rosa Maria Jacome  07/12/2024

## 2024-07-17 RX ORDER — METOPROLOL SUCCINATE 200 MG/1
TABLET, EXTENDED RELEASE ORAL
Qty: 90 TABLET | Refills: 0 | Status: SHIPPED | OUTPATIENT
Start: 2024-07-17

## 2024-07-18 PROBLEM — G89.29 CHRONIC LOWER BACK PAIN: Status: ACTIVE | Noted: 2024-07-18

## 2024-07-18 PROBLEM — R26.9 GAIT ABNORMALITY: Status: ACTIVE | Noted: 2024-07-18

## 2024-07-18 PROBLEM — M54.50 CHRONIC LOWER BACK PAIN: Status: ACTIVE | Noted: 2024-07-18

## 2024-07-22 ENCOUNTER — TELEPHONE (OUTPATIENT)
Dept: PAIN MEDICINE | Facility: CLINIC | Age: 89
End: 2024-07-22
Payer: MEDICARE

## 2024-07-22 NOTE — TELEPHONE ENCOUNTER
Discussed MRI results with patient's daughter, Angeles. Discussed recommendation of kyphoplasty. She would like to discuss with patient and family and will call us back.

## 2024-08-16 ENCOUNTER — TELEPHONE (OUTPATIENT)
Dept: PAIN MEDICINE | Facility: CLINIC | Age: 89
End: 2024-08-16
Payer: MEDICARE

## 2024-08-16 NOTE — TELEPHONE ENCOUNTER
----- Message from Candice Pereyra sent at 8/16/2024  3:44 PM CDT -----  Regarding: Return Call    Who Called:  Patient Daughter      Who Left Message for Patient:  BASSAM Jacome      Does the patient know what this is regarding?  About  kyphoplasty.        Best Call Back Number: [phone]         Additional Information: Patient is returning a call.  Please assist.

## 2024-08-20 ENCOUNTER — OFFICE VISIT (OUTPATIENT)
Dept: OPHTHALMOLOGY | Facility: CLINIC | Age: 89
End: 2024-08-20
Payer: MEDICARE

## 2024-08-20 DIAGNOSIS — Z96.1 PSEUDOPHAKIA OF BOTH EYES: ICD-10-CM

## 2024-08-20 DIAGNOSIS — H40.023 OPEN ANGLE WITH BORDERLINE FINDINGS AND HIGH GLAUCOMA RISK IN BOTH EYES: Primary | ICD-10-CM

## 2024-08-20 DIAGNOSIS — H40.053 BILATERAL OCULAR HYPERTENSION: ICD-10-CM

## 2024-08-20 PROCEDURE — 1160F RVW MEDS BY RX/DR IN RCRD: CPT | Mod: CPTII,S$GLB,, | Performed by: OPHTHALMOLOGY

## 2024-08-20 PROCEDURE — 99999 PR PBB SHADOW E&M-EST. PATIENT-LVL I: CPT | Mod: PBBFAC,,, | Performed by: OPHTHALMOLOGY

## 2024-08-20 PROCEDURE — G2211 COMPLEX E/M VISIT ADD ON: HCPCS | Mod: S$GLB,,, | Performed by: OPHTHALMOLOGY

## 2024-08-20 PROCEDURE — 99214 OFFICE O/P EST MOD 30 MIN: CPT | Mod: S$GLB,,, | Performed by: OPHTHALMOLOGY

## 2024-08-20 PROCEDURE — 1159F MED LIST DOCD IN RCRD: CPT | Mod: CPTII,S$GLB,, | Performed by: OPHTHALMOLOGY

## 2024-08-20 PROCEDURE — 92133 CPTRZD OPH DX IMG PST SGM ON: CPT | Mod: S$GLB,,, | Performed by: OPHTHALMOLOGY

## 2024-08-20 PROCEDURE — 1157F ADVNC CARE PLAN IN RCRD: CPT | Mod: CPTII,S$GLB,, | Performed by: OPHTHALMOLOGY

## 2024-08-20 RX ORDER — DORZOLAMIDE HYDROCHLORIDE AND TIMOLOL MALEATE 20; 5 MG/ML; MG/ML
1 SOLUTION/ DROPS OPHTHALMIC 2 TIMES DAILY
Qty: 10 ML | Refills: 6 | Status: SHIPPED | OUTPATIENT
Start: 2024-08-20 | End: 2024-09-19

## 2024-08-20 NOTE — PROGRESS NOTES
Assessment /Plan     For exam results, see Encounter Report.    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 variable    Both eyes --> Tolerating well & good adherence --> adjust --> using OD only --> discussed OU  Cosopt BID    PC IOL OU  Quiet  Observe    Plan  RTC 4 months IOP & Adherence & DFE & photos  RTC sooner prn with good understanding

## 2024-08-28 ENCOUNTER — TELEPHONE (OUTPATIENT)
Dept: PAIN MEDICINE | Facility: CLINIC | Age: 89
End: 2024-08-28
Payer: MEDICARE

## 2024-08-28 DIAGNOSIS — I50.22 HEART FAILURE WITH MILDLY REDUCED EJECTION FRACTION (HFMREF): Primary | ICD-10-CM

## 2024-08-28 RX ORDER — FUROSEMIDE 20 MG/1
20 TABLET ORAL 2 TIMES DAILY
Qty: 60 TABLET | Refills: 11 | Status: SHIPPED | OUTPATIENT
Start: 2024-08-28 | End: 2025-08-23

## 2024-08-28 NOTE — TELEPHONE ENCOUNTER
----- Message from Heather Mcclain sent at 8/28/2024  1:56 PM CDT -----  Contact: Angeles  Type:  Patient Call          Who Called: patient daughter -Angeles         Does the patient know what this is regarding?: Requesting a call back about pt being scheduled for therapy ; please advise           Would the patient rather a call back or a response via [facility]? Call           Best Call Back Number:[phone]             Additional Information:

## 2024-08-28 NOTE — TELEPHONE ENCOUNTER
Staff called pt about message that was left with the call center. Pt did not answer. Staff COULDN'T  leave a vm.

## 2024-08-29 ENCOUNTER — PATIENT MESSAGE (OUTPATIENT)
Dept: CARDIOLOGY | Facility: CLINIC | Age: 89
End: 2024-08-29
Payer: MEDICARE

## 2024-08-29 DIAGNOSIS — I12.9 BENIGN HYPERTENSION WITH CHRONIC KIDNEY DISEASE, STAGE III: ICD-10-CM

## 2024-08-29 DIAGNOSIS — N18.30 BENIGN HYPERTENSION WITH CHRONIC KIDNEY DISEASE, STAGE III: ICD-10-CM

## 2024-08-29 NOTE — TELEPHONE ENCOUNTER
Care Due:                  Date            Visit Type   Department     Provider  --------------------------------------------------------------------------------                                 -         [facility]                              PRIMARY      MED/ INTERNAL  [facility]  Last Visit: 03-      CARE ([facility])   MED/ PEDS      Roxanne Hughes                              [facility]                              PRIMARY      MED/ INTERNAL  Bannerentia Sandee  Next Visit: 12-      CARE ([facility])   MED/ PEDS      Jojoer  Ellen                                                            Last  Test          Frequency    Reason                     Performed    Due Date  --------------------------------------------------------------------------------    Lipid Panel.  12 months..  lovastatin...............  08- 08-    [facility] Embedded Care Due Messages. Reference number: [id number].   8/29/2024 12:31:48 PM CDT

## 2024-08-30 RX ORDER — LOSARTAN POTASSIUM 50 MG/1
TABLET ORAL
Qty: 90 TABLET | Refills: 1 | Status: SHIPPED | OUTPATIENT
Start: 2024-08-30

## 2024-08-30 NOTE — TELEPHONE ENCOUNTER
Refill Routing Note   Medication(s) are not appropriate for processing by [facility] for the following reason(s):        Required vitals abnormal    [facility] action(s):  Defer   Requires labs : Yes             Appointments  past 12m or future 3m with PCP    Date Provider   Last Visit   3/4/2024 Tere Hughes MD   Next Visit   12/18/2024 Tere Hughes MD   ED visits in past 90 days: 0        Note composed:9:45 AM 08/30/2024

## 2024-09-05 ENCOUNTER — TELEPHONE (OUTPATIENT)
Dept: PAIN MEDICINE | Facility: CLINIC | Age: 89
End: 2024-09-05
Payer: MEDICARE

## 2024-09-05 NOTE — TELEPHONE ENCOUNTER
----- Message from Maco Izquierdo sent at 9/5/2024  2:14 PM CDT -----  Name of Who is Calling:ANTHONY LOWERY [[id number]]                   What is the request in detail:PT wants aq call back to set up an appt for injection in her hip please assist                    Can the clinic reply by [facility]: No                   What Number to Call Back if not in [facility]:[phone]

## 2024-09-05 NOTE — TELEPHONE ENCOUNTER
Staff called pt about message that was left with the call center. Pt wants an appt for hip injection. Staff verbalized we will talk with eladia to see what's best.

## 2024-09-13 ENCOUNTER — TELEPHONE (OUTPATIENT)
Dept: PAIN MEDICINE | Facility: CLINIC | Age: 89
End: 2024-09-13
Payer: MEDICARE

## 2024-09-13 NOTE — TELEPHONE ENCOUNTER
----- Message from Melina Champion sent at 9/13/2024 12:34 PM CDT -----  Regarding: Patient Advice              Name of Who is Calling:       Patient's Daughter    Who Left The Message:       Patient's Daughter       What is the request in detail:        Patient's daughter called requesting a order for her Mother to resume her physical therapy.  Please give a call back at your earliest convenience and further advise.    Thank you      Can the clinic reply by [facility]: NO      Preferred Call Back :   [phone] (F)

## 2024-09-13 NOTE — TELEPHONE ENCOUNTER
Staff called pt about message that was left with the call center. Pt daughter is asking for a new referral for her mother because they told her it was over 30 days and she would need a new one.

## 2024-09-14 DIAGNOSIS — E78.5 HYPERLIPIDEMIA LDL GOAL <100: ICD-10-CM

## 2024-09-14 NOTE — TELEPHONE ENCOUNTER
No care due was identified.  [facility] Embedded Care Due Messages. Reference number: [id number].   9/14/2024 6:09:41 AM CDT

## 2024-09-16 DIAGNOSIS — E03.9 ACQUIRED HYPOTHYROIDISM: ICD-10-CM

## 2024-09-16 DIAGNOSIS — N18.30 BENIGN HYPERTENSION WITH CHRONIC KIDNEY DISEASE, STAGE III: ICD-10-CM

## 2024-09-16 DIAGNOSIS — M79.18 MYOFASCIAL PAIN: ICD-10-CM

## 2024-09-16 DIAGNOSIS — I12.9 BENIGN HYPERTENSION WITH CHRONIC KIDNEY DISEASE, STAGE III: ICD-10-CM

## 2024-09-16 DIAGNOSIS — M47.816 LUMBAR SPONDYLOSIS: Primary | ICD-10-CM

## 2024-09-16 RX ORDER — LOVASTATIN 10 MG/1
TABLET ORAL
Qty: 90 TABLET | Refills: 0 | Status: SHIPPED | OUTPATIENT
Start: 2024-09-16 | End: 2024-09-18 | Stop reason: SDUPTHER

## 2024-09-16 RX ORDER — LEVOTHYROXINE SODIUM 88 UG/1
88 TABLET ORAL DAILY
Qty: 90 TABLET | Refills: 0 | Status: SHIPPED | OUTPATIENT
Start: 2024-09-16

## 2024-09-16 RX ORDER — LOSARTAN POTASSIUM 50 MG/1
50 TABLET ORAL DAILY
Qty: 90 TABLET | Refills: 0 | Status: SHIPPED | OUTPATIENT
Start: 2024-09-16 | End: 2024-09-18 | Stop reason: SDUPTHER

## 2024-09-16 NOTE — TELEPHONE ENCOUNTER
No care due was identified.  [facility] Embedded Care Due Messages. Reference number: [id number].   9/16/2024 8:44:49 AM CDT

## 2024-09-16 NOTE — TELEPHONE ENCOUNTER
Refill Routing Note   Medication(s) are not appropriate for processing by [facility] for the following reason(s):        Required labs outdated    [facility] action(s):  Defer               Appointments  past 12m or future 3m with PCP    Date Provider   Last Visit   Visit date not found Tere Hughes MD   Next Visit   9/16/2024 Tere Hughes MD   ED visits in past 90 days: 1        Note composed:9:42 AM 09/16/2024

## 2024-09-16 NOTE — TELEPHONE ENCOUNTER
"----- Message from Jose Ackerman sent at 9/13/2024 12:16 PM CDT -----  Regarding: Angeles "daughter"  Type: RX Refill Request     Who Called:Angeles "daughter"      Have you contacted your pharmacy: Yes     Refill or New Rx:Refill      RX Name and Strength: levothyroxine (SYNTHROID) 88 MCG tablet, losartan (COZAAR) 50 MG tablet// stated that there are no more refills for these prescription and need them sent to the pharmacy as soon as possible. Patient is out of the [facility] and she has a few left for the levothyroxine. Please reach out to the pharmacy and call her once done.     Preferred Pharmacy with phone number:.  [id number] - [place] [id number] [facility]  [address] [place] [id number]  Phone: [phone] Fax: [phone]     Local or Mail Order: Local     Ordering Provider: Dr. Tere Hughes     Would the patient rather a call back or a response via [facility]? Callback      Best Call Back Number: [phone]      Additional Information:  "

## 2024-09-17 ENCOUNTER — TELEPHONE (OUTPATIENT)
Dept: PAIN MEDICINE | Facility: CLINIC | Age: 89
End: 2024-09-17
Payer: MEDICARE

## 2024-09-17 DIAGNOSIS — M25.561 CHRONIC PAIN OF RIGHT KNEE: Primary | ICD-10-CM

## 2024-09-17 DIAGNOSIS — G89.29 CHRONIC PAIN OF RIGHT KNEE: Primary | ICD-10-CM

## 2024-09-18 ENCOUNTER — TELEPHONE (OUTPATIENT)
Dept: PAIN MEDICINE | Facility: CLINIC | Age: 89
End: 2024-09-18
Payer: MEDICARE

## 2024-09-18 ENCOUNTER — OFFICE VISIT (OUTPATIENT)
Dept: CARDIOLOGY | Facility: CLINIC | Age: 89
End: 2024-09-18
Payer: MEDICARE

## 2024-09-18 VITALS
OXYGEN SATURATION: 97 % | DIASTOLIC BLOOD PRESSURE: 62 MMHG | SYSTOLIC BLOOD PRESSURE: 130 MMHG | HEIGHT: 66 IN | WEIGHT: 184.94 LBS | HEART RATE: 56 BPM | BODY MASS INDEX: 29.72 KG/M2

## 2024-09-18 DIAGNOSIS — I70.0 ATHEROSCLEROSIS OF ABDOMINAL AORTA: ICD-10-CM

## 2024-09-18 DIAGNOSIS — I48.91 NEW ONSET ATRIAL FIBRILLATION: ICD-10-CM

## 2024-09-18 DIAGNOSIS — I50.22 HEART FAILURE WITH MILDLY REDUCED EJECTION FRACTION (HFMREF): ICD-10-CM

## 2024-09-18 DIAGNOSIS — K21.9 GASTROESOPHAGEAL REFLUX DISEASE WITHOUT ESOPHAGITIS: ICD-10-CM

## 2024-09-18 DIAGNOSIS — E03.9 HYPOTHYROIDISM (ACQUIRED): ICD-10-CM

## 2024-09-18 DIAGNOSIS — I12.9 BENIGN HYPERTENSION WITH CHRONIC KIDNEY DISEASE, STAGE III: ICD-10-CM

## 2024-09-18 DIAGNOSIS — I10 ESSENTIAL HYPERTENSION: Primary | Chronic | ICD-10-CM

## 2024-09-18 DIAGNOSIS — N18.30 BENIGN HYPERTENSION WITH CHRONIC KIDNEY DISEASE, STAGE III: ICD-10-CM

## 2024-09-18 DIAGNOSIS — E78.5 HYPERLIPIDEMIA LDL GOAL <100: ICD-10-CM

## 2024-09-18 DIAGNOSIS — E11.40 TYPE 2 DIABETES, CONTROLLED, WITH NEUROPATHY: ICD-10-CM

## 2024-09-18 DIAGNOSIS — E66.09 CLASS 1 OBESITY DUE TO EXCESS CALORIES WITH SERIOUS COMORBIDITY AND BODY MASS INDEX (BMI) OF 31.0 TO 31.9 IN ADULT: ICD-10-CM

## 2024-09-18 PROCEDURE — 99999 PR PBB SHADOW E&M-EST. PATIENT-LVL III: CPT | Mod: PBBFAC,,,

## 2024-09-18 PROCEDURE — 1101F PT FALLS ASSESS-DOCD LE1/YR: CPT | Mod: CPTII,S$GLB,,

## 2024-09-18 PROCEDURE — 3288F FALL RISK ASSESSMENT DOCD: CPT | Mod: CPTII,S$GLB,,

## 2024-09-18 PROCEDURE — 1157F ADVNC CARE PLAN IN RCRD: CPT | Mod: CPTII,S$GLB,,

## 2024-09-18 PROCEDURE — 1160F RVW MEDS BY RX/DR IN RCRD: CPT | Mod: CPTII,S$GLB,,

## 2024-09-18 PROCEDURE — 1125F AMNT PAIN NOTED PAIN PRSNT: CPT | Mod: CPTII,S$GLB,,

## 2024-09-18 PROCEDURE — 99214 OFFICE O/P EST MOD 30 MIN: CPT | Mod: S$GLB,,,

## 2024-09-18 PROCEDURE — 1159F MED LIST DOCD IN RCRD: CPT | Mod: CPTII,S$GLB,,

## 2024-09-18 RX ORDER — LOVASTATIN 10 MG/1
10 TABLET ORAL DAILY
Qty: 90 TABLET | Refills: 3 | Status: SHIPPED | OUTPATIENT
Start: 2024-09-18

## 2024-09-18 RX ORDER — METOPROLOL SUCCINATE 100 MG/1
100 TABLET, EXTENDED RELEASE ORAL DAILY
Qty: 90 TABLET | Refills: 3 | Status: SHIPPED | OUTPATIENT
Start: 2024-09-18 | End: 2025-09-18

## 2024-09-18 RX ORDER — LOSARTAN POTASSIUM 50 MG/1
50 TABLET ORAL DAILY
Qty: 90 TABLET | Refills: 3 | Status: SHIPPED | OUTPATIENT
Start: 2024-09-18

## 2024-09-18 NOTE — TELEPHONE ENCOUNTER
"Called PT , No answer.  "has not been set up"      I tried reaching out to the PT to reschedule her but was unable to get in contact with the PT at this time     Will attempt to contact PT again for a second time , since I was unable  to leave    Mike   "

## 2024-09-18 NOTE — ASSESSMENT & PLAN NOTE
Body mass index is 29.85 kg/m². Morbid obesity complicates all aspects of disease management from diagnostic modalities to treatment. Weight loss encouraged and health benefits explained to patient.

## 2024-09-18 NOTE — PROGRESS NOTES
Subjective:    Patient ID:  Tonya Cohn is a 89 y.o. female who presents for evaluation of No chief complaint on file.      PCP: Tere Hughes MD     Referring Provider: Alberto Staples DO     HPI: Patient is a 89 yo F w/PMH of HTN, HLD, PAF, new HFmrEF (45-50%),mild AS/MS, T2DM , diverticulosis, CKD-3, sciatica who presents today for f/u appt. She was last seen on 6/20/24 for hospital f/u for PAF. She was continued on medical therapy for HF management, HTN. She is also follow by HF clinic and was last seen on 6/25/24 and was continued on medical therapy. .    Patient reports doing well since discharge. Patient denies CP, SOB, palpitations, orthopnea, PND, presyncope, LOC, swelling, or claudication. She notes right hip pain and ambulates with a cane. She performs all daily activities independently, including preparing her meals.  Patient monitors her  home BP and ranges 140s/50s. Patient reports medication compliance without side effects.  Patient does not exercise regularly, but remains active at home without limitation.      6/20/24: Patient presents today to \[facility]\"" care and hospital follow up following recent admission for new onset AF. On arrival to ED HR in 140s. EKG with new onset AF RVR. CXR with pulmonary edema with . Troponin 0.038. Admitted to  for further evaluation and management of new onset AF and ADHF. 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 Volume status optimized. Started on beta-blocker with avoidance of Cardizem due to new reduction in EF. Converted to NSR so DCCV deferred. She was initiated on DOAC and subsequently discharged.   Patient reports doing well since discharge. Patient denies CP, SOB, palpitations, orthopnea, PND, presyncope, LOC, swelling, or claudication. She notes right hip pain and ambulates with a cane. She performs all daily activities independently, including preparing her meals.  Patient monitors her  home BP and ranges 140s/50s. Patient reports medication  compliance without side effects  Patient does not exercise regularly, but remains active at home without limitation.    Past Medical History:   Diagnosis Date    AR (allergic rhinitis)     Atherosclerosis of abdominal aorta     noted on CT scan 2011    Carpal tunnel syndrome of left wrist     Chronic kidney disease, stage 3     Diabetic peripheral neuropathy     Diverticulosis     History of colon polyps     History of diverticulitis of colon 2014    Hyperlipemia     Hypertension     Hypothyroidism     followed by endocrinology, Dr. Green    Mild aortic stenosis     New onset atrial fibrillation 2024    OA (osteoarthritis) of knee     Obesity     Sciatica     Type II or unspecified type diabetes mellitus with neurological manifestations, uncontrolled(250.62)      Past Surgical History:   Procedure Laterality Date    CATARACT EXTRACTION Bilateral     COLONOSCOPY N/A 10/3/2017    Procedure: COLONOSCOPY;  Surgeon: Alin Gonzalez MD;  Location: [facility] ([address] [place] [address]);  Service: Endoscopy;  Laterality: N/A;    COSMETIC SURGERY      HYSTERECTOMY      partial    JOINT REPLACEMENT Right     LUNG BIOPSY  in her 40's    TOTAL KNEE ARTHROPLASTY Right 2014    TKR     Social History     Socioeconomic History    Marital status:     Number of children: 7   Occupational History    Occupation: retired - house cleaning   Tobacco Use    Smoking status: Former     Current packs/day: 0.00     Average packs/day: 0.3 packs/day for 7.5 years (1.9 ttl pk-yrs)     Types: Cigarettes     Start date: 1955     Quit date: 1962     Years since quittin.2     Passive exposure: Never    Smokeless tobacco: Former     Quit date: 1970   Substance and Sexual Activity    Alcohol use: Yes     Alcohol/week: 1.0 standard drink of alcohol     Types: 1 Cans of beer per week     Comment: occasionally    Drug use: No    Sexual activity: Not Currently     Partners: Male   Other Topics Concern    Are you pregnant or think  you may be? No    Breast-feeding No     Social Determinants of Health     Financial Resource Strain: Low Risk  (4/23/2024)    Overall Financial Resource Strain (CARDIA)     Difficulty of Paying Living Expenses: Not hard at all   Food Insecurity: No Food Insecurity (4/23/2024)    Hunger Vital Sign     Worried About Running Out of Food in the Last Year: Never true     Ran Out of Food in the Last Year: Never true   Transportation Needs: No Transportation Needs (4/23/2024)    PRAPARE - Transportation     Lack of Transportation (Medical): No     Lack of Transportation (Non-Medical): No   Physical Activity: Unknown (4/23/2024)    Exercise Vital Sign     Days of Exercise per Week: Patient declined   Stress: No Stress Concern Present (4/23/2024)    [employer] - Occupational Stress Questionnaire     Feeling of Stress : Not at all   Housing Stability: Low Risk  (5/4/2022)    Housing Stability Vital Sign     Unable to Pay for Housing in the Last Year: No     Number of Places Lived in the Last Year: 1     Unstable Housing in the Last Year: No     Family History   Problem Relation Name Age of Onset    Cancer Mother          shoulder tumor - cancer    Hypertension Mother      Glaucoma Mother      Heart disease Father          valve replacement    Hypertension Father      Heart attack Father      Diabetes Sister Lidya     Arthritis Sister Lovinia         s/p knee surgery    Diabetes Brother Benoris     Heart disease Brother Benoris     Heart failure Brother Benoris     Stroke Brother Ardell     Arthritis Brother Vital         back problems    Skin cancer Brother Bogdan     Cancer Brother Bogdan     Throat cancer Brother Bogdan     No Known Problems Maternal Grandmother      No Known Problems Maternal Grandfather      No Known Problems Paternal Grandmother      No Known Problems Paternal Grandfather      Cancer Son Johnathan         jaw    Cataracts Son Johnathan     No Known Problems Maternal Aunt       No Known Problems Maternal Uncle      No Known Problems Paternal Aunt      No Known Problems Paternal Uncle      Melanoma Neg Hx      Eczema Neg Hx      Lupus Neg Hx      Psoriasis Neg Hx      Breast cancer Neg Hx      Colon cancer Neg Hx      Amblyopia Neg Hx      Blindness Neg Hx      Macular degeneration Neg Hx      Retinal detachment Neg Hx      Strabismus Neg Hx      Thyroid disease Neg Hx         Review of patient's allergies indicates:   Allergen Reactions    Lipitor [atorvastatin]        Medication List with Changes/Refills   New Medications    METOPROLOL SUCCINATE (TOPROL-XL) 100 MG 24 HR TABLET    Take 1 tablet (100 mg total) by mouth once daily.   Current Medications    ACETAMINOPHEN (TYLENOL) 500 MG TABLET    Take 1 tablet (500 mg total) by mouth every 4 (four) hours as needed for Pain.    APIXABAN (ELIQUIS) 5 MG TAB    Take 1 tablet (5 mg total) by mouth 2 (two) times daily.    ASPIRIN 81 MG CHEW    Take 1 tablet (81 mg total) by mouth once daily.    BLOOD-GLUCOSE METER (FREESTYLE SYSTEM KIT) KIT    Use as instructed    CALCIUM CARBONATE (OS-MIRACLE) 500 MG CALCIUM (1,250 MG) TABLET    Take 1 tablet by mouth every other day.    DIPHENHYDRAMINE (BENADRYL) 25 MG CAPSULE    Take 1 capsule (25 mg total) by mouth every 6 (six) hours as needed for Itching or Allergies.    DORZOLAMIDE-TIMOLOL 2-0.5% (COSOPT) 22.3-6.8 MG/ML OPHTHALMIC SOLUTION    Place 1 drop into both eyes 2 (two) times daily.    FISH OIL-FAT ACID COMB.8- 1,200 MG (400 [id number]) CAP    Take 1 capsule by mouth once daily.    FUROSEMIDE (LASIX) 20 MG TABLET    Take 1 tablet (20 mg total) by mouth 2 (two) times daily.    HYDROCODONE-ACETAMINOPHEN (NORCO) 5-325 MG PER TABLET    Take 1 tablet by mouth.    HYDROCORTISONE 2.5 % CREAM    Apply topically 2 (two) times daily.    IBUPROFEN (ADVIL,MOTRIN) 800 MG TABLET    Take 800 mg by mouth every 6 (six) hours as needed.    LEVOTHYROXINE (SYNTHROID) 88 MCG TABLET    Take 1 tablet (88 mcg total)  "by mouth once daily.    LIDOCAINE (LIDODERM) 5 %    Place 1 patch onto the skin once daily. Remove & Discard patch within 12 hours or as directed by MD    MOMETASONE 0.1% (ELOCON) 0.1 % CREAM    Use daily    MULTIVIT,CALC,MINS/IRON/FOLIC (ONE-A-DAY WOMENS FORMULA ORAL)    Take 1 tablet by mouth once daily.   Changed and/or Refilled Medications    Modified Medication Previous Medication    LOSARTAN (COZAAR) 50 MG TABLET losartan (COZAAR) 50 MG tablet       Take 1 tablet (50 mg total) by mouth once daily.    Take 1 tablet (50 mg total) by mouth once daily.    LOVASTATIN (MEVACOR) 10 MG TABLET lovastatin (MEVACOR) 10 MG tablet       Take 1 tablet (10 mg total) by mouth once daily.    TAKE 1 TABLET(10 MG) BY MOUTH EVERY DAY   Discontinued Medications    METOPROLOL SUCCINATE (TOPROL-XL) 200 MG 24 HR TABLET    Take 100mg (1/2 tablet) once daily.       Review of Systems   Constitutional: Negative for diaphoresis and fever.   HENT:  Negative for congestion and hearing loss.    Eyes:  Negative for blurred vision and pain.   Cardiovascular:  Negative for chest pain, claudication, dyspnea on exertion, leg swelling, near-syncope, palpitations and syncope.   Respiratory:  Negative for shortness of breath and sleep disturbances due to breathing.    Hematologic/Lymphatic: Negative for bleeding problem. Does not bruise/bleed easily.   Skin:  Negative for color change and poor wound healing.   Musculoskeletal:  Positive for joint pain.   Gastrointestinal:  Negative for abdominal pain and nausea.   Genitourinary:  Negative for bladder incontinence and flank pain.   Neurological:  Negative for focal weakness and light-headedness.        Objective:   /62 (BP Location: Left arm, Patient Position: Sitting, BP Method: Large (Manual))   Pulse (!) 56   Ht 5' 6" (1.676 m)   Wt 83.9 kg (184 lb 15.5 oz)   SpO2 97%   BMI 29.85 kg/m²    Physical Exam  Constitutional:       Appearance: She is well-developed. She is not diaphoretic. "   HENT:      Head: Normocephalic and atraumatic.   Eyes:      General: No scleral icterus.     Pupils: Pupils are equal, round, and reactive to light.   Neck:      Vascular: No JVD.   Cardiovascular:      Rate and Rhythm: Normal rate and regular rhythm.      Pulses: Intact distal pulses.           Radial pulses are 2+ on the right side and 2+ on the left side.        Dorsalis pedis pulses are 1+ on the right side and 1+ on the left side.        Posterior tibial pulses are 1+ on the right side and 1+ on the left side.      Heart sounds: S1 normal and S2 normal. Murmur heard.      Harsh systolic murmur is present at the upper right sternal border radiating to the neck.      No friction rub. No gallop.   Pulmonary:      Effort: Pulmonary effort is normal. No respiratory distress.      Breath sounds: Normal breath sounds. No wheezing or rales.   Chest:      Chest wall: No tenderness.   Abdominal:      General: Bowel sounds are normal. There is no distension.      Palpations: Abdomen is soft. There is no mass.      Tenderness: There is no abdominal tenderness. There is no rebound.   Musculoskeletal:         General: No tenderness. Normal range of motion.      Cervical back: Normal range of motion and neck supple.   Skin:     General: Skin is warm and dry.      Coloration: Skin is not pale.   Neurological:      Mental Status: She is alert and oriented to person, place, and time.      Coordination: Coordination normal.      Deep Tendon Reflexes: Reflexes normal.   Psychiatric:         Behavior: Behavior normal.         Judgment: Judgment normal.           EKG reviewed 6/11/24: SB w PACs, incomplete RBBB   Cardiac echo 6/1/24  Echo 6/1/24:    Left Ventricle: The left ventricle is normal in size. Normal wall thickness. There is concentric remodeling. Normal wall motion. There is mildly reduced systolic function with a visually estimated ejection fraction of 45 - 50%. Ejection fraction by visual approximation is 55%. Unable  to assess diastolic function due to atrial fibrillation. Elevated left ventricular filling pressure.    Right Ventricle: Normal right ventricular cavity size. There is hypertrophy. Right ventricle wall motion  is normal. Systolic function is mildly reduced.    Left Atrium: Left atrium is moderately dilated.    Right Atrium: Right atrium is moderately dilated.    Aortic Valve: There is moderate aortic valve sclerosis. There is moderate annular calcification present. Moderately restricted motion. There is mild stenosis. Aortic valve area by VTI is 1.30 cm². Aortic valve peak velocity is 1.6 m/s. Mean gradient is 6 mmHg. The dimensionless index is 0.46. There is no significant regurgitation.    Mitral Valve: There is moderate posterior mitral annular calcification present. Mildly restricted motion. There is mild stenosis. The mean pressure gradient across the mitral valve is 4 mmHg at a heart rate of 101 bpm. There is mild to moderate regurgitation with a centrally directed jet.    Tricuspid Valve: The tricuspid valve is structurally normal. There is mild to moderate regurgitation.    Pulmonary Artery: There is mild pulmonary hypertension. The estimated pulmonary artery systolic pressure is 53 mmHg.    IVC/SVC: Elevated venous pressure at 15 mmHg.    Pericardium: There is no pericardial effusion.    Significant beat to beat variability in EF with irregular rhythm.    TE 10/11/2021  - The left ventricle is normal in size with concentric remodeling and normal systolic function.  - The estimated ejection fraction is 60%.  - Grade II left ventricular diastolic dysfunction.  - Normal right ventricular size with normal right ventricular systolic function.  - There is mild aortic valve stenosis.  - Aortic valve area is 0.74 cm2; peak velocity is 2.13 m/s; mean gradient is 10 mmHg.  - There is mild mitral stenosis.  - The mean diastolic gradient across the mitral valve is 4 mmHg at a heart rate of 64 bpm.  - The estimated PA  systolic pressure is 29 mmHg.     Cardiac Pharmacological Stress  10/2016  1. The EKG portion of this study is negative for ischemia at a peak heart rate of 109 bpm (83% of predicted).   2. Blood pressure remained stable throughout the protocol  (Presenting BP: 141/77 Peak BP: 171/76).   3. No significant arrhythmias were present.   4. There were no symptoms of chest discomfort or significant dyspnea throughout the protocol.      Holter monitor - 24h 07/2014  1. Predominant rhythm is NSR   2. Rare PAC's and PVC's   3. No significant arrhythmias/blocks/pause     Assessment:       1. Essential hypertension    2. Benign hypertension with chronic kidney disease, stage III    3. Hyperlipidemia LDL goal <100    4. Heart failure with mildly reduced ejection fraction (HFmrEF)    5. New onset atrial fibrillation    6. Atherosclerosis of abdominal aorta    7. Class 1 obesity due to excess calories with serious comorbidity and body mass index (BMI) of 31.0 to 31.9 in adult    8. Type 2 diabetes, controlled, with neuropathy    9. Hypothyroidism (acquired)    10. Gastroesophageal reflux disease without esophagitis           Plan:         Essential hypertension  -Goal BP < 140/90  -continue medical therapy- losartan 50 mg  -See HF management  -low salt diet      Heart failure with mildly reduced ejection fraction (HFmrEF)  PLAN:    Patient Instructions:   Instruct the patient to notify this clinic if HH, a physician or an advanced care provider wants to change medication one of their HF medications   Activity and Diet restrictions:   Recommend 2-3 gram sodium restriction and 1500cc- 2000cc fluid restriction.  Encourage physical activity with graded exercise program.  Requested patient to weigh themselves daily, and to notify us if their weight increases by more than 3 lbs in 1 day or 5 lbs in 3 days.  -Followed by Heart failure team and was last seen on 6/11/24 by Audrey Rodriguez PA-C  -continue medical therapy: furosemide 20 mg BID,  losartan 50 mg, metoprolol succinate 100 mg        Hyperlipidemia LDL goal <100  -Lovastatin 10 mg   -discussed lifestyle modifications      New onset atrial fibrillation  -PAF  -EKG 6/11/24- SB w PAC  -CHADVASC score 4  -continue apixaban 5 mg   -discussed safety while on AC therapy     Atherosclerosis of abdominal aorta  -Continue ASA and lovastatin 10 mg     Class 1 obesity due to excess calories with serious comorbidity and body mass index (BMI) of 31.0 to 31.9 in adult  Body mass index is 29.85 kg/m². Morbid obesity complicates all aspects of disease management from diagnostic modalities to treatment. Weight loss encouraged and health benefits explained to patient.       Type 2 diabetes, controlled, with neuropathy  -Last A1c 7.0 2/26/24  -followed by PCP  -continue medical therapy     Hypothyroidism (acquired)  -followed by PCP  -continue medical therapy- synthroid 88 mcg     GERD (gastroesophageal reflux disease)  -followed by PCP  -treated with OTC medication         Total duration of face to face visit time 30 minutes.  Total time spent counseling greater than fifty percent of total visit time.  Counseling included discussion regarding imaging findings, diagnosis, possibilities, treatment options, risks and benefits.  The patient had many questions regarding the options and long-term effects      Samuel Rader, JOSHUA  Cardiology

## 2024-09-24 ENCOUNTER — TELEPHONE (OUTPATIENT)
Dept: PAIN MEDICINE | Facility: CLINIC | Age: 89
End: 2024-09-24
Payer: MEDICARE

## 2024-09-24 ENCOUNTER — OFFICE VISIT (OUTPATIENT)
Dept: PAIN MEDICINE | Facility: CLINIC | Age: 89
End: 2024-09-24
Payer: MEDICARE

## 2024-09-24 VITALS
WEIGHT: 184.94 LBS | OXYGEN SATURATION: 100 % | SYSTOLIC BLOOD PRESSURE: 136 MMHG | RESPIRATION RATE: 18 BRPM | HEART RATE: 75 BPM | BODY MASS INDEX: 29.85 KG/M2 | TEMPERATURE: 98 F | DIASTOLIC BLOOD PRESSURE: 73 MMHG

## 2024-09-24 DIAGNOSIS — M25.561 CHRONIC PAIN OF RIGHT KNEE: ICD-10-CM

## 2024-09-24 DIAGNOSIS — M47.816 LUMBAR SPONDYLOSIS: ICD-10-CM

## 2024-09-24 DIAGNOSIS — M25.551 RIGHT HIP PAIN: Primary | ICD-10-CM

## 2024-09-24 DIAGNOSIS — G89.29 CHRONIC PAIN OF RIGHT KNEE: ICD-10-CM

## 2024-09-24 DIAGNOSIS — M79.18 MYOFASCIAL PAIN: ICD-10-CM

## 2024-09-24 PROCEDURE — 1101F PT FALLS ASSESS-DOCD LE1/YR: CPT | Mod: CPTII,S$GLB,, | Performed by: NURSE PRACTITIONER

## 2024-09-24 PROCEDURE — 1159F MED LIST DOCD IN RCRD: CPT | Mod: CPTII,S$GLB,, | Performed by: NURSE PRACTITIONER

## 2024-09-24 PROCEDURE — 99999 PR PBB SHADOW E&M-EST. PATIENT-LVL IV: CPT | Mod: PBBFAC,,, | Performed by: NURSE PRACTITIONER

## 2024-09-24 PROCEDURE — 1160F RVW MEDS BY RX/DR IN RCRD: CPT | Mod: CPTII,S$GLB,, | Performed by: NURSE PRACTITIONER

## 2024-09-24 PROCEDURE — 99214 OFFICE O/P EST MOD 30 MIN: CPT | Mod: S$GLB,,, | Performed by: NURSE PRACTITIONER

## 2024-09-24 PROCEDURE — 3288F FALL RISK ASSESSMENT DOCD: CPT | Mod: CPTII,S$GLB,, | Performed by: NURSE PRACTITIONER

## 2024-09-24 PROCEDURE — 1157F ADVNC CARE PLAN IN RCRD: CPT | Mod: CPTII,S$GLB,, | Performed by: NURSE PRACTITIONER

## 2024-09-24 NOTE — H&P (VIEW-ONLY)
Chronic Pain - Established Visit    Referring Physician: No ref. provider found    Chief Complaint:   Chief Complaint   Patient presents with    Hip Pain        SUBJECTIVE:    Interval History 9/24/2024:  Tonya returns today for follow up of right hip and back pain. She has had worsened right hip pain since previous encounter, and is interested in a procedure at this time. The pain is located mainly to anterior hip and groin. It worsens with walking and bending her leg. No numbness. She plans to continue with PT. Her MRI from July does show acute-subacute T12 compression fracture with severe height loss and 5 mm retropulsion resulting in mild canal stenosis and moderate-severe foraminal stenosis. Her pain today is 8/10.    Interval History 7/12/2024:  The patient returns today for follow up of chronic back pain. She was evaluated by Dr. Sherwood at initial OV and PT was ordered. However, she has not yet started PT. Since previous encounter, she had a hospitalization for new-onset A-fib. She is now taking Eliquis. Her pain is located across the lower back without radiation. No numbness to BLE. She does report numbness to left hand intermittently. Her back pain worsens with standing. She is accompanied by her two daughters who are active in her care. Her pain today is 10/10.    Initial Encounter:  Tonya Cohn presents to the clinic for the evaluation of low back pain. The pain started 2 weeks ago without inciting event/trauma and symptoms have been unchanged.She recently went to the ED for her back pain where they did LXR. She also received Steroid injection, tylenol, and given Zanaflex by her PCP. The pain is located in the bilateral lumbar paraspinal area without radiating pain.  The pain is described as aching and sharp and is rated as 10/10. The pain is rated with a score of  7/10 on the BEST day and a score of 10/10 on the WORST day.  Symptoms interfere with daily activity. The pain is exacerbated by Standing,  Bending, Walking, and Extension.  The pain is mitigated by heat, medications, rest, and sitting. She has been using her walking cane since the pain started to ambulate.     Patient denies night fever/night sweats, urinary incontinence, bowel incontinence, significant weight loss, significant motor weakness, and loss of sensations.    Physical Therapy/Home Exercise: Yes; 1 year ago- 6 weeks      Pain Disability Index Review:      9/24/2024     3:12 PM 9/23/2024     1:38 PM 12/28/2021    10:01 AM   Last 3 PDI Scores   Pain Disability Index (PDI) 48 [phone] 43       Pain Medications:  Tylenol  Zanaflex     report:  Not applicable    Pain Procedures: None    Imaging:   X-Ray Lumbar Spine Flexion And Extension Only  Order: [id number]  Status: Final result       Visible to patient: Yes (not seen)       Next appt: 07/17/2024 at 01:00 PM in Radiology ([facility] MAMMO1 HOLOGIC DIMENSIONS)       Dx: Lumbar spondylosis; Spondylolisthesis...    0 Result Notes  Details    Reading Physician Reading Date Result Priority   William Diaz MD  [phone] 5/29/2024 Routine     Narrative & Impression  EXAMINATION:  XR LUMBAR SPINE FLEXION AND EXTENSION ONLY     CLINICAL HISTORY:  Spondylolisthesis, site unspecified     TECHNIQUE:  Lateral flexion/extension views of the lumbar spine were performed     COMPARISON:  05/19/2024     FINDINGS:  Grade 2 anterolisthesis of L4 on L5 is unchanged.  There is no significant translation of listhesis in flexion/extension views.  There is a mild compression deformity identified at T12     Impression:     Grade 2 anterolisthesis of L4 on L5 without significant translation on flexion/extension views     Mild compression deformity at T12.     Past Medical History:   Diagnosis Date    AR (allergic rhinitis)     Atherosclerosis of abdominal aorta     noted on CT scan 1/17/2011    Carpal tunnel syndrome of left wrist     Chronic kidney disease, stage 3     Diabetic peripheral neuropathy     Diverticulosis      History of colon polyps     History of diverticulitis of colon 2014    Hyperlipemia     Hypertension     Hypothyroidism     followed by endocrinology, Dr. Green    Mild aortic stenosis     New onset atrial fibrillation 2024    OA (osteoarthritis) of knee     Obesity     Sciatica     Type II or unspecified type diabetes mellitus with neurological manifestations, uncontrolled(250.62)      Past Surgical History:   Procedure Laterality Date    CATARACT EXTRACTION Bilateral     COLONOSCOPY N/A 10/3/2017    Procedure: COLONOSCOPY;  Surgeon: Alin Gonzalez MD;  Location: [facility] ([address] [place] [address]);  Service: Endoscopy;  Laterality: N/A;    COSMETIC SURGERY      HYSTERECTOMY      partial    JOINT REPLACEMENT Right     LUNG BIOPSY  in her 40's    TOTAL KNEE ARTHROPLASTY Right 2014    TKR     Social History     Socioeconomic History    Marital status:     Number of children: 7   Occupational History    Occupation: retired - house cleaning   Tobacco Use    Smoking status: Former     Current packs/day: 0.00     Average packs/day: 0.3 packs/day for 7.5 years (1.9 ttl pk-yrs)     Types: Cigarettes     Start date: 1955     Quit date: 1962     Years since quittin.2     Passive exposure: Never    Smokeless tobacco: Former     Quit date: 1970   Substance and Sexual Activity    Alcohol use: Yes     Alcohol/week: 1.0 standard drink of alcohol     Types: 1 Cans of beer per week     Comment: occasionally    Drug use: No    Sexual activity: Not Currently     Partners: Male   Other Topics Concern    Are you pregnant or think you may be? No    Breast-feeding No     Social Determinants of Health     Financial Resource Strain: Low Risk  (2024)    Overall Financial Resource Strain (CARDIA)     Difficulty of Paying Living Expenses: Not hard at all   Food Insecurity: No Food Insecurity (2024)    Hunger Vital Sign     Worried About Running Out of Food in the Last Year: Never true     Ran Out of Food  in the Last Year: Never true   Transportation Needs: No Transportation Needs (4/23/2024)    PRAPARE - Transportation     Lack of Transportation (Medical): No     Lack of Transportation (Non-Medical): No   Physical Activity: Unknown (4/23/2024)    Exercise Vital Sign     Days of Exercise per Week: Patient declined   Stress: No Stress Concern Present (4/23/2024)    [employer] - Occupational Stress Questionnaire     Feeling of Stress : Not at all   Housing Stability: Low Risk  (5/4/2022)    Housing Stability Vital Sign     Unable to Pay for Housing in the Last Year: No     Number of Places Lived in the Last Year: 1     Unstable Housing in the Last Year: No     Family History   Problem Relation Name Age of Onset    Cancer Mother          shoulder tumor - cancer    Hypertension Mother      Glaucoma Mother      Heart disease Father          valve replacement    Hypertension Father      Heart attack Father      Diabetes Sister Lidya     Arthritis Sister Lovinia         s/p knee surgery    Diabetes Brother Benoris     Heart disease Brother Benoris     Heart failure Brother Benoris     Stroke Brother Ardell     Arthritis Brother Vital         back problems    Skin cancer Brother Bogdan     Cancer Brother Bogdan     Throat cancer Brother Bogdan     No Known Problems Maternal Grandmother      No Known Problems Maternal Grandfather      No Known Problems Paternal Grandmother      No Known Problems Paternal Grandfather      Cancer Son Johnathan         jaw    Cataracts Son Johnathan     No Known Problems Maternal Aunt      No Known Problems Maternal Uncle      No Known Problems Paternal Aunt      No Known Problems Paternal Uncle      Melanoma Neg Hx      Eczema Neg Hx      Lupus Neg Hx      Psoriasis Neg Hx      Breast cancer Neg Hx      Colon cancer Neg Hx      Amblyopia Neg Hx      Blindness Neg Hx      Macular degeneration Neg Hx      Retinal detachment Neg Hx      Strabismus Neg Hx      Thyroid  disease Neg Hx         Review of patient's allergies indicates:   Allergen Reactions    Lipitor [atorvastatin]        Current Outpatient Medications   Medication Sig    acetaminophen (TYLENOL) 500 MG tablet Take 1 tablet (500 mg total) by mouth every 4 (four) hours as needed for Pain. (Patient taking differently: Take 500 mg by mouth daily as needed for Pain (Back Pain).)    apixaban (ELIQUIS) 5 mg Tab Take 1 tablet (5 mg total) by mouth 2 (two) times daily.    calcium carbonate (OS-MIRACLE) 500 mg calcium (1,250 mg) tablet Take 1 tablet by mouth every other day.    diphenhydrAMINE (BENADRYL) 25 mg capsule Take 1 capsule (25 mg total) by mouth every 6 (six) hours as needed for Itching or Allergies.    fish oil-fat acid [id number] 1,200 mg (400 [id number]) Cap Take 1 capsule by mouth once daily.    furosemide (LASIX) 20 MG tablet Take 1 tablet (20 mg total) by mouth 2 (two) times daily.    HYDROcodone-acetaminophen (NORCO) 5-325 mg per tablet Take 1 tablet by mouth.    hydrocortisone 2.5 % cream Apply topically 2 (two) times daily. (Patient taking differently: Apply 15 g topically 2 (two) times daily.)    ibuprofen (ADVIL,MOTRIN) 800 MG tablet Take 800 mg by mouth every 6 (six) hours as needed.    levothyroxine (SYNTHROID) 88 MCG tablet Take 1 tablet (88 mcg total) by mouth once daily.    LIDOcaine (LIDODERM) 5 % Place 1 patch onto the skin once daily. Remove & Discard patch within 12 hours or as directed by MD    losartan (COZAAR) 50 MG tablet Take 1 tablet (50 mg total) by mouth once daily.    lovastatin (MEVACOR) 10 MG tablet Take 1 tablet (10 mg total) by mouth once daily.    metoprolol succinate (TOPROL-XL) 100 MG 24 hr tablet Take 1 tablet (100 mg total) by mouth once daily.    mometasone 0.1% (ELOCON) 0.1 % cream Use daily    multivit,calc,mins/iron/folic (ONE-A-DAY WOMENS FORMULA ORAL) Take 1 tablet by mouth once daily.    aspirin 81 MG Chew Take 1 tablet (81 mg total) by mouth once daily.    blood-glucose  meter (FREESTYLE SYSTEM KIT) kit Use as instructed    dorzolamide-timolol 2-0.5% (COSOPT) 22.3-6.8 mg/mL ophthalmic solution Place 1 drop into both eyes 2 (two) times daily.     No current facility-administered medications for this visit.       REVIEW OF SYSTEMS:    GENERAL:  No weight loss, malaise or fevers.  HEENT:  Negative for frequent or significant headaches.  NECK:  Negative for lumps, goiter, pain and significant neck swelling.  RESPIRATORY:  Negative for cough, wheezing or shortness of breath.  CARDIOVASCULAR:  Negative for chest pain, leg swelling or palpitations. HTN  GI:  Negative for abdominal discomfort, blood in stools or black stools or change in bowel habits.  MUSCULOSKELETAL:  See HPI. Low back pain.   SKIN:  Negative for lesions, rash, and itching.  PSYCH:  Negative for sleep disturbance, mood disorder and recent psychosocial stressors.  HEMATOLOGY/LYMPHOLOGY:  Negative for prolonged bleeding, bruising easily or swollen nodes.  NEURO:   No history of headaches, syncope, paralysis, seizures or tremors.  All other reviewed and negative other than HPI.    OBJECTIVE:    /73   Pulse 75   Temp 98.2 °F (36.8 °C)   Resp 18   Wt 83.9 kg (184 lb 15.5 oz)   SpO2 100%   BMI 29.85 kg/m²     PHYSICAL EXAMINATION:    General appearance: Well appearing, in no acute distress, alert and oriented x3.  Psych:  Mood and affect appropriate.  Skin: Skin color, texture, turgor normal, no rashes or lesions, in both upper and lower body.  Head/face:  Normocephalic, atraumatic. No palpable lymph nodes.  Back: Straight leg raising in the sitting and supine positions is negative to radicular pain. Pain to palpation over the lumbar facet joints. Limited range of motion with pain reproduction on flexion and extension. Positive facet loading.   Extremities: Peripheral joint ROM is full and pain free without obvious instability or laxity in all four extremities. No deformities, edema, or skin discoloration. Good  capillary refill.  Musculoskeletal: Bilateral upper and lower extremity strength is normal and symmetric.  No atrophy or tone abnormalities are noted. Full ROM of right hip with pain on external rotation. William's is positive on the right.  Neuro: No loss of sensation is noted.  Gait: Antalgic- ambulates without assistance.    ASSESSMENT: 89 y.o. year old female with lower back pain, consistent with the following diagnoses:    1. Right hip pain  Procedure Order to Pain Management      2. Chronic pain of right knee        3. Lumbar spondylosis        4. Myofascial pain              PLAN:     We discussed with the patient the assessment and recommendations. The following is the plan we agreed on:     - Lumbar MRI results discussed in detail which shows acute-subacute T12 compression fracture with severe height loss and 5 mm retropulsion resulting in mild canal stenosis and moderate-severe foraminal stenosis.     - She also has significant right hip pain with positive provocative hip maneuvers. Will schedule for right hip joint injection. If limited benefit, consider kyphoplasty. We discussed this today.     - Continue home medications for continued pain relief.     - RTC 2 weeks after injection.    - Counseled patient regarding the importance of activity modification and physical therapy.      The above plan and management options were discussed at length with patient. Patient is in agreement with the above and verbalized understanding.     Rosa Maria Jacome  09/24/2024    I spent a total of 30 minutes on the day of the visit.  This includes face to face time and non-face to face time preparing to see the patient by reviewing previous labs/imaging, obtaining and/or reviewing separately obtained history, documenting clinical information in the electronic or other health record, independently interpreting results and communicating results to the patient/family/caregiver.

## 2024-09-24 NOTE — TELEPHONE ENCOUNTER
Staff tried reaching out to patient to get her rescheduled for her missed in clinic procedure on 9/23. Patients voicemail box has not been set up. Staff left a portal message.

## 2024-09-24 NOTE — PROGRESS NOTES
Chronic Pain - Established Visit    Referring Physician: No ref. provider found    Chief Complaint:   Chief Complaint   Patient presents with    Hip Pain        SUBJECTIVE:    Interval History 9/24/2024:  Tonya returns today for follow up of right hip and back pain. She has had worsened right hip pain since previous encounter, and is interested in a procedure at this time. The pain is located mainly to anterior hip and groin. It worsens with walking and bending her leg. No numbness. She plans to continue with PT. Her MRI from July does show acute-subacute T12 compression fracture with severe height loss and 5 mm retropulsion resulting in mild canal stenosis and moderate-severe foraminal stenosis. Her pain today is 8/10.    Interval History 7/12/2024:  The patient returns today for follow up of chronic back pain. She was evaluated by Dr. Sherwood at initial OV and PT was ordered. However, she has not yet started PT. Since previous encounter, she had a hospitalization for new-onset A-fib. She is now taking Eliquis. Her pain is located across the lower back without radiation. No numbness to BLE. She does report numbness to left hand intermittently. Her back pain worsens with standing. She is accompanied by her two daughters who are active in her care. Her pain today is 10/10.    Initial Encounter:  Tonya Cohn presents to the clinic for the evaluation of low back pain. The pain started 2 weeks ago without inciting event/trauma and symptoms have been unchanged.She recently went to the ED for her back pain where they did LXR. She also received Steroid injection, tylenol, and given Zanaflex by her PCP. The pain is located in the bilateral lumbar paraspinal area without radiating pain.  The pain is described as aching and sharp and is rated as 10/10. The pain is rated with a score of  7/10 on the BEST day and a score of 10/10 on the WORST day.  Symptoms interfere with daily activity. The pain is exacerbated by Standing,  Bending, Walking, and Extension.  The pain is mitigated by heat, medications, rest, and sitting. She has been using her walking cane since the pain started to ambulate.     Patient denies night fever/night sweats, urinary incontinence, bowel incontinence, significant weight loss, significant motor weakness, and loss of sensations.    Physical Therapy/Home Exercise: Yes; 1 year ago- 6 weeks      Pain Disability Index Review:      9/24/2024     3:12 PM 9/23/2024     1:38 PM 12/28/2021    10:01 AM   Last 3 PDI Scores   Pain Disability Index (PDI) 48 54 43       Pain Medications:  Tylenol  Zanaflex     report:  Not applicable    Pain Procedures: None    Imaging:   X-Ray Lumbar Spine Flexion And Extension Only  Order: [id number]  Status: Final result       Visible to patient: Yes (not seen)       Next appt: 07/17/2024 at 01:00 PM in Radiology ([facility] MAMMO1 HOLOGIC DIMENSIONS)       Dx: Lumbar spondylosis; Spondylolisthesis...    0 Result Notes  Details    Reading Physician Reading Date Result Priority   William Diaz MD  [phone] 5/29/2024 Routine     Narrative & Impression  EXAMINATION:  XR LUMBAR SPINE FLEXION AND EXTENSION ONLY     CLINICAL HISTORY:  Spondylolisthesis, site unspecified     TECHNIQUE:  Lateral flexion/extension views of the lumbar spine were performed     COMPARISON:  05/19/2024     FINDINGS:  Grade 2 anterolisthesis of L4 on L5 is unchanged.  There is no significant translation of listhesis in flexion/extension views.  There is a mild compression deformity identified at T12     Impression:     Grade 2 anterolisthesis of L4 on L5 without significant translation on flexion/extension views     Mild compression deformity at T12.     Past Medical History:   Diagnosis Date    AR (allergic rhinitis)     Atherosclerosis of abdominal aorta     noted on CT scan 1/17/2011    Carpal tunnel syndrome of left wrist     Chronic kidney disease, stage 3     Diabetic peripheral neuropathy     Diverticulosis      History of colon polyps     History of diverticulitis of colon 2014    Hyperlipemia     Hypertension     Hypothyroidism     followed by endocrinology, Dr. Green    Mild aortic stenosis     New onset atrial fibrillation 2024    OA (osteoarthritis) of knee     Obesity     Sciatica     Type II or unspecified type diabetes mellitus with neurological manifestations, uncontrolled(250.62)      Past Surgical History:   Procedure Laterality Date    CATARACT EXTRACTION Bilateral     COLONOSCOPY N/A 10/3/2017    Procedure: COLONOSCOPY;  Surgeon: Alin Gonzalez MD;  Location: [facility] ([address] [place] [address]);  Service: Endoscopy;  Laterality: N/A;    COSMETIC SURGERY      HYSTERECTOMY      partial    JOINT REPLACEMENT Right     LUNG BIOPSY  in her 40's    TOTAL KNEE ARTHROPLASTY Right 2014    TKR     Social History     Socioeconomic History    Marital status:     Number of children: 7   Occupational History    Occupation: retired - house cleaning   Tobacco Use    Smoking status: Former     Current packs/day: 0.00     Average packs/day: 0.3 packs/day for 7.5 years (1.9 ttl pk-yrs)     Types: Cigarettes     Start date: 1955     Quit date: 1962     Years since quittin.2     Passive exposure: Never    Smokeless tobacco: Former     Quit date: 1970   Substance and Sexual Activity    Alcohol use: Yes     Alcohol/week: 1.0 standard drink of alcohol     Types: 1 Cans of beer per week     Comment: occasionally    Drug use: No    Sexual activity: Not Currently     Partners: Male   Other Topics Concern    Are you pregnant or think you may be? No    Breast-feeding No     Social Determinants of Health     Financial Resource Strain: Low Risk  (2024)    Overall Financial Resource Strain (CARDIA)     Difficulty of Paying Living Expenses: Not hard at all   Food Insecurity: No Food Insecurity (2024)    Hunger Vital Sign     Worried About Running Out of Food in the Last Year: Never true     Ran Out of Food  in the Last Year: Never true   Transportation Needs: No Transportation Needs (4/23/2024)    PRAPARE - Transportation     Lack of Transportation (Medical): No     Lack of Transportation (Non-Medical): No   Physical Activity: Unknown (4/23/2024)    Exercise Vital Sign     Days of Exercise per Week: Patient declined   Stress: No Stress Concern Present (4/23/2024)    [employer] [place] [employer] - Occupational Stress Questionnaire     Feeling of Stress : Not at all   Housing Stability: Low Risk  (5/4/2022)    Housing Stability Vital Sign     Unable to Pay for Housing in the Last Year: No     Number of Places Lived in the Last Year: 1     Unstable Housing in the Last Year: No     Family History   Problem Relation Name Age of Onset    Cancer Mother          shoulder tumor - cancer    Hypertension Mother      Glaucoma Mother      Heart disease Father          valve replacement    Hypertension Father      Heart attack Father      Diabetes Sister Lidya     Arthritis Sister Lovinia         s/p knee surgery    Diabetes Brother Benoris     Heart disease Brother Benoris     Heart failure Brother Benoris     Stroke Brother Ardell     Arthritis Brother Vital         back problems    Skin cancer Brother Bogdan     Cancer Brother Bogdan     Throat cancer Brother Bogdan     No Known Problems Maternal Grandmother      No Known Problems Maternal Grandfather      No Known Problems Paternal Grandmother      No Known Problems Paternal Grandfather      Cancer Son Johnathan         jaw    Cataracts Son Johnathan     No Known Problems Maternal Aunt      No Known Problems Maternal Uncle      No Known Problems Paternal Aunt      No Known Problems Paternal Uncle      Melanoma Neg Hx      Eczema Neg Hx      Lupus Neg Hx      Psoriasis Neg Hx      Breast cancer Neg Hx      Colon cancer Neg Hx      Amblyopia Neg Hx      Blindness Neg Hx      Macular degeneration Neg Hx      Retinal detachment Neg Hx      Strabismus Neg Hx      Thyroid  disease Neg Hx         Review of patient's allergies indicates:   Allergen Reactions    Lipitor [atorvastatin]        Current Outpatient Medications   Medication Sig    acetaminophen (TYLENOL) 500 MG tablet Take 1 tablet (500 mg total) by mouth every 4 (four) hours as needed for Pain. (Patient taking differently: Take 500 mg by mouth daily as needed for Pain (Back Pain).)    apixaban (ELIQUIS) 5 mg Tab Take 1 tablet (5 mg total) by mouth 2 (two) times daily.    calcium carbonate (OS-MIRACLE) 500 mg calcium (1,250 mg) tablet Take 1 tablet by mouth every other day.    diphenhydrAMINE (BENADRYL) 25 mg capsule Take 1 capsule (25 mg total) by mouth every 6 (six) hours as needed for Itching or Allergies.    fish oil-fat acid comb.8-hb137 1,200 mg (400 [id number]) Cap Take 1 capsule by mouth once daily.    furosemide (LASIX) 20 MG tablet Take 1 tablet (20 mg total) by mouth 2 (two) times daily.    HYDROcodone-acetaminophen (NORCO) 5-325 mg per tablet Take 1 tablet by mouth.    hydrocortisone 2.5 % cream Apply topically 2 (two) times daily. (Patient taking differently: Apply 15 g topically 2 (two) times daily.)    ibuprofen (ADVIL,MOTRIN) 800 MG tablet Take 800 mg by mouth every 6 (six) hours as needed.    levothyroxine (SYNTHROID) 88 MCG tablet Take 1 tablet (88 mcg total) by mouth once daily.    LIDOcaine (LIDODERM) 5 % Place 1 patch onto the skin once daily. Remove & Discard patch within 12 hours or as directed by MD    losartan (COZAAR) 50 MG tablet Take 1 tablet (50 mg total) by mouth once daily.    lovastatin (MEVACOR) 10 MG tablet Take 1 tablet (10 mg total) by mouth once daily.    metoprolol succinate (TOPROL-XL) 100 MG 24 hr tablet Take 1 tablet (100 mg total) by mouth once daily.    mometasone 0.1% (ELOCON) 0.1 % cream Use daily    multivit,calc,mins/iron/folic (ONE-A-DAY WOMENS FORMULA ORAL) Take 1 tablet by mouth once daily.    aspirin 81 MG Chew Take 1 tablet (81 mg total) by mouth once daily.    blood-glucose  meter (FREESTYLE SYSTEM KIT) kit Use as instructed    dorzolamide-timolol 2-0.5% (COSOPT) 22.3-6.8 mg/mL ophthalmic solution Place 1 drop into both eyes 2 (two) times daily.     No current facility-administered medications for this visit.       REVIEW OF SYSTEMS:    GENERAL:  No weight loss, malaise or fevers.  HEENT:  Negative for frequent or significant headaches.  NECK:  Negative for lumps, goiter, pain and significant neck swelling.  RESPIRATORY:  Negative for cough, wheezing or shortness of breath.  CARDIOVASCULAR:  Negative for chest pain, leg swelling or palpitations. HTN  GI:  Negative for abdominal discomfort, blood in stools or black stools or change in bowel habits.  MUSCULOSKELETAL:  See HPI. Low back pain.   SKIN:  Negative for lesions, rash, and itching.  PSYCH:  Negative for sleep disturbance, mood disorder and recent psychosocial stressors.  HEMATOLOGY/LYMPHOLOGY:  Negative for prolonged bleeding, bruising easily or swollen nodes.  NEURO:   No history of headaches, syncope, paralysis, seizures or tremors.  All other reviewed and negative other than HPI.    OBJECTIVE:    /73   Pulse 75   Temp 98.2 °F (36.8 °C)   Resp 18   Wt 83.9 kg (184 lb 15.5 oz)   SpO2 100%   BMI 29.85 kg/m²     PHYSICAL EXAMINATION:    General appearance: Well appearing, in no acute distress, alert and oriented x3.  Psych:  Mood and affect appropriate.  Skin: Skin color, texture, turgor normal, no rashes or lesions, in both upper and lower body.  Head/face:  Normocephalic, atraumatic. No palpable lymph nodes.  Back: Straight leg raising in the sitting and supine positions is negative to radicular pain. Pain to palpation over the lumbar facet joints. Limited range of motion with pain reproduction on flexion and extension. Positive facet loading.   Extremities: Peripheral joint ROM is full and pain free without obvious instability or laxity in all four extremities. No deformities, edema, or skin discoloration. Good  capillary refill.  Musculoskeletal: Bilateral upper and lower extremity strength is normal and symmetric.  No atrophy or tone abnormalities are noted. Full ROM of right hip with pain on external rotation. William's is positive on the right.  Neuro: No loss of sensation is noted.  Gait: Antalgic- ambulates without assistance.    ASSESSMENT: 89 y.o. year old female with lower back pain, consistent with the following diagnoses:    1. Right hip pain  Procedure Order to Pain Management      2. Chronic pain of right knee        3. Lumbar spondylosis        4. Myofascial pain              PLAN:     We discussed with the patient the assessment and recommendations. The following is the plan we agreed on:     - Lumbar MRI results discussed in detail which shows acute-subacute T12 compression fracture with severe height loss and 5 mm retropulsion resulting in mild canal stenosis and moderate-severe foraminal stenosis.     - She also has significant right hip pain with positive provocative hip maneuvers. Will schedule for right hip joint injection. If limited benefit, consider kyphoplasty. We discussed this today.     - Continue home medications for continued pain relief.     - RTC 2 weeks after injection.    - Counseled patient regarding the importance of activity modification and physical therapy.      The above plan and management options were discussed at length with patient. Patient is in agreement with the above and verbalized understanding.     Rosa Maria Jacome  09/24/2024    I spent a total of 30 minutes on the day of the visit.  This includes face to face time and non-face to face time preparing to see the patient by reviewing previous labs/imaging, obtaining and/or reviewing separately obtained history, documenting clinical information in the electronic or other health record, independently interpreting results and communicating results to the patient/family/caregiver.

## 2024-09-25 ENCOUNTER — PATIENT MESSAGE (OUTPATIENT)
Dept: PAIN MEDICINE | Facility: OTHER | Age: 89
End: 2024-09-25
Payer: MEDICARE

## 2024-09-25 DIAGNOSIS — M25.551 RIGHT HIP PAIN: Primary | ICD-10-CM

## 2024-09-26 ENCOUNTER — TELEPHONE (OUTPATIENT)
Dept: PAIN MEDICINE | Facility: CLINIC | Age: 89
End: 2024-09-26
Payer: MEDICARE

## 2024-09-26 ENCOUNTER — PATIENT MESSAGE (OUTPATIENT)
Dept: PAIN MEDICINE | Facility: OTHER | Age: 89
End: 2024-09-26
Payer: MEDICARE

## 2024-09-26 NOTE — TELEPHONE ENCOUNTER
Staff tried contacting the patient to get her scheduled for a 2 week f/u after her procedure. Patient did not answer and has a vm that has not been set up.

## 2024-09-26 NOTE — TELEPHONE ENCOUNTER
----- Message from Gillian Jacinto sent at 9/26/2024  8:59 AM CDT -----  Please schedule patient for _2 week follow-up with Rosa Maria after procedure on _10/7.

## 2024-10-02 PROBLEM — M53.86 DECREASED RANGE OF MOTION OF LUMBAR SPINE: Status: ACTIVE | Noted: 2024-10-02

## 2024-10-02 PROBLEM — R26.89 IMPAIRED GAIT AND MOBILITY: Status: ACTIVE | Noted: 2024-10-02

## 2024-10-02 PROBLEM — R29.898 LOWER EXTREMITY WEAKNESS: Status: ACTIVE | Noted: 2024-10-02

## 2024-10-07 ENCOUNTER — HOSPITAL ENCOUNTER (OUTPATIENT)
Facility: OTHER | Age: 89
Discharge: HOME | End: 2024-10-07
Attending: ANESTHESIOLOGY | Admitting: ANESTHESIOLOGY
Payer: MEDICARE

## 2024-10-07 VITALS
BODY MASS INDEX: 29.57 KG/M2 | HEIGHT: 66 IN | WEIGHT: 184 LBS | HEART RATE: 54 BPM | SYSTOLIC BLOOD PRESSURE: 153 MMHG | OXYGEN SATURATION: 98 % | RESPIRATION RATE: 18 BRPM | TEMPERATURE: 98 F | DIASTOLIC BLOOD PRESSURE: 69 MMHG

## 2024-10-07 DIAGNOSIS — G89.29 CHRONIC PAIN: ICD-10-CM

## 2024-10-07 DIAGNOSIS — M16.11 PRIMARY OSTEOARTHRITIS OF RIGHT HIP: Primary | ICD-10-CM

## 2024-10-07 LAB — POCT GLUCOSE: 101 MG/DL (ref 70–110)

## 2024-10-07 PROCEDURE — 63600175 PHARM REV CODE 636 W HCPCS: Mod: JZ,JG | Performed by: ANESTHESIOLOGY

## 2024-10-07 PROCEDURE — 20610 DRAIN/INJ JOINT/BURSA W/O US: CPT | Mod: RT | Performed by: ANESTHESIOLOGY

## 2024-10-07 PROCEDURE — 25500020 PHARM REV CODE 255: Performed by: ANESTHESIOLOGY

## 2024-10-07 PROCEDURE — 20610 DRAIN/INJ JOINT/BURSA W/O US: CPT | Mod: RT,,, | Performed by: ANESTHESIOLOGY

## 2024-10-07 RX ORDER — SODIUM CHLORIDE 9 MG/ML
INJECTION, SOLUTION INTRAVENOUS CONTINUOUS
Status: DISCONTINUED | OUTPATIENT
Start: 2024-10-07 | End: 2024-10-07 | Stop reason: HOSPADM

## 2024-10-07 RX ORDER — BUPIVACAINE HYDROCHLORIDE 2.5 MG/ML
INJECTION, SOLUTION EPIDURAL; INFILTRATION; INTRACAUDAL
Status: DISCONTINUED | OUTPATIENT
Start: 2024-10-07 | End: 2024-10-07 | Stop reason: HOSPADM

## 2024-10-07 RX ORDER — LIDOCAINE HYDROCHLORIDE 20 MG/ML
INJECTION, SOLUTION INFILTRATION; PERINEURAL
Status: DISCONTINUED | OUTPATIENT
Start: 2024-10-07 | End: 2024-10-07 | Stop reason: HOSPADM

## 2024-10-07 NOTE — DISCHARGE SUMMARY
Discharge Note  Short Stay      SUMMARY     Admit Date: 10/7/2024    Attending Physician: Chago Rizvi MD    Discharge Physician: Chago Rizvi MD      Discharge Date: 10/7/2024 2:40 PM    Procedure(s) (LRB):  INJECTION, JOINT RIGHT HIP (Right)    Final Diagnosis: Right hip pain [M25.551]    Disposition: Home or self care    Patient Instructions:   Current Discharge Medication List        CONTINUE these medications which have NOT CHANGED    Details   acetaminophen (TYLENOL) 500 MG tablet Take 1 tablet (500 mg total) by mouth every 4 (four) hours as needed for Pain.  Qty: 60 tablet, Refills: 2    Associated Diagnoses: Primary osteoarthritis of both knees      apixaban (ELIQUIS) 5 mg Tab Take 1 tablet (5 mg total) by mouth 2 (two) times daily.  Qty: 60 tablet, Refills: 6    Associated Diagnoses: Paroxysmal atrial fibrillation      aspirin 81 MG Chew Take 1 tablet (81 mg total) by mouth once daily.  Refills: 0    Associated Diagnoses: Atherosclerosis of abdominal aorta      blood-glucose meter (FREESTYLE SYSTEM KIT) kit Use as instructed  Qty: 1 each, Refills: 0      calcium carbonate (OS-MIRACLE) 500 mg calcium (1,250 mg) tablet Take 1 tablet by mouth every other day.      diphenhydrAMINE (BENADRYL) 25 mg capsule Take 1 capsule (25 mg total) by mouth every 6 (six) hours as needed for Itching or Allergies.  Qty: 20 capsule, Refills: 0      dorzolamide-timolol 2-0.5% (COSOPT) 22.3-6.8 mg/mL ophthalmic solution Place 1 drop into both eyes 2 (two) times daily.  Qty: 10 mL, Refills: 6    Associated Diagnoses: Open angle with borderline findings and high glaucoma risk in both eyes      fish oil-fat acid comb.8-hb137 1,200 mg (400 [id number]) Cap Take 1 capsule by mouth once daily.      furosemide (LASIX) 20 MG tablet Take 1 tablet (20 mg total) by mouth 2 (two) times daily.  Qty: 60 tablet, Refills: 11    Associated Diagnoses: Heart failure with mildly reduced ejection fraction (HFmrEF)       HYDROcodone-acetaminophen (NORCO) 5-325 mg per tablet Take 1 tablet by mouth.      hydrocortisone 2.5 % cream Apply topically 2 (two) times daily.  Qty: 20 g, Refills: 1    Associated Diagnoses: Atopic dermatitis, unspecified type      ibuprofen (ADVIL,MOTRIN) 800 MG tablet Take 800 mg by mouth every 6 (six) hours as needed.      levothyroxine (SYNTHROID) 88 MCG tablet Take 1 tablet (88 mcg total) by mouth once daily.  Qty: 90 tablet, Refills: 0    Associated Diagnoses: Acquired hypothyroidism      LIDOcaine (LIDODERM) 5 % Place 1 patch onto the skin once daily. Remove & Discard patch within 12 hours or as directed by MD  Qty: 30 patch, Refills: 0      losartan (COZAAR) 50 MG tablet Take 1 tablet (50 mg total) by mouth once daily.  Qty: 90 tablet, Refills: 3    Comments: .  Associated Diagnoses: Benign hypertension with chronic kidney disease, stage III; Essential hypertension      lovastatin (MEVACOR) 10 MG tablet Take 1 tablet (10 mg total) by mouth once daily.  Qty: 90 tablet, Refills: 3    Associated Diagnoses: Hyperlipidemia LDL goal <100      metoprolol succinate (TOPROL-XL) 100 MG 24 hr tablet Take 1 tablet (100 mg total) by mouth once daily.  Qty: 90 tablet, Refills: 3    Comments: .  Associated Diagnoses: Benign hypertension with chronic kidney disease, stage III; Essential hypertension      mometasone 0.1% (ELOCON) 0.1 % cream Use daily  Qty: 50 g, Refills: 3    Associated Diagnoses: Dermatitis      multivit,calc,mins/iron/folic (ONE-A-DAY WOMENS FORMULA ORAL) Take 1 tablet by mouth once daily.                 Discharge Diagnosis: Right hip pain [M25.551]  Condition on Discharge: Stable with no complications to procedure   Diet on Discharge: Same as before.  Activity: as per instruction sheet.  Discharge to: Home with a responsible adult.  Follow up: 2-4 weeks       Please call my office or pager at [phone] if experienced any weakness or loss of sensation, fever > 101.5, pain uncontrolled with oral  medications, persistent nausea/vomiting/or diarrhea, redness or drainage from the incisions, or any other worrisome concerns. If physician on call was not reached or could not communicate with our office for any reason please go to the nearest emergency department     Campos Rojo MD

## 2024-10-07 NOTE — DISCHARGE INSTRUCTIONS

## 2024-10-07 NOTE — OP NOTE
Hip Joint Injection under Fluoroscopic Guidance    The procedure, risks, benefits, and options were discussed with the patient. There are no contraindications to the procedure. The patent expressed understanding and agreed to the procedure. Informed written consent was obtained prior to the start of the procedure and can be found in the patient's chart.    PATIENT NAME: Ms. Tonya Cohn   MRN: [id number]     DATE OF PROCEDURE: 10/07/2024    PROCEDURE: Right Hip Joint Injection under Fluoroscopic Guidance    PRE-OP DIAGNOSIS: Right hip pain [M25.551]    POST-OP DIAGNOSIS: Right hip pain [M25.551]    PHYSICIAN: Chago Rizvi MD    ASSISTANTS: Campos Rojo MD  [facility] pain fellow     MEDICATIONS INJECTED: Preservative-free Kenalog 40mg with 3cc of Bupivacine 0.25%     LOCAL ANESTHETIC INJECTED: Xylocaine 2%     SEDATION: None    ESTIMATED BLOOD LOSS: None    COMPLICATIONS: None    TECHNIQUE: Time-out was performed to identify the patient and procedure to be performed. With the patient laying in a prone position, the surgical area was prepped and draped in the usual sterile fashion using ChloraPrep and a fenestrated drape. The area overlying the hip joint was determined under fluoroscopy guidance. Skin anesthesia was achieved by injecting Lidocaine 2% over the injection site. The acetabulofemoral joint was then approached with a 22 gauge, 5 inch spinal quinke needle that was introduced under fluoroscopic guidance in the AP view.  When the needle tip was in the appropriate position, and there was no blood aspiration, Contrast dye  Omnipaque (300mg/mL) was injected to confirm placement and there was no vascular runoff. 4 mL of the medication mixture listed above was injected slowly. The needles were removed and bleeding was nil. A sterile dressing was applied. No specimens collected. The patient tolerated the procedure well.    The patient was monitored after the procedure in the recovery area. They were given  post-procedure and discharge instructions to follow at home. The patient was discharged in a stable condition.    Campos Rojo MD    I reviewed and edited the fellow's note. I conducted my own interview and physical examination. I agree with the findings. I was present and supervising all critical portions of the procedure.

## 2024-10-22 ENCOUNTER — OFFICE VISIT (OUTPATIENT)
Dept: PAIN MEDICINE | Facility: CLINIC | Age: 89
End: 2024-10-22
Payer: MEDICARE

## 2024-10-22 VITALS — HEART RATE: 59 BPM | SYSTOLIC BLOOD PRESSURE: 136 MMHG | DIASTOLIC BLOOD PRESSURE: 65 MMHG

## 2024-10-22 DIAGNOSIS — M47.816 LUMBAR SPONDYLOSIS: ICD-10-CM

## 2024-10-22 DIAGNOSIS — G89.4 CHRONIC PAIN SYNDROME: ICD-10-CM

## 2024-10-22 DIAGNOSIS — S22.080D COMPRESSION FRACTURE OF T12 VERTEBRA WITH ROUTINE HEALING, SUBSEQUENT ENCOUNTER: Primary | ICD-10-CM

## 2024-10-22 PROCEDURE — 99999 PR PBB SHADOW E&M-EST. PATIENT-LVL III: CPT | Mod: PBBFAC,,, | Performed by: NURSE PRACTITIONER

## 2024-10-22 PROCEDURE — 1157F ADVNC CARE PLAN IN RCRD: CPT | Mod: CPTII,S$GLB,, | Performed by: NURSE PRACTITIONER

## 2024-10-22 PROCEDURE — 1159F MED LIST DOCD IN RCRD: CPT | Mod: CPTII,S$GLB,, | Performed by: NURSE PRACTITIONER

## 2024-10-22 PROCEDURE — 1101F PT FALLS ASSESS-DOCD LE1/YR: CPT | Mod: CPTII,S$GLB,, | Performed by: NURSE PRACTITIONER

## 2024-10-22 PROCEDURE — 3288F FALL RISK ASSESSMENT DOCD: CPT | Mod: CPTII,S$GLB,, | Performed by: NURSE PRACTITIONER

## 2024-10-22 PROCEDURE — 1160F RVW MEDS BY RX/DR IN RCRD: CPT | Mod: CPTII,S$GLB,, | Performed by: NURSE PRACTITIONER

## 2024-10-22 PROCEDURE — 1125F AMNT PAIN NOTED PAIN PRSNT: CPT | Mod: CPTII,S$GLB,, | Performed by: NURSE PRACTITIONER

## 2024-10-22 PROCEDURE — 99214 OFFICE O/P EST MOD 30 MIN: CPT | Mod: S$GLB,,, | Performed by: NURSE PRACTITIONER

## 2024-10-22 NOTE — PROGRESS NOTES
Chronic Pain - Established Visit    Referring Physician: No ref. provider found    Chief Complaint:   Chief Complaint   Patient presents with    Hip Pain        SUBJECTIVE:    Interval History 10/22/2024:  The patient is here today for follow up of right sided back and hip pain. She is s/p right hip injection with no benefit. She continues with severe pain to the area and radiation into the groin. She has a known severe compression fracture at T12. We previously discussed kyphoplasty and she would like to further discuss at this time. Her pain today is 9/10.    Interval History 9/24/2024:  Tonya returns today for follow up of right hip and back pain. She has had worsened right hip pain since previous encounter, and is interested in a procedure at this time. The pain is located mainly to anterior hip and groin. It worsens with walking and bending her leg. No numbness. She plans to continue with PT. Her MRI from July does show acute-subacute T12 compression fracture with severe height loss and 5 mm retropulsion resulting in mild canal stenosis and moderate-severe foraminal stenosis. Her pain today is 8/10.    Interval History 7/12/2024:  The patient returns today for follow up of chronic back pain. She was evaluated by Dr. Sherwood at initial OV and PT was ordered. However, she has not yet started PT. Since previous encounter, she had a hospitalization for new-onset A-fib. She is now taking Eliquis. Her pain is located across the lower back without radiation. No numbness to BLE. She does report numbness to left hand intermittently. Her back pain worsens with standing. She is accompanied by her two daughters who are active in her care. Her pain today is 10/10.    Initial Encounter:  Tonya Cohn presents to the clinic for the evaluation of low back pain. The pain started 2 weeks ago without inciting event/trauma and symptoms have been unchanged.She recently went to the ED for her back pain where they did LXR. She also  received Steroid injection, tylenol, and given Zanaflex by her PCP. The pain is located in the bilateral lumbar paraspinal area without radiating pain.  The pain is described as aching and sharp and is rated as 10/10. The pain is rated with a score of  7/10 on the BEST day and a score of 10/10 on the WORST day.  Symptoms interfere with daily activity. The pain is exacerbated by Standing, Bending, Walking, and Extension.  The pain is mitigated by heat, medications, rest, and sitting. She has been using her walking cane since the pain started to ambulate.     Patient denies night fever/night sweats, urinary incontinence, bowel incontinence, significant weight loss, significant motor weakness, and loss of sensations.    Physical Therapy/Home Exercise: Yes; 1 year ago- 6 weeks      Pain Disability Index Review:      10/22/2024     3:07 PM 9/24/2024     3:12 PM 9/23/2024     1:38 PM   Last 3 PDI Scores   Pain Disability Index (PDI) 63 48 54       Pain Medications:  Tylenol  Zanaflex     report:  Not applicable    Pain Procedures:   10/7/24 Right hip injection    Imaging:   X-Ray Lumbar Spine Flexion And Extension Only  Order: [id number]  Status: Final result       Visible to patient: Yes (not seen)       Next appt: 07/17/2024 at 01:00 PM in Radiology ([facility] MAMMO1 HOLOGIC DIMENSIONS)       Dx: Lumbar spondylosis; Spondylolisthesis...    0 Result Notes  Details    Reading Physician Reading Date Result Priority   William Diaz MD  [phone] 5/29/2024 Routine     Narrative & Impression  EXAMINATION:  XR LUMBAR SPINE FLEXION AND EXTENSION ONLY     CLINICAL HISTORY:  Spondylolisthesis, site unspecified     TECHNIQUE:  Lateral flexion/extension views of the lumbar spine were performed     COMPARISON:  05/19/2024     FINDINGS:  Grade 2 anterolisthesis of L4 on L5 is unchanged.  There is no significant translation of listhesis in flexion/extension views.  There is a mild compression deformity identified at T12      Impression:     Grade 2 anterolisthesis of L4 on L5 without significant translation on flexion/extension views     Mild compression deformity at T12.     Past Medical History:   Diagnosis Date    AR (allergic rhinitis)     Atherosclerosis of abdominal aorta     noted on CT scan 2011    Carpal tunnel syndrome of left wrist     Chronic kidney disease, stage 3     Diabetic peripheral neuropathy     Diverticulosis     History of colon polyps     History of diverticulitis of colon 2014    Hyperlipemia     Hypertension     Hypothyroidism     followed by endocrinology, Dr. Green    Mild aortic stenosis     New onset atrial fibrillation 2024    OA (osteoarthritis) of knee     Obesity     Sciatica     Type II or unspecified type diabetes mellitus with neurological manifestations, uncontrolled(250.62)      Past Surgical History:   Procedure Laterality Date    CATARACT EXTRACTION Bilateral     COLONOSCOPY N/A 10/3/2017    Procedure: COLONOSCOPY;  Surgeon: Alin Gonzalez MD;  [facility]  Service: Endoscopy;  Laterality: N/A;    COSMETIC SURGERY      HYSTERECTOMY      partial    INJECTION OF JOINT Right 10/7/2024    Procedure: INJECTION, JOINT RIGHT HIP;  Surgeon: Chago Rizvi MD;  Location: [facility];  Service: Pain Management;  Laterality: Right;  [phone]  2 WK F/U EL    JOINT REPLACEMENT Right     LUNG BIOPSY  in her 40's    TOTAL KNEE ARTHROPLASTY Right 2014    TKR     Social History     Socioeconomic History    Marital status:     Number of children: 7   Occupational History    Occupation: retired - house cleaning   Tobacco Use    Smoking status: Former     Current packs/day: 0.00     Average packs/day: 0.3 packs/day for 7.5 years (1.9 ttl pk-yrs)     Types: Cigarettes     Start date: 1955     Quit date: 1962     Years since quittin.3     Passive exposure: Never    Smokeless tobacco: Former     Quit date: 1970   Substance and Sexual  Activity    Alcohol use: Yes     Alcohol/week: 1.0 standard drink of alcohol     Types: 1 Cans of beer per week     Comment: occasionally    Drug use: No    Sexual activity: Not Currently     Partners: Male   Other Topics Concern    Are you pregnant or think you may be? No    Breast-feeding No     Social Drivers of Health     Financial Resource Strain: Low Risk  (4/23/2024)    Overall Financial Resource Strain (CARDIA)     Difficulty of Paying Living Expenses: Not hard at all   Food Insecurity: No Food Insecurity (4/23/2024)    Hunger Vital Sign     Worried About Running Out of Food in the Last Year: Never true     Ran Out of Food in the Last Year: Never true   Transportation Needs: No Transportation Needs (4/23/2024)    PRAPARE - Transportation     Lack of Transportation (Medical): No     Lack of Transportation (Non-Medical): No   Physical Activity: Unknown (4/23/2024)    Exercise Vital Sign     Days of Exercise per Week: Patient declined   Stress: No Stress Concern Present (4/23/2024)    [employer] - Occupational Stress Questionnaire     Feeling of Stress : Not at all   Housing Stability: Low Risk  (5/4/2022)    Housing Stability Vital Sign     Unable to Pay for Housing in the Last Year: No     Number of Places Lived in the Last Year: 1     Unstable Housing in the Last Year: No     Family History   Problem Relation Name Age of Onset    Cancer Mother          shoulder tumor - cancer    Hypertension Mother      Glaucoma Mother      Heart disease Father          valve replacement    Hypertension Father      Heart attack Father      Diabetes Sister Lidya     Arthritis Sister Lovinia         s/p knee surgery    Diabetes Brother Benoris     Heart disease Brother Benoris     Heart failure Brother Benoris     Stroke Brother Ardell     Arthritis Brother Vital         back problems    Skin cancer Brother Bogdan     Cancer Brother Bogdan     Throat cancer Brother Bogdan     No Known Problems  Maternal Grandmother      No Known Problems Maternal Grandfather      No Known Problems Paternal Grandmother      No Known Problems Paternal Grandfather      Cancer Son Johnathan         jaw    Cataracts Son Johnathan     No Known Problems Maternal Aunt      No Known Problems Maternal Uncle      No Known Problems Paternal Aunt      No Known Problems Paternal Uncle      Melanoma Neg Hx      Eczema Neg Hx      Lupus Neg Hx      Psoriasis Neg Hx      Breast cancer Neg Hx      Colon cancer Neg Hx      Amblyopia Neg Hx      Blindness Neg Hx      Macular degeneration Neg Hx      Retinal detachment Neg Hx      Strabismus Neg Hx      Thyroid disease Neg Hx         Review of patient's allergies indicates:   Allergen Reactions    Lipitor [atorvastatin]        Current Outpatient Medications   Medication Sig    acetaminophen (TYLENOL) 500 MG tablet Take 1 tablet (500 mg total) by mouth every 4 (four) hours as needed for Pain. (Patient taking differently: Take 500 mg by mouth daily as needed for Pain (Back Pain).)    apixaban (ELIQUIS) 5 mg Tab Take 1 tablet (5 mg total) by mouth 2 (two) times daily.    calcium carbonate (OS-MIRACLE) 500 mg calcium (1,250 mg) tablet Take 1 tablet by mouth every other day.    diphenhydrAMINE (BENADRYL) 25 mg capsule Take 1 capsule (25 mg total) by mouth every 6 (six) hours as needed for Itching or Allergies.    fish oil-fat acid [id number] 1,200 mg (400 [id number]) Cap Take 1 capsule by mouth once daily.    furosemide (LASIX) 20 MG tablet Take 1 tablet (20 mg total) by mouth 2 (two) times daily.    HYDROcodone-acetaminophen (NORCO) 5-325 mg per tablet Take 1 tablet by mouth.    hydrocortisone 2.5 % cream Apply topically 2 (two) times daily. (Patient taking differently: Apply 15 g topically 2 (two) times daily.)    levothyroxine (SYNTHROID) 88 MCG tablet Take 1 tablet (88 mcg total) by mouth once daily.    LIDOcaine (LIDODERM) 5 % Place 1 patch onto the skin once daily. Remove & Discard patch within  12 hours or as directed by MD    losartan (COZAAR) 50 MG tablet Take 1 tablet (50 mg total) by mouth once daily.    lovastatin (MEVACOR) 10 MG tablet Take 1 tablet (10 mg total) by mouth once daily.    metoprolol succinate (TOPROL-XL) 100 MG 24 hr tablet Take 1 tablet (100 mg total) by mouth once daily.    mometasone 0.1% (ELOCON) 0.1 % cream Use daily    multivit,calc,mins/iron/folic (ONE-A-DAY WOMENS FORMULA ORAL) Take 1 tablet by mouth once daily.    aspirin 81 MG Chew Take 1 tablet (81 mg total) by mouth once daily.    blood-glucose meter (FREESTYLE SYSTEM KIT) kit Use as instructed    dorzolamide-timolol 2-0.5% (COSOPT) 22.3-6.8 mg/mL ophthalmic solution Place 1 drop into both eyes 2 (two) times daily.    ibuprofen (ADVIL,MOTRIN) 800 MG tablet Take 800 mg by mouth every 6 (six) hours as needed. (Patient not taking: Reported on 10/22/2024)     No current facility-administered medications for this visit.       REVIEW OF SYSTEMS:    GENERAL:  No weight loss, malaise or fevers.  HEENT:  Negative for frequent or significant headaches.  NECK:  Negative for lumps, goiter, pain and significant neck swelling.  RESPIRATORY:  Negative for cough, wheezing or shortness of breath.  CARDIOVASCULAR:  Negative for chest pain, leg swelling or palpitations. HTN  GI:  Negative for abdominal discomfort, blood in stools or black stools or change in bowel habits.  MUSCULOSKELETAL:  See HPI. Low back pain.   SKIN:  Negative for lesions, rash, and itching.  PSYCH:  Negative for sleep disturbance, mood disorder and recent psychosocial stressors.  HEMATOLOGY/LYMPHOLOGY:  Negative for prolonged bleeding, bruising easily or swollen nodes.  NEURO:   No history of headaches, syncope, paralysis, seizures or tremors.  All other reviewed and negative other than HPI.    OBJECTIVE:    /65 (BP Location: Right arm, Patient Position: Sitting)   Pulse (!) 59     PHYSICAL EXAMINATION:    General appearance: Well appearing, in no acute distress,  alert and oriented x3.  Psych:  Mood and affect appropriate.  Skin: Skin color, texture, turgor normal, no rashes or lesions, in both upper and lower body.  Head/face:  Normocephalic, atraumatic. No palpable lymph nodes.  Back: TTP over spine at the level of T12. Pain to palpation over the lumbar facet joints. Limited range of motion with pain reproduction on flexion and extension. Positive facet loading.   Extremities: Peripheral joint ROM is full and pain free without obvious instability or laxity in all four extremities. No deformities, edema, or skin discoloration. Good capillary refill.  Musculoskeletal: Bilateral upper and lower extremity strength is normal and symmetric.  No atrophy or tone abnormalities are noted. Full ROM of right hip with pain on external rotation. William's is negative.  Neuro: No loss of sensation is noted.  Gait: Antalgic.    ASSESSMENT: 89 y.o. year old female with lower back pain, consistent with the following diagnoses:    1. Compression fracture of T12 vertebra with routine healing, subsequent encounter  Procedure Order to Pain Management      2. Chronic pain syndrome        3. Lumbar spondylosis                PLAN:     We discussed with the patient the assessment and recommendations. The following is the plan we agreed on:     - Lumbar MRI results discussed in detail which shows acute-subacute T12 compression fracture with severe height loss and 5 mm retropulsion resulting in mild canal stenosis and moderate-severe foraminal stenosis.     - She had no benefit with diagnostic/therapeutic hip injection.    - Reji Disability Questionnaire (RDQ) score is 21. There is over 25% height loss on imaging.    - Orders placed for T12 kyphoplasty.    - Continue current medications.    - RTC 1 week post-op.    - Counseled patient regarding the importance of activity modification and physical therapy.      The above plan and management options were discussed at length with patient. Patient is  in agreement with the above and verbalized understanding.     Rosa Maria Jacome  10/22/2024    I spent a total of 30 minutes on the day of the visit.  This includes face to face time and non-face to face time preparing to see the patient by reviewing previous labs/imaging, obtaining and/or reviewing separately obtained history, documenting clinical information in the electronic or other health record, independently interpreting results and communicating results to the patient/family/caregiver.

## 2024-10-23 ENCOUNTER — PATIENT MESSAGE (OUTPATIENT)
Dept: PAIN MEDICINE | Facility: OTHER | Age: 89
End: 2024-10-23
Payer: MEDICARE

## 2024-10-23 DIAGNOSIS — M80.88XA OSTEOPOROTIC COMPRESSION FRACTURE OF VERTEBRA, INITIAL ENCOUNTER: ICD-10-CM

## 2024-10-23 DIAGNOSIS — G89.4 CHRONIC PAIN SYNDROME: ICD-10-CM

## 2024-10-23 DIAGNOSIS — S22.080D COMPRESSION FRACTURE OF T12 VERTEBRA WITH ROUTINE HEALING: ICD-10-CM

## 2024-10-23 DIAGNOSIS — S22.080D COMPRESSION FRACTURE OF T12 VERTEBRA WITH ROUTINE HEALING, SUBSEQUENT ENCOUNTER: ICD-10-CM

## 2024-10-23 DIAGNOSIS — M47.816 LUMBAR SPONDYLOSIS: ICD-10-CM

## 2024-10-23 DIAGNOSIS — M25.559 HIP PAIN, UNSPECIFIED LATERALITY: Primary | ICD-10-CM

## 2024-10-29 ENCOUNTER — TELEPHONE (OUTPATIENT)
Dept: PAIN MEDICINE | Facility: OTHER | Age: 89
End: 2024-10-29
Payer: MEDICARE

## 2024-11-07 ENCOUNTER — TELEPHONE (OUTPATIENT)
Dept: PAIN MEDICINE | Facility: CLINIC | Age: 89
End: 2024-11-07
Payer: MEDICARE

## 2024-11-07 NOTE — TELEPHONE ENCOUNTER
Staff attempted to contact patient. Call went to voicemail and mailbox is full.   Staff is routing callback request to the procedure scheduling department for further assistance. Patient is not having an in office procedure.

## 2024-11-07 NOTE — TELEPHONE ENCOUNTER
----- Message from Lamar sent at 11/7/2024  4:11 PM CST -----  Regarding: Cancel her appointment  Name of Who is Calling: Tonya            What is the request in detail: Pt is requesting a call back because she needs to cancel her upcoming appointment.            Can the clinic reply by [facility]:No            What [facility] Back [facility]: [phone]

## 2024-11-19 ENCOUNTER — TELEPHONE (OUTPATIENT)
Dept: PAIN MEDICINE | Facility: CLINIC | Age: 89
End: 2024-11-19
Payer: MEDICARE

## 2024-11-21 ENCOUNTER — PATIENT MESSAGE (OUTPATIENT)
Dept: FAMILY MEDICINE | Facility: CLINIC | Age: 89
End: 2024-11-21
Payer: MEDICARE

## 2024-11-27 ENCOUNTER — TELEPHONE (OUTPATIENT)
Dept: OPHTHALMOLOGY | Facility: CLINIC | Age: 89
End: 2024-11-27
Payer: MEDICARE

## 2024-11-27 NOTE — TELEPHONE ENCOUNTER
----- Message from Angelica sent at 11/27/2024  9:16 AM CST -----  Type:  Needs Medical Advice    Who Called: [place] daughter  Symptoms (please be specific):   How long has patient had these symptoms:    Pharmacy name and phone #:    Would the patient rather a call back or a response via [facility]? CALL   Best Call Back Number:  [phone]  Additional Information: asking  for a later time or date

## 2024-11-27 NOTE — TELEPHONE ENCOUNTER
Returned pt's call but number goes straight to pt's voicemail which is not set up. I tried calling several times

## 2024-11-29 ENCOUNTER — HOSPITAL ENCOUNTER (EMERGENCY)
Facility: HOSPITAL | Age: 89
Discharge: HOME | End: 2024-11-29
Attending: STUDENT IN AN ORGANIZED HEALTH CARE EDUCATION/TRAINING PROGRAM
Payer: MEDICARE

## 2024-11-29 VITALS
DIASTOLIC BLOOD PRESSURE: 74 MMHG | OXYGEN SATURATION: 99 % | RESPIRATION RATE: 27 BRPM | BODY MASS INDEX: 29.86 KG/M2 | TEMPERATURE: 99 F | HEART RATE: 61 BPM | SYSTOLIC BLOOD PRESSURE: 168 MMHG | WEIGHT: 185 LBS

## 2024-11-29 DIAGNOSIS — R05.1 ACUTE COUGH: Primary | ICD-10-CM

## 2024-11-29 DIAGNOSIS — R00.1 BRADYCARDIA: ICD-10-CM

## 2024-11-29 LAB
ALBUMIN SERPL BCP-MCNC: 3.7 G/DL (ref 3.5–5.2)
ALP SERPL-CCNC: 69 U/L (ref 40–150)
ALT SERPL W/O P-5'-P-CCNC: 10 U/L (ref 10–44)
ANION GAP SERPL CALC-SCNC: 12 MMOL/L (ref 8–16)
AST SERPL-CCNC: 19 U/L (ref 10–40)
BASOPHILS # BLD AUTO: 0.03 K/UL (ref 0–0.2)
BASOPHILS NFR BLD: 0.5 % (ref 0–1.9)
BILIRUB SERPL-MCNC: 0.3 MG/DL (ref 0.1–1)
BNP SERPL-MCNC: 474 PG/ML (ref 0–99)
BUN SERPL-MCNC: 18 MG/DL (ref 10–30)
CALCIUM SERPL-MCNC: 9.6 MG/DL (ref 8.7–10.5)
CHLORIDE SERPL-SCNC: 106 MMOL/L (ref 95–110)
CO2 SERPL-SCNC: 26 MMOL/L (ref 23–29)
CREAT SERPL-MCNC: 0.9 MG/DL (ref 0.5–1.4)
DIFFERENTIAL METHOD BLD: ABNORMAL
EOSINOPHIL # BLD AUTO: 0.1 K/UL (ref 0–0.5)
EOSINOPHIL NFR BLD: 1.3 % (ref 0–8)
ERYTHROCYTE [DISTWIDTH] IN BLOOD BY AUTOMATED COUNT: 13.4 % (ref 11.5–14.5)
EST. GFR  (NO RACE VARIABLE): 60 ML/MIN/1.73 M^2
GLUCOSE SERPL-MCNC: 124 MG/DL (ref 70–110)
HCT VFR BLD AUTO: 34.5 % (ref 37–48.5)
HGB BLD-MCNC: 11.9 G/DL (ref 12–16)
IMM GRANULOCYTES # BLD AUTO: 0.01 K/UL (ref 0–0.04)
IMM GRANULOCYTES NFR BLD AUTO: 0.2 % (ref 0–0.5)
INFLUENZA A, MOLECULAR: NEGATIVE
INFLUENZA A, MOLECULAR: NOT DETECTED
INFLUENZA B, MOLECULAR: NEGATIVE
INFLUENZA B, MOLECULAR: NOT DETECTED
LYMPHOCYTES # BLD AUTO: 1.7 K/UL (ref 1–4.8)
LYMPHOCYTES NFR BLD: 28.5 % (ref 18–48)
MCH RBC QN AUTO: 30 PG (ref 27–31)
MCHC RBC AUTO-ENTMCNC: 34.5 G/DL (ref 32–36)
MCV RBC AUTO: 87 FL (ref 82–98)
MONOCYTES # BLD AUTO: 0.9 K/UL (ref 0.3–1)
MONOCYTES NFR BLD: 14.9 % (ref 4–15)
NEUTROPHILS # BLD AUTO: 3.3 K/UL (ref 1.8–7.7)
NEUTROPHILS NFR BLD: 54.6 % (ref 38–73)
NRBC BLD-RTO: 0 /100 WBC
OHS QRS DURATION: 108 MS
OHS QRS DURATION: 108 MS
OHS QTC CALCULATION: 447 MS
OHS QTC CALCULATION: 492 MS
PLATELET # BLD AUTO: 197 K/UL (ref 150–450)
PMV BLD AUTO: 11.5 FL (ref 9.2–12.9)
POTASSIUM SERPL-SCNC: 3.7 MMOL/L (ref 3.5–5.1)
PROT SERPL-MCNC: 7.3 G/DL (ref 6–8.4)
RBC # BLD AUTO: 3.97 M/UL (ref 4–5.4)
RSV AG BY MOLECULAR METHOD: NOT DETECTED
SARS-COV-2 RDRP RESP QL NAA+PROBE: NEGATIVE
SARS-COV-2 RNA RESP QL NAA+PROBE: NOT DETECTED
SODIUM SERPL-SCNC: 144 MMOL/L (ref 136–145)
SPECIMEN SOURCE: NORMAL
TROPONIN I SERPL DL<=0.01 NG/ML-MCNC: 0.05 NG/ML (ref 0–0.03)
TROPONIN I SERPL DL<=0.01 NG/ML-MCNC: 0.05 NG/ML (ref 0–0.03)
WBC # BLD AUTO: 5.96 K/UL (ref 3.9–12.7)

## 2024-11-29 PROCEDURE — 99285 EMERGENCY DEPT VISIT HI MDM: CPT | Mod: 25

## 2024-11-29 PROCEDURE — 85025 COMPLETE CBC W/AUTO DIFF WBC: CPT

## 2024-11-29 PROCEDURE — 93010 ELECTROCARDIOGRAM REPORT: CPT | Mod: ,,, | Performed by: INTERNAL MEDICINE

## 2024-11-29 PROCEDURE — 0241U SARS-COV2 (COVID) WITH FLU/RSV BY PCR: CPT | Performed by: STUDENT IN AN ORGANIZED HEALTH CARE EDUCATION/TRAINING PROGRAM

## 2024-11-29 PROCEDURE — 80053 COMPREHEN METABOLIC PANEL: CPT

## 2024-11-29 PROCEDURE — 84484 ASSAY OF TROPONIN QUANT: CPT

## 2024-11-29 PROCEDURE — 87635 SARS-COV-2 COVID-19 AMP PRB: CPT | Performed by: STUDENT IN AN ORGANIZED HEALTH CARE EDUCATION/TRAINING PROGRAM

## 2024-11-29 PROCEDURE — 93005 ELECTROCARDIOGRAM TRACING: CPT

## 2024-11-29 PROCEDURE — 83880 ASSAY OF NATRIURETIC PEPTIDE: CPT

## 2024-11-29 PROCEDURE — 25000003 PHARM REV CODE 250

## 2024-11-29 PROCEDURE — 87502 INFLUENZA DNA AMP PROBE: CPT

## 2024-11-29 RX ORDER — BENZONATATE 100 MG/1
100 CAPSULE ORAL
Status: COMPLETED | OUTPATIENT
Start: 2024-11-29 | End: 2024-11-29

## 2024-11-29 RX ORDER — BENZONATATE 100 MG/1
100 CAPSULE ORAL 3 TIMES DAILY PRN
Qty: 20 CAPSULE | Refills: 0 | Status: SHIPPED | OUTPATIENT
Start: 2024-11-29 | End: 2024-12-09

## 2024-11-29 RX ADMIN — BENZONATATE 100 MG: 100 CAPSULE ORAL at 12:11

## 2024-11-29 NOTE — ED TRIAGE NOTES
Patient identifiers for Tonya Cohn 89 y.o. female checked and correct. Pt arrives to ED via POV c/o nonproductive cough and runny nose since Tuesday. Pt states taking mucinex OTC which has been mildly beneficial. Pt also endorsing midsternal chest pressure/heaviness. Denies SOB, fever, chills, NVD. Pt states grandchild has been sick lately.     Chief Complaint   Patient presents with    Cough     Began Tuesday night, took Tylenol, no relief      Past Medical History:   Diagnosis Date    AR (allergic rhinitis)     Atherosclerosis of abdominal aorta     noted on CT scan 1/17/2011    Carpal tunnel syndrome of left wrist     Chronic kidney disease, stage 3     Diabetic peripheral neuropathy     Diverticulosis     History of colon polyps     History of diverticulitis of colon 1/2014    Hyperlipemia     Hypertension     Hypothyroidism     followed by endocrinology, Dr. Green    Mild aortic stenosis     New onset atrial fibrillation 6/1/2024    OA (osteoarthritis) of knee     Obesity     Sciatica     Type II or unspecified type diabetes mellitus with neurological manifestations, uncontrolled(250.62)      Allergies reported:   Review of patient's allergies indicates:   Allergen Reactions    Lipitor [atorvastatin]

## 2024-11-29 NOTE — ED PROVIDER NOTES
Encounter Date: 11/29/2024       History     Chief Complaint   Patient presents with    Cough     Began Tuesday night, took Tylenol, no relief      89 y.o. female with a PMH of CKD 3, hypothyroidism, AFib, obesity, T2 DM, HLD, HTN, allergic rhinitis, diverticulosis, and mild aortic stenosis presents to [facility] Department for evaluation of a persistent cough productive of clear sputum that began 3 nights ago.  She endorses mild nasal congestion and mild pleuritic chest pain with coughing but denies any chest pain at rest.  She denies any other symptoms including fever, chills, sinus or facial pain, headache, neck pain or stiffness, sore throat, hemoptysis, dyspnea, nausea, vomiting, diarrhea, abdominal pain, dysuria, flank pain, lower extremity edema, or palpitations.  She reports she took Tylenol for the coughing without relief.  She denies any other complaints at this time and states she would just like something to help with her cough.        The history is provided by the patient and medical records.     Review of patient's allergies indicates:   Allergen Reactions    Lipitor [atorvastatin]      Past Medical History:   Diagnosis Date    AR (allergic rhinitis)     Atherosclerosis of abdominal aorta     noted on CT scan 1/17/2011    Carpal tunnel syndrome of left wrist     Chronic kidney disease, stage 3     Diabetic peripheral neuropathy     Diverticulosis     History of colon polyps     History of diverticulitis of colon 1/2014    Hyperlipemia     Hypertension     Hypothyroidism     followed by endocrinology, Dr. Green    Mild aortic stenosis     New onset atrial fibrillation 6/1/2024    OA (osteoarthritis) of knee     Obesity     Sciatica     Type II or unspecified type diabetes mellitus with neurological manifestations, uncontrolled(250.62)      Past Surgical History:   Procedure Laterality Date    CATARACT EXTRACTION Bilateral     COLONOSCOPY N/A 10/3/2017    Procedure: COLONOSCOPY;  Surgeon: Alin CARSON  MD Carlos;  Location: [facility] ENDO (4TH FLR);  Service: Endoscopy;  Laterality: N/A;    COSMETIC SURGERY      HYSTERECTOMY      partial    INJECTION OF JOINT Right 10/7/2024    Procedure: INJECTION, JOINT RIGHT HIP;  Surgeon: Chago Rizvi MD;  Location: [facility];  Service: Pain Management;  Laterality: Right;  [phone]  2 WK F/U EL    JOINT REPLACEMENT Right     LUNG BIOPSY  in her 40's    TOTAL KNEE ARTHROPLASTY Right 6/13/2014    TKR     Family History   Problem Relation Name Age of Onset    Cancer Mother          shoulder tumor - cancer    Hypertension Mother      Glaucoma Mother      Heart disease Father          valve replacement    Hypertension Father      Heart attack Father      Diabetes Sister Lidya     Arthritis Sister Lovinia         s/p knee surgery    Diabetes Brother Benoris     Heart disease Brother Benoris     Heart failure Brother Benoris     Stroke Brother Ardell     Arthritis Brother Vital         back problems    Skin cancer Brother Bogdan     Cancer Brother Bogdan     Throat cancer Brother Bogdan     No Known Problems Maternal Grandmother      No Known Problems Maternal Grandfather      No Known Problems Paternal Grandmother      No Known Problems Paternal Grandfather      Cancer Son Johnathan         jaw    Cataracts Son Johnathan     No Known Problems Maternal Aunt      No Known Problems Maternal Uncle      No Known Problems Paternal Aunt      No Known Problems Paternal Uncle      Melanoma Neg Hx      Eczema Neg Hx      Lupus Neg Hx      Psoriasis Neg Hx      Breast cancer Neg Hx      Colon cancer Neg Hx      Amblyopia Neg Hx      Blindness Neg Hx      Macular degeneration Neg Hx      Retinal detachment Neg Hx      Strabismus Neg Hx      Thyroid disease Neg Hx       Social History     Tobacco Use    Smoking status: Former     Current packs/day: 0.00     Average packs/day: 0.3 packs/day for 7.5 years (1.9 ttl pk-yrs)     Types: Cigarettes     Start date: 1/1/1955     Quit date:  1962     Years since quittin.4     Passive exposure: Never    Smokeless tobacco: Former     Quit date: 1970   Substance Use Topics    Alcohol use: Yes     Alcohol/week: 1.0 standard drink of alcohol     Types: 1 Cans of beer per week     Comment: occasionally    Drug use: No     Review of Systems    Physical Exam     Initial Vitals [24 1003]   BP Pulse Resp Temp SpO2   (!) 161/111 75 18 98.5 °F (36.9 °C) 100 %      MAP       --         Physical Exam    Nursing note and vitals reviewed.  Constitutional: She appears well-developed and well-nourished. No distress.   HENT:   Head: Normocephalic. Mouth/Throat: Oropharynx is clear and moist.   Appears slightly congested.  No sinus tenderness to palpation.  Tonsils are normal-appearing without erythema or exudates.  Uvula midline.   Eyes: Conjunctivae are normal. Pupils are equal, round, and reactive to light. No scleral icterus.   Neck: Neck supple.   Cardiovascular:  Normal rate and regular rhythm.           Pulmonary/Chest: Breath sounds normal. No stridor. No respiratory distress. She has no wheezes. She has no rhonchi. She has no rales.   Intermittent coughing spells   Abdominal: Abdomen is soft. She exhibits no distension. There is no abdominal tenderness.   Musculoskeletal:         General: No edema.      Cervical back: Neck supple.     Neurological: She is alert. GCS score is 15. GCS eye subscore is 4. GCS verbal subscore is 5. GCS motor subscore is 6.   Skin: Skin is warm and dry. No rash noted.         ED Course   Procedures  Labs Reviewed   B-TYPE NATRIURETIC PEPTIDE - Abnormal       Result Value     (*)    CBC W/ AUTO DIFFERENTIAL - Abnormal    WBC 5.96      RBC 3.97 (*)     Hemoglobin 11.9 (*)     Hematocrit 34.5 (*)     MCV 87      MCH 30.0      MCHC 34.5      RDW 13.4      Platelets 197      MPV 11.5      Immature Granulocytes 0.2      Gran # (ANC) 3.3      Immature Grans (Abs) 0.01      Lymph # 1.7      Mono # 0.9      Eos # 0.1       Baso # 0.03      nRBC 0      Gran % 54.6      Lymph % 28.5      Mono % 14.9      Eosinophil % 1.3      Basophil % 0.5      Differential Method Automated     COMPREHENSIVE METABOLIC PANEL - Abnormal    Sodium 144      Potassium 3.7      Chloride 106      CO2 26      Glucose 124 (*)     BUN 18      Creatinine 0.9      Calcium 9.6      Total Protein 7.3      Albumin 3.7      Total Bilirubin 0.3      Alkaline Phosphatase 69      AST 19      ALT 10      eGFR 60.0      Anion Gap 12     TROPONIN I - Abnormal    Troponin I 0.052 (*)    TROPONIN I - Abnormal    Troponin I 0.046 (*)    INFLUENZA A & B BY MOLECULAR    Influenza A, Molecular Negative      Influenza B, Molecular Negative      Flu A & B Source Nasal swab     SARS-COV-2 RNA AMPLIFICATION, QUAL    SARS-CoV-2 RNA, Amplification, Qual Negative     SARS-COV2 (COVID) WITH FLU/RSV BY PCR    SARS-CoV2 (COVID-19) Qualitative PCR Not Detected      Influenza A, Molecular Not Detected      Influenza B, Molecular Not Detected      RSV Ag by Molecular Method Not Detected       EKG Readings: (Independently Interpreted)   Initial Reading: No STEMI. Previous EKG: Compared with most recent EKG Rhythm: Normal Sinus Rhythm. Heart Rate: 67. Ectopy: PVCs PACs. Conduction: RBBB.     ECG Results              EKG 12-lead (Final result)        Collection Time Result Time QRS Duration OHS QTC Calculation    11/29/24 12:29:02 11/29/24 12:54:39 108 492                     Final result by Interface, Lab In [facility] (11/29/24 12:54:42)                   Narrative:    Test Reason : R00.1,    Vent. Rate :  67 BPM     Atrial Rate :  67 BPM     P-R Int : 172 ms          QRS Dur : 108 ms      QT Int : 466 ms       P-R-T Axes :  83  40  42 degrees    QTcB Int : 492 ms    Sinus rhythm with PACs and  with occasional Premature ventricular complexes  Right bundle branch block  Abnormal ECG  When compared with ECG of 29-Nov-2024 11:07,  a PVC is now present  Confirmed by Colt Hdez ([id number]) on  11/29/2024 12:54:36 PM    Referred By: NEAL SELF           Confirmed By: Colt Hdez                                  Imaging Results              X-Ray Chest AP Portable (Final result)  Result time 11/29/24 12:16:20      Final result by Mehdi Ellis MD (11/29/24 12:16:20)                   Impression:      See above      Electronically signed by: Mehdi Ellis MD  Date:    11/29/2024  Time:    12:16               Narrative:    EXAMINATION:  XR CHEST AP PORTABLE    CLINICAL HISTORY:  cough;    TECHNIQUE:  Single frontal view of the chest was performed.    COMPARISON:  N 06/01/2024 one    FINDINGS:  Mild cardiomegaly.  The lungs are clear.  No pleural effusion                                    X-Rays:   Independently Interpreted Readings:   Chest X-Ray: No acute abnormalities. Mild cardiomegaly, similar to prior      Medications   benzonatate capsule 100 mg (100 mg Oral Given 11/29/24 1228)     Medical Decision Making  89-year-old female presents to the ED for cough, congestion, and pleuritic chest pain x3 days.    Patient is afebrile, hemodynamically stable, and in no acute distress on arrival.  Her lungs are completely clear to auscultation and she was satting 100% on room air.  Ambulatory SpO2 was 96%.  She is an exceptionally well appearing 89-year-old, appears well kempt and has high functional status at baseline.    Differential diagnoses considered include, but not limited to:  Pneumonia, PE, ACS, viral respiratory infection    COVID and flu negative.  BNP elevated at 474 but improved from prior, clinically patient has no edema or rales on exam.  Troponin elevated at patient's baseline, 2nd troponin obtained at 3:00 a.m. which has improved.  CMP and CBC without any acute abnormalities.  Chest x-ray similar to prior without any evidence of pneumonia.  EKG is nonischemic.     Patient was given Tessalon in the ED which she reported greatly improved her symptoms.  I discussed all of the findings with  the patient and her daughter at bedside, and given her age discussed that we could admit her for observation but they were comfortable with plan for discharge home.  Patient is very well-appearing and I do not suspect an emergent etiology of her symptoms such as PE or ACS.  History and exam most consistent with viral respiratory infection.  Prescription sent for Tessalon Perles.  Discussed supportive care, close follow-up with PCP, and very strict return precautions and they expressed understanding and agreement.    Amount and/or Complexity of Data Reviewed  Labs: ordered. Decision-making details documented in ED Course.  Radiology: ordered and independent interpretation performed. Decision-making details documented in ED Course.  ECG/medicine tests: ordered and independent interpretation performed. Decision-making details documented in ED Course.    Risk  Prescription drug management.  Decision regarding hospitalization.               ED Course as of 11/29/24 2055 Fri Nov 29, 2024   1231 3 days cough and congestion, grandson sick with similar, no chest pain or sob, no leg swelling or pain, no fevers or chills, no sore throat, taking OTC meds without improvement with cough [NN]   1231 Non labored, lungs clear, intermittently coughing, RRR, no LE edema, no unilateral leg swelling, warmth or erythema, non-toxic appearing [NN]   1233 CXR w/o focal consolidation or PTX on my independent interpretation [NN]   1259 EKG with sinus rhythm with frequent PACs, no STEMI [NN]   1440 Ambulatory sat 96% on room air [NN]      ED Course User Index  [NN] Luzmaria Arias MD                           Clinical Impression:  Final diagnoses:  [R00.1] Bradycardia  [R05.1] Acute cough (Primary)          ED Disposition Condition    Discharge Stable          ED Prescriptions       Medication Sig Dispense Start Date End Date Auth. Provider    benzonatate (TESSALON) 100 MG capsule Take 1 capsule (100 mg total) by mouth 3 (three) times  daily as needed for Cough. 20 capsule 11/29/2024 12/9/2024 Sabina Faye MD          Follow-up Information       Follow up With Specialties Details Why Contact Info    Tere Hughes MD Internal Medicine, Pediatrics   [address] [place] [address]  [phone]      [facility] - Emergency Dept Emergency Medicine  If symptoms worsen [address] [facility] [id number] [phone]             Sabina Faye MD  Resident  11/29/24 2055

## 2024-12-03 ENCOUNTER — OFFICE VISIT (OUTPATIENT)
Dept: OPHTHALMOLOGY | Facility: CLINIC | Age: 89
End: 2024-12-03
Payer: MEDICARE

## 2024-12-03 DIAGNOSIS — H40.023 OPEN ANGLE WITH BORDERLINE FINDINGS AND HIGH GLAUCOMA RISK IN BOTH EYES: Primary | ICD-10-CM

## 2024-12-03 DIAGNOSIS — Z96.1 PSEUDOPHAKIA OF BOTH EYES: ICD-10-CM

## 2024-12-03 DIAGNOSIS — H40.053 BILATERAL OCULAR HYPERTENSION: ICD-10-CM

## 2024-12-03 PROCEDURE — 92250 FUNDUS PHOTOGRAPHY W/I&R: CPT | Mod: S$GLB,,, | Performed by: OPHTHALMOLOGY

## 2024-12-03 PROCEDURE — 2023F DILAT RTA XM W/O RTNOPTHY: CPT | Mod: CPTII,S$GLB,, | Performed by: OPHTHALMOLOGY

## 2024-12-03 PROCEDURE — 99214 OFFICE O/P EST MOD 30 MIN: CPT | Mod: S$GLB,,, | Performed by: OPHTHALMOLOGY

## 2024-12-03 PROCEDURE — 99999 PR PBB SHADOW E&M-EST. PATIENT-LVL III: CPT | Mod: PBBFAC,,, | Performed by: OPHTHALMOLOGY

## 2024-12-03 PROCEDURE — 1157F ADVNC CARE PLAN IN RCRD: CPT | Mod: CPTII,S$GLB,, | Performed by: OPHTHALMOLOGY

## 2024-12-03 PROCEDURE — G2211 COMPLEX E/M VISIT ADD ON: HCPCS | Mod: S$GLB,,, | Performed by: OPHTHALMOLOGY

## 2024-12-03 PROCEDURE — 1160F RVW MEDS BY RX/DR IN RCRD: CPT | Mod: CPTII,S$GLB,, | Performed by: OPHTHALMOLOGY

## 2024-12-03 PROCEDURE — 1159F MED LIST DOCD IN RCRD: CPT | Mod: CPTII,S$GLB,, | Performed by: OPHTHALMOLOGY

## 2024-12-03 NOTE — PROGRESS NOTES
HPI     Glaucoma     Additional comments: 4 mon iop chk/dfe/photos           Comments    Patient states that vision seems about the same as last visit in both   eyes.    Bilateral ocular hypertension  PCIOL OU    Cosopt BID OU          Last edited by Mehdi Phillip on 12/3/2024  2:57 PM.            Assessment /Plan     For exam results, see Encounter Report.    Open angle with borderline findings and high glaucoma risk in both eyes    Bilateral ocular hypertension    Pseudophakia of both eyes      Patient with daughter    Presented to Outside ER --> [facility] ER with OD Pain & IOP 50s  Rx'd at outside ER with diamox    Neg ROS GCA    OHT // supect POAG    Likely not HVF taker  Follow OCT RNFL    CCT  577 // 528    Target < 22 --> achieved --> ?? Variable // spiking ??    Both eyes --> Tolerating well & good adherence --> CSM  Cosopt BID    PC IOL OU  Quiet  Observe    12/03/2024        Plan  Had 2 delay getting into exam room - Cx'd HVF --> apologized  RTC 4 months IOP & Adherence --> then OCT RNFL  RTC sooner prn with good understanding

## 2024-12-12 ENCOUNTER — TELEPHONE (OUTPATIENT)
Dept: FAMILY MEDICINE | Facility: CLINIC | Age: 89
End: 2024-12-12
Payer: MEDICARE

## 2024-12-12 DIAGNOSIS — I10 ESSENTIAL HYPERTENSION: ICD-10-CM

## 2024-12-12 DIAGNOSIS — E03.9 HYPOTHYROIDISM (ACQUIRED): ICD-10-CM

## 2024-12-12 DIAGNOSIS — E03.9 ACQUIRED HYPOTHYROIDISM: ICD-10-CM

## 2024-12-12 DIAGNOSIS — E11.40 TYPE 2 DIABETES, CONTROLLED, WITH NEUROPATHY: Primary | ICD-10-CM

## 2024-12-12 PROBLEM — R26.89 IMPAIRED GAIT AND MOBILITY: Status: RESOLVED | Noted: 2024-10-02 | Resolved: 2024-12-12

## 2024-12-12 PROBLEM — N18.31 CHRONIC KIDNEY DISEASE, STAGE 3A: Status: ACTIVE | Noted: 2024-12-12

## 2024-12-12 PROBLEM — R53.1 WEAKNESS: Status: RESOLVED | Noted: 2022-01-04 | Resolved: 2024-12-12

## 2024-12-12 PROBLEM — R26.9 GAIT ABNORMALITY: Status: RESOLVED | Noted: 2024-07-18 | Resolved: 2024-12-12

## 2024-12-12 RX ORDER — LEVOTHYROXINE SODIUM 88 UG/1
TABLET ORAL
Qty: 90 TABLET | Refills: 0 | Status: SHIPPED | OUTPATIENT
Start: 2024-12-12

## 2024-12-12 NOTE — TELEPHONE ENCOUNTER
No care due was identified.  [facility] Embedded Care Due Messages. Reference number: [id number].   12/12/2024 6:08:57 AM CST

## 2024-12-12 NOTE — TELEPHONE ENCOUNTER
Refill Decision Note   Tonya Cohn  is requesting a refill authorization.  Brief Assessment and Rationale for Refill:  Approve     Medication Therapy Plan:  T4 IS WNL; ED documentation reviewed. No changes to therapy note      Extended chart review required: Yes   Comments:     Note composed:1:17 PM 12/12/2024

## 2024-12-18 ENCOUNTER — OFFICE VISIT (OUTPATIENT)
Dept: FAMILY MEDICINE | Facility: CLINIC | Age: 89
End: 2024-12-18
Payer: MEDICARE

## 2024-12-18 ENCOUNTER — LAB VISIT (OUTPATIENT)
Dept: LAB | Facility: HOSPITAL | Age: 89
End: 2024-12-18
Attending: INTERNAL MEDICINE
Payer: MEDICARE

## 2024-12-18 VITALS
OXYGEN SATURATION: 98 % | HEART RATE: 58 BPM | TEMPERATURE: 99 F | HEIGHT: 66 IN | DIASTOLIC BLOOD PRESSURE: 68 MMHG | SYSTOLIC BLOOD PRESSURE: 156 MMHG | WEIGHT: 183.75 LBS | BODY MASS INDEX: 29.53 KG/M2

## 2024-12-18 DIAGNOSIS — E78.5 HYPERLIPIDEMIA LDL GOAL <100: Chronic | ICD-10-CM

## 2024-12-18 DIAGNOSIS — I48.91 ATRIAL FIBRILLATION, UNSPECIFIED TYPE: ICD-10-CM

## 2024-12-18 DIAGNOSIS — Z99.89 AMBULATES WITH CANE: ICD-10-CM

## 2024-12-18 DIAGNOSIS — R20.0 NUMBNESS AND TINGLING IN LEFT HAND: ICD-10-CM

## 2024-12-18 DIAGNOSIS — J40 BRONCHITIS: ICD-10-CM

## 2024-12-18 DIAGNOSIS — I70.0 ATHEROSCLEROSIS OF ABDOMINAL AORTA: ICD-10-CM

## 2024-12-18 DIAGNOSIS — E11.40 TYPE 2 DIABETES, CONTROLLED, WITH NEUROPATHY: Primary | ICD-10-CM

## 2024-12-18 DIAGNOSIS — N18.30 BENIGN HYPERTENSION WITH CHRONIC KIDNEY DISEASE, STAGE III: ICD-10-CM

## 2024-12-18 DIAGNOSIS — N18.31 CHRONIC KIDNEY DISEASE, STAGE 3A: ICD-10-CM

## 2024-12-18 DIAGNOSIS — J01.90 ACUTE SINUSITIS, RECURRENCE NOT SPECIFIED, UNSPECIFIED LOCATION: ICD-10-CM

## 2024-12-18 DIAGNOSIS — I12.9 BENIGN HYPERTENSION WITH CHRONIC KIDNEY DISEASE, STAGE III: ICD-10-CM

## 2024-12-18 DIAGNOSIS — M85.89 OSTEOPENIA OF MULTIPLE SITES: ICD-10-CM

## 2024-12-18 DIAGNOSIS — R20.2 NUMBNESS AND TINGLING IN LEFT HAND: ICD-10-CM

## 2024-12-18 DIAGNOSIS — K21.9 GASTROESOPHAGEAL REFLUX DISEASE WITHOUT ESOPHAGITIS: ICD-10-CM

## 2024-12-18 DIAGNOSIS — E03.9 HYPOTHYROIDISM (ACQUIRED): ICD-10-CM

## 2024-12-18 DIAGNOSIS — Z74.09 IMPAIRED FUNCTIONAL MOBILITY, BALANCE, GAIT, AND ENDURANCE: ICD-10-CM

## 2024-12-18 DIAGNOSIS — E11.40 TYPE 2 DIABETES, CONTROLLED, WITH NEUROPATHY: ICD-10-CM

## 2024-12-18 DIAGNOSIS — I50.22 HEART FAILURE WITH MILDLY REDUCED EJECTION FRACTION (HFMREF): ICD-10-CM

## 2024-12-18 LAB
ALBUMIN/CREAT UR: 31.8 UG/MG (ref 0–30)
CREAT UR-MCNC: 44 MG/DL (ref 15–325)
MICROALBUMIN UR DL<=1MG/L-MCNC: 14 UG/ML

## 2024-12-18 PROCEDURE — 1159F MED LIST DOCD IN RCRD: CPT | Mod: CPTII,S$GLB,, | Performed by: INTERNAL MEDICINE

## 2024-12-18 PROCEDURE — 1157F ADVNC CARE PLAN IN RCRD: CPT | Mod: CPTII,S$GLB,, | Performed by: INTERNAL MEDICINE

## 2024-12-18 PROCEDURE — 99999 PR PBB SHADOW E&M-EST. PATIENT-LVL V: CPT | Mod: PBBFAC,,, | Performed by: INTERNAL MEDICINE

## 2024-12-18 PROCEDURE — 3288F FALL RISK ASSESSMENT DOCD: CPT | Mod: CPTII,S$GLB,, | Performed by: INTERNAL MEDICINE

## 2024-12-18 PROCEDURE — 1101F PT FALLS ASSESS-DOCD LE1/YR: CPT | Mod: CPTII,S$GLB,, | Performed by: INTERNAL MEDICINE

## 2024-12-18 PROCEDURE — 82570 ASSAY OF URINE CREATININE: CPT | Performed by: INTERNAL MEDICINE

## 2024-12-18 PROCEDURE — 1160F RVW MEDS BY RX/DR IN RCRD: CPT | Mod: CPTII,S$GLB,, | Performed by: INTERNAL MEDICINE

## 2024-12-18 PROCEDURE — 99214 OFFICE O/P EST MOD 30 MIN: CPT | Mod: S$GLB,,, | Performed by: INTERNAL MEDICINE

## 2024-12-18 PROCEDURE — G2211 COMPLEX E/M VISIT ADD ON: HCPCS | Mod: S$GLB,,, | Performed by: INTERNAL MEDICINE

## 2024-12-18 PROCEDURE — 1125F AMNT PAIN NOTED PAIN PRSNT: CPT | Mod: CPTII,S$GLB,, | Performed by: INTERNAL MEDICINE

## 2024-12-18 RX ORDER — AZITHROMYCIN 250 MG/1
TABLET, FILM COATED ORAL
Qty: 6 TABLET | Refills: 0 | Status: SHIPPED | OUTPATIENT
Start: 2024-12-18

## 2024-12-18 RX ORDER — LOSARTAN POTASSIUM 50 MG/1
50 TABLET ORAL DAILY
Qty: 90 TABLET | Refills: 1 | Status: SHIPPED | OUTPATIENT
Start: 2024-12-18

## 2024-12-18 RX ORDER — LEVOTHYROXINE SODIUM 88 UG/1
88 TABLET ORAL
Qty: 90 TABLET | Refills: 1 | Status: SHIPPED | OUTPATIENT
Start: 2024-12-18

## 2024-12-18 NOTE — PROGRESS NOTES
CHIEF COMPLAINT:   Chief Complaint   Patient presents with    Diabetes          HISTORY OF PRESENT ILLNESS:  Tonya Cohn is a 89 y.o. female who presents to the clinic today for Diabetes  .             Patient has had a cough since Thanksgiving, persisting for several weeks. She initially sought treatment at the emergency room, where medication was given with minimal effect. The cough worsens at night, described as a sensation of something in her throat. She has been self-treating with OTC Robitussin (without codeine) and mucinex DM. The cough produces slightly greenish secretions. Patient denies fever but notes possible Tylenol use. She reports no shortness of breath, and her oxygen levels are good. The cough is the only symptom from this illness. ER workup was notable for unchanged CXR and mildly elevated BNP (although improved from previous).    Patient also has numbness and tingling in her left hand, particularly at night, disrupting her sleep. The sensation affects first three fingers, feeling asleep and numb. She wears a glove at night to manage this symptom.    Patient frequently [place] a casino environment with significant smoke exposure, potentially exacerbating her cough. She tries to mitigate this by sitting outside and wearing a mask.         Subjective    PAST MEDICAL HISTORY:  Past Medical History:   Diagnosis Date    AR (allergic rhinitis)     Atherosclerosis of abdominal aorta     noted on CT scan 1/17/2011    Carpal tunnel syndrome of left wrist     Chronic kidney disease, stage 3     Diabetic peripheral neuropathy     Diverticulosis     History of colon polyps     History of diverticulitis of colon 1/2014    Hyperlipemia     Hypertension     Hypothyroidism     followed by endocrinology, Dr. Green    Mild aortic stenosis     New onset atrial fibrillation 6/1/2024    OA (osteoarthritis) of knee     Obesity     Sciatica     Type II or unspecified type diabetes mellitus with neurological  manifestations, uncontrolled(250.62)        PAST SURGICAL HISTORY:  Past Surgical History:   Procedure Laterality Date    CATARACT EXTRACTION Bilateral     COLONOSCOPY N/A 10/3/2017    Procedure: COLONOSCOPY;  Surgeon: Alin Gonzalez MD;  [facility]  Service: Endoscopy;  Laterality: N/A;    COSMETIC SURGERY      HYSTERECTOMY      partial    INJECTION OF JOINT Right 10/7/2024    Procedure: INJECTION, JOINT RIGHT HIP;  Surgeon: Chago Rizvi MD;  Location: [facility] PAIN [facility]GT;  Service: Pain Management;  Laterality: Right;  [phone]  2 WK F/U EL    JOINT REPLACEMENT Right     LUNG BIOPSY  in her 40's    TOTAL KNEE ARTHROPLASTY Right 2014    TKR       SOCIAL HISTORY:  Social History     Socioeconomic History    Marital status:     Number of children: 7   Occupational History    Occupation: retired - house cleaning   Tobacco Use    Smoking status: Former     Current packs/day: 0.00     Average packs/day: 0.3 packs/day for 7.5 years (1.9 ttl pk-yrs)     Types: Cigarettes     Start date: 1955     Quit date: 1962     Years since quittin.4     Passive exposure: Never    Smokeless tobacco: Former     Quit date: 1970   Substance and Sexual Activity    Alcohol use: Yes     Alcohol/week: 1.0 standard drink of alcohol     Types: 1 Cans of beer per week     Comment: occasionally    Drug use: No    Sexual activity: Not Currently     Partners: Male   Other Topics Concern    Are you pregnant or think you may be? No    Breast-feeding No     Social Drivers of Health     Financial Resource Strain: Low Risk  (2024)    Overall Financial Resource Strain (CARDIA)     Difficulty of Paying Living Expenses: Not hard at all   Food Insecurity: No Food Insecurity (2024)    Hunger Vital Sign     Worried About Running Out of Food in the Last Year: Never true     Ran Out of Food in the Last Year: Never true   Transportation Needs: No Transportation Needs (2024)    PRAPARE -  Transportation     Lack of Transportation (Medical): No     Lack of Transportation (Non-Medical): No   Physical Activity: Insufficiently Active (12/13/2024)    Exercise Vital Sign     Days of Exercise per Week: 2 days     Minutes of Exercise per Session: 10 min   Stress: No Stress Concern Present (12/13/2024)    [employer] - Occupational Stress Questionnaire     Feeling of Stress : Not at all   Housing Stability: Low Risk  (5/4/2022)    Housing Stability Vital Sign     Unable to Pay for Housing in the Last Year: No     Number of Places Lived in the Last Year: 1     Unstable Housing in the Last Year: No       FAMILY HISTORY:  Family History   Problem Relation Name Age of Onset    Cancer Mother          shoulder tumor - cancer    Hypertension Mother      Glaucoma Mother      Heart disease Father          valve replacement    Hypertension Father      Heart attack Father      Diabetes Sister Lidya     Arthritis Sister Lovinia         s/p knee surgery    Diabetes Brother Benoris     Heart disease Brother Benoris     Heart failure Brother Benoris     Stroke Brother Ardell     Arthritis Brother Vital         back problems    Skin cancer Brother Bogdan     Cancer Brother Bogdan     Throat cancer Brother Bogdan     No Known Problems Maternal Grandmother      No Known Problems Maternal Grandfather      No Known Problems Paternal Grandmother      No Known Problems Paternal Grandfather      Cancer Son Johnathan         jaw    Cataracts Son Johnathan     No Known Problems Maternal Aunt      No Known Problems Maternal Uncle      No Known Problems Paternal Aunt      No Known Problems Paternal Uncle      Melanoma Neg Hx      Eczema Neg Hx      Lupus Neg Hx      Psoriasis Neg Hx      Breast cancer Neg Hx      Colon cancer Neg Hx      Amblyopia Neg Hx      Blindness Neg Hx      Macular degeneration Neg Hx      Retinal detachment Neg Hx      Strabismus Neg Hx      Thyroid disease Neg Hx         ALLERGIES  AND MEDICATIONS: updated and reviewed.  Review of patient's allergies indicates:   Allergen Reactions    Lipitor [atorvastatin]      Medication List with Changes/Refills   New Medications    AZITHROMYCIN (ZITHROMAX Z-RUBEN) 250 MG TABLET    Take 2 pills day 1, then 1 pill day 2-5.   Current Medications    ACETAMINOPHEN (TYLENOL) 500 MG TABLET    Take 1 tablet (500 mg total) by mouth every 4 (four) hours as needed for Pain.    APIXABAN (ELIQUIS) 5 MG TAB    Take 1 tablet (5 mg total) by mouth 2 (two) times daily.    ASPIRIN 81 MG CHEW    Take 1 tablet (81 mg total) by mouth once daily.    BLOOD-GLUCOSE METER (FREESTYLE SYSTEM KIT) KIT    Use as instructed    CALCIUM CARBONATE (OS-MIRACLE) 500 MG CALCIUM (1,250 MG) TABLET    Take 1 tablet by mouth every other day.    DIPHENHYDRAMINE (BENADRYL) 25 MG CAPSULE    Take 1 capsule (25 mg total) by mouth every 6 (six) hours as needed for Itching or Allergies.    DORZOLAMIDE-TIMOLOL 2-0.5% (COSOPT) 22.3-6.8 MG/ML OPHTHALMIC SOLUTION    Place 1 drop into both eyes 2 (two) times daily.    FISH OIL-FAT ACID COMB.8- 1,200 MG (400 [id number]) CAP    Take 1 capsule by mouth once daily.    FUROSEMIDE (LASIX) 20 MG TABLET    Take 1 tablet (20 mg total) by mouth 2 (two) times daily.    HYDROCODONE-ACETAMINOPHEN (NORCO) 5-325 MG PER TABLET    Take 1 tablet by mouth.    HYDROCORTISONE 2.5 % CREAM    Apply topically 2 (two) times daily.    IBUPROFEN (ADVIL,MOTRIN) 800 MG TABLET    Take 800 mg by mouth every 6 (six) hours as needed.    LIDOCAINE (LIDODERM) 5 %    Place 1 patch onto the skin once daily. Remove & Discard patch within 12 hours or as directed by MD    LOVASTATIN (MEVACOR) 10 MG TABLET    Take 1 tablet (10 mg total) by mouth once daily.    METOPROLOL SUCCINATE (TOPROL-XL) 100 MG 24 HR TABLET    Take 1 tablet (100 mg total) by mouth once daily.    MOMETASONE 0.1% (ELOCON) 0.1 % CREAM    Use daily    MULTIVIT,CALC,MINS/IRON/FOLIC (ONE-A-DAY WOMENS FORMULA ORAL)    Take 1  "tablet by mouth once daily.   Changed and/or Refilled Medications    Modified Medication Previous Medication    LEVOTHYROXINE (SYNTHROID) 88 MCG TABLET levothyroxine (SYNTHROID) 88 MCG tablet       Take 1 tablet (88 mcg total) by mouth before breakfast.    TAKE 1 TABLET(88 MCG) BY MOUTH DAILY    LOSARTAN (COZAAR) 50 MG TABLET losartan (COZAAR) 50 MG tablet       Take 1 tablet (50 mg total) by mouth once daily.    Take 1 tablet (50 mg total) by mouth once daily.       CARE TEAM:  Patient Care Team:  Tere Hughes MD as PCP - General (Internal Medicine)  Preston Whitfield MD as Consulting Physician (Cardiology)  Criss Baird OD (Inactive) as Consulting Physician (Optometry)  Marvin Roach MD (Inactive) as Consulting Physician (Rheumatology)  Wendy Garcia LPN as Care Coordinator       REVIEW OF SYSTEMS:  Review of Systems   Constitutional:  Negative for chills and fever.   Respiratory:  Positive for cough. Negative for shortness of breath and wheezing.    Cardiovascular:  Negative for chest pain.   Gastrointestinal:  Negative for abdominal pain.   Genitourinary:  Negative for dysuria.   Musculoskeletal:  Positive for arthralgias.              Objective    PHYSICAL EXAMINATION/VITALS:  Vitals:    12/18/24 1407   BP: (!) 156/68   Pulse: (!) 58   Temp: 98.5 °F (36.9 °C)   TempSrc: Oral   SpO2: 98%   Weight: 83.3 kg (183 lb 12.1 oz)   Height: 5' 6" (1.676 m)       Body mass index is 29.66 kg/m².      General appearance - alert, well appearing, and in no distress, overweight, pleasant elderly female, exam limited as patient did not get onto the examination table  Psychiatric - alert, oriented to person, place, and time, normal behavior, speech, dress, motor activity and thought process  Eyes - pupils equal and reactive, extraocular eye movements intact, sclera anicteric  Neck - supple, no significant adenopathy, carotids upstroke normal bilaterally, no bruits  Lymphatics - no palpable " cervical lymphadenopathy  Chest - clear to auscultation, no wheezes, rales or rhonchi, symmetric air entry, no tachypnea, retractions or cyanosis, no shortness of breath with speech or exertion, occasional cough noted  Heart - normal rate and regular rhythm  Neurological - alert, normal speech, no focal findings; cranial nerves II through XII intact  Musculoskeletal - not examined, patient ambulated with a walker  Extremities - pedal edema trace  Skin - normal coloration, no suspicious skin lesions        LABS:  Lab Results   Component Value Date    HGBA1C 7.0 (H) 02/26/2024    HGBA1C 7.2 (H) 08/18/2023    HGBA1C 7.1 (H) 02/06/2023      Lab Results   Component Value Date    CHOL 227 (H) 08/18/2023    CHOL 203 (H) 08/02/2022    CHOL 193 07/02/2021     Lab Results   Component Value Date    LDLCALC 142.8 08/18/2023    LDLCALC 119.4 08/02/2022    LDLCALC 117.0 07/02/2021               ASSESSMENT AND PLAN:  1. Type 2 diabetes, controlled, with neuropathy  Lab Results   Component Value Date    HGBA1C 7.0 (H) 02/26/2024     Diabetes is under good control at this time for age and comorbid conditions. Overall diabetes control has stayed the same since last check.  We discussed diabetic diet and regular exercise.   We discussed home blood sugar monitoring, if appropriate - the patient does not need to test daily but can test only as needed.   We discussed low sugar/low carbohydrate diet and regular exercise to prevent progression. No need for prescription medication at this time.  Diabetic complications addressed: Neuropathy pain controlled.   Patient was counseled on the need for yearly eye exam to screen for/monitor diabetic retinopathy and yearly diabetic foot exam.  -     Microalbumin/Creatinine Ratio, Urine; Future; Expected date: 12/18/2024    2. Benign hypertension with chronic kidney disease, stage III  BP Readings from Last 1 Encounters:   12/18/24 (!) 156/68      HTN under fair control for age. Will defer medication  changes to cardiology.  -     losartan (COZAAR) 50 MG tablet; Take 1 tablet (50 mg total) by mouth once daily.  Dispense: 90 tablet; Refill: 1    3. Chronic kidney disease, stage 3a  eGFR   Date Value Ref Range Status   11/29/2024 60.0 >60 mL/min/1.73 m^2 Final   06/25/2024 52.9 (A) >60 mL/min/1.73 m^2 Final   06/20/2024 52.9 (A) >60 mL/min/1.73 m^2 Final     Stable decreased kidney function. Observe. Patient counseled to avoid/minimize the use of anti-inflammatory  Medication. Discussed to stay well hydrated. Also discussed with patient that good control of blood pressure and/or diabetes, if present, will help to prevent progression.    4. Atrial fibrillation, unspecified type/5. Heart failure with mildly reduced ejection fraction (HFmrEF)  Will refer her back to her local cardiologist for care. Continue current medication for now.  -     Ambulatory referral/consult to Cardiology; Future; Expected date: 12/25/2024    6. Hyperlipidemia LDL goal <100  Lab Results   Component Value Date    CHOL 227 (H) 08/18/2023     Lab Results   Component Value Date    HDL 66 08/18/2023     Lab Results   Component Value Date    LDLCALC 142.8 08/18/2023     Lab Results   Component Value Date    TRIG 91 08/18/2023     Lab Results   Component Value Date    LDLCALC 142.8 08/18/2023     We discussed low fat diet and regular exercise.The current medical regimen is effective;  continue present plan and medications.     7. Atherosclerosis of abdominal aorta  Patient with Atherosclerosis of the Aorta.  Stable/asymptomatic. Currently stable on lipid lowering medication and blood pressure monitoring.  Overview:  noted on CT scan 1/17/2011      8. Hypothyroidism (acquired)  Lab Results   Component Value Date    TSH 0.374 (L) 06/01/2024     Patient is clinically euthyroid. Continue current regimen.  -     levothyroxine (SYNTHROID) 88 MCG tablet; Take 1 tablet (88 mcg total) by mouth before breakfast.  Dispense: 90 tablet; Refill: 1    9.  Gastroesophageal reflux disease without esophagitis  Symptoms controlled: yes - takes medication occasionally as needed.   Reflux precautions discussed (eliminate tobacco if a smoker; minimize caffeine, chocolate and red/white peppermint intake; avoid heavy and spicy meals; don't lay down within 2-3 hours after eating; minimize the intake of NSAIDs). Medication as needed.   Patient asked to take medication breaks, if possible - discussed chronic use can limit calcium absorption (which can lead to osteopenia/osteoporosis), increases the risk for intestinal infections, and can cause kidney damage. There are also some newer studies that show possible increased risk of mortality.    10. Osteopenia of multiple sites  We discussed adequate calcium intake (preferably from diet) and vitamin D supplementation. We discussed fall precautions. She is up to date on her BMD. No need for prescription medication at this time.  Overview:  -3/2018 - No need for Rx tx at this time.  6/2022 - No need for Rx tx at this time.          11. Impaired functional mobility, balance, gait, and endurance/12. Ambulates with cane  We discussed fall precautions.    13. Bronchitis/14. Acute sinusitis, recurrence not specified, unspecified location  She has had persistent symptoms for more than 2 weeks.  Nasal discharge and cough productive of green mucus.  I will treat her with antibiotics.  She may continue with over-the-counter medication help reduce cough.  We also discussed cough drops suppressants.  She will let me know if symptoms do not improve in 2 weeks or if symptoms worsen.  -     azithromycin (ZITHROMAX Z-RUBEN) 250 MG tablet; Take 2 pills day 1, then 1 pill day 2-5.  Dispense: 6 tablet; Refill: 0    15. Numbness and tingling in left hand  I suspect carpal tunnel syndrome.  She will get a wrist brace to wear at night.  She will let me know if symptoms worsen or persist.                PATIENT EDUCATION:  Explained carpal tunnel syndrome:  nerve compression in wrist causing numbness and tingling in fingers  Discussed that cough can last 2-3 months in older adults, even after treatment  Explained that smoke exposure can irritate airways and prolong cough    ACTION ITEMS/LIFESTYLE:  Patient to purchase and wear carpal tunnel wrist brace at night while sleeping  Recommend avoiding exposure to secondhand smoke, especially in casino environments, until cough resolves  Patient to use saline nasal rinse: tilt head over sink, direct spray towards ear, then blow nose  Recommend continuing to drink warm tea with honey and lemon for cough relief  Patient to suppress cough as much as possible to reduce irritation (e.g., use lozenges, drink warm liquids)          Orders Placed This Encounter   Procedures    Microalbumin/Creatinine Ratio, Urine    Ambulatory referral/consult to Cardiology      FOLLOW UP: Follow up in about 6 months (around 6/18/2025), or if symptoms worsen or fail to improve, for annual exam. or sooner as needed.    This note was generated with the assistance of ambient listening technology. Verbal consent was obtained by the patient and accompanying visitor(s) for the recording of patient appointment to facilitate this note. I attest to having reviewed and edited the generated note for accuracy, though some syntax or spelling errors may persist. Please contact the author of this note for any clarification.

## 2025-01-13 ENCOUNTER — TELEPHONE (OUTPATIENT)
Dept: FAMILY MEDICINE | Facility: CLINIC | Age: OVER 89
End: 2025-01-13
Payer: MEDICARE

## 2025-01-13 NOTE — TELEPHONE ENCOUNTER
----- Message from Med Assistant Vargas sent at 1/13/2025 10:24 AM CST -----  Type: Patient Call Back    Who called: Self    What is the request in detail: pt.  Is asking for something to be called for her cold symptoms - sore throat and excessive coughing ..    Can the clinic reply by [facility]?NO    Would the patient rather a call back or a response via [facility]? Yes, call    Best call back number: [phone] (home)

## 2025-01-14 ENCOUNTER — OFFICE VISIT (OUTPATIENT)
Dept: CARDIOLOGY | Facility: CLINIC | Age: OVER 89
End: 2025-01-14
Payer: MEDICARE

## 2025-01-14 ENCOUNTER — OFFICE VISIT (OUTPATIENT)
Dept: FAMILY MEDICINE | Facility: CLINIC | Age: OVER 89
End: 2025-01-14
Payer: MEDICARE

## 2025-01-14 VITALS
SYSTOLIC BLOOD PRESSURE: 148 MMHG | HEIGHT: 66 IN | HEART RATE: 96 BPM | TEMPERATURE: 99 F | DIASTOLIC BLOOD PRESSURE: 86 MMHG | OXYGEN SATURATION: 98 % | BODY MASS INDEX: 29.21 KG/M2 | WEIGHT: 181.75 LBS

## 2025-01-14 VITALS
BODY MASS INDEX: 29.51 KG/M2 | HEART RATE: 101 BPM | OXYGEN SATURATION: 95 % | DIASTOLIC BLOOD PRESSURE: 90 MMHG | SYSTOLIC BLOOD PRESSURE: 170 MMHG | WEIGHT: 183.63 LBS | HEIGHT: 66 IN

## 2025-01-14 DIAGNOSIS — I48.91 NEW ONSET ATRIAL FIBRILLATION: ICD-10-CM

## 2025-01-14 DIAGNOSIS — I70.0 ATHEROSCLEROSIS OF ABDOMINAL AORTA: ICD-10-CM

## 2025-01-14 DIAGNOSIS — I35.0 MILD AORTIC STENOSIS: ICD-10-CM

## 2025-01-14 DIAGNOSIS — E78.5 HYPERLIPIDEMIA LDL GOAL <100: Primary | Chronic | ICD-10-CM

## 2025-01-14 DIAGNOSIS — J06.9 UPPER RESPIRATORY TRACT INFECTION, UNSPECIFIED TYPE: ICD-10-CM

## 2025-01-14 DIAGNOSIS — I12.9 BENIGN HYPERTENSION WITH CHRONIC KIDNEY DISEASE, STAGE III: ICD-10-CM

## 2025-01-14 DIAGNOSIS — I50.22 HEART FAILURE WITH MILDLY REDUCED EJECTION FRACTION (HFMREF): ICD-10-CM

## 2025-01-14 DIAGNOSIS — J30.9 ALLERGIC RHINITIS, UNSPECIFIED SEASONALITY, UNSPECIFIED TRIGGER: ICD-10-CM

## 2025-01-14 DIAGNOSIS — J10.1 INFLUENZA A: Primary | ICD-10-CM

## 2025-01-14 DIAGNOSIS — I48.91 ATRIAL FIBRILLATION, UNSPECIFIED TYPE: ICD-10-CM

## 2025-01-14 DIAGNOSIS — N18.30 BENIGN HYPERTENSION WITH CHRONIC KIDNEY DISEASE, STAGE III: ICD-10-CM

## 2025-01-14 LAB
CTP QC/QA: YES
POC MOLECULAR INFLUENZA A AGN: POSITIVE
POC MOLECULAR INFLUENZA B AGN: NEGATIVE
SARS-COV-2 RNA RESP QL NAA+PROBE: NOT DETECTED

## 2025-01-14 PROCEDURE — 1125F AMNT PAIN NOTED PAIN PRSNT: CPT | Mod: CPTII,S$GLB,, | Performed by: INTERNAL MEDICINE

## 2025-01-14 PROCEDURE — 99214 OFFICE O/P EST MOD 30 MIN: CPT | Mod: S$GLB,,,

## 2025-01-14 PROCEDURE — 99999 PR PBB SHADOW E&M-EST. PATIENT-LVL V: CPT | Mod: PBBFAC,,,

## 2025-01-14 PROCEDURE — 1101F PT FALLS ASSESS-DOCD LE1/YR: CPT | Mod: CPTII,S$GLB,,

## 2025-01-14 PROCEDURE — 99214 OFFICE O/P EST MOD 30 MIN: CPT | Mod: S$GLB,,, | Performed by: INTERNAL MEDICINE

## 2025-01-14 PROCEDURE — 1159F MED LIST DOCD IN RCRD: CPT | Mod: CPTII,S$GLB,, | Performed by: INTERNAL MEDICINE

## 2025-01-14 PROCEDURE — G2211 COMPLEX E/M VISIT ADD ON: HCPCS | Mod: S$GLB,,,

## 2025-01-14 PROCEDURE — 87635 SARS-COV-2 COVID-19 AMP PRB: CPT

## 2025-01-14 PROCEDURE — 1126F AMNT PAIN NOTED NONE PRSNT: CPT | Mod: CPTII,S$GLB,,

## 2025-01-14 PROCEDURE — 3072F LOW RISK FOR RETINOPATHY: CPT | Mod: CPTII,S$GLB,,

## 2025-01-14 PROCEDURE — 1157F ADVNC CARE PLAN IN RCRD: CPT | Mod: CPTII,S$GLB,,

## 2025-01-14 PROCEDURE — 3288F FALL RISK ASSESSMENT DOCD: CPT | Mod: CPTII,S$GLB,,

## 2025-01-14 PROCEDURE — 1101F PT FALLS ASSESS-DOCD LE1/YR: CPT | Mod: CPTII,S$GLB,, | Performed by: INTERNAL MEDICINE

## 2025-01-14 PROCEDURE — 3072F LOW RISK FOR RETINOPATHY: CPT | Mod: CPTII,S$GLB,, | Performed by: INTERNAL MEDICINE

## 2025-01-14 PROCEDURE — 1159F MED LIST DOCD IN RCRD: CPT | Mod: CPTII,S$GLB,,

## 2025-01-14 PROCEDURE — 1160F RVW MEDS BY RX/DR IN RCRD: CPT | Mod: CPTII,S$GLB,,

## 2025-01-14 PROCEDURE — 99999 PR PBB SHADOW E&M-EST. PATIENT-LVL V: CPT | Mod: PBBFAC,,, | Performed by: INTERNAL MEDICINE

## 2025-01-14 PROCEDURE — 87502 INFLUENZA DNA AMP PROBE: CPT | Mod: QW,S$GLB,,

## 2025-01-14 PROCEDURE — 3288F FALL RISK ASSESSMENT DOCD: CPT | Mod: CPTII,S$GLB,, | Performed by: INTERNAL MEDICINE

## 2025-01-14 PROCEDURE — 1157F ADVNC CARE PLAN IN RCRD: CPT | Mod: CPTII,S$GLB,, | Performed by: INTERNAL MEDICINE

## 2025-01-14 RX ORDER — METHYLPREDNISOLONE 4 MG/1
TABLET ORAL
Qty: 21 EACH | Refills: 0 | Status: SHIPPED | OUTPATIENT
Start: 2025-01-14 | End: 2025-02-04

## 2025-01-14 RX ORDER — OSELTAMIVIR PHOSPHATE 75 MG/1
75 CAPSULE ORAL 2 TIMES DAILY
Qty: 10 CAPSULE | Refills: 0 | Status: SHIPPED | OUTPATIENT
Start: 2025-01-14 | End: 2025-01-19

## 2025-01-14 RX ORDER — BENZONATATE 100 MG/1
100 CAPSULE ORAL 3 TIMES DAILY PRN
Qty: 30 CAPSULE | Refills: 0 | Status: SHIPPED | OUTPATIENT
Start: 2025-01-14 | End: 2025-01-24

## 2025-01-14 RX ORDER — PROMETHAZINE HYDROCHLORIDE AND DEXTROMETHORPHAN HYDROBROMIDE 6.25; 15 MG/5ML; MG/5ML
5 SYRUP ORAL NIGHTLY PRN
Qty: 118 ML | Refills: 0 | Status: SHIPPED | OUTPATIENT
Start: 2025-01-14 | End: 2025-02-07

## 2025-01-14 NOTE — PROGRESS NOTES
HPI     Tonya Cohn is a 89 y.o. female with multiple medical diagnoses as listed in the medical history and problem list that presents for cough and nasal congestion. PCP Dr. Hughes with last visit in this clinic on 12/18/24.     Chief Complaint   Patient presents with    Cough    Nasal Congestion       HPI    CHIEF COMPLAINT:  Patient presents today with her daughter for cold symptoms and chest congestion.    HISTORY OF PRESENT ILLNESS:  She developed cold symptoms Sunday night including cough with associated chest wall pain that improves when coughing subsides. She experiences mild shortness of breath with rapid movement and nasal congestion with rhinorrhea. She reports achy bones and joints, as well as fatigue. She denies fever. She had possible exposure to flu-like illness when her grandson's samantha, who was diagnosed with flu by a doctor, visited while wearing a mask.    CURRENT MEDICATIONS:  She is taking Tylenol for symptom management and Robitussin for cough relief.    MEDICAL HISTORY:  She has previous steroid use. She denies history of asthma, COPD, or smoking. She has never used an inhaler. She had a recent visit with Dr. Hughes in December.       Assessment & Plan     1. Influenza A    Patient tested positive for Flu A in clinic today. No acute respiratory distress noted. Encouraged hydration, rest, and Tamiflu BID x 5 days. Follow up with clinic for any worsening symptoms, or if symptoms fail to improve.     ED precautions given.   - oseltamivir (TAMIFLU) 75 MG capsule; Take 1 capsule (75 mg total) by mouth 2 (two) times daily. for 5 days  Dispense: 10 capsule; Refill: 0    2. Upper respiratory tract infection, unspecified type    Lungs with slight expiratory wheeze. Congestion and cough noted. Patient denies fevers. Endorses runny nose, fatigue, aching joints. No acute respiratory distress. AFVSS in clinic today. Denies shortness of breath, chest pain at this time. Medrol dose pack given due  to cough and wheeze. Discussed that it may temporarily elevate BG. Recommended Tamiflu BID x 5 days as well as:  -Warm tea with honey and lemon, and/or warm salt water gargles every 4 hours PRN for sore throat. May try OTC Cepacol if pt desires.  -advised frequent hand washing, rest, and plenty of fluids. Increase water intake to 64-80 oz daily to help thin mucus.  -Tylenol tablets as needed for fever, headaches, sore throat, ear pain, bodyaches, and/or nasal/sinus inflammation.   -Nasal Saline spray (Over the counter [place] spray or [place])  2 sprays each nostril 2-3 times a day for nasal congestion.     - COVID-19 Routine Screening  - POCT Influenza A/B Molecular  - benzonatate (TESSALON) 100 MG capsule; Take 1 capsule (100 mg total) by mouth 3 (three) times daily as needed for Cough.  Dispense: 30 capsule; Refill: 0  - promethazine-dextromethorphan (PROMETHAZINE-DM) 6.25-15 mg/5 mL Syrp; Take 5 mLs by mouth nightly as needed (cough).  Dispense: 118 mL; Refill: 0  - methylPREDNISolone (MEDROL DOSEPACK) 4 mg tablet; use as directed  Dispense: 21 each; Refill: 0    3. Allergic rhinitis, unspecified seasonality, unspecified trigger    As above. Given current symptoms, encouraged patient to use OTC flonase BID to help with sinus congestion and runny nose.         Discussed DDx, condition, and treatment.   Education sent to patient portal/included in after visit summary.  ED precautions given.   Notify provider if symptoms do not resolve or increase in severity.   Patient verbalizes understanding and agrees with plan of care.  --------------------------------------------      Health Maintenance:  Health Maintenance         Date Due Completion Date    DEXA Scan 06/15/2024 6/15/2022    Hemoglobin A1c 06/19/2025 12/19/2024    Diabetic Eye Exam 12/03/2025 12/3/2024    Override on 9/20/2016: Done (Dr. Kole Lucero- negative for diabetic retinopathy bilaterally)    Override on 9/9/2015: Done (No diabetic retinopathy)     Override on 8/14/2014: Done (no diabetic retinopathy; Dr. Lucero)    Override on 4/21/2014: Done (Pt states her eye exam is scheduled for next month)    Override on 4/1/2013: Done    Diabetes Urine Screening 12/19/2025 12/19/2024    Lipid Panel 12/19/2025 12/19/2024    Override on 2/26/2016: Done ([facility] - total 189, TG 62, , HDL 76)    TETANUS VACCINE 12/06/2028 12/6/2018            Discussed the importance of overdue vaccines which were offered during this encounter. Patient declined overdue vaccines at this time and Advised patient on the importance of completing overdue health maintenance items    Follow Up:  Follow up if symptoms worsen or fail to improve.    Exam     Review of Systems:  (as noted above)  Review of Systems   Constitutional:  Positive for fatigue. Negative for fever.   HENT:  Positive for congestion, rhinorrhea and sneezing. Negative for trouble swallowing.    Respiratory:  Positive for cough. Negative for shortness of breath.    Cardiovascular:  Negative for chest pain.   Gastrointestinal:  Negative for blood in stool and vomiting.   Genitourinary:  Negative for hematuria.   Musculoskeletal:  Positive for myalgias.   Skin:  Negative for rash.       Physical Exam:   Physical Exam  Constitutional:       General: She is not in acute distress.     Appearance: Normal appearance. She is ill-appearing (mildly).   HENT:      Head: Normocephalic and atraumatic.      Right Ear: Tympanic membrane normal.      Left Ear: Tympanic membrane normal.      Nose: Congestion and rhinorrhea present.      Mouth/Throat:      Mouth: Mucous membranes are moist.      Pharynx: Posterior oropharyngeal erythema present. No oropharyngeal exudate.   Cardiovascular:      Rate and Rhythm: Normal rate and regular rhythm.      Pulses: Normal pulses.      Heart sounds: Normal heart sounds. No murmur heard.  Pulmonary:      Effort: Pulmonary effort is normal. No respiratory distress.      Breath sounds:  "Wheezing (mild expiratory wheeze) present.   Abdominal:      General: Abdomen is flat. Bowel sounds are normal. There is no distension.      Tenderness: There is no abdominal tenderness.   Musculoskeletal:      Cervical back: Normal range of motion and neck supple. No rigidity or tenderness.   Lymphadenopathy:      Cervical: No cervical adenopathy.   Skin:     General: Skin is warm and dry.      Capillary Refill: Capillary refill takes less than 2 seconds.   Neurological:      General: No focal deficit present.      Mental Status: She is alert and oriented to person, place, and time.   Psychiatric:         Mood and Affect: Mood normal.         Behavior: Behavior normal.       Vitals:    01/14/25 1522   BP: (!) 148/86   BP Location: Right arm   Patient Position: Sitting   Pulse: 96   Temp: 99.2 °F (37.3 °C)   TempSrc: Oral   SpO2: 98%   Weight: 82.5 kg (181 lb 12.3 oz)   Height: 5' 6" (1.676 m)      Body mass index is 29.34 kg/m².        History     Past Medical History:  Past Medical History:   Diagnosis Date    AR (allergic rhinitis)     Atherosclerosis of abdominal aorta     noted on CT scan 1/17/2011    Carpal tunnel syndrome of left wrist     Chronic kidney disease, stage 3     Diabetic peripheral neuropathy     Diverticulosis     History of colon polyps     History of diverticulitis of colon 1/2014    Hyperlipemia     Hypertension     Hypothyroidism     followed by endocrinology, Dr. Green    Mild aortic stenosis     New onset atrial fibrillation 6/1/2024    OA (osteoarthritis) of knee     Obesity     Sciatica     Type II or unspecified type diabetes mellitus with neurological manifestations, uncontrolled(250.62)        Past Surgical History:  Past Surgical History:   Procedure Laterality Date    CATARACT EXTRACTION Bilateral     COLONOSCOPY N/A 10/3/2017    Procedure: COLONOSCOPY;  Surgeon: Alin Gonzalez MD;  Location: [address];  Service: Endoscopy;  Laterality: N/A;    COSMETIC SURGERY      " HYSTERECTOMY      partial    INJECTION OF JOINT Right 10/7/2024    Procedure: INJECTION, JOINT RIGHT HIP;  Surgeon: Chago Rizvi MD;  [facility];  Service: Pain Management;  Laterality: Right;  [phone]  2 WK F/U EL    JOINT REPLACEMENT Right     LUNG BIOPSY  in her 40's    TOTAL KNEE ARTHROPLASTY Right 2014    TKR       Social History:  Social History     Socioeconomic History    Marital status:     Number of children: 7   Occupational History    Occupation: retired - house cleaning   Tobacco Use    Smoking status: Former     Current packs/day: 0.00     Average packs/day: 0.3 packs/day for 7.5 years (1.9 ttl pk-yrs)     Types: Cigarettes     Start date: 1955     Quit date: 1962     Years since quittin.5     Passive exposure: Never    Smokeless tobacco: Former     Quit date: 1970   Substance and Sexual Activity    Alcohol use: Yes     Alcohol/week: 1.0 standard drink of alcohol     Types: 1 Cans of beer per week     Comment: occasionally    Drug use: No    Sexual activity: Not Currently     Partners: Male   Other Topics Concern    Are you pregnant or think you may be? No    Breast-feeding No     Social Drivers of Health     Financial Resource Strain: Low Risk  (2024)    Overall Financial Resource Strain (CARDIA)     Difficulty of Paying Living Expenses: Not hard at all   Food Insecurity: No Food Insecurity (2024)    Hunger Vital Sign     Worried About Running Out of Food in the Last Year: Never true     Ran Out of Food in the Last Year: Never true   Transportation Needs: No Transportation Needs (2024)    PRAPARE - Transportation     Lack of Transportation (Medical): No     Lack of Transportation (Non-Medical): No   Physical Activity: Insufficiently Active (2024)    Exercise Vital Sign     Days of Exercise per Week: 2 days     Minutes of Exercise per Session: 10 min   Stress: No Stress Concern Present (2024)    [employer] - Occupational Stress Questionnaire     Feeling of Stress : Not at all   Housing Stability: Low Risk  (5/4/2022)    Housing Stability Vital Sign     Unable to Pay for Housing in the Last Year: No     Number of Places Lived in the Last Year: 1     Unstable Housing in the Last Year: No       Family History:  Family History   Problem Relation Name Age of Onset    Cancer Mother          shoulder tumor - cancer    Hypertension Mother      Glaucoma Mother      Heart disease Father          valve replacement    Hypertension Father      Heart attack Father      Diabetes Sister Lidya     Arthritis Sister Lovinia         s/p knee surgery    Diabetes Brother Benoris     Heart disease Brother Benoris     Heart failure Brother Benoris     Stroke Brother Ardell     Arthritis Brother Vital         back problems    Skin cancer Brother Bogdan     Cancer Brother Bogdan     Throat cancer Brother Bogdan     No Known Problems Maternal Grandmother      No Known Problems Maternal Grandfather      No Known Problems Paternal Grandmother      No Known Problems Paternal Grandfather      Cancer Son Johnathan         jaw    Cataracts Son Johnathan     No Known Problems Maternal Aunt      No Known Problems Maternal Uncle      No Known Problems Paternal Aunt      No Known Problems Paternal Uncle      Melanoma Neg Hx      Eczema Neg Hx      Lupus Neg Hx      Psoriasis Neg Hx      Breast cancer Neg Hx      Colon cancer Neg Hx      Amblyopia Neg Hx      Blindness Neg Hx      Macular degeneration Neg Hx      Retinal detachment Neg Hx      Strabismus Neg Hx      Thyroid disease Neg Hx         Allergies and Medications: (updated and reviewed)  Review of patient's allergies indicates:   Allergen Reactions    Lipitor [atorvastatin]      Current Outpatient Medications   Medication Sig Dispense Refill    acetaminophen (TYLENOL) 500 MG tablet Take 1 tablet (500 mg total) by mouth every 4 (four) hours as needed for Pain. (Patient taking  differently: Take 500 mg by mouth daily as needed for Pain (Back Pain).) 60 tablet 2    apixaban (ELIQUIS) 5 mg Tab Take 1 tablet (5 mg total) by mouth 2 (two) times daily. 60 tablet 6    azithromycin (ZITHROMAX Z-RUBEN) 250 MG tablet Take 2 pills day 1, then 1 pill day 2-5. 6 tablet 0    calcium carbonate (OS-MIRACLE) 500 mg calcium (1,250 mg) tablet Take 1 tablet by mouth every other day.      diphenhydrAMINE (BENADRYL) 25 mg capsule Take 1 capsule (25 mg total) by mouth every 6 (six) hours as needed for Itching or Allergies. 20 capsule 0    fish oil-fat acid [id number] 1,200 mg (400 [id number]) Cap Take 1 capsule by mouth once daily.      furosemide (LASIX) 20 MG tablet Take 1 tablet (20 mg total) by mouth 2 (two) times daily. 60 tablet 11    HYDROcodone-acetaminophen (NORCO) 5-325 mg per tablet Take 1 tablet by mouth.      hydrocortisone 2.5 % cream Apply topically 2 (two) times daily. (Patient taking differently: Apply 15 g topically 2 (two) times daily.) 20 g 1    ibuprofen (ADVIL,MOTRIN) 800 MG tablet Take 800 mg by mouth every 6 (six) hours as needed.      levothyroxine (SYNTHROID) 88 MCG tablet Take 1 tablet (88 mcg total) by mouth before breakfast. 90 tablet 1    LIDOcaine (LIDODERM) 5 % Place 1 patch onto the skin once daily. Remove & Discard patch within 12 hours or as directed by MD 30 patch 0    losartan (COZAAR) 50 MG tablet Take 1 tablet (50 mg total) by mouth once daily. 90 tablet 1    lovastatin (MEVACOR) 10 MG tablet Take 1 tablet (10 mg total) by mouth once daily. 90 tablet 3    metoprolol succinate (TOPROL-XL) 100 MG 24 hr tablet Take 1 tablet (100 mg total) by mouth once daily. 90 tablet 3    mometasone 0.1% (ELOCON) 0.1 % cream Use daily 50 g 3    multivit,calc,mins/iron/folic (ONE-A-DAY WOMENS FORMULA ORAL) Take 1 tablet by mouth once daily.      aspirin 81 MG Chew Take 1 tablet (81 mg total) by mouth once daily.  0    benzonatate (TESSALON) 100 MG capsule Take 1 capsule (100 mg total) by  mouth 3 (three) times daily as needed for Cough. 30 capsule 0    blood-glucose meter (FREESTYLE SYSTEM KIT) kit Use as instructed 1 each 0    dorzolamide-timolol 2-0.5% (COSOPT) 22.3-6.8 mg/mL ophthalmic solution Place 1 drop into both eyes 2 (two) times daily. 10 mL 6    methylPREDNISolone (MEDROL DOSEPACK) 4 mg tablet use as directed 21 each 0    oseltamivir (TAMIFLU) 75 MG capsule Take 1 capsule (75 mg total) by mouth 2 (two) times daily. for 5 days 10 capsule 0    promethazine-dextromethorphan (PROMETHAZINE-DM) 6.25-15 mg/5 mL Syrp Take 5 mLs by mouth nightly as needed (cough). 118 mL 0     No current facility-administered medications for this visit.       Patient Care Team:  Tere Hughes MD as PCP - General (Internal Medicine)  Preston Whitfield MD as Consulting Physician (Cardiology)  Criss Baird OD (Inactive) as Consulting Physician (Optometry)  Marvin Roach MD (Inactive) as Consulting Physician (Rheumatology)  Wendy Garcia LPN as Care Coordinator         - The patient is given an After Visit Summary that lists all medications with directions, allergies, education, orders placed during this encounter and follow-up instructions.      - I have reviewed the patient's medical information including past medical, family, and social history sections including the medications and allergies.      - We discussed the patient's current medications.     This note was created by combination of typed  and MModal dictation.  Transcription errors may be present.  If there are any questions, please contact me.                 KEILY Jenkins

## 2025-01-14 NOTE — PROGRESS NOTES
Subjective   Patient ID:  Tonya Cohn is a 89 y.o. female who presents for follow-up of No chief complaint on file.      HPI         PAF on eliquis, combined CHF, HTN, AS/MS - mild, HLD, DM     Stress test 10/17/16  LVEF: >= 70 %  Impression: NORMAL MYOCARDIAL PERFUSION  1. The perfusion scan is free of evidence for myocardial ischemia or injury.   2. Resting wall motion is physiologic.   3. Resting LV function is normal.     Echo 6/1/24    Left Ventricle: The left ventricle is normal in size. Normal wall thickness. There is concentric remodeling. Normal wall motion. There is mildly reduced systolic function with a visually estimated ejection fraction of 45 - 50%. Ejection fraction by visual approximation is 55%. Unable to assess diastolic function due to atrial fibrillation. Elevated left ventricular filling pressure.    Right Ventricle: Normal right ventricular cavity size. There is hypertrophy. Right ventricle wall motion  is normal. Systolic function is mildly reduced.    Left Atrium: Left atrium is moderately dilated.    Right Atrium: Right atrium is moderately dilated.    Aortic Valve: There is moderate aortic valve sclerosis. There is moderate annular calcification present. Moderately restricted motion. There is mild stenosis. Aortic valve area by VTI is 1.30 cm². Aortic valve peak velocity is 1.6 m/s. Mean gradient is 6 mmHg. The dimensionless index is 0.46. There is no significant regurgitation.    Mitral Valve: There is moderate posterior mitral annular calcification present. Mildly restricted motion. There is mild stenosis. The mean pressure gradient across the mitral valve is 4 mmHg at a heart rate of 101 bpm. There is mild to moderate regurgitation with a centrally directed jet.    Tricuspid Valve: The tricuspid valve is structurally normal. There is mild to moderate regurgitation.    Pulmonary Artery: There is mild pulmonary hypertension. The estimated pulmonary artery systolic pressure is 53  mmHg.    IVC/SVC: Elevated venous pressure at 15 mmHg.    Pericardium: There is no pericardial effusion.    Significant beat to beat variability in EF with irregular rhythm.            5/22/18 Stays active for 86, denies CP or SOB  EKG NSR - ok     11/9/18 Denies CP or SOB  Stays active - [place] the [place]  EKG NSR - ok     5/20/19 Stays active  Gets some indigestion type pain after spicy foods  Denies SOB  EKG NSR - ok     11/5/19 Denies CP or SOB  BP elevated  Increase losartan 100 qd  Otherwise cardiac stable  AS mild     2/6/20 Denies CP or SOB  EKG NSR - ok  BP controlled  Cardiac stable  OV 1 year with repeat echo     2/4/21 Denies CP or SOB  EKG NSR - ok  BP stable  Eager to [place] to the casino   Continue Rx for HTN, HLD, AS  OV 6 months with echo     10/20/21 Denies CP or SOB  BP controlled   Continue Rx for HTN, HLD, AS/MS  OV 6 months        [place] at [facility]  6/20/24: Patient presents today to [facility] care and hospital follow up following recent admission for new onset AF. On arrival to ED HR in 140s. EKG with new onset AF RVR. CXR with pulmonary edema with . Troponin 0.038. Admitted to  for further evaluation and management of new onset AF and ADHF. 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 Volume status optimized. Started on beta-blocker with avoidance of Cardizem due to new reduction in EF. Converted to NSR so DCCV deferred. She was initiated on DOAC and subsequently discharged.   Patient reports doing well since discharge. Patient denies CP, SOB, palpitations, orthopnea, PND, presyncope, LOC, swelling, or claudication. She notes right hip pain and ambulates with a cane. She performs all daily activities independently, including preparing her meals.  Patient monitors her  home BP and ranges 140s/50s. Patient reports medication compliance without side effects  Patient does not exercise regularly, but remains active at home without limitation.     1/14/25 Recently followed at [facility]. Looks good  for 89  Denies CP, mild stable BLACK'Suffering with URI  BP controlled by outside readings  EKG NSR IRBBB PVCs NSSTT changes    Review of Systems   Constitutional: Negative for decreased appetite.   HENT:  Negative for ear discharge.    Eyes:  Negative for blurred vision.   Respiratory:  Negative for hemoptysis.    Endocrine: Negative for polyphagia.   Hematologic/Lymphatic: Negative for adenopathy.   Skin:  Negative for color change.   Musculoskeletal:  Negative for joint swelling.   Genitourinary:  Negative for bladder incontinence.   Neurological:  Negative for brief paralysis.   Psychiatric/Behavioral:  Negative for hallucinations.    Allergic/Immunologic: Negative for hives.          Objective     Physical Exam  Constitutional:       Appearance: She is well-developed.   HENT:      Head: Normocephalic and atraumatic.   Eyes:      Conjunctiva/sclera: Conjunctivae normal.      Pupils: Pupils are equal, round, and reactive to light.   Cardiovascular:      Rate and Rhythm: Normal rate.      Pulses: Intact distal pulses.      Heart sounds: Normal heart sounds.   Pulmonary:      Effort: Pulmonary effort is normal.      Breath sounds: Normal breath sounds.   Abdominal:      General: Bowel sounds are normal.      Palpations: Abdomen is soft.   Musculoskeletal:         General: Normal range of motion.      Cervical back: Normal range of motion and neck supple.   Skin:     General: Skin is warm and dry.   Neurological:      Mental Status: She is alert and oriented to person, place, and time.            Assessment and Plan     1. Hyperlipidemia LDL goal <100    2. Atrial fibrillation, unspecified type    3. Heart failure with mildly reduced ejection fraction (HFmrEF)    4. New onset atrial fibrillation    5. Atherosclerosis of abdominal aorta    6. Mild aortic stenosis    7. Benign hypertension with chronic kidney disease, stage III        Plan:      Continue Rx for PAF, HTN, HLD, AS/MS  OV 3 months with BNP, BMP    Advance  Care Planning     Date: 01/14/2025  Patient did not wish or was not able to name a surrogate decision maker or provide an Advance Care Plan.

## 2025-01-24 ENCOUNTER — TELEPHONE (OUTPATIENT)
Dept: FAMILY MEDICINE | Facility: CLINIC | Age: OVER 89
End: 2025-01-24
Payer: MEDICARE

## 2025-01-24 NOTE — TELEPHONE ENCOUNTER
----- Message from KEILY Jenkins sent at 1/16/2025  5:28 PM CST -----  Please schedule her for a 2 week nurse BP check. Thank you!

## 2025-01-29 ENCOUNTER — TELEPHONE (OUTPATIENT)
Dept: FAMILY MEDICINE | Facility: CLINIC | Age: OVER 89
End: 2025-01-29

## 2025-01-29 ENCOUNTER — CLINICAL SUPPORT (OUTPATIENT)
Dept: FAMILY MEDICINE | Facility: CLINIC | Age: OVER 89
End: 2025-01-29
Payer: MEDICARE

## 2025-01-29 VITALS — HEART RATE: 122 BPM | OXYGEN SATURATION: 94 % | DIASTOLIC BLOOD PRESSURE: 76 MMHG | SYSTOLIC BLOOD PRESSURE: 128 MMHG

## 2025-01-29 DIAGNOSIS — N18.30 BENIGN HYPERTENSION WITH CHRONIC KIDNEY DISEASE, STAGE III: Primary | ICD-10-CM

## 2025-01-29 DIAGNOSIS — I12.9 BENIGN HYPERTENSION WITH CHRONIC KIDNEY DISEASE, STAGE III: Primary | ICD-10-CM

## 2025-01-29 PROCEDURE — 99999 PR PBB SHADOW E&M-EST. PATIENT-LVL II: CPT | Mod: PBBFAC,,,

## 2025-01-29 NOTE — PROGRESS NOTES
Blood pressure controlled but heart rate a little high. She is currently on prednisone.  Recommend she come back to have a recheck a week after completing her steroids.

## 2025-01-29 NOTE — PROGRESS NOTES
Tonya TITI Cohn 89 y.o. female is here today for Blood Pressure check.   History of HTN no.    Review of patient's allergies indicates:   Allergen Reactions    Lipitor [atorvastatin]      Creatinine   Date Value Ref Range Status   11/29/2024 0.9 0.5 - 1.4 mg/dL Final     Sodium   Date Value Ref Range Status   11/29/2024 144 136 - 145 mmol/L Final     Potassium   Date Value Ref Range Status   11/29/2024 3.7 3.5 - 5.1 mmol/L Final   ]  Patient verifies taking blood pressure medications on a regular basis at the same time of the day.     Current Outpatient Medications:     acetaminophen (TYLENOL) 500 MG tablet, Take 1 tablet (500 mg total) by mouth every 4 (four) hours as needed for Pain. (Patient taking differently: Take 500 mg by mouth daily as needed for Pain (Back Pain).), Disp: 60 tablet, Rfl: 2    apixaban (ELIQUIS) 5 mg Tab, Take 1 tablet (5 mg total) by mouth 2 (two) times daily., Disp: 60 tablet, Rfl: 6    aspirin 81 MG Chew, Take 1 tablet (81 mg total) by mouth once daily., Disp: , Rfl: 0    azithromycin (ZITHROMAX Z-RUBEN) 250 MG tablet, Take 2 pills day 1, then 1 pill day 2-5., Disp: 6 tablet, Rfl: 0    blood-glucose meter (FREESTYLE SYSTEM KIT) kit, Use as instructed, Disp: 1 each, Rfl: 0    calcium carbonate (OS-MIRACLE) 500 mg calcium (1,250 mg) tablet, Take 1 tablet by mouth every other day., Disp: , Rfl:     diphenhydrAMINE (BENADRYL) 25 mg capsule, Take 1 capsule (25 mg total) by mouth every 6 (six) hours as needed for Itching or Allergies., Disp: 20 capsule, Rfl: 0    dorzolamide-timolol 2-0.5% (COSOPT) 22.3-6.8 mg/mL ophthalmic solution, Place 1 drop into both eyes 2 (two) times daily., Disp: 10 mL, Rfl: 6    fish oil-fat acid comb.8-hb137 1,200 mg ([id number]) Cap, Take 1 capsule by mouth once daily., Disp: , Rfl:     furosemide (LASIX) 20 MG tablet, Take 1 tablet (20 mg total) by mouth 2 (two) times daily., Disp: 60 tablet, Rfl: 11    HYDROcodone-acetaminophen (NORCO) 5-325 mg per tablet, Take  1 tablet by mouth., Disp: , Rfl:     hydrocortisone 2.5 % cream, Apply topically 2 (two) times daily. (Patient taking differently: Apply 15 g topically 2 (two) times daily.), Disp: 20 g, Rfl: 1    ibuprofen (ADVIL,MOTRIN) 800 MG tablet, Take 800 mg by mouth every 6 (six) hours as needed., Disp: , Rfl:     levothyroxine (SYNTHROID) 88 MCG tablet, Take 1 tablet (88 mcg total) by mouth before breakfast., Disp: 90 tablet, Rfl: 1    LIDOcaine (LIDODERM) 5 %, Place 1 patch onto the skin once daily. Remove & Discard patch within 12 hours or as directed by MD, Disp: 30 patch, Rfl: 0    losartan (COZAAR) 50 MG tablet, Take 1 tablet (50 mg total) by mouth once daily., Disp: 90 tablet, Rfl: 1    lovastatin (MEVACOR) 10 MG tablet, Take 1 tablet (10 mg total) by mouth once daily., Disp: 90 tablet, Rfl: 3    methylPREDNISolone (MEDROL DOSEPACK) 4 mg tablet, use as directed, Disp: 21 each, Rfl: 0    metoprolol succinate (TOPROL-XL) 100 MG 24 hr tablet, Take 1 tablet (100 mg total) by mouth once daily., Disp: 90 tablet, Rfl: 3    mometasone 0.1% (ELOCON) 0.1 % cream, Use daily, Disp: 50 g, Rfl: 3    multivit,calc,mins/iron/folic (ONE-A-DAY WOMENS FORMULA ORAL), Take 1 tablet by mouth once daily., Disp: , Rfl:     promethazine-dextromethorphan (PROMETHAZINE-DM) 6.25-15 mg/5 mL Syrp, Take 5 mLs by mouth nightly as needed (cough)., Disp: 118 mL, Rfl: 0  Does patient have record of home blood pressure readings no.   Last dose of blood pressure medication was taken at 7:30 am 1/29/25.  Patient is asymptomatic.       BP: 128/76 , Pulse: (!) 122 .

## 2025-02-06 DIAGNOSIS — I48.0 PAROXYSMAL ATRIAL FIBRILLATION: ICD-10-CM

## 2025-02-06 NOTE — TELEPHONE ENCOUNTER
----- Message from Fabricio sent at 2/6/2025  9:37 AM CST -----  Regarding: self  Type: RX Refill Request    Who called:daughtermerry  Have you contacted your pharmacy:  Refill or New Rx:refill  RX name and strength:apixaban (ELIQUIS) 5 mg Tab    Preferred pharmacy and phone number:  [id number] - [place] [id number] [place] [address] AT [facility] [address] [address]  Phone: [phone] Fax: [phone]    Ordering provider:maryjo nice  Would the patient rather a call back or a response via [facility]:call  Best call back number:[phone]    Additional information:

## 2025-02-12 ENCOUNTER — CLINICAL SUPPORT (OUTPATIENT)
Dept: FAMILY MEDICINE | Facility: CLINIC | Age: OVER 89
End: 2025-02-12
Payer: MEDICARE

## 2025-02-12 VITALS — SYSTOLIC BLOOD PRESSURE: 136 MMHG | HEART RATE: 67 BPM | DIASTOLIC BLOOD PRESSURE: 62 MMHG

## 2025-02-12 DIAGNOSIS — I48.91 NEW ONSET ATRIAL FIBRILLATION: Primary | ICD-10-CM

## 2025-02-12 PROCEDURE — 99999 PR PBB SHADOW E&M-EST. PATIENT-LVL I: CPT | Mod: PBBFAC,,,

## 2025-02-12 NOTE — PROGRESS NOTES
Review of patient's allergies indicates:   Allergen Reactions    Lipitor [atorvastatin]      Creatinine   Date Value Ref Range Status   11/29/2024 0.9 0.5 - 1.4 mg/dL Final     Sodium   Date Value Ref Range Status   11/29/2024 144 136 - 145 mmol/L Final     Potassium   Date Value Ref Range Status   11/29/2024 3.7 3.5 - 5.1 mmol/L Final   ]  Patient verifies taking blood pressure medications on a regular basis at the same time of the day.     Current Outpatient Medications:     acetaminophen (TYLENOL) 500 MG tablet, Take 1 tablet (500 mg total) by mouth every 4 (four) hours as needed for Pain. (Patient taking differently: Take 500 mg by mouth daily as needed for Pain (Back Pain).), Disp: 60 tablet, Rfl: 2    apixaban (ELIQUIS) 5 mg Tab, Take 1 tablet (5 mg total) by mouth 2 (two) times daily., Disp: 180 tablet, Rfl: 3    aspirin 81 MG Chew, Take 1 tablet (81 mg total) by mouth once daily., Disp: , Rfl: 0    azithromycin (ZITHROMAX Z-RUBEN) 250 MG tablet, Take 2 pills day 1, then 1 pill day 2-5., Disp: 6 tablet, Rfl: 0    blood-glucose meter (FREESTYLE SYSTEM KIT) kit, Use as instructed, Disp: 1 each, Rfl: 0    calcium carbonate (OS-MIRACLE) 500 mg calcium (1,250 mg) tablet, Take 1 tablet by mouth every other day., Disp: , Rfl:     diphenhydrAMINE (BENADRYL) 25 mg capsule, Take 1 capsule (25 mg total) by mouth every 6 (six) hours as needed for Itching or Allergies., Disp: 20 capsule, Rfl: 0    dorzolamide-timolol 2-0.5% (COSOPT) 22.3-6.8 mg/mL ophthalmic solution, Place 1 drop into both eyes 2 (two) times daily., Disp: 10 mL, Rfl: 6    fish oil-fat acid comb.8-hb137 1,200 mg ([id number]) Cap, Take 1 capsule by mouth once daily., Disp: , Rfl:     furosemide (LASIX) 20 MG tablet, Take 1 tablet (20 mg total) by mouth 2 (two) times daily., Disp: 60 tablet, Rfl: 11    HYDROcodone-acetaminophen (NORCO) 5-325 mg per tablet, Take 1 tablet by mouth., Disp: , Rfl:     hydrocortisone 2.5 % cream, Apply topically 2 (two) times  daily. (Patient taking differently: Apply 15 g topically 2 (two) times daily.), Disp: 20 g, Rfl: 1    ibuprofen (ADVIL,MOTRIN) 800 MG tablet, Take 800 mg by mouth every 6 (six) hours as needed., Disp: , Rfl:     levothyroxine (SYNTHROID) 88 MCG tablet, Take 1 tablet (88 mcg total) by mouth before breakfast., Disp: 90 tablet, Rfl: 1    LIDOcaine (LIDODERM) 5 %, Place 1 patch onto the skin once daily. Remove & Discard patch within 12 hours or as directed by MD, Disp: 30 patch, Rfl: 0    losartan (COZAAR) 50 MG tablet, Take 1 tablet (50 mg total) by mouth once daily., Disp: 90 tablet, Rfl: 1    lovastatin (MEVACOR) 10 MG tablet, Take 1 tablet (10 mg total) by mouth once daily., Disp: 90 tablet, Rfl: 3    metoprolol succinate (TOPROL-XL) 100 MG 24 hr tablet, Take 1 tablet (100 mg total) by mouth once daily., Disp: 90 tablet, Rfl: 3    mometasone 0.1% (ELOCON) 0.1 % cream, Use daily, Disp: 50 g, Rfl: 3    multivit,calc,mins/iron/folic (ONE-A-DAY WOMENS FORMULA ORAL), Take 1 tablet by mouth once daily., Disp: , Rfl:   Does patient have record of home blood pressure readings no.   Last dose of blood pressure medication was taken at 2/12/2025@ 0830.  Patient is asymptomatic.     BP: 136/62 , Pulse: 67 .      Dr. Hughes notified.

## 2025-03-13 ENCOUNTER — OFFICE VISIT (OUTPATIENT)
Dept: CARDIOLOGY | Facility: CLINIC | Age: OVER 89
End: 2025-03-13
Payer: MEDICARE

## 2025-03-13 VITALS
BODY MASS INDEX: 28.91 KG/M2 | OXYGEN SATURATION: 92 % | HEART RATE: 79 BPM | SYSTOLIC BLOOD PRESSURE: 130 MMHG | HEIGHT: 66 IN | WEIGHT: 179.88 LBS | DIASTOLIC BLOOD PRESSURE: 80 MMHG

## 2025-03-13 DIAGNOSIS — I12.9 BENIGN HYPERTENSION WITH CHRONIC KIDNEY DISEASE, STAGE III: Primary | ICD-10-CM

## 2025-03-13 DIAGNOSIS — I48.91 NEW ONSET ATRIAL FIBRILLATION: ICD-10-CM

## 2025-03-13 DIAGNOSIS — I35.0 MILD AORTIC STENOSIS: ICD-10-CM

## 2025-03-13 DIAGNOSIS — N18.30 BENIGN HYPERTENSION WITH CHRONIC KIDNEY DISEASE, STAGE III: Primary | ICD-10-CM

## 2025-03-13 DIAGNOSIS — I50.22 HEART FAILURE WITH MILDLY REDUCED EJECTION FRACTION (HFMREF): ICD-10-CM

## 2025-03-13 DIAGNOSIS — E78.5 HYPERLIPIDEMIA LDL GOAL <100: Chronic | ICD-10-CM

## 2025-03-13 DIAGNOSIS — I70.0 ATHEROSCLEROSIS OF ABDOMINAL AORTA: ICD-10-CM

## 2025-03-13 PROCEDURE — 1101F PT FALLS ASSESS-DOCD LE1/YR: CPT | Mod: CPTII,S$GLB,, | Performed by: INTERNAL MEDICINE

## 2025-03-13 PROCEDURE — 99214 OFFICE O/P EST MOD 30 MIN: CPT | Mod: S$GLB,,, | Performed by: INTERNAL MEDICINE

## 2025-03-13 PROCEDURE — 3072F LOW RISK FOR RETINOPATHY: CPT | Mod: CPTII,S$GLB,, | Performed by: INTERNAL MEDICINE

## 2025-03-13 PROCEDURE — 1126F AMNT PAIN NOTED NONE PRSNT: CPT | Mod: CPTII,S$GLB,, | Performed by: INTERNAL MEDICINE

## 2025-03-13 PROCEDURE — 99999 PR PBB SHADOW E&M-EST. PATIENT-LVL IV: CPT | Mod: PBBFAC,,, | Performed by: INTERNAL MEDICINE

## 2025-03-13 PROCEDURE — 1157F ADVNC CARE PLAN IN RCRD: CPT | Mod: CPTII,S$GLB,, | Performed by: INTERNAL MEDICINE

## 2025-03-13 PROCEDURE — 1159F MED LIST DOCD IN RCRD: CPT | Mod: CPTII,S$GLB,, | Performed by: INTERNAL MEDICINE

## 2025-03-13 PROCEDURE — 3288F FALL RISK ASSESSMENT DOCD: CPT | Mod: CPTII,S$GLB,, | Performed by: INTERNAL MEDICINE

## 2025-03-13 NOTE — PROGRESS NOTES
Subjective   Patient ID:  Tonya Cohn is a 89 y.o. female who presents for follow-up of No chief complaint on file.      HPI         PAF on eliquis, combined CHF, HTN, AS/MS - mild, HLD, DM     Stress test 10/17/16  LVEF: >= 70 %  Impression: NORMAL MYOCARDIAL PERFUSION  1. The perfusion scan is free of evidence for myocardial ischemia or injury.   2. Resting wall motion is physiologic.   3. Resting LV function is normal.     Echo 6/1/24    Left Ventricle: The left ventricle is normal in size. Normal wall thickness. There is concentric remodeling. Normal wall motion. There is mildly reduced systolic function with a visually estimated ejection fraction of 45 - 50%. Ejection fraction by visual approximation is 55%. Unable to assess diastolic function due to atrial fibrillation. Elevated left ventricular filling pressure.    Right Ventricle: Normal right ventricular cavity size. There is hypertrophy. Right ventricle wall motion  is normal. Systolic function is mildly reduced.    Left Atrium: Left atrium is moderately dilated.    Right Atrium: Right atrium is moderately dilated.    Aortic Valve: There is moderate aortic valve sclerosis. There is moderate annular calcification present. Moderately restricted motion. There is mild stenosis. Aortic valve area by VTI is 1.30 cm². Aortic valve peak velocity is 1.6 m/s. Mean gradient is 6 mmHg. The dimensionless index is 0.46. There is no significant regurgitation.    Mitral Valve: There is moderate posterior mitral annular calcification present. Mildly restricted motion. There is mild stenosis. The mean pressure gradient across the mitral valve is 4 mmHg at a heart rate of 101 bpm. There is mild to moderate regurgitation with a centrally directed jet.    Tricuspid Valve: The tricuspid valve is structurally normal. There is mild to moderate regurgitation.    Pulmonary Artery: There is mild pulmonary hypertension. The estimated pulmonary artery systolic pressure is 53  mmHg.    IVC/SVC: Elevated venous pressure at 15 mmHg.    Pericardium: There is no pericardial effusion.    Significant beat to beat variability in EF with irregular rhythm.            5/22/18 Stays active for 86, denies CP or SOB  EKG NSR - ok     11/9/18 Denies CP or SOB  Stays active - [place] the [place]  EKG NSR - ok     5/20/19 Stays active  Gets some indigestion type pain after spicy foods  Denies SOB  EKG NSR - ok     11/5/19 Denies CP or SOB  BP elevated  Increase losartan 100 qd  Otherwise cardiac stable  AS mild     2/6/20 Denies CP or SOB  EKG NSR - ok  BP controlled  Cardiac stable  OV 1 year with repeat echo     2/4/21 Denies CP or SOB  EKG NSR - ok  BP stable  Eager [place] the casino   Continue Rx for HTN, HLD, AS  OV 6 months with echo     10/20/21 Denies CP or SOB  BP controlled   Continue Rx for HTN, HLD, AS/MS  OV 6 months         [place] [facility]  [place]/24: Patient presents today to [facility] care and hospital follow up following recent admission for new onset AF. On arrival to ED HR in 140s. EKG with new onset AF RVR. CXR with pulmonary edema with . Troponin 0.038. Admitted to  for further evaluation and management of new onset AF and ADHF. 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 Volume status optimized. Started on beta-blocker with avoidance of Cardizem due to new reduction in EF. Converted to NSR so DCCV deferred. She was initiated on DOAC and subsequently discharged.   Patient reports doing well since discharge. Patient denies CP, SOB, palpitations, orthopnea, PND, presyncope, LOC, swelling, or claudication. She notes right hip pain and ambulates with a cane. She performs all daily activities independently, including preparing her meals.  Patient monitors her  home BP and ranges 140s/50s. Patient reports medication compliance without side effects  Patient does not exercise regularly, but remains active at home without limitation.     1/14/25 Recently followed at [facility]. Looks good  for 89  Denies CP, mild stable BLACK'Suffering with URI  BP controlled by outside readings  EKG NSR IRBBB PVCs NSSTT changes   Continue Rx for PAF, HTN, HLD, AS/MS  OV 3 months with BNP, BMP     Went to the ER 3/10/25  Patient is a 89-year-old female with a past medical history aortic stenosis chronic kidney disease diabetes hypertension hyperlipidemia hypothyroidism atrial fibrillation heart failure with reduced ejection fraction who presents the emergency room for evaluation of shortness breath since this morning with dyspnea on exertion.  She denies any significant productive cough fevers chills chest pain abdominal pain nausea vomiting diarrhea or lower extremity edema.  She is compliant with the medications including her diuretics.   Troponin 0.03 to 0.04    EKG NSR IRBBB    3/13/25 Less SOB since the ER. Denies CP  BP controlled  Takes lasix 20 bid    Review of Systems   Constitutional: Negative for decreased appetite.   HENT:  Negative for ear discharge.    Eyes:  Negative for blurred vision.   Respiratory:  Negative for hemoptysis.    Endocrine: Negative for polyphagia.   Hematologic/Lymphatic: Negative for adenopathy.   Skin:  Negative for color change.   Musculoskeletal:  Negative for joint swelling.   Genitourinary:  Negative for bladder incontinence.   Neurological:  Negative for brief paralysis.   Psychiatric/Behavioral:  Negative for hallucinations.    Allergic/Immunologic: Negative for hives.          Objective     Physical Exam  Constitutional:       Appearance: She is well-developed.   HENT:      Head: Normocephalic and atraumatic.   Eyes:      Conjunctiva/sclera: Conjunctivae normal.      Pupils: Pupils are equal, round, and reactive to light.   Cardiovascular:      Rate and Rhythm: Normal rate.      Pulses: Intact distal pulses.      Heart sounds: Normal heart sounds.   Pulmonary:      Effort: Pulmonary effort is normal.      Breath sounds: Normal breath sounds.   Abdominal:      General:  Bowel sounds are normal.      Palpations: Abdomen is soft.   Musculoskeletal:         General: Normal range of motion.      Cervical back: Normal range of motion and neck supple.   Skin:     General: Skin is warm and dry.   Neurological:      Mental Status: She is alert and oriented to person, place, and time.            Assessment and Plan     1. Benign hypertension with chronic kidney disease, stage III    2. Hyperlipidemia LDL goal <100    3. New onset atrial fibrillation    4. Atherosclerosis of abdominal aorta    5. Mild aortic stenosis    6. Heart failure with mildly reduced ejection fraction (HFmrEF)        Plan:    Volume overload improved from the ER    Continue Rx for PAF, HTN, HLD, AS/MS  OV 1 month with BNP, BMP, Echo    Advance Care Planning     Date: 03/13/2025  Patient did not wish or was not able to name a surrogate decision maker or provide an Advance Care Plan.

## 2025-03-21 ENCOUNTER — TELEPHONE (OUTPATIENT)
Dept: FAMILY MEDICINE | Facility: CLINIC | Age: OVER 89
End: 2025-03-21
Payer: MEDICARE

## 2025-03-21 ENCOUNTER — HOSPITAL ENCOUNTER (EMERGENCY)
Facility: HOSPITAL | Age: OVER 89
Discharge: HOME | End: 2025-03-22
Attending: EMERGENCY MEDICINE
Payer: MEDICARE

## 2025-03-21 VITALS
HEART RATE: 94 BPM | HEIGHT: 66 IN | OXYGEN SATURATION: 95 % | BODY MASS INDEX: 28.93 KG/M2 | RESPIRATION RATE: 20 BRPM | SYSTOLIC BLOOD PRESSURE: 139 MMHG | DIASTOLIC BLOOD PRESSURE: 63 MMHG | WEIGHT: 180 LBS | TEMPERATURE: 98 F

## 2025-03-21 DIAGNOSIS — R06.02 SHORTNESS OF BREATH: ICD-10-CM

## 2025-03-21 LAB
ALBUMIN SERPL BCP-MCNC: 4 G/DL (ref 3.5–5.2)
ALP SERPL-CCNC: 63 U/L (ref 40–150)
ALT SERPL W/O P-5'-P-CCNC: 24 U/L (ref 10–44)
ANION GAP SERPL CALC-SCNC: 10 MMOL/L (ref 8–16)
AST SERPL-CCNC: 24 U/L (ref 10–40)
BASOPHILS # BLD AUTO: 0.02 K/UL (ref 0–0.2)
BASOPHILS NFR BLD: 0.2 % (ref 0–1.9)
BILIRUB SERPL-MCNC: 0.6 MG/DL (ref 0.1–1)
BNP SERPL-MCNC: 267 PG/ML (ref 0–99)
BUN SERPL-MCNC: 29 MG/DL (ref 10–30)
CALCIUM SERPL-MCNC: 9.4 MG/DL (ref 8.7–10.5)
CHLORIDE SERPL-SCNC: 106 MMOL/L (ref 95–110)
CO2 SERPL-SCNC: 26 MMOL/L (ref 23–29)
CREAT SERPL-MCNC: 1.1 MG/DL (ref 0.5–1.4)
DIFFERENTIAL METHOD BLD: ABNORMAL
EOSINOPHIL # BLD AUTO: 0.2 K/UL (ref 0–0.5)
EOSINOPHIL NFR BLD: 2 % (ref 0–8)
ERYTHROCYTE [DISTWIDTH] IN BLOOD BY AUTOMATED COUNT: 13.8 % (ref 11.5–14.5)
EST. GFR  (NO RACE VARIABLE): 47 ML/MIN/1.73 M^2
GLUCOSE SERPL-MCNC: 136 MG/DL (ref 70–110)
HCT VFR BLD AUTO: 31.3 % (ref 37–48.5)
HGB BLD-MCNC: 10.7 G/DL (ref 12–16)
IMM GRANULOCYTES # BLD AUTO: 0.02 K/UL (ref 0–0.04)
IMM GRANULOCYTES NFR BLD AUTO: 0.2 % (ref 0–0.5)
INR PPP: 1.1 (ref 0.8–1.2)
LYMPHOCYTES # BLD AUTO: 1.7 K/UL (ref 1–4.8)
LYMPHOCYTES NFR BLD: 20.5 % (ref 18–48)
MAGNESIUM SERPL-MCNC: 1.9 MG/DL (ref 1.6–2.6)
MCH RBC QN AUTO: 29.7 PG (ref 27–31)
MCHC RBC AUTO-ENTMCNC: 34.2 G/DL (ref 32–36)
MCV RBC AUTO: 87 FL (ref 82–98)
MONOCYTES # BLD AUTO: 0.6 K/UL (ref 0.3–1)
MONOCYTES NFR BLD: 7.5 % (ref 4–15)
NEUTROPHILS # BLD AUTO: 5.9 K/UL (ref 1.8–7.7)
NEUTROPHILS NFR BLD: 69.6 % (ref 38–73)
NRBC BLD-RTO: 0 /100 WBC
PLATELET # BLD AUTO: 208 K/UL (ref 150–450)
PMV BLD AUTO: 10.7 FL (ref 9.2–12.9)
POTASSIUM SERPL-SCNC: 4.2 MMOL/L (ref 3.5–5.1)
PROT SERPL-MCNC: 7.3 G/DL (ref 6–8.4)
PROTHROMBIN TIME: 12.5 SEC (ref 9–12.5)
RBC # BLD AUTO: 3.6 M/UL (ref 4–5.4)
SODIUM SERPL-SCNC: 142 MMOL/L (ref 136–145)
TROPONIN I SERPL DL<=0.01 NG/ML-MCNC: 0.04 NG/ML (ref 0–0.03)
WBC # BLD AUTO: 8.49 K/UL (ref 3.9–12.7)

## 2025-03-21 PROCEDURE — 99285 EMERGENCY DEPT VISIT HI MDM: CPT | Mod: 25

## 2025-03-21 PROCEDURE — 85025 COMPLETE CBC W/AUTO DIFF WBC: CPT | Performed by: EMERGENCY MEDICINE

## 2025-03-21 PROCEDURE — 85610 PROTHROMBIN TIME: CPT | Performed by: EMERGENCY MEDICINE

## 2025-03-21 PROCEDURE — 84484 ASSAY OF TROPONIN QUANT: CPT | Performed by: EMERGENCY MEDICINE

## 2025-03-21 PROCEDURE — 83880 ASSAY OF NATRIURETIC PEPTIDE: CPT | Performed by: EMERGENCY MEDICINE

## 2025-03-21 PROCEDURE — 93010 ELECTROCARDIOGRAM REPORT: CPT | Mod: ,,, | Performed by: INTERNAL MEDICINE

## 2025-03-21 PROCEDURE — 63600175 PHARM REV CODE 636 W HCPCS: Performed by: EMERGENCY MEDICINE

## 2025-03-21 PROCEDURE — 93005 ELECTROCARDIOGRAM TRACING: CPT

## 2025-03-21 PROCEDURE — 83735 ASSAY OF MAGNESIUM: CPT | Performed by: EMERGENCY MEDICINE

## 2025-03-21 PROCEDURE — 96374 THER/PROPH/DIAG INJ IV PUSH: CPT

## 2025-03-21 PROCEDURE — 94761 N-INVAS EAR/PLS OXIMETRY MLT: CPT

## 2025-03-21 PROCEDURE — 94640 AIRWAY INHALATION TREATMENT: CPT

## 2025-03-21 PROCEDURE — 25000242 PHARM REV CODE 250 ALT 637 W/ HCPCS: Performed by: EMERGENCY MEDICINE

## 2025-03-21 PROCEDURE — 80053 COMPREHEN METABOLIC PANEL: CPT | Performed by: EMERGENCY MEDICINE

## 2025-03-21 RX ORDER — FUROSEMIDE 10 MG/ML
80 INJECTION INTRAMUSCULAR; INTRAVENOUS
Status: COMPLETED | OUTPATIENT
Start: 2025-03-21 | End: 2025-03-21

## 2025-03-21 RX ORDER — ALBUTEROL SULFATE 90 UG/1
1-2 INHALANT RESPIRATORY (INHALATION) EVERY 6 HOURS PRN
Qty: 8 G | Refills: 1 | Status: SHIPPED | OUTPATIENT
Start: 2025-03-21 | End: 2025-04-20

## 2025-03-21 RX ORDER — IPRATROPIUM BROMIDE AND ALBUTEROL SULFATE 2.5; .5 MG/3ML; MG/3ML
3 SOLUTION RESPIRATORY (INHALATION)
Status: COMPLETED | OUTPATIENT
Start: 2025-03-21 | End: 2025-03-21

## 2025-03-21 RX ADMIN — FUROSEMIDE 80 MG: 10 INJECTION, SOLUTION INTRAVENOUS at 12:03

## 2025-03-21 RX ADMIN — IPRATROPIUM BROMIDE AND ALBUTEROL SULFATE 3 ML: 2.5; .5 SOLUTION RESPIRATORY (INHALATION) at 12:03

## 2025-03-21 NOTE — TELEPHONE ENCOUNTER
Called patient and was able to reach the daughter Mrs. Reynoso and I informed her that the provider was trying to reach them about the appointment the mom have today. Daughter stated that the mom is still having SOB and its not a ER appoint ment at this time. Daughter was advise by the Provider to go to the ER instead. Daughter understand and will inform them.

## 2025-03-21 NOTE — DISCHARGE INSTRUCTIONS
Please use your albuterol inhaler.  Please continue all of your other medicines exactly as they are prescribed.  Please return immediately if you get worse or if new problems develop.  Please see Dr. Whitfield in the office this week.  Low-sodium diet.  No extra fluids.  Rest.

## 2025-03-21 NOTE — ED PROVIDER NOTES
Encounter Date: 3/21/2025    SCRIBE #1 NOTE: I, Patria Melo, am scribing for, and in the presence of,  Mehdi Medellin MD. I have scribed the following portions of the note - Other sections scribed: HPI, ROS.       History     Chief Complaint   Patient presents with    Shortness of Breath     Pt to ED c/o shortness of breath x's 4 days. Reports Hx of a-fib.      Tonya Cohn is a 89 y.o. female, with a PMHx of A-fib on Eliquis, DM Type 2, hypothyroidism, HFmrEF, CKD stage 3a, who presents to the ED with worsening SOB for 1 week. Patient reports the slightest movement causes her to be SOB. States she was seen at the ED 2 weeks ago for similar symptoms and given diuretics which provided some relief, but began worsening again 1 week ago. No other exacerbating or alleviating factors. Denies any eye problems, sore throat, chest pain, cough, abdominal pain, vomiting, diarrhea, dysuria, headache, or arm/leg problems.      The history is provided by the patient. No  was used.     Review of patient's allergies indicates:   Allergen Reactions    Lipitor [atorvastatin]      Past Medical History:   Diagnosis Date    AR (allergic rhinitis)     Atherosclerosis of abdominal aorta     noted on CT scan 1/17/2011    Carpal tunnel syndrome of left wrist     Chronic kidney disease, stage 3     Diabetic peripheral neuropathy     Diverticulosis     History of colon polyps     History of diverticulitis of colon 1/2014    Hyperlipemia     Hypertension     Hypothyroidism     followed by endocrinology, Dr. Green    Mild aortic stenosis     New onset atrial fibrillation 6/1/2024    OA (osteoarthritis) of knee     Obesity     Sciatica     Type II or unspecified type diabetes mellitus with neurological manifestations, uncontrolled(250.62)      Past Surgical History:   Procedure Laterality Date    CATARACT EXTRACTION Bilateral     COLONOSCOPY N/A 10/3/2017    Procedure: COLONOSCOPY;  Surgeon: Alin Gonzalez MD;   Location: [facility] ENDO (4TH FLR);  Service: Endoscopy;  Laterality: N/A;    COSMETIC SURGERY      HYSTERECTOMY      partial    INJECTION OF JOINT Right 10/7/2024    Procedure: INJECTION, JOINT RIGHT HIP;  Surgeon: Chago Rizvi MD;  Location: [facility];  Service: Pain Management;  Laterality: Right;  [phone]  2 WK F/U EL    JOINT REPLACEMENT Right     LUNG BIOPSY  in her 40's    TOTAL KNEE ARTHROPLASTY Right 6/13/2014    TKR     Family History   Problem Relation Name Age of Onset    Cancer Mother          shoulder tumor - cancer    Hypertension Mother      Glaucoma Mother      Heart disease Father          valve replacement    Hypertension Father      Heart attack Father      Diabetes Sister Lidya     Arthritis Sister Lovinia         s/p knee surgery    Diabetes Brother Benoris     Heart disease Brother Benoris     Heart failure Brother Benoris     Stroke Brother Ardell     Arthritis Brother Vital         back problems    Skin cancer Brother Bogdan     Cancer Brother Bogdan     Throat cancer Brother Bogdan     No Known Problems Maternal Grandmother      No Known Problems Maternal Grandfather      No Known Problems Paternal Grandmother      No Known Problems Paternal Grandfather      Cancer Son Johnathan         jaw    Cataracts Son Johnathan     No Known Problems Maternal Aunt      No Known Problems Maternal Uncle      No Known Problems Paternal Aunt      No Known Problems Paternal Uncle      Melanoma Neg Hx      Eczema Neg Hx      Lupus Neg Hx      Psoriasis Neg Hx      Breast cancer Neg Hx      Colon cancer Neg Hx      Amblyopia Neg Hx      Blindness Neg Hx      Macular degeneration Neg Hx      Retinal detachment Neg Hx      Strabismus Neg Hx      Thyroid disease Neg Hx       Social History[1]  Review of Systems   Constitutional:  Negative for chills, diaphoresis and fever.   HENT:  Negative for ear pain and sore throat.    Eyes:  Negative for pain.   Respiratory:  Positive for shortness of breath.  Negative for cough.    Cardiovascular:  Negative for chest pain.   Gastrointestinal:  Negative for abdominal pain, diarrhea, nausea and vomiting.   Genitourinary:  Negative for dysuria.   Musculoskeletal:  Negative for back pain.        (-) Arm or leg trouble.    Skin:  Negative for rash.   Neurological:  Negative for headaches.   Psychiatric/Behavioral:  Negative for confusion.        Physical Exam     Initial Vitals [03/21/25 1046]   BP Pulse Resp Temp SpO2   (!) 160/71 67 20 97.5 °F (36.4 °C) 95 %      MAP       --         Physical Exam  The patient was examined specifically for the following:   General:No significant distress, Good color, Warm and dry. Head and neck:Scalp atraumatic, Neck supple. Neurological:Appropriate conversation, Gross motor deficits. Eyes:Conjugate gaze, Clear corneas. ENT: No epistaxis. Cardiac: Regular rate and rhythm, Grossly normal heart tones. Pulmonary: Wheezing, Rales. Gastrointestinal: Abdominal tenderness, Abdominal distention. Musculoskeletal: Extremity deformity, Apparent pain with range of motion of the joints. Skin: Rash.   The findings on examination were normal except for the following:  The patient has an elevated BMI.  The lungs are clear.  The heart tones are remarkable for an irregular rhythm.  The abdomen is nontender.  There is no extremity swelling or tenderness.  He went for a 50 ft walk.  Lowest oxygen saturation was 94%.  The patient is 97% on room air.  ED Course   Procedures  Labs Reviewed   COMPREHENSIVE METABOLIC PANEL - Abnormal       Result Value    Sodium 142      Potassium 4.2      Chloride 106      CO2 26      Glucose 136 (*)     BUN 29      Creatinine 1.1      Calcium 9.4      Total Protein 7.3      Albumin 4.0      Total Bilirubin 0.6      Alkaline Phosphatase 63      AST 24      ALT 24      eGFR 47 (*)     Anion Gap 10     CBC W/ AUTO DIFFERENTIAL - Abnormal    WBC 8.49      RBC 3.60 (*)     Hemoglobin 10.7 (*)     Hematocrit 31.3 (*)     MCV 87       MCH 29.7      MCHC 34.2      RDW 13.8      Platelets 208      MPV 10.7      Immature Granulocytes 0.2      Gran # (ANC) 5.9      Immature Grans (Abs) 0.02      Lymph # 1.7      Mono # 0.6      Eos # 0.2      Baso # 0.02      nRBC 0      Gran % 69.6      Lymph % 20.5      Mono % 7.5      Eosinophil % 2.0      Basophil % 0.2      Differential Method Automated     TROPONIN I - Abnormal    Troponin I 0.037 (*)    B-TYPE NATRIURETIC PEPTIDE - Abnormal     (*)    MAGNESIUM    Magnesium 1.9     PROTIME-INR    Prothrombin Time 12.5      INR 1.1       EKG Readings: (Independently Interpreted)   This patient is in a sinus rhythm with a first-degree AV block.  There are multiple premature atrial complexes.  The patient has an incomplete right bundle branch block.  There is no definite evidence of acute myocardial infarction or malignant arrhythmia.       Imaging Results              X-Ray Chest AP Portable (Final result)  Result time 03/21/25 14:27:57      Final result by Santi Donato MD (03/21/25 14:27:57)                   Impression:      No convincing evidence of acute detrimental change relative prior radiograph performed 03/10/2025, 12:23 hours.      Electronically signed by: Santi Donato  Date:    03/21/2025  Time:    14:27               Narrative:    EXAMINATION:  XR CHEST AP PORTABLE    CLINICAL HISTORY:  chest pain;    TECHNIQUE:  Single frontal view of the chest was performed.    COMPARISON:  Chest radiograph performed 03/10/2025, 12:23 hours.    FINDINGS:  Monitoring leads over the chest.  Grossly unchanged cardiomediastinal contour.  Atherosclerosis of the aorta.    Patchy opacities in both lungs, relatively similar compared to 03/10/2025, 12:23 hours study, nonspecific.    No definite pneumothorax or large volume pleural effusion    No acute findings in the visualized abdomen.  Osseous and soft tissue structures appear without definite acute change.                                        Medications   albuterol-ipratropium 2.5 mg-0.5 mg/3 mL nebulizer solution 3 mL (3 mLs Nebulization Given 3/21/25 1236)   furosemide injection 80 mg (80 mg Intravenous Given 3/21/25 1210)     Medical Decision Making  Amount and/or Complexity of Data Reviewed  Labs: ordered. Decision-making details documented in ED Course.  Radiology: ordered and independent interpretation performed. Decision-making details documented in ED Course.  ECG/medicine tests: ordered. Decision-making details documented in ED Course.    Risk  Prescription drug management.    Given the above, this patient presents emergency room with shortness of breath.  I walk this patient.  Her lowest walking walking 0 2 she sat was 94%.  She had very slight shortness of breath but no clinical respiratory distress after her walk.  There is no chest pain pressure tightness.  Her troponin is slightly elevated but at baseline.  The patient's BNP is 267 which is low.  The patient has maintained on Eliquis.  There is no clinical evidence of deep venous thrombosis I doubt pulmonary embolus.  There are no clinical symptoms to suggest pneumonia.  The patient has a mild anemia with a hemoglobin of 11.  Chemistries are unremarkable except for a borderline high glucose at 136 this is not require treatment.  I will treat the patient with an albuterol inhaler and discharge to follow up with Cardiology.  I find no evidence of fluid overload.  I reviewed the chest x-ray myself there is no significant pulmonary edema cardiomegaly infiltrate or pleural effusion.        Scribe Attestation:   Scribe #1: I performed the above scribed service and the documentation accurately describes the services I performed. I attest to the accuracy of the note.                               Clinical Impression:  Final diagnoses:  [R06.02] Shortness of breath          ED Disposition Condition    Discharge Stable          ED Prescriptions       Medication Sig Dispense Start Date End Date  Auth. Provider    albuterol (PROVENTIL/VENTOLIN HFA) 90 mcg/actuation inhaler Inhale 1-2 puffs into the lungs every 6 (six) hours as needed. 8 g 3/21/2025 2025 Mehdi Medellin MD          Follow-up Information       Follow up With Specialties Details Why Contact Info    Preston Whitfield MD Cardiology In 3 days  [address]  [address]  [place] [address]  [phone]      Tere Hughes MD Internal Medicine, Pediatrics In 1 week  [address] [facility] [id number] [phone]             Please note that the documentation on this chart was provided by the scribe above on the date of service noted above, and that the documentation in the chart accurately reflects the work and decisions made by me alone.  Signed, Dr. Medellin          [1]   Social History  Tobacco Use    Smoking status: Former     Current packs/day: 0.00     Average packs/day: 0.3 packs/day for 7.5 years (1.9 ttl pk-yrs)     Types: Cigarettes     Start date: 1955     Quit date: 1962     Years since quittin.7     Passive exposure: Never    Smokeless tobacco: Former     Quit date: 1970   Substance Use Topics    Alcohol use: Yes     Alcohol/week: 1.0 standard drink of alcohol     Types: 1 Cans of beer per week     Comment: occasionally    Drug use: No        Mehdi Medellin MD  [phone]

## 2025-03-24 LAB
OHS QRS DURATION: 104 MS
OHS QTC CALCULATION: 456 MS

## 2025-04-02 PROBLEM — I50.43 ACUTE ON CHRONIC COMBINED SYSTOLIC AND DIASTOLIC HEART FAILURE: Status: ACTIVE | Noted: 2025-04-02

## 2025-04-02 PROBLEM — I50.22 HEART FAILURE WITH MILDLY REDUCED EJECTION FRACTION (HFMREF): Status: RESOLVED | Noted: 2024-06-01 | Resolved: 2025-04-02

## 2025-04-07 ENCOUNTER — TELEPHONE (OUTPATIENT)
Dept: CARDIOLOGY | Facility: CLINIC | Age: OVER 89
End: 2025-04-07
Payer: MEDICARE

## 2025-04-07 NOTE — TELEPHONE ENCOUNTER
Spoke with patient's daughter and got patient scheduled for her labs and echo done in [place] and moved up Nahid's ov appointment.

## 2025-04-07 NOTE — TELEPHONE ENCOUNTER
----- Message from Blanca sent at 4/7/2025  1:39 PM CDT -----  Type: Patient Call Back Who called: Daughter  What is the request in detail: pt would like to have there echo moved to a [place] Location and if not pt would like to the ECHO at a location closer to the Lab Appt.  Can the clinic reply by [facility]? Would the patient rather a call back or a response via [facility]? Call Back  Best call back number: [phone] Additional Information:

## 2025-04-08 ENCOUNTER — OFFICE VISIT (OUTPATIENT)
Dept: OPHTHALMOLOGY | Facility: CLINIC | Age: OVER 89
End: 2025-04-08
Payer: MEDICARE

## 2025-04-08 DIAGNOSIS — H40.023 OPEN ANGLE WITH BORDERLINE FINDINGS AND HIGH GLAUCOMA RISK IN BOTH EYES: Primary | ICD-10-CM

## 2025-04-08 DIAGNOSIS — H40.053 BILATERAL OCULAR HYPERTENSION: ICD-10-CM

## 2025-04-08 DIAGNOSIS — Z96.1 PSEUDOPHAKIA OF BOTH EYES: ICD-10-CM

## 2025-04-08 PROCEDURE — 3288F FALL RISK ASSESSMENT DOCD: CPT | Mod: CPTII,S$GLB,, | Performed by: OPHTHALMOLOGY

## 2025-04-08 PROCEDURE — 1126F AMNT PAIN NOTED NONE PRSNT: CPT | Mod: CPTII,S$GLB,, | Performed by: OPHTHALMOLOGY

## 2025-04-08 PROCEDURE — 99213 OFFICE O/P EST LOW 20 MIN: CPT | Mod: S$GLB,,, | Performed by: OPHTHALMOLOGY

## 2025-04-08 PROCEDURE — 1157F ADVNC CARE PLAN IN RCRD: CPT | Mod: CPTII,S$GLB,, | Performed by: OPHTHALMOLOGY

## 2025-04-08 PROCEDURE — 1101F PT FALLS ASSESS-DOCD LE1/YR: CPT | Mod: CPTII,S$GLB,, | Performed by: OPHTHALMOLOGY

## 2025-04-08 PROCEDURE — 1159F MED LIST DOCD IN RCRD: CPT | Mod: CPTII,S$GLB,, | Performed by: OPHTHALMOLOGY

## 2025-04-08 PROCEDURE — 99999 PR PBB SHADOW E&M-EST. PATIENT-LVL III: CPT | Mod: PBBFAC,,, | Performed by: OPHTHALMOLOGY

## 2025-04-08 PROCEDURE — 2023F DILAT RTA XM W/O RTNOPTHY: CPT | Mod: CPTII,S$GLB,, | Performed by: OPHTHALMOLOGY

## 2025-04-08 PROCEDURE — 1160F RVW MEDS BY RX/DR IN RCRD: CPT | Mod: CPTII,S$GLB,, | Performed by: OPHTHALMOLOGY

## 2025-04-08 PROCEDURE — 1111F DSCHRG MED/CURRENT MED MERGE: CPT | Mod: CPTII,S$GLB,, | Performed by: OPHTHALMOLOGY

## 2025-04-08 PROCEDURE — G2211 COMPLEX E/M VISIT ADD ON: HCPCS | Mod: S$GLB,,, | Performed by: OPHTHALMOLOGY

## 2025-04-08 NOTE — PROGRESS NOTES
HPI    Pt states she has been have more trouble with her [facility], lately,   especially in her left eye.    Bilateral ocular hypertension  PCIOL OU    Cosopt BID OU  Last edited by Mehdi Phillip on 4/8/2025  1:37 PM.            Assessment /Plan     For exam results, see Encounter Report.    Open angle with borderline findings and high glaucoma risk in both eyes    Bilateral ocular hypertension    Pseudophakia of both eyes      Patient with daughter    [facility] 50[facility]  Rx'd at outside ER with diamox    Neg ROS GCA    OHT // supect POAG    Not HVF taker  Follow OCT RNFL    CCT  577 // 528    Target < 22 --> achieved --> ?? Variable // spiking ?? --> discussed    Both eyes --> Tolerating well & good adherence --> CSM  Cosopt BID    PC IOL OU  Quiet  Observe  Need Frames adjustment --> bifocal too low discussed with patient // daughter  Try New MRx +250    12/03/2024        Plan  RTC 4 months IOP & Adherence & DFE & OCT RNFL  RTC sooner prn with good understanding

## 2025-04-30 ENCOUNTER — OFFICE VISIT (OUTPATIENT)
Dept: CARDIOLOGY | Facility: CLINIC | Age: OVER 89
End: 2025-04-30
Payer: MEDICARE

## 2025-04-30 VITALS
HEART RATE: 117 BPM | DIASTOLIC BLOOD PRESSURE: 70 MMHG | HEIGHT: 66 IN | WEIGHT: 182 LBS | SYSTOLIC BLOOD PRESSURE: 120 MMHG | BODY MASS INDEX: 29.25 KG/M2 | OXYGEN SATURATION: 95 %

## 2025-04-30 DIAGNOSIS — I50.43 ACUTE ON CHRONIC COMBINED SYSTOLIC AND DIASTOLIC HEART FAILURE: ICD-10-CM

## 2025-04-30 DIAGNOSIS — E78.5 HYPERLIPIDEMIA LDL GOAL <100: Chronic | ICD-10-CM

## 2025-04-30 DIAGNOSIS — N18.30 BENIGN HYPERTENSION WITH CHRONIC KIDNEY DISEASE, STAGE III: ICD-10-CM

## 2025-04-30 DIAGNOSIS — I70.0 ATHEROSCLEROSIS OF ABDOMINAL AORTA: ICD-10-CM

## 2025-04-30 DIAGNOSIS — I35.0 MILD AORTIC STENOSIS: Primary | ICD-10-CM

## 2025-04-30 DIAGNOSIS — I48.91 ATRIAL FIBRILLATION WITH RVR: ICD-10-CM

## 2025-04-30 DIAGNOSIS — I12.9 BENIGN HYPERTENSION WITH CHRONIC KIDNEY DISEASE, STAGE III: ICD-10-CM

## 2025-04-30 LAB
OHS QRS DURATION: 102 MS
OHS QTC CALCULATION: 486 MS

## 2025-04-30 PROCEDURE — 3288F FALL RISK ASSESSMENT DOCD: CPT | Mod: CPTII,S$GLB,, | Performed by: INTERNAL MEDICINE

## 2025-04-30 PROCEDURE — 1101F PT FALLS ASSESS-DOCD LE1/YR: CPT | Mod: CPTII,S$GLB,, | Performed by: INTERNAL MEDICINE

## 2025-04-30 PROCEDURE — 99999 PR PBB SHADOW E&M-EST. PATIENT-LVL IV: CPT | Mod: PBBFAC,,, | Performed by: INTERNAL MEDICINE

## 2025-04-30 PROCEDURE — 1157F ADVNC CARE PLAN IN RCRD: CPT | Mod: CPTII,S$GLB,, | Performed by: INTERNAL MEDICINE

## 2025-04-30 PROCEDURE — 1126F AMNT PAIN NOTED NONE PRSNT: CPT | Mod: CPTII,S$GLB,, | Performed by: INTERNAL MEDICINE

## 2025-04-30 PROCEDURE — 93000 ELECTROCARDIOGRAM COMPLETE: CPT | Mod: S$GLB,,, | Performed by: INTERNAL MEDICINE

## 2025-04-30 PROCEDURE — 1111F DSCHRG MED/CURRENT MED MERGE: CPT | Mod: CPTII,S$GLB,, | Performed by: INTERNAL MEDICINE

## 2025-04-30 PROCEDURE — 1159F MED LIST DOCD IN RCRD: CPT | Mod: CPTII,S$GLB,, | Performed by: INTERNAL MEDICINE

## 2025-04-30 PROCEDURE — 99214 OFFICE O/P EST MOD 30 MIN: CPT | Mod: S$GLB,,, | Performed by: INTERNAL MEDICINE

## 2025-04-30 NOTE — PROGRESS NOTES
Subjective   Patient ID:  Tonya Cohn is a 89 y.o. female who presents for follow-up of No chief complaint on file.      HPI      PAF on eliquis, combined CHF, HTN, AS/MS - mild, HLD, DM     Stress test 10/17/16  LVEF: >= 70 %  Impression: NORMAL MYOCARDIAL PERFUSION  1. The perfusion scan is free of evidence for myocardial ischemia or injury.   2. Resting wall motion is physiologic.   3. Resting LV function is normal.     Echo 4/24/25    Left Ventricle: The left ventricle is normal in size. Moderately increased wall thickness. Normal wall motion. There is normal systolic function. Quantitated ejection fraction is 54%. Diastolic function cannot be reliably determined in the presence of mitral annular calcification.    Right Ventricle: The right ventricle is normal in size. Systolic function is normal.    Left Atrium: Mildly dilated measuring 39 mL/m2    Right Atrium: Right atrium is moderately dilated.    Aortic Valve: Moderately calcified cusps. Moderately restricted motion. There is moderate stenosis. Aortic valve area by VTI is 1.2 cm². Aortic valve peak velocity is 1.8 m/s. Mean gradient is 8 mmHg. The dimensionless index is 0.41. 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 LV stroke volume index is approximately 21 cc/m2.    Tricuspid Valve: There is mild regurgitation.    Pulmonary Artery: There is mild pulmonary hypertension. The estimated pulmonary artery systolic pressure is 46 mmHg.    IVC/SVC: Intermediate venous pressure at 8 mmHg.               5/22/18 Stays active for 86, denies CP or SOB  EKG NSR - ok     11/9/18 Denies CP or SOB  Stays active - [place] the [place]  EKG NSR - ok     5/20/19 Stays active  Gets some indigestion type pain after spicy foods  Denies SOB  EKG NSR - ok     11/5/19 Denies CP or SOB  BP elevated  Increase losartan 100 qd  Otherwise cardiac stable  AS mild     2/6/20 Denies CP or SOB  EKG NSR - ok  BP controlled  Cardiac stable  OV 1 year with repeat echo      2/4/21 Denies CP or SOB  EKG NSR - ok  BP stable  Eager to head out to the casino   Continue Rx for HTN, HLD, AS  OV 6 months with echo     10/20/21 Denies CP or SOB  BP controlled   Continue Rx for HTN, HLD, AS/MS  OV 6 months         CHF clinic at [facility]  6/20/24: Patient presents today to [facility] care and hospital follow up following recent admission for new onset AF. On arrival to ED HR in 140s. EKG with new onset AF RVR. CXR with pulmonary edema with . Troponin 0.038. Admitted to  for further evaluation and management of new onset AF and ADHF. 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 Volume status optimized. Started on beta-blocker with avoidance of Cardizem due to new reduction in EF. Converted to NSR so DCCV deferred. She was initiated on DOAC and subsequently discharged.   Patient reports doing well since discharge. Patient denies CP, SOB, palpitations, orthopnea, PND, presyncope, LOC, swelling, or claudication. She notes right hip pain and ambulates with a cane. She performs all daily activities independently, including preparing her meals.  Patient monitors her  home BP and ranges 140s/50s. Patient reports medication compliance without side effects  Patient does not exercise regularly, but remains active at home without limitation.     1/14/25 Recently followed at [facility]. Looks good for 89  Denies CP, mild stable BLACK'Suffering with URI  BP controlled by outside readings  EKG NSR IRBBB PVCs NSSTT changes   Continue Rx for PAF, HTN, HLD, AS/MS  OV 3 months with BNP, BMP     Went to the ER 3/10/25  Patient is a 89-year-old female with a past medical history aortic stenosis chronic kidney disease diabetes hypertension hyperlipidemia hypothyroidism atrial fibrillation heart failure with reduced ejection fraction who presents the emergency room for evaluation of shortness breath since this morning with dyspnea on exertion.  She denies any significant productive cough fevers chills chest pain abdominal pain nausea  vomiting diarrhea or lower extremity edema.  She is compliant with the medications including her diuretics.   Troponin 0.03 to 0.04    EKG NSR IRBBB     3/13/25 Less SOB since the ER. Denies CP  BP controlled  Takes lasix 20 bid  Volume overload improved from the ER   Continue Rx for PAF, HTN, HLD, AS/MS  OV 1 month with BNP, BMP, Echo    Admitted 4/2/25   Admission chief complaint and/or Diagnosis and/on course in hospital:   She presented with atrial fibrillation with RVR and she was started on Cardizem drip with    which  heart rate controlled and she is sinus.   Home metoprolol was resumed. currently heart rate in 60s. She also having shortness of breath likely secondary to acute on chronic heart failure, she received IV diuretics, she feels better,   Fluid status improving, recommended her to limit water intake. Recommended her to continue her home medications.  Recommended to follow up with PCP and Cardiology within next few    days.    Labs 4/24/25  K 4.1  Cr 1.0      4/30/25 Reports recent BLACK again. EKG shows A-fib 120 NSSTT changes  Denies CP      Review of Systems   Constitutional: Negative for decreased appetite.   HENT:  Negative for ear discharge.    Eyes:  Negative for blurred vision.   Respiratory:  Negative for hemoptysis.    Endocrine: Negative for polyphagia.   Hematologic/Lymphatic: Negative for adenopathy.   Skin:  Negative for color change.   Musculoskeletal:  Negative for joint swelling.   Genitourinary:  Negative for bladder incontinence.   Neurological:  Negative for brief paralysis.   Psychiatric/Behavioral:  Negative for hallucinations.    Allergic/Immunologic: Negative for hives.          Objective     Physical Exam  Constitutional:       Appearance: She is well-developed.   HENT:      Head: Normocephalic and atraumatic.   Eyes:      Conjunctiva/sclera: Conjunctivae normal.      Pupils: Pupils are equal, round, and reactive to light.   Cardiovascular:      Rate and Rhythm:  Normal rate.      Pulses: Intact distal pulses.      Heart sounds: Normal heart sounds.   Pulmonary:      Effort: Pulmonary effort is normal.      Breath sounds: Normal breath sounds.   Abdominal:      General: Bowel sounds are normal.      Palpations: Abdomen is soft.   Musculoskeletal:         General: Normal range of motion.      Cervical back: Normal range of motion and neck supple.   Skin:     General: Skin is warm and dry.   Neurological:      Mental Status: She is alert and oriented to person, place, and time.            Assessment and Plan     1. Mild aortic stenosis    2. Atherosclerosis of abdominal aorta    3. Atrial fibrillation with RVR    4. Benign hypertension with chronic kidney disease, stage III    5. Hyperlipidemia LDL goal <100    6. Acute on chronic combined systolic and diastolic heart failure        Plan:     Back in A-fib RVR - will refer to the ER for rate control and consideration of CV if A-fib persists  Continue Rx for PAF, HTN, HLD, AS/MS  OV 1 month     Advance Care Planning     Date: 04/30/2025  Patient did not wish or was not able to name a surrogate decision maker or provide an Advance Care Plan.

## 2025-05-01 PROBLEM — R06.02 SOB (SHORTNESS OF BREATH): Status: ACTIVE | Noted: 2025-05-01

## 2025-05-09 ENCOUNTER — OFFICE VISIT (OUTPATIENT)
Dept: CARDIOLOGY | Facility: CLINIC | Age: OVER 89
End: 2025-05-09
Payer: MEDICARE

## 2025-05-09 VITALS
HEART RATE: 108 BPM | HEIGHT: 66 IN | DIASTOLIC BLOOD PRESSURE: 80 MMHG | WEIGHT: 184.75 LBS | BODY MASS INDEX: 29.69 KG/M2 | SYSTOLIC BLOOD PRESSURE: 120 MMHG

## 2025-05-09 DIAGNOSIS — I35.0 MILD AORTIC STENOSIS: ICD-10-CM

## 2025-05-09 DIAGNOSIS — I10 ESSENTIAL HYPERTENSION: Chronic | ICD-10-CM

## 2025-05-09 DIAGNOSIS — I50.43 ACUTE ON CHRONIC COMBINED SYSTOLIC AND DIASTOLIC HEART FAILURE: ICD-10-CM

## 2025-05-09 DIAGNOSIS — I12.9 BENIGN HYPERTENSION WITH CHRONIC KIDNEY DISEASE, STAGE III: ICD-10-CM

## 2025-05-09 DIAGNOSIS — I48.91 ATRIAL FIBRILLATION WITH RVR: Primary | ICD-10-CM

## 2025-05-09 DIAGNOSIS — E78.5 HYPERLIPIDEMIA LDL GOAL <100: Chronic | ICD-10-CM

## 2025-05-09 DIAGNOSIS — N18.30 BENIGN HYPERTENSION WITH CHRONIC KIDNEY DISEASE, STAGE III: ICD-10-CM

## 2025-05-09 LAB
OHS QRS DURATION: 98 MS
OHS QTC CALCULATION: 482 MS

## 2025-05-09 PROCEDURE — 99999 PR PBB SHADOW E&M-EST. PATIENT-LVL IV: CPT | Mod: PBBFAC,,, | Performed by: INTERNAL MEDICINE

## 2025-05-09 RX ORDER — SODIUM CHLORIDE 0.9 % (FLUSH) 0.9 %
10 SYRINGE (ML) INJECTION
Status: SHIPPED | OUTPATIENT
Start: 2025-05-09

## 2025-05-09 RX ORDER — METOPROLOL SUCCINATE 100 MG/1
100 TABLET, EXTENDED RELEASE ORAL 2 TIMES DAILY
Qty: 180 TABLET | Refills: 3 | Status: SHIPPED | OUTPATIENT
Start: 2025-05-09 | End: 2026-05-09

## 2025-05-09 NOTE — PROGRESS NOTES
Subjective   Patient ID:  Tonya Cohn is a 89 y.o. female who presents for follow-up of No chief complaint on file.      HPI        PAF on eliquis, combined CHF, HTN, AS/MS - mild, HLD, DM     Stress test 10/17/16  LVEF: >= 70 %  Impression: NORMAL MYOCARDIAL PERFUSION  1. The perfusion scan is free of evidence for myocardial ischemia or injury.   2. Resting wall motion is physiologic.   3. Resting LV function is normal.     Echo 4/24/25    Left Ventricle: The left ventricle is normal in size. Moderately increased wall thickness. Normal wall motion. There is normal systolic function. Quantitated ejection fraction is 54%. Diastolic function cannot be reliably determined in the presence of mitral annular calcification.    Right Ventricle: The right ventricle is normal in size. Systolic function is normal.    Left Atrium: Mildly dilated measuring 39 mL/m2    Right Atrium: Right atrium is moderately dilated.    Aortic Valve: Moderately calcified cusps. Moderately restricted motion. There is moderate stenosis. Aortic valve area by VTI is 1.2 cm². Aortic valve peak velocity is 1.8 m/s. Mean gradient is 8 mmHg. The dimensionless index is 0.41. 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 LV stroke volume index is approximately 21 cc/m2.    Tricuspid Valve: There is mild regurgitation.    Pulmonary Artery: There is mild pulmonary hypertension. The estimated pulmonary artery systolic pressure is 46 mmHg.    IVC/SVC: Intermediate venous pressure at 8 mmHg.               5/22/18 Stays active for 86, denies CP or SOB  EKG NSR - ok     11/9/18 Denies CP or SOB  Stays active - [place]  EKG NSR - ok     5/20/19 Stays active  Gets some indigestion type pain after spicy foods  Denies SOB  EKG NSR - ok     11/5/19 Denies CP or SOB  BP elevated  Increase losartan 100 qd  Otherwise cardiac stable  AS mild     2/6/20 Denies CP or SOB  EKG NSR - ok  BP controlled  Cardiac stable  OV 1 year with repeat echo      2/4/21 Denies CP or SOB  EKG NSR - ok  BP stable  [place] the [place]   Continue Rx for HTN, HLD, AS  OV 6 months with echo     10/20/21 Denies CP or SOB  BP controlled   Continue Rx for HTN, HLD, AS/MS  OV 6 months         [place] [facility] [place]: Patient presents today to [facility] care and hospital follow up following recent admission for new onset AF. On arrival to ED HR in 140s. EKG with new onset AF RVR. CXR with pulmonary edema with . Troponin 0.038. Admitted to  for further evaluation and management of new onset AF and ADHF. 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 Volume status optimized. Started on beta-blocker with avoidance of Cardizem due to new reduction in EF. Converted to NSR so DCCV deferred. She was initiated on DOAC and subsequently discharged.   Patient reports doing well since discharge. Patient denies CP, SOB, palpitations, orthopnea, PND, presyncope, LOC, swelling, or claudication. She notes right hip pain and ambulates with a cane. She performs all daily activities independently, including preparing her meals.  Patient monitors her  home BP and ranges 140s/50s. Patient reports medication compliance without side effects  Patient does not exercise regularly, but remains active at home without limitation.     1/14/25 Recently followed at [facility] [place]. Looks good for 89  Denies CP, mild stable BLACK'Suffering with URI  BP controlled by outside readings  EKG NSR IRBBB PVCs NSSTT changes   Continue Rx for PAF, HTN, HLD, AS/MS  OV 3 months with BNP, BMP     Went to the ER 3/10/25  Patient is a 89-year-old female with a past medical history aortic stenosis chronic kidney disease diabetes hypertension hyperlipidemia hypothyroidism atrial fibrillation heart failure with reduced ejection fraction who presents the emergency room for evaluation of shortness breath since this morning with dyspnea on exertion.  She denies any significant productive cough fevers chills chest pain abdominal pain nausea  vomiting diarrhea or lower extremity edema.  She is compliant with the medications including her diuretics.   Troponin 0.03 to 0.04    EKG NSR IRBBB     3/13/25 Less SOB since the ER. Denies CP  BP controlled  Takes lasix 20 bid  Volume overload improved from the ER   Continue Rx for PAF, HTN, HLD, AS/MS  OV 1 month with BNP, BMP, Echo     Admitted 4/2/25   Admission chief complaint and/or Diagnosis and/on course in hospital:   She presented with atrial fibrillation with RVR and she was started on Cardizem drip with    which  heart rate controlled and she is sinus.   Home metoprolol was resumed. currently heart rate in 60s. She also having shortness of breath likely secondary to acute on chronic heart failure, she received IV diuretics, she feels better,   Fluid status improving, recommended her to limit water intake. Recommended her to continue her home medications.  Recommended to follow up with PCP and Cardiology within next few    days.     Labs 4/24/25  K 4.1  Cr 1.0       4/30/25 Reports recent BLACK again. EKG shows A-fib 120 NSSTT changes  Denies CP  Back in A-fib RVR - will refer to the ER for rate control and consideration of CV if A-fib persists  Continue Rx for PAF, HTN, HLD, AS/MS  OV 1 month     Admitted 4/30/25  Patient admitted with CHF exacerbation and afib w/ RVR. See by cardiology. HR much improved with diuresis. Patient exam nearly euvolemic on day of discharge. DC home on lasix 40mg daily instead of 20mg bid. Close cardiology follow-up arranged.   , troponin 0.04    5/9/25 Denies CP, mild BLACK  EKG A-fib 108 NSSTT changes  Compliant with eliquis   Still with A-fib - mild RVR  Increase toprol  bid  Discussed CV - no need for SUSY on eliquis - risks/benefits explained and she agrees to proceed   Continue Rx for PAF, HTN, HLD, AS/MS       Review of Systems   Constitutional: Negative for decreased appetite.   HENT:  Negative for ear discharge.    Eyes:  Negative for blurred  vision.   Respiratory:  Negative for hemoptysis.    Endocrine: Negative for polyphagia.   Hematologic/Lymphatic: Negative for adenopathy.   Skin:  Negative for color change.   Musculoskeletal:  Negative for joint swelling.   Genitourinary:  Negative for bladder incontinence.   Neurological:  Negative for brief paralysis.   Psychiatric/Behavioral:  Negative for hallucinations.    Allergic/Immunologic: Negative for hives.          Objective     Physical Exam  Constitutional:       Appearance: She is well-developed.   HENT:      Head: Normocephalic and atraumatic.   Eyes:      Conjunctiva/sclera: Conjunctivae normal.      Pupils: Pupils are equal, round, and reactive to light.   Cardiovascular:      Rate and Rhythm: Normal rate. Rhythm irregular.      Pulses: Intact distal pulses.      Heart sounds: Normal heart sounds.   Pulmonary:      Effort: Pulmonary effort is normal.      Breath sounds: Normal breath sounds.   Abdominal:      General: Bowel sounds are normal.      Palpations: Abdomen is soft.   Musculoskeletal:         General: Normal range of motion.      Cervical back: Normal range of motion and neck supple.   Skin:     General: Skin is warm and dry.   Neurological:      Mental Status: She is alert and oriented to person, place, and time.            Assessment and Plan     1. Atrial fibrillation with RVR    2. Acute on chronic combined systolic and diastolic heart failure    3. Mild aortic stenosis    4. Benign hypertension with chronic kidney disease, stage III    5. Hyperlipidemia LDL goal <100        Plan:    Still with A-fib - mild RVR  Increase toprol  bid  Discussed CV - no need for SUSY on eliquis - risks/benefits explained and she agrees to proceed   Continue Rx for PAF, HTN, HLD, AS/MS    Advance Care Planning     Date: 05/09/2025  Patient did not wish or was not able to name a surrogate decision maker or provide an Advance Care Plan.

## 2025-05-09 NOTE — H&P
[facility] [place]    Subjective:      Chief Complaint/Reason for Admission: CV    Tonya Cohn is a 89 y.o. female.      PAF on eliquis, combined CHF, HTN, AS/MS - mild, HLD, DM     Stress test 10/17/16  LVEF: >= 70 %  Impression: NORMAL MYOCARDIAL PERFUSION  1. The perfusion scan is free of evidence for myocardial ischemia or injury.   2. Resting wall motion is physiologic.   3. Resting LV function is normal.     Echo 4/24/25    Left Ventricle: The left ventricle is normal in size. Moderately increased wall thickness. Normal wall motion. There is normal systolic function. Quantitated ejection fraction is 54%. Diastolic function cannot be reliably determined in the presence of mitral annular calcification.    Right Ventricle: The right ventricle is normal in size. Systolic function is normal.    Left Atrium: Mildly dilated measuring 39 mL/m2    Right Atrium: Right atrium is moderately dilated.    Aortic Valve: Moderately calcified cusps. Moderately restricted motion. There is moderate stenosis. Aortic valve area by VTI is 1.2 cm². Aortic valve peak velocity is 1.8 m/s. Mean gradient is 8 mmHg. The dimensionless index is 0.41. 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 LV stroke volume index is approximately 21 cc/m2.    Tricuspid Valve: There is mild regurgitation.    Pulmonary Artery: There is mild pulmonary hypertension. The estimated pulmonary artery systolic pressure is 46 mmHg.    IVC/SVC: Intermediate venous pressure at 8 mmHg.               5/22/18 Stays active for 86, denies CP or SOB  EKG NSR - ok     11/9/18 Denies CP or SOB  [facility]ays [facility]  EKG NSR - ok     5/20/19 Stays active  Gets some indigestion type pain after spicy foods  Denies SOB  EKG NSR - ok     11/5/19 Denies CP or SOB  BP elevated  Increase losartan 100 qd  Otherwise cardiac stable  AS mild     2/6/20 Denies CP or SOB  EKG NSR - ok  BP controlled  Cardiac stable  OV 1 year with repeat  echo     2/4/21 Denies CP or SOB  EKG NSR - ok  BP stable  [place] the [place]   Continue Rx for HTN, HLD, AS  OV 6 months with echo     10/20/21 Denies CP or SOB  BP controlled   Continue Rx for HTN, HLD, AS/MS  OV 6 months         [place] [facility] [place]: Patient presents today to [facility] care and hospital follow up following recent admission for new onset AF. On arrival to ED HR in 140s. EKG with new onset AF RVR. CXR with pulmonary edema with . Troponin 0.038. Admitted to  for further evaluation and management of new onset AF and ADHF. 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 Volume status optimized. Started on beta-blocker with avoidance of Cardizem due to new reduction in EF. Converted to NSR so DCCV deferred. She was initiated on DOAC and subsequently discharged.   Patient reports doing well since discharge. Patient denies CP, SOB, palpitations, orthopnea, PND, presyncope, LOC, swelling, or claudication. She notes right hip pain and ambulates with a cane. She performs all daily activities independently, including preparing her meals.  Patient monitors her  home BP and ranges 140s/50s. Patient reports medication compliance without side effects  Patient does not exercise regularly, but remains active at home without limitation.     1/14/25 [place] [facility] [place] 89  Denies CP, mild stable BLACK'Suffering with URI  BP controlled by outside readings  EKG NSR IRBBB PVCs NSSTT changes   Continue Rx for PAF, HTN, HLD, AS/MS  OV 3 months with BNP, BMP     Went to the ER 3/10/25  Patient is a 89-year-old female with a past medical history aortic stenosis chronic kidney disease diabetes hypertension hyperlipidemia hypothyroidism atrial fibrillation heart failure with reduced ejection fraction who presents the emergency room for evaluation of shortness breath since this morning with dyspnea on exertion.  She denies any significant productive cough fevers chills chest pain abdominal pain  nausea vomiting diarrhea or lower extremity edema.  She is compliant with the medications including her diuretics.   Troponin 0.03 to 0.04    EKG NSR IRBBB     3/13/25 Less SOB since the ER. Denies CP  BP controlled  Takes lasix 20 bid  Volume overload improved from the ER   Continue Rx for PAF, HTN, HLD, AS/MS  OV 1 month with BNP, BMP, Echo     Admitted 4/2/25   Admission chief complaint and/or Diagnosis and/on course in hospital:   She presented with atrial fibrillation with RVR and she was started on Cardizem drip with    which  heart rate controlled and she is sinus.   Home metoprolol was resumed. currently heart rate in 60s. She also having shortness of breath likely secondary to acute on chronic heart failure, she received IV diuretics, she feels better,   Fluid status improving, recommended her to limit water intake. Recommended her to continue her home medications.  Recommended to follow up with PCP and Cardiology within next few    days.     Labs 4/24/25  K 4.1  Cr 1.0       4/30/25 Reports recent BLACK again. EKG shows A-fib 120 NSSTT changes  Denies CP  Back in A-fib RVR - will refer to the ER for rate control and consideration of CV if A-fib persists  Continue Rx for PAF, HTN, HLD, AS/MS  OV 1 month     Admitted 4/30/25  Patient admitted with CHF exacerbation and afib w/ RVR. See by cardiology. HR much improved with diuresis. Patient exam nearly euvolemic on day of discharge. DC home on lasix 40mg daily instead of 20mg bid. Close cardiology follow-up arranged.   , troponin 0.04    5/9/25 Denies CP, mild BLACK  EKG A-fib 108 NSSTT changes  Compliant with eliquis   Still with A-fib - mild RVR  Increase toprol  bid  Discussed CV - no need for SUSY on eliquis - risks/benefits explained and she agrees to proceed   Continue Rx for PAF, HTN, HLD, AS/MS       Past Medical History:   Diagnosis Date    AR (allergic rhinitis)     Atherosclerosis of abdominal aorta     noted on CT scan  1/17/2011    Carpal tunnel syndrome of left wrist     Chronic kidney disease, stage 3     Diabetic peripheral neuropathy     Diverticulosis     History of colon polyps     History of diverticulitis of colon 1/2014    Hyperlipemia     Hypertension     Hypothyroidism     followed by endocrinology, Dr. Green    Mild aortic stenosis     New onset atrial fibrillation 6/1/2024    OA (osteoarthritis) of knee     Obesity     Sciatica     Type II or unspecified type diabetes mellitus with neurological manifestations, uncontrolled(250.62)      Past Surgical History:   Procedure Laterality Date    CATARACT EXTRACTION Bilateral     COLONOSCOPY N/A 10/3/2017    Procedure: COLONOSCOPY;  Surgeon: Alin Gonzalez MD;  [facility]  Service: Endoscopy;  Laterality: N/A;    COSMETIC SURGERY      HYSTERECTOMY      partial    INJECTION OF JOINT Right 10/7/2024    Procedure: INJECTION, JOINT RIGHT HIP;  Surgeon: Chago Rizvi MD;  Location: [facility] PAIN MGT;  Service: Pain Management;  Laterality: Right;  [phone]  2 WK F/U EL    JOINT REPLACEMENT Right     LUNG BIOPSY  in her 40's    TOTAL KNEE ARTHROPLASTY Right 6/13/2014    TKR     Social History[1]    (Not in a hospital admission)    Review of patient's allergies indicates:   Allergen Reactions    Lipitor [atorvastatin]         Review of Systems   All other systems reviewed and are negative.      Objective:      Vital Signs (Most Recent)  Pulse: 108 (05/09/25 1309)  BP: 120/80 (05/09/25 1309)    Vital Signs Range (Last 24H):  [unfilled]    Physical Exam  Constitutional:       Appearance: She is well-developed.   HENT:      Head: Normocephalic and atraumatic.   Eyes:      Pupils: Pupils are equal, round, and reactive to light.   Cardiovascular:      Rate and Rhythm: Normal rate. Rhythm irregular.   Pulmonary:      Effort: Pulmonary effort is normal.      Breath sounds: Normal breath sounds.   Abdominal:      General: Bowel sounds are normal.       Palpations: Abdomen is soft.   Musculoskeletal:         General: Normal range of motion.      Cervical back: Normal range of motion and neck supple.   Skin:     General: Skin is warm and dry.   Neurological:      Mental Status: She is alert and oriented to person, place, and time.         Data Review:  CBC:   Lab Results   Component Value Date    WBC 6.50 2025    RBC 3.71 (L) 2025    RBC 3.60 (L) 2025    HGB 11.0 (L) 2025    HGB 10.7 (L) 2025    HCT 31.7 (L) 2025    HCT 31.3 (L) 2025     2025     2025     BMP:   Lab Results   Component Value Date     (H) 2025     (H) 2025     2025     2025    K 4.0 2025    K 4.2 2025     2025     2025    CO2 30 (H) 2025    CO2 26 2025    BUN 25 2025    CREATININE 0.9 2025    CALCIUM 9.5 2025    CALCIUM 9.4 2025      ECG: A-fib NSSTT changes.     Assessment:      There are no hospital problems to display for this patient.    Recurrent A-fib RVR on eliquis    Plan:    CV - no need for SUSY           [1]   Social History  Tobacco Use    Smoking status: Former     Current packs/day: 0.00     Average packs/day: 0.3 packs/day for 7.5 years (1.9 ttl pk-yrs)     Types: Cigarettes     Start date: 1955     Quit date: 1962     Years since quittin.8     Passive exposure: Never    Smokeless tobacco: Former     Quit date: 1970   Substance Use Topics    Alcohol use: Yes     Alcohol/week: 1.0 standard drink of alcohol     Types: 1 Cans of beer per week     Comment: occasionally    Drug use: No

## 2025-05-12 ENCOUNTER — HOSPITAL ENCOUNTER (OUTPATIENT)
Dept: PREADMISSION TESTING | Facility: HOSPITAL | Age: OVER 89
Discharge: HOME | End: 2025-05-12
Attending: INTERNAL MEDICINE
Payer: MEDICARE

## 2025-05-12 ENCOUNTER — OFFICE VISIT (OUTPATIENT)
Dept: FAMILY MEDICINE | Facility: CLINIC | Age: OVER 89
End: 2025-05-12
Payer: MEDICARE

## 2025-05-12 VITALS
WEIGHT: 184.75 LBS | HEIGHT: 66 IN | TEMPERATURE: 98 F | SYSTOLIC BLOOD PRESSURE: 116 MMHG | OXYGEN SATURATION: 99 % | BODY MASS INDEX: 29.69 KG/M2 | HEART RATE: 91 BPM | DIASTOLIC BLOOD PRESSURE: 84 MMHG

## 2025-05-12 VITALS
DIASTOLIC BLOOD PRESSURE: 64 MMHG | HEIGHT: 66 IN | RESPIRATION RATE: 16 BRPM | SYSTOLIC BLOOD PRESSURE: 99 MMHG | HEART RATE: 118 BPM | BODY MASS INDEX: 29.6 KG/M2 | OXYGEN SATURATION: 99 % | WEIGHT: 184.19 LBS | TEMPERATURE: 97 F

## 2025-05-12 DIAGNOSIS — I35.0 MILD AORTIC STENOSIS: ICD-10-CM

## 2025-05-12 DIAGNOSIS — I48.91 ATRIAL FIBRILLATION WITH RVR: ICD-10-CM

## 2025-05-12 DIAGNOSIS — N18.30 BENIGN HYPERTENSION WITH CHRONIC KIDNEY DISEASE, STAGE III: ICD-10-CM

## 2025-05-12 DIAGNOSIS — I50.43 ACUTE ON CHRONIC COMBINED SYSTOLIC AND DIASTOLIC HEART FAILURE: ICD-10-CM

## 2025-05-12 DIAGNOSIS — E78.5 HYPERLIPIDEMIA LDL GOAL <100: Chronic | ICD-10-CM

## 2025-05-12 DIAGNOSIS — I12.9 BENIGN HYPERTENSION WITH CHRONIC KIDNEY DISEASE, STAGE III: ICD-10-CM

## 2025-05-12 DIAGNOSIS — M51.360 DEGENERATION OF INTERVERTEBRAL DISC OF LUMBAR REGION WITH DISCOGENIC BACK PAIN: ICD-10-CM

## 2025-05-12 DIAGNOSIS — I50.42 CHRONIC COMBINED SYSTOLIC AND DIASTOLIC HEART FAILURE: ICD-10-CM

## 2025-05-12 DIAGNOSIS — E11.40 TYPE 2 DIABETES, CONTROLLED, WITH NEUROPATHY: ICD-10-CM

## 2025-05-12 DIAGNOSIS — E03.9 HYPOTHYROIDISM (ACQUIRED): ICD-10-CM

## 2025-05-12 DIAGNOSIS — I48.0 PAROXYSMAL ATRIAL FIBRILLATION: Primary | ICD-10-CM

## 2025-05-12 DIAGNOSIS — N18.31 CHRONIC KIDNEY DISEASE, STAGE 3A: ICD-10-CM

## 2025-05-12 DIAGNOSIS — I10 ESSENTIAL HYPERTENSION: Chronic | ICD-10-CM

## 2025-05-12 PROBLEM — R06.02 SOB (SHORTNESS OF BREATH): Status: RESOLVED | Noted: 2025-05-01 | Resolved: 2025-05-12

## 2025-05-12 LAB
APTT PPP: 25.8 SECONDS (ref 21–32)
INR PPP: 1.2 (ref 0.8–1.2)
PROTHROMBIN TIME: 13 SECONDS (ref 9–12.5)

## 2025-05-12 PROCEDURE — 85610 PROTHROMBIN TIME: CPT | Performed by: INTERNAL MEDICINE

## 2025-05-12 PROCEDURE — 1159F MED LIST DOCD IN RCRD: CPT | Mod: CPTII,S$GLB,, | Performed by: INTERNAL MEDICINE

## 2025-05-12 PROCEDURE — 85730 THROMBOPLASTIN TIME PARTIAL: CPT | Performed by: INTERNAL MEDICINE

## 2025-05-12 PROCEDURE — 1125F AMNT PAIN NOTED PAIN PRSNT: CPT | Mod: CPTII,S$GLB,, | Performed by: INTERNAL MEDICINE

## 2025-05-12 PROCEDURE — 1157F ADVNC CARE PLAN IN RCRD: CPT | Mod: CPTII,S$GLB,, | Performed by: INTERNAL MEDICINE

## 2025-05-12 PROCEDURE — 1111F DSCHRG MED/CURRENT MED MERGE: CPT | Mod: CPTII,S$GLB,, | Performed by: INTERNAL MEDICINE

## 2025-05-12 PROCEDURE — 1101F PT FALLS ASSESS-DOCD LE1/YR: CPT | Mod: CPTII,S$GLB,, | Performed by: INTERNAL MEDICINE

## 2025-05-12 PROCEDURE — 3288F FALL RISK ASSESSMENT DOCD: CPT | Mod: CPTII,S$GLB,, | Performed by: INTERNAL MEDICINE

## 2025-05-12 PROCEDURE — 1160F RVW MEDS BY RX/DR IN RCRD: CPT | Mod: CPTII,S$GLB,, | Performed by: INTERNAL MEDICINE

## 2025-05-12 PROCEDURE — 99999 PR PBB SHADOW E&M-EST. PATIENT-LVL V: CPT | Mod: PBBFAC,,, | Performed by: INTERNAL MEDICINE

## 2025-05-12 PROCEDURE — 99214 OFFICE O/P EST MOD 30 MIN: CPT | Mod: S$GLB,,, | Performed by: INTERNAL MEDICINE

## 2025-05-12 NOTE — DISCHARGE INSTRUCTIONS
YOUR PROCEDURE WILL BE AT [facility] at [address], [place] [address]                                   Enter through the Main Entrance facing [address].      Your procedure  is scheduled for 5/20/2025_________.    Call [phone] between 2pm and 5pm on ____5/19/2025___to find out your arrival time for the day of surgery.    You may have up to three visitors.  No children under 18 years old.     You will be going to the Same Day Surgery Unit on the 2nd floor of the hospital.    Report to the Same Day Surgery Registration Desk in the hallway.(Just beside the Same Day Surgery Unit)                    DO NOT PARK IN THE GARAGE.  THERE IS NO OPEN ENTRANCE TO THE HOSPITAL BUILDING AND NO ELEVATOR.      Important instructions:  Do not eat anything after midnight.  You may have plain water, non carbonated.  You may also have Gatorade or Powerade after midnight.    Stop all fluids 2 hours before your surgery.    It is okay to brush your teeth.  Do not have gum, candy or mints.    SEE MEDICATION SHEET.   TAKE MEDICATIONS AS DIRECTED WITH SIPS OF WATER.    Do not take any diabetic medication on the morning of surgery unless instructed to do so by your doctor or pre op nurse.    All GLP-1 weekly diabetic/weight loss medications must not be taken for one week before your surgery, or your surgery could be canceled.    Contact lenses and removable denture work may not be worn during your procedure.    You may wear deodorant only. If you are having breast surgery, do not wear deodorant on the operative side.    Do not wear powder, body lotion, perfume/cologne or make-up.    Do not wear any jewelry or have any metal on your body.    You will be asked to remove any dentures or partials for the procedure.    If you are going home on the same day of surgery, you must arrange for a family member or a friend to drive you home.  Public transportation is prohibited.  You will not be  able to drive home if you were given anesthesia or sedation.    Patients who want to have their Post-op prescriptions filled from our in-house [facility], bring a Credit/Debit Card or cash with you. A co-pay may be required.  The pharmacy closes at 5:30 pm.    Wear loose fitting clothes allowing for bandages.    Please leave money and valuables home.      You may bring your cell phone.    Call the doctor if fever or illness should occur before your surgery.    Call [phone] to contact us here if needed.

## 2025-05-12 NOTE — PROGRESS NOTES
Transitional Care Note    Family and/or Caretaker present at visit?  Yes - daughter.  Diagnostic tests reviewed/disposition: No diagnosic tests pending after this hospitalization.  Disease/illness education: yes  Home health/community services discussion/referrals: Patient does not have home health established from hospital visit.  They do not need home health.  If needed, we will set up home health for the patient.   Establishment or re-establishment of referral orders for community resources: No other necessary community resources.   Discussion with other health care providers: No discussion with other health care providers necessary.     Medications reviewed and reconciled.      CHIEF COMPLAINT:   Chief Complaint   Patient presents with    Hospital Follow Up          HISTORY OF PRESENT ILLNESS:  Tonya Cohn is a 89 y.o. female who presents to the clinic today for Hospital Follow Up  .             Patient was recently hospitalized due to shortness of breath and atrial fibrillation (AFib), with a previous episode in June of the prior year (first diagnosed with A-fib in June of last year). During the recent hospitalization, she had a fast heart rate response, and medication was administered to slow it down. Since discharge, she continues to have breathing difficulties and palpitations, particularly with movement, which improve with rest. She denies chest pain associated with these episodes.    Her medications were adjusted during the hospital stay. Metoprolol dose was increased from 1 tablet daily to 100 mg twice daily. She is now taking 2 Lasix (furosemide) tablets in the morning.    She has an upcoming appointment with her cardiologist, Dr. Whitfield, on June 3rd. Cardioversion was discussed as a potential treatment for the arrhythmia, as her heart rhythm remained abnormal but not fast during this visit.    She reports long-standing back pain, requiring a walking stick for support. The back pain limits her  "ability to stand or sit for prolonged periods.    She has a history of a rash, which can be itchy when present but has not recently broken out. She uses various treatments including steroid cream (sparingly), Benadryl, moisturizing lotion, and a product referred to as "A&D ointment" for the rash.    She denies excessive caffeine intake and frequent chocolate consumption. She does drink one chicory coffee every morning. She also consumes juice during the day.         Subjective    PAST MEDICAL HISTORY:  Past Medical History:   Diagnosis Date    AR (allergic rhinitis)     Atherosclerosis of abdominal aorta     noted on CT scan 1/17/2011    Carpal tunnel syndrome of left wrist     Chronic kidney disease, stage 3     Diabetic peripheral neuropathy     Diverticulosis     History of colon polyps     History of diverticulitis of colon 1/2014    Hyperlipemia     Hypertension     Hypothyroidism     followed by endocrinology, Dr. Green    Mild aortic stenosis     New onset atrial fibrillation 6/1/2024    OA (osteoarthritis) of knee     Obesity     Sciatica     Type II or unspecified type diabetes mellitus with neurological manifestations, uncontrolled(250.62)        PAST SURGICAL HISTORY:  Past Surgical History:   Procedure Laterality Date    CATARACT EXTRACTION Bilateral     COLONOSCOPY N/A 10/3/2017    Procedure: COLONOSCOPY;  Surgeon: Alin Gonzalez MD;  Location: [facility] ENDO ([address] [place] [address]);  Service: Endoscopy;  Laterality: N/A;    COSMETIC SURGERY      HYSTERECTOMY      partial    INJECTION OF JOINT Right 10/7/2024    Procedure: INJECTION, JOINT RIGHT HIP;  Surgeon: Chago Rizvi MD;  Location: [facility];  Service: Pain Management;  Laterality: Right;  [phone]  2 WK F/U EL    JOINT REPLACEMENT Right     LUNG BIOPSY  in her 40's    TOTAL KNEE ARTHROPLASTY Right 6/13/2014    TKR       SOCIAL HISTORY:  Social History[1]    FAMILY HISTORY:  Family History   Problem Relation Name Age of Onset    Cancer Mother   "        shoulder tumor - cancer    Hypertension Mother      Glaucoma Mother      Heart disease Father          valve replacement    Hypertension Father      Heart attack Father      Diabetes Sister Lidya     Arthritis Sister Lovinia         s/p knee surgery    Diabetes Brother Benoris     Heart disease Brother Benoris     Heart failure Brother Benoris     Stroke Brother Ardell     Arthritis Brother Vital         back problems    Skin cancer Brother Bogdan     Cancer Brother Bogdan     Throat cancer Brother Bogdan     No Known Problems Maternal Grandmother      No Known Problems Maternal Grandfather      No Known Problems Paternal Grandmother      No Known Problems Paternal Grandfather      Cancer Son Johnathan         jaw    Cataracts Son Johnathan     No Known Problems Maternal Aunt      No Known Problems Maternal Uncle      No Known Problems Paternal Aunt      No Known Problems Paternal Uncle      Melanoma Neg Hx      Eczema Neg Hx      Lupus Neg Hx      Psoriasis Neg Hx      Breast cancer Neg Hx      Colon cancer Neg Hx      Amblyopia Neg Hx      Blindness Neg Hx      Macular degeneration Neg Hx      Retinal detachment Neg Hx      Strabismus Neg Hx      Thyroid disease Neg Hx         ALLERGIES AND MEDICATIONS: updated and reviewed.  Review of patient's allergies indicates:   Allergen Reactions    Lipitor [atorvastatin]      Medication List with Changes/Refills   Current Medications    ACETAMINOPHEN (TYLENOL) 500 MG TABLET    Take 1 tablet (500 mg total) by mouth every 4 (four) hours as needed for Pain.    ALBUTEROL (PROVENTIL/VENTOLIN HFA) 90 MCG/ACTUATION INHALER    Inhale 1-2 puffs into the lungs every 6 (six) hours as needed. Rescue    ALBUTEROL (PROVENTIL/VENTOLIN HFA) 90 MCG/ACTUATION INHALER    Inhale 1 puff into the lungs every 6 (six) hours as needed for Shortness of Breath. Rescue    APIXABAN (ELIQUIS) 5 MG TAB    Take 1 tablet (5 mg total) by mouth 2 (two) times daily.    ASPIRIN 81 MG CHEW    Take 81 mg  by mouth once daily.    BLOOD-GLUCOSE METER (FREESTYLE SYSTEM KIT MISC)    by Misc.(Non-Drug; Combo Route) route.    CALCIUM CARBONATE (OS-MIRACLE) 500 MG CALCIUM (1,250 MG) TABLET    Take 1 tablet by mouth every other day.    DIPHENHYDRAMINE (BENADRYL) 25 MG CAPSULE    Take 1 capsule (25 mg total) by mouth every 6 (six) hours as needed for Itching or Allergies.    DORZOLAMIDE-TIMOLOL 2-0.5% (COSOPT) 22.3-6.8 MG/ML OPHTHALMIC SOLUTION    Place 1 drop into both eyes 2 (two) times daily.    FISH OIL-FAT ACID COMB.8- 1,200 MG (400 [id number]) CAP    Take 1 capsule by mouth once daily.    FUROSEMIDE (LASIX) 20 MG TABLET    Take 2 tablets (40 mg total) by mouth once daily.    HYDROCORTISONE 2.5 % CREAM    Apply topically 2 (two) times daily.    IBUPROFEN (ADVIL,MOTRIN) 800 MG TABLET    Take 800 mg by mouth every 6 (six) hours as needed.    LEVOTHYROXINE (SYNTHROID) 88 MCG TABLET    Take 1 tablet (88 mcg total) by mouth before breakfast.    LIDOCAINE (LIDODERM) 5 %    Place 1 patch onto the skin once daily. Remove & Discard patch within 12 hours or as directed by MD    LOSARTAN (COZAAR) 50 MG TABLET    Take 1 tablet (50 mg total) by mouth once daily.    LOVASTATIN (MEVACOR) 10 MG TABLET    Take 1 tablet (10 mg total) by mouth once daily.    METOPROLOL SUCCINATE (TOPROL-XL) 100 MG 24 HR TABLET    Take 1 tablet (100 mg total) by mouth 2 (two) times daily.    MOMETASONE 0.1% (ELOCON) 0.1 % CREAM    Use daily    MULTIVIT,CALC,MINS/IRON/FOLIC (ONE-A-DAY WOMENS FORMULA ORAL)    Take 1 tablet by mouth once daily.       CARE TEAM:  Patient Care Team:  Tere Hughes MD as PCP - General (Internal Medicine)  Preston Whitfield MD as Consulting Physician (Cardiology)  Criss Baird OD (Inactive) as Consulting Physician (Optometry)  Marvin Roach MD (Inactive) as Consulting Physician (Rheumatology)  Radha, Wendy M, LPN as Care Coordinator       REVIEW OF SYSTEMS:  Review of Systems   Constitutional:   "Negative for chills and fever.   Respiratory:  Positive for shortness of breath. Negative for cough.    Cardiovascular:  Positive for palpitations. Negative for leg swelling.   Gastrointestinal:  Negative for abdominal pain.              Objective    PHYSICAL EXAMINATION/VITALS:  Vitals:    05/12/25 1000   BP: 116/84   Pulse: 91   Temp: 98 °F (36.7 °C)   TempSrc: Oral   SpO2: 99%   Weight: 83.8 kg (184 lb 11.9 oz)   Height: 5' 6" (1.676 m)       Body mass index is 29.82 kg/m².      General appearance - alert, well appearing, and in no distress, overweight, pleasant elderly female, exam limited as patient did not get onto the examination table  Psychiatric - alert, oriented to person, place, and time, normal behavior, speech, dress, motor activity and thought process  Eyes - pupils equal and reactive, extraocular eye movements intact, sclera anicteric  Chest - clear to auscultation, no wheezes, rales or rhonchi, symmetric air entry  Heart - irregularly irregular rhythm with rate controlled   Extremities - no pedal edema noted        LABS:  Ordered/Scheduled            ASSESSMENT AND PLAN:  1. Paroxysmal atrial fibrillation  Patient reports there are plans for cardioversion.  Continue current medication regimen with heart rate controlled today.  Follow-up with cardiology as per their recommendations.    2. Benign hypertension with chronic kidney disease, stage III  BP Readings from Last 1 Encounters:   05/12/25 116/84      Discussed sodium restriction, maintaining ideal body weight and regular exercise program as physiologic means to achieve blood pressure control. The patient will strive towards this.   The current medical regimen is effective;  continue present plan and medications. Recommended patient to check home readings to monitor and see me for followup as scheduled or sooner as needed.   Patient was educated that both decongestant and anti-inflammatory medication may raise blood pressure.  Stable decreased " kidney function. Observe. Patient counseled to avoid/minimize the use of anti-inflammatory  Medication. Discussed to stay well hydrated. Also discussed with patient that good control of blood pressure and/or diabetes, if present, will help to prevent progression.  The patient is not active on the digital hypertension program.    3. Chronic combined systolic and diastolic heart failure  The current medical regimen is effective;  continue present plan and medications.   Followed by: Cardiology.     4. Type 2 diabetes, controlled, with neuropathy  Lab Results   Component Value Date    HGBA1C 7.3 (H) 04/30/2025     Diabetes is under good control at this time for age and comorbid conditions.   We discussed diabetic diet and regular exercise.   We discussed home blood sugar monitoring, if appropriate - the patient does not need to test daily but can test only as needed.   We discussed low sugar/low carbohydrate diet and regular exercise to prevent progression. No need for prescription medication at this time.  Diabetic complications addressed: Neuropathy pain controlled.   Patient was counseled on the need for yearly eye exam to screen for/monitor diabetic retinopathy and yearly diabetic foot exam.    5. Chronic kidney disease, stage 3a  eGFR   Date Value Ref Range Status   05/02/2025 60 (L) >60 mL/min/1.73/m2 Final     Comment:     Estimated GFR calculated using the CKD-EPI creatinine (2021) equation.   05/01/2025 53 (L) >60 mL/min/1.73/m2 Final     Comment:     Estimated GFR calculated using the CKD-EPI creatinine (2021) equation.   04/30/2025 53 (L) >60 mL/min/1.73/m2 Final     Comment:     Estimated GFR calculated using the CKD-EPI creatinine (2021) equation.   03/21/2025 47 (A) >60 mL/min/1.73 m^2 Final   03/10/2025 60.0 >60 mL/min/1.73 m^2 Final   11/29/2024 60.0 >60 mL/min/1.73 m^2 Final     Stable decreased kidney function. Observe. Patient counseled to avoid/minimize the use of anti-inflammatory medication.  Discussed to stay well hydrated. Also discussed with patient that good control of blood pressure and/or diabetes, if present, will help to prevent progression.    6. Hypothyroidism (acquired)  Her last TSH was slightly depressed.  I will recheck this before her office visit with me in June.  We may need to decrease her thyroid dosing if this remains depressed as it could be contributing to her atrial fibrillation.  -     TSH; Future; Expected date: 05/12/2025    7. Degeneration of intervertebral disc of lumbar region with discogenic back pain  She has chronic back pain.  She is now walking with a cane.  Consider further evaluation by pain management or formal physical therapy if symptoms worsen or persist.                PATIENT EDUCATION:  Explained risks of caffeine on heart rate and a-fib.  Discussed impact of sugar intake on heart rate.  Educated on importance of monitoring heart rate at home, with instructions on using oximeter.  Informed about risks of steroid cream overuse for skin rash management.    ACTION ITEMS/LIFESTYLE:  Patient to switch to decaffeinated coffee (Piedad decaf medium roast with chicory).  Patient to eliminate juice and replace with water.  Patient to monitor heart rate at home, reporting if consistently below 60 bpm.  Patient to use moisturizing cream instead of barrier ointments for skin care.          Orders Placed This Encounter   Procedures    TSH      FOLLOW UP: Follow up in 6 weeks (on 6/23/2025), or if symptoms worsen or fail to improve, for annual exam. or sooner as needed.    This note was generated with the assistance of ambient listening technology. Verbal consent was obtained by the patient and accompanying visitor(s) for the recording of patient appointment to facilitate this note. I attest to having reviewed and edited the generated note for accuracy, though some syntax or spelling errors may persist. Please contact the author of this note for any clarification.         [1]    Social History  Socioeconomic History    Marital status:     Number of children: 7   Occupational History    Occupation: retired - house cleaning   Tobacco Use    Smoking status: Former     Current packs/day: 0.00     Average packs/day: 0.3 packs/day for 7.5 years (1.9 ttl pk-yrs)     Types: Cigarettes     Start date: 1955     Quit date: 1962     Years since quittin.8     Passive exposure: Never    Smokeless tobacco: Former     Quit date: 1970   Substance and Sexual Activity    Alcohol use: Yes     Alcohol/week: 1.0 standard drink of alcohol     Types: 1 Cans of beer per week     Comment: occasionally    Drug use: No    Sexual activity: Not Currently     Partners: Male   Other Topics Concern    Are you pregnant or think you may be? No    Breast-feeding No     Social Drivers of Health     Financial Resource Strain: Low Risk  (2025)    Overall Financial Resource Strain (CARDIA)     Difficulty of Paying Living Expenses: Not hard at all   Food Insecurity: No Food Insecurity (2025)    Hunger Vital Sign     Worried About Running Out of Food in the Last Year: Never true     Ran Out of Food in the Last Year: Never true   Transportation Needs: No Transportation Needs (2025)    PRAPARE - Transportation     Lack of Transportation (Medical): No     Lack of Transportation (Non-Medical): No   Physical Activity: Inactive (2025)    Exercise Vital Sign     Days of Exercise per Week: 0 days     Minutes of Exercise per Session: 0 min   Stress: No Stress Concern Present (2025)    [employer] [place] [employer] - Occupational Stress Questionnaire     Feeling of Stress : Not at all   Housing Stability: Low Risk  (2025)    Housing Stability Vital Sign     Unable to Pay for Housing in the Last Year: No     Number of Times Moved in the Last Year: 0     Homeless in the Last Year: No

## 2025-05-19 ENCOUNTER — TELEPHONE (OUTPATIENT)
Dept: SURGERY | Facility: HOSPITAL | Age: OVER 89
End: 2025-05-19
Payer: MEDICARE

## 2025-05-20 ENCOUNTER — HOSPITAL ENCOUNTER (OUTPATIENT)
Facility: HOSPITAL | Age: OVER 89
Discharge: HOME | End: 2025-05-20
Attending: INTERNAL MEDICINE | Admitting: INTERNAL MEDICINE
Payer: MEDICARE

## 2025-05-20 ENCOUNTER — ANESTHESIA EVENT (OUTPATIENT)
Dept: CARDIOLOGY | Facility: HOSPITAL | Age: OVER 89
End: 2025-05-20

## 2025-05-20 ENCOUNTER — ANESTHESIA (OUTPATIENT)
Dept: CARDIOLOGY | Facility: HOSPITAL | Age: OVER 89
End: 2025-05-20

## 2025-05-20 VITALS
TEMPERATURE: 98 F | SYSTOLIC BLOOD PRESSURE: 129 MMHG | WEIGHT: 184.38 LBS | RESPIRATION RATE: 18 BRPM | BODY MASS INDEX: 29.76 KG/M2 | DIASTOLIC BLOOD PRESSURE: 84 MMHG | OXYGEN SATURATION: 98 % | HEART RATE: 98 BPM

## 2025-05-20 DIAGNOSIS — I48.91 A-FIB: ICD-10-CM

## 2025-05-20 DIAGNOSIS — I12.9 BENIGN HYPERTENSION WITH CHRONIC KIDNEY DISEASE, STAGE III: ICD-10-CM

## 2025-05-20 DIAGNOSIS — I50.43 ACUTE ON CHRONIC COMBINED SYSTOLIC AND DIASTOLIC HEART FAILURE: ICD-10-CM

## 2025-05-20 DIAGNOSIS — I10 ESSENTIAL HYPERTENSION: Chronic | ICD-10-CM

## 2025-05-20 DIAGNOSIS — E78.5 HYPERLIPIDEMIA LDL GOAL <100: Chronic | ICD-10-CM

## 2025-05-20 DIAGNOSIS — I48.91 ATRIAL FIBRILLATION WITH RVR: ICD-10-CM

## 2025-05-20 DIAGNOSIS — N18.30 BENIGN HYPERTENSION WITH CHRONIC KIDNEY DISEASE, STAGE III: ICD-10-CM

## 2025-05-20 DIAGNOSIS — I35.0 MILD AORTIC STENOSIS: ICD-10-CM

## 2025-05-20 PROCEDURE — 99499 UNLISTED E&M SERVICE: CPT | Mod: ,,, | Performed by: INTERNAL MEDICINE

## 2025-05-20 PROCEDURE — 93005 ELECTROCARDIOGRAM TRACING: CPT

## 2025-05-20 PROCEDURE — 93010 ELECTROCARDIOGRAM REPORT: CPT | Mod: ,,, | Performed by: INTERNAL MEDICINE

## 2025-05-21 LAB
OHS QRS DURATION: 114 MS
OHS QTC CALCULATION: 499 MS

## 2025-05-29 ENCOUNTER — ANESTHESIA EVENT (OUTPATIENT)
Dept: CARDIOLOGY | Facility: HOSPITAL | Age: OVER 89
End: 2025-05-29
Payer: MEDICARE

## 2025-05-29 ENCOUNTER — HOSPITAL ENCOUNTER (OUTPATIENT)
Facility: HOSPITAL | Age: OVER 89
Discharge: HOME | End: 2025-05-29
Attending: INTERNAL MEDICINE | Admitting: INTERNAL MEDICINE
Payer: MEDICARE

## 2025-05-29 ENCOUNTER — ANESTHESIA (OUTPATIENT)
Dept: CARDIOLOGY | Facility: HOSPITAL | Age: OVER 89
End: 2025-05-29
Payer: MEDICARE

## 2025-05-29 VITALS
DIASTOLIC BLOOD PRESSURE: 67 MMHG | OXYGEN SATURATION: 100 % | HEART RATE: 66 BPM | RESPIRATION RATE: 18 BRPM | TEMPERATURE: 98 F | SYSTOLIC BLOOD PRESSURE: 152 MMHG | WEIGHT: 184.38 LBS | BODY MASS INDEX: 29.76 KG/M2

## 2025-05-29 DIAGNOSIS — I48.91 A-FIB: ICD-10-CM

## 2025-05-29 DIAGNOSIS — I35.0 MILD AORTIC STENOSIS: ICD-10-CM

## 2025-05-29 DIAGNOSIS — I50.43 ACUTE ON CHRONIC COMBINED SYSTOLIC AND DIASTOLIC HEART FAILURE: ICD-10-CM

## 2025-05-29 DIAGNOSIS — I48.91 ATRIAL FIBRILLATION WITH RVR: ICD-10-CM

## 2025-05-29 DIAGNOSIS — N18.30 BENIGN HYPERTENSION WITH CHRONIC KIDNEY DISEASE, STAGE III: ICD-10-CM

## 2025-05-29 DIAGNOSIS — I10 ESSENTIAL HYPERTENSION: Chronic | ICD-10-CM

## 2025-05-29 DIAGNOSIS — E78.5 HYPERLIPIDEMIA LDL GOAL <100: Chronic | ICD-10-CM

## 2025-05-29 DIAGNOSIS — E11.40 TYPE 2 DIABETES, CONTROLLED, WITH NEUROPATHY: Primary | ICD-10-CM

## 2025-05-29 DIAGNOSIS — I12.9 BENIGN HYPERTENSION WITH CHRONIC KIDNEY DISEASE, STAGE III: ICD-10-CM

## 2025-05-29 LAB
ANION GAP (OHS): 9 MMOL/L (ref 8–16)
BUN SERPL-MCNC: 25 MG/DL (ref 10–30)
CALCIUM SERPL-MCNC: 9.4 MG/DL (ref 8.7–10.5)
CHLORIDE SERPL-SCNC: 110 MMOL/L (ref 95–110)
CO2 SERPL-SCNC: 27 MMOL/L (ref 23–29)
CREAT SERPL-MCNC: 1 MG/DL (ref 0.5–1.4)
ERYTHROCYTE [DISTWIDTH] IN BLOOD BY AUTOMATED COUNT: 13.6 % (ref 11.5–14.5)
GFR SERPLBLD CREATININE-BSD FMLA CKD-EPI: 53 ML/MIN/1.73/M2
GLUCOSE SERPL-MCNC: 130 MG/DL (ref 70–110)
HCT VFR BLD AUTO: 34.8 % (ref 37–48.5)
HGB BLD-MCNC: 11.8 GM/DL (ref 12–16)
MCH RBC QN AUTO: 29.1 PG (ref 27–31)
MCHC RBC AUTO-ENTMCNC: 33.9 G/DL (ref 32–36)
MCV RBC AUTO: 86 FL (ref 82–98)
OHS QRS DURATION: 112 MS
OHS QRS DURATION: 114 MS
OHS QTC CALCULATION: 446 MS
OHS QTC CALCULATION: 490 MS
PLATELET # BLD AUTO: 204 K/UL (ref 150–450)
PMV BLD AUTO: 10.8 FL (ref 9.2–12.9)
POTASSIUM SERPL-SCNC: 3.6 MMOL/L (ref 3.5–5.1)
RBC # BLD AUTO: 4.05 M/UL (ref 4–5.4)
SODIUM SERPL-SCNC: 146 MMOL/L (ref 136–145)
WBC # BLD AUTO: 6.07 K/UL (ref 3.9–12.7)

## 2025-05-29 PROCEDURE — 82310 ASSAY OF CALCIUM: CPT | Performed by: INTERNAL MEDICINE

## 2025-05-29 PROCEDURE — 37000009 HC ANESTHESIA EA ADD 15 MINS: Performed by: INTERNAL MEDICINE

## 2025-05-29 PROCEDURE — 93005 ELECTROCARDIOGRAM TRACING: CPT

## 2025-05-29 PROCEDURE — 92960 CARDIOVERSION ELECTRIC EXT: CPT | Performed by: INTERNAL MEDICINE

## 2025-05-29 PROCEDURE — 85027 COMPLETE CBC AUTOMATED: CPT | Performed by: INTERNAL MEDICINE

## 2025-05-29 PROCEDURE — 37000008 HC ANESTHESIA 1ST 15 MINUTES: Performed by: INTERNAL MEDICINE

## 2025-05-29 PROCEDURE — 25000003 PHARM REV CODE 250

## 2025-05-29 PROCEDURE — 63600175 PHARM REV CODE 636 W HCPCS

## 2025-05-29 RX ORDER — PROPOFOL 10 MG/ML
VIAL (ML) INTRAVENOUS
Status: DISCONTINUED | OUTPATIENT
Start: 2025-05-29 | End: 2025-05-29

## 2025-05-29 RX ORDER — LIDOCAINE HYDROCHLORIDE 20 MG/ML
INJECTION INTRAVENOUS
Status: DISCONTINUED | OUTPATIENT
Start: 2025-05-29 | End: 2025-05-29

## 2025-05-29 RX ADMIN — LIDOCAINE HYDROCHLORIDE 60 MG: 20 INJECTION, SOLUTION INTRAVENOUS at 11:05

## 2025-05-29 RX ADMIN — PROPOFOL 30 MG: 10 INJECTION, EMULSION INTRAVENOUS at 11:05

## 2025-05-29 RX ADMIN — SODIUM CHLORIDE: 0.9 INJECTION, SOLUTION INTRAVENOUS at 11:05

## 2025-05-29 NOTE — ANESTHESIA POSTPROCEDURE EVALUATION
Anesthesia Post Evaluation    Patient: Tonya Cohn    Procedure(s) Performed: Procedure(s) (LRB):  Cardioversion (N/A)    Final Anesthesia Type: MAC      Patient location: OR 5.  Patient participation: Yes- Able to Participate  Level of consciousness: awake and alert  Post-procedure vital signs: reviewed and stable  Pain management: adequate  Airway patency: patent    PONV status at discharge: No PONV  Anesthetic complications: no      Cardiovascular status: hemodynamically stable  Respiratory status: spontaneous ventilation and unassisted  Hydration status: euvolemic  Follow-up not needed.              Vitals Value Taken Time   /78 05/29/25 12:01   Temp 36.4 °C (97.5 °F) 05/29/25 11:50   Pulse 51 05/29/25 12:04   Resp 16 05/29/25 12:04   SpO2 100 % 05/29/25 12:04   Vitals shown include unfiled device data.      No case tracking events are documented in the log.      Pain/Ophelia Score: No data recorded

## 2025-05-29 NOTE — DISCHARGE SUMMARY
[facility]  Cardiology  Discharge Summary      Patient Name: Tonya Cohn  MRN: [id number]  Admission Date: 5/29/2025  Hospital Length of Stay: 0 days  Discharge Date and Time: 05/29/2025 11:35 AM  Attending Physician: Preston Whitfield MD    Discharging Provider: Preston Whitfield MD  Primary Care Physician: Tere Hughes MD    HPI:   No notes on file    Procedure(s) (LRB):  Cardioversion (N/A)     Indwelling Lines/Drains at time of discharge:  Lines/Drains/Airways       None                   Hospital Course:  5/29/25 CV - 200J shock converted A-fib to NSR    Home today  OV 1 week with EKG    Goals of Care Treatment Preferences:  Code Status: Full Code    Living Will: Yes              Consults:     Significant Diagnostic Studies: N/A    Pending Diagnostic Studies:       Procedure Component Value Units Date/Time    EKG 12-lead [[id number]]     Order Status: Sent Lab Status: No result     EKG 12-lead [[id number]]     Order Status: Sent Lab Status: No result             Final Active Diagnoses:    Diagnosis Date Noted POA    PRINCIPAL PROBLEM:  Paroxysmal atrial fibrillation [I48.0] 06/01/2024 Yes      Problems Resolved During this Admission:     Cardiac/Vascular  * Paroxysmal atrial fibrillation  5/29/25 CV - 200J shock converted A-fib to NSR    Home today  OV 1 week with EKG  Antiarrhythmics       Anticoagulants                 Discharged Condition: good    Disposition: Home or Self Care    Follow Up: Dr Whitfield 1 week with EKG    Patient Instructions:   No discharge procedures on file.  Medications:  Reconciled Home Medications:      Medication List        CHANGE how you take these medications      acetaminophen 500 MG tablet  Commonly known as: TYLENOL  Take 1 tablet (500 mg total) by mouth every 4 (four) hours as needed for Pain.  What changed:   when to take this  reasons to take this     hydrocortisone 2.5 % cream  Apply topically 2 (two) times daily.  What changed: how much to take             CONTINUE taking these medications      * albuterol 90 mcg/actuation inhaler  Commonly known as: PROVENTIL/VENTOLIN HFA  Inhale 1-2 puffs into the lungs every 6 (six) hours as needed. Rescue     * albuterol 90 mcg/actuation inhaler  Commonly known as: PROVENTIL/VENTOLIN HFA  Inhale 1 puff into the lungs every 6 (six) hours as needed for Shortness of Breath. Rescue     apixaban 5 mg Tab  Commonly known as: ELIQUIS  Take 1 tablet (5 mg total) by mouth 2 (two) times daily.     aspirin 81 MG Chew  Take 81 mg by mouth once daily.     calcium carbonate 500 mg calcium (1,250 mg) tablet  Commonly known as: OS-MIRACLE  Take 1 tablet by mouth every other day.     diphenhydrAMINE 25 mg capsule  Commonly known as: BENADRYL  Take 1 capsule (25 mg total) by mouth every 6 (six) hours as needed for Itching or Allergies.     dorzolamide-timolol 2-0.5% 22.3-6.8 mg/mL ophthalmic solution  Commonly known as: COSOPT  Place 1 drop into both eyes 2 (two) times daily.     fish oil-fat acid comb.8-hb137 1,200 mg (400 [id number]) Cap  Take 1 capsule by mouth once daily.     FREESTYLE SYSTEM KIT MISC  by Misc.(Non-Drug; Combo Route) route.     furosemide 20 MG tablet  Commonly known as: LASIX  Take 2 tablets (40 mg total) by mouth once daily.     ibuprofen 800 MG tablet  Commonly known as: ADVIL,MOTRIN  Take 800 mg by mouth every 6 (six) hours as needed.     levothyroxine 88 MCG tablet  Commonly known as: SYNTHROID  Take 1 tablet (88 mcg total) by mouth before breakfast.     LIDOcaine 5 %  Commonly known as: LIDODERM  Place 1 patch onto the skin once daily. Remove & Discard patch within 12 hours or as directed by MD     losartan 50 MG tablet  Commonly known as: COZAAR  Take 1 tablet (50 mg total) by mouth once daily.     lovastatin 10 MG tablet  Commonly known as: MEVACOR  Take 1 tablet (10 mg total) by mouth once daily.     metoprolol succinate 100 MG 24 hr tablet  Commonly known as: TOPROL-XL  Take 1 tablet (100 mg total) by mouth 2  (two) times daily.     mometasone 0.1% 0.1 % cream  Commonly known as: ELOCON  Use daily     ONE-A-DAY WOMENS FORMULA ORAL  Take 1 tablet by mouth once daily.           * This list has 2 medication(s) that are the same as other medications prescribed for you. Read the directions carefully, and ask your doctor or other care provider to review them with you.                  Time spent on the discharge of patient: 30 minutes    Preston Whitfield MD  Cardiology  [facility]

## 2025-05-29 NOTE — ANESTHESIA PREPROCEDURE EVALUATION
05/29/2025  Tonya Cohn is a 89 y.o., female.      Pre-op Assessment    I have reviewed the Patient Summary Reports.     I have reviewed the Nursing Notes.       Review of Systems  Anesthesia Hx:  No problems with previous Anesthesia             Denies Family Hx of Anesthesia complications.    Denies Personal Hx of Anesthesia complications.                    Social:  Former Smoker       Hematology/Oncology:                   Hematology Comments: Eliquis-last dose yesterday                    Cardiovascular:     Hypertension Valvular problems/Murmurs, AS   Dysrhythmias atrial fibrillation      hyperlipidemia      04/25 Echo:  Left Ventricle: The left ventricle is normal in size. Moderately increased wall thickness. Normal wall motion. There is normal systolic function. Quantitated ejection fraction is 54%. Diastolic function cannot be reliably determined in the presence of mitral annular calcification.  ·  Right Ventricle: The right ventricle is normal in size. Systolic function is normal.  ·  Left Atrium: Mildly dilated measuring 39 mL/m2  ·  Right Atrium: Right atrium is moderately dilated.  ·  Aortic Valve: Moderately calcified cusps. Moderately restricted motion. There is moderate stenosis. Aortic valve area by VTI is 1.2 cm². Aortic valve peak velocity is 1.8 m/s. Mean gradient is 8 mmHg. The dimensionless index is 0.41. 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 LV stroke volume index is approximately 21 cc/m2.  ·  Tricuspid Valve: There is mild regurgitation.  ·  Pulmonary Artery: There is mild pulmonary hypertension. The estimated pulmonary artery systolic pressure is 46 mmHg.  ·  IVC/SVC: Intermediate venous pressure at 8 mmHg.                             Pulmonary:  Pulmonary Normal                       Renal/:  Chronic Renal Disease, CKD                Hepatic/GI:     GERD, well  controlled                Neurological:    Neuromuscular Disease,                                   Endocrine:  Diabetes Hypothyroidism              Physical Exam  General: Well nourished, Cooperative, Alert and Oriented    Airway:  Mallampati: II   Mouth Opening: Normal  TM Distance: 4 - 6 cm  Tongue: Normal  Neck ROM: Normal ROM    Dental:  Dentures on top; 4 teeth on bottom; none loose  Chest/Lungs:  Normal Respiratory Rate, Clear to auscultation    Heart:  Rate: Normal  Rhythm: Irregularly Irregular      Wt Readings from Last 3 Encounters:   05/16/25 83.6 kg (184 lb 6.4 oz)   05/12/25 83.6 kg (184 lb 3.1 oz)   05/12/25 83.8 kg (184 lb 11.9 oz)     Temp Readings from Last 3 Encounters:   05/20/25 36.5 °C (97.7 °F) (Oral)   05/12/25 36.1 °C (96.9 °F) (Oral)   05/12/25 36.7 °C (98 °F) (Oral)     BP Readings from Last 3 Encounters:   05/20/25 129/84   05/12/25 99/64   05/12/25 116/84     Pulse Readings from Last 3 Encounters:   05/20/25 98   05/12/25 (!) 118   05/12/25 91     Lab Results   Component Value Date    WBC 6.50 04/30/2025    HGB 11.0 (L) 04/30/2025    HCT 31.7 (L) 04/30/2025    MCV 85 04/30/2025     04/30/2025       CMP  Sodium   Date Value Ref Range Status   05/02/2025 144 136 - 145 mmol/L Final   03/21/2025 142 136 - 145 mmol/L Final     Potassium   Date Value Ref Range Status   05/02/2025 4.0 3.5 - 5.1 mmol/L Final   03/21/2025 4.2 3.5 - 5.1 mmol/L Final     Chloride   Date Value Ref Range Status   05/02/2025 105 95 - 110 mmol/L Final   03/21/2025 106 95 - 110 mmol/L Final     CO2   Date Value Ref Range Status   05/02/2025 30 (H) 23 - 29 mmol/L Final   03/21/2025 26 23 - 29 mmol/L Final     Glucose   Date Value Ref Range Status   05/02/2025 124 (H) 70 - 110 mg/dL Final   03/21/2025 136 (H) 70 - 110 mg/dL Final     BUN   Date Value Ref Range Status   05/02/2025 25 10 - 30 mg/dL Final     Creatinine   Date Value Ref Range Status   05/02/2025 0.9 0.5 - 1.4 mg/dL Final     Calcium   Date Value Ref  Range Status   05/02/2025 9.5 8.7 - 10.5 mg/dL Final   03/21/2025 9.4 8.7 - 10.5 mg/dL Final     Protein Total   Date Value Ref Range Status   04/30/2025 7.1 6.0 - 8.4 gm/dL Final     Total Protein   Date Value Ref Range Status   03/21/2025 7.3 6.0 - 8.4 g/dL Final     Albumin   Date Value Ref Range Status   04/30/2025 3.9 3.5 - 5.2 g/dL Final   03/21/2025 4.0 3.5 - 5.2 g/dL Final     Total Bilirubin   Date Value Ref Range Status   03/21/2025 0.6 0.1 - 1.0 mg/dL Final     Comment:     For infants and newborns, interpretation of results should be based  on gestational age, weight and in agreement with clinical  observations.    Premature Infant recommended reference ranges:  Up to 24 hours.............<8.0 mg/dL  Up to 48 hours............<12.0 mg/dL  3-5 days..................<15.0 mg/dL  6-29 days.................<15.0 mg/dL       Bilirubin Total   Date Value Ref Range Status   04/30/2025 0.4 0.1 - 1.0 mg/dL Final     Comment:     For infants and newborns, interpretation of results should be based   on gestational age, weight and in agreement with clinical   observations.    Premature Infant recommended reference ranges:   0-24 hours:  <8.0 mg/dL   24-48 hours: <12.0 mg/dL   3-5 days:    <15.0 mg/dL   6-29 days:   <15.0 mg/dL     Alkaline Phosphatase   Date Value Ref Range Status   03/21/2025 63 40 - 150 U/L Final     ALP   Date Value Ref Range Status   04/30/2025 75 40 - 150 unit/L Final     AST   Date Value Ref Range Status   04/30/2025 101 (H) 11 - 45 unit/L Final   03/21/2025 24 10 - 40 U/L Final     ALT   Date Value Ref Range Status   04/30/2025 110 (H) 10 - 44 unit/L Final   03/21/2025 24 10 - 44 U/L Final     Anion Gap   Date Value Ref Range Status   05/02/2025 9 8 - 16 mmol/L Final     eGFR   Date Value Ref Range Status   05/02/2025 60 (L) >60 mL/min/1.73/m2 Final     Comment:     Estimated GFR calculated using the CKD-EPI creatinine (2021) equation.   03/21/2025 47 (A) >60 mL/min/1.73 m^2 Final          Anesthesia Plan  Type of Anesthesia, risks & benefits discussed:    Anesthesia Type: MAC, Gen Natural Airway, Gen ETT  Intra-op Monitoring Plan: Standard ASA Monitors  Post Op Pain Control Plan: multimodal analgesia  Induction:  IV  Informed Consent: Informed consent signed with the Patient and all parties understand the risks and agree with anesthesia plan.  All questions answered.   ASA Score: 3  Day of Surgery Review of History & Physical: H&P Update referred to the surgeon/provider.    Ready For Surgery From Anesthesia Perspective.     .

## 2025-05-29 NOTE — PROCEDURES
Tonya Cohn is a 89 y.o. female patient.    Temp: 97.6 °F (36.4 °C) (05/29/25 0944)  Pulse: 89 (05/29/25 0944)  Resp: 18 (05/29/25 0944)  BP: 136/76 (05/29/25 0944)  SpO2: 96 % (05/29/25 0944)  Weight: 83.6 kg (184 lb 6.4 oz) (05/28/25 0858)       Procedures    CV   Dr Whitfield  Pre-op Dx A-fib  Post-op Dx same  Specimen none  EBL < 50 cc    5/29/25 CV - 200J shock converted A-fib to NSR    Home today  OV 1 week with EKG    5/29/2025

## 2025-05-29 NOTE — H&P
[facility] [place] - Cardiology  History & Physical    Subjective:      Chief Complaint/Reason for Admission: CV    Tonya Cohn is a 89 y.o. female.      PAF on eliquis, combined CHF, HTN, AS/MS - mild, HLD, DM     Stress test 10/17/16  LVEF: >= 70 %  Impression: NORMAL MYOCARDIAL PERFUSION  1. The perfusion scan is free of evidence for myocardial ischemia or injury.   2. Resting wall motion is physiologic.   3. Resting LV function is normal.     Echo 4/24/25    Left Ventricle: The left ventricle is normal in size. Moderately increased wall thickness. Normal wall motion. There is normal systolic function. Quantitated ejection fraction is 54%. Diastolic function cannot be reliably determined in the presence of mitral annular calcification.    Right Ventricle: The right ventricle is normal in size. Systolic function is normal.    Left Atrium: Mildly dilated measuring 39 mL/m2    Right Atrium: Right atrium is moderately dilated.    Aortic Valve: Moderately calcified cusps. Moderately restricted motion. There is moderate stenosis. Aortic valve area by VTI is 1.2 cm². Aortic valve peak velocity is 1.8 m/s. Mean gradient is 8 mmHg. The dimensionless index is 0.41. 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 LV stroke volume index is approximately 21 cc/m2.    Tricuspid Valve: There is mild regurgitation.    Pulmonary Artery: There is mild pulmonary hypertension. The estimated pulmonary artery systolic pressure is 46 mmHg.    IVC/SVC: Intermediate venous pressure at 8 mmHg.               5/22/18 Stays active for 86, denies CP or SOB  EKG NSR - ok     11/9/18 Denies CP or SOB  Stays active [facility]  EKG NSR - ok     5/20/19 Stays active  Gets some indigestion type pain after spicy foods  Denies SOB  EKG NSR - ok     11/5/19 Denies CP or SOB  BP elevated  Increase losartan 100 qd  Otherwise cardiac stable  AS mild     2/6/20 Denies CP or SOB  EKG NSR - ok  BP controlled  Cardiac stable  OV 1 year with repeat  echo     2/4/21 Denies CP or SOB  EKG NSR - ok  BP stable  [place]   Continue Rx for HTN, HLD, AS  OV 6 months with echo     10/20/21 Denies CP or SOB  BP controlled   Continue Rx for HTN, HLD, AS/MS  OV 6 months         CHF clinic at [facility]  6/20/24: Patient presents today to [facility] care and hospital follow up following recent admission for new onset AF. On arrival to ED HR in 140s. EKG with new onset AF RVR. CXR with pulmonary edema with . Troponin 0.038. Admitted to  for further evaluation and management of new onset AF and ADHF. 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 Volume status optimized. Started on beta-blocker with avoidance of Cardizem due to new reduction in EF. Converted to NSR so DCCV deferred. She was initiated on DOAC and subsequently discharged.   Patient reports doing well since discharge. Patient denies CP, SOB, palpitations, orthopnea, PND, presyncope, LOC, swelling, or claudication. She notes right hip pain and ambulates with a cane. She performs all daily activities independently, including preparing her meals.  Patient monitors her  home BP and ranges 140s/50s. Patient reports medication compliance without side effects  Patient does not exercise regularly, but remains active at home without limitation.     1/14/25 Recently followed at [facility]. Looks good for 89  Denies CP, mild stable BLACK'Suffering with URI  BP controlled by outside readings  EKG NSR IRBBB PVCs NSSTT changes   Continue Rx for PAF, HTN, HLD, AS/MS  OV 3 months with BNP, BMP     Went to the ER 3/10/25  Patient is a 89-year-old female with a past medical history aortic stenosis chronic kidney disease diabetes hypertension hyperlipidemia hypothyroidism atrial fibrillation heart failure with reduced ejection fraction who presents the emergency room for evaluation of shortness breath since this morning with dyspnea on exertion.  She denies any significant productive cough fevers chills chest pain abdominal pain  nausea vomiting diarrhea or lower extremity edema.  She is compliant with the medications including her diuretics.   Troponin 0.03 to 0.04    EKG NSR IRBBB     3/13/25 Less SOB since the ER. Denies CP  BP controlled  Takes lasix 20 bid  Volume overload improved from the ER   Continue Rx for PAF, HTN, HLD, AS/MS  OV 1 month with BNP, BMP, Echo     Admitted 4/2/25   Admission chief complaint and/or Diagnosis and/on course in hospital:   She presented with atrial fibrillation with RVR and she was started on Cardizem drip with    which  heart rate controlled and she is sinus.   Home metoprolol was resumed. currently heart rate in 60s. She also having shortness of breath likely secondary to acute on chronic heart failure, she received IV diuretics, she feels better,   Fluid status improving, recommended her to limit water intake. Recommended her to continue her home medications.  Recommended to follow up with PCP and Cardiology within next few    days.     Labs 4/24/25  K 4.1  Cr 1.0       4/30/25 Reports recent BLACK again. EKG shows A-fib 120 NSSTT changes  Denies CP  Back in A-fib RVR - will refer to the ER for rate control and consideration of CV if A-fib persists  Continue Rx for PAF, HTN, HLD, AS/MS  OV 1 month     Admitted 4/30/25  Patient admitted with CHF exacerbation and afib w/ RVR. See by cardiology. HR much improved with diuresis. Patient exam nearly euvolemic on day of discharge. DC home on lasix 40mg daily instead of 20mg bid. Close cardiology follow-up arranged.   , troponin 0.04    5/9/25 Denies CP, mild BLACK  EKG A-fib 108 NSSTT changes  Compliant with eliquis   Still with A-fib - mild RVR  Increase toprol  bid  Discussed CV - no need for SUSY on eliquis - risks/benefits explained and she agrees to proceed   Continue Rx for PAF, HTN, HLD, AS/MS       Past Medical History:   Diagnosis Date    AR (allergic rhinitis)     Atherosclerosis of abdominal aorta     noted on CT scan  1/17/2011    Carpal tunnel syndrome of left wrist     Chronic kidney disease, stage 3     Diabetic peripheral neuropathy     Diverticulosis     History of colon polyps     History of diverticulitis of colon 1/2014    Hyperlipemia     Hypertension     Hypothyroidism     followed by endocrinology, Dr. Green    Mild aortic stenosis     New onset atrial fibrillation 6/1/2024    OA (osteoarthritis) of knee     Obesity     Sciatica     Type II or unspecified type diabetes mellitus with neurological manifestations, uncontrolled(250.62)      Past Surgical History:   Procedure Laterality Date    CATARACT EXTRACTION Bilateral     COLONOSCOPY N/A 10/3/2017    Procedure: COLONOSCOPY;  Surgeon: Alin Gonzalez MD;  Location: [facility] ENDO (4TH FLR);  Service: Endoscopy;  Laterality: N/A;    COSMETIC SURGERY      HYSTERECTOMY      partial    INJECTION OF JOINT Right 10/7/2024    Procedure: INJECTION, JOINT RIGHT HIP;  Surgeon: Chago Rizvi MD;  Location: [facility] [place] PAIN MGT;  Service: Pain Management;  Laterality: Right;  [phone]  2 WK F/U EL    JOINT REPLACEMENT Right     LUNG BIOPSY  in her 40's    TOTAL KNEE ARTHROPLASTY Right 6/13/2014    TKR     Social History[1]    Facility-Administered Medications Prior to Admission   Medication    sodium chloride 0.9% flush 10 mL     PTA Medications   Medication Sig    acetaminophen (TYLENOL) 500 MG tablet Take 1 tablet (500 mg total) by mouth every 4 (four) hours as needed for Pain. (Patient taking differently: Take 500 mg by mouth daily as needed for Pain (Back Pain).)    albuterol (PROVENTIL/VENTOLIN HFA) 90 mcg/actuation inhaler Inhale 1-2 puffs into the lungs every 6 (six) hours as needed. Rescue    albuterol (PROVENTIL/VENTOLIN HFA) 90 mcg/actuation inhaler Inhale 1 puff into the lungs every 6 (six) hours as needed for Shortness of Breath. Rescue    apixaban (ELIQUIS) 5 mg Tab Take 1 tablet (5 mg total) by mouth 2 (two) times daily.    aspirin 81 MG Chew Take 81 mg by mouth  once daily.    blood-glucose meter (FREESTYLE SYSTEM KIT MISC) by Misc.(Non-Drug; Combo Route) route.    calcium carbonate (OS-MIRACLE) 500 mg calcium (1,250 mg) tablet Take 1 tablet by mouth every other day.    diphenhydrAMINE (BENADRYL) 25 mg capsule Take 1 capsule (25 mg total) by mouth every 6 (six) hours as needed for Itching or Allergies.    dorzolamide-timolol 2-0.5% (COSOPT) 22.3-6.8 mg/mL ophthalmic solution Place 1 drop into both eyes 2 (two) times daily.    fish oil-fat acid [id number] 1,200 mg (400 [id number]) Cap Take 1 capsule by mouth once daily.    furosemide (LASIX) 20 MG tablet Take 2 tablets (40 mg total) by mouth once daily.    hydrocortisone 2.5 % cream Apply topically 2 (two) times daily. (Patient taking differently: Apply 15 g topically 2 (two) times daily.)    ibuprofen (ADVIL,MOTRIN) 800 MG tablet Take 800 mg by mouth every 6 (six) hours as needed.    levothyroxine (SYNTHROID) 88 MCG tablet Take 1 tablet (88 mcg total) by mouth before breakfast.    LIDOcaine (LIDODERM) 5 % Place 1 patch onto the skin once daily. Remove & Discard patch within 12 hours or as directed by MD    losartan (COZAAR) 50 MG tablet Take 1 tablet (50 mg total) by mouth once daily.    lovastatin (MEVACOR) 10 MG tablet Take 1 tablet (10 mg total) by mouth once daily.    metoprolol succinate (TOPROL-XL) 100 MG 24 hr tablet Take 1 tablet (100 mg total) by mouth 2 (two) times daily.    mometasone 0.1% (ELOCON) 0.1 % cream Use daily    multivit,calc,mins/iron/folic (ONE-A-DAY WOMENS FORMULA ORAL) Take 1 tablet by mouth once daily.     Review of patient's allergies indicates:   Allergen Reactions    Lipitor [atorvastatin]         Review of Systems   All other systems reviewed and are negative.      Objective:      Vital Signs (Most Recent)       Vital Signs Range (Last 24H):  [unfilled]    Physical Exam  Constitutional:       Appearance: She is well-developed.   HENT:      Head: Normocephalic and atraumatic.   Eyes:       Pupils: Pupils are equal, round, and reactive to light.   Cardiovascular:      Rate and Rhythm: Normal rate. Rhythm irregular.   Pulmonary:      Effort: Pulmonary effort is normal.      Breath sounds: Normal breath sounds.   Abdominal:      General: Bowel sounds are normal.      Palpations: Abdomen is soft.   Musculoskeletal:         General: Normal range of motion.      Cervical back: Normal range of motion and neck supple.   Skin:     General: Skin is warm and dry.   Neurological:      Mental Status: She is alert and oriented to person, place, and time.         Data Review:  CBC:   Lab Results   Component Value Date    WBC 6.50 2025    RBC 3.71 (L) 2025    RBC 3.60 (L) 2025    HGB 11.0 (L) 2025    HGB 10.7 (L) 2025    HCT 31.7 (L) 2025    HCT 31.3 (L) 2025     2025     2025     BMP:   Lab Results   Component Value Date     (H) 2025     (H) 2025     2025     2025    K 4.0 2025    K 4.2 2025     2025     2025    CO2 30 (H) 2025    CO2 26 2025    BUN 25 2025    CREATININE 0.9 2025    CALCIUM 9.5 2025    CALCIUM 9.4 2025      ECG: A-fib NSSTT changes.     Assessment:      There are no hospital problems to display for this patient.    Recurrent A-fib RVR on eliquis    Plan:    CV - no need for SUSY           [1]   Social History  Tobacco Use    Smoking status: Former     Current packs/day: 0.00     Average packs/day: 0.3 packs/day for 7.5 years (1.9 ttl pk-yrs)     Types: Cigarettes     Start date: 1955     Quit date: 1962     Years since quittin.9     Passive exposure: Never    Smokeless tobacco: Former     Quit date: 1970   Substance Use Topics    Alcohol use: Yes     Alcohol/week: 1.0 standard drink of alcohol     Types: 1 Cans of beer per week     Comment: occasionally    Drug use: No

## 2025-05-29 NOTE — DISCHARGE INSTRUCTIONS
DIET: You may resume your home diet. If nausea is present, increase your diet gradually with fluids and bland  Foods    ACTIVITY LEVEL: You have received sedation or an anesthetic, you may feel sleepy for several hours. Rest until  you are more awake. Gradually resume your normal activities    Medications: Pain medication should be taken only if needed and as directed. If antibiotics are prescribed, the  medication should be taken until completed.    No driving, alcoholic beverages or signing legal documents for next 24 hours or while taking pain  medication.      If any unusual problems or difficulties occur contact your doctor. If you cannot contact your doctor but  feel your signs and symptoms warrant a physicians attention return to the emergency room.            Fall Prevention  Millions of people fall every year and injure themselves. You may have had anesthesia or sedation which may increase your risk of falling. You may have health issues that put you at an increased risk of falling.     Here are ways to reduce your risk of falling.    Make your home safe by keeping walkways clear of objects you may trip over.  Use non-slip pads under rugs. Do not use area rugs or small throw rugs.  Use non-slip mats in bathtubs and showers.  Install handrails and lights on staircases.  Do not walk in poorly lit areas.  Do not stand on chairs or wobbly ladders.  Use caution when reaching overhead or looking upward. This position can cause a loss of balance.  Be sure your shoes fit properly, have non-slip bottoms and are in good condition.   Wear shoes both inside and out. Avoid going barefoot or wearing slippers.  Be cautious when going up and down stairs, curbs, and when walking on uneven sidewalks.  If your balance is poor, consider using a cane or walker.  If your fall was related to alcohol use, stop or limit alcohol intake.   If your fall was related to use of sleeping medicines, talk to your doctor about this. You may  need to reduce your dosage at bedtime if you awaken during the night to go to the bathroom.    To reduce the need for nighttime bathroom trips:  Avoid drinking fluids for several hours before going to bed  Empty your bladder before going to bed  Men can keep a urinal at the bedside  Stay as active as you can. Balance, flexibility, strength, and endurance all come from exercise. They all play a role in preventing falls. Ask your healthcare provider which types of activity are right for you.  Get your vision checked on a regular basis.  If you have pets, know where they are before you stand up or walk so you don't trip over them.  Use night lights.

## 2025-05-29 NOTE — TRANSFER OF CARE
Anesthesia Transfer of Care Note    Patient: Tonya Cohn    Procedure(s) Performed: Procedure(s) (LRB):  Cardioversion (N/A)    Patient location: Other: Report given to Cath lab RN at bedside in OR 5    Anesthesia Type: MAC    Transport from OR: Transported from OR on room air with adequate spontaneous ventilation    Post pain: adequate analgesia    Post assessment: no apparent anesthetic complications and tolerated procedure well    Post vital signs: stable    Level of consciousness: awake, alert and oriented    Nausea/Vomiting: no nausea/vomiting    Complications: none    Transfer of care protocol was followed      Last vitals: Visit Vitals  /72 (BP Location: Right arm, Patient Position: Lying)   Pulse (!) 59   Temp 37.1 °C (98.8 °F) (Temporal)   Resp 14   Wt 83.6 kg (184 lb 6.4 oz)   SpO2 100%   BMI 29.76 kg/m²

## 2025-06-03 PROBLEM — R29.898 LOWER EXTREMITY WEAKNESS: Status: RESOLVED | Noted: 2024-10-02 | Resolved: 2025-06-03

## 2025-06-03 PROBLEM — M25.562 CHRONIC PAIN OF LEFT KNEE: Status: RESOLVED | Noted: 2022-01-04 | Resolved: 2025-06-03

## 2025-06-03 PROBLEM — L23.7 ALLERGIC CONTACT DERMATITIS DUE TO PLANTS, EXCEPT FOOD: Status: RESOLVED | Noted: 2022-09-26 | Resolved: 2025-06-03

## 2025-06-03 PROBLEM — G89.29 CHRONIC PAIN OF LEFT KNEE: Status: RESOLVED | Noted: 2022-01-04 | Resolved: 2025-06-03

## 2025-06-05 ENCOUNTER — OFFICE VISIT (OUTPATIENT)
Dept: CARDIOLOGY | Facility: CLINIC | Age: OVER 89
End: 2025-06-05
Payer: MEDICARE

## 2025-06-05 VITALS
OXYGEN SATURATION: 99 % | BODY MASS INDEX: 29.62 KG/M2 | HEART RATE: 124 BPM | DIASTOLIC BLOOD PRESSURE: 68 MMHG | SYSTOLIC BLOOD PRESSURE: 120 MMHG | WEIGHT: 184.31 LBS | HEIGHT: 66 IN

## 2025-06-05 DIAGNOSIS — I48.0 PAROXYSMAL ATRIAL FIBRILLATION: ICD-10-CM

## 2025-06-05 DIAGNOSIS — E78.5 HYPERLIPIDEMIA LDL GOAL <100: Chronic | ICD-10-CM

## 2025-06-05 DIAGNOSIS — N18.30 BENIGN HYPERTENSION WITH CHRONIC KIDNEY DISEASE, STAGE III: Primary | ICD-10-CM

## 2025-06-05 DIAGNOSIS — I12.9 BENIGN HYPERTENSION WITH CHRONIC KIDNEY DISEASE, STAGE III: Primary | ICD-10-CM

## 2025-06-05 DIAGNOSIS — I50.42 CHRONIC COMBINED SYSTOLIC AND DIASTOLIC HEART FAILURE: ICD-10-CM

## 2025-06-05 DIAGNOSIS — I70.0 ATHEROSCLEROSIS OF ABDOMINAL AORTA: ICD-10-CM

## 2025-06-05 DIAGNOSIS — I35.0 MILD AORTIC STENOSIS: ICD-10-CM

## 2025-06-05 LAB
OHS QRS DURATION: 108 MS
OHS QTC CALCULATION: 495 MS

## 2025-06-05 PROCEDURE — 1101F PT FALLS ASSESS-DOCD LE1/YR: CPT | Mod: CPTII,S$GLB,, | Performed by: INTERNAL MEDICINE

## 2025-06-05 PROCEDURE — 3288F FALL RISK ASSESSMENT DOCD: CPT | Mod: CPTII,S$GLB,, | Performed by: INTERNAL MEDICINE

## 2025-06-05 PROCEDURE — 1159F MED LIST DOCD IN RCRD: CPT | Mod: CPTII,S$GLB,, | Performed by: INTERNAL MEDICINE

## 2025-06-05 PROCEDURE — 1157F ADVNC CARE PLAN IN RCRD: CPT | Mod: CPTII,S$GLB,, | Performed by: INTERNAL MEDICINE

## 2025-06-05 PROCEDURE — 93000 ELECTROCARDIOGRAM COMPLETE: CPT | Mod: S$GLB,,, | Performed by: INTERNAL MEDICINE

## 2025-06-05 PROCEDURE — 99999 PR PBB SHADOW E&M-EST. PATIENT-LVL V: CPT | Mod: PBBFAC,,, | Performed by: INTERNAL MEDICINE

## 2025-06-05 PROCEDURE — 99214 OFFICE O/P EST MOD 30 MIN: CPT | Mod: S$GLB,,, | Performed by: INTERNAL MEDICINE

## 2025-06-05 PROCEDURE — 1125F AMNT PAIN NOTED PAIN PRSNT: CPT | Mod: CPTII,S$GLB,, | Performed by: INTERNAL MEDICINE

## 2025-06-05 RX ORDER — DIGOXIN 125 MCG
125 TABLET ORAL DAILY
Qty: 90 TABLET | Refills: 3 | Status: SHIPPED | OUTPATIENT
Start: 2025-06-05 | End: 2026-06-05

## 2025-06-18 ENCOUNTER — RESULTS FOLLOW-UP (OUTPATIENT)
Dept: FAMILY MEDICINE | Facility: CLINIC | Age: OVER 89
End: 2025-06-18

## 2025-06-23 ENCOUNTER — OFFICE VISIT (OUTPATIENT)
Dept: FAMILY MEDICINE | Facility: CLINIC | Age: OVER 89
End: 2025-06-23
Payer: MEDICARE

## 2025-06-23 VITALS
WEIGHT: 184.94 LBS | BODY MASS INDEX: 29.72 KG/M2 | OXYGEN SATURATION: 99 % | HEIGHT: 66 IN | SYSTOLIC BLOOD PRESSURE: 132 MMHG | HEART RATE: 74 BPM | DIASTOLIC BLOOD PRESSURE: 60 MMHG | TEMPERATURE: 98 F

## 2025-06-23 DIAGNOSIS — E78.5 HYPERLIPIDEMIA LDL GOAL <100: Chronic | ICD-10-CM

## 2025-06-23 DIAGNOSIS — M85.89 OSTEOPENIA OF MULTIPLE SITES: ICD-10-CM

## 2025-06-23 DIAGNOSIS — Z00.00 ROUTINE MEDICAL EXAM: Primary | ICD-10-CM

## 2025-06-23 DIAGNOSIS — K21.9 GASTROESOPHAGEAL REFLUX DISEASE WITHOUT ESOPHAGITIS: ICD-10-CM

## 2025-06-23 DIAGNOSIS — L29.9 EAR ITCHING: ICD-10-CM

## 2025-06-23 DIAGNOSIS — N18.30 BENIGN HYPERTENSION WITH CHRONIC KIDNEY DISEASE, STAGE III: ICD-10-CM

## 2025-06-23 DIAGNOSIS — M54.50 CHRONIC BILATERAL LOW BACK PAIN WITHOUT SCIATICA: ICD-10-CM

## 2025-06-23 DIAGNOSIS — N18.31 CHRONIC KIDNEY DISEASE, STAGE 3A: ICD-10-CM

## 2025-06-23 DIAGNOSIS — Z99.89 AMBULATES WITH CANE: ICD-10-CM

## 2025-06-23 DIAGNOSIS — J30.9 ALLERGIC RHINITIS, UNSPECIFIED SEASONALITY, UNSPECIFIED TRIGGER: ICD-10-CM

## 2025-06-23 DIAGNOSIS — I70.0 ATHEROSCLEROSIS OF ABDOMINAL AORTA: ICD-10-CM

## 2025-06-23 DIAGNOSIS — I48.0 PAROXYSMAL ATRIAL FIBRILLATION: ICD-10-CM

## 2025-06-23 DIAGNOSIS — E11.40 TYPE 2 DIABETES, CONTROLLED, WITH NEUROPATHY: ICD-10-CM

## 2025-06-23 DIAGNOSIS — I12.9 BENIGN HYPERTENSION WITH CHRONIC KIDNEY DISEASE, STAGE III: ICD-10-CM

## 2025-06-23 DIAGNOSIS — I50.42 CHRONIC COMBINED SYSTOLIC AND DIASTOLIC HEART FAILURE: ICD-10-CM

## 2025-06-23 DIAGNOSIS — M25.531 RIGHT WRIST PAIN: ICD-10-CM

## 2025-06-23 DIAGNOSIS — N83.209 CYST OF OVARY, UNSPECIFIED LATERALITY: ICD-10-CM

## 2025-06-23 DIAGNOSIS — G89.29 CHRONIC BILATERAL LOW BACK PAIN WITHOUT SCIATICA: ICD-10-CM

## 2025-06-23 DIAGNOSIS — M81.0 OSTEOPOROSIS, POST-MENOPAUSAL: ICD-10-CM

## 2025-06-23 PROCEDURE — 1159F MED LIST DOCD IN RCRD: CPT | Mod: CPTII,S$GLB,, | Performed by: INTERNAL MEDICINE

## 2025-06-23 PROCEDURE — 1101F PT FALLS ASSESS-DOCD LE1/YR: CPT | Mod: CPTII,S$GLB,, | Performed by: INTERNAL MEDICINE

## 2025-06-23 PROCEDURE — 99999 PR PBB SHADOW E&M-EST. PATIENT-LVL V: CPT | Mod: PBBFAC,,, | Performed by: INTERNAL MEDICINE

## 2025-06-23 PROCEDURE — 1157F ADVNC CARE PLAN IN RCRD: CPT | Mod: CPTII,S$GLB,, | Performed by: INTERNAL MEDICINE

## 2025-06-23 PROCEDURE — 1125F AMNT PAIN NOTED PAIN PRSNT: CPT | Mod: CPTII,S$GLB,, | Performed by: INTERNAL MEDICINE

## 2025-06-23 PROCEDURE — 1160F RVW MEDS BY RX/DR IN RCRD: CPT | Mod: CPTII,S$GLB,, | Performed by: INTERNAL MEDICINE

## 2025-06-23 PROCEDURE — 99397 PER PM REEVAL EST PAT 65+ YR: CPT | Mod: S$GLB,,, | Performed by: INTERNAL MEDICINE

## 2025-06-23 PROCEDURE — 3288F FALL RISK ASSESSMENT DOCD: CPT | Mod: CPTII,S$GLB,, | Performed by: INTERNAL MEDICINE

## 2025-06-23 NOTE — PROGRESS NOTES
CHIEF COMPLAINT:   Chief Complaint   Patient presents with    Annual Exam          HISTORY OF PRESENT ILLNESS:  Tonya Cohn is a 89 y.o. female who presents to the clinic today for a routine physical exam. Her last physical exam was approximately 1 years(s) ago.          Patient reports right hip and back pain present for approximately 2 months, localized to the right side. She has been using patches (similar to Icy Hot) and taking Tylenol, which have provided minimal relief. She received an injection in the hip around October 7th of last year (?and again in the recent past) at a different medical facility.    She complains of ear itching, primarily in the left ear but affecting both ears. She has attempted to clean the ears with cotton swabs to address the issue.    She has a nighttime cough, described as a choking sensation, occurring when lying down. She has been using OTC cough syrup to manage the symptoms. The cough is not present during the day.  She reports allergy symptoms, including a runny nose and sneezing, particularly noticeable when attending [employer]. She takes OTC allergy medication as needed, typically when sneezing occurs or before going to [employer].    She mentions waking up with right wrist pain today, localized to the tendon/palmar area and exacerbated by movement. She denies any specific incident or activity that might have caused the wrist pain.    She denies decreased hearing, heartburn, reflux, or any coughing or choking during the day. She denies using nasal sprays.          Subjective    PAST MEDICAL HISTORY:  Past Medical History:   Diagnosis Date    AR (allergic rhinitis)     Atherosclerosis of abdominal aorta     noted on CT scan 1/17/2011    Carpal tunnel syndrome of left wrist     Chronic kidney disease, stage 3     Diabetic peripheral neuropathy     Diverticulosis     History of colon polyps     History of diverticulitis of colon 1/2014    Hyperlipemia     Hypertension      Hypothyroidism     followed by endocrinology, Dr. Green    Mild aortic stenosis     New onset atrial fibrillation 6/1/2024    OA (osteoarthritis) of knee     Obesity     Sciatica     Type II or unspecified type diabetes mellitus with neurological manifestations, uncontrolled(250.62)        PAST SURGICAL HISTORY:  Past Surgical History:   Procedure Laterality Date    CARDIOVERSION N/A 5/29/2025    Procedure: Cardioversion;  Surgeon: Preston Whitfield MD;  Location: [facility] CATH LAB;  Service: Cardiology;  Laterality: N/A;  RN PREOP 5/12/2025    CATARACT EXTRACTION Bilateral     COLONOSCOPY N/A 10/3/2017    Procedure: COLONOSCOPY;  Surgeon: Alin Gonzalez MD;  Location: [facility] ENDO (4TH FLR);  Service: Endoscopy;  Laterality: N/A;    COSMETIC SURGERY      HYSTERECTOMY      partial    INJECTION OF JOINT Right 10/7/2024    Procedure: INJECTION, JOINT RIGHT HIP;  Surgeon: Chago Rizvi MD;  Location: [facility] [place] PAIN MGT;  Service: Pain Management;  Laterality: Right;  [phone]  2 WK F/U EL    JOINT REPLACEMENT Right     LUNG BIOPSY  in her 40's    TOTAL KNEE ARTHROPLASTY Right 6/13/2014    TKR       SOCIAL HISTORY:  Social History[1]    FAMILY HISTORY:  Family History   Problem Relation Name Age of Onset    Cancer Mother          shoulder tumor - cancer    Hypertension Mother      Glaucoma Mother      Heart disease Father          valve replacement    Hypertension Father      Heart attack Father      Diabetes Sister Lidya     Arthritis Sister Lovinia         s/p knee surgery    Diabetes Brother Benoris     Heart disease Brother Benoris     Heart failure Brother Benoris     Stroke Brother Ardell     Arthritis Brother Vital         back problems    Skin cancer Brother Bogdan     Cancer Brother Bogdan     Throat cancer Brother Bogdan     No Known Problems Maternal Grandmother      No Known Problems Maternal Grandfather      No Known Problems Paternal Grandmother      No Known Problems Paternal Grandfather       Cancer Son Johnathan         jaw    Cataracts Son Johnathan     No Known Problems Maternal Aunt      No Known Problems Maternal Uncle      No Known Problems Paternal Aunt      No Known Problems Paternal Uncle      Melanoma Neg Hx      Eczema Neg Hx      Lupus Neg Hx      Psoriasis Neg Hx      Breast cancer Neg Hx      Colon cancer Neg Hx      Amblyopia Neg Hx      Blindness Neg Hx      Macular degeneration Neg Hx      Retinal detachment Neg Hx      Strabismus Neg Hx      Thyroid disease Neg Hx         ALLERGIES AND MEDICATIONS: updated and reviewed.  Review of patient's allergies indicates:   Allergen Reactions    Lipitor [atorvastatin]      Medication List with Changes/Refills   Current Medications    ACETAMINOPHEN (TYLENOL) 500 MG TABLET    Take 1 tablet (500 mg total) by mouth every 4 (four) hours as needed for Pain.    ALBUTEROL (PROVENTIL/VENTOLIN HFA) 90 MCG/ACTUATION INHALER    Inhale 1 puff into the lungs every 6 (six) hours as needed for Shortness of Breath. Rescue    APIXABAN (ELIQUIS) 5 MG TAB    Take 1 tablet (5 mg total) by mouth 2 (two) times daily.    ASPIRIN 81 MG CHEW    Take 81 mg by mouth once daily.    BLOOD-GLUCOSE METER (FREESTYLE SYSTEM KIT MISC)    by Misc.(Non-Drug; Combo Route) route.    CALCIUM CARBONATE (OS-MIRACLE) 500 MG CALCIUM (1,250 MG) TABLET    Take 1 tablet by mouth every other day.    DIGOXIN (LANOXIN) 125 MCG TABLET    Take 1 tablet (125 mcg total) by mouth once daily.    DIPHENHYDRAMINE (BENADRYL) 25 MG CAPSULE    Take 1 capsule (25 mg total) by mouth every 6 (six) hours as needed for Itching or Allergies.    DORZOLAMIDE-TIMOLOL 2-0.5% (COSOPT) 22.3-6.8 MG/ML OPHTHALMIC SOLUTION    Place 1 drop into both eyes 2 (two) times daily.    FISH OIL-FAT ACID COMB.8- 1,200 MG (400 [id number]) CAP    Take 1 capsule by mouth once daily.    FUROSEMIDE (LASIX) 20 MG TABLET    Take 2 tablets (40 mg total) by mouth once daily.    HYDROCORTISONE 2.5 % CREAM    Apply topically 2 (two) times  daily.    IBUPROFEN (ADVIL,MOTRIN) 800 MG TABLET    Take 800 mg by mouth every 6 (six) hours as needed.    LEVOTHYROXINE (SYNTHROID) 88 MCG TABLET    Take 1 tablet (88 mcg total) by mouth before breakfast.    LIDOCAINE (LIDODERM) 5 %    Place 1 patch onto the skin once daily. Remove & Discard patch within 12 hours or as directed by MD    LOSARTAN (COZAAR) 50 MG TABLET    Take 1 tablet (50 mg total) by mouth once daily.    LOVASTATIN (MEVACOR) 10 MG TABLET    Take 1 tablet (10 mg total) by mouth once daily.    METOPROLOL SUCCINATE (TOPROL-XL) 100 MG 24 HR TABLET    Take 1 tablet (100 mg total) by mouth 2 (two) times daily.    MOMETASONE 0.1% (ELOCON) 0.1 % CREAM    Use daily    MULTIVIT,CALC,MINS/IRON/FOLIC (ONE-A-DAY WOMENS FORMULA ORAL)    Take 1 tablet by mouth once daily.   Discontinued Medications    ALBUTEROL (PROVENTIL/VENTOLIN HFA) 90 MCG/ACTUATION INHALER    Inhale 1-2 puffs into the lungs every 6 (six) hours as needed. Rescue          CARE TEAM:  Patient Care Team:  Tere Hughes MD as PCP - General (Internal Medicine)  Preston Whitfield MD as Consulting Physician (Cardiology)  Criss Baird OD (Inactive) as Consulting Physician (Optometry)  Marvin Roach MD (Inactive) as Consulting Physician (Rheumatology)  Wendy Garcia LPN as Care Coordinator       SCREENING HISTORY:  Health Maintenance         Date Due Completion Date    DEXA Scan 06/15/2024 6/15/2022    Hemoglobin A1c 10/30/2025 4/30/2025    Diabetes Urine Screening 12/19/2025 12/19/2024    Lipid Panel 12/19/2025 12/19/2024    Override on 2/26/2016: Done ([facility] - total 189, TG 62, , HDL 76)    Diabetic Eye Exam 04/08/2026 4/8/2025    Override on 9/20/2016: Done (Dr. Kole Lucero- negative for diabetic retinopathy bilaterally)    Override on 9/9/2015: Done (No diabetic retinopathy)    Override on 8/14/2014: Done (no diabetic retinopathy; Dr. Lucero)    Override on 4/21/2014: Done (Pt states her  "eye exam is scheduled for next month)    Override on 4/1/2013: Done    TETANUS VACCINE 12/06/2028 12/6/2018              REVIEW OF SYSTEMS:   The patient reports : good dietary habits.  The patient reports : that they are unable to exercise regularly because  of orthopaedic limitations.  Review of Systems   Constitutional:  Negative for chills and fever.   HENT:  Positive for postnasal drip. Negative for hearing loss.    Respiratory:  Positive for cough. Negative for shortness of breath and wheezing.    Cardiovascular:  Negative for chest pain and leg swelling.   Gastrointestinal:  Negative for abdominal pain.   Genitourinary:  Negative for dysuria.   Musculoskeletal:  Positive for arthralgias and back pain.       ROS (Optional)-: no pelvic pain  Breast ROS (Optional)-: negative for breast lumps/discharge          Objective    PHYSICAL EXAMINATION/VITALS:  Vitals:    06/23/25 1510   BP: 132/60   Pulse: 74   Temp: 97.5 °F (36.4 °C)   TempSrc: Oral   SpO2: 99%   Weight: 83.9 kg (184 lb 15.5 oz)   Height: 5' 6" (1.676 m)        Body mass index is 29.85 kg/m².      General appearance - alert, well appearing, and in no distress, pleasant elderly female, exam limited as patient did not get onto the examination table  Psychiatric - alert, oriented to person, place, and time, normal behavior, speech, dress, motor activity and thought process  Eyes - pupils equal and reactive, extraocular eye movements intact, sclera anicteric  Ears - bilateral TM's and external ear canals normal (except mild dry skin), scant cerumen noted - bilateral  Nose - mild mucosal bogginess, mucosal pallor, enlarged turbinates noted - bilateral - mild  Mouth - mucous membranes moist, pharynx normal without lesions  Neck - supple, no significant adenopathy, carotids upstroke normal bilaterally, no bruits  Lymphatics - no palpable cervical lymphadenopathy  Chest - clear to auscultation, no wheezes, rales or rhonchi, symmetric air entry  Heart - " normal rate and regular rhythm  Neurological - alert, normal speech, no focal findings; cranial nerves II through XII intact  Musculoskeletal - not examined, patient ambulated with a cane; mild pain to palpation over right wrist region without swelling/erythema/warmth or deformity noted  Extremities - no pedal edema noted  Skin - normal coloration, no suspicious skin lesions      LABS:  Lab Results   Component Value Date    HGBA1C 7.3 (H) 04/30/2025    HGBA1C 6.9 (H) 12/19/2024    HGBA1C 7.0 (H) 02/26/2024      Lab Results   Component Value Date    CHOL 229 (H) 12/19/2024    CHOL 227 (H) 08/18/2023    CHOL 203 (H) 08/02/2022     Lab Results   Component Value Date    LDLCALC 157.4 12/19/2024    LDLCALC 142.8 08/18/2023    LDLCALC 119.4 08/02/2022               ASSESSMENT AND PLAN:   1. Routine medical exam  Counseled on age appropriate medical preventative services including age appropriate cancer screenings, age appropriate eye and dental exams, over all nutritional health, need for a consistent exercise regimen, and an over all push towards maintaining a vigorous and active lifestyle.  Counseled on age appropriate vaccines and discussed upcoming health care needs based on age/gender. Discussed good sleep hygiene and stress management.    2. Type 2 diabetes, controlled, with neuropathy  Lab Results   Component Value Date    HGBA1C 7.3 (H) 04/30/2025     Diabetes is under good control at this time for age and comorbid conditions.   Overall diabetes control has worsened since last check but this is commensurate with stopping her metformin and is acceptable.  We discussed diabetic diet and regular exercise.   We discussed home blood sugar monitoring, if appropriate - the patient does not need to test daily but can test only as needed.   We discussed low sugar/low carbohydrate diet and regular exercise to prevent progression. No need for prescription medication at this time.  Diabetic complications addressed: Neuropathy  pain controlled.   Patient was counseled on the need for yearly eye exam to screen for/monitor diabetic retinopathy and yearly diabetic foot exam.    3. Benign hypertension with chronic kidney disease, stage III/4. Chronic kidney disease, stage 3a  BP Readings from Last 1 Encounters:   06/23/25 132/60      Discussed sodium restriction, maintaining ideal body weight and regular exercise program as physiologic means to achieve blood pressure control. The patient will strive towards this.   The current medical regimen is effective;  continue present plan and medications. Recommended patient to check home readings to monitor and see me for followup as scheduled or sooner as needed.   Patient was educated that both decongestant and anti-inflammatory medication may raise blood pressure.  Stable decreased kidney function. Observe. Patient counseled to avoid/minimize the use of anti-inflammatory  Medication. Discussed to stay well hydrated. Also discussed with patient that good control of blood pressure and/or diabetes, if present, will help to prevent progression.  The patient is not active on the digital hypertension program.    5. Chronic combined systolic and diastolic heart failure  The current medical regimen is effective;  continue present plan and medications.   Followed by: Cardiology.     6. Hyperlipidemia LDL goal <100  Lab Results   Component Value Date    CHOL 229 (H) 12/19/2024     Lab Results   Component Value Date    HDL 59 12/19/2024     Lab Results   Component Value Date    LDLCALC 157.4 12/19/2024     Lab Results   Component Value Date    TRIG 63 12/19/2024     Lab Results   Component Value Date    LDLCALC 157.4 12/19/2024     We discussed low fat diet and regular exercise.The current medical regimen is effective;  continue present plan and medications.     7. Paroxysmal atrial fibrillation  The current medical regimen is effective;  continue present plan and medications.   Followed by: Cardiology.     8.  Atherosclerosis of abdominal aorta  Patient with Atherosclerosis of the Aorta.  Stable/asymptomatic. Currently stable on lipid lowering medication and blood pressure monitoring.  Overview:  noted on CT scan 1/17/2011      9. Gastroesophageal reflux disease without esophagitis  Symptoms controlled: yes .   Reflux precautions discussed (eliminate tobacco if a smoker; minimize caffeine, chocolate and red/white peppermint intake; avoid heavy and spicy meals; don't lay down within 2-3 hours after eating; minimize the intake of NSAIDs). Medication as needed.   Patient asked to take medication breaks, if possible - discussed chronic use can limit calcium absorption (which can lead to osteopenia/osteoporosis), increases the risk for intestinal infections, and can cause kidney damage. There are also some newer studies that show possible increased risk of mortality.    10. Chronic bilateral low back pain without sciatica  We discussed that she has known degenerative disc disease in her back.  This may also be causing her to have referred hip pain.  She would like to see a back specialist for further evaluation.  She can continue her current pain regimen for now.  -     Ambulatory referral/consult to Pain Clinic; Future; Expected date: 06/30/2025    11. Osteopenia of multiple sites  We discussed adequate calcium intake (preferably from diet) and vitamin D supplementation. We discussed fall precautions. She is scheduled for her BMD. No need for prescription medication at this time. Further treatment plan will be based on results of bone density testing.  Overview:  -3/2018 - No need for Rx tx at this time.  6/2022 - No need for Rx tx at this time.          12. Ambulates with cane  We discussed fall precautions.    13. Osteoporosis, post-menopausal    -     DXA Bone Density Axial Skeleton 1 or more sites; Future; Expected date: 06/23/2025    14. Cyst of ovary, unspecified laterality  This was an incidental finding on MRI last  year.  We will schedule her for 1 year follow-up.  Asymptomatic.  -     US Pelvis Comp with Transvag NON-OB (xpd; Future; Expected date: 06/23/2025    15. Ear itching  Patient reassured that ear exam was normal.  No significant cerumen impaction noted.  She can try some over-the-counter mineral oil drops to see if that will help with itch which I suspect is due to dry skin in the ear canal.  She will let us know if symptoms worsen or persist.    16. Allergic rhinitis, unspecified seasonality, unspecified trigger  We discussed several treatment strategies:   antihistamine at bedtime  saline nasal rinse daily (preferably in the evening before bedtime)  allergy covers for pillow and mattress   Patient will let me know if symptoms worsen or persist.    17. Right wrist pain  Patient reassured I did not see anything worrisome on exam.  Likely just some arthritis pain or she may have had her wrist bent while sleeping.  We discussed icing and Tylenol as needed for pain.  Consider further evaluation by orthopedics if symptoms worsen or persist.               PATIENT EDUCATION:  Explained relationship between renal function, insulin breakdown, and diabetes control in older adults.  Described nasal anatomy and function, including turbinates and their role in filtering air.  Discussed difference between infection and allergy in nasal passages.  Explained rationale for pneumonia vaccine coverage after age 65.    ACTION ITEMS/LIFESTYLE:  Patient to apply ice to right wrist for pain and inflammation.  Patient to tilt head over sink, direct saline nasal spray towards ear, and blow nose after use.          Orders Placed This Encounter   Procedures    DXA Bone Density Axial Skeleton 1 or more sites    US Pelvis Comp with Transvag NON-OB (xpd    Ambulatory referral/consult to Pain Clinic      FOLLOW UP: Follow up in about 6 months (around 12/23/2025), or if symptoms worsen or fail to improve, for follow up chronic medical conditions..  or sooner as needed.    This note was generated with the assistance of ambient listening technology. Verbal consent was obtained by the patient and accompanying visitor(s) for the recording of patient appointment to facilitate this note. I attest to having reviewed and edited the generated note for accuracy, though some syntax or spelling errors may persist. Please contact the author of this note for any clarification.            [1]   Social History  Socioeconomic History    Marital status:     Number of children: 7   Occupational History    Occupation: retired - house cleaning   Tobacco Use    Smoking status: Former     Current packs/day: 0.00     Average packs/day: 0.3 packs/day for 7.5 years (1.9 ttl pk-yrs)     Types: Cigarettes     Start date: 1955     Quit date: 1962     Years since quittin.9     Passive exposure: Never    Smokeless tobacco: Former     Quit date: 1970   Substance and Sexual Activity    Alcohol use: Yes     Alcohol/week: 1.0 standard drink of alcohol     Types: 1 Cans of beer per week     Comment: occasionally    Drug use: No    Sexual activity: Not Currently     Partners: Male   Other Topics Concern    Are you pregnant or think you may be? No    Breast-feeding No     Social Drivers of Health     Financial Resource Strain: Low Risk  (2025)    Overall Financial Resource Strain (CARDIA)     Difficulty of Paying Living Expenses: Not hard at all   Food Insecurity: No Food Insecurity (2025)    Hunger Vital Sign     Worried About Running Out of Food in the Last Year: Never true     Ran Out of Food in the Last Year: Never true   Transportation Needs: No Transportation Needs (2025)    PRAPARE - Transportation     Lack of Transportation (Medical): No     Lack of Transportation (Non-Medical): No   Physical Activity: Inactive (2025)    Exercise Vital Sign     Days of Exercise per Week: 0 days     Minutes of Exercise per Session: 0 min   Stress: No Stress Concern  Present (5/1/2025)    [facility] - Occupational Stress Questionnaire     Feeling of Stress : Not at all   Housing Stability: Low Risk  (5/1/2025)    Housing Stability Vital Sign     Unable to Pay for Housing in the Last Year: No     Number of Times Moved in the Last Year: 0     Homeless in the Last Year: No

## 2025-06-23 NOTE — PATIENT INSTRUCTIONS
For allergy symptoms I recommend:   antihistamine at bedtime (allegra, claritin or zyrtec - without decongestant (D))   saline nasal rinse daily (preferably in the evening before bedtime) (hold head forward and spray towards the corresponding ear; then blow your nose)  I also recommended allergy covers for your pillow and mattress

## 2025-06-24 ENCOUNTER — RESULTS FOLLOW-UP (OUTPATIENT)
Dept: FAMILY MEDICINE | Facility: CLINIC | Age: OVER 89
End: 2025-06-24

## 2025-06-24 PROBLEM — N94.9 ADNEXAL CYST: Status: ACTIVE | Noted: 2025-06-24

## 2025-07-09 ENCOUNTER — OFFICE VISIT (OUTPATIENT)
Dept: CARDIOLOGY | Facility: CLINIC | Age: OVER 89
End: 2025-07-09
Payer: MEDICARE

## 2025-07-09 VITALS
SYSTOLIC BLOOD PRESSURE: 134 MMHG | DIASTOLIC BLOOD PRESSURE: 70 MMHG | HEART RATE: 69 BPM | OXYGEN SATURATION: 99 % | RESPIRATION RATE: 15 BRPM | BODY MASS INDEX: 29.09 KG/M2 | WEIGHT: 181 LBS | HEIGHT: 66 IN

## 2025-07-09 DIAGNOSIS — I48.0 PAROXYSMAL ATRIAL FIBRILLATION: ICD-10-CM

## 2025-07-09 DIAGNOSIS — I70.0 ATHEROSCLEROSIS OF ABDOMINAL AORTA: ICD-10-CM

## 2025-07-09 DIAGNOSIS — I50.42 CHRONIC COMBINED SYSTOLIC AND DIASTOLIC HEART FAILURE: Primary | ICD-10-CM

## 2025-07-09 DIAGNOSIS — E78.5 HYPERLIPIDEMIA LDL GOAL <100: Chronic | ICD-10-CM

## 2025-07-09 DIAGNOSIS — I12.9 BENIGN HYPERTENSION WITH CHRONIC KIDNEY DISEASE, STAGE III: ICD-10-CM

## 2025-07-09 DIAGNOSIS — I35.0 MILD AORTIC STENOSIS: ICD-10-CM

## 2025-07-09 DIAGNOSIS — N18.30 BENIGN HYPERTENSION WITH CHRONIC KIDNEY DISEASE, STAGE III: ICD-10-CM

## 2025-07-09 PROCEDURE — 1157F ADVNC CARE PLAN IN RCRD: CPT | Mod: CPTII,S$GLB,, | Performed by: INTERNAL MEDICINE

## 2025-07-09 PROCEDURE — 99999 PR PBB SHADOW E&M-EST. PATIENT-LVL V: CPT | Mod: PBBFAC,,, | Performed by: INTERNAL MEDICINE

## 2025-07-09 PROCEDURE — 99214 OFFICE O/P EST MOD 30 MIN: CPT | Mod: S$GLB,,, | Performed by: INTERNAL MEDICINE

## 2025-07-09 PROCEDURE — 1159F MED LIST DOCD IN RCRD: CPT | Mod: CPTII,S$GLB,, | Performed by: INTERNAL MEDICINE

## 2025-07-09 PROCEDURE — 1101F PT FALLS ASSESS-DOCD LE1/YR: CPT | Mod: CPTII,S$GLB,, | Performed by: INTERNAL MEDICINE

## 2025-07-09 PROCEDURE — 3288F FALL RISK ASSESSMENT DOCD: CPT | Mod: CPTII,S$GLB,, | Performed by: INTERNAL MEDICINE

## 2025-07-09 PROCEDURE — 1126F AMNT PAIN NOTED NONE PRSNT: CPT | Mod: CPTII,S$GLB,, | Performed by: INTERNAL MEDICINE

## 2025-08-05 ENCOUNTER — OFFICE VISIT (OUTPATIENT)
Dept: PAIN MEDICINE | Facility: CLINIC | Age: OVER 89
End: 2025-08-05
Payer: MEDICARE

## 2025-08-05 VITALS
OXYGEN SATURATION: 95 % | RESPIRATION RATE: 16 BRPM | SYSTOLIC BLOOD PRESSURE: 155 MMHG | DIASTOLIC BLOOD PRESSURE: 71 MMHG | HEART RATE: 76 BPM

## 2025-08-05 DIAGNOSIS — M54.50 CHRONIC BILATERAL LOW BACK PAIN WITHOUT SCIATICA: ICD-10-CM

## 2025-08-05 DIAGNOSIS — G89.29 CHRONIC BILATERAL LOW BACK PAIN WITHOUT SCIATICA: ICD-10-CM

## 2025-08-05 DIAGNOSIS — M51.362 DEGENERATION OF INTERVERTEBRAL DISC OF LUMBAR REGION WITH DISCOGENIC BACK PAIN AND LOWER EXTREMITY PAIN: ICD-10-CM

## 2025-08-05 DIAGNOSIS — M54.9 DORSALGIA, UNSPECIFIED: Primary | ICD-10-CM

## 2025-08-05 PROCEDURE — 1160F RVW MEDS BY RX/DR IN RCRD: CPT | Mod: CPTII,S$GLB,, | Performed by: PAIN MEDICINE

## 2025-08-05 PROCEDURE — 99999 PR PBB SHADOW E&M-EST. PATIENT-LVL V: CPT | Mod: PBBFAC,,, | Performed by: PAIN MEDICINE

## 2025-08-05 PROCEDURE — 1157F ADVNC CARE PLAN IN RCRD: CPT | Mod: CPTII,S$GLB,, | Performed by: PAIN MEDICINE

## 2025-08-05 PROCEDURE — 99214 OFFICE O/P EST MOD 30 MIN: CPT | Mod: S$GLB,,, | Performed by: PAIN MEDICINE

## 2025-08-05 PROCEDURE — 1101F PT FALLS ASSESS-DOCD LE1/YR: CPT | Mod: CPTII,S$GLB,, | Performed by: PAIN MEDICINE

## 2025-08-05 PROCEDURE — 3288F FALL RISK ASSESSMENT DOCD: CPT | Mod: CPTII,S$GLB,, | Performed by: PAIN MEDICINE

## 2025-08-05 PROCEDURE — 1125F AMNT PAIN NOTED PAIN PRSNT: CPT | Mod: CPTII,S$GLB,, | Performed by: PAIN MEDICINE

## 2025-08-05 PROCEDURE — 1159F MED LIST DOCD IN RCRD: CPT | Mod: CPTII,S$GLB,, | Performed by: PAIN MEDICINE

## 2025-08-05 NOTE — PROGRESS NOTES
Subjective:     Patient ID: Tonya Cohn is a 89 y.o. female    Chief Complaint: Low-back Pain (Midline achy pain radiating into right hip )      Referred by: Tere Hughes MD      HPI:    Initial Encounter (8/5/25):  Tonya presents with complaints of back pain that started approximately 8 months ago. Pain is localized to the low back, radiating down to the right side of the leg, not extending beyond the upper thigh. It is exacerbated by movement, particularly when walking and standing still, while sitting provides relief. She sleeps well at night.    She has a history of a spinal fracture from about a year ago, for which she was seen at [facility]. At that time, she was referred for PT and received an injection in the hip.  She did not choose to pursue kyphoplasty.    Currently taking Tylenol for pain management, but reports it is not particularly effective. She does not walk too far due to pain, indicating limited mobility.    A bone density scan earlier this year revealed low bone mineral density, particularly in the hips. She has a history of fragile bones, evidenced by the previous fracture.    She denies numbness or tingling in the leg associated with the back pain, weakness in the legs, and any medical diagnoses.       Physical Therapy:  Yes.    Non-pharmacologic Treatment:  Rest helps         TENS?  No    Pain Medications:         Currently taking:  Tylenol, ibuprofen    Has tried in the past:      Has not tried:  Opioids, Muscle relaxants, TCAs, SNRIs, anticonvulsants, topical creams    Blood thinners:  Eliquis    Interventional Therapies:  None    Relevant Surgeries:  None    Affecting sleep?  Yes    Affecting daily activities? yes    Depressive symptoms? No          SI/HI? No    Work status: Retired    Pain Scores:    Best:       2/10  Worst:     10/10  Usually:   5/10  Today:    6/10    Pain Disability Index  Family/Home Responsibilities:: 8  Recreation:: 2  Social Activity::  8  Occupation:: 0  Sexual Behavior:: 0  Self Care:: 8  Life-Support Activities:: 3  Pain Disability Index (PDI): 29    Review of Systems   Constitutional:  Negative for activity change, appetite change, chills, fatigue, fever and unexpected weight change.   HENT:  Negative for hearing loss.    Eyes:  Negative for visual disturbance.   Respiratory:  Negative for chest tightness and shortness of breath.    Cardiovascular:  Negative for chest pain.   Gastrointestinal:  Negative for abdominal pain, constipation, diarrhea, nausea and vomiting.   Genitourinary:  Negative for difficulty urinating.   Musculoskeletal:  Positive for back pain, gait problem and myalgias. Negative for neck pain.   Skin:  Negative for rash.   Neurological:  Negative for dizziness, weakness, light-headedness, numbness and headaches.   Psychiatric/Behavioral:  Positive for sleep disturbance. Negative for hallucinations and suicidal ideas. The patient is not nervous/anxious.        Past Medical History:   Diagnosis Date    AR (allergic rhinitis)     Atherosclerosis of abdominal aorta     noted on CT scan 1/17/2011    Carpal tunnel syndrome of left wrist     Chronic kidney disease, stage 3     Diabetic peripheral neuropathy     Diverticulosis     History of colon polyps     History of diverticulitis of colon 1/2014    Hyperlipemia     Hypertension     Hypothyroidism     followed by endocrinology, Dr. Green    Mild aortic stenosis     New onset atrial fibrillation 6/1/2024    OA (osteoarthritis) of knee     Obesity     Sciatica     Type II or unspecified type diabetes mellitus with neurological manifestations, uncontrolled(250.62)        Past Surgical History:   Procedure Laterality Date    CARDIOVERSION N/A 5/29/2025    Procedure: Cardioversion;  Surgeon: Preston Whitfield MD;  Location: [facility] CATH LAB;  Service: Cardiology;  Laterality: N/A;  RN PREOP 5/12/2025    CATARACT EXTRACTION Bilateral     COLONOSCOPY N/A 10/3/2017    Procedure:  COLONOSCOPY;  Surgeon: Alin Gonzalez MD;  Location: [facility] ENDO (4TH FLR);  Service: Endoscopy;  Laterality: N/A;    COSMETIC SURGERY      HYSTERECTOMY      partial    INJECTION OF JOINT Right 10/7/2024    Procedure: INJECTION, JOINT RIGHT HIP;  Surgeon: Chago Rizvi MD;  Location: [facility] PAIN MGT;  Service: Pain Management;  Laterality: Right;  [phone]  2 WK F/U EL    JOINT REPLACEMENT Right     LUNG BIOPSY  in her 40's    TOTAL KNEE ARTHROPLASTY Right 6/13/2014    TKR       Social History[1]    Review of patient's allergies indicates:   Allergen Reactions    Lipitor [atorvastatin]        Medications Ordered Prior to Encounter[2]    Objective:      BP (!) 155/71 (BP Location: Right arm, Patient Position: Sitting)   Pulse 76   Resp 16   SpO2 95%     Exam:  GEN:  Well developed, well nourished.  No acute distress.  Normal pain behavior.  HEENT:  No trauma.  Mucous membranes moist.  Nares patent bilaterally.  PSYCH: Normal affect. Thought content appropriate.  CHEST:  Breathing symmetric.  No audible wheezing.  ABD: Soft, non-distended.  SKIN:  Warm, pink, dry.  No rash on exposed areas.    EXT:  No cyanosis, clubbing, or edema.  No color change or changes in nail or hair growth.  NEURO/MUSCULOSKELETAL:  Fully alert, oriented, and appropriate. Speech normal kali. No cranial nerve deficits.   Gait:  In manual wheelchair.    Motor Strength:  5/5 motor strength throughout lower extremities.   Sensory:  No sensory deficit in the lower extremities.   Reflexes:  Unable to elicit bilateral patellar or Achilles DTRs.  No clonus or spasticity.  L-Spine:  Limited ROM with pain on flexion and extension.  Negative SLR bilaterally.    No TTP over lumbar paraspinals, bilateral SI joints, hips, piriformis muscles, or GTB.  \    No tenderness to percussion over the lower thoracic or upper lumbar spine   Moderate tenderness to percussion over midline/right-sided lower lumbar spine      Imaging:      Narrative &  Impression    EXAMINATION:  MRI LUMBAR SPINE WITHOUT CONTRAST     CLINICAL HISTORY:  Compression fracture, lumbar;Evaluate T12 compression fracture; Spondylosis without myelopathy or radiculopathy, lumbar region     TECHNIQUE:  Multiplanar, multisequence MR images were acquired from the thoracolumbar junction to the sacrum without the administration of contrast.     COMPARISON:  Radiographs 05/29/2024; MRI lumbar spine 12/04/2013     FINDINGS:  ALIGNMENT: Grade 1 retrolisthesis at L1-2.  Grade 1 anterolisthesis at L4-5.     BONE: T12 compression fracture with severe height loss and 5 mm retropulsion.  There is bone marrow edema and paraspinal soft tissue edema compatible with acute-subacute injury.  No marrow replacing lesions.     JOINT: Multilevel degenerative changes with disc desiccation, disc height loss, and facet arthropathy.  Mild endplate edema at multiple levels.  There is subchondral bone marrow edema at the L4-5 facet joints bilaterally.     SPINAL CANAL: The conus medullaris has a normal appearance and terminates at the L1 level.  Redundancy of cauda equina nerve roots above the L4-5 level.  No mass or collection.     PARASPINAL SOFT TISSUES: There is a 3.0 cm simple left ovarian cystic lesion.     SIGNIFICANT FINDINGS BY LEVEL:     T12-L1: Osseous retropulsion.  Disc bulge.  Bilateral facet arthropathy and ligamentum flavum thickening.  Mild canal stenosis.  Severe right and moderate left foraminal stenosis.     L1-2: Disc bulge.  Bilateral facet arthropathy and ligamentum flavum thickening.  Mild canal stenosis.  Severe right and moderate left foraminal stenosis.     L2-3: Disc bulge.  Bilateral facet arthropathy and ligamentum flavum thickening.  Abutment of the left descending L3 nerve root in the lateral recess.  Mild right and moderate left foraminal stenosis.     L3-4: Disc bulge.  Bilateral facet arthropathy and ligamentum flavum thickening.  Moderate canal stenosis.  Encroachment on the  descending L4 nerve roots bilaterally in the lateral recesses.  Mild right and moderate left foraminal stenosis.     L4-5: Disc bulge and posterior disc uncovering.  Advanced facet arthropathy and ligamentum flavum thickening bilaterally.  Severe canal stenosis.  Moderate bilateral foraminal stenosis.     L5-S1: Facet arthropathy resulting in mild right foraminal stenosis.     Impression:     Acute-subacute T12 compression fracture with severe height loss and 5 mm retropulsion resulting in mild canal stenosis and moderate-severe foraminal stenosis.     Multilevel degenerative changes, most advanced at L4-5 where there is degenerative grade 1 anterolisthesis, severe canal stenosis, and moderate foraminal stenosis.     There is high-grade canal and foraminal stenosis at other levels as well.     3.0 cm simple left ovarian cyst.  Follow-up pelvic ultrasound in 1 year is recommended.     This report was flagged in Epic as abnormal.        Electronically signed by:Steve Garduno  Date:                                            07/19/2024  Time:                                           17:27       Assessment:       Encounter Diagnoses   Name Primary?    Chronic bilateral low back pain without sciatica     Dorsalgia, unspecified Yes    Degeneration of intervertebral disc of lumbar region with discogenic back pain and lower extremity pain      Plan:       Tonya was seen today for low-back pain.    Diagnoses and all orders for this visit:    Dorsalgia, unspecified  -     MRI Lumbar Spine Without Contrast; Future  -     Ambulatory Referral/Consult to Physical Therapy    Chronic bilateral low back pain without sciatica  -     Ambulatory referral/consult to Pain Clinic  -     Ambulatory Referral/Consult to Physical Therapy    Degeneration of intervertebral disc of lumbar region with discogenic back pain and lower extremity pain  -     Ambulatory Referral/Consult to Physical Therapy        Tonyapam Cohn is a 89 y.o. female  with chronic right-sided low back and hip pain.  Exact etiology is unclear.  Seems to be axial.  Does have history of T12 compression fracture though I do not think this is the likely source of her current pain.  Can not rule out additional fractures in the lower lumbar region.  Not having overt radicular symptoms..    Pertinent imaging studies reviewed by me. Imaging results were discussed with patient.  Lumbar MRI.    Refer to PT for ROM, strengthening, stretching and HEP.  I advised patient not to schedule physical therapy until MRI results have been reviewed.    Patient not overly interested in interventional procedures or injections at this time.    Return to clinic after MRI to review results.            This note was created by combination of typed  and M-Modal dictation. Transcription and phonetic errors may be present.  If there are any questions, please contact me.           [1]   Social History  Socioeconomic History    Marital status:     Number of children: 7   Occupational History    Occupation: retired - house cleaning   Tobacco Use    Smoking status: Former     Current packs/day: 0.00     Average packs/day: 0.3 packs/day for 7.5 years (1.9 ttl pk-yrs)     Types: Cigarettes     Start date: 1955     Quit date: 1962     Years since quittin.0     Passive exposure: Never    Smokeless tobacco: Former     Quit date: 1970   Substance and Sexual Activity    Alcohol use: Yes     Alcohol/week: 1.0 standard drink of alcohol     Types: 1 Cans of beer per week     Comment: occasionally    Drug use: No    Sexual activity: Not Currently     Partners: Male   Other Topics Concern    Are you pregnant or think you may be? No    Breast-feeding No     Social Drivers of Health     Financial Resource Strain: Low Risk  (2025)    Overall Financial Resource Strain (CARDIA)     Difficulty of Paying Living Expenses: Not hard at all   Food Insecurity: No Food Insecurity (2025)    Hunger  Vital Sign     Worried About Running Out of Food in the Last Year: Never true     Ran Out of Food in the Last Year: Never true   Transportation Needs: No Transportation Needs (5/1/2025)    PRAPARE - Transportation     Lack of Transportation (Medical): No     Lack of Transportation (Non-Medical): No   Physical Activity: Inactive (5/1/2025)    Exercise Vital Sign     Days of Exercise per Week: 0 days     Minutes of Exercise per Session: 0 min   Stress: No Stress Concern Present (5/1/2025)    [employer] - Occupational Stress Questionnaire     Feeling of Stress : Not at all   Housing Stability: Low Risk  (5/1/2025)    Housing Stability Vital Sign     Unable to Pay for Housing in the Last Year: No     Number of Times Moved in the Last Year: 0     Homeless in the Last Year: No   [2]   Current Outpatient Medications on File Prior to Visit   Medication Sig Dispense Refill    acetaminophen (TYLENOL) 500 MG tablet Take 1 tablet (500 mg total) by mouth every 4 (four) hours as needed for Pain. 60 tablet 2    albuterol (PROVENTIL/VENTOLIN HFA) 90 mcg/actuation inhaler Inhale 1 puff into the lungs every 6 (six) hours as needed for Shortness of Breath. Rescue 18 g 1    apixaban (ELIQUIS) 5 mg Tab Take 1 tablet (5 mg total) by mouth 2 (two) times daily. 180 tablet 3    aspirin 81 MG Chew Take 81 mg by mouth once daily.      blood-glucose meter (FREESTYLE SYSTEM KIT MISC) by Misc.(Non-Drug; Combo Route) route.      calcium carbonate (OS-MIRACLE) 500 mg calcium (1,250 mg) tablet Take 1 tablet by mouth every other day.      digoxin (LANOXIN) 125 mcg tablet Take 1 tablet (125 mcg total) by mouth once daily. 90 tablet 3    diphenhydrAMINE (BENADRYL) 25 mg capsule Take 1 capsule (25 mg total) by mouth every 6 (six) hours as needed for Itching or Allergies. 20 capsule 0    dorzolamide-timolol 2-0.5% (COSOPT) 22.3-6.8 mg/mL ophthalmic solution Place 1 drop into both eyes 2 (two) times daily.      fish oil-fat acid  [id number] 1,200 mg (400 [id number]) Cap Take 1 capsule by mouth once daily.      furosemide (LASIX) 20 MG tablet Take 2 tablets (40 mg total) by mouth once daily. 60 tablet 11    hydrocortisone 2.5 % cream Apply topically 2 (two) times daily. 20 g 1    ibuprofen (ADVIL,MOTRIN) 800 MG tablet Take 800 mg by mouth every 6 (six) hours as needed.      levothyroxine (SYNTHROID) 88 MCG tablet Take 1 tablet (88 mcg total) by mouth before breakfast. 90 tablet 1    LIDOcaine (LIDODERM) 5 % Place 1 patch onto the skin once daily. Remove & Discard patch within 12 hours or as directed by MD 30 patch 0    losartan (COZAAR) 50 MG tablet Take 1 tablet (50 mg total) by mouth once daily. 90 tablet 1    lovastatin (MEVACOR) 10 MG tablet Take 1 tablet (10 mg total) by mouth once daily. 90 tablet 3    metoprolol succinate (TOPROL-XL) 100 MG 24 hr tablet Take 1 tablet (100 mg total) by mouth 2 (two) times daily. 180 tablet 3    mometasone 0.1% (ELOCON) 0.1 % cream Use daily 50 g 3    multivit,calc,mins/iron/folic (ONE-A-DAY WOMENS FORMULA ORAL) Take 1 tablet by mouth once daily.       No current facility-administered medications on file prior to visit.

## 2025-08-19 ENCOUNTER — OFFICE VISIT (OUTPATIENT)
Dept: OPHTHALMOLOGY | Facility: CLINIC | Age: OVER 89
End: 2025-08-19
Payer: MEDICARE

## 2025-08-19 DIAGNOSIS — H40.023 OPEN ANGLE WITH BORDERLINE FINDINGS AND HIGH GLAUCOMA RISK IN BOTH EYES: Primary | ICD-10-CM

## 2025-08-19 DIAGNOSIS — Z96.1 PSEUDOPHAKIA OF BOTH EYES: ICD-10-CM

## 2025-08-19 DIAGNOSIS — H40.053 BILATERAL OCULAR HYPERTENSION: ICD-10-CM

## 2025-08-19 DIAGNOSIS — H40.053 BILATERAL OCULAR HYPERTENSION: Primary | ICD-10-CM

## 2025-08-19 DIAGNOSIS — H04.123 DRY EYE SYNDROME OF BOTH EYES: ICD-10-CM

## 2025-08-19 PROCEDURE — 1126F AMNT PAIN NOTED NONE PRSNT: CPT | Mod: CPTII,S$GLB,, | Performed by: OPHTHALMOLOGY

## 2025-08-19 PROCEDURE — 1157F ADVNC CARE PLAN IN RCRD: CPT | Mod: CPTII,S$GLB,, | Performed by: OPHTHALMOLOGY

## 2025-08-19 PROCEDURE — 1159F MED LIST DOCD IN RCRD: CPT | Mod: CPTII,S$GLB,, | Performed by: OPHTHALMOLOGY

## 2025-08-19 PROCEDURE — 92133 CPTRZD OPH DX IMG PST SGM ON: CPT | Mod: S$GLB,,, | Performed by: OPHTHALMOLOGY

## 2025-08-19 PROCEDURE — 1160F RVW MEDS BY RX/DR IN RCRD: CPT | Mod: CPTII,S$GLB,, | Performed by: OPHTHALMOLOGY

## 2025-08-19 PROCEDURE — 2023F DILAT RTA XM W/O RTNOPTHY: CPT | Mod: CPTII,S$GLB,, | Performed by: OPHTHALMOLOGY

## 2025-08-19 PROCEDURE — 92012 INTRM OPH EXAM EST PATIENT: CPT | Mod: S$GLB,,, | Performed by: OPHTHALMOLOGY

## 2025-08-19 PROCEDURE — 99999 PR PBB SHADOW E&M-EST. PATIENT-LVL III: CPT | Mod: PBBFAC,,, | Performed by: OPHTHALMOLOGY

## 2025-08-19 RX ORDER — DORZOLAMIDE HYDROCHLORIDE AND TIMOLOL MALEATE 20; 5 MG/ML; MG/ML
1 SOLUTION/ DROPS OPHTHALMIC 2 TIMES DAILY
Qty: 10 ML | Refills: 6 | Status: SHIPPED | OUTPATIENT
Start: 2025-08-19

## 2025-08-21 PROBLEM — Z74.09 IMPAIRED FUNCTIONAL MOBILITY AND ACTIVITY TOLERANCE: Status: ACTIVE | Noted: 2025-08-21

## (undated) DEVICE — PAD DEFIB CADENCE ADULT R2